# Patient Record
Sex: MALE | Race: WHITE | NOT HISPANIC OR LATINO | Employment: UNEMPLOYED | URBAN - METROPOLITAN AREA
[De-identification: names, ages, dates, MRNs, and addresses within clinical notes are randomized per-mention and may not be internally consistent; named-entity substitution may affect disease eponyms.]

---

## 2017-11-10 ENCOUNTER — HOSPITAL ENCOUNTER (EMERGENCY)
Facility: HOSPITAL | Age: 52
Discharge: HOME | End: 2017-11-10
Admitting: EMERGENCY MEDICINE
Payer: SUBSIDIZED

## 2017-11-10 ENCOUNTER — APPOINTMENT (EMERGENCY)
Dept: RADIOLOGY | Facility: HOSPITAL | Age: 52
End: 2017-11-10
Payer: SUBSIDIZED

## 2017-11-10 VITALS
HEART RATE: 84 BPM | HEIGHT: 75 IN | RESPIRATION RATE: 16 BRPM | WEIGHT: 315 LBS | BODY MASS INDEX: 39.17 KG/M2 | TEMPERATURE: 98.5 F | DIASTOLIC BLOOD PRESSURE: 70 MMHG | SYSTOLIC BLOOD PRESSURE: 115 MMHG | OXYGEN SATURATION: 97 %

## 2017-11-10 DIAGNOSIS — M51.36 DDD (DEGENERATIVE DISC DISEASE), LUMBAR: ICD-10-CM

## 2017-11-10 DIAGNOSIS — M51.26 BULGE OF LUMBAR DISC WITHOUT MYELOPATHY: ICD-10-CM

## 2017-11-10 DIAGNOSIS — M54.50 LUMBAR BACK PAIN: Primary | ICD-10-CM

## 2017-11-10 PROCEDURE — 72131 CT LUMBAR SPINE W/O DYE: CPT

## 2017-11-10 PROCEDURE — 99283 EMERGENCY DEPT VISIT LOW MDM: CPT

## 2017-11-10 PROCEDURE — 96372 THER/PROPH/DIAG INJ SC/IM: CPT

## 2017-11-10 RX ORDER — KETOROLAC TROMETHAMINE 30 MG/ML
30 INJECTION, SOLUTION INTRAMUSCULAR; INTRAVENOUS ONCE
Status: COMPLETED | OUTPATIENT
Start: 2017-11-10 | End: 2017-11-10

## 2017-11-10 RX ORDER — CYCLOBENZAPRINE HCL 10 MG
10 TABLET ORAL 3 TIMES DAILY PRN
Qty: 21 TABLET | Refills: 0 | Status: SHIPPED | OUTPATIENT
Start: 2017-11-10 | End: 2018-06-26

## 2017-11-10 RX ORDER — PREDNISONE 50 MG/1
50 TABLET ORAL DAILY
Qty: 5 TABLET | Refills: 0 | Status: SHIPPED | OUTPATIENT
Start: 2017-11-10 | End: 2017-11-15

## 2017-11-10 RX ORDER — TRAMADOL HYDROCHLORIDE 50 MG/1
50 TABLET ORAL EVERY 8 HOURS PRN
Qty: 15 TABLET | Refills: 0 | Status: SHIPPED | OUTPATIENT
Start: 2017-11-10 | End: 2017-11-15

## 2017-11-10 RX ORDER — CYCLOBENZAPRINE HCL 10 MG
10 TABLET ORAL ONCE
Status: COMPLETED | OUTPATIENT
Start: 2017-11-10 | End: 2017-11-10

## 2017-11-10 RX ADMIN — KETOROLAC TROMETHAMINE 30 MG: 30 INJECTION, SOLUTION INTRAMUSCULAR at 11:16

## 2017-11-10 RX ADMIN — CYCLOBENZAPRINE HYDROCHLORIDE 10 MG: 10 TABLET, FILM COATED ORAL at 11:09

## 2017-11-10 RX ADMIN — PREDNISONE 50 MG: 20 TABLET ORAL at 11:09

## 2017-11-10 NOTE — ED PROVIDER NOTES
History  Chief Complaint   Patient presents with    Back Pain     lower center back pain x 2-3 days     The patient is a 49-year-old male presents to the emergency department complaining moderate to severe sharp central lumbar pain for for the past 3 days after the patient lifted a large wooden block with bad posture  Patient has a history of 2 laminectomies that were done at the L5-S1 level following severe right-sided radicular symptoms  He states that the procedures were done at [facility]  He is no longer followedBy a specialist for back issues, and states that he has not had any acute issues since the procedures were completed  He denies bowel or bladder incontinence, saddle anesthesia, or symptoms to bilateral lower extremities  History provided by:  Patient   used: No    Back Pain   Location:  Lumbar spine  Quality:  Stabbing  Radiates to:  Does not radiate  Pain severity:  Moderate  Onset quality:  Sudden  Duration:  3 days  Timing:  Constant  Progression:  Unchanged  Chronicity:  New  Context: lifting heavy objects    Context: not emotional stress, not falling, not jumping from heights, not MCA, not MVA, not occupational injury, not pedestrian accident, not physical stress, not recent illness, not recent injury and not twisting    Associated symptoms: no abdominal pain, no abdominal swelling, no bladder incontinence, no bowel incontinence, no chest pain, no dysuria, no fever, no headaches, no leg pain, no numbness, no paresthesias, no pelvic pain, no perianal numbness, no tingling, no weakness and no weight loss    Risk factors: no hx of cancer, no hx of osteoporosis, no lack of exercise, no menopause, not obese, not pregnant, no recent surgery, no steroid use and no vascular disease        Prior to Admission Medications   Prescriptions Last Dose Informant Patient Reported?  Taking?   lisinopril-hydrochlorothiazide (PRINZIDE,ZESTORETIC) 20-25 MG per tablet 11/10/2017 at Unknown time  Yes Yes   Sig: Take 1 tablet by mouth daily  Facility-Administered Medications: None       Past Medical History:   Diagnosis Date    Hypertension        Past Surgical History:   Procedure Laterality Date    LAMINECTOMY         History reviewed  No pertinent family history  I have reviewed and agree with the history as documented  Social History   Substance Use Topics    Smoking status: Never Smoker    Smokeless tobacco: Never Used    Alcohol use No        Review of Systems   Constitutional: Negative for chills, diaphoresis, fatigue, fever and weight loss  HENT: Negative  Eyes: Negative for photophobia and visual disturbance  Respiratory: Negative for cough, chest tightness, shortness of breath and wheezing  Cardiovascular: Negative for chest pain  Gastrointestinal: Negative for abdominal pain, bowel incontinence, nausea and vomiting  Genitourinary: Negative for bladder incontinence, dysuria, flank pain and pelvic pain  Musculoskeletal: Positive for back pain  Negative for myalgias and neck stiffness  Skin: Negative for color change, pallor and rash  Neurological: Negative for dizziness, tingling, weakness, numbness, headaches and paresthesias  Physical Exam  ED Triage Vitals [11/10/17 1034]   Temperature Pulse Respirations Blood Pressure SpO2   98 5 °F (36 9 °C) 84 16 115/70 97 %      Temp Source Heart Rate Source Patient Position - Orthostatic VS BP Location FiO2 (%)   Tympanic Monitor Sitting Left arm --      Pain Score       8           Orthostatic Vital Signs  Vitals:    11/10/17 1034   BP: 115/70   Pulse: 84   Patient Position - Orthostatic VS: Sitting       Physical Exam   Constitutional: He is oriented to person, place, and time  He appears well-developed and well-nourished  No distress  HENT:   Head: Normocephalic and atraumatic     Right Ear: External ear normal    Left Ear: External ear normal    Nose: Nose normal    Eyes: Conjunctivae are normal  Right eye exhibits no discharge  Left eye exhibits no discharge  Neck: Normal range of motion  Neck supple  Cardiovascular: Normal rate, regular rhythm, normal heart sounds and intact distal pulses  Exam reveals no friction rub  No murmur heard  Pulmonary/Chest: Effort normal and breath sounds normal  No respiratory distress  He has no wheezes  Musculoskeletal:        Lumbar back: He exhibits decreased range of motion, tenderness and pain  He exhibits no swelling, no edema, no deformity and no spasm  Back:    Right-sided lumbar-sacral tenderness to palpation without bony step-offs  No spasms noted in the region  Lymphadenopathy:     He has no cervical adenopathy  Neurological: He is alert and oriented to person, place, and time  He exhibits normal muscle tone  Coordination normal    Skin: Skin is warm and dry  Capillary refill takes less than 2 seconds  No rash noted  He is not diaphoretic  No pallor  Nursing note and vitals reviewed  ED Medications  Medications   ketorolac (TORADOL) 30 mg/mL injection 30 mg (30 mg Intramuscular Given 11/10/17 1116)   cyclobenzaprine (FLEXERIL) tablet 10 mg (10 mg Oral Given 11/10/17 1109)   predniSONE tablet 50 mg (50 mg Oral Given 11/10/17 1109)       Diagnostic Studies  Results Reviewed     None                 CT lumbar spine without contrast   Final Result by Jenelle Mead MD (11/10 1209)      No acute compression collapse of the vertebra seen          Severe left lateral recess stenosis of the S1 due to proliferative change and degenerative disc disease      Mild central canal narrowing at L4-5 with the right foraminal and protrusion and mild right foraminal narrowing      There is a right paracentral, subarticular recess and foraminal and extraforaminal protrusion at L3-4 level mild central canal narrowing         Workstation performed: [id number]                    Procedures  Procedures       Phone Contacts  ED Phone Contact    ED Course  ED Course                                MDM  Number of Diagnoses or Management Options  Bulge of lumbar disc without myelopathy: new and requires workup  DDD (degenerative disc disease), lumbar: new and requires workup  Lumbar back pain: new and requires workup  Diagnosis management comments: CT of the patient's lumbar spine showed disc bulging and foraminal stenosis from L3-L5  He was discharged in no acute distress with supportive therapy and referred to [address] [place] [address] for further evaluation and/or treatment as necessary  Amount and/or Complexity of Data Reviewed  Tests in the radiology section of CPT®: ordered and reviewed  Review and summarize past medical records: yes      CritCare Time    Disposition  Final diagnoses:   Lumbar back pain   Bulge of lumbar disc without myelopathy   DDD (degenerative disc disease), lumbar     Time reflects when diagnosis was documented in both MDM as applicable and the Disposition within this note     Time User Action Codes Description Comment    11/10/2017 12:16 PM Gladys Major Add [M54 5] Lumbar back pain     11/10/2017 12:17 PM Gladys Major Add [M51 26] Bulge of lumbar disc without myelopathy     11/10/2017 12:17 PM Gladys Major Add [M51 36] DDD (degenerative disc disease), lumbar       ED Disposition     ED Disposition Condition Comment    Discharge  Luige Nadeem 54 discharge to home/self care  Condition at discharge: Stable        Follow-up Information     Follow up With Specialties Details Why Contact Info Additional [address] Emergency Department Emergency Medicine Go to If symptoms worsen [facility]  [phone] [facility], [place], [place], [address]    Chan Linder MD Orthopedic Surgery Schedule an appointment as soon as possible for a visit For further evaluation and/or treatment as necessary   [facility] [address] [place]  [phone]           Discharge Medication List as of 11/10/2017 12:22 PM      START taking these medications    Details   cyclobenzaprine (FLEXERIL) 10 mg tablet Take 1 tablet by mouth 3 (three) times a day as needed for muscle spasms for up to 7 days, Starting Fri 11/10/2017, Until Fri 11/17/2017, Print      predniSONE 50 mg tablet Take 1 tablet by mouth daily for 5 days, Starting Fri 11/10/2017, Until Wed 11/15/2017, Print      traMADol (ULTRAM) 50 mg tablet Take 1 tablet by mouth every 8 (eight) hours as needed for moderate pain or severe pain for up to 5 days, Starting Fri 11/10/2017, Until Wed 11/15/2017, Print         CONTINUE these medications which have NOT CHANGED    Details   lisinopril-hydrochlorothiazide (PRINZIDE,ZESTORETIC) 20-25 MG per tablet Take 1 tablet by mouth daily  , Until Discontinued, Historical Med           No discharge procedures on file      ED Provider  Electronically Signed by           Naveen Roman PA-C  11/10/17 0170

## 2017-11-10 NOTE — DISCHARGE INSTRUCTIONS
Degenerative Disc Disease   WHAT YOU NEED TO KNOW:   Degenerative disc disease happens when one or more discs between the vertebrae (bones in your spine) wear down  Discs act like a cushion between your vertebrae and help to stabilize your spine  Degenerative disc disease commonly occurs in the neck or lower back as you get older  DISCHARGE INSTRUCTIONS:   Medicines: You may need the following:  · NSAIDs , such as ibuprofen, help decrease swelling, pain, and fever  This medicine is available with or without a doctor's order  NSAIDs can cause stomach bleeding or kidney problems in certain people  If you take blood thinner medicine, always ask your healthcare provider if NSAIDs are safe for you  Always read the medicine label and follow directions  · Acetaminophen  decreases pain  It is available without a doctor's order  Ask how much to take and how often to take it  Follow directions  Acetaminophen can cause liver damage if not taken correctly  · Prescription pain medicine  may be given  Ask how to take this medicine safely  · Take your medicine as directed  Contact your healthcare provider if you think your medicine is not helping or if you have side effects  Tell him or her if you are allergic to any medicine  Keep a list of the medicines, vitamins, and herbs you take  Include the amounts, and when and why you take them  Bring the list or the pill bottles to follow-up visits  Carry your medicine list with you in case of an emergency  Follow up with your healthcare provider as directed:  Write down your questions so you remember to ask them during your visits  Go to physical therapy as directed:  A physical therapist will help you find stretches and exercises to decrease pain and improve movement and strength  He may also do spinal decompression to stretch and open the area between your vertebra  Manage your symptoms:   · Avoid activities that make your symptoms worse    Ask your healthcare provider for ways to decrease your symptoms  Certain stretches or exercises may relieve your symptoms  Ask how to stay active without further injury  · Apply heat or ice as directed  Heat or ice may help decrease pain, inflammation, and muscle spasms  · Maintain a healthy weight  If you are overweight, weight loss may help improve your symptoms  Ask your healthcare provider to help you create a weight loss plan if you are overweight  · Find ways to manage your stress  Behavioral therapy may help you learn ways to manage stress and decrease pain  Ask for more information about behavioral therapy  · Do not smoke  If you smoke, it is never too late to quit  Ask for information if you need help quitting  Contact your healthcare provider if:   · Your pain gets worse, or you cannot control it with pain medicine  · Your symptoms get worse  · You have questions or concerns about your condition or care  Return to the emergency department if:   · You have severe pain or weakness, or you cannot move your arm or leg  · You lose control of your bladder or bowels  © 2017 [facility] Information is for End User's use only and may not be sold, redistributed or otherwise used for commercial purposes  All illustrations and images included in CareNotes® are the copyrighted property of [employer] , [employer]  or Daniel Ricardo  The above information is an  only  It is not intended as medical advice for individual conditions or treatments  Talk to your doctor, nurse or pharmacist before following any medical regimen to see if it is safe and effective for you

## 2017-11-10 NOTE — ED NOTES
Patient transported to [facility]     Noel Srivastava, RN  11/10/17 [address] RN  11/10/17 1121

## 2018-06-26 ENCOUNTER — APPOINTMENT (EMERGENCY)
Dept: RADIOLOGY | Facility: HOSPITAL | Age: 53
End: 2018-06-26
Payer: SUBSIDIZED

## 2018-06-26 ENCOUNTER — HOSPITAL ENCOUNTER (OUTPATIENT)
Facility: HOSPITAL | Age: 53
Setting detail: OBSERVATION
Discharge: HOME | End: 2018-06-27
Attending: SURGERY | Admitting: EMERGENCY MEDICINE

## 2018-06-26 ENCOUNTER — HOSPITAL ENCOUNTER (EMERGENCY)
Facility: HOSPITAL | Age: 53
End: 2018-06-26
Attending: EMERGENCY MEDICINE | Admitting: EMERGENCY MEDICINE
Payer: SUBSIDIZED

## 2018-06-26 VITALS
HEART RATE: 63 BPM | BODY MASS INDEX: 38.36 KG/M2 | WEIGHT: 315 LBS | RESPIRATION RATE: 18 BRPM | DIASTOLIC BLOOD PRESSURE: 77 MMHG | HEIGHT: 76 IN | SYSTOLIC BLOOD PRESSURE: 153 MMHG | TEMPERATURE: 97.1 F | OXYGEN SATURATION: 96 %

## 2018-06-26 DIAGNOSIS — S32.009A FRACTURE OF TRANSVERSE PROCESS OF LUMBAR VERTEBRA (HCC): Primary | ICD-10-CM

## 2018-06-26 DIAGNOSIS — S32.009A CLOSED FRACTURE OF TRANSVERSE PROCESS OF LUMBAR VERTEBRA, INITIAL ENCOUNTER (HCC): Primary | ICD-10-CM

## 2018-06-26 PROBLEM — S50.812A ABRASION OF LEFT FOREARM: Status: ACTIVE | Noted: 2018-06-26

## 2018-06-26 PROBLEM — S80.212A ABRASION OF LEFT KNEE: Status: ACTIVE | Noted: 2018-06-26

## 2018-06-26 LAB
ANION GAP SERPL CALCULATED.3IONS-SCNC: 5 MMOL/L (ref 4–13)
BACTERIA UR QL AUTO: ABNORMAL /HPF
BASOPHILS # BLD AUTO: 0.07 THOUSANDS/ΜL (ref 0–0.1)
BASOPHILS NFR BLD AUTO: 1 % (ref 0–1)
BILIRUB UR QL STRIP: NEGATIVE
BUN SERPL-MCNC: 16 MG/DL (ref 5–25)
CALCIUM SERPL-MCNC: 8.8 MG/DL (ref 8.3–10.1)
CHLORIDE SERPL-SCNC: 105 MMOL/L (ref 100–108)
CLARITY UR: CLEAR
CO2 SERPL-SCNC: 28 MMOL/L (ref 21–32)
COLOR UR: YELLOW
CREAT SERPL-MCNC: 1.23 MG/DL (ref 0.6–1.3)
EOSINOPHIL # BLD AUTO: 0.21 THOUSAND/ΜL (ref 0–0.61)
EOSINOPHIL NFR BLD AUTO: 2 % (ref 0–6)
ERYTHROCYTE [DISTWIDTH] IN BLOOD BY AUTOMATED COUNT: 13 % (ref 11.6–15.1)
GFR SERPL CREATININE-BSD FRML MDRD: 66 ML/MIN/1.73SQ M
GLUCOSE SERPL-MCNC: 110 MG/DL (ref 65–140)
GLUCOSE UR STRIP-MCNC: NEGATIVE MG/DL
HCT VFR BLD AUTO: 42.5 % (ref 36.5–49.3)
HGB BLD-MCNC: 14 G/DL (ref 12–17)
HGB UR QL STRIP.AUTO: ABNORMAL
IMM GRANULOCYTES # BLD AUTO: 0.03 THOUSAND/UL (ref 0–0.2)
IMM GRANULOCYTES NFR BLD AUTO: 0 % (ref 0–2)
KETONES UR STRIP-MCNC: ABNORMAL MG/DL
LEUKOCYTE ESTERASE UR QL STRIP: NEGATIVE
LYMPHOCYTES # BLD AUTO: 1.45 THOUSANDS/ΜL (ref 0.6–4.47)
LYMPHOCYTES NFR BLD AUTO: 16 % (ref 14–44)
MCH RBC QN AUTO: 30.6 PG (ref 26.8–34.3)
MCHC RBC AUTO-ENTMCNC: 32.9 G/DL (ref 31.4–37.4)
MCV RBC AUTO: 93 FL (ref 82–98)
MONOCYTES # BLD AUTO: 0.58 THOUSAND/ΜL (ref 0.17–1.22)
MONOCYTES NFR BLD AUTO: 6 % (ref 4–12)
MUCOUS THREADS UR QL AUTO: ABNORMAL
NEUTROPHILS # BLD AUTO: 6.74 THOUSANDS/ΜL (ref 1.85–7.62)
NEUTS SEG NFR BLD AUTO: 75 % (ref 43–75)
NITRITE UR QL STRIP: NEGATIVE
NON-SQ EPI CELLS URNS QL MICRO: ABNORMAL /HPF
NRBC BLD AUTO-RTO: 0 /100 WBCS
PH UR STRIP.AUTO: 6.5 [PH] (ref 5–9)
PLATELET # BLD AUTO: 235 THOUSANDS/UL (ref 149–390)
PMV BLD AUTO: 10 FL (ref 8.9–12.7)
POTASSIUM SERPL-SCNC: 3.7 MMOL/L (ref 3.5–5.3)
PROT UR STRIP-MCNC: NEGATIVE MG/DL
RBC # BLD AUTO: 4.57 MILLION/UL (ref 3.88–5.62)
RBC #/AREA URNS AUTO: ABNORMAL /HPF
SODIUM SERPL-SCNC: 138 MMOL/L (ref 136–145)
SP GR UR STRIP.AUTO: 1.02 (ref 1–1.03)
UROBILINOGEN UR QL STRIP.AUTO: 0.2 E.U./DL
WBC # BLD AUTO: 9.08 THOUSAND/UL (ref 4.31–10.16)
WBC #/AREA URNS AUTO: ABNORMAL /HPF

## 2018-06-26 PROCEDURE — 99285 EMERGENCY DEPT VISIT HI MDM: CPT

## 2018-06-26 PROCEDURE — 85025 COMPLETE CBC W/AUTO DIFF WBC: CPT | Performed by: EMERGENCY MEDICINE

## 2018-06-26 PROCEDURE — 90715 TDAP VACCINE 7 YRS/> IM: CPT | Performed by: EMERGENCY MEDICINE

## 2018-06-26 PROCEDURE — 81001 URINALYSIS AUTO W/SCOPE: CPT | Performed by: EMERGENCY MEDICINE

## 2018-06-26 PROCEDURE — 36415 COLL VENOUS BLD VENIPUNCTURE: CPT | Performed by: EMERGENCY MEDICINE

## 2018-06-26 PROCEDURE — 72100 X-RAY EXAM L-S SPINE 2/3 VWS: CPT

## 2018-06-26 PROCEDURE — 74177 CT ABD & PELVIS W/CONTRAST: CPT

## 2018-06-26 PROCEDURE — 96376 TX/PRO/DX INJ SAME DRUG ADON: CPT

## 2018-06-26 PROCEDURE — 80048 BASIC METABOLIC PNL TOTAL CA: CPT | Performed by: EMERGENCY MEDICINE

## 2018-06-26 PROCEDURE — 96374 THER/PROPH/DIAG INJ IV PUSH: CPT

## 2018-06-26 PROCEDURE — 99219 PR INITIAL OBSERVATION CARE/DAY 50 MINUTES: CPT | Performed by: EMERGENCY MEDICINE

## 2018-06-26 PROCEDURE — 72170 X-RAY EXAM OF PELVIS: CPT

## 2018-06-26 RX ORDER — LIDOCAINE 50 MG/G
1 PATCH TOPICAL DAILY
Status: DISCONTINUED | OUTPATIENT
Start: 2018-06-26 | End: 2018-06-27 | Stop reason: HOSPADM

## 2018-06-26 RX ORDER — ACETAMINOPHEN 325 MG/1
650 TABLET ORAL EVERY 6 HOURS SCHEDULED
Status: DISCONTINUED | OUTPATIENT
Start: 2018-06-26 | End: 2018-06-27 | Stop reason: HOSPADM

## 2018-06-26 RX ORDER — OXYCODONE HYDROCHLORIDE 5 MG/1
5 TABLET ORAL EVERY 4 HOURS PRN
Status: DISCONTINUED | OUTPATIENT
Start: 2018-06-26 | End: 2018-06-27 | Stop reason: HOSPADM

## 2018-06-26 RX ORDER — METHOCARBAMOL 500 MG/1
500 TABLET, FILM COATED ORAL EVERY 6 HOURS PRN
Status: DISCONTINUED | OUTPATIENT
Start: 2018-06-26 | End: 2018-06-27 | Stop reason: HOSPADM

## 2018-06-26 RX ORDER — MORPHINE SULFATE 4 MG/ML
4 INJECTION, SOLUTION INTRAMUSCULAR; INTRAVENOUS ONCE
Status: COMPLETED | OUTPATIENT
Start: 2018-06-26 | End: 2018-06-26

## 2018-06-26 RX ORDER — HEPARIN SODIUM 5000 [USP'U]/ML
5000 INJECTION, SOLUTION INTRAVENOUS; SUBCUTANEOUS EVERY 8 HOURS SCHEDULED
Status: DISCONTINUED | OUTPATIENT
Start: 2018-06-26 | End: 2018-06-27 | Stop reason: HOSPADM

## 2018-06-26 RX ORDER — OXYCODONE HYDROCHLORIDE AND ACETAMINOPHEN 5; 325 MG/1; MG/1
1 TABLET ORAL ONCE
Status: COMPLETED | OUTPATIENT
Start: 2018-06-26 | End: 2018-06-26

## 2018-06-26 RX ORDER — OXYCODONE HYDROCHLORIDE 5 MG/1
2.5 TABLET ORAL EVERY 4 HOURS PRN
Status: DISCONTINUED | OUTPATIENT
Start: 2018-06-26 | End: 2018-06-27

## 2018-06-26 RX ORDER — MORPHINE SULFATE 4 MG/ML
2 INJECTION, SOLUTION INTRAMUSCULAR; INTRAVENOUS ONCE
Status: COMPLETED | OUTPATIENT
Start: 2018-06-26 | End: 2018-06-26

## 2018-06-26 RX ADMIN — MORPHINE SULFATE 2 MG: 4 INJECTION INTRAVENOUS at 04:21

## 2018-06-26 RX ADMIN — MORPHINE SULFATE 4 MG: 4 INJECTION INTRAVENOUS at 07:00

## 2018-06-26 RX ADMIN — HEPARIN SODIUM 5000 UNITS: 5000 INJECTION, SOLUTION INTRAVENOUS; SUBCUTANEOUS at 15:48

## 2018-06-26 RX ADMIN — IOHEXOL 100 ML: 350 INJECTION, SOLUTION INTRAVENOUS at 05:15

## 2018-06-26 RX ADMIN — HEPARIN SODIUM 5000 UNITS: 5000 INJECTION, SOLUTION INTRAVENOUS; SUBCUTANEOUS at 23:15

## 2018-06-26 RX ADMIN — ACETAMINOPHEN 650 MG: 325 TABLET ORAL at 23:15

## 2018-06-26 RX ADMIN — ACETAMINOPHEN 650 MG: 325 TABLET ORAL at 12:36

## 2018-06-26 RX ADMIN — TETANUS TOXOID, REDUCED DIPHTHERIA TOXOID AND ACELLULAR PERTUSSIS VACCINE, ADSORBED 0.5 ML: 5; 2.5; 8; 8; 2.5 SUSPENSION INTRAMUSCULAR at 12:37

## 2018-06-26 RX ADMIN — ACETAMINOPHEN 650 MG: 325 TABLET ORAL at 18:29

## 2018-06-26 RX ADMIN — OXYCODONE HYDROCHLORIDE 2.5 MG: 5 TABLET ORAL at 15:48

## 2018-06-26 RX ADMIN — OXYCODONE HYDROCHLORIDE 2.5 MG: 5 TABLET ORAL at 23:19

## 2018-06-26 RX ADMIN — OXYCODONE HYDROCHLORIDE AND ACETAMINOPHEN 1 TABLET: 5; 325 TABLET ORAL at 02:25

## 2018-06-26 NOTE — ORTHOTIC NOTE
Orthotic Note            Date: 6/26/2018      Patient Name: Carlos Chahal        Time: 13:30pm    Reason for Consult:  Patient Active Problem List   Diagnosis    Closed fracture of transverse process of lumbar vertebra ([address] )    Abrasion of left forearm    Abrasion of left knee   [employer] Draw plus additional extension panel     I measured patient for [employer] quick Draw with additional extension panel  I reviewed instructions/adjustments with patient x's 3  Patient states he is having increased pain making it difficult for him to mobilize  I will continue to follow up with patient daily  Instructions and my contact information at bedside  RN aware  Recommendations:  Please call Mobility Coordinator at ext  [phone] in regards to bracing instruction and/or adjustment  Mirza Landis Mobility Coordinator LCFo, LCOF, ASOP R  O T, O B T

## 2018-06-26 NOTE — EMTALA/ACUTE CARE TRANSFER
[address] [facility] [address] EMERGENCY DEPARTMENT  [address] [id number]  Dept: [phone]      EMTALA TRANSFER CONSENT    NAME Madison Gore                                         1965                              MRN [id number]    I have been informed of my rights regarding examination, treatment, and transfer   by Dr Niko Pelayo DO    Benefits:      Risks: Potential for delay in receiving treatment, Potential deterioration of medical condition, Increased discomfort during transfer, Possible worsening of condition or death during transfer      Consent for Transfer:  I acknowledge that my medical condition has been evaluated and explained to me by the emergency department physician or other qualified medical person and/or my attending physician, who has recommended that I be transferred to the service of  Accepting Physician: Dr Shira Molina at [address] [place] [address] Name, [address] : [place], [place]  The above potential benefits of such transfer, the potential risks associated with such transfer, and the probable risks of not being transferred have been explained to me, and I fully understand them  The doctor has explained that, in my case, the benefits of transfer outweigh the risks  I agree to be transferred  I authorize the performance of emergency medical procedures and treatments upon me in both transit and upon arrival at the receiving facility  Additionally, I authorize the release of any and all medical records to the receiving facility and request they be transported with me, if possible  I understand that the safest mode of transportation during a medical emergency is an ambulance and that the Hospital advocates the use of this mode of transport   Risks of traveling to the receiving facility by car, including absence of medical control, life sustaining equipment, such as oxygen, and medical personnel has been explained to me and I fully understand them     ([facility])  [  ]  I consent to the stated transfer and to be transported by ambulance/helicopter  [  ]  I consent to the stated transfer, but refuse transportation by ambulance and accept full responsibility for my transportation by car  I understand the risks of non-ambulance transfers and I exonerate the Hospital and its staff from any deterioration in my condition that results from this refusal     X___________________________________________    DATE  18  TIME________  Signature of patient or legally responsible individual signing on patient behalf           RELATIONSHIP TO PATIENT_________________________          Provider Certification    NAME Saqib Mendez                                         1965                              MRN [id number]    A medical screening exam was performed on the above named patient  Based on the examination:    Condition Necessitating Transfer The encounter diagnosis was Fracture of transverse process of lumbar vertebra ([facility] [address] )  Patient Condition: An emergency transfer is being made prior to stabilization due to the need for definitive care and the benefit of transfer outweighs the risk    Reason for Transfer: Level of Care needed not available at this facility    Transfer Requirements: [address] [place] [address]   · Space available and qualified personnel available for treatment as acknowledged by    · Agreed to accept transfer and to provide appropriate medical treatment as acknowledged by       Dr Shobha Cevallos  · Appropriate medical records of the examination and treatment of the patient are provided at the time of transfer   [facility], [address] _______  · Transfer will be performed by qualified personnel from    and appropriate transfer equipment as required, including the use of necessary and appropriate life support measures      Provider Certification: I have examined the patient and explained the following risks and benefits of being transferred/refusing transfer to the patient/family:  General risk, such as traffic hazards, adverse weather conditions, rough terrain or turbulence, possible failure of equipment (including vehicle or aircraft), or consequences of actions of persons outside the control of the transport personnel, Risk of worsening condition      Based on these reasonable risks and benefits to the patient and/or the unborn child(nory), and based upon the information available at the time of the patients examination, I certify that the medical benefits reasonably to be expected from the provision of appropriate medical treatments at another medical facility outweigh the increasing risks, if any, to the individuals medical condition, and in the case of labor to the unborn child, from effecting the transfer      X____________________________________________ DATE 06/26/18        TIME_______      ORIGINAL - SEND TO MEDICAL RECORDS   COPY - SEND WITH PATIENT DURING TRANSFER

## 2018-06-26 NOTE — RESPIRATORY THERAPY NOTE
RT Protocol Note  Daril Needle 52 y o  male MRN: [id number]  Unit/Bed#: ED 06 Encounter: [id number]    Assessment    Principal Problem:    Closed fracture of transverse process of lumbar vertebra ([address] )  Active Problems:    Abrasion of left forearm    Abrasion of left knee      Home Pulmonary Medications:  n/a       Past Medical History:   Diagnosis Date    Hypertension      Social History     Social History    Marital status:      Spouse name: N/A    Number of children: N/A    Years of education: N/A     Social History Main Topics    Smoking status: Never Smoker    Smokeless tobacco: Never Used    Alcohol use No    Drug use: No    Sexual activity: Not Asked     Other Topics Concern    None     Social History Narrative    None       Subjective         Objective    Physical Exam:   Assessment Type: (P) Assess only  General Appearance: (P) Awake  Respiratory Pattern: (P) Normal  Chest Assessment: (P) Chest expansion symmetrical  Bilateral Breath Sounds: (P) Clear    Vitals:  Blood pressure 129/66, pulse 65, temperature 97 8 °F (36 6 °C), temperature source Oral, resp  rate 18, SpO2 97 %  Imaging and other studies: I have personally reviewed pertinent reports  Plan             Resp Comments: (P) respiratory protocol d/c'd, pt has no pulmonary hx, pt takes no pulmonary meds at home, pt bs are clear, and pt is in no distress

## 2018-06-26 NOTE — H&P
H&P Exam - Trauma   Shawn Lowry 52 y o  male MRN: [id number]  Unit/Bed#: ED 06 Encounter: [id number]    Assessment/Plan   Trauma Alert: Other Transfer  Model of Arrival: Ambulance  Trauma Team: Attending Dianne Johnston and Residents [address]  Consultants: None Neurosurgery    Trauma Active Problems:     Fall from standing  Acute nondisplaced fracture of the left L1, L2 transverse process fracture  L forearm abrasion  L knee abrasion    Trauma Plan:     Admit for Observation  Neurosurgery consul  Pain control  Lumbar spine precautions  update tetanus  cbc  dvt ppx    Chief Complaint:  Back pain    History of Present Illness   HPI:  Shawn Lowry is a 52 y o  male who presents after falling while trying to unload a trailer  He lost his balance stumbling backwards and landing on a piece of cement on his left lower back  This happened yesterday evening  Was able sleep sleep through the night but I considerable left lower back pain  He presented to [address] [place] [address] this morning secondary to the pain and CT scan imaging showed a L1, L2 left-sided transverse process fracture  Patient had no neurologic symptoms  Pain was sharp, constant, nonradiating in the left lower back  No history of back pain  Worse with ambulating and better when lying flat  Currently a 2/10  No red flag back pain symptoms including saddle anesthesia, urinary retention, or incontinence, no lower extremity numbness tingling weakness  Fer Bess He denies any other areas of injury at this time  He denies hitting his head or losing consciousness  He was able to get up and ambulate back to his house after the fall but had considerable pain in the left lower back  Mechanism:Fall    Review of Systems   Constitutional: Negative for chills, fatigue and fever  HENT: Negative for congestion and sore throat  Eyes: Negative for visual disturbance  Respiratory: Negative for cough, chest tightness, shortness of breath and wheezing      Cardiovascular: Negative for chest pain, palpitations and leg swelling  Gastrointestinal: Negative for abdominal pain, blood in stool, diarrhea, nausea and vomiting  Genitourinary: Negative for difficulty urinating, dysuria and hematuria  Musculoskeletal: Positive for back pain and gait problem  Negative for arthralgias  Skin: Negative for rash  Neurological: Negative for dizziness, syncope, weakness, light-headedness, numbness and headaches  Hematological: Negative for adenopathy  Psychiatric/Behavioral: Negative for sleep disturbance  Historical Information     Efforts to obtain history included the following sources: other medical personnel, obtained from other records    Past Medical History:   Diagnosis Date    Hypertension      Past Surgical History:   Procedure Laterality Date    LAMINECTOMY       Social History   History   Alcohol Use No     History   Drug Use No     History   Smoking Status    Never Smoker   Smokeless Tobacco    Never Used     Immunization History   Administered Date(s) Administered    Tdap 07/02/2010     Last Tetanus:  Unknown  Family History: Non-contributory  Unable to obtain/limited by none      Meds/Allergies   all current active meds have been reviewed    No Known Allergies      PHYSICAL EXAM    PE limited by:  None    Objective   Vitals:   First set: Temperature: 97 8 °F (36 6 °C) (06/26/18 0750)  Pulse: 68 (06/26/18 0750)  Respirations: 18 (06/26/18 0750)  Blood Pressure: 156/75 (06/26/18 0750)    Primary Survey:   (A) Airway:  Intact  (B) Breathing:  Bilateral breath sounds  (C) Circulation: Pulses:   normal, carotid  2/4, pedal  2/4, radial  2/4 and femoral  2/4  (D) Disabliity:  GCS Total:  15  (E) Expose:  Completed    Secondary Survey: (Click on Physical Exam tab above)  Physical Exam   Constitutional: He is oriented to person, place, and time  He appears well-developed and well-nourished  No distress  HENT:   Head: Normocephalic and atraumatic     Right Ear: External ear normal    Left Ear: External ear normal    Mouth/Throat: Oropharynx is clear and moist  No oropharyngeal exudate  Eyes: Conjunctivae are normal  Pupils are equal, round, and reactive to light  Neck: Normal range of motion  Neck supple  No JVD present  No tracheal deviation present  No thyromegaly present  Cardiovascular: Normal rate and regular rhythm  No murmur heard  Pulmonary/Chest: Effort normal and breath sounds normal  No respiratory distress  He has no wheezes  He has no rales  He exhibits no tenderness  Abdominal: Soft  He exhibits no distension  There is no tenderness  There is no rebound and no guarding  Musculoskeletal: Normal range of motion  Cervical back: Normal         Thoracic back: Normal         Lumbar back: He exhibits tenderness, bony tenderness, pain and spasm  He exhibits no laceration  Back:    Full range of motion all extremities and no pain to bony palpation   Lymphadenopathy:     He has no cervical adenopathy  Neurological: He is alert and oriented to person, place, and time  He has normal strength  No cranial nerve deficit or sensory deficit  He exhibits normal muscle tone  Coordination normal  GCS eye subscore is 4  GCS verbal subscore is 5  GCS motor subscore is 6  Able to ambulate without difficulty, 5 out of 5 lower extremity strength at the hip, knee, plantar and dorsiflexion  Normal sensation that medial, lateral malleolus as well as first webspace  2+ patellar reflexes bilaterally  Negative straight leg raise bilaterally  Unremarkable cranial nerve exam  Pupils 4 mm equal round reactive to light  No nystagmus, normal extraocular motion  5 out of 5 upper and lower extremity strength  No subjective sensory deficits to the face, upper or lower extremity  Normal finger-nose and heel shin  Skin: Skin is warm  Abrasion noted  No laceration and no rash noted  He is not diaphoretic  No pallor  Psychiatric: He has a normal mood and affect  Invasive Devices     Peripheral Intravenous Line            Peripheral IV 06/26/18 Right Antecubital less than 1 day                Lab Results:   Results: I have personally reviewed pertinent reports   , BMP/CMP:   Lab Results   Component Value Date     06/26/2018    K 3 7 06/26/2018     06/26/2018    CO2 28 06/26/2018    ANIONGAP 5 06/26/2018    BUN 16 06/26/2018    CREATININE 1 23 06/26/2018    GLUCOSE 110 06/26/2018    CALCIUM 8 8 06/26/2018    EGFR 66 06/26/2018    and CBC: No results found for: WBC, HGB, HCT, MCV, PLT, ADJUSTEDWBC, MCH, MCHC, RDW, MPV, NRBC  Imaging/EKG Studies: Results: I have personally reviewed pertinent reports  Other Studies:     CTAP -Acute nondisplaced fracture left L1 and L2 transverse processes  No acute intra-abdominal or intrapelvic process  Mild hepatic steatosis  XR Lumbar - No acute osseous abnormality       Degenerative changes as described  XR PELVIS - No acute osseous abnormality      Degenerative changes as described       Code Status: Level 1 - Full Code  Advance Directive and Living Will:      Power of :    POLST:

## 2018-06-26 NOTE — ED PROVIDER NOTES
History  Chief Complaint   Patient presents with    Back Injury     Pt states he fell on his bottom at about 2100 and hurt his L lower back  Pt in ER with c/o low back pain after he slipped and fell onto his back and landed on a piece of concrete late last night  Pt denies LOC or head injury  He states that pain worsened with attempts to move or lay on his side  Pt took 400mg of motrin at home prior to coming to the ER  None       Past Medical History:   Diagnosis Date    Hypertension        Past Surgical History:   Procedure Laterality Date    LAMINECTOMY         No family history on file  I have reviewed and agree with the history as documented  Social History   Substance Use Topics    Smoking status: Never Smoker    Smokeless tobacco: Never Used    Alcohol use No        Review of Systems   Constitutional: Negative for chills and fever  Musculoskeletal: Positive for back pain and gait problem  All other systems reviewed and are negative  Physical Exam  Physical Exam   Constitutional: He is oriented to person, place, and time  He appears well-developed and well-nourished  He appears distressed  HENT:   Head: Normocephalic and atraumatic  Cardiovascular: Normal rate and regular rhythm  Pulmonary/Chest: Effort normal and breath sounds normal  No respiratory distress  He has no wheezes  Abdominal: Soft  Normal appearance and bowel sounds are normal    Musculoskeletal: He exhibits tenderness  He exhibits no edema or deformity  Arms:  Neurological: He is alert and oriented to person, place, and time  Skin: Skin is dry  Nursing note and vitals reviewed        Vital Signs  ED Triage Vitals   Temperature Pulse Respirations Blood Pressure SpO2   06/26/18 0208 06/26/18 0208 06/26/18 0208 06/26/18 0208 06/26/18 0208   98 4 °F (36 9 °C) 79 22 161/70 98 %      Temp Source Heart Rate Source Patient Position - Orthostatic VS BP Location FiO2 (%)   06/26/18 4116 06/26/18 1626 06/26/18 0208 06/26/18 0208 --   Tympanic Monitor Lying Left arm       Pain Score       06/26/18 0225       8           Vitals:    06/26/18 0208 06/26/18 0545 06/26/18 0546 06/26/18 0651   BP: 161/70 (!) 173/73 (!) 173/73 153/77   Pulse: 79  68 63   Patient Position - Orthostatic VS: Lying  Lying Lying       Visual Acuity      ED Medications  Medications   oxyCODONE-acetaminophen (PERCOCET) 5-325 mg per tablet 1 tablet (1 tablet Oral Given 6/26/18 0225)   morphine (PF) 4 mg/mL injection 2 mg (2 mg Intravenous Given 6/26/18 0421)   iohexol (OMNIPAQUE) 350 MG/ML injection (SINGLE-DOSE) 100 mL (100 mL Intravenous Given 6/26/18 0515)   morphine (PF) 4 mg/mL injection 4 mg (4 mg Intravenous Given 6/26/18 0700)       Diagnostic Studies  Results Reviewed     Procedure Component Value Units Date/Time    Basic metabolic panel [[id number]] Collected:  06/26/18 0422    Lab Status:  Final result Specimen:  Blood from Arm, Right Updated:  06/26/18 0440     Sodium 138 mmol/L      Potassium 3 7 mmol/L      Chloride 105 mmol/L      CO2 28 mmol/L      Anion Gap 5 mmol/L      BUN 16 mg/dL      Creatinine 1 23 mg/dL      Glucose 110 mg/dL      Calcium 8 8 mg/dL      eGFR 66 ml/min/1 73sq m     Narrative:         National Kidney Disease Education Program recommendations are as follows:  GFR calculation is accurate only with a steady state creatinine  Chronic Kidney disease less than 60 ml/min/1 73 sq  meters  Kidney failure less than 15 ml/min/1 73 sq  meters      Urine Microscopic [[id number]]  (Abnormal) Collected:  06/26/18 0335    Lab Status:  Final result Specimen:  Urine from Urine, Clean Catch Updated:  06/26/18 0348     RBC, UA 1-2 (A) /hpf      WBC, UA None Seen /hpf      Epithelial Cells None Seen /hpf      Bacteria, UA Occasional /hpf      MUCOUS THREADS Moderate (A)    UA w Reflex to Microscopic [[id number]]  (Abnormal) Collected:  06/26/18 0335    Lab Status:  Final result Specimen:  Urine from Urine, Clean Catch Updated: 06/26/18 0341     Color, UA Yellow     Clarity, UA Clear     Specific Hester, UA 1 020     pH, UA 6 5     Leukocytes, UA Negative     Nitrite, UA Negative     Protein, UA Negative mg/dl      Glucose, UA Negative mg/dl      Ketones, UA 15 (1+) (A) mg/dl      Urobilinogen, UA 0 2 E U /dl      Bilirubin, UA Negative     Blood, UA Small (A)                 XR lumbar spine 2 or 3 views   Final Result by Rosa Nj MD (06/26 8161)      No acute osseous abnormality  Degenerative changes as described  Workstation performed: [id number]         XR pelvis ap only 1 or 2 views   Final Result by Rosa Nj MD (06/26 6679)      No acute osseous abnormality  Degenerative changes as described  Workstation performed: [id number]         CT abdomen pelvis with contrast   Final Result by Maria M Combs MD (06/26 1771)      Acute nondisplaced fracture left L1 and L2 transverse processes  No acute intra-abdominal or intrapelvic process  Mild hepatic steatosis  The study was marked in [facility] for immediate notification  Workstation performed: [id number]                    Procedures  Procedures       Phone Contacts  ED Phone Contact    ED Course                               MDM  Number of Diagnoses or Management Options  Fracture of transverse process of lumbar vertebra [facility]): new and requires workup  Diagnosis management comments: Pt now pain free with morphine  CT report pending      Pt with L1/L2 transverse process fx on CT  Still with severe pain with movement  Will transfer to [facility] for trauma evaluation          Amount and/or Complexity of Data Reviewed  Tests in the radiology section of CPT®: ordered and reviewed    Risk of Complications, Morbidity, and/or Mortality  Presenting problems: moderate  Diagnostic procedures: moderate  Management options: moderate    Patient Progress  Patient progress: stable    CritCare Time    Disposition  Final diagnoses:   Fracture of transverse process of lumbar vertebra ([facility] [address] )     Time reflects when diagnosis was documented in both MDM as applicable and the Disposition within this note     Time User Action Codes Description Comment    6/26/2018  5:47 AM Yessijustyna Chu Add [S32 009A] Fracture of transverse process of lumbar vertebra [facility])       ED Disposition     ED Disposition Condition Comment    Transfer to Another [address] should be transferred out to [facility] - Dr Farida Quiñones MD Documentation      Most Recent Value   Patient Condition  An emergency transfer is being made prior to stabilization due to the need for definitive care and the benefit of transfer outweighs the risk   Reason for Transfer  Level of Care needed not available at this facility   Risks of Transfer  Potential for delay in receiving treatment, Potential deterioration of medical condition, Increased discomfort during transfer, Possible worsening of condition or death during transfer   Accepting Physician  Dr Gini Nelson Name, [address], [place]   Sending MD  Dr Lawyer Neal   Provider Certification  General risk, such as traffic hazards, adverse weather conditions, rough terrain or turbulence, possible failure of equipment (including vehicle or aircraft), or consequences of actions of persons outside the control of the transport personnel, Risk of worsening condition      RN Documentation      Most Recent Value   Accepting Facility Name, [address], [place]   Bed Assignment  [facility]   Report Given to  Abad Smith   Medications Reviewed with Next Provider of Service  Yes   Transport Mode  Ambulance   Level of Care  Basic life support   Copies of Medical Records Sent  Transfer form   Patient Belongings Disposition  Sent with patient   Transfer Date  06/26/18   Transfer Time  0645      Follow-up Information    None         Discharge Medication List as of 6/26/2018  7:26 AM      CONTINUE these medications which have NOT CHANGED    Details   cyclobenzaprine (FLEXERIL) 10 mg tablet Take 1 tablet by mouth 3 (three) times a day as needed for muscle spasms for up to 7 days, Starting Fri 11/10/2017, Until Fri 11/17/2017, Print      lisinopril-hydrochlorothiazide (PRINZIDE,ZESTORETIC) 20-25 MG per tablet Take 1 tablet by mouth daily  , Until Discontinued, Historical Med           No discharge procedures on file      ED Provider  Electronically Signed by           Jeremiah Ramírez DO  06/27/18 5314

## 2018-06-27 VITALS
BODY MASS INDEX: 38.36 KG/M2 | OXYGEN SATURATION: 94 % | HEIGHT: 76 IN | SYSTOLIC BLOOD PRESSURE: 142 MMHG | TEMPERATURE: 97.5 F | RESPIRATION RATE: 18 BRPM | HEART RATE: 74 BPM | WEIGHT: 315 LBS | DIASTOLIC BLOOD PRESSURE: 81 MMHG

## 2018-06-27 PROCEDURE — G8987 SELF CARE CURRENT STATUS: HCPCS

## 2018-06-27 PROCEDURE — 97162 PT EVAL MOD COMPLEX 30 MIN: CPT

## 2018-06-27 PROCEDURE — G8979 MOBILITY GOAL STATUS: HCPCS

## 2018-06-27 PROCEDURE — G8980 MOBILITY D/C STATUS: HCPCS

## 2018-06-27 PROCEDURE — 97165 OT EVAL LOW COMPLEX 30 MIN: CPT

## 2018-06-27 PROCEDURE — 99217 PR OBSERVATION CARE DISCHARGE MANAGEMENT: CPT | Performed by: PHYSICIAN ASSISTANT

## 2018-06-27 PROCEDURE — G8989 SELF CARE D/C STATUS: HCPCS

## 2018-06-27 PROCEDURE — G8988 SELF CARE GOAL STATUS: HCPCS

## 2018-06-27 PROCEDURE — G8978 MOBILITY CURRENT STATUS: HCPCS

## 2018-06-27 RX ORDER — ACETAMINOPHEN 325 MG/1
650 TABLET ORAL EVERY 8 HOURS
Qty: 30 TABLET | Refills: 0 | Status: SHIPPED | OUTPATIENT
Start: 2018-06-27 | End: 2018-08-13 | Stop reason: ALTCHOICE

## 2018-06-27 RX ORDER — OXYCODONE HYDROCHLORIDE 5 MG/1
5 TABLET ORAL EVERY 4 HOURS PRN
Qty: 30 TABLET | Refills: 0 | Status: SHIPPED | OUTPATIENT
Start: 2018-06-27 | End: 2018-06-27

## 2018-06-27 RX ORDER — METHOCARBAMOL 500 MG/1
500 TABLET, FILM COATED ORAL 3 TIMES DAILY
Qty: 30 TABLET | Refills: 0 | Status: SHIPPED | OUTPATIENT
Start: 2018-06-27 | End: 2018-07-17 | Stop reason: ALTCHOICE

## 2018-06-27 RX ORDER — OXYCODONE HYDROCHLORIDE AND ACETAMINOPHEN 5; 325 MG/1; MG/1
2 TABLET ORAL EVERY 4 HOURS PRN
Status: DISCONTINUED | OUTPATIENT
Start: 2018-06-27 | End: 2018-06-27 | Stop reason: HOSPADM

## 2018-06-27 RX ORDER — IBUPROFEN 400 MG/1
400 TABLET ORAL EVERY 6 HOURS PRN
Refills: 0
Start: 2018-06-27 | End: 2018-08-13 | Stop reason: ALTCHOICE

## 2018-06-27 RX ORDER — OXYCODONE HYDROCHLORIDE 5 MG/1
TABLET ORAL
Qty: 40 TABLET | Refills: 0 | Status: SHIPPED | OUTPATIENT
Start: 2018-06-27 | End: 2018-07-17 | Stop reason: ALTCHOICE

## 2018-06-27 RX ADMIN — ACETAMINOPHEN 650 MG: 325 TABLET ORAL at 05:23

## 2018-06-27 RX ADMIN — ACETAMINOPHEN 650 MG: 325 TABLET ORAL at 11:48

## 2018-06-27 RX ADMIN — OXYCODONE HYDROCHLORIDE 2.5 MG: 5 TABLET ORAL at 05:27

## 2018-06-27 RX ADMIN — HEPARIN SODIUM 5000 UNITS: 5000 INJECTION, SOLUTION INTRAVENOUS; SUBCUTANEOUS at 05:23

## 2018-06-27 RX ADMIN — OXYCODONE HYDROCHLORIDE 5 MG: 5 TABLET ORAL at 09:44

## 2018-06-27 NOTE — CASE MANAGEMENT
Initial Clinical Review    Admission: Date/Time/Statement: 06/26/18 @ 1009 Observation Written     Orders Placed This Encounter   Procedures    Place in Observation     Standing Status:   Standing     Number of Occurrences:   1     Order Specific Question:   Admitting Physician     Answer:   Kathe Andrade     Order Specific Question:   Level of Care     Answer:   Med Surg [16]         ED: Date/Time/Mode of Arrival:   ED Arrival Information     Expected Arrival Acuity Means of Arrival Escorted By Service Admission Type    6/26/2018 07:28 6/26/2018 07:46 Emergent Amb[facility] DEPARTMENT [facility] Trauma Urgent    Arrival Complaint    Lumbar Fracture          Chief Complaint:   Chief Complaint   Patient presents with    Back Injury     patient fell on back onto concrete when moving trailer last night around 9 pm  went to [facility] around 2 am  found to have L1, L2 transverse fx  History of Illness: Yoly Oneal is a 52 y o  male who presents after falling while trying to unload a trailer  He lost his balance stumbling backwards and landing on a piece of cement on his left lower back  This happened yesterday evening  Was able sleep through the night but I considerable left lower back pain  He presented to [facility] this morning secondary to the pain and CT scan imaging showed a L1, L2 left-sided transverse process fracture  Patient had no neurologic symptoms  Pain was sharp, constant, nonradiating in the left lower back  No history of back pain  Worse with ambulating and better when lying flat  Currently a 2/10  No red flag back pain symptoms including saddle anesthesia, urinary retention, or incontinence, no lower extremity numbness tingling weakness  Deacon Heather He denies any other areas of injury at this time  He denies hitting his head or losing consciousness  He was able to get up and ambulate back to his house after the fall but had considerable pain in the left lower back      ED Vital Signs: ED Triage Vitals   Temperature Pulse Respirations Blood Pressure SpO2   06/26/18 0750 06/26/18 0750 06/26/18 0750 06/26/18 0750 06/26/18 0750   97 8 °F (36 6 °C) 68 18 156/75 98 %      Temp Source Heart Rate Source Patient Position - Orthostatic VS BP Location FiO2 (%)   06/26/18 0750 06/26/18 0750 06/26/18 0750 06/26/18 1500 --   Oral Monitor Sitting Left arm       Pain Score       06/26/18 1104       No Pain        Wt Readings from Last 1 Encounters:   06/26/18 (!) 181 kg (400 lb)       Vital Signs (abnormal): WNL    Abnormal Labs/Diagnostic Test Results:   Ketones, UA 15 (1+)     Blood, UA Small       RBC, UA 1-2     MUCOUS THREADS Moderate         CTAP -Acute nondisplaced fracture left L1 and L2 transverse processes  No acute intra-abdominal or intrapelvic process  Mild hepatic steatosis      XR Lumbar - No acute osseous abnormality   Degenerative changes as described      XR PELVIS - No acute osseous abnormality  Degenerative changes as described        ED Treatment:   Medication Administration from 06/26/2018 0727 to 06/26/2018 1514       Date/Time Order Dose Route Action     06/26/2018 1236 acetaminophen (TYLENOL) tablet 650 mg 650 mg Oral Given     06/26/2018 1237 tetanus-diphtheria-acellular pertussis (BOOSTRIX) IM injection 0 5 mL 0 5 mL Intramuscular Given          Past Medical/Surgical History: Active Ambulatory Problems     Diagnosis Date Noted    No Active Ambulatory Problems     Resolved Ambulatory Problems     Diagnosis Date Noted    No Resolved Ambulatory Problems     Past Medical History:   Diagnosis Date    Hypertension        Admitting Diagnosis: [facility] [S34 109A, S32 009A]  Closed fracture of transverse process of lumbar vertebra, initial encounter ([facility] ) [S32 009A]    Age/Sex: 52 y o  male    Assessment/Plan   Trauma Alert:  Other Transfer  Model of Arrival: Ambulance  Trauma Active Problems:      Fall from standing  Acute nondisplaced fracture of the left L1, L2 transverse process fracture  L forearm abrasion  L knee abrasion     Trauma Plan:   Admit for Observation  Neurosurgery consul  Pain control  Lumbar spine precautions  update tetanus  cbc  dvt ppx    Admission Orders:  Lspine precautions  Bedrest  Encourage deep breathing and coughing  Neuro checks q4h  Pt, ot  Consult neurosurgery    Scheduled Meds:   Current Facility-Administered Medications:  acetaminophen 650 mg Oral Q6H [facility]   heparin (porcine) 5,000 Units Subcutaneous Q8H [facility]   lidocaine 1 patch Transdermal Daily   methocarbamol 500 mg Oral Q6H PRN   oxyCODONE 2 5 mg Oral Q4H PRN   oxyCODONE 5 mg Oral Q4H PRN     Continuous Infusions:    PRN Meds: methocarbamol    oxyCODONE IR 2 5mg x3 thus far

## 2018-06-27 NOTE — PLAN OF CARE
Problem: Potential for Falls  Goal: Patient will remain free of falls  INTERVENTIONS:  - Assess patient frequently for physical needs  -  Identify cognitive and physical deficits and behaviors that affect risk of falls    -  [place] fall precautions as indicated by assessment   - Educate patient/family on patient safety including physical limitations  - Instruct patient to call for assistance with activity based on assessment  - Modify environment to reduce risk of injury  - Consider OT/PT consult to assist with strengthening/mobility   Outcome: Progressing      Problem: PAIN - ADULT  Goal: Verbalizes/displays adequate comfort level or baseline comfort level  Interventions:  - Encourage patient to monitor pain and request assistance  - Assess pain using appropriate pain scale  - Administer analgesics based on type and severity of pain and evaluate response  - Implement non-pharmacological measures as appropriate and evaluate response  - Consider cultural and social influences on pain and pain management  - Notify physician/advanced practitioner if interventions unsuccessful or patient reports new pain   Outcome: Progressing      Problem: SAFETY ADULT  Goal: Maintain or return to baseline ADL function  INTERVENTIONS:  -  Assess patient's ability to carry out ADLs; assess patient's baseline for ADL function and identify physical deficits which impact ability to perform ADLs (bathing, care of mouth/teeth, toileting, grooming, dressing, etc )  - Assess/evaluate cause of self-care deficits   - Assess range of motion  - Assess patient's mobility; develop plan if impaired  - Assess patient's need for assistive devices and provide as appropriate  - Encourage maximum independence but intervene and supervise when necessary  ¯ Involve family in performance of ADLs  ¯ Assess for home care needs following discharge   ¯ Request OT consult to assist with ADL evaluation and planning for discharge  ¯ Provide patient education as appropriate   Outcome: Progressing    Goal: Maintain or return mobility status to optimal level  INTERVENTIONS:  - Assess patient's baseline mobility status (ambulation, transfers, stairs, etc )    - Identify cognitive and physical deficits and behaviors that affect mobility  - Identify mobility aids required to assist with transfers and/or ambulation (gait belt, sit-to-stand, lift, walker, cane, etc )  - [place] fall precautions as indicated by assessment  - Record patient progress and toleration of activity level on Mobility SBAR; progress patient to next Phase/Stage  - Instruct patient to call for assistance with activity based on assessment  - Request Rehabilitation consult to assist with strengthening/weightbearing, etc    Outcome: Progressing      Problem: DISCHARGE PLANNING  Goal: Discharge to home or other facility with appropriate resources  INTERVENTIONS:  - Identify barriers to discharge w/patient and caregiver  - Arrange for needed discharge resources and transportation as appropriate  - Identify discharge learning needs (meds, wound care, etc )  - Arrange for interpretive services to assist at discharge as needed  - Refer to Case Management Department for coordinating discharge planning if the patient needs post-hospital services based on physician/advanced practitioner order or complex needs related to functional status, cognitive ability, or social support system   Outcome: Progressing      Problem: Knowledge Deficit  Goal: Patient/family/caregiver demonstrates understanding of disease process, treatment plan, medications, and discharge instructions  Complete learning assessment and assess knowledge base    Interventions:  - Provide teaching at level of understanding  - Provide teaching via preferred learning methods   Outcome: Progressing      Problem: Prexisting or High Potential for Compromised Skin Integrity  Goal: Skin integrity is maintained or improved  INTERVENTIONS:  - Identify patients at risk for skin breakdown  - Assess and monitor skin integrity  - Assess and monitor nutrition and hydration status  - Monitor labs (i e  albumin)  - Assess for incontinence   - Turn and reposition patient  - Assist with mobility/ambulation  - Relieve pressure over bony prominences  - Avoid friction and shearing  - Provide appropriate hygiene as needed including keeping skin clean and dry  - Evaluate need for skin moisturizer/barrier cream  - Collaborate with interdisciplinary team (i e  Nutrition, Rehabilitation, etc )   - Patient/family teaching   Outcome: Progressing

## 2018-06-27 NOTE — OCCUPATIONAL THERAPY NOTE
[address] Evaluation     Patient Name: Herminio Armendariz  DICJV'S Date: 6/27/2018  Problem List  Patient Active Problem List   Diagnosis    Closed fracture of transverse process of lumbar vertebra ([address] )    Abrasion of left forearm    Abrasion of left knee     Past Medical History  Past Medical History:   Diagnosis Date    Hypertension      Past Surgical History  Past Surgical History:   Procedure Laterality Date    LAMINECTOMY           06/27/18 1225   Note Type   Note type Eval only   Restrictions/Precautions   Weight Bearing Precautions Per Order No   Braces or Orthoses (QUICK DRAW)   Other Precautions Spinal precautions;Pain   Pain Assessment   Pain Assessment 0-10   Pain Score 2   Pain Type Acute pain   Pain Location Back   Patient's Stated Pain Goal No pain   Hospital Pain Intervention(s) Ambulation/increased activity;Repositioned;Distraction; Emotional support   Response to Interventions TOLERATED    Home Living   Type of [address] [place] [address] One level;Stairs to enter with rails  (3 SARY )   Bathroom Shower/Tub Tub/shower unit   Bathroom Toilet Standard   Bathroom Accessibility Accessible   Additional Comments NO USE OF DME AT BASELINE   Prior Function   Level of [place] Independent with ADLs and functional mobility   Lives With Alone   Receives Help From Family   ADL Assistance Independent   IADLs Independent   Falls in the last 6 months 1 to 4  (1-REASON FOR ADMISSION )   Lifestyle   [employer] PT REPORTS BEING I WITH ADLS/IADLS/DRIVING PTA   NO USE OF DME AT BASELINE    Reciprocal Relationships LIVES ALONE  HOWEVER PT REPORTS SUPPORTIVE SISTER WHO IS ABLE TO ASSIST UPON D/C    Intrinsic Gratification PT ENJOYS BEING INDEPENDENT    Psychosocial   Psychosocial (WDL) WDL   ADL   Eating Assistance 7  Independent   Grooming Assistance 7  Independent   UB Bathing Assistance 6  Modified Independent   LB Bathing Assistance 5  Supervision/Setup   UB Dressing Assistance 6  Modified independent LB Dressing Assistance 5  Supervision/Setup   Toileting Assistance  6  Modified independent   Functional Assistance 6  Modified independent   Bed Mobility   Additional Comments PT SITTING OOB UPON ARRIVAL    Transfers   Sit to Stand 6  Modified independent   Additional items Increased time required   Stand to Sit 6  Modified independent   Additional items Increased time required   Functional Mobility   Functional Mobility 6  Modified independent   Additional Comments WITHOUT AD, REQUIRING ADDITIONAL TIME COMPARED TO BASELINE    Balance   Static Sitting Good   Static Standing Good   Ambulatory Good   Activity Tolerance   Activity Tolerance Patient tolerated treatment well   Medical Staff Made Aware SPOKE TO WILL FROM CM REGARDING D/C PLAN    Nurse Made Aware APPROPRIATE TO SEE    RUE Assessment   RUE Assessment WFL   LUE Assessment   LUE Assessment WFL   Hand Function   Gross Motor Coordination Functional   Fine Motor Coordination Functional   Sensation   Light Touch No apparent deficits   Cognition   Overall Cognitive Status WFL   Arousal/Participation Alert; Cooperative   Attention Within functional limits   Orientation Level Oriented X4   Memory Within functional limits   Following Commands Follows all commands and directions without difficulty   Comments PT IS PLEASANT AND COOPERATIVE  G RECALL/CARRY OVER OF LEARNED PRECAUTIONS  Assessment   Assessment 52 YO MALE SEEN FOR INITIAL OT EVAL S/P FALL RESULTING IN L1-2 TRANSVERESE PROCESS FX, L FOREARM ABRASION AND L KNEE ABRASION  PT IN QUICK DRAW BRACE WITH SPINAL PRECAUTIONS  PT REPORTS -HEADSTRIKE/LOC DURING FALL  PROBLEMS LIST INLCUDES HTN AND BODY HABITUS  PT IS FROM HOME ALONE WHERE HE REPORTS BEING I WITH ADLS/IADLS/DRIVING PTA  PT CURRENTLY REQUIRES OVERALL MOD I-SUPERVISION LEVEL FOR ADLS, AND MOD I FOR TRANSFERS/FUNCTIONAL MOBILITY WITHOUT AD, HOWEVER, REQUIRING ADDITIONAL TIME TO COMPLETE COMPARED TO BASELINE   PT IS EXPERIENCING EXPECTED LIMITATIONS 2' PAIN, FATIGUE, QUICK DRAW BRACE WITH SPINAL PRECAUTIONS  PT EDUCATED ON QUICK DRAW BRACE, SPINAL PRECAUTIONS AND ENERGY CONSERVATION TECHNIQUES INCLUDING USE OF SC TO CARRY OVER UPON D/C  ALL QUESTIONS/CONCERNS ADDRESS  FROM AN OT PERSPECTIVE, PT CAN RETURN HOME WITH INCREASED FAMILY SUPPORT WHEN MEDICALLY CLEARED  NO ADDITIONAL ACUTE CARE OT NEEDS  D/C OT      Goals   Patient Goals TO RETURN HOME TODAY    Recommendation   OT Discharge Recommendation Home with family support   Equipment Recommended (SC)   OT - OK to Discharge Yes   Barthel Index   Feeding 10   Bathing 5   Grooming Score 5   Dressing Score 10   Bladder Score 10   Bowels Score 10   Toilet Use Score 10   Transfers (Bed/Chair) Score 15   Mobility (Level Surface) Score 15   Stairs Score 10   Barthel Index Score 100   Modified Shahnaz Scale   Modified Shahnaz Scale 1       Documentation completed by Jeanne Crabtree, CURT, OTR/L

## 2018-06-27 NOTE — DISCHARGE SUMMARY
Discharge Summary - Yoly Oneal 52 y o  male MRN: [id number]    [facility] Encounter: [id number]    Admission Date: 6/26/2018   Discharge Date: 06/27/18    Admitting Diagnosis:   Lumbar fracture with cord injury ([facility] ) [S34 109A, S32 009A]  Closed fracture of transverse process of lumbar vertebra, initial encounter ([facility] ) Radhasujit Pryor    Discharge Diagnoses: Principal Problem:    Closed fracture of transverse process of lumbar vertebra ([facility] )  Active Problems:    Abrasion of left forearm    Abrasion of left knee      Consultations: none     Procedures Performed: none    Hospital Course: Yoly Oneal is a 52 y o  male transferred from [facility] for trauma care and admitted s/p fall for acute nondisplaced fracture left L1 and L2 transverse processes  A Quickdraw brace was ordered for comfort and he was started on around the clock muscle relaxants and pain medication with narcotics for breakthrough  He is doing well and pain is controlled  Tertiary exam did not reveal additional injuries  Abrasions will be treated with local wound care  He will be seen by PT/OT prior to discharge and is cleared for discharge with follow up in the trauma outpatient office  Condition at Discharge: good     Radiographic Imaging    Xr Lumbar Spine 2 Or 3 Views    Result Date: 6/26/2018  Impression: No acute osseous abnormality  Degenerative changes as described  Workstation performed: [id number]     Xr Pelvis Ap Only 1 Or 2 Views    Result Date: 6/26/2018  Impression: No acute osseous abnormality  Degenerative changes as described  Workstation performed: [id number]     Ct Abdomen Pelvis With Contrast    Result Date: 6/26/2018  Impression: Acute nondisplaced fracture left L1 and L2 transverse processes  No acute intra-abdominal or intrapelvic process  Mild hepatic steatosis  The study was marked in [facility] for immediate notification   Workstation performed: [id number]       Discharge instructions/Information to patient and family:   See after visit summary for information provided to patient and family  Provisions for Follow-Up Care:  See after visit summary for information related to follow-up care and any pertinent home health orders  Disposition: Home    Planned Readmission: No    Discharge Statement   I spent 20 minutes discharging the patient  This time was spent on the day of discharge  I had direct contact with the patient on the day of discharge  Additional documentation is required if more than 30 minutes were spent on discharge  Discharge Medications:  See after visit summary for reconciled discharge medications provided to patient and family

## 2018-06-27 NOTE — SOCIAL WORK
CM met with pt to discuss the role of CM  Pt lives in a 1 story home which has 3STE  Pt works, drives, and was fully independent PTA  Pt has no living will or DME  Pt has no hx of mental health, substance abuse or IP rehab  Pt's pharmacy is [facility]  Pt made aware of meds to beds and would like to utilize at the time of d/c   Awaiting therapy notes but pt likely to d/c home today and his sister will transport

## 2018-06-27 NOTE — PROGRESS NOTES
Tertiary Progress Note - Guerda Chand 1965, 52 y o  male MRN: [id number]    Unit/Bed#: [place] 922-01 Encounter: [id number]    Primary Care Provider: Anna Marie Schmidt MD   Date and time admitted to hospital: 6/26/2018  7:46 AM        Abrasion of left knee   Assessment & Plan    Healing well  Local wound care          Abrasion of left forearm   Assessment & Plan    Healing well  Local wound care        * Closed fracture of transverse process of lumbar vertebra [facility])   Assessment & Plan    Pain controlled  Brace as needed for comfort  OK for discharge home today          VTE Pharmacologic Prophylaxis: Sequential compression device (Venodyne)  and Heparin    Subjective/Objective     Subjective:  Pain controlled when he is in bed   Taking po pain control     Objective/Physical Exam: Blood pressure 142/81, pulse 74, temperature 97 5 °F (36 4 °C), temperature source Oral, resp  rate 18, height 6' 4" (1 93 m), weight (!) 181 kg (400 lb), SpO2 94 %  ,Body mass index is 48 69 kg/m²      General appearance: alert and oriented, in no acute distress  Heart: regular rate and rhythm, S1, S2 normal, no murmur, click, rub or gallop  Lungs: clear to auscultation bilaterally  Abdomen: soft, non-tender; bowel sounds normal; no masses,  no organomegaly   Back: L flank ecchymosis and tenderness, No midline pain   Neurological: normal without focal findings      Current Facility-Administered Medications:     acetaminophen (TYLENOL) tablet 650 mg, 650 mg, Oral, Q6H [facility], Hernando Thompson DO, 650 mg at 06/27/18 0523    heparin (porcine) subcutaneous injection 5,000 Units, 5,000 Units, Subcutaneous, Q8H [facility], [facility] DO Jay, 5,000 Units at 06/27/18 0523    lidocaine (LIDODERM) 5 % patch 1 patch, 1 patch, Transdermal, Daily, [facility] DO Jay, Stopped at 06/26/18 1237    methocarbamol (ROBAXIN) tablet 500 mg, 500 mg, Oral, Q6H PRN, [facility] DO Jay    oxyCODONE (ROXICODONE) IR tablet 2 5 mg, 2 5 mg, Oral, Q4H PRN, [facility] DO Jay, 2 5 mg at 06/27/18 0527    oxyCODONE (ROXICODONE) IR tablet 5 mg, 5 mg, Oral, Q4H Marla CUEVAS DO, 5 mg at 06/27/18 0944      Intake/Output Summary (Last 24 hours) at 06/27/18 1020  Last data filed at 06/27/18 0930   Gross per 24 hour   Intake              460 ml   Output              350 ml   Net              110 ml

## 2018-06-27 NOTE — PHYSICAL THERAPY NOTE
Physical Therapy Evaluation     Patient's Name: Katja Gonsalves    Admitting Diagnosis  Lumbar fracture with cord injury ([facility] ) [S34 109A, S32 009A]  Closed fracture of transverse process of lumbar vertebra, initial encounter ([facility] ) [S32 009A]    Problem List  Patient Active Problem List   Diagnosis    Closed fracture of transverse process of lumbar vertebra ([facility] )    Abrasion of left forearm    Abrasion of left knee       Past Medical History  Past Medical History:   Diagnosis Date    Hypertension        Past Surgical History  Past Surgical History:   Procedure Laterality Date    LAMINECTOMY        06/27/18 1230   Note Type   Note type Eval only   Pain Assessment   Pain Assessment 0-10   Pain Score 2   Pain Type Acute pain   Pain Location Back   Patient's Stated Pain Goal No pain   Hospital Pain Intervention(s) Repositioned; Ambulation/increased activity   Response to Interventions tolerated   Home Living   Type of [address] [place] [address] One level  (3 SARY )   Bathroom Shower/Tub Tub/shower unit   Bathroom Toilet Standard   Bathroom Accessibility Accessible   Additional Comments Pt denies any use of DME    Prior Function   Level of [place] Independent with ADLs and functional mobility   Lives With Alone   Receives Help From Family   ADL Assistance Independent   IADLs Independent   Falls in the last 6 months 1 to 4   Vocational Full time employment   Comments Pt driving, working and fully (I)    Restrictions/Precautions   [place] Bearing Precautions Per Order No  (activity as tolerated orders)   Braces or Orthoses LSO;Other (Comment)  (QUICK DRAW FOR COMFORT )   Other Precautions Pain;Spinal precautions   General   Family/Caregiver Present No   Cognition   Overall Cognitive Status WFL   Arousal/Participation Alert   Attention Within functional limits   Orientation Level Oriented X4   Memory Within functional limits   Following Commands Follows all commands and directions without difficulty   Comments Pt is overall pleasant and cooperative; able ot converse ebyond basic needs  Educated on spinal precautions in which he was able to recall post session  Visual reminder placed on white board    RUE Assessment   RUE Assessment WFL   LUE Assessment   LUE Assessment WFL   RLE Assessment   RLE Assessment WFL   LLE Assessment   LLE Assessment WFL   Coordination   Movements are Fluid and Coordinated 1   Sensation WFL   Light Touch   RLE Light Touch Grossly intact   LLE Light Touch Grossly intact   Proprioception   RLE Proprioception Grossly intact   LLE Proprioception Grossly Intact   Bed Mobility   Additional Comments Pt OOB upon arrival  Demonstrated and educated on log roll for bed mobility at d/c  Transfers   Sit to Stand 6  Modified independent   Additional items Increased time required   Stand to Sit 6  Modified independent   Additional items Increased time required   Ambulation/Elevation   Gait pattern Decreased foot clearance; Short stride   Gait Assistance 6  Modified independent   Additional items Verbal cues   Assistive Device None   Distance 250'   Stair Management Assistance 6  Modified independent   Additional items Verbal cues   Stair Management Technique Step to pattern; Foreward; Two rails   Number of Stairs 3  ( steps )   Balance   Static Sitting Normal   Dynamic Sitting Good   Static Standing Good   Dynamic Standing Good   Ambulatory Good   Endurance Deficit   Endurance Deficit No   Activity Tolerance   Activity Tolerance Patient tolerated treatment well   Medical Staff Made Olga Lidia Heller CM; MIO Zhao   Nurse Made Romayne Hakim, RN    Assessment   Prognosis Good   Problem List Orthopedic restrictions;Pain   Assessment Pt is a 52year old male presenting s/p fall while unloading trailer in which he sustained a lumbar fracture with cord injury, closed fracture of TP of lumbar vertebra, abrasions of L forearm and abrasion of L knee  Pt with an additional PMH which includes HTN and h/o laminectomy   PT consulted to assess functional mobility and assist with safe d/c planning  PT eval performed with activity as tolerated orders, quick draw brace for comfort and spinal precautions  PTA, pt living home alone in a 1 floor home with 3 SARY  Pt driving, (I) and working  On eval, pt presenting with pain but able to perform transfers, gaits and stairs with no LOB, no adverse reactions, and no physical assistance  Pt educated on log roll technique  Pt with no further skilled IP PT needs at this time  Safe to return home when medically appropriate      Goals   Patient Goals patient would like to go home    Plan   PT Frequency One time visit   Recommendation   Recommendation Home with family support   PT - OK to Discharge Yes  (when medically appropriate )   Barthel Index   Feeding 10   Bathing 5   Grooming Score 5   Dressing Score 10   Bladder Score 10   Bowels Score 10   Toilet Use Score 10   Transfers (Bed/Chair) Score 15   Mobility (Level Surface) Score 15   Stairs Score 10   Barthel Index Score 100           Ailin Maurer, PT

## 2018-06-27 NOTE — INCIDENTAL FINDINGS
The following findings require follow up:  Radiographic finding   Finding: Mild hepatic steatosis   Follow up required: Primary care provider   Follow up should be done within 1 month(s)

## 2018-07-17 ENCOUNTER — OFFICE VISIT (OUTPATIENT)
Dept: SURGERY | Facility: CLINIC | Age: 53
End: 2018-07-17

## 2018-07-17 VITALS
BODY MASS INDEX: 38.36 KG/M2 | SYSTOLIC BLOOD PRESSURE: 152 MMHG | DIASTOLIC BLOOD PRESSURE: 120 MMHG | TEMPERATURE: 98 F | HEIGHT: 76 IN | WEIGHT: 315 LBS

## 2018-07-17 DIAGNOSIS — S32.009A CLOSED FRACTURE OF TRANSVERSE PROCESS OF LUMBAR VERTEBRA, INITIAL ENCOUNTER (HCC): Primary | ICD-10-CM

## 2018-07-17 PROCEDURE — 99212 OFFICE O/P EST SF 10 MIN: CPT | Performed by: EMERGENCY MEDICINE

## 2018-07-17 NOTE — ASSESSMENT & PLAN NOTE
- L1-L2 left transverse process fractures  - Continue quickdraw brace for comfort  - May begin to resume activity as tolerated  - Continue non-narcotic oral analgesics as needed  - No further need for Trauma Clinic follow-up  May follow-up as needed and with PCP

## 2018-07-17 NOTE — PROGRESS NOTES
Office Visit - General Surgery  Christel Bowser MRN: [id number]  Encounter: [id number]    Assessment and Plan    Problem List Items Addressed This Visit        Musculoskeletal and Integument    Closed fracture of transverse process of lumbar vertebra ([address] ) - Primary     - L1-L2 left transverse process fractures  - Continue quickdraw brace for comfort  - May begin to resume activity as tolerated  - Continue non-narcotic oral analgesics as needed  - No further need for Trauma Clinic follow-up  May follow-up as needed and with PCP  Chief Complaint:  Christel Bowser is a 52 y o  male who presents for Fall (f/u fall)    Subjective    Patient is feeling much better since his discharge  He continues to have occasional episodes of spasm like pain in his lower back and notes some altered sensation in the left lateral lower back, but the symptoms have been improving and decreasing in frequency  He denies any bowel or bladder incontinence; he also denies any lower extremity weakness or decrease sensation  He offers no other complaints      Past Medical History  Past Medical History:   Diagnosis Date    Hypertension        Past Surgical History  Past Surgical History:   Procedure Laterality Date    LAMINECTOMY         Family History  Family History   Problem Relation Age of Onset    No Known Problems Mother     No Known Problems Father        Medications  Current Outpatient Prescriptions on File Prior to Visit   Medication Sig Dispense Refill    acetaminophen (TYLENOL) 325 mg tablet Take 2 tablets (650 mg total) by mouth every 8 (eight) hours 30 tablet 0    ibuprofen (MOTRIN) 400 mg tablet Take 1 tablet (400 mg total) by mouth every 6 (six) hours as needed for mild pain (Take with food)  0    [DISCONTINUED] methocarbamol (ROBAXIN) 500 mg tablet Take 1 tablet (500 mg total) by mouth 3 (three) times a day 30 tablet 0    [DISCONTINUED] oxyCODONE (ROXICODONE) 5 mg immediate release tablet SI-2 tabs PO Q4H PRN pain    Ongoing therapy 40 tablet 0     No current facility-administered medications on file prior to visit  Allergies  No Known Allergies    Review of Systems   Constitutional: Positive for activity change (He has had continued decreased activity based on recommendations of therapy staff prior to his discharge  )  Negative for chills, fever and unexpected weight change  Cardiovascular: Negative for leg swelling  Gastrointestinal: Negative for abdominal pain, constipation, diarrhea, nausea and vomiting  Genitourinary: Negative for difficulty urinating, dysuria and frequency  Musculoskeletal: Positive for back pain (Intermittent lower back pain)  Skin: Negative for wound  Neurological: Negative for weakness and numbness  Objective  Vitals:    07/17/18 1422   BP: (!) 152/120   Temp: 98 °F (36 7 °C)       Physical Exam   Constitutional: He is oriented to person, place, and time  He appears well-developed and well-nourished  No distress  HENT:   Head: Normocephalic and atraumatic  Right Ear: External ear normal    Left Ear: External ear normal    Eyes: EOM are normal  Pupils are equal, round, and reactive to light  Neck: Normal range of motion  Neck supple  No tracheal deviation present  Cardiovascular: Normal rate, regular rhythm, normal heart sounds and intact distal pulses  Exam reveals no gallop and no friction rub  No murmur heard  Pulmonary/Chest: Effort normal and breath sounds normal  No respiratory distress  He has no wheezes  He has no rales  Abdominal: Soft  Bowel sounds are normal  He exhibits no distension and no mass  There is no tenderness  Musculoskeletal: He exhibits no edema  Lumbar back: He exhibits normal range of motion, no tenderness, no swelling and no deformity  Neurological: He is alert and oriented to person, place, and time  GCS eye subscore is 4  GCS verbal subscore is 5  GCS motor subscore is 6     Skin: Skin is warm, dry and intact  No rash noted  He is not diaphoretic  No erythema  No pallor  Psychiatric: He has a normal mood and affect  Nursing note and vitals reviewed

## 2018-08-13 ENCOUNTER — OFFICE VISIT (OUTPATIENT)
Dept: FAMILY MEDICINE CLINIC | Facility: CLINIC | Age: 53
End: 2018-08-13
Payer: COMMERCIAL

## 2018-08-13 VITALS
HEIGHT: 76 IN | HEART RATE: 92 BPM | TEMPERATURE: 98.2 F | DIASTOLIC BLOOD PRESSURE: 88 MMHG | SYSTOLIC BLOOD PRESSURE: 140 MMHG | RESPIRATION RATE: 20 BRPM | OXYGEN SATURATION: 94 % | WEIGHT: 315 LBS | BODY MASS INDEX: 38.36 KG/M2

## 2018-08-13 DIAGNOSIS — I10 ESSENTIAL HYPERTENSION: ICD-10-CM

## 2018-08-13 DIAGNOSIS — H61.21 IMPACTED CERUMEN OF RIGHT EAR: Primary | ICD-10-CM

## 2018-08-13 PROCEDURE — 99213 OFFICE O/P EST LOW 20 MIN: CPT | Performed by: FAMILY MEDICINE

## 2018-08-13 RX ORDER — ALBUTEROL SULFATE 90 UG/1
2 AEROSOL, METERED RESPIRATORY (INHALATION) 3 TIMES DAILY PRN
COMMUNITY
Start: 2014-12-26 | End: 2018-08-13 | Stop reason: ALTCHOICE

## 2018-08-13 RX ORDER — VALACYCLOVIR HYDROCHLORIDE 1 G/1
1 TABLET, FILM COATED ORAL 3 TIMES DAILY
COMMUNITY
End: 2018-08-13 | Stop reason: ALTCHOICE

## 2018-08-13 RX ORDER — NEOMYCIN SULFATE, POLYMYXIN B SULFATE AND HYDROCORTISONE 10; 3.5; 1 MG/ML; MG/ML; [USP'U]/ML
3 SUSPENSION/ DROPS AURICULAR (OTIC) 3 TIMES DAILY
Qty: 10 ML | Refills: 0 | Status: SHIPPED | OUTPATIENT
Start: 2018-08-13 | End: 2018-12-10 | Stop reason: ALTCHOICE

## 2018-08-13 RX ORDER — LISINOPRIL AND HYDROCHLOROTHIAZIDE 25; 20 MG/1; MG/1
1 TABLET ORAL DAILY
COMMUNITY
Start: 2017-09-09 | End: 2018-09-24 | Stop reason: SDUPTHER

## 2018-08-13 NOTE — PROGRESS NOTES
Assessment/Plan:  Cerumen impaction  Essential   hypertension  Cortisporin otic suspension 3 drops t i d  the right ear for 5 days  Return 1 week to irrigate the right ear canal   Essential hypertension is stable and will continue to monitor  Diagnoses and all orders for this visit:    Impacted cerumen of right ear  -     neomycin-polymyxin-hydrocortisone (CORTISPORIN) 0 35%-10,000 units/mL-1% otic suspension; Administer 3 drops to the right ear 3 (three) times a day for 5 days    Other orders  -     Discontinue: albuterol (PROAIR HFA) 90 mcg/act inhaler; Inhale 2 puffs 3 (three) times a day as needed  -     lisinopril-hydrochlorothiazide (PRINZIDE,ZESTORETIC) 20-25 MG per tablet; Take 1 tablet by mouth daily  -     Discontinue: valACYclovir (VALTREX) 1,000 mg tablet; Take 1 tablet by mouth 3 (three) times a day          Subjective:     Patient ID: Serena Cai is a 52 y o  male  This is a 52-year-old gentleman who presents with the cerumen impaction right ear  Review of Systems   Constitutional: Negative  HENT: Negative  Respiratory: Negative  Cardiovascular: Negative  Objective:     Physical Exam   Constitutional: He is oriented to person, place, and time  He appears well-developed and well-nourished  HENT:   Head: Normocephalic and atraumatic  Left Ear: External ear normal    Right ear canal cerumen impaction  Eyes: Right eye exhibits no discharge  Left eye exhibits no discharge  No scleral icterus  Lymphadenopathy:     He has no cervical adenopathy  Neurological: He is alert and oriented to person, place, and time  No cranial nerve deficit

## 2018-08-29 ENCOUNTER — OFFICE VISIT (OUTPATIENT)
Dept: FAMILY MEDICINE CLINIC | Facility: CLINIC | Age: 53
End: 2018-08-29
Payer: COMMERCIAL

## 2018-08-29 VITALS
RESPIRATION RATE: 18 BRPM | TEMPERATURE: 98.2 F | WEIGHT: 315 LBS | OXYGEN SATURATION: 96 % | HEART RATE: 97 BPM | SYSTOLIC BLOOD PRESSURE: 128 MMHG | DIASTOLIC BLOOD PRESSURE: 80 MMHG | HEIGHT: 75 IN | BODY MASS INDEX: 39.17 KG/M2

## 2018-08-29 DIAGNOSIS — M54.50 CHRONIC MIDLINE LOW BACK PAIN WITHOUT SCIATICA: Primary | ICD-10-CM

## 2018-08-29 DIAGNOSIS — G89.29 CHRONIC MIDLINE LOW BACK PAIN WITHOUT SCIATICA: Primary | ICD-10-CM

## 2018-08-29 PROCEDURE — 99213 OFFICE O/P EST LOW 20 MIN: CPT | Performed by: FAMILY MEDICINE

## 2018-08-29 NOTE — PROGRESS NOTES
Assessment/Plan:   Chronic low back pain  Tylenol arthritis p r n  Gentle stretching exercises  Recommended MRI to see any soft tissue or disc injury  Call with any questions or concerns  Call if the MRI is needed for disability  Subjective:     Patient ID: Cayla Manzano is a 52 y o  male  This is a 40-year-old gentleman who has chronic low back pain  Review of Systems   Constitutional: Negative  Respiratory: Negative  Negative for apnea  Cardiovascular: Negative  Musculoskeletal: Back pain: Chronic low back pain  Objective:      Reviewed x-ray of the lumbar spine from 06/26/2018 which demonstrates degenerative changes  Physical Exam   Constitutional: He is oriented to person, place, and time  He appears well-developed and well-nourished  HENT:   Head: Normocephalic and atraumatic  Cardiovascular: Normal rate, regular rhythm and normal heart sounds  Exam reveals no gallop and no friction rub  No murmur heard  Pulmonary/Chest: Effort normal and breath sounds normal  No respiratory distress  He has no wheezes  He has no rales  He exhibits no tenderness  Neurological: He is alert and oriented to person, place, and time  No cranial nerve deficit   Coordination normal

## 2018-09-24 DIAGNOSIS — I10 ESSENTIAL HYPERTENSION: Primary | ICD-10-CM

## 2018-09-24 RX ORDER — LISINOPRIL AND HYDROCHLOROTHIAZIDE 25; 20 MG/1; MG/1
1 TABLET ORAL DAILY
Qty: 90 TABLET | Refills: 3 | Status: SHIPPED | OUTPATIENT
Start: 2018-09-24 | End: 2019-03-11 | Stop reason: SDUPTHER

## 2018-11-12 ENCOUNTER — OFFICE VISIT (OUTPATIENT)
Dept: FAMILY MEDICINE CLINIC | Facility: CLINIC | Age: 53
End: 2018-11-12
Payer: COMMERCIAL

## 2018-11-12 VITALS
WEIGHT: 315 LBS | HEIGHT: 75 IN | BODY MASS INDEX: 39.17 KG/M2 | RESPIRATION RATE: 16 BRPM | SYSTOLIC BLOOD PRESSURE: 152 MMHG | HEART RATE: 101 BPM | OXYGEN SATURATION: 96 % | DIASTOLIC BLOOD PRESSURE: 86 MMHG | TEMPERATURE: 98.7 F

## 2018-11-12 DIAGNOSIS — M15.9 PRIMARY OSTEOARTHRITIS INVOLVING MULTIPLE JOINTS: ICD-10-CM

## 2018-11-12 DIAGNOSIS — S32.009A CLOSED FRACTURE OF TRANSVERSE PROCESS OF LUMBAR VERTEBRA, INITIAL ENCOUNTER (HCC): ICD-10-CM

## 2018-11-12 DIAGNOSIS — E66.01 CLASS 3 SEVERE OBESITY DUE TO EXCESS CALORIES WITH SERIOUS COMORBIDITY AND BODY MASS INDEX (BMI) OF 50.0 TO 59.9 IN ADULT (HCC): ICD-10-CM

## 2018-11-12 DIAGNOSIS — M54.41 CHRONIC BILATERAL LOW BACK PAIN WITH RIGHT-SIDED SCIATICA: Primary | ICD-10-CM

## 2018-11-12 DIAGNOSIS — G89.29 CHRONIC BILATERAL LOW BACK PAIN WITH RIGHT-SIDED SCIATICA: Primary | ICD-10-CM

## 2018-11-12 PROBLEM — S50.812A ABRASION OF LEFT FOREARM: Status: RESOLVED | Noted: 2018-06-26 | Resolved: 2018-11-12

## 2018-11-12 PROBLEM — S80.212A ABRASION OF LEFT KNEE: Status: RESOLVED | Noted: 2018-06-26 | Resolved: 2018-11-12

## 2018-11-12 PROBLEM — E66.813 CLASS 3 SEVERE OBESITY DUE TO EXCESS CALORIES WITH SERIOUS COMORBIDITY AND BODY MASS INDEX (BMI) OF 50.0 TO 59.9 IN ADULT (HCC): Status: ACTIVE | Noted: 2018-11-12

## 2018-11-12 PROCEDURE — 99214 OFFICE O/P EST MOD 30 MIN: CPT | Performed by: FAMILY MEDICINE

## 2018-11-12 RX ORDER — METHYLPREDNISOLONE ACETATE 40 MG/ML
80 INJECTION, SUSPENSION INTRA-ARTICULAR; INTRALESIONAL; INTRAMUSCULAR; SOFT TISSUE ONCE
Status: COMPLETED | OUTPATIENT
Start: 2018-11-12 | End: 2018-11-12

## 2018-11-12 RX ORDER — METHYLPREDNISOLONE ACETATE 80 MG/ML
80 INJECTION, SUSPENSION INTRA-ARTICULAR; INTRALESIONAL; INTRAMUSCULAR; SOFT TISSUE ONCE
Status: DISCONTINUED | OUTPATIENT
Start: 2018-11-12 | End: 2018-11-12

## 2018-11-12 RX ADMIN — METHYLPREDNISOLONE ACETATE 80 MG: 40 INJECTION, SUSPENSION INTRA-ARTICULAR; INTRALESIONAL; INTRAMUSCULAR; SOFT TISSUE at 18:41

## 2018-11-12 NOTE — PATIENT INSTRUCTIONS
REST  AVOID LIFTING AND PUSHING  TRIAL OF DEPOMEDROL  PAIN MANAGEMENT CONSULT    RV 3-4 WEEKS FOR CPX, CALL SOONER PRN

## 2018-11-12 NOTE — PROGRESS NOTES
Assessment/Plan:    Problem List Items Addressed This Visit        Nervous and Auditory    Chronic bilateral low back pain with right-sided sciatica - Primary    Relevant Medications    methylPREDNISolone acetate (DEPO-MEDROL) injection 80 mg (Start on 11/12/2018  3:15 PM)    Other Relevant Orders    Ambulatory referral to Pain Management       Musculoskeletal and Integument    Closed fracture of transverse process of lumbar vertebra (HCC)    Osteoarthritis          Patient Instructions   REST  AVOID LIFTING AND PUSHING  TRIAL OF DEPOMEDROL  PAIN MANAGEMENT CONSULT    RV 3-4 WEEKS FOR CPX, CALL SOONER PRN      Return in about 4 weeks (around 12/10/2018) for Annual physical     Subjective:      Patient ID: Hakeem Hernandez is a 52 y o  male  Chief Complaint   Patient presents with    Back Pain     Laminectomies x2 L5-S1 - 1996 & 2010  Both done at [facility], No new injury    Immunizations     discuss Pneumonia & flu shots       PATIENT RETURNS TO DISCUSS HIS LOW BACK PAIN  CHRONIC CONDITION  X MANY YEARS  RECENTLY EXACERBATED BY A FALL WITH FX OF SEVERAL TRANSVERSE PROCESSES  SOME RADIATION DOWN R LEG TO KNEE    DISCUSSED ISSUES AT LENGTH  TOLD HE NEEDED TO LOSE WEIGHT - UNABLE   REVIEWED PAIN MANAGEMENT ISSUES        The following portions of the patient's history were reviewed and updated as appropriate: allergies, current medications, past family history, past medical history, past social history, past surgical history and problem list     Review of Systems   Constitutional: Negative for chills, fatigue and fever  HENT: Negative for congestion, ear discharge, ear pain, mouth sores, postnasal drip, sore throat and trouble swallowing  Eyes: Negative for pain, discharge and visual disturbance  Respiratory: Negative for cough, shortness of breath and wheezing  Cardiovascular: Negative for chest pain, palpitations and leg swelling     Gastrointestinal: Negative for abdominal distention, abdominal pain, blood in stool, diarrhea and nausea  Endocrine: Negative for polydipsia, polyphagia and polyuria  Genitourinary: Negative for dysuria, frequency, hematuria and urgency  Musculoskeletal: Positive for arthralgias and gait problem  Negative for joint swelling  Skin: Negative for pallor and rash  Neurological: Negative for dizziness, syncope, speech difficulty, weakness, light-headedness, numbness and headaches  Hematological: Negative for adenopathy  Psychiatric/Behavioral: Negative for behavioral problems, confusion and sleep disturbance  The patient is not nervous/anxious  Current Outpatient Prescriptions   Medication Sig Dispense Refill    lisinopril-hydrochlorothiazide (PRINZIDE,ZESTORETIC) 20-25 MG per tablet Take 1 tablet by mouth daily 90 tablet 3    neomycin-polymyxin-hydrocortisone (CORTISPORIN) 0 35%-10,000 units/mL-1% otic suspension Administer 3 drops to the right ear 3 (three) times a day for 5 days 10 mL 0     Current Facility-Administered Medications   Medication Dose Route Frequency Provider Last Rate Last Dose    methylPREDNISolone acetate (DEPO-MEDROL) injection 80 mg  80 mg Intramuscular Once Brennan Junior MD           Objective:    /86 (BP Location: Left arm, Patient Position: Sitting, Cuff Size: Extra-Large)   Pulse 101   Temp 98 7 °F (37 1 °C) (Temporal)   Resp 16   Ht 6' 3" (1 905 m)   Wt (!) 182 kg (401 lb 12 8 oz)   SpO2 96%   BMI 50 22 kg/m²        Physical Exam   Constitutional: He is oriented to person, place, and time  He appears well-developed and well-nourished  HENT:   Head: Normocephalic and atraumatic  Eyes: Pupils are equal, round, and reactive to light  Conjunctivae and EOM are normal  Right eye exhibits no discharge  Left eye exhibits no discharge  Neck: Neck supple  No JVD present  No thyromegaly present  Cardiovascular: Normal rate, regular rhythm and normal heart sounds  No murmur heard    Pulmonary/Chest: Effort normal and breath sounds normal  He has no wheezes  He has no rales  Abdominal: Soft  Bowel sounds are normal  He exhibits no mass  There is no hepatosplenomegaly  There is no tenderness  There is no rebound, no guarding and no CVA tenderness  MORBIDLY OBESE   Musculoskeletal: Normal range of motion  He exhibits tenderness  He exhibits no edema or deformity  MILD TENDERNESS ON PALPATION  GOOD LOWER EXT STRENGTH  REFLEXES APPEAR TO BE PRESERVED   Lymphadenopathy:     He has no cervical adenopathy  He has no axillary adenopathy  Neurological: He is alert and oriented to person, place, and time  Skin: Skin is warm and dry  No rash noted  No erythema  Psychiatric: He has a normal mood and affect   His behavior is normal  Judgment and thought content normal           LENGTH OF VISIT 25 MIN  LENGTH OF  18 MIN    Pati Linder MD

## 2018-11-15 ENCOUNTER — OFFICE VISIT (OUTPATIENT)
Dept: PAIN MEDICINE | Facility: CLINIC | Age: 53
End: 2018-11-15
Payer: COMMERCIAL

## 2018-11-15 ENCOUNTER — APPOINTMENT (OUTPATIENT)
Dept: RADIOLOGY | Facility: CLINIC | Age: 53
End: 2018-11-15
Payer: COMMERCIAL

## 2018-11-15 VITALS
HEART RATE: 80 BPM | WEIGHT: 315 LBS | RESPIRATION RATE: 16 BRPM | BODY MASS INDEX: 39.17 KG/M2 | SYSTOLIC BLOOD PRESSURE: 138 MMHG | HEIGHT: 75 IN | DIASTOLIC BLOOD PRESSURE: 92 MMHG

## 2018-11-15 DIAGNOSIS — M96.1 LUMBAR POST-LAMINECTOMY SYNDROME: ICD-10-CM

## 2018-11-15 DIAGNOSIS — G89.29 CHRONIC PAIN OF RIGHT KNEE: ICD-10-CM

## 2018-11-15 DIAGNOSIS — M25.561 CHRONIC PAIN OF RIGHT KNEE: ICD-10-CM

## 2018-11-15 DIAGNOSIS — M54.50 CHRONIC BILATERAL LOW BACK PAIN WITHOUT SCIATICA: ICD-10-CM

## 2018-11-15 DIAGNOSIS — G89.29 CHRONIC BILATERAL LOW BACK PAIN WITHOUT SCIATICA: ICD-10-CM

## 2018-11-15 DIAGNOSIS — G89.4 CHRONIC PAIN SYNDROME: Primary | ICD-10-CM

## 2018-11-15 PROCEDURE — 99244 OFF/OP CNSLTJ NEW/EST MOD 40: CPT | Performed by: ANESTHESIOLOGY

## 2018-11-15 PROCEDURE — 73562 X-RAY EXAM OF KNEE 3: CPT

## 2018-11-15 NOTE — PROGRESS NOTES
Assessment:  1  Chronic pain syndrome    2  Chronic bilateral low back pain without sciatica    3  Lumbar post-laminectomy syndrome    4  Chronic pain of right knee        Plan:  Orders Placed This Encounter   Procedures    XR knee 3 vw right non injury     Standing Status:   Future     Number of Occurrences:   1     Standing Expiration Date:   11/15/2022     Scheduling Instructions:      Bring along any outside films relating to this procedure  Order Specific Question:   Reason for Exam:     Answer:   chronic right knee pain    Ambulatory referral to Physical Therapy     Standing Status:   Future     Standing Expiration Date:   5/15/2019     Referral Priority:   Routine     Referral Type:   Physical Therapy     Referral Reason:   Specialty Services Required     Requested Specialty:   Physical Therapy     Number of Visits Requested:   1     Expiration Date:   11/15/2019    Ambulatory referral to Orthopedic Surgery     Standing Status:   Future     Standing Expiration Date:   5/15/2019     Referral Priority:   Routine     Referral Type:   Consult - AMB     Referral Reason:   Specialty Services Required     Referred to Provider:   Dev Mustafa DO     Requested Specialty:   Orthopedic Surgery     Number of Visits Requested:   1     Expiration Date:   11/15/2019     My impressions and treatment recommendations were discussed in detail with the patient, who verbalized understanding and had no further questions  The patient is complaining of chronic left-sided back pain  He has undergone 2 laminectomies in the past   He does have some signs of lumbar degenerative disc disease by x-ray of the lumbar spine  As such, I felt a reasonable to have the patient undergo a course of physical therapy 2-3 times per week for 4-6 weeks  If he does not respond to physical therapy, will obtain a MRI of the lumbar spine with and without contrast     In addition, the patient is also complaining of right knee pain    I felt a reasonable to have the patient undergo a right knee x-ray to evaluate for any pathology involving the right knee  I also felt a reasonable to have the patient evaluated by Dr Ester Good  Follow-up is planned in 6 weeks time or sooner as warranted  Discharge instructions were provided  I personally saw and examined the patient and I agree with the above discussed plan of care  History of Present Illness:    Samuel Harrell is a 52 y o  male who presents to [facility] for initial evaluation of the above stated pain complaints  The patient has a past medical and chronic pain history as outlined in the assessment section  He was referred by Dr Neha Tello  The patient reports a several year history of left-sided low back pain  He did undergo 2 lumbar laminectomies in 1996 and 2010  He did report that he had lumbar radicular symptoms into the right lower extremity prior to undergoing the laminectomy is  At this point, he does not experiencing any lumbar radicular symptoms  He is only complaining of right knee pain  He describes his pain as moderate and 5/10 on the verbal numerical pain rating scale  His pain is nearly constant in nature without any typical pattern  He describes his pain as numbness and dull/aching    He reports weakness in his right lower extremity  The patient states that lying down and relaxation decreases pain while bending, sitting, walking, and exercise increases pain  He reports that nerve block/injections, exercises, and chiropractic manipulation did not provide relief of symptoms  He does report that physical therapy and heat/ice treatment provided moderate relief  He reports the lumbar spine surgeries have provided him excellent pain relief  He is not currently using any medications for his pain  He is currently unemployed  Review of Systems:    Review of Systems   Constitutional: Negative for fever and unexpected weight change     HENT: Negative for trouble swallowing  Eyes: Negative for visual disturbance  Respiratory: Negative for shortness of breath and wheezing  Cardiovascular: Negative for chest pain and palpitations  Gastrointestinal: Negative for constipation, diarrhea, nausea and vomiting  Endocrine: Negative for cold intolerance, heat intolerance and polydipsia  Genitourinary: Negative for difficulty urinating and frequency  Musculoskeletal: Positive for gait problem (difficulty walking/ decreased range of motion)  Negative for arthralgias, joint swelling and myalgias  Skin: Negative for rash  Neurological: Negative for dizziness, seizures, syncope, weakness and headaches  Hematological: Does not bruise/bleed easily  Psychiatric/Behavioral: Negative for dysphoric mood  All other systems reviewed and are negative          Patient Active Problem List   Diagnosis    Closed fracture of transverse process of lumbar vertebra (HCC)    Anxiety    Backache    Collagen vascular disease ([facility] [address] )    Diverticulitis    GERD (gastroesophageal reflux disease)    Hypertension    Impaired fasting glucose    Osteoarthritis    Sciatica    Breathing-related sleep disorder    Type 2 diabetes mellitus (HCC)    Urinary calculus    Chronic bilateral low back pain with right-sided sciatica    Class 3 severe obesity due to excess calories with serious comorbidity and body mass index (BMI) of 50 0 to 59 9 in adult [facility])    Chronic pain syndrome    Lumbar post-laminectomy syndrome       Past Medical History:   Diagnosis Date    Hypertension     Kidney stone     x 2 episodes    Mild sleep apnea     Skin tag        Past Surgical History:   Procedure Laterality Date    KNEE ARTHROSCOPY Left 2007    LAMINECTOMY      LAMINOTOMY  07/2010    spinal       Family History   Problem Relation Age of Onset    Osteoarthritis Mother     Obesity Father     Substance Abuse Neg Hx     Mental illness Neg Hx        Social History Occupational History    Not on file  Social History Main Topics    Smoking status: Never Smoker    Smokeless tobacco: Never Used    Alcohol use No    Drug use: No    Sexual activity: Not on file         Current Outpatient Prescriptions:     lisinopril-hydrochlorothiazide (PRINZIDE,ZESTORETIC) 20-25 MG per tablet, Take 1 tablet by mouth daily, Disp: 90 tablet, Rfl: 3    neomycin-polymyxin-hydrocortisone (CORTISPORIN) 0 35%-10,000 units/mL-1% otic suspension, Administer 3 drops to the right ear 3 (three) times a day for 5 days, Disp: 10 mL, Rfl: 0    No Known Allergies    Physical Exam:    /92 (BP Location: Left arm, Patient Position: Sitting, Cuff Size: Standard)   Pulse 80   Resp 16   Ht 6' 3" (1 905 m)   Wt (!) 183 kg (402 lb 6 4 oz)   BMI 50 30 kg/m²     Constitutional: obese  Eyes: anicteric  HEENT: grossly intact  Neck: supple, symmetric, trachea midline and no masses   Pulmonary:even and unlabored  Cardiovascular:No edema or pitting edema present  Skin:Normal without rashes or lesions and well hydrated  Psychiatric:Mood and affect appropriate  Neurologic:Cranial Nerves II-XII grossly intact  Musculoskeletal:antalgic     Lumbar Spine Exam    Appearance:  Normal lordosis  Palpation/Tenderness:  no tenderness or spasm  Sensory:  no sensory deficits noted  Range of Motion:  Flexion:   Moderately limited  with pain  Extension:  Moderately limited  with pain  Lateral Flexion - Left:  Moderately limited  with pain  Lateral Flexion - Right:  Moderately limited  with pain  Rotation - Left:  Moderately limited  with pain  Rotation - Right:  Moderately limited  with pain  Motor Strength:  Left hip flexion:  5/5  Left hip extension:  5/5  Right hip flexion:  5/5  Right hip extension:  5/5  Left knee flexion:  5/5  Left knee extension:  5/5  Right knee flexion:  5/5  Right knee extension:  5/5  Left foot dorsiflexion:  5/5  Left foot plantar flexion:  5/5  Right foot dorsiflexion:  5/5  Right foot plantar flexion:  5/5  Reflexes:  Left Patellar:  2+   Right Patellar:  2+   Left Achilles:  2+   Right Achilles:  2+   Special Tests:  Left Straight Leg Test:  negative  Right Straight Leg Test:  negative  Left Roosevelt's Maneuver:  negative  Right Roosevelt's Maneuver:  negative    Imaging    PACS Images     Show images for XR lumbar spine 2 or 3 views   Order Report      Order Details   Order Questions     Question Answer Comment   Exam reason trauma    Note:  Enter reason for exam          Reason For Exam     trauma   Study Result     LUMBAR SPINE     INDICATION:   trauma      COMPARISON:  [facility] 11/10/2017      VIEWS:  XR SPINE LUMBAR 2 OR 3 VIEWS INJURY        FINDINGS:     There is no evidence of acute fracture or destructive osseous lesion      Alignment is unremarkable      There is moderate degenerative disc disease at L5-S1      The pedicles appear intact      Visualized soft tissues appear unremarkable      IMPRESSION:     No acute osseous abnormality        Degenerative changes as described         Workstation performed: [id number]      Imaging     XR lumbar spine 2 or 3 views (Order #[id number]) on 6/26/2018 - Imaging Information   Result History     XR lumbar spine 2 or 3 views (Order #[id number]) on 6/26/2018 - Order Result History Report    Show result history   Result Comparison   XR lumbar spine 2 or 3 views (Order [id number])   Newer Version Older Version   Final result    6/26/2018  9:22 AM    Interface, Radiology Results In         This is the newest version No older versions exist   Narrative     LUMBAR SPINE     INDICATION:   trauma  COMPARISON:  CT 11/10/2017  VIEWS:  XR SPINE LUMBAR 2 OR 3 VIEWS INJURY       FINDINGS:     There is no evidence of acute fracture or destructive osseous lesion  Alignment is unremarkable  There is moderate degenerative disc disease at L5-S1  The pedicles appear intact  Visualized soft tissues appear unremarkable        Impression       No acute osseous abnormality        Degenerative changes as described            Orders Placed This Encounter   Procedures    XR knee 3 vw right non injury    Ambulatory referral to Physical Therapy    Ambulatory referral to Orthopedic Surgery

## 2018-11-15 NOTE — LETTER
November 15, 2018     Camron Carter MD  [address] [address]    Patient: Lorie Alcantara   YOB: 1965   Date of Visit: 11/15/2018       Dear Dr Daigle Sayres: Thank you for referring Dany Belcher to me for evaluation  Below are my notes for this consultation  If you have questions, please do not hesitate to call me  I look forward to following your patient along with you  Sincerely,        Flor Pena MD        CC: No Recipients  Flor Pena MD  11/15/2018  9:43 AM  Sign at close encounter  Assessment:  1  Chronic pain syndrome    2  Chronic bilateral low back pain without sciatica    3  Lumbar post-laminectomy syndrome    4  Chronic pain of right knee        Plan:  Orders Placed This Encounter   Procedures    XR knee 3 vw right non injury     Standing Status:   Future     Number of Occurrences:   1     Standing Expiration Date:   11/15/2022     Scheduling Instructions:      Bring along any outside films relating to this procedure             Order Specific Question:   Reason for Exam:     Answer:   chronic right knee pain    Ambulatory referral to Physical Therapy     Standing Status:   Future     Standing Expiration Date:   5/15/2019     Referral Priority:   Routine     Referral Type:   Physical Therapy     Referral Reason:   Specialty Services Required     Requested Specialty:   Physical Therapy     Number of Visits Requested:   1     Expiration Date:   11/15/2019   [facility] Ambulatory referral to Orthopedic Surgery     Standing Status:   Future     Standing Expiration Date:   5/15/2019     Referral Priority:   Routine     Referral Type:   Consult - AMB     Referral Reason:   Specialty Services Required     Referred to Provider:   Mary Alice Yarbrough DO     Requested Specialty:   Orthopedic Surgery     Number of Visits Requested:   1     Expiration Date:   11/15/2019     My impressions and treatment recommendations were discussed in detail with the patient, who verbalized understanding and had no further questions  The patient is complaining of chronic left-sided back pain  He has undergone 2 laminectomies in the past   He does have some signs of lumbar degenerative disc disease by x-ray of the lumbar spine  As such, I felt a reasonable to have the patient undergo a course of physical therapy 2-3 times per week for 4-6 weeks  If he does not respond to physical therapy, will obtain a MRI of the lumbar spine with and without contrast     In addition, the patient is also complaining of right knee pain  I felt a reasonable to have the patient undergo a right knee x-ray to evaluate for any pathology involving the right knee  I also felt a reasonable to have the patient evaluated by Dr Jessica Montesinos  Follow-up is planned in 6 weeks time or sooner as warranted  Discharge instructions were provided  I personally saw and examined the patient and I agree with the above discussed plan of care  History of Present Illness:    Ketan Pearson is a 52 y o  male who presents to [facility] for initial evaluation of the above stated pain complaints  The patient has a past medical and chronic pain history as outlined in the assessment section  He was referred by Dr Ketan Pike  The patient reports a several year history of left-sided low back pain  He did undergo 2 lumbar laminectomies in 1996 and 2010  He did report that he had lumbar radicular symptoms into the right lower extremity prior to undergoing the laminectomy is  At this point, he does not experiencing any lumbar radicular symptoms  He is only complaining of right knee pain  He describes his pain as moderate and 5/10 on the verbal numerical pain rating scale  His pain is nearly constant in nature without any typical pattern  He describes his pain as numbness and dull/aching    He reports weakness in his right lower extremity    The patient states that lying down and relaxation decreases pain while bending, sitting, walking, and exercise increases pain  He reports that nerve block/injections, exercises, and chiropractic manipulation did not provide relief of symptoms  He does report that physical therapy and heat/ice treatment provided moderate relief  He reports the lumbar spine surgeries have provided him excellent pain relief  He is not currently using any medications for his pain  He is currently unemployed  Review of Systems:    Review of Systems   Constitutional: Negative for fever and unexpected weight change  HENT: Negative for trouble swallowing  Eyes: Negative for visual disturbance  Respiratory: Negative for shortness of breath and wheezing  Cardiovascular: Negative for chest pain and palpitations  Gastrointestinal: Negative for constipation, diarrhea, nausea and vomiting  Endocrine: Negative for cold intolerance, heat intolerance and polydipsia  Genitourinary: Negative for difficulty urinating and frequency  Musculoskeletal: Positive for gait problem (difficulty walking/ decreased range of motion)  Negative for arthralgias, joint swelling and myalgias  Skin: Negative for rash  Neurological: Negative for dizziness, seizures, syncope, weakness and headaches  Hematological: Does not bruise/bleed easily  Psychiatric/Behavioral: Negative for dysphoric mood  All other systems reviewed and are negative          Patient Active Problem List   Diagnosis    Closed fracture of transverse process of lumbar vertebra (HCC)    Anxiety    Backache    Collagen vascular disease ([facility] [address] )    Diverticulitis    GERD (gastroesophageal reflux disease)    Hypertension    Impaired fasting glucose    Osteoarthritis    Sciatica    Breathing-related sleep disorder    Type 2 diabetes mellitus (HCC)    Urinary calculus    Chronic bilateral low back pain with right-sided sciatica    Class 3 severe obesity due to excess calories with serious comorbidity and body mass index (BMI) of 50 0 to 59 9 in adult [facility])    Chronic pain syndrome    Lumbar post-laminectomy syndrome       Past Medical History:   Diagnosis Date    Hypertension     Kidney stone     x 2 episodes    Mild sleep apnea     Skin tag        Past Surgical History:   Procedure Laterality Date    KNEE ARTHROSCOPY Left 2007    LAMINECTOMY      LAMINOTOMY  07/2010    spinal       Family History   Problem Relation Age of Onset    Osteoarthritis Mother     Obesity Father     Substance Abuse Neg Hx     Mental illness Neg Hx        Social History     Occupational History    Not on file  Social History Main Topics    Smoking status: Never Smoker    Smokeless tobacco: Never Used    Alcohol use No    Drug use: No    Sexual activity: Not on file         Current Outpatient Prescriptions:     lisinopril-hydrochlorothiazide (PRINZIDE,ZESTORETIC) 20-25 MG per tablet, Take 1 tablet by mouth daily, Disp: 90 tablet, Rfl: 3    neomycin-polymyxin-hydrocortisone (CORTISPORIN) 0 35%-10,000 units/mL-1% otic suspension, Administer 3 drops to the right ear 3 (three) times a day for 5 days, Disp: 10 mL, Rfl: 0    No Known Allergies    Physical Exam:    /92 (BP Location: Left arm, Patient Position: Sitting, Cuff Size: Standard)   Pulse 80   Resp 16   Ht 6' 3" (1 905 m)   Wt (!) 183 kg (402 lb 6 4 oz)   BMI 50 30 kg/m²      Constitutional: obese  Eyes: anicteric  HEENT: grossly intact  Neck: supple, symmetric, trachea midline and no masses   Pulmonary:even and unlabored  Cardiovascular:No edema or pitting edema present  Skin:Normal without rashes or lesions and well hydrated  Psychiatric:Mood and affect appropriate  Neurologic:Cranial Nerves II-XII grossly intact  Musculoskeletal:antalgic     Lumbar Spine Exam    Appearance:  Normal lordosis  Palpation/Tenderness:  no tenderness or spasm  Sensory:  no sensory deficits noted  Range of Motion:  Flexion:   Moderately limited  with pain  Extension:  Moderately limited  with pain  Lateral Flexion - Left:  Moderately limited  with pain  Lateral Flexion - Right:  Moderately limited  with pain  Rotation - Left:  Moderately limited  with pain  Rotation - Right:  Moderately limited  with pain  Motor Strength:  Left hip flexion:  5/5  Left hip extension:  5/5  Right hip flexion:  5/5  Right hip extension:  5/5  Left knee flexion:  5/5  Left knee extension:  5/5  Right knee flexion:  5/5  Right knee extension:  5/5  Left foot dorsiflexion:  5/5  Left foot plantar flexion:  5/5  Right foot dorsiflexion:  5/5  Right foot plantar flexion:  5/5  Reflexes:  Left Patellar:  2+   Right Patellar:  2+   Left Achilles:  2+   Right Achilles:  2+   Special Tests:  Left Straight Leg Test:  negative  Right Straight Leg Test:  negative  Left Roosevelt's Maneuver:  negative  Right Roosevelt's Maneuver:  negative    Imaging    PACS Images     Show images for XR lumbar spine 2 or 3 views   Order Report      Order Details   Order Questions     Question Answer Comment   Exam reason trauma    Note:  Enter reason for exam          Reason For Exam     trauma   Study Result     LUMBAR SPINE     INDICATION:   trauma      COMPARISON:  CT 11/10/2017      VIEWS:  XR SPINE LUMBAR 2 OR 3 VIEWS INJURY        FINDINGS:     There is no evidence of acute fracture or destructive osseous lesion      Alignment is unremarkable      There is moderate degenerative disc disease at L5-S1      The pedicles appear intact      Visualized soft tissues appear unremarkable      IMPRESSION:     No acute osseous abnormality        Degenerative changes as described         Workstation performed: [id number]      Imaging     XR lumbar spine 2 or 3 views (Order #[id number]) on 6/26/2018 - Imaging Information   Result History     XR lumbar spine 2 or 3 views (Order #[id number]) on 6/26/2018 - Order Result History Report    Show result history   Result Comparison   XR lumbar spine 2 or 3 views (Order [id number])   Newer Version Older Version   Final result 6/26/2018  9:22 AM    Interface, Radiology Results In         This is the newest version No older versions exist   Narrative     LUMBAR SPINE     INDICATION:   trauma  COMPARISON:  CT 11/10/2017  VIEWS:  XR SPINE LUMBAR 2 OR 3 VIEWS INJURY       FINDINGS:     There is no evidence of acute fracture or destructive osseous lesion  Alignment is unremarkable  There is moderate degenerative disc disease at L5-S1  The pedicles appear intact  Visualized soft tissues appear unremarkable  Impression       No acute osseous abnormality        Degenerative changes as described            Orders Placed This Encounter   Procedures    XR knee 3 vw right non injury    Ambulatory referral to Physical Therapy    Ambulatory referral to Orthopedic Surgery

## 2018-11-16 ENCOUNTER — TELEPHONE (OUTPATIENT)
Dept: PAIN MEDICINE | Facility: MEDICAL CENTER | Age: 53
End: 2018-11-16

## 2018-11-16 NOTE — TELEPHONE ENCOUNTER
----- Message from James Perez MD sent at 11/16/2018 11:16 AM EST -----  Regarding: XR Results  X-ray of the right knee showing arthritis of the right knee  As discussed with him yesterday, please have him follow-up with Dr Rd Harding

## 2018-11-20 ENCOUNTER — APPOINTMENT (OUTPATIENT)
Dept: PHYSICAL THERAPY | Facility: CLINIC | Age: 53
End: 2018-11-20
Payer: COMMERCIAL

## 2018-11-21 ENCOUNTER — APPOINTMENT (OUTPATIENT)
Dept: PHYSICAL THERAPY | Facility: CLINIC | Age: 53
End: 2018-11-21
Payer: COMMERCIAL

## 2018-11-27 ENCOUNTER — EVALUATION (OUTPATIENT)
Dept: PHYSICAL THERAPY | Facility: CLINIC | Age: 53
End: 2018-11-27
Payer: COMMERCIAL

## 2018-11-27 DIAGNOSIS — M54.41 CHRONIC BILATERAL LOW BACK PAIN WITH RIGHT-SIDED SCIATICA: Primary | ICD-10-CM

## 2018-11-27 DIAGNOSIS — G89.29 CHRONIC BILATERAL LOW BACK PAIN WITH RIGHT-SIDED SCIATICA: Primary | ICD-10-CM

## 2018-11-27 PROCEDURE — G8978 MOBILITY CURRENT STATUS: HCPCS

## 2018-11-27 PROCEDURE — 97162 PT EVAL MOD COMPLEX 30 MIN: CPT

## 2018-11-27 PROCEDURE — G8979 MOBILITY GOAL STATUS: HCPCS

## 2018-11-27 NOTE — PROGRESS NOTES
PT Evaluation     Today's date: 2018  Patient name: Mehreen Monday  : 1965  MRN: [id number]  Referring provider: Mathew Fortune MD  Dx:   Encounter Diagnosis     ICD-10-CM    1  Chronic bilateral low back pain with right-sided sciatica M54 41     G89 29                   Assessment  Assessment details: Pt is a pleasant 52 y o  male who presents [facility] with low back pain that has been chronic in nature for over 20 years  Today, he presents with decreased and guarded thoracolumbar ROM and joint mobility, decreased B LE and core strength, soft tissue density restrictions throughout posterior chain, and high self reports of pain w/ activity  Functionally, he is limited in his ability to tolerate prolonged static positioning in sitting or standing, tolerate normal exercise, and perform full day of work related activities  He is motivated to improve  Pt will benefit from skilled PT to address the aforementioned deficits and limitations in an effort to maximize pain free functional mobility and overall quality of life  Progress as able with these goals in mind  Impairments: abnormal coordination, abnormal gait, abnormal muscle firing, abnormal muscle tone, abnormal or restricted ROM, abnormal movement, activity intolerance, impaired physical strength and pain with function  Understanding of Dx/Px/POC: good   Prognosis: good    Goals  Short term goals (3 weeks):  1) Pt will improve thoracolumbar mobility deficits by 25% pain free  2) Pt will improve B LE and core strength deficits by 1/3 grade MMT  3) Pt will report pain at worse <4/10  4) Pt will initiate and progress HEP w/ special emphasis on functional core stability and thoracolumbar mobility  Long term goals (6 weeks)  1) Pt will improve FOTO to at least 70   2) Pt will tolerate prolonged static positioning in sitting and standing while working for durations of at least 1 hour w/o modification due to pain    3) Pt will stand and ambulate for durations of at least 20 min w/ normalized gait mechanics and only min pain  4) Pt will be independent and compliant w/ HEP in order to maximize functional benefit of skilled PT following d/c        Plan  Plan details: POC to include: stretching, traction, core stab, functional lifting     HEP to start: see scanned document     Patient would benefit from: skilled PT  Planned modality interventions: cryotherapy and thermotherapy: hydrocollator packs  Planned therapy interventions: abdominal trunk stabilization, activity modification, ADL retraining, manual therapy, massage, motor coordination training, neuromuscular re-education, patient education, therapeutic training, therapeutic exercise, therapeutic activities, stretching, strengthening, home exercise program, functional ROM exercises, gait training, balance, balance/weight bearing training and body mechanics training  Frequency: 2x week  Duration in visits: 12  Duration in weeks: 6  Treatment plan discussed with: patient        Subjective Evaluation    History of Present Illness  Mechanism of injury: Pt has hx of 2 laminectomies, L5/21, first in  and had another in  in same spot  Was told both times that he needed to lose weight and stop driving truck  Notes that he has not lose weight and kept driving truck for 6 years  Notes that pain has steadily increased in the last few years  Retired from  two years ago  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, notes that he has to switch between sitting and standing  Prolonged positioning causes lots of soreness  Lying down flat is best  Was told recently he has arthritis in R knee  Pt notes fall over the summer, notes two small fractures in spine (L1 and L2 )  Reports that he has been told he has DDD in low back   Pt functional status is as follows:   Quality of life: good    Pain  No pain reported  Current pain rating: 3  At best pain ratin  At worst pain ratin  Location: [place] towards L side of low back   Quality: sharp and dull ache  Relieving factors: rest  Aggravating factors: standing, walking and sitting  Progression: no change    Social Support  Steps to enter house: yes  Stairs in house: no   Lives in: [place] house  Lives with: alone    Employment status: working (works as custom )  Treatments  Previous treatment: physical therapy  Patient Goals  Patient goals for therapy: increased strength, decreased pain and increased motion          Objective     Special Questions  Negative for bladder dysfunction, bowel dysfunction and saddle (S4) numbness    Postural Observations  Seated posture: poor  Standing posture: poor  Correction of posture: makes symptoms better        Palpation     Additional Palpation Details  Pt is TTP and has increased tissue density through lower thoracolumbar PS and QL/piri    Neurological Testing     Sensation     Lumbar   Left   Intact: light touch    Right   Intact: light touch    Active Range of Motion     Additional Active Range of Motion Details  Flex: 25% w/ heavy guarding upon standing, min pain  Ext: 50% w/o pain  SB R: 50% w/ min pain  SB L: 50% w/ min pain   Rot R: 75%  Rot L: 75%       Strength/Myotome Testing     Left Hip   Planes of Motion   Flexion: 4  Extension: 4  Abduction: 4  External rotation: 4  Internal rotation: 4    Right Hip   Planes of Motion   Flexion: 4  Extension: 4  Abduction: 4  External rotation: 4  Internal rotation: 4    Left Knee   Flexion: 4  Extension: 4    Right Knee   Flexion: 4  Extension: 4    Left Ankle/Foot   Dorsiflexion: 4    Right Ankle/Foot   Dorsiflexion: 4    Muscle Activation   Patient unable to activate left transverse abdominals and right transverse abdominals  Additional Muscle Activation Details  Core Strength: 1+/5 w/ compensations and heavy komal brijesh noted at 2/5 testing     Tests     Lumbar   Positive repeated flexion       Additional Tests Details  R passive SLR to 60 w/ min LBP, L to 80-90 w/o pain    Slump (-) B     PRITI and press ups relieve sx  Manual lumbar traction provides relief     Ambulation     Ambulation: Level Surfaces     Additional Level Surfaces Ambulation Details  Poor lumbar rotation, Mod trendelenburg B, decreased pacing, not significantly antalgic     Functional Assessment     Comments  Sit<>stand: heavy UE support and forward WS, mod antalgic, guarded       Flowsheet Rows      Most Recent Value   PT/OT G-Codes   Current Score  49   Projected Score  64   FOTO information reviewed  Yes   Assessment Type  Evaluation   G code set  Mobility: Walking & Moving Around   Mobility: Walking and Moving Around Current Status ()  CK   Mobility: Walking and Moving Around Goal Status ()  CJ          Precautions: DM II, HTN, standard     Specialty Daily Treatment Diary     Manual         Manual lumbar traction                                            Exercise Diary         HS and glute/piri stretching        TrA progressions        bridging        Sciatic nerve flossing         LTR         Sit<>stand        tband core stab        pball isometrics                                                                                                             Modalities        Heat pre        Ice post

## 2018-11-29 ENCOUNTER — OFFICE VISIT (OUTPATIENT)
Dept: PHYSICAL THERAPY | Facility: CLINIC | Age: 53
End: 2018-11-29
Payer: COMMERCIAL

## 2018-11-29 DIAGNOSIS — G89.29 CHRONIC BILATERAL LOW BACK PAIN WITH RIGHT-SIDED SCIATICA: Primary | ICD-10-CM

## 2018-11-29 DIAGNOSIS — M54.41 CHRONIC BILATERAL LOW BACK PAIN WITH RIGHT-SIDED SCIATICA: Primary | ICD-10-CM

## 2018-11-29 PROCEDURE — 97112 NEUROMUSCULAR REEDUCATION: CPT

## 2018-11-29 PROCEDURE — 97110 THERAPEUTIC EXERCISES: CPT

## 2018-11-29 NOTE — PROGRESS NOTES
Daily Note     Today's date: 2018  Patient name: Ema Thornton  : 1965  MRN: [id number]  Referring provider: Yumiko Awan MD  Dx:   Encounter Diagnosis     ICD-10-CM    1  Chronic bilateral low back pain with right-sided sciatica M54 41     G89 29                   Subjective: Pt notes 3/10 pain in low back to start  Thinks that home exercises helped significantly  Excited to begin formal sessions  Objective: Pt requires cueing to avoid komal brijesh w/ TrA progressions in supine, corrects well but needs cueing throughout       Precautions: DM II, HTN, standard     Specialty Daily Treatment Diary     Manual         Manual lumbar traction 2x2 min holds                                            Exercise Diary  2       HS and glute/piri stretching 3x30 sec holds w/ sciatic nerve floss       TrA progressions isos x 3 sec x 10, marching 3x10 B        bridging Ad sq x 20, w/ bridge 2x10       Sciatic nerve flossing  Manual        LTR  x20-30 total       Sit<>stand        tband core stab GTB pallof press 2x10 B        pball isometrics          3 way pball roll outs x15 B                                                                                                    Modalities 2       Heat pre no       Ice post  no                       Assessment: Tolerated treatment well  Patient demonstrated fatigue post treatment and would benefit from continued PT  Does well w/ initial follow up, shows good form and pacing throughout  Fatigue but no pain by end  He will benefit from higher level stabilization as sx allow  Added bridges to HEP  Plan: Continue per plan of care   stay away from 3 way pball roll outs

## 2018-11-30 ENCOUNTER — OFFICE VISIT (OUTPATIENT)
Dept: OBGYN CLINIC | Facility: CLINIC | Age: 53
End: 2018-11-30
Payer: COMMERCIAL

## 2018-11-30 VITALS
DIASTOLIC BLOOD PRESSURE: 74 MMHG | BODY MASS INDEX: 38.36 KG/M2 | HEART RATE: 86 BPM | WEIGHT: 315 LBS | SYSTOLIC BLOOD PRESSURE: 169 MMHG | HEIGHT: 76 IN

## 2018-11-30 DIAGNOSIS — M25.561 CHRONIC PAIN OF RIGHT KNEE: Primary | ICD-10-CM

## 2018-11-30 DIAGNOSIS — G89.29 CHRONIC PAIN OF RIGHT KNEE: Primary | ICD-10-CM

## 2018-11-30 DIAGNOSIS — M17.11 PRIMARY OSTEOARTHRITIS OF RIGHT KNEE: ICD-10-CM

## 2018-11-30 DIAGNOSIS — E66.01 CLASS 3 SEVERE OBESITY DUE TO EXCESS CALORIES WITH SERIOUS COMORBIDITY AND BODY MASS INDEX (BMI) OF 50.0 TO 59.9 IN ADULT (HCC): ICD-10-CM

## 2018-11-30 PROCEDURE — 99244 OFF/OP CNSLTJ NEW/EST MOD 40: CPT | Performed by: ORTHOPAEDIC SURGERY

## 2018-11-30 NOTE — PROGRESS NOTES
Assessment/Plan:  1  Chronic pain of right knee     2  Primary osteoarthritis of right knee     3  Class 3 severe obesity due to excess calories with serious comorbidity and body mass index (BMI) of 50 0 to 59 9 in adult [facility])       Patient is a very pleasant 56-year-old male that is sent to see me as a consultation from his pain management specialist for his right knee pain  After thorough history, clinical exam, and review of his x-rays he does have very mild osteoarthritis of his right knee  We discussed this diagnosis here today his x-rays were reviewed  We discussed activity modification and more specifically an ergonomic driving position as this seems to be when he is having the most discomfort after about 45 min of driving  We also discussed activity modification and weight management as his BMI is around 50  He did appear to have a scant effusion evaluation today and he was amenable to undergoing an aspiration and injection of his right knee  The risks and benefits of undergoing this procedure were discussed at length prior the procedure  There was no aspirate yield and he tolerated the knee injection well  Post-injection instructions were provided  All of his questions were addressed today  He can return to see me approximately 4 months or later pending the efficacy of today steroid injection  Subjective:  Right knee pain    Patient ID: Wagner Covarrubias is a 52 y o  male  HPI  Patient is a very pleasant 56-year-old male that is seen today in consultation at the referral of his pain management specialist for his complaint of right knee pain  He states that over the last few months he notices that he gets and anterior medial right knee pain after he has been driving for 45 min or more  He denies any locking or catching but states that this is a dull ache that is present over the anterior medial aspect of his knee and is quantified at 7/10  He denies any stiffness associated with it    He denies any prior injury or surgery to the right knee  He states the pain is not associated with activity weight-bearing or going up and down stairs  He states that there is no pain with twisting motions of his knee  He states that after he gets out of a car and starts to mobilize the pain is gone  He has not attempted any form of conservative measures of treatment for his discomfort thus far  He is currently employed as [employer] for children  Review of Systems   Constitutional: Positive for activity change  HENT: Negative  Eyes: Negative  Respiratory: Negative  Cardiovascular: Negative  Gastrointestinal: Negative  Endocrine: Negative  Musculoskeletal: Positive for arthralgias  Skin: Negative  Neurological: Negative  Psychiatric/Behavioral: Negative  Past Medical History:   Diagnosis Date    Hypertension     Kidney stone     x 2 episodes    Mild sleep apnea     Skin tag        Past Surgical History:   Procedure Laterality Date    KNEE ARTHROSCOPY Left 2007    LAMINECTOMY      LAMINOTOMY  07/2010    spinal       Family History   Problem Relation Age of Onset    Osteoarthritis Mother     Obesity Father     Substance Abuse Neg Hx     Mental illness Neg Hx        Social History     Occupational History    Not on file       Social History Main Topics    Smoking status: Never Smoker    Smokeless tobacco: Never Used    Alcohol use No    Drug use: No    Sexual activity: Not on file         Current Outpatient Prescriptions:     lisinopril-hydrochlorothiazide (PRINZIDE,ZESTORETIC) 20-25 MG per tablet, Take 1 tablet by mouth daily, Disp: 90 tablet, Rfl: 3    neomycin-polymyxin-hydrocortisone (CORTISPORIN) 0 35%-10,000 units/mL-1% otic suspension, Administer 3 drops to the right ear 3 (three) times a day for 5 days, Disp: 10 mL, Rfl: 0    No Known Allergies    Objective:  Vitals:    11/30/18 1012   BP: 169/74   Pulse: 86       Body mass index is 49 35 kg/m²  Right Knee Exam     Tenderness   The patient is experiencing tenderness in the medial joint line  Range of Motion   Extension: 0   Right knee flexion: 115°     Tests   Carmen:  Medial - negative Lateral - negative  Drawer:       Anterior - negative      Varus: negative  Valgus: negative  Patellar Apprehension: negative    Other   Erythema: absent  Scars: absent  Sensation: normal  Pulse: present  Swelling: none    Comments:  Negative Apley negative Johnny  Mild parapatellar crepitance  Negative parapatellar grind  Knee is stable to ligamentous exam at 0° 30° 90°            Physical Exam   Constitutional: He is oriented to person, place, and time  He appears well-developed  HENT:   Head: Atraumatic  Eyes: EOM are normal    Neck: Neck supple  Cardiovascular: Normal rate  Pulmonary/Chest: Effort normal    Abdominal: Soft  Musculoskeletal:   See orthopedic exam   Neurological: He is alert and oriented to person, place, and time  Skin: Skin is warm and dry  Psychiatric: He has a normal mood and affect  Nursing note and vitals reviewed  I have personally reviewed pertinent films in PACS  X-rays of the right knee reviewed by me from 11/15/2018  They demonstrate mild degenerative changes in the patellofemoral joint as well as the medial compartment with joint space asymmetry  There is osteophyte formation off of the nonarticular side of the patella  No lytic or blastic lesion  No fracture appreciated  Mynor STOUT    Division of Adult Reconstruction  Department of [facility]

## 2018-11-30 NOTE — LETTER
November 30, 2018     Les Zuniga, [address] [facility] [address]    Patient: Alex Noble   YOB: 1965   Date of Visit: 11/30/2018       Dear Dr Sonia Ram:    Thank you for referring Daisha Batresler to me for evaluation  Below are my notes for this consultation  If you have questions, please do not hesitate to call me  I look forward to following your patient along with you  Sincerely,        Iggy Livnigston DO        CC: No Recipients  Iggy Livingston DO  11/30/2018 10:48 AM  Sign at close encounter  Assessment/Plan:  1  Chronic pain of right knee     2  Primary osteoarthritis of right knee     3  Class 3 severe obesity due to excess calories with serious comorbidity and body mass index (BMI) of 50 0 to 59 9 in [facility])       Patient is a very pleasant 70-year-old male that is sent to see me as a consultation from his pain management specialist for his right knee pain  After thorough history, clinical exam, and review of his x-rays he does have very mild osteoarthritis of his right knee  We discussed this diagnosis here today his x-rays were reviewed  We discussed activity modification and more specifically an ergonomic driving position as this seems to be when he is having the most discomfort after about 45 min of driving  We also discussed activity modification and weight management as his BMI is around 50  He did appear to have a scant effusion evaluation today and he was amenable to undergoing an aspiration and injection of his right knee  The risks and benefits of undergoing this procedure were discussed at length prior the procedure  There was no aspirate yield and he tolerated the knee injection well  Post-injection instructions were provided  All of his questions were addressed today  He can return to see me approximately 4 months or later pending the efficacy of today steroid injection       Subjective:  Right knee pain    Patient ID: Liban Colon Lashonda Vines is a 52 y o  male  HPI  Patient is a very pleasant 64-year-old male that is seen today in consultation at the referral of his pain management specialist for his complaint of right knee pain  He states that over the last few months he notices that he gets and anterior medial right knee pain after he has been driving for 45 min or more  He denies any locking or catching but states that this is a dull ache that is present over the anterior medial aspect of his knee and is quantified at 7/10  He denies any stiffness associated with it  He denies any prior injury or surgery to the right knee  He states the pain is not associated with activity weight-bearing or going up and down stairs  He states that there is no pain with twisting motions of his knee  He states that after he gets out of a car and starts to mobilize the pain is gone  He has not attempted any form of conservative measures of treatment for his discomfort thus far  He is currently employed as [employer] for children  Review of Systems   Constitutional: Positive for activity change  HENT: Negative  Eyes: Negative  Respiratory: Negative  Cardiovascular: Negative  Gastrointestinal: Negative  Endocrine: Negative  Musculoskeletal: Positive for arthralgias  Skin: Negative  Neurological: Negative  Psychiatric/Behavioral: Negative  Past Medical History:   Diagnosis Date    Hypertension     Kidney stone     x 2 episodes    Mild sleep apnea     Skin tag        Past Surgical History:   Procedure Laterality Date    KNEE ARTHROSCOPY Left 2007    LAMINECTOMY      LAMINOTOMY  07/2010    spinal       Family History   Problem Relation Age of Onset    Osteoarthritis Mother     Obesity Father     Substance Abuse Neg Hx     Mental illness Neg Hx        Social History     Occupational History    Not on file       Social History Main Topics    Smoking status: Never Smoker    Smokeless tobacco: Never Used    Alcohol use No    Drug use: No    Sexual activity: Not on file         Current Outpatient Prescriptions:     lisinopril-hydrochlorothiazide (PRINZIDE,ZESTORETIC) 20-25 MG per tablet, Take 1 tablet by mouth daily, Disp: 90 tablet, Rfl: 3    neomycin-polymyxin-hydrocortisone (CORTISPORIN) 0 35%-10,000 units/mL-1% otic suspension, Administer 3 drops to the right ear 3 (three) times a day for 5 days, Disp: 10 mL, Rfl: 0    No Known Allergies    Objective:  Vitals:    11/30/18 1012   BP: 169/74   Pulse: 86       Body mass index is 49 35 kg/m²  Right Knee Exam     Tenderness   The patient is experiencing tenderness in the medial joint line  Range of Motion   Extension: 0   Right knee flexion: 115°      Tests   Carmen:  Medial - negative Lateral - negative  Drawer:       Anterior - negative      Varus: negative  Valgus: negative  Patellar Apprehension: negative    Other   Erythema: absent  Scars: absent  Sensation: normal  Pulse: present  Swelling: none    Comments:  Negative Apley negative Johnny  Mild parapatellar crepitance  Negative parapatellar grind  Knee is stable to ligamentous exam at 0° 30° 90°            Physical Exam   Constitutional: He is oriented to person, place, and time  He appears well-developed  HENT:   Head: Atraumatic  Eyes: EOM are normal    Neck: Neck supple  Cardiovascular: Normal rate  Pulmonary/Chest: Effort normal    Abdominal: Soft  Musculoskeletal:   See orthopedic exam   Neurological: He is alert and oriented to person, place, and time  Skin: Skin is warm and dry  Psychiatric: He has a normal mood and affect  Nursing note and vitals reviewed  I have personally reviewed pertinent films in PACS  X-rays of the right knee reviewed by me from 11/15/2018  They demonstrate mild degenerative changes in the patellofemoral joint as well as the medial compartment with joint space asymmetry    There is osteophyte formation off of the nonarticular side of the patella  No lytic or blastic lesion  No fracture appreciated  Auneber STOUT    Division of Adult Reconstruction  Department of [facility]

## 2018-12-04 ENCOUNTER — OFFICE VISIT (OUTPATIENT)
Dept: PHYSICAL THERAPY | Facility: CLINIC | Age: 53
End: 2018-12-04
Payer: COMMERCIAL

## 2018-12-04 DIAGNOSIS — G89.29 CHRONIC BILATERAL LOW BACK PAIN WITH RIGHT-SIDED SCIATICA: Primary | ICD-10-CM

## 2018-12-04 DIAGNOSIS — M54.41 CHRONIC BILATERAL LOW BACK PAIN WITH RIGHT-SIDED SCIATICA: Primary | ICD-10-CM

## 2018-12-04 PROCEDURE — 97110 THERAPEUTIC EXERCISES: CPT

## 2018-12-04 PROCEDURE — 97112 NEUROMUSCULAR REEDUCATION: CPT

## 2018-12-04 NOTE — PROGRESS NOTES
Daily Note     Today's date: 2018  Patient name: Marisa Moore  : 1965  MRN: [id number]  Referring provider: Anne Landry MD  Dx:   Encounter Diagnosis     ICD-10-CM    1  Chronic bilateral low back pain with right-sided sciatica M54 41     G89 29                   Subjective: Pt notes 3/10 pain to start session  Had one instance of sharp pain in R side low back while changing tire over the weekend  Pt is trying to avoid this as much as possible in the meantime  Objective: Requires initial cueing for proper TrA contraction in supine and standing each but corrects well and maintains  Quick fatigue noted w/ pball progresions  Precautions: DM II, HTN, standard     Specialty Daily Treatment Diary     Manual        Manual lumbar traction 2x2 min holds  2x2 min holds                                           Exercise Diary  2 3      HS and glute/piri stretching 3x30 sec holds w/ sciatic nerve floss 3x30 sec for HS w/ sciatic nerve floss, self piri stretch 3x30 sec holds      TrA progressions isos x 3 sec x 10, marching 3x10 B  Isos, marching x 20-30 each, single leg ext x 10 each      bridging Ad sq x 20, w/ bridge 2x10 Reviewed       Sciatic nerve flossing  Manual  manual      LTR  x20-30 total x20-30 total      Sit<>stand        tband core stab GTB pallof press 2x10 B        pball isometrics   isos 3 sec hold x 20, marching, hip abd x 20 each       3 way pball roll outs x15 B                                                                                                    Modalities 2 3      Heat pre no no      Ice post  no no                  Assessment: Tolerated treatment well  Patient demonstrated fatigue post treatment and would benefit from continued PT  Pt fatigued after current program and deferred further progressions  Shows good form and pacing, improves tendency to utilize komal brijesh w/ cueing   Asked to add standing core isometrics to HEP and shows good understanding of how to perform these techniques at home  Pt will be progressed as sx allow at next session  Plan: Continue per plan of care   hector foote progressions

## 2018-12-06 ENCOUNTER — OFFICE VISIT (OUTPATIENT)
Dept: PHYSICAL THERAPY | Facility: CLINIC | Age: 53
End: 2018-12-06
Payer: COMMERCIAL

## 2018-12-06 DIAGNOSIS — M54.41 CHRONIC BILATERAL LOW BACK PAIN WITH RIGHT-SIDED SCIATICA: Primary | ICD-10-CM

## 2018-12-06 DIAGNOSIS — G89.29 CHRONIC BILATERAL LOW BACK PAIN WITH RIGHT-SIDED SCIATICA: Primary | ICD-10-CM

## 2018-12-06 PROCEDURE — 97110 THERAPEUTIC EXERCISES: CPT

## 2018-12-06 PROCEDURE — 97112 NEUROMUSCULAR REEDUCATION: CPT

## 2018-12-06 NOTE — PROGRESS NOTES
Daily Note     Today's date: 2018  Patient name: Sena Galvez  : 1965  MRN: [id number]  Referring provider: Elsy Estrella MD  Dx:   Encounter Diagnosis     ICD-10-CM    1  Chronic bilateral low back pain with right-sided sciatica M54 41     G89 29                   Subjective: Pt notes 4/10 soreness to start session  Not sure why he is a little more sore today  Felt good after last session and has been doing HEP each morning  Objective: Pt unable to perform wall slides for posture w/ arms flat against wall  Shows improved TrA contraction throughout w/ less trunk compensation       Precautions: DM II, HTN, standard     Specialty Daily Treatment Diary     Manual       Manual lumbar traction 2x2 min holds  2x2 min holds  2x2 min holds                                         Exercise Diary  2 3 4     HS and glute/piri stretching 3x30 sec holds w/ sciatic nerve floss 3x30 sec for HS w/ sciatic nerve floss, self piri stretch 3x30 sec holds 3x30 w/ floss, self piri reviewed      TrA progressions isos x 3 sec x 10, marching 3x10 B  Isos, marching x 20-30 each, single leg ext x 10 each isos 10 x 3 sec, marching 2x10, single leg ext 2x10     bridging Ad sq x 20, w/ bridge 2x10 Reviewed  Review      Sciatic nerve flossing  Manual  manual      LTR  x20-30 total x20-30 total x20 B      Sit<>stand        tband core stab GTB pallof press 2x10 B   Review      pball isometrics   isos 3 sec hold x 20, marching, hip abd x 20 each Isos, marching, hip abd and hip ext 3x10 each      3 way pball roll outs x15 B           Black tubing rows and shoulder ext 2x10 each        Wall posture x 3 mins, wall slides for posture attempted                                                                                  Modalities 2 3 4     Heat pre no no no     Ice post  no no no                   Assessment: Tolerated treatment well   Patient demonstrated fatigue post treatment and would benefit from continued PT  Fatigue but no increase in pain by end of session  Pt continually notes that exercises feel beneficial  Shows good improvement in ability to self correct for min form deficits w/ less cueing  Appropriate for higher level progressions as sx allow at next session         Plan: Continue per plan of care  hip abd work, gentle SLDL progressions

## 2018-12-10 ENCOUNTER — OFFICE VISIT (OUTPATIENT)
Dept: FAMILY MEDICINE CLINIC | Facility: CLINIC | Age: 53
End: 2018-12-10
Payer: COMMERCIAL

## 2018-12-10 VITALS
HEART RATE: 86 BPM | BODY MASS INDEX: 39.17 KG/M2 | TEMPERATURE: 98.3 F | RESPIRATION RATE: 18 BRPM | DIASTOLIC BLOOD PRESSURE: 80 MMHG | HEIGHT: 75 IN | OXYGEN SATURATION: 97 % | SYSTOLIC BLOOD PRESSURE: 126 MMHG | WEIGHT: 315 LBS

## 2018-12-10 DIAGNOSIS — E11.9 TYPE 2 DIABETES MELLITUS WITHOUT COMPLICATION, WITHOUT LONG-TERM CURRENT USE OF INSULIN (HCC): ICD-10-CM

## 2018-12-10 DIAGNOSIS — M54.41 CHRONIC BILATERAL LOW BACK PAIN WITH RIGHT-SIDED SCIATICA: ICD-10-CM

## 2018-12-10 DIAGNOSIS — M15.9 PRIMARY OSTEOARTHRITIS INVOLVING MULTIPLE JOINTS: ICD-10-CM

## 2018-12-10 DIAGNOSIS — Z00.00 ROUTINE GENERAL MEDICAL EXAMINATION AT A HEALTH CARE FACILITY: Primary | ICD-10-CM

## 2018-12-10 DIAGNOSIS — R73.01 IMPAIRED FASTING GLUCOSE: ICD-10-CM

## 2018-12-10 DIAGNOSIS — Z12.11 COLON CANCER SCREENING: ICD-10-CM

## 2018-12-10 DIAGNOSIS — E66.01 CLASS 3 SEVERE OBESITY DUE TO EXCESS CALORIES WITH SERIOUS COMORBIDITY AND BODY MASS INDEX (BMI) OF 50.0 TO 59.9 IN ADULT (HCC): ICD-10-CM

## 2018-12-10 DIAGNOSIS — I10 ESSENTIAL HYPERTENSION: ICD-10-CM

## 2018-12-10 DIAGNOSIS — R31.9 HEMATURIA, UNSPECIFIED TYPE: ICD-10-CM

## 2018-12-10 DIAGNOSIS — K21.9 GASTROESOPHAGEAL REFLUX DISEASE WITHOUT ESOPHAGITIS: ICD-10-CM

## 2018-12-10 DIAGNOSIS — Z13.220 SCREENING FOR HYPERLIPIDEMIA: ICD-10-CM

## 2018-12-10 DIAGNOSIS — G89.29 CHRONIC BILATERAL LOW BACK PAIN WITH RIGHT-SIDED SCIATICA: ICD-10-CM

## 2018-12-10 DIAGNOSIS — Z12.5 ENCOUNTER FOR PROSTATE CANCER SCREENING: ICD-10-CM

## 2018-12-10 DIAGNOSIS — Z13.29 SCREENING FOR HYPOTHYROIDISM: ICD-10-CM

## 2018-12-10 LAB
SL AMB  POCT GLUCOSE, UA: 50
SL AMB LEUKOCYTE ESTERASE,UA: 0
SL AMB POCT BILIRUBIN,UA: 0
SL AMB POCT BLOOD,UA: 250
SL AMB POCT CLARITY,UA: CLEAR
SL AMB POCT COLOR,UA: YELLOW
SL AMB POCT KETONES,UA: 0
SL AMB POCT NITRITE,UA: 0
SL AMB POCT PH,UA: 5
SL AMB POCT SPECIFIC GRAVITY,UA: 1.01
SL AMB POCT URINE PROTEIN: 0
SL AMB POCT UROBILINOGEN: 0

## 2018-12-10 PROCEDURE — 81003 URINALYSIS AUTO W/O SCOPE: CPT | Performed by: FAMILY MEDICINE

## 2018-12-10 PROCEDURE — 36415 COLL VENOUS BLD VENIPUNCTURE: CPT | Performed by: FAMILY MEDICINE

## 2018-12-10 PROCEDURE — 93000 ELECTROCARDIOGRAM COMPLETE: CPT | Performed by: FAMILY MEDICINE

## 2018-12-10 PROCEDURE — 99396 PREV VISIT EST AGE 40-64: CPT | Performed by: FAMILY MEDICINE

## 2018-12-10 PROCEDURE — 82270 OCCULT BLOOD FECES: CPT | Performed by: FAMILY MEDICINE

## 2018-12-10 NOTE — PATIENT INSTRUCTIONS
DISCUSSED HEALTH MAINTENENCE ISSUES  BW WILL BE OBTAINED  ENCOURAGED HEALTHY DIET AND EXERCISE  WEIGHT MANAGEMENT CONSULT    COLONOSCOPY  RV FOR ANNUAL HEALTH EXAM IN 1 YEAR  RV SOONER IF THERE ARE ANY CONCERNS

## 2018-12-11 ENCOUNTER — OFFICE VISIT (OUTPATIENT)
Dept: PHYSICAL THERAPY | Facility: CLINIC | Age: 53
End: 2018-12-11
Payer: COMMERCIAL

## 2018-12-11 DIAGNOSIS — G89.29 CHRONIC BILATERAL LOW BACK PAIN WITH RIGHT-SIDED SCIATICA: Primary | ICD-10-CM

## 2018-12-11 DIAGNOSIS — M54.41 CHRONIC BILATERAL LOW BACK PAIN WITH RIGHT-SIDED SCIATICA: Primary | ICD-10-CM

## 2018-12-11 LAB — SL AMB POCT FECES OCC BLD: NORMAL

## 2018-12-11 PROCEDURE — 97112 NEUROMUSCULAR REEDUCATION: CPT

## 2018-12-11 PROCEDURE — 97110 THERAPEUTIC EXERCISES: CPT

## 2018-12-11 NOTE — PROGRESS NOTES
[facility] HEALTH MAINTENANCE OFFICE VISIT  [facility]    NAME: Bibiana Mathews  AGE: 52 y o  SEX: male  : 1965     DATE: 2018    Assessment and Plan     Problem List Items Addressed This Visit        Digestive    GERD (gastroesophageal reflux disease)       Endocrine    Impaired fasting glucose    Type 2 diabetes mellitus (HCC)    Relevant Orders    Hemoglobin A1C    Lipid panel    Microalbumin,Urine       Cardiovascular and Mediastinum    Hypertension    Relevant Orders    POCT ECG (Completed)    Comprehensive metabolic panel    Uric acid    POCT urine dip auto non-scope (Completed)       Nervous and Auditory    Chronic bilateral low back pain with right-sided sciatica       Musculoskeletal and Integument    Osteoarthritis       Other    Class 3 severe obesity due to excess calories with serious comorbidity and body mass index (BMI) of 50 0 to 59 9 in adult ([facility] [address] )    Relevant Orders    TSH, 3rd generation    CBC and differential    Lipid panel    Ambulatory referral to Bariatric Surgery    Routine general medical examination at a health care facility - Primary    Screening for hyperlipidemia    Relevant Orders    Lipid panel    Colon cancer screening    Relevant Orders    POCT hemoccult screening    Ambulatory referral to Gastroenterology    Encounter for prostate cancer screening    Relevant Orders    PSA, total and free    Screening for hypothyroidism    Relevant Orders    TSH, 3rd generation    Lipid panel      Other Visit Diagnoses     Hematuria, unspecified type        Relevant Orders    Cytology, urine               No Follow-up on file          Chief Complaint     Chief Complaint   Patient presents with    Physical Exam    Flu Vaccine     will discuss       History of Present Illness     [address] RECORD  WEIGHT ISSUES        Well Adult Physical   Patient here for a comprehensive physical exam       Diet and Physical Activity  Diet: well balanced diet  Weight concerns: Patient has class 3 obesity (BMI >40)  Exercise: rarely      Depression Screen  PHQ-9 Depression Screening    PHQ-9:    Frequency of the following problems over the past two weeks:               General Health  Hearing: Normal:  bilateral  Vision: no vision problems  Dental: regular dental visits    Reproductive Health          The following portions of the patient's history were reviewed and updated as appropriate: allergies, current medications, past family history, past medical history, past social history, past surgical history and problem list     Review of Systems     Review of Systems   Constitutional: Negative for chills, fatigue and fever  HENT: Negative for congestion, ear discharge, ear pain, mouth sores, postnasal drip, sore throat and trouble swallowing  Eyes: Negative for pain, discharge and visual disturbance  Respiratory: Negative for cough, shortness of breath and wheezing  Cardiovascular: Negative for chest pain, palpitations and leg swelling  Gastrointestinal: Negative for abdominal distention, abdominal pain, blood in stool, diarrhea and nausea  Endocrine: Negative for polydipsia, polyphagia and polyuria  Genitourinary: Negative for dysuria, frequency, hematuria and urgency  Musculoskeletal: Positive for arthralgias and gait problem  Negative for joint swelling  Skin: Negative for pallor and rash  Neurological: Negative for dizziness, syncope, speech difficulty, weakness, light-headedness, numbness and headaches  Hematological: Negative for adenopathy  Psychiatric/Behavioral: Negative for behavioral problems, confusion and sleep disturbance  The patient is not nervous/anxious          Past Medical History     Past Medical History:   Diagnosis Date    Hypertension     Kidney stone     x 2 episodes    Mild sleep apnea     Skin tag        Past Surgical History     Past Surgical History:   Procedure Laterality Date    KNEE ARTHROSCOPY Left 2007    LAMINECTOMY      LAMINOTOMY  07/2010    spinal       Social History     Social History     Social History    Marital status:      Spouse name: N/A    Number of children: N/A    Years of education: N/A     Social History Main Topics    Smoking status: Never Smoker    Smokeless tobacco: Never Used    Alcohol use No    Drug use: No    Sexual activity: Not on file     Other Topics Concern    Not on file     Social History Narrative    Living alone           Family History     Family History   Problem Relation Age of Onset    Osteoarthritis Mother     Obesity Father     Substance Abuse Neg Hx     Mental illness Neg Hx        Current Medications       Current Outpatient Prescriptions:     lisinopril-hydrochlorothiazide (PRINZIDE,ZESTORETIC) 20-25 MG per tablet, Take 1 tablet by mouth daily, Disp: 90 tablet, Rfl: 3     Allergies     No Known Allergies    Objective     /80 (BP Location: Left arm, Patient Position: Sitting, Cuff Size: Extra-Large)   Pulse 86   Temp 98 3 °F (36 8 °C) (Temporal)   Resp 18   Ht 6' 2 5" (1 892 m)   Wt (!) 178 kg (391 lb 6 4 oz)   SpO2 97%   BMI 49 58 kg/m²      Physical Exam   Constitutional: He is oriented to person, place, and time  He appears well-developed and well-nourished  HENT:   Head: Normocephalic and atraumatic  Right Ear: External ear normal    Left Ear: External ear normal    Nose: Nose normal    Mouth/Throat: Oropharynx is clear and moist    Eyes: Pupils are equal, round, and reactive to light  Conjunctivae and EOM are normal  Right eye exhibits no discharge  Left eye exhibits no discharge  Neck: Neck supple  No JVD present  No thyromegaly present  Cardiovascular: Normal rate, regular rhythm, normal heart sounds and intact distal pulses  No murmur heard  Pulmonary/Chest: Effort normal and breath sounds normal  He has no wheezes  He has no rales  Abdominal: Soft   Bowel sounds are normal  He exhibits no mass  There is no hepatosplenomegaly  There is no tenderness  There is no rebound, no guarding and no CVA tenderness  OBESE   Genitourinary: Rectum normal, prostate normal and penis normal  Rectal exam shows guaiac negative stool  Musculoskeletal: Normal range of motion  He exhibits no edema, tenderness or deformity  MILD DJD CHANGES     Lymphadenopathy:     He has no cervical adenopathy  He has no axillary adenopathy  Neurological: He is alert and oriented to person, place, and time  He has normal reflexes  No cranial nerve deficit  He exhibits normal muscle tone  Coordination normal    Skin: Skin is warm and dry  No rash noted  No erythema  Psychiatric: He has a normal mood and affect   His behavior is normal  Judgment and thought content normal          No exam data present    Health Maintenance     Health Maintenance   Topic Date Due    Hepatitis C Screening  1965    HEMOGLOBIN A1C  1965    Diabetic Foot Exam  04/02/1973    DM Eye Exam  04/02/1973    URINE MICROALBUMIN  04/02/1973    Pneumococcal PPSV23 Highest Risk Adult (1 of 3 - PCV13) 04/02/1982    INFLUENZA VACCINE  07/01/2018    PT PLAN OF CARE  12/27/2018    Depression Screening PHQ  11/27/2019    CRC Screening: Colonoscopy  12/08/2021    DTaP,Tdap,and Td Vaccines (3 - Td) 06/26/2028     Immunization History   Administered Date(s) Administered    Tdap 07/02/2010, 06/26/2018       Skylar Carrera MD  [facility]

## 2018-12-11 NOTE — PROGRESS NOTES
Daily Note     Today's date: 2018  Patient name: Levy Zhang  : 1965  MRN: [id number]  Referring provider: Devin Agudelo MD  Dx:   Encounter Diagnosis     ICD-10-CM    1  Chronic bilateral low back pain with right-sided sciatica M54 41     G89 29        Start Time: 1430  Stop Time: 1520  Total time in clinic (min): 50 minutes    Subjective: Pt states his LB has been sore; therapy is helping muscles in his upper back & core       Objective: Pt unable to perform wall slides for posture w/ arms flat against wall   Shows improved TrA contraction throughout w/ less trunk compensation       Precautions: DM II, HTN, standard     Specialty Daily Treatment Diary     Manual      Manual lumbar traction 2x2 min holds  2x2 min holds  2x2 min holds Not today                                         Exercise Diary  2 3 4 5    HS and glute/piri stretching 3x30 sec holds w/ sciatic nerve floss 3x30 sec for HS w/ sciatic nerve floss, self piri stretch 3x30 sec holds 3x30 w/ floss, self piri reviewed  HS SOS 3 x 30 sec   Self piri 3 x 30 sec     TrA progressions isos x 3 sec x 10, marching 3x10 B  Isos, marching x 20-30 each, single leg ext x 10 each isos 10 x 3 sec, marching 2x10, single leg ext 2x10 isos 3 x 10   Marching 3  x 10, single leg ext 2 x 10     bridging Ad sq x 20, w/ bridge 2x10 Reviewed  Review  Held     Sciatic nerve flossing  Manual  manual      LTR  x20-30 total x20-30 total x20 B  X 20 B    Sit<>stand        tband core stab GTB pallof press 2x10 B   Review  Resume NV     pball isometrics   isos 3 sec hold x 20, marching, hip abd x 20 each Isos, marching, hip abd and hip ext 3x10 each isos march, hip abd & hip ext 2 x 10 ea     3 way pball roll outs x15 B           Black tubing rows and shoulder ext 2x10 each Blk tubing rows & shldr ext 2 x 10        Wall posture x 3 mins, wall slides for posture attempted Modalities 2 3 4 5    Heat pre no no no No     Ice post  no no no No                     Assessment: Tolerated treatment well  Patient would benefit from continued PT; pt tolerating core strengthening well - supine & in stance; pt reports therex challenging but " feels good"     Plan: Continue per plan of care   Pt to add self HS stretch w SOS to home program; resume Tband pallof/ add rotation

## 2018-12-12 LAB
ALBUMIN SERPL-MCNC: 4.3 G/DL (ref 3.5–5.5)
ALBUMIN/GLOB SERPL: 1.3 {RATIO} (ref 1.2–2.2)
ALP SERPL-CCNC: 79 IU/L (ref 39–117)
ALT SERPL-CCNC: 29 IU/L (ref 0–44)
AST SERPL-CCNC: 25 IU/L (ref 0–40)
BASOPHILS # BLD AUTO: 0.1 X10E3/UL (ref 0–0.2)
BASOPHILS NFR BLD AUTO: 0 %
BILIRUB SERPL-MCNC: 0.4 MG/DL (ref 0–1.2)
BUN SERPL-MCNC: 20 MG/DL (ref 6–24)
BUN/CREAT SERPL: 18 (ref 9–20)
CALCIUM SERPL-MCNC: 9.6 MG/DL (ref 8.7–10.2)
CHLORIDE SERPL-SCNC: 99 MMOL/L (ref 96–106)
CHOLEST SERPL-MCNC: 158 MG/DL (ref 100–199)
CHOLEST/HDLC SERPL: 3.8 RATIO (ref 0–5)
CO2 SERPL-SCNC: 23 MMOL/L (ref 20–29)
CREAT SERPL-MCNC: 1.09 MG/DL (ref 0.76–1.27)
EOSINOPHIL # BLD AUTO: 0.1 X10E3/UL (ref 0–0.4)
EOSINOPHIL NFR BLD AUTO: 1 %
ERYTHROCYTE [DISTWIDTH] IN BLOOD BY AUTOMATED COUNT: 14 % (ref 12.3–15.4)
EST. AVERAGE GLUCOSE BLD GHB EST-MCNC: 123 MG/DL
GLOBULIN SER-MCNC: 3.4 G/DL (ref 1.5–4.5)
GLUCOSE SERPL-MCNC: 102 MG/DL (ref 65–99)
HBA1C MFR BLD: 5.9 % (ref 4.8–5.6)
HCT VFR BLD AUTO: 45.4 % (ref 37.5–51)
HDLC SERPL-MCNC: 42 MG/DL
HGB BLD-MCNC: 15.3 G/DL (ref 13–17.7)
IMM GRANULOCYTES # BLD: 0 X10E3/UL (ref 0–0.1)
IMM GRANULOCYTES NFR BLD: 0 %
LDLC SERPL CALC-MCNC: 103 MG/DL (ref 0–99)
LYMPHOCYTES # BLD AUTO: 2.6 X10E3/UL (ref 0.7–3.1)
LYMPHOCYTES NFR BLD AUTO: 19 %
MCH RBC QN AUTO: 31.1 PG (ref 26.6–33)
MCHC RBC AUTO-ENTMCNC: 33.7 G/DL (ref 31.5–35.7)
MCV RBC AUTO: 92 FL (ref 79–97)
MONOCYTES # BLD AUTO: 0.9 X10E3/UL (ref 0.1–0.9)
MONOCYTES NFR BLD AUTO: 7 %
NEUTROPHILS # BLD AUTO: 10.3 X10E3/UL (ref 1.4–7)
NEUTROPHILS NFR BLD AUTO: 73 %
PLATELET # BLD AUTO: 343 X10E3/UL (ref 150–379)
POTASSIUM SERPL-SCNC: 4.1 MMOL/L (ref 3.5–5.2)
PROT SERPL-MCNC: 7.7 G/DL (ref 6–8.5)
PSA FREE MFR SERPL: 9.1 %
PSA FREE SERPL-MCNC: 0.2 NG/ML
PSA SERPL-MCNC: 2.2 NG/ML (ref 0–4)
RBC # BLD AUTO: 4.92 X10E6/UL (ref 4.14–5.8)
SL AMB EGFR AFRICAN AMERICAN: 88 ML/MIN/1.73
SL AMB EGFR NON AFRICAN AMERICAN: 76 ML/MIN/1.73
SL AMB VLDL CHOLESTEROL CALC: 13 MG/DL (ref 5–40)
SODIUM SERPL-SCNC: 138 MMOL/L (ref 134–144)
TRIGL SERPL-MCNC: 65 MG/DL (ref 0–149)
TSH SERPL DL<=0.005 MIU/L-ACNC: 2.08 UIU/ML (ref 0.45–4.5)
URATE SERPL-MCNC: 7.6 MG/DL (ref 3.7–8.6)
WBC # BLD AUTO: 14.1 X10E3/UL (ref 3.4–10.8)

## 2018-12-13 ENCOUNTER — OFFICE VISIT (OUTPATIENT)
Dept: PHYSICAL THERAPY | Facility: CLINIC | Age: 53
End: 2018-12-13
Payer: COMMERCIAL

## 2018-12-13 DIAGNOSIS — M54.41 CHRONIC BILATERAL LOW BACK PAIN WITH RIGHT-SIDED SCIATICA: Primary | ICD-10-CM

## 2018-12-13 DIAGNOSIS — G89.29 CHRONIC BILATERAL LOW BACK PAIN WITH RIGHT-SIDED SCIATICA: Primary | ICD-10-CM

## 2018-12-13 LAB
COUNSELING NOTE: NORMAL
CYTOLOGIST CVX/VAG CYTO: NORMAL
DX ICD CODE: NORMAL
PATH REPORT.FINAL DX SPEC: NORMAL
PATH REPORT.GROSS SPEC: NORMAL
PATH REPORT.SITE OF ORIGIN SPEC: NORMAL
PATHOLOGIST NAME: NORMAL

## 2018-12-13 PROCEDURE — 97112 NEUROMUSCULAR REEDUCATION: CPT | Performed by: PHYSICAL THERAPIST

## 2018-12-13 PROCEDURE — 97110 THERAPEUTIC EXERCISES: CPT | Performed by: PHYSICAL THERAPIST

## 2018-12-13 NOTE — PROGRESS NOTES
Daily Note     Today's date: 2018  Patient name: Hakeem Hernandez  : 1965  MRN: [id number]  Referring provider: Kai Cobos MD  Dx:   Encounter Diagnosis     ICD-10-CM    1  Chronic bilateral low back pain with right-sided sciatica M54 41     G89 29                   Subjective:   He feels improvement overall      Objective:  See treatment flow sheet    Added Mini-squats with upper extremity support      Precautions: DM II, HTN, standard     Specialty Daily Treatment Diary     Manual     Manual lumbar traction 2x2 min holds  2x2 min holds  2x2 min holds Not today  3 x 30 sec                                       Exercise Diary  2 3 4 5 6   HS and glute/piri stretching 3x30 sec holds w/ sciatic nerve floss 3x30 sec for HS w/ sciatic nerve floss, self piri stretch 3x30 sec holds 3x30 w/ floss, self piri reviewed  HS SOS 3 x 30 sec   Self piri 3 x 30 sec  3 x 30 sec   TrA progressions isos x 3 sec x 10, marching 3x10 B  Isos, marching x 20-30 each, single leg ext x 10 each isos 10 x 3 sec, marching 2x10, single leg ext 2x10 isos 3 x 10   Marching 3  x 10, single leg ext 2 x 10  3 x 10 sec   bridging Ad sq x 20, w/ bridge 2x10 Reviewed  Review  Held     Sciatic nerve flossing  Manual  manual      LTR  x20-30 total x20-30 total x20 B  X 20 B 20x  B   Sit<>stand        tband core stab GTB pallof press 2x10 B   Review  Resume NV  Pallof press  Green   20x  B   pball isometrics   isos 3 sec hold x 20, marching, hip abd x 20 each Isos, marching, hip abd and hip ext 3x10 each isos march, hip abd & hip ext 2 x 10 ea 20x    3 way pball roll outs x15 B           Black tubing rows and shoulder ext 2x10 each Blk tubing rows & shldr ext 2 x 10  Black TB   Rows and Ext  30x      Wall posture x 3 mins, wall slides for posture attempted           Mini squats    20x                                                                       Modalities 2 3 4 5 6   Heat pre no no no No     Ice post  no no no No                     Assessment:    He only had lower extremity muscle fatigue following squats ; no LBP or joint pain  Plan: Continue per plan of care

## 2018-12-17 ENCOUNTER — TELEPHONE (OUTPATIENT)
Dept: FAMILY MEDICINE CLINIC | Facility: CLINIC | Age: 53
End: 2018-12-17

## 2018-12-18 ENCOUNTER — OFFICE VISIT (OUTPATIENT)
Dept: PHYSICAL THERAPY | Facility: CLINIC | Age: 53
End: 2018-12-18
Payer: COMMERCIAL

## 2018-12-18 DIAGNOSIS — M54.41 CHRONIC BILATERAL LOW BACK PAIN WITH RIGHT-SIDED SCIATICA: Primary | ICD-10-CM

## 2018-12-18 DIAGNOSIS — G89.29 CHRONIC BILATERAL LOW BACK PAIN WITH RIGHT-SIDED SCIATICA: Primary | ICD-10-CM

## 2018-12-18 PROCEDURE — 97140 MANUAL THERAPY 1/> REGIONS: CPT

## 2018-12-18 PROCEDURE — 97112 NEUROMUSCULAR REEDUCATION: CPT

## 2018-12-18 NOTE — PROGRESS NOTES
Daily Note     Today's date: 2018  Patient name: Ashley Triana  : 1965  MRN: [id number]  Referring provider: Marah Pierre MD  Dx:   Encounter Diagnosis     ICD-10-CM    1  Chronic bilateral low back pain with right-sided sciatica M54 41     G89 29                   Subjective: 3/10 pain to start session  Feels that PT is helping but notes that pain is creeping up to mid back lately  Not sure why  Has been keeping up w/ home exercises         Objective: min hypomobility through tspine, requires cueing to prevent excessive thoracic flexion at end range w/ rowing and shoulder ext       Precautions: DM II, HTN, standard     Specialty Daily Treatment Diary     Manual     Manual lumbar traction 2x2 min holds   2x2 min holds Not today  3 x 30 sec    Thoracic  x 3-4 mins in prone T2-8                                    Exercise Diary  7  4 5 6   HS and glute/piri stretching Self 3x30 sec holds piri, manual w/ sciatic nerve floss 3x30 sec   3x30 w/ floss, self piri reviewed  HS SOS 3 x 30 sec   Self piri 3 x 30 sec  3 x 30 sec   TrA progressions Isos, march, single leg ext 3x10   isos 10 x 3 sec, marching 2x10, single leg ext 2x10 isos 3 x 10   Marching 3  x 10, single leg ext 2 x 10  3 x 10 sec   bridging   Review  Held     Sciatic nerve flossing         LTR  x10 B, s/l open books x 15 B   x20 B  X 20 B 20x  B   Sit<>stand        tband core stab Green and red tband pallof press 3x10  Review  Resume NV  Pallof press  Green   20x  B   pball isometrics    Isos, marching, hip abd and hip ext 3x10 each isos march, hip abd & hip ext 2 x 10 ea 20x            Black tband rows and shoulder ext x 30-40 each w/ focus on form   Black tubing rows and shoulder ext 2x10 each Blk tubing rows & shldr ext 2 x 10  Black TB   Rows and Ext  30x      Wall posture x 3 mins, wall slides for posture attempted           Mini squats    20x Modalities 7  4 5 6   Heat pre no  no No     Ice post  no  no No                   Assessment: Tolerated treatment well  Patient demonstrated fatigue post treatment and would benefit from continued PT  Shows improved form w/ all exercises following cueing  Notes fatigue but no pain by end  Good relief after manual work and s/l open books  Will add open books to HEP  Progress as sx allow         Plan: Continue per plan of care  hip abd work, hip hinge

## 2018-12-20 ENCOUNTER — OFFICE VISIT (OUTPATIENT)
Dept: PHYSICAL THERAPY | Facility: CLINIC | Age: 53
End: 2018-12-20
Payer: COMMERCIAL

## 2018-12-20 DIAGNOSIS — M54.41 CHRONIC BILATERAL LOW BACK PAIN WITH RIGHT-SIDED SCIATICA: Primary | ICD-10-CM

## 2018-12-20 DIAGNOSIS — G89.29 CHRONIC BILATERAL LOW BACK PAIN WITH RIGHT-SIDED SCIATICA: Primary | ICD-10-CM

## 2018-12-20 PROCEDURE — 97110 THERAPEUTIC EXERCISES: CPT

## 2018-12-20 PROCEDURE — 97140 MANUAL THERAPY 1/> REGIONS: CPT

## 2018-12-20 PROCEDURE — G8980 MOBILITY D/C STATUS: HCPCS

## 2018-12-20 PROCEDURE — G8979 MOBILITY GOAL STATUS: HCPCS

## 2018-12-20 NOTE — PROGRESS NOTES
Daily Note     Today's date: 2018  Patient name: Billy Ovalles  : 1965  MRN: [id number]  Referring provider: Symone Loredo MD  Dx:   Encounter Diagnosis     ICD-10-CM    1  Chronic bilateral low back pain with right-sided sciatica M54 41     G89 29        Start Time: 1515  Stop Time: 1610  Total time in clinic (min): 55 minutes    Subjective: Patient reports that he had 3/10 LBP upon         Objective: See treatment diary below  e  Specialty Daily Treatment Diary     Manual     Manual lumbar traction 2x2 min holds   2x2 min holds Not today  3 x 30 sec    Thoracic  x 3-4 mins in prone T2-8        HS stretch                            Exercise Diary  7 8 4 5 6   HS and glute/piri stretching Self 3x30 sec holds piri, manual w/ sciatic nerve floss 3x30 sec  Self 3x30 sec holds piri, manual w/ sciatic nerve floss 3x30 sec  3x30 w/ floss, self piri reviewed  HS SOS 3 x 30 sec   Self piri 3 x 30 sec  3 x 30 sec   TrA progressions Isos, march, single leg ext 3x10  Isos, march, single leg ext 3x10  isos 10 x 3 sec, marching 2x10, single leg ext 2x10 isos 3 x 10   Marching 3  x 10, single leg ext 2 x 10  3 x 10 sec   bridging   Review  Held     Sciatic nerve flossing         LTR  x10 B, s/l open books x 15 B  x10 B, s/l open books x 15 B x20 B  X 20 B 20x  B   Sit<>stand        tband core stab Green and red tband pallof press 3x10 Green and red tband pallof press 3x10 Review  Resume NV  Pallof press  Green   20x  B   pball isometrics    Isos, marching, hip abd and hip ext 3x10 each isos march, hip abd & hip ext 2 x 10 ea 20x            Black tband rows and shoulder ext x 30-40 each w/ focus on form  Black tband rows and shoulder ext x 30-40 each w/ focus on form Black tubing rows and shoulder ext 2x10 each Blk tubing rows & shldr ext 2 x 10  Black TB   Rows and Ext  30x      Wall posture x 3 mins, wall slides for posture attempted        Mini squats    20x   Mini squats    20x Modalities 7 8 4 5 6   Heat pre no no no No     Ice post  no no no No                   Assessment: Tolerated treatment well  No increase in back pain with exercises  Completed with good form requiring minimal - no PT cues  Manual tx was emphasized to address severe HS length restrictions present which are associated with his career as a  requiring long durations of sitting  Patient would benefit from continued PT in order to continue to reduce pain and promote pain free function  Plan: Continue per plan of care

## 2018-12-31 ENCOUNTER — OFFICE VISIT (OUTPATIENT)
Dept: PAIN MEDICINE | Facility: CLINIC | Age: 53
End: 2018-12-31
Payer: COMMERCIAL

## 2018-12-31 VITALS
DIASTOLIC BLOOD PRESSURE: 80 MMHG | SYSTOLIC BLOOD PRESSURE: 126 MMHG | WEIGHT: 315 LBS | BODY MASS INDEX: 39.17 KG/M2 | HEART RATE: 79 BPM | HEIGHT: 75 IN

## 2018-12-31 DIAGNOSIS — M96.1 LUMBAR POST-LAMINECTOMY SYNDROME: ICD-10-CM

## 2018-12-31 DIAGNOSIS — G89.29 CHRONIC BILATERAL LOW BACK PAIN WITH RIGHT-SIDED SCIATICA: ICD-10-CM

## 2018-12-31 DIAGNOSIS — G89.4 CHRONIC PAIN SYNDROME: Primary | ICD-10-CM

## 2018-12-31 DIAGNOSIS — M54.41 CHRONIC BILATERAL LOW BACK PAIN WITH RIGHT-SIDED SCIATICA: ICD-10-CM

## 2018-12-31 PROCEDURE — 99214 OFFICE O/P EST MOD 30 MIN: CPT | Performed by: ANESTHESIOLOGY

## 2018-12-31 NOTE — LETTER
December 31, 2018     Santy Dill MD  [address] [place] [address] [address] [id number]    Patient: Judy Elder   YOB: 1965   Date of Visit: 12/31/2018       Dear Dr Danna Hurst: Thank you for referring Elayne Bermudez to me for evaluation  Below are my notes for this consultation  If you have questions, please do not hesitate to call me  I look forward to following your patient along with you  Sincerely,        Vincent Klinefelter, MD        CC: No Recipients  Vincent Klinefelter, MD  12/31/2018 10:42 AM  Sign at close encounter  Pain Medicine Follow-Up Note    Assessment:  1  Chronic pain syndrome    2  Chronic bilateral low back pain with right-sided sciatica    3  Lumbar post-laminectomy syndrome        Plan:  Orders Placed This Encounter   Procedures    MRI lumbar spine without contrast     Standing Status:   Future     Standing Expiration Date:   12/31/2022     Scheduling Instructions: There is no preparation for this test  Please leave your jewelry and valuables at home, wedding rings are the exception  Please bring your insurance cards, a form of photo ID and a list of your medications with you  Arrive 15 minutes prior to your appointment time in order to register  Please bring any prior CT or MRI studies of this area that were not performed at a [facility]  To schedule this appointment, please contact Central Scheduling at [phone]  Order Specific Question:   What is the patient's sedation requirement? Answer:   Unknown     My impressions and treatment recommendations were discussed in detail with the patient who verbalized understanding and had no further questions  The patient reports no significant relief following physical therapy  He continues to complain of chronic midline low back pain  He is pain appears to be around his incision site for the lumbar laminectomies    As such, since he has failed physical therapy, I feel reasonable to obtain a MRI of the lumbar spine to evaluate for any pathology that may be contributing to his pain complaints  Once the patient undergoes the MRI of the lumbar spine, we will discuss further treatment options  Discharge instructions were provided  I personally saw and examined the patient and I agree with the above discussed plan of care  History of Present Illness:    Kurt Neal is a 52 y o  male who presents to [facility] for interval re-evaluation of the above stated pain complaints  The patient has a past medical and chronic pain history as outlined in the assessment section  He was last seen on November 15, 2018 at which time he was asked to undergo a course of physical therapy  At today's office visit, the patient's pain score is 3/10 on the verbal numerical pain rating scale  The patient states that his pain is primarily in his low back  He describes pain as constant in nature and describes the quality of his pain as dull/aching, sharp, and numbness    He continues to report primarily midline low back pain as his primary pain complaint  He reports that physical therapy was not helpful for his low back pain, but states that his posture has improved significantly since undergoing physical therapy  Other than as stated above, the patient denies any interval changes in medications, medical condition, mental condition, symptoms, or allergies since the last office visit  Review of Systems:    Review of Systems   Respiratory: Negative for shortness of breath  Cardiovascular: Negative for chest pain  Gastrointestinal: Negative for constipation, diarrhea, nausea and vomiting  Musculoskeletal: Positive for gait problem (difficulty walking/ decreased range of motion)  Negative for arthralgias, joint swelling and myalgias  Skin: Negative for rash  Neurological: Negative for dizziness, seizures and weakness  All other systems reviewed and are negative          Patient Active Problem List   Diagnosis    Closed fracture of transverse process of lumbar vertebra (HCC)    Anxiety    Backache    Collagen vascular disease (HCC)    GERD (gastroesophageal reflux disease)    Hypertension    Impaired fasting glucose    Osteoarthritis    Sciatica    Breathing-related sleep disorder    Type 2 diabetes mellitus (HCC)    Urinary calculus    Chronic bilateral low back pain with right-sided sciatica    Class 3 severe obesity due to excess calories with serious comorbidity and body mass index (BMI) of 50 0 to 59 9 in adult [facility])    Chronic pain syndrome    Lumbar post-laminectomy syndrome    Primary osteoarthritis of right knee    Routine general medical examination at a health care facility    Screening for hyperlipidemia    Colon cancer screening    Encounter for prostate cancer screening    Screening for hypothyroidism       Past Medical History:   Diagnosis Date    Hypertension     Kidney stone     x 2 episodes    Mild sleep apnea     Skin tag        Past Surgical History:   Procedure Laterality Date    KNEE ARTHROSCOPY Left 2007    LAMINECTOMY      LAMINOTOMY  07/2010    spinal       Family History   Problem Relation Age of Onset    Osteoarthritis Mother     Obesity Father     Substance Abuse Neg Hx     Mental illness Neg Hx        Social History     Occupational History    Not on file       Social History Main Topics    Smoking status: Never Smoker    Smokeless tobacco: Never Used    Alcohol use No    Drug use: No    Sexual activity: Not on file         Current Outpatient Prescriptions:     lisinopril-hydrochlorothiazide (PRINZIDE,ZESTORETIC) 20-25 MG per tablet, Take 1 tablet by mouth daily, Disp: 90 tablet, Rfl: 3    No Known Allergies    Physical Exam:    /80 (BP Location: Left arm, Patient Position: Sitting, Cuff Size: Standard)   Pulse 79   Ht 6' 3" (1 905 m)   Wt (!) 182 kg (401 lb 12 8 oz)   BMI 50 22 kg/m² Constitutional:obese  Eyes:anicteric  HEENT:grossly intact  Neck:supple, symmetric, trachea midline and no masses   Pulmonary:even and unlabored  Cardiovascular:No edema or pitting edema present  Skin:Normal without rashes or lesions and well hydrated  Psychiatric:Mood and affect appropriate  Neurologic:Cranial Nerves II-XII grossly intact  Musculoskeletal:normal      Imaging  MRI lumbar spine without contrast    (Results Pending)         Orders Placed This Encounter   Procedures    MRI lumbar spine without contrast

## 2018-12-31 NOTE — PROGRESS NOTES
Pain Medicine Follow-Up Note    Assessment:  1  Chronic pain syndrome    2  Chronic bilateral low back pain with right-sided sciatica    3  Lumbar post-laminectomy syndrome        Plan:  Orders Placed This Encounter   Procedures    MRI lumbar spine without contrast     Standing Status:   Future     Standing Expiration Date:   12/31/2022     Scheduling Instructions: There is no preparation for this test  Please leave your jewelry and valuables at home, wedding rings are the exception  Please bring your insurance cards, a form of photo ID and a list of your medications with you  Arrive 15 minutes prior to your appointment time in order to register  Please bring any prior CT or MRI studies of this area that were not performed at a [facility]  To schedule this appointment, please contact Central Scheduling at [phone]  Order Specific Question:   What is the patient's sedation requirement? Answer:   Unknown     My impressions and treatment recommendations were discussed in detail with the patient who verbalized understanding and had no further questions  The patient reports no significant relief following physical therapy  He continues to complain of chronic midline low back pain  He is pain appears to be around his incision site for the lumbar laminectomies  As such, since he has failed physical therapy, I feel reasonable to obtain a MRI of the lumbar spine to evaluate for any pathology that may be contributing to his pain complaints  Once the patient undergoes the MRI of the lumbar spine, we will discuss further treatment options  Discharge instructions were provided  I personally saw and examined the patient and I agree with the above discussed plan of care  History of Present Illness:    Jessi Hook is a 52 y o  male who presents to [facility] for interval re-evaluation of the above stated pain complaints   The patient has a past medical and chronic pain history as outlined in the assessment section  He was last seen on November 15, 2018 at which time he was asked to undergo a course of physical therapy  At today's office visit, the patient's pain score is 3/10 on the verbal numerical pain rating scale  The patient states that his pain is primarily in his low back  He describes pain as constant in nature and describes the quality of his pain as dull/aching, sharp, and numbness    He continues to report primarily midline low back pain as his primary pain complaint  He reports that physical therapy was not helpful for his low back pain, but states that his posture has improved significantly since undergoing physical therapy  Other than as stated above, the patient denies any interval changes in medications, medical condition, mental condition, symptoms, or allergies since the last office visit  Review of Systems:    Review of Systems   Respiratory: Negative for shortness of breath  Cardiovascular: Negative for chest pain  Gastrointestinal: Negative for constipation, diarrhea, nausea and vomiting  Musculoskeletal: Positive for gait problem (difficulty walking/ decreased range of motion)  Negative for arthralgias, joint swelling and myalgias  Skin: Negative for rash  Neurological: Negative for dizziness, seizures and weakness  All other systems reviewed and are negative          Patient Active Problem List   Diagnosis    Closed fracture of transverse process of lumbar vertebra (HCC)    Anxiety    Backache    Collagen vascular disease ([facility] [address] )    GERD (gastroesophageal reflux disease)    Hypertension    Impaired fasting glucose    Osteoarthritis    Sciatica    Breathing-related sleep disorder    Type 2 diabetes mellitus (HCC)    Urinary calculus    Chronic bilateral low back pain with right-sided sciatica    Class 3 severe obesity due to excess calories with serious comorbidity and body mass index (BMI) of 50 0 to 59 9 in adult [facility])    Chronic pain syndrome    Lumbar post-laminectomy syndrome    Primary osteoarthritis of right knee    Routine general medical examination at a health care facility    Screening for hyperlipidemia    Colon cancer screening    Encounter for prostate cancer screening    Screening for hypothyroidism       Past Medical History:   Diagnosis Date    Hypertension     Kidney stone     x 2 episodes    Mild sleep apnea     Skin tag        Past Surgical History:   Procedure Laterality Date    KNEE ARTHROSCOPY Left 2007    LAMINECTOMY      LAMINOTOMY  07/2010    spinal       Family History   Problem Relation Age of Onset    Osteoarthritis Mother     Obesity Father     Substance Abuse Neg Hx     Mental illness Neg Hx        Social History     Occupational History    Not on file       Social History Main Topics    Smoking status: Never Smoker    Smokeless tobacco: Never Used    Alcohol use No    Drug use: No    Sexual activity: Not on file         Current Outpatient Prescriptions:     lisinopril-hydrochlorothiazide (PRINZIDE,ZESTORETIC) 20-25 MG per tablet, Take 1 tablet by mouth daily, Disp: 90 tablet, Rfl: 3    No Known Allergies    Physical Exam:    /80 (BP Location: Left arm, Patient Position: Sitting, Cuff Size: Standard)   Pulse 79   Ht 6' 3" (1 905 m)   Wt (!) 182 kg (401 lb 12 8 oz)   BMI 50 22 kg/m²     Constitutional:obese  Eyes:anicteric  HEENT:grossly intact  Neck:supple, symmetric, trachea midline and no masses   Pulmonary:even and unlabored  Cardiovascular:No edema or pitting edema present  Skin:Normal without rashes or lesions and well hydrated  Psychiatric:Mood and affect appropriate  Neurologic:Cranial Nerves II-XII grossly intact  Musculoskeletal:normal      Imaging  MRI lumbar spine without contrast    (Results Pending)         Orders Placed This Encounter   Procedures    MRI lumbar spine without contrast

## 2019-01-08 NOTE — PROGRESS NOTES
Update 1/8/2018: Pt has not returned for any follow up and has no future visits scheduled  Chart will be d/c at this time s/t non-compliance w/ program  From patient chart I can see that he is scheduled for MRI on back in the future

## 2019-01-09 ENCOUNTER — TELEPHONE (OUTPATIENT)
Dept: RADIOLOGY | Facility: CLINIC | Age: 54
End: 2019-01-09

## 2019-01-09 ENCOUNTER — OFFICE VISIT (OUTPATIENT)
Dept: PHYSICAL THERAPY | Facility: CLINIC | Age: 54
End: 2019-01-09
Payer: COMMERCIAL

## 2019-01-09 DIAGNOSIS — G89.29 CHRONIC BILATERAL LOW BACK PAIN WITH RIGHT-SIDED SCIATICA: Primary | ICD-10-CM

## 2019-01-09 DIAGNOSIS — M54.41 CHRONIC BILATERAL LOW BACK PAIN WITH RIGHT-SIDED SCIATICA: Primary | ICD-10-CM

## 2019-01-09 PROCEDURE — G8978 MOBILITY CURRENT STATUS: HCPCS

## 2019-01-09 PROCEDURE — 97110 THERAPEUTIC EXERCISES: CPT

## 2019-01-09 PROCEDURE — 97112 NEUROMUSCULAR REEDUCATION: CPT

## 2019-01-09 PROCEDURE — G8979 MOBILITY GOAL STATUS: HCPCS

## 2019-01-09 NOTE — TELEPHONE ENCOUNTER
Called pt and notified of MRI denial  Advised to continue PT for full 6 weeks  MRI canceled in system  Pt verbalized understanding

## 2019-01-09 NOTE — TELEPHONE ENCOUNTER
----- Message from Mamie Yates MD sent at 1/9/2019 10:15 AM EST -----  Regarding: RE: PEER TO PEER  P2P done  They will not approve the MRI under any circumstance unless he does all 6 weeks of PT  Please let the patient know and resubmit when we have the PT notes after he goes for the remainder of the PT   ----- Message -----  From: Niranjan Mathew  Sent: 1/9/2019   9:39 AM  To: Mamie Yates MD, #  Subject: FW: PEER TO PEER                                 Hi team,    Please advise the message that was sent below  Joseluis's MRI appointment is tomorrow  Thank you,  Aimee Pagan    ----- Message -----  From: Niranjan Mathew  Sent: 1/8/2019   9:03 AM  To: Mamie Yates MD, #  Subject: PEER TO PEER                                     Good morning Dr Tomeka Wyatt,    Joseluis's MRI lumbar spine is pending denial through his insurance  I called to verify that all clinicals were received and reviewed  The rep I spoke with stated that there are numerous reasons why it would be denied, most possibly because he hasn't done a full 6 week PT treatment  He went for almost 4 weeks  Joseluis's MRI appt is this Thurs 1/10/2019  They are offering Peer to Peer at [phone]; Tracking #[id number]  Please let me know the outcome of the Peer to Peer OR have your office contact him to review plan of care and instruct him to cancel his MRI appt       Thank you,  Aimee Pagan

## 2019-01-09 NOTE — PROGRESS NOTES
Progress Note     Today's date: 2019  Patient name: Rd Mckee  : 1965  MRN: [id number]  Referring provider: Michaela Mccartney MD  Dx:   Encounter Diagnosis     ICD-10-CM    1  Chronic bilateral low back pain with right-sided sciatica M54 41 PT plan of care cert/re-cert    [id number]        Start Time: 1338  Stop Time: 1425  Total time in clinic (min): 47 minutes    Subjective: Pt reports that he has continued PT exercises on own over the last few weeks  Was told by MD that he needs an MRI but insurance will not cover until he has more skilled PT  As a result, he presents today for re-assessment w/ intention on continuing for the next few weeks  He notes that his exercises have helped, but he feels like he has plateud  Intense pain occurs less often but has not gone away completely  Pt updated functional status to follow:     Goals  Short term goals (3 weeks):  1) Pt will improve thoracolumbar mobility deficits by 25% pain free  - progressed   2) Pt will improve B LE and core strength deficits by 1/3 grade MMT  - progressed   3) Pt will report pain at worse <4/10  - progressed   4) Pt will initiate and progress HEP w/ special emphasis on functional core stability and thoracolumbar mobility  - achieved     Long term goals (6 weeks)  1) Pt will improve FOTO to at least 70  - progressed   2) Pt will tolerate prolonged static positioning in sitting and standing while working for durations of at least 1 hour w/o modification due to pain  - progressed   3) Pt will stand and ambulate for durations of at least 20 min w/ normalized gait mechanics and only min pain  - progressed   4) Pt will be independent and compliant w/ HEP in order to maximize functional benefit of skilled PT following d/c  - progressed     New Goals 2019:  Short term goals (3 weeks):  1) Pt will improve thoracolumbar mobility deficits by 25% pain free  2) Pt will improve B LE and core strength deficits by 1/3 grade MMT    3) Pt will report pain at worse <4/10  4) Pt will initiate and progress HEP w/ special emphasis on functional core stability and thoracolumbar mobility  Long term goals (6 weeks)  1) Pt will improve FOTO to at least 70   2) Pt will tolerate prolonged static positioning in sitting and standing while working for durations of at least 1 hour w/o modification due to pain  3) Pt will stand and ambulate for durations of at least 20 min w/ normalized gait mechanics and only min pain  4) Pt will be independent and compliant w/ HEP in order to maximize functional benefit of skilled PT following d/c       Pain  No pain reported  Current pain ratin  At best pain ratin  At worst pain ratin (zinging pain only occasionally)   Location: Center towards L side of low back   Quality: sharp and dull ache  Relieving factors: rest  Aggravating factors: standing, walking and sitting  Progression: no change    Social Support  Steps to enter house: yes  Stairs in house: no   Lives in: [facility]  Lives with: alone    Employment status: working (works as [employer] )  Treatments  Previous treatment: physical therapy  Patient Goals  Patient goals for therapy: increased strength, decreased pain and increased motion          Objective     Special Questions  Negative for bladder dysfunction, bowel dysfunction and saddle (S4) numbness    Postural Observations  Seated posture: poor  Standing posture: poor  Correction of posture: makes symptoms better        Palpation     Additional Palpation Details  Pt is TTP and has increased tissue density through lower thoracolumbar PS and QL/piri - continued     Neurological Testing     Sensation     Lumbar   Left   Intact: light touch    Right   Intact: light touch    Active Range of Motion     Additional Active Range of Motion Details  Flex: 25-50% w/ heavy guarding upon standing, min pain  Ext: 50-75% w/o pain  SB R: 50% w/ min pain on R side low back  SB L: 50% w/ min pain   Rot R: 75%  Rot L: 75%       Strength/Myotome Testing     Left Hip   Planes of Motion   Flexion: 4  Extension: 4  Abduction: 4  External rotation: 4  Internal rotation: 4    Right Hip   Planes of Motion   Flexion: 4  Extension: 4  Abduction: 4  External rotation: 4  Internal rotation: 4    Left Knee   Flexion: 4  Extension: 4    Right Knee   Flexion: 4  Extension: 4    Left Ankle/Foot   Dorsiflexion: 4    Right Ankle/Foot   Dorsiflexion: 4    Muscle Activation   Patient unable to activate left transverse abdominals and right transverse abdominals  Additional Muscle Activation Details  Core Strength: 2/5 w/ compensations and heavy komal brijesh noted at 3/5 testing     Tests     Lumbar   Positive repeated flexion   - continued     Additional Tests Details  R passive SLR to 70 w/o LBP, L to 80 w/o pain    Slump (-) B     PRITI and press ups relieve sx    Manual lumbar traction provides significant relief     Ambulation     Ambulation: Level Surfaces     Additional Level Surfaces Ambulation Details  Poor lumbar rotation, Mod trendelenburg B, decreased pacing, not significantly antalgic - continued    Functional Assessment     Comments  Sit<>stand: heavy UE support and forward WS, mod antalgic, guarded - continued     Precautions: DM II, HTN, standard     Specialty Daily Treatment Diary     Manual  12/18 12/20 1/9 - RE  12/13   Manual lumbar traction 2x2 min holds   2x2 min holds   3 x 30 sec    Thoracic  x 3-4 mins in prone T2-8        HS stretch                            Exercise Diary  7 8 9 -   6   HS and glute/piri stretching Self 3x30 sec holds piri, manual w/ sciatic nerve floss 3x30 sec  Self 3x30 sec holds piri, manual w/ sciatic nerve floss 3x30 sec  HS w/ sciatic nerve flossing 3x30 sec holds   3 x 30 sec   TrA progressions Isos, march, single leg ext 3x10  Isos, march, single leg ext 3x10  Isos, single leg ext, 90/90 taps - 6 mins total  3 x 10 sec   bridging        Sciatic nerve flossing         LTR  x10 B, s/l open books x 15 B  x10 B, s/l open books x 15 B LTR x 20 total  20x  B   Sit<>stand        tband core stab Green and red tband pallof press 3x10 Green and red tband pallof press 3x10 Black tband pallof press 3x10, rotations x 15 B   Pallof press  Green   20x  B   pball isometrics      20x            Black tband rows and shoulder ext x 30-40 each w/ focus on form  Black tband rows and shoulder ext x 30-40 each w/ focus on form x30 total for each  Black TB   Rows and Ext  30x             Mini squats    20x no  Mini squats    20x                              PRITI x 4 mins, PPU 2x10 total                         Review of HEP, anatomy of spine and possible causes of pain, and progressions to be made moving forward x 6 mins                  Modalities 7 8 9  6   Heat pre no no no No     Ice post  no no no No                 Assessment: Tolerated treatment well  Patient demonstrated fatigue post treatment and would benefit from continued PT  Pt has made progress since starting skilled PT, despite recent 3 week layoff  He shows good carryover of home program and notes no significant increase in sx throughout today's session  Higher level progressions were added (core rotations black tband and 90/90 taps w/ TrA brace), w/ good results  Will continue w/ these on own  Core strength has improved, B LE strength is relatively similar  We will continue for the next 3-4 weeks w/ focus on techniques that can be added to HEP  Plan: Continue per plan of care   tband chops and lifts, dead bugs, bridging progressions

## 2019-01-14 ENCOUNTER — OFFICE VISIT (OUTPATIENT)
Dept: PHYSICAL THERAPY | Facility: CLINIC | Age: 54
End: 2019-01-14
Payer: COMMERCIAL

## 2019-01-14 DIAGNOSIS — M54.41 CHRONIC BILATERAL LOW BACK PAIN WITH RIGHT-SIDED SCIATICA: Primary | ICD-10-CM

## 2019-01-14 DIAGNOSIS — G89.29 CHRONIC BILATERAL LOW BACK PAIN WITH RIGHT-SIDED SCIATICA: Primary | ICD-10-CM

## 2019-01-14 PROCEDURE — 97110 THERAPEUTIC EXERCISES: CPT

## 2019-01-14 PROCEDURE — 97112 NEUROMUSCULAR REEDUCATION: CPT

## 2019-01-14 NOTE — PROGRESS NOTES
Daily Note     Today's date: 2019  Patient name: Narayan Melgar  : 1965  MRN: [id number]  Referring provider: Homar Richards MD  Dx:   Encounter Diagnosis     ICD-10-CM    1   Chronic bilateral low back pain with right-sided sciatica M54 41     G89 29        Start Time: 1430  Stop Time: 1520  Total time in clinic (min): 50 minutes    Subjective: 3/10 LBP which remains unchanged; now has momentary sharp  stabbing pain mid back 9/10  - mostly on the right; pt states he  has been breaking out in a sweat & breathing heavy for the last 2 days jes when the pain occurs ; upper back & LBP increases when on his feet - best laying down       Objective: See treatment diary below    Precautions: DM II, HTN, standard     Specialty Daily Treatment Diary     Manual   - RE     Manual lumbar traction 2x2 min holds   2x2 min holds  no 3 x 30 sec    Thoracic  x 3-4 mins in prone T2-8        HS stretch                            Exercise Diary  7 8 9 -  10    HS and glute/piri stretching Self 3x30 sec holds piri, manual w/ sciatic nerve floss 3x30 sec  Self 3x30 sec holds piri, manual w/ sciatic nerve floss 3x30 sec  HS w/ sciatic nerve flossing 3x30 sec holds  HS self  w SOS 3 x 30 sec ea  3 x 30 sec   TrA progressions Isos, march, single leg ext 3x10  Isos, march, single leg ext 3x10  Isos, single leg ext, 90/90 taps - 6 mins total Isos  3 x 10 only  3 x 10 sec   bridging        Sciatic nerve flossing         LTR  x10 B, s/l open books x 15 B  x10 B, s/l open books x 15 B LTR x 20 total LTR x 20 ea  20x  B   Sit<>stand        tband core stab Green and red tband pallof press 3x10 Green and red tband pallof press 3x10 Black tband pallof press 3x10, rotations x 15 B  Blk tband pallof press   3 x 10, rotations x 15 B  Pallof press  Green   20x  B   pball isometrics      20x            Black tband rows and shoulder ext x 30-40 each w/ focus on form  Black tband rows and shoulder ext x 30-40 each w/ focus on form x30 total for each X 30 e a Black TB   Rows and Ext  30x             Mini squats    20x no  Mini squats    20x                              PRITI x 4 mins, PPU 2x10 total  PRITI x 4 min   PPU 2 x 10 - held today                        Review of HEP, anatomy of spine and possible causes of pain, and progressions to be made moving forward x 6 mins                  Modalities 7 8 9 10     Heat pre no no no No     Ice post  no no no No                   Assessment: Tolerated treatment well  Patient would benefit from continued PT; pt able to tolerate above exercises including theraband rotations without reproducing T/S pain; deferred prone therex this session due to increasing pain in T/S;  pt instructed to monitor his sweating & breathing & to contact his PCP if symptoms continue or worsen       Plan: Continue per plan of care

## 2019-01-16 ENCOUNTER — OFFICE VISIT (OUTPATIENT)
Dept: PHYSICAL THERAPY | Facility: CLINIC | Age: 54
End: 2019-01-16
Payer: COMMERCIAL

## 2019-01-16 DIAGNOSIS — G89.29 CHRONIC BILATERAL LOW BACK PAIN WITH RIGHT-SIDED SCIATICA: Primary | ICD-10-CM

## 2019-01-16 DIAGNOSIS — M54.41 CHRONIC BILATERAL LOW BACK PAIN WITH RIGHT-SIDED SCIATICA: Primary | ICD-10-CM

## 2019-01-16 PROCEDURE — 97110 THERAPEUTIC EXERCISES: CPT

## 2019-01-16 PROCEDURE — 97140 MANUAL THERAPY 1/> REGIONS: CPT

## 2019-01-16 NOTE — PROGRESS NOTES
Daily Note     Today's date: 2019  Patient name: Shey Griffith  : 1965  MRN: [id number]  Referring provider: Estrada Flaherty MD  Dx:   Encounter Diagnosis     ICD-10-CM    1  Chronic bilateral low back pain with right-sided sciatica M54 41     G89 29                   Subjective: Pt reports he has been taking ibuprofen consistently since last session, but that intense bouts of mid back pain (sharp and knife-like in nature) have been less  He notes no other new complaints  No pain number given to start  Objective: hypomobility noted through R side T7-9 at junction of rib and transverse process, good reduction in sx w/ mobs  Requires cueing for proper form but corrects well w/ s/l open books         Precautions: DM II, HTN, standard     Specialty Daily Treatment Diary     Manual   - RE    Manual lumbar traction 2x2 min holds   2x2 min holds  no 3x2 min holds     Thoracic  x 3-4 mins in prone T2-8     Prone thoracic PA's and ribs mobs grade III/IV x 5 mins    HS stretch                            Exercise Diary  7 8 9 -  10 11   HS and glute/piri stretching Self 3x30 sec holds piri, manual w/ sciatic nerve floss 3x30 sec  Self 3x30 sec holds piri, manual w/ sciatic nerve floss 3x30 sec  HS w/ sciatic nerve flossing 3x30 sec holds  HS self  w SOS 3 x 30 sec ea     TrA progressions Isos, march, single leg ext 3x10  Isos, march, single leg ext 3x10  Isos, single leg ext, 90/90 taps - 6 mins total Isos  3 x 10 only  isos x 10 total   bridging        Sciatic nerve flossing         LTR  x10 B, s/l open books x 15 B  x10 B, s/l open books x 15 B LTR x 20 total LTR x 20 ea  x20 total   Sit<>stand        tband core stab Green and red tband pallof press 3x10 Green and red tband pallof press 3x10 Black tband pallof press 3x10, rotations x 15 B  Blk tband pallof press   3 x 10, rotations x 15 B  x15 each   pball isometrics                  Black tband rows and shoulder ext x 30-40 each w/ focus on form  Black tband rows and shoulder ext x 30-40 each w/ focus on form x30 total for each X 30 e a x30 each        S/l open books x 15-20 B      Mini squats    20x no                                PRITI x 4 mins, PPU 2x10 total  PRITI x 4 min   PPU 2 x 10 - held today  PPU 2x10 total w/ overpressure through tspine                      Review of HEP, anatomy of spine and possible causes of pain, and progressions to be made moving forward x 6 mins                  Modalities 7 8 9 10  11   Heat pre no no no No  no   Ice post  no no no No  no               Assessment: Tolerated treatment well  Patient demonstrated fatigue post treatment and would benefit from continued PT  Pt notes "good soreness" to end session, further exercises held so as not to further exacerbate sx  Pt will continue w/ home mobility techniques as he feels that these were helpful today  Discussed w/ pt fact that continuing to take excessive amounts of ibuprofen can be detrimental over a longer period, verbalizes understanding  Will check in on sx again at next session and proceed accordingly         Plan: Continue per plan of care  higher level thoracic work as able, self massage w/ tennis ball

## 2019-01-21 ENCOUNTER — OFFICE VISIT (OUTPATIENT)
Dept: BARIATRICS | Facility: CLINIC | Age: 54
End: 2019-01-21
Payer: COMMERCIAL

## 2019-01-21 VITALS
BODY MASS INDEX: 38.36 KG/M2 | TEMPERATURE: 98.3 F | RESPIRATION RATE: 18 BRPM | DIASTOLIC BLOOD PRESSURE: 90 MMHG | WEIGHT: 315 LBS | HEART RATE: 80 BPM | HEIGHT: 76 IN | SYSTOLIC BLOOD PRESSURE: 148 MMHG

## 2019-01-21 DIAGNOSIS — R73.01 IMPAIRED FASTING GLUCOSE: ICD-10-CM

## 2019-01-21 DIAGNOSIS — G89.29 CHRONIC BILATERAL LOW BACK PAIN WITH RIGHT-SIDED SCIATICA: ICD-10-CM

## 2019-01-21 DIAGNOSIS — E66.01 MORBID OBESITY (HCC): Primary | ICD-10-CM

## 2019-01-21 DIAGNOSIS — I10 ESSENTIAL HYPERTENSION: ICD-10-CM

## 2019-01-21 DIAGNOSIS — M54.41 CHRONIC BILATERAL LOW BACK PAIN WITH RIGHT-SIDED SCIATICA: ICD-10-CM

## 2019-01-21 PROCEDURE — 99244 OFF/OP CNSLTJ NEW/EST MOD 40: CPT | Performed by: PHYSICIAN ASSISTANT

## 2019-01-21 NOTE — ASSESSMENT & PLAN NOTE
-followed by pain management  -currently in physical therapy  -weight loss may help improve back pain

## 2019-01-21 NOTE — PROGRESS NOTES
Assessment/Plan: Morbid obesity ([facility] 75 )  -Discussed options of HealthyCORE-Intensive Lifestyle Intervention Program, Very Low Calorie Diet-VLCD, Conservative Program, Vijaya-En-Y Gastric Bypass and Vertical Sleeve Gastrectomy and the role of weight loss medications   -Initial weight loss goal of 5-10% weight loss for improved health  -Screening labs: reviewed, HgbA1c elevated at 5 9  -Patient is interested in pursuing HealthyCORE    +STOP BANG (7/8):  -reviewed risks of undiagnosed/untreated sleep apnea  -Recommended referral to sleep medicine provider for further evaluation  Hypertension  -on BP medications, blood pressure elevated today  Patient reports he is having a lot of back pain  -weight loss may help improve this condition  -advised he continue to monitor his blood pressure and if it is consistently greater than 140/90, contact family doctor    Impaired fasting glucose  -hgbA1c elevated at 5 9  -recommend avoid refined carbohydrates, increase cardiovascular exercise as tolerated    Chronic bilateral low back pain with right-sided sciatica  -followed by pain management  -currently in physical therapy  -weight loss may help improve back pain    Goals:    Food log (ie ) www myfitnesspal com,sparkpeople  com,loseit com,calorieking  com,etc    No sugary beverages  At least 64oz of water daily  Increase physical activity by 10 minutes daily  Gradually increase physical activity to a goal of 5 days per week for 30 minutes of MODERATE intensity PLUS 2 days per week of FULL BODY resistance training  Monitor blood pressure, if consistently greater than 140/90 please notify family doctor    Follow up in approximately 1 week with Non-Surgical Dietician and 6 weeks w/ PA-C    Diagnoses and all orders for this visit:    Morbid obesity ([facility] 75 )  -     Ambulatory referral to Sleep Medicine;  Future    Essential hypertension    Impaired fasting glucose    Chronic bilateral low back pain with right-sided sciatica Subjective:   Chief Complaint   Patient presents with    Consult     pt is here for MWM consult  Patient ID: Jessi Hook  is a 52 y o  male with excess weight/obesity here to pursue weight managment  Past Medical History:   Diagnosis Date    Hypertension     Kidney stone     x 2 episodes    Localized pain of joint     Mild sleep apnea     Skin tag          HPI:   Obesity/Excess Weight:  Severity: Very Severe  Onset:  Last 10 years - was 300 lbs at age [age]  Modifiers: Diet and Exercise  Contributing factors: Poor Food Choices and very large portions  Associated symptoms: decreased mobility and inability to do certain activities    Goals: to lose 100 lbs    B: 2 eri english muffins + butter and PB  S: skips  L: hot dog/bun + potato salad + pickles  S: skips  D: frozen pizza 2x per week or hamburgers without bun + salad or pork roast + potato   S: pretzels, apple pie, ice cream     Hydration: water 1 gallon, occasional glass of juice  Exercise: none    The following portions of the patient's history were reviewed and updated as appropriate: allergies, current medications, past family history, past medical history, past social history, past surgical history and problem list     Review of Systems   Constitutional: Negative for chills and fever  HENT: Negative for sore throat  Respiratory: Positive for shortness of breath (wakes up trying to catch breath some nights)  Negative for cough  Cardiovascular: Negative for chest pain and palpitations  Gastrointestinal: Negative for abdominal pain, constipation, diarrhea, nausea and vomiting  Genitourinary: Negative for dysuria  Musculoskeletal: Positive for arthralgias and back pain  Skin: Negative for rash  Neurological: Negative for dizziness and headaches     Psychiatric/Behavioral:        Reports some stress financially but overall reports mood stable       Objective:    /90 (BP Location: Left arm, Patient Position: Sitting, Cuff Size: Large)   Pulse 80   Temp 98 3 °F (36 8 °C) (Tympanic)   Resp 18   Ht 6' 3 5" (1 918 m)   Wt (!) 186 kg (409 lb 9 6 oz)   BMI 50 52 kg/m²     Physical Exam   Nursing note and vitals reviewed  Constitutional   General appearance: Abnormal   well developed and morbidly obese  Eyes No conjunctival pallor  Ears, Nose, Mouth, and Throat Oral mucosa moist    Pulmonary   Respiratory effort: No increased work of breathing or signs of respiratory distress  Auscultation of lungs: Clear to auscultation, equal breath sounds bilaterally, no wheezes, no rales, no rhonci  Cardiovascular   Auscultation of heart: Normal rate and rhythm, normal S1 and S2, without murmurs  Examination of extremities for edema and/or varicosities: Normal   no edema  Abdomen   Abdomen: Abnormal   The abdomen was obese  Bowel sounds were normal  The abdomen was soft and nontender     Musculoskeletal   Gait and station: Normal     Psychiatric   Orientation to person, place and time: Normal     Affect: appropriate

## 2019-01-21 NOTE — PATIENT INSTRUCTIONS
Goals: Food log (ie ) www myfitnesspal com,sparkpeople  com,loseit com,calorieking  com,etc    No sugary beverages  At least 64oz of water daily  Increase physical activity by 10 minutes daily   Gradually increase physical activity to a goal of 5 days per week for 30 minutes of MODERATE intensity PLUS 2 days per week of FULL BODY resistance training  Monitor blood pressure, if consistently greater than 140/90 please notify family doctor

## 2019-01-21 NOTE — ASSESSMENT & PLAN NOTE
-hgbA1c elevated at 5 9  -recommend avoid refined carbohydrates, increase cardiovascular exercise as tolerated

## 2019-01-21 NOTE — PROGRESS NOTES
Weight Management Medical Nutrition Assessment  Rollin Cowden is here for his  initial RD visit for the Healthy Core program  Current wt: 407 4 lbs  Pt reports that he is largely motivated to lose weight due to preventing knee problems and alleviating chronic back pain  Pt often eats 3 large meals throughout the day and snacks on sweets in the evening  Reviewed portion sizes and encouraged pt to incorporate some protein-rich snacks in between meals in order to meet high protein needs and better control appetite  Pt admits that he often struggles with willpower and is interested in practicing some mindful eating principles  Recommend pt switch to leaner protein sources (ie switch to low-fat or fat-free lactaid, etc ) in order to reduce saturated fat intake  Body composition test completed and results will be reviewed at next appointment      Anthropometric Measurements  Start Weight (lbs): 407 4 lbs  Ideal Body Weight (lbs): 199 lbs  Goal Weight (lbs): 5-10% loss of TBW    Weight Loss History  Previous weight loss attempts: Self Created Diets (Portion Control, Healthy Food Choices, etc )  Highest weight: 407 lbs (today)  Lowest weight: 200 lbs at age 23  350 lbs at age 26 lbs    Food and Nutrition Related History  Wake up: 6 am  Bed Time: 10 pm    Meal Plan  Breakfast: 2 english muffins w/butter, peanut butter, and honey w/1 glass of 2% lactaid milk @ 6:30 am  Snack: none  Lunch: hot dog, potato salad, and pickle @ 12:00 pm  Snack: none  Dinner: pork roast w/potatoes OR frozen pizza OR hamburgers w/onion and mushrooms @ 4:45-5:00 pm  Snack: cookies, apple pie, something sweet, sometimes popcorn @ 7-8 pm    Beverages: 1 gallon water & 1 cup of 2% lactaid milk    Weekends: Same  Cravings: sweets   Trouble area of day: after dinner    Frequency of Eating out: 1x/month  Food restrictions: lactose intolerance  Cooking: self   Food Shopping: self    Physical Activity Intake  Activity: minimum   Physical limitations/barriers to exercise: chronic lower back pain (had surgery when he was 33)    Estimated Needs  Energy  [place]: BMR: 2993 X 1 3 -1000 = 400 [facility] Energy Needs: BMR : 9439   1-2# loss sedentary:  7537-6549             Lightly active:   8137-6000  Protein: 108-136 g (1 2-1 5g/kg IBW)  Fluid: 104 oz (35mL/kg IBW)    Nutrition Diagnosis  Yes;     Overweight/obesity  related to Excess energy intake as evidenced by  BMI more than normative standard for age and sex (obesity-grade III 36+)       Nutrition Intervention  Meal Plan  5735-5973 kcal/day  108-136 g protein/day    Nutrition Education:   Healthy Core Manual  Calorie controlled menu  Lean protein food choices  Healthy snack options  Food journaling tips      Nutrition Counseling:  Strategies: meal planning, portion sizes, healthy snack choices, hydration, fiber intake, protein intake, exercise, food journal      Monitoring and Evaluation:  Evaluation criteria:  Energy Intake: 8282-6183 kcal/day  Meet protein needs  Maintain adequate hydration  Monitor weekly weight  Meal planning/preparation  Food journal   Portions at mealtimes and snacks  Physical activity     Barriers to learning:none  Readiness to change: Preparation  Comprehension: very good  Expected Compliance: very good

## 2019-01-21 NOTE — ASSESSMENT & PLAN NOTE
-on BP medications, blood pressure elevated today   Patient reports he is having a lot of back pain  -weight loss may help improve this condition  -advised he continue to monitor his blood pressure and if it is consistently greater than 140/90, contact family doctor

## 2019-01-21 NOTE — ASSESSMENT & PLAN NOTE
-Discussed options of HealthyCORE-Intensive Lifestyle Intervention Program, Very Low Calorie Diet-VLCD, Conservative Program, Vijaya-En-Y Gastric Bypass and Vertical Sleeve Gastrectomy and the role of weight loss medications   -Initial weight loss goal of 5-10% weight loss for improved health  -Screening labs: reviewed, HgbA1c elevated at 5 9  -Patient is interested in pursuing HealthyCORE    +STOP BANG (7/8):  -reviewed risks of undiagnosed/untreated sleep apnea  -Recommended referral to sleep medicine provider for further evaluation

## 2019-01-22 ENCOUNTER — OFFICE VISIT (OUTPATIENT)
Dept: BARIATRICS | Facility: CLINIC | Age: 54
End: 2019-01-22

## 2019-01-22 ENCOUNTER — OFFICE VISIT (OUTPATIENT)
Dept: PHYSICAL THERAPY | Facility: CLINIC | Age: 54
End: 2019-01-22
Payer: COMMERCIAL

## 2019-01-22 ENCOUNTER — OFFICE VISIT (OUTPATIENT)
Dept: PAIN MEDICINE | Facility: CLINIC | Age: 54
End: 2019-01-22
Payer: COMMERCIAL

## 2019-01-22 VITALS — WEIGHT: 315 LBS | HEIGHT: 76 IN | BODY MASS INDEX: 38.36 KG/M2

## 2019-01-22 VITALS
BODY MASS INDEX: 38.36 KG/M2 | HEART RATE: 96 BPM | WEIGHT: 315 LBS | HEIGHT: 76 IN | SYSTOLIC BLOOD PRESSURE: 132 MMHG | RESPIRATION RATE: 17 BRPM | DIASTOLIC BLOOD PRESSURE: 90 MMHG

## 2019-01-22 DIAGNOSIS — G89.29 CHRONIC BILATERAL LOW BACK PAIN WITH RIGHT-SIDED SCIATICA: Primary | ICD-10-CM

## 2019-01-22 DIAGNOSIS — G89.29 CHRONIC BILATERAL LOW BACK PAIN WITH RIGHT-SIDED SCIATICA: ICD-10-CM

## 2019-01-22 DIAGNOSIS — M54.41 CHRONIC BILATERAL LOW BACK PAIN WITH RIGHT-SIDED SCIATICA: Primary | ICD-10-CM

## 2019-01-22 DIAGNOSIS — M96.1 LUMBAR POST-LAMINECTOMY SYNDROME: ICD-10-CM

## 2019-01-22 DIAGNOSIS — M79.18 MYOFASCIAL PAIN SYNDROME: ICD-10-CM

## 2019-01-22 DIAGNOSIS — M54.41 CHRONIC BILATERAL LOW BACK PAIN WITH RIGHT-SIDED SCIATICA: ICD-10-CM

## 2019-01-22 DIAGNOSIS — R63.5 ABNORMAL WEIGHT GAIN: ICD-10-CM

## 2019-01-22 DIAGNOSIS — G89.4 CHRONIC PAIN SYNDROME: Primary | ICD-10-CM

## 2019-01-22 PROCEDURE — 97110 THERAPEUTIC EXERCISES: CPT

## 2019-01-22 PROCEDURE — 97140 MANUAL THERAPY 1/> REGIONS: CPT

## 2019-01-22 PROCEDURE — WMPRO12

## 2019-01-22 PROCEDURE — 99214 OFFICE O/P EST MOD 30 MIN: CPT | Performed by: ANESTHESIOLOGY

## 2019-01-22 PROCEDURE — 99999 PR OFFICE/OUTPT VISIT,PROCEDURE ONLY: CPT

## 2019-01-22 RX ORDER — IBUPROFEN 200 MG
600 TABLET ORAL EVERY 6 HOURS PRN
COMMUNITY
End: 2019-03-11 | Stop reason: ALTCHOICE

## 2019-01-22 RX ORDER — BACLOFEN 10 MG/1
10 TABLET ORAL 3 TIMES DAILY PRN
Qty: 90 TABLET | Refills: 0 | Status: SHIPPED | OUTPATIENT
Start: 2019-01-22 | End: 2019-03-11 | Stop reason: ALTCHOICE

## 2019-01-22 NOTE — PROGRESS NOTES
Daily Note     Today's date: 2019  Patient name: Cong Pruitt  : 1965  MRN: [id number]  Referring provider: John Argueta MD  Dx:   Encounter Diagnosis     ICD-10-CM    1  Chronic bilateral low back pain with right-sided sciatica M54 41     G89 29                   Subjective: Pt reports similar sx in mid back  He followed up w/ Dr Rashida Garg this morning, was instructed to try TENS machine to help relieve mid back pain  Reports that low back sx are similar  No other new complaints  Pain to start session 7/10  Objective: Notes significant relief from manual lumbar traction       Precautions: DM II, HTN, standard     Specialty Daily Treatment Diary     Manual   - RE    Manual lumbar traction 3x2-3 min holds   2x2 min holds  no 3x2 min holds         Prone thoracic PA's and ribs mobs grade III/IV x 5 mins    HS stretch                            Exercise Diary  12   -  10 11   HS and glute/piri stretching Manual HS stretch w/ sciatic nerve floss 3x30 sec   HS w/ sciatic nerve flossing 3x30 sec holds  HS self  w SOS 3 x 30 sec ea     TrA progressions Reviewed x 3-4 mins   Isos, single leg ext, 90/90 taps - 6 mins total Isos  3 x 10 only  isos x 10 total   bridging        Sciatic nerve flossing         LTR  x20 B   LTR x 20 total LTR x 20 ea  x20 total   Sit<>stand Side lying open books x 15-20 B        tband core stab Review   Black tband pallof press 3x10, rotations x 15 B  Blk tband pallof press   3 x 10, rotations x 15 B  x15 each   pball isometrics                    x30 total for each X 30 e a x30 each        S/l open books x 15-20 B       no                                PRITI x 4 mins, PPU 2x10 total  PRITI x 4 min   PPU 2 x 10 - held today  PPU 2x10 total w/ overpressure through tspine                      Review of HEP, anatomy of spine and possible causes of pain, and progressions to be made moving forward x 6 mins                  Modalities 12  9 10  11   Heat pre Heat pre x 10-12 min w/ TENS over R mid thoracic spine   no No  no   Ice post  no  no No  no               Assessment: Tolerated treatment well  Patient demonstrated fatigue post treatment and would benefit from continued PT  Tolerates session well, notes no pain after heat, TENS, stretching, and manual work  Exercises deferred today as pt was prescribed muscle relaxers and will start taking these following today's session  We agreed that it would be best not to exacerbate sx w/ exercise, however progressions will again be added at next session barring setback  Plan: Continue per plan of care   standing core stab as able

## 2019-01-22 NOTE — PROGRESS NOTES
Pain Medicine Follow-Up Note    Assessment:  1  Chronic pain syndrome    2  Chronic bilateral low back pain with right-sided sciatica    3  Lumbar post-laminectomy syndrome    4  Myofascial pain syndrome        Plan:  New Medications Ordered This Visit   Medications    ibuprofen (MOTRIN) 200 mg tablet     Sig: Take 600 mg by mouth every 6 (six) hours as needed for mild pain    baclofen 10 mg tablet     Sig: Take 1 tablet (10 mg total) by mouth 3 (three) times a day as needed for muscle spasms     Dispense:  90 tablet     Refill:  0     My impressions and treatment recommendations were discussed in detail with the patient who verbalized understanding and had no further questions  The patient appears to have acute right-sided thoracic paraspinal muscle spasm  This appears to be affecting him significantly, so I asked him to speak to the physical therapist about using a TENS unit to help alleviate his pain in that region  I also felt a reasonable to have the patient trial baclofen 10 mg 3 times daily as needed for muscle spasms  Side effects were reviewed with the patient  The patient reports that he has few more weeks worth of physical therapy before his MRI of the lumbar spine will be approved  I encouraged him to continue with physical therapy  Follow-up is planned in 4 weeks time or sooner as warranted  Discharge instructions were provided  I personally saw and examined the patient and I agree with the above discussed plan of care  History of Present Illness:    Samuel Harrell is a 52 y o  male who presents to [facility] for interval re-evaluation of the above stated pain complaints  The patient has a past medical and chronic pain history as outlined in the assessment section  He was last seen on December 31, 2018  At today's office visit, the patient's pain score is 9/10 on the verbal numerical pain rating scale    The patient states that his pain is primarily on the right side of his low back  He states that his pain is worse in the morning, evening, and night  His pain is constant in nature and he reports the quality of his pain as sharp " He reports that he has been going for physical therapy  Unfortunately, he appears to have developed a muscle spasm of his right thoracic paraspinal musculature  Other than as stated above, the patient denies any interval changes in medications, medical condition, mental condition, symptoms, or allergies since the last office visit  Review of Systems:    Review of Systems   Respiratory: Negative for shortness of breath  Cardiovascular: Negative for chest pain  Gastrointestinal: Negative for constipation, diarrhea, nausea and vomiting  Musculoskeletal: Positive for gait problem (difficulty walking/ decreased range of motion) and joint swelling (joint stiffness)  Negative for arthralgias and myalgias  Skin: Negative for rash  Neurological: Negative for dizziness, seizures and weakness  All other systems reviewed and are negative          Patient Active Problem List   Diagnosis    Closed fracture of transverse process of lumbar vertebra (HCC)    Anxiety    Backache    Collagen vascular disease ([address] )    GERD (gastroesophageal reflux disease)    Hypertension    Impaired fasting glucose    Osteoarthritis    Sciatica    Breathing-related sleep disorder    Type 2 diabetes mellitus (HCC)    Urinary calculus    Chronic bilateral low back pain with right-sided sciatica    Morbid obesity (HCC)    Chronic pain syndrome    Lumbar post-laminectomy syndrome    Primary osteoarthritis of right knee    Routine general medical examination at a health care facility    Screening for hyperlipidemia    Colon cancer screening    Encounter for prostate cancer screening    Screening for hypothyroidism       Past Medical History:   Diagnosis Date    Hypertension     Kidney stone     x 2 episodes    Localized pain of joint     Mild sleep apnea     Skin tag        Past Surgical History:   Procedure Laterality Date    KNEE ARTHROSCOPY Left 2007    LAMINECTOMY      LAMINOTOMY  07/2010    spinal       Family History   Problem Relation Age of Onset    Osteoarthritis Mother     Obesity Father     No Known Problems Sister     No Known Problems Brother     No Known Problems Sister     No Known Problems Sister     Substance Abuse Neg Hx     Mental illness Neg Hx     Cancer Neg Hx     Diabetes Neg Hx     Heart disease Neg Hx     Stroke Neg Hx        Social History     Occupational History    Not on file  Social History Main Topics    Smoking status: Never Smoker    Smokeless tobacco: Never Used    Alcohol use No    Drug use: No    Sexual activity: Not on file         Current Outpatient Prescriptions:     ibuprofen (MOTRIN) 200 mg tablet, Take 600 mg by mouth every 6 (six) hours as needed for mild pain, Disp: , Rfl:     lisinopril-hydrochlorothiazide (PRINZIDE,ZESTORETIC) 20-25 MG per tablet, Take 1 tablet by mouth daily, Disp: 90 tablet, Rfl: 3    baclofen 10 mg tablet, Take 1 tablet (10 mg total) by mouth 3 (three) times a day as needed for muscle spasms, Disp: 90 tablet, Rfl: 0    No Known Allergies    Physical Exam:    /90 (BP Location: Left arm, Patient Position: Sitting, Cuff Size: Standard)   Pulse 96   Resp 17   Ht 6' 3 5" (1 918 m)   Wt (!) 186 kg (411 lb)   BMI 50 69 kg/m²     Constitutional:obese and malodorous  Eyes:anicteric  HEENT:grossly intact  Neck:supple, symmetric, trachea midline and no masses   Pulmonary:even and unlabored  Cardiovascular:No edema or pitting edema present  Skin:Normal without rashes or lesions and well hydrated  Psychiatric:Mood and affect appropriate  Neurologic:Cranial Nerves II-XII grossly intact  Musculoskeletal:Tenderness noted in the right paraspinal thoracic musculature    There are discrete trigger points palpable

## 2019-01-24 ENCOUNTER — OFFICE VISIT (OUTPATIENT)
Dept: PHYSICAL THERAPY | Facility: CLINIC | Age: 54
End: 2019-01-24
Payer: COMMERCIAL

## 2019-01-24 DIAGNOSIS — G89.29 CHRONIC BILATERAL LOW BACK PAIN WITH RIGHT-SIDED SCIATICA: Primary | ICD-10-CM

## 2019-01-24 DIAGNOSIS — M54.41 CHRONIC BILATERAL LOW BACK PAIN WITH RIGHT-SIDED SCIATICA: Primary | ICD-10-CM

## 2019-01-24 PROCEDURE — 97110 THERAPEUTIC EXERCISES: CPT

## 2019-01-24 PROCEDURE — 97140 MANUAL THERAPY 1/> REGIONS: CPT

## 2019-01-24 NOTE — PROGRESS NOTES
Daily Note     Today's date: 2019  Patient name: Lorie Alcantara  : 1965  MRN: [id number]  Referring provider: Santiago Tadeo MD  Dx:   Encounter Diagnosis     ICD-10-CM    1  Chronic bilateral low back pain with right-sided sciatica M54 41     G89 29                   Subjective: 4/10 pain to start, feels much better than at last session  Thinks stim helped, thinks that muscle relaxers have helped  Objective: Pt note significant relief w/ R sided lat stretching in standing  Requires no cueing for proper form w/ gentle core stab program     Precautions: DM II, HTN, standard     Specialty Daily Treatment Diary     Manual     Manual lumbar traction 3x2-3 min holds  4x2 min holds   no 3x2 min holds         Prone thoracic PA's and ribs mobs grade III/IV x 5 mins    HS stretch                            Exercise Diary  12 13  10 11   HS and glute/piri stretching Manual HS stretch w/ sciatic nerve floss 3x30 sec  no  HS self  w SOS 3 x 30 sec ea     TrA progressions Reviewed x 3-4 mins  isos x 10, marching 4x10 total  Isos  3 x 10 only  isos x 10 total   bridging        Sciatic nerve flossing   Review       LTR  x20 B  x20  LTR x 20 ea  x20 total   Sit<>stand Side lying open books x 15-20 B  Side lying open books x15 B       tband core stab Review  Reviewed   Blk tband pallof press   3 x 10, rotations x 15 B  x15 each   pball isometrics   Standing side lat stretch to R 3x30 sec holds                   X 30 e a x30 each        S/l open books x 15-20 B                                      PRITI x 2-3 mins, press ups x 10  PRITI x 4 min   PPU 2 x 10 - held today  PPU 2x10 total w/ overpressure through tspine                                       Modalities 12 13  10  11   Heat pre Heat pre x 10-12 min w/ TENS over R mid thoracic spine  Heat pre in prone w/ TENS x 10 min   No  no   Ice post  no no  No  no                 Assessment: Tolerated treatment well   Patient demonstrated fatigue post treatment and would benefit from continued PT  Pt noted significant relief following lat stretching and opted not to perform further exercises so as not to exacerbate sx  Palpable area of tenderness slightly reduced since last session  Pt will continue w/ exercises and stretches on own  No complaints to end session  Plan: Continue per plan of care  higher level core stab if able

## 2019-01-29 ENCOUNTER — OFFICE VISIT (OUTPATIENT)
Dept: PHYSICAL THERAPY | Facility: CLINIC | Age: 54
End: 2019-01-29
Payer: COMMERCIAL

## 2019-01-29 DIAGNOSIS — M54.41 CHRONIC BILATERAL LOW BACK PAIN WITH RIGHT-SIDED SCIATICA: Primary | ICD-10-CM

## 2019-01-29 DIAGNOSIS — G89.29 CHRONIC BILATERAL LOW BACK PAIN WITH RIGHT-SIDED SCIATICA: Primary | ICD-10-CM

## 2019-01-29 PROCEDURE — G8980 MOBILITY D/C STATUS: HCPCS

## 2019-01-29 PROCEDURE — 97110 THERAPEUTIC EXERCISES: CPT

## 2019-01-29 PROCEDURE — G8978 MOBILITY CURRENT STATUS: HCPCS

## 2019-01-29 PROCEDURE — G8979 MOBILITY GOAL STATUS: HCPCS

## 2019-01-29 PROCEDURE — 97140 MANUAL THERAPY 1/> REGIONS: CPT

## 2019-01-29 NOTE — PROGRESS NOTES
Progress Note     Today's date: 2019  Patient name: Mehreen Monday  : 1965  MRN: [id number]  Referring provider: Mathew Fortune MD  Dx:   Encounter Diagnosis     ICD-10-CM    1  Chronic bilateral low back pain with right-sided sciatica M54 41     G89 29                   Subjective: Pt reports 3/10 pain to start, feels much better and did not take muscle relaxers today  Thinks that PT has been beneficial but would like to follow up w/ MD before continuing  Was told to complete 2-3 more weeks of PT before following up w/ MD, was told that he had possibility of obtaining MRI thereafter  Pt is going to wean off of muscle relaxers and is hopeful that pain will not return  Updated functional status to follow:    Goals  Short term goals (3 weeks):  1) Pt will improve thoracolumbar mobility deficits by 25% pain free  - progressed   2) Pt will improve B LE and core strength deficits by 1/3 grade MMT  - progressed   3) Pt will report pain at worse <4/10  - progressed   4) Pt will initiate and progress HEP w/ special emphasis on functional core stability and thoracolumbar mobility  - achieved     Long term goals (6 weeks)  1) Pt will improve FOTO to at least 70  - progressed   2) Pt will tolerate prolonged static positioning in sitting and standing while working for durations of at least 1 hour w/o modification due to pain  - progressed   3) Pt will stand and ambulate for durations of at least 20 min w/ normalized gait mechanics and only min pain  - progressed   4) Pt will be independent and compliant w/ HEP in order to maximize functional benefit of skilled PT following d/c  - progressed     New Goals 2019:  Short term goals (3 weeks):  1) Pt will improve thoracolumbar mobility deficits by 25% pain free  - progressed/achieved   2) Pt will improve B LE and core strength deficits by 1/3 grade MMT   - achieved   3) Pt will report pain at worse <4/10  - progressed   4) Pt will initiate and progress HEP w/ special emphasis on functional core stability and thoracolumbar mobility  - achieved     Long term goals (6 weeks)  1) Pt will improve FOTO to at least 70  - progressed - goal should have read 55 as wrong FOTO score was entered at last re-assessment   2) Pt will tolerate prolonged static positioning in sitting and standing while working for durations of at least 1 hour w/o modification due to pain  - progressed   3) Pt will stand and ambulate for durations of at least 20 min w/ normalized gait mechanics and only min pain  - progressed   4) Pt will be independent and compliant w/ HEP in order to maximize functional benefit of skilled PT following d/c  - achieved     **wrong FOTO score was reported at last session, should have been out of 54 instead of 64  Pain  No pain reported  Current pain rating: 3  At best pain ratin  At worst pain ratin-6  Location: Center towards L side of low back   Quality: sharp and dull ache  Relieving factors: rest  Aggravating factors: standing, walking and sitting  Progression: improved in the last 2 weeks    Social Support  Steps to enter house: yes  Stairs in house: no   L[facility]: [facility]  Lives with: alone    Employment status: working (works as [employer] )  Treatments  Previous treatment: physical therapy  Patient Goals  Patient goals for therapy: increased strength, decreased pain and increased motion          Objective     Special Questions  Negative for bladder dysfunction, bowel dysfunction and saddle (S4) numbness    Postural Observations  Seated posture: poor  Standing posture: poor  Correction of posture: makes symptoms better        Palpation     Additional Palpation Details  Pt has min TTP through R side thoracic PS, no significant increase in tissue density today       Neurological Testing     Sensation     Lumbar   Left   Intact: light touch    Right   Intact: light touch    Active Range of Motion     Additional Active Range of Motion Details  Flex: 50-75% w/ min guarding upon standing, min pain  Ext: 75% w/o pain  SB R: 50-75% w/o  SB L: 50-75% w/o pain   Rot R: 75%  Rot L: 75%       Strength/Myotome Testing     Left Hip   Planes of Motion   Flexion: 4+  Extension: 4+  Abduction: 4+  External rotation: 4+  Internal rotation: 4+    Right Hip   Planes of Motion   Flexion: 4+  Extension: 4+  Abduction: 4+  External rotation: 4+  Internal rotation: 4+    Left Knee   Flexion: 4+  Extension: 4+    Right Knee   Flexion: 4+  Extension: 4+    Left Ankle/Foot   Dorsiflexion: 4+    Right Ankle/Foot   Dorsiflexion: 4+    Muscle Activation     Additional Muscle Activation Details  Core Strength: 2+/5 w/ compensations and min komal brijesh noted at 3/5 testing     Tests     Lumbar   Positive repeated flexion   - continued     Additional Tests Details  Passive SLR to ~75-80 B w/o pain     Slump (-) B     PRITI and press ups relieve sx    Manual lumbar traction provides significant relief     Ambulation     Ambulation: Level Surfaces     Additional Level Surfaces Ambulation Details  Fair lumbar rotation, Mod trendelenburg B, decreased pacing, not significantly antalgic - continued    Functional Assessment     Comments  Sit<>stand: heavy UE support and forward WS, mod antalgic, guarded - continued         Precautions: DM II, HTN, standard     Specialty Daily Treatment Diary     Manual  1/22 1/24 1/29 1/16   Manual lumbar traction 3x2-3 min holds  4x2 min holds  3x3 min holds   3x2 min holds         Prone thoracic PA's and ribs mobs grade III/IV x 5 mins    HS stretch                            Exercise Diary  12 13 14  11   HS and glute/piri stretching Manual HS stretch w/ sciatic nerve floss 3x30 sec  no Manual HS stretch 3x30 sec holds B      TrA progressions Reviewed x 3-4 mins  isos x 10, marching 4x10 total isos 2x10 total, review of progressions x 5 mins  isos x 10 total   bridging        Sciatic nerve flossing   Review       LTR  x20 B  x20 Reviewed   x20 total Sit<>stand Side lying open books x 15-20 B  Side lying open books x15 B       tband core stab Review  Reviewed    x15 each   pball isometrics   Standing side lat stretch to R 3x30 sec holds                    x30 each        S/l open books x 15-20 B                                      PRITI x 2-3 mins, press ups x 10 PRITI x 2-3 mins, press ups x 10  PPU 2x10 total w/ overpressure through tspine                      HEP review, progressions updated, final questions answered x 5 mins                 Modalities 12 13 14  11   Heat pre Heat pre x 10-12 min w/ TENS over R mid thoracic spine  Heat pre in prone w/ TENS x 10 min  Pre x 10 min w TENS   no   Ice post  no no no  no                   Assessment: Tolerated treatment well  Patient exhibited good technique with therapeutic exercises  Pt does well w/ today's session, notes greater mobility and less pain by end of session  Pt shows good understanding of HEP and declines further handouts detailing exercises  Pt will call to schedule MD follow up, as was instructed, will check in with us after this visit  In the case that he is cleared by MD this will be changed to a d/c note, until then case will be left open  Pt final questions were answered and he was asked to call should any issues arise prior to MD visit  Plan: Continue per plan of care  possible d/c pending results of MD visit  Update 2/20/2019: Pt chart to be d/c as we have not heard back regarding continued therapy  Program will be re-started in future if necessary

## 2019-01-30 ENCOUNTER — OFFICE VISIT (OUTPATIENT)
Dept: FAMILY MEDICINE CLINIC | Facility: CLINIC | Age: 54
End: 2019-01-30
Payer: COMMERCIAL

## 2019-01-30 VITALS
SYSTOLIC BLOOD PRESSURE: 130 MMHG | HEART RATE: 95 BPM | HEIGHT: 75 IN | BODY MASS INDEX: 39.17 KG/M2 | RESPIRATION RATE: 16 BRPM | DIASTOLIC BLOOD PRESSURE: 90 MMHG | TEMPERATURE: 97.3 F | WEIGHT: 315 LBS | OXYGEN SATURATION: 95 %

## 2019-01-30 DIAGNOSIS — M96.1 LUMBAR POST-LAMINECTOMY SYNDROME: ICD-10-CM

## 2019-01-30 DIAGNOSIS — G89.29 CHRONIC BILATERAL LOW BACK PAIN WITH RIGHT-SIDED SCIATICA: ICD-10-CM

## 2019-01-30 DIAGNOSIS — G89.4 CHRONIC PAIN SYNDROME: ICD-10-CM

## 2019-01-30 DIAGNOSIS — Z12.11 COLON CANCER SCREENING: ICD-10-CM

## 2019-01-30 DIAGNOSIS — E66.01 MORBID OBESITY WITH BODY MASS INDEX (BMI) OF 50.0 TO 59.9 IN ADULT (HCC): ICD-10-CM

## 2019-01-30 DIAGNOSIS — M54.41 CHRONIC BILATERAL LOW BACK PAIN WITH RIGHT-SIDED SCIATICA: ICD-10-CM

## 2019-01-30 DIAGNOSIS — I10 ESSENTIAL HYPERTENSION: ICD-10-CM

## 2019-01-30 DIAGNOSIS — R73.01 IMPAIRED FASTING GLUCOSE: Primary | ICD-10-CM

## 2019-01-30 DIAGNOSIS — E11.9 TYPE 2 DIABETES MELLITUS WITHOUT COMPLICATION, WITHOUT LONG-TERM CURRENT USE OF INSULIN (HCC): ICD-10-CM

## 2019-01-30 PROBLEM — Z00.00 ROUTINE GENERAL MEDICAL EXAMINATION AT A HEALTH CARE FACILITY: Status: RESOLVED | Noted: 2018-12-10 | Resolved: 2019-01-30

## 2019-01-30 PROBLEM — Z12.5 ENCOUNTER FOR PROSTATE CANCER SCREENING: Status: RESOLVED | Noted: 2018-12-10 | Resolved: 2019-01-30

## 2019-01-30 PROBLEM — Z13.29 SCREENING FOR HYPOTHYROIDISM: Status: RESOLVED | Noted: 2018-12-10 | Resolved: 2019-01-30

## 2019-01-30 PROBLEM — Z13.220 SCREENING FOR HYPERLIPIDEMIA: Status: RESOLVED | Noted: 2018-12-10 | Resolved: 2019-01-30

## 2019-01-30 LAB — SL AMB POCT HEMOGLOBIN AIC: 5.9 (ref ?–6.5)

## 2019-01-30 PROCEDURE — 3044F HG A1C LEVEL LT 7.0%: CPT | Performed by: FAMILY MEDICINE

## 2019-01-30 PROCEDURE — 99214 OFFICE O/P EST MOD 30 MIN: CPT | Performed by: FAMILY MEDICINE

## 2019-01-30 PROCEDURE — 83036 HEMOGLOBIN GLYCOSYLATED A1C: CPT | Performed by: FAMILY MEDICINE

## 2019-01-30 RX ORDER — PHENTERMINE HYDROCHLORIDE 30 MG/1
30 CAPSULE ORAL EVERY MORNING
Qty: 30 CAPSULE | Refills: 2 | Status: SHIPPED | OUTPATIENT
Start: 2019-01-30 | End: 2019-05-13 | Stop reason: ALTCHOICE

## 2019-01-30 NOTE — ASSESSMENT & PLAN NOTE
STARTED HIS EVALUATION WITH [facility]  STARTING A NEW MEAL PLAN  APPEARS MOTIVATED    - SUPPORT GIVEN  - CONTINUE CURRENT TREATMENT PLAN

## 2019-01-30 NOTE — PATIENT INSTRUCTIONS
CONTINUE CURRENT TREATMENT PLAN  MONITOR DIETARY SODIUM, CHOL, AND CARBOHYDRATE INTAKE  ENCOURAGE PHYSICAL ACTIVITY  FOOT CARE  TRIAL OF PHENTERMINE  COLOGUARD WILL BE ORDERED    RV 3 M, SOONER PRN

## 2019-01-30 NOTE — ASSESSMENT & PLAN NOTE
WORKING WITH PAIN MANAGEMENT  JUST FINISHED 6 WEEKS OF PT  NO REAL IMPROVEMENT      - REST  - AVOID LIFTING OR PUSHING  - PAIN MANAGEMENT FOLLOW UP

## 2019-01-30 NOTE — PROGRESS NOTES
Assessment/Plan:    Problem List Items Addressed This Visit        Endocrine    Impaired fasting glucose - Primary    Relevant Orders    POCT hemoglobin A1c (Completed)    Microalbumin / creatinine urine ratio    Type 2 diabetes mellitus (HCC)     Lab Results   Component Value Date    HGBA1C 5 9 01/30/2019       No results for input(s): POCGLU in the last 72 hours      Blood Sugar Average: Last 72 hrs:       [facility] WITH [facility]  DENIES ANY NUMBNESS, TINGLING IN FEET    - DISCUSSED DIETARY MODIFICATION OF CARBOHYDRATES  - HGB A1C AND URINE STUDIES DUE TODAY  - FOOT CARE  - RV 3 MONTHS             Relevant Orders    POCT hemoglobin A1c (Completed)    Microalbumin / creatinine urine ratio       Cardiovascular and Mediastinum    Hypertension     STABLE  DENIES ANY CP, SOB, PALPITATIONS, OR HEADACHE  NOTES NO WATER RETENTION  COMPLIANT WITH MEDICATION  NO CONCERNS    - CONTINUE CURRENT TREATMENT PLAN  - MONITOR DIETARY SODIUM INTAKE  - ENCOURAGE PHYSICAL ACTIVITY  - RV 3 MONTHS              Nervous and Auditory    Chronic bilateral low back pain with right-sided sciatica     WORKING WITH PAIN MANAGEMENT  JUST FINISHED 6 WEEKS OF PT  NO REAL IMPROVEMENT      - REST  - AVOID LIFTING OR PUSHING  - PAIN MANAGEMENT FOLLOW UP            Other    Morbid obesity with body mass index (BMI) of 50 0 to 59 9 in adult ([facility])     STARTED HIS EVALUATION WITH THE [facility] LOSS [facility]  STARTING A NEW MEAL PLAN  APPEARS MOTIVATED    - SUPPORT GIVEN  - CONTINUE CURRENT TREATMENT PLAN         Relevant Medications    phentermine 30 MG capsule    Chronic pain syndrome    Lumbar post-laminectomy syndrome    Colon cancer screening    Relevant Orders    Cologuard          Patient Instructions   CONTINUE CURRENT TREATMENT PLAN  MONITOR DIETARY SODIUM, CHOL, AND CARBOHYDRATE INTAKE  ENCOURAGE PHYSICAL ACTIVITY  FOOT CARE  TRIAL OF PHENTERMINE  COLOGUARD WILL BE ORDERED    RV 3 M, SOONER PRN        Return in about 3 months (around 4/30/2019) for Recheck  Subjective:      Patient ID: Jose Yates is a 52 y o  male  Chief Complaint   Patient presents with    Weight Loss    Diabetes     Urine Micro, A1C,     Foot exam       PATIENT RETURNS FOR ROUTINE EVALUATION OF PATIENT'S MEDICAL ISSUES    INDIVIDUAL MEDICAL ISSUES WITH THEIR CURRENT STATUS, ASSESSMENT AND PLANS ARE LISTED ABOVE            The following portions of the patient's history were reviewed and updated as appropriate: allergies, current medications, past family history, past medical history, past social history, past surgical history and problem list     Review of Systems   Constitutional: Negative for chills, fatigue and fever  HENT: Negative for congestion, ear discharge, ear pain, mouth sores, postnasal drip, sore throat and trouble swallowing  Eyes: Negative for pain, discharge and visual disturbance  Respiratory: Negative for cough, shortness of breath and wheezing  Cardiovascular: Negative for chest pain, palpitations and leg swelling  Gastrointestinal: Negative for abdominal distention, abdominal pain, blood in stool, diarrhea and nausea  Endocrine: Negative for polydipsia, polyphagia and polyuria  Genitourinary: Negative for dysuria, frequency, hematuria and urgency  Musculoskeletal: Positive for arthralgias and back pain  Negative for gait problem and joint swelling  Skin: Negative for pallor and rash  Neurological: Negative for dizziness, syncope, speech difficulty, weakness, light-headedness, numbness and headaches  Hematological: Negative for adenopathy  Psychiatric/Behavioral: Negative for behavioral problems, confusion and sleep disturbance  The patient is not nervous/anxious            Current Outpatient Prescriptions   Medication Sig Dispense Refill    baclofen 10 mg tablet Take 1 tablet (10 mg total) by mouth 3 (three) times a day as needed for muscle spasms 90 tablet 0    lisinopril-hydrochlorothiazide (PRINZIDE,ZESTORETIC) 20-25 MG per tablet Take 1 tablet by mouth daily 90 tablet 3    ibuprofen (MOTRIN) 200 mg tablet Take 600 mg by mouth every 6 (six) hours as needed for mild pain      phentermine 30 MG capsule Take 1 capsule (30 mg total) by mouth every morning 30 capsule 2     No current facility-administered medications for this visit  Objective:    /90 (BP Location: Other (Comment), Patient Position: Sitting, Cuff Size: Extra-Large) Comment (BP Location): Left forearn  Pulse 95   Temp (!) 97 3 °F (36 3 °C) (Temporal)   Resp 16   Ht 6' 3" (1 905 m)   Wt (!) 183 kg (404 lb)   SpO2 95%   BMI 50 50 kg/m²        Physical Exam   Constitutional: He is oriented to person, place, and time  He appears well-developed and well-nourished  HENT:   Head: Normocephalic and atraumatic  Eyes: Pupils are equal, round, and reactive to light  Conjunctivae and EOM are normal  Right eye exhibits no discharge  Left eye exhibits no discharge  Neck: Neck supple  No JVD present  No thyromegaly present  Cardiovascular: Normal rate, regular rhythm and normal heart sounds  Pulses are no weak pulses  No murmur heard  Pulses:       Dorsalis pedis pulses are 2+ on the right side, and 2+ on the left side  Posterior tibial pulses are 2+ on the right side, and 2+ on the left side  Pulmonary/Chest: Effort normal and breath sounds normal  He has no wheezes  He has no rales  Abdominal: Soft  Bowel sounds are normal  He exhibits no mass  There is no hepatosplenomegaly  There is no tenderness  There is no rebound, no guarding and no CVA tenderness  Morbidly obese     Musculoskeletal: Normal range of motion  He exhibits no edema, tenderness or deformity  MODERATE DJD CHANGES  TRACE EDEMA   Feet:   Right Foot:   Skin Integrity: Negative for ulcer, skin breakdown, erythema, warmth, callus or dry skin  Left Foot:   Skin Integrity: Negative for ulcer, skin breakdown, erythema, warmth, callus or dry skin     Lymphadenopathy: He has no cervical adenopathy  He has no axillary adenopathy  Neurological: He is alert and oriented to person, place, and time  Skin: Skin is warm and dry  No rash noted  No erythema  Psychiatric: He has a normal mood and affect  His behavior is normal  Judgment and thought content normal           Patient's shoes and socks removed  Right Foot/Ankle   Right Foot Inspection  Skin Exam: skin normal and skin intact no dry skin, no warmth, no callus, no erythema, no maceration, no abnormal color, no pre-ulcer, no ulcer and no callus                            Sensory       Monofilament testing: intact  Vascular    The right DP pulse is 2+  The right PT pulse is 2+  Left Foot/Ankle  Left Foot Inspection  Skin Exam: skin normal and skin intactno dry skin, no warmth, no erythema, no maceration, normal color, no pre-ulcer, no ulcer and no callus                                         Sensory       Monofilament: intact  Vascular    The left DP pulse is 2+  The left PT pulse is 2+  Assign Risk Category:  No deformity present; No loss of protective sensation;  No weak pulses       Risk: 0      Elinor Brown MD

## 2019-01-30 NOTE — ASSESSMENT & PLAN NOTE
Lab Results   Component Value Date    HGBA1C 5 9 01/30/2019       No results for input(s): POCGLU in the last 72 hours      Blood Sugar Average: Last 72 hrs:       [facility] WITH [facility]  DENIES ANY NUMBNESS, TINGLING IN FEET    - DISCUSSED DIETARY MODIFICATION OF CARBOHYDRATES  - HGB A1C AND URINE STUDIES DUE TODAY  - FOOT CARE  - RV 3 MONTHS

## 2019-01-31 LAB
ALBUMIN/CREAT UR: 5.9 MG/G CREAT (ref 0–30)
CREAT UR-MCNC: 203 MG/DL
MICROALBUMIN UR-MCNC: 12 UG/ML

## 2019-02-07 ENCOUNTER — OFFICE VISIT (OUTPATIENT)
Dept: BARIATRICS | Facility: CLINIC | Age: 54
End: 2019-02-07

## 2019-02-07 VITALS — BODY MASS INDEX: 39.17 KG/M2 | HEIGHT: 75 IN | WEIGHT: 315 LBS

## 2019-02-07 DIAGNOSIS — R63.5 ABNORMAL WEIGHT GAIN: Primary | ICD-10-CM

## 2019-02-07 PROCEDURE — 99999 PR OFFICE/OUTPT VISIT,PROCEDURE ONLY: CPT

## 2019-02-07 NOTE — PROGRESS NOTES
Weight Management Medical Nutrition Assessment  Bal Marcelo is here for his month 1 healthy core visit  Current weight: 403 0 lbs  Loss of 4 4 lbs since last visit ~2 weeks ago  Pt states that he has been very busy and has not been able to make many changes so far  However, diet recall reveals that pt has eliminated nighttime snacking, switched to lean proteins as discussed, and is trying to make healthier choices  Provided pt with affirmations regarding the changes he has made  Encouraged pt to focus on making small, gradual changes instead of getting overwhelmed with making changes all at once  Pt set a goal of tracking and measuring his food  Pt to follow up in 1 month  Anthropometric Measurements  Start Weight (lbs): 407 4 lbs  Current Weight (lbs): 403 0 lbs  TBW % Change from start weight (lbs): 1%  Ideal Body Weight (lbs): 199 lbs  Goal Weight (lbs): 5-10% loss of TBW     Weight Loss History  Previous weight loss attempts: Self Created Diets (Portion Control, Healthy Food Choices, etc )  Highest weight: 407 lbs (today)  Lowest weight: 200 lbs at age 23  350 lbs at age 26 lbs     Food and Nutrition Related History  Wake up: 6 am  Bed Time: 10 pm    Meal Plan  Breakfast: 2 english muffins w/butter & peanut butter w/1 glass of skim milk   Snack: protein bar  Lunch: turkey sandwich on whole wheat bread w/loomis   Snack: protein bar or none  Dinner: baked chicken breast with sweet potatoes w/butter   Snack: none or ritz crackers w/peanut butter      Beverages: 1 gallon of water & 1 cup lactose-free skim milk    Food restrictions: lactose intolerance  Cooking: self   Food Shopping: self    Physical Activity Intake  Activity none   Physical limitations/barriers to exercise: chronic lower back pain (had surgery when he was 33)    Estimated Needs  Energy  Bear Rigoberto Energy Needs:  BMR : 1394   1-2# loss sedentary: 3245-0582            Lightly active: 5773- 7529    Protein: 108-136 g (1 2-1 5g/kg IBW)  Fluid: 104 oz (35mL/kg IBW)    Nutrition Diagnosis  Yes;     Overweight/obesity  related to Excess energy intake as evidenced by  BMI more than normative standard for age and sex (obesity-grade III 36+)       Nutrition Intervention  Meal Plan  3579-8895 kcal/day  Adequate protein intake: 108-136 g/day    Nutrition Education:   Healthy Core Manual  Calorie controlled menu  Lean protein food choices  Healthy snack options  Food journaling tips      Nutrition Counseling:  Strategies: meal planning, portion sizes, healthy snack choices, hydration, fiber intake, protein intake, exercise, food journal      Monitoring and Evaluation:  Evaluation criteria:  Energy Intake: 6143-4027 kcal/day  Meet protein needs  Maintain adequate hydration  Monitor weekly weight  Meal planning/preparation  Food journal   Portions at mealtimes and snacks  Physical activity     Barriers to learning:none  Readiness to change: Action & preparation  Comprehension: very good  Expected Compliance: very good

## 2019-02-19 ENCOUNTER — CLINICAL SUPPORT (OUTPATIENT)
Dept: BARIATRICS | Facility: CLINIC | Age: 54
End: 2019-02-19

## 2019-02-19 VITALS — BODY MASS INDEX: 38.36 KG/M2 | WEIGHT: 315 LBS | HEIGHT: 76 IN

## 2019-02-19 DIAGNOSIS — R63.5 ABNORMAL WEIGHT GAIN: Primary | ICD-10-CM

## 2019-02-19 PROCEDURE — 99999 PR OFFICE/OUTPT VISIT,PROCEDURE ONLY: CPT

## 2019-02-26 ENCOUNTER — TRANSCRIBE ORDERS (OUTPATIENT)
Dept: SLEEP CENTER | Facility: CLINIC | Age: 54
End: 2019-02-26

## 2019-02-26 DIAGNOSIS — E66.01 MORBID (SEVERE) OBESITY DUE TO EXCESS CALORIES (HCC): Primary | ICD-10-CM

## 2019-02-28 ENCOUNTER — OFFICE VISIT (OUTPATIENT)
Dept: SLEEP CENTER | Facility: CLINIC | Age: 54
End: 2019-02-28
Payer: COMMERCIAL

## 2019-02-28 VITALS
HEART RATE: 90 BPM | HEIGHT: 75 IN | WEIGHT: 315 LBS | BODY MASS INDEX: 39.17 KG/M2 | DIASTOLIC BLOOD PRESSURE: 92 MMHG | SYSTOLIC BLOOD PRESSURE: 153 MMHG

## 2019-02-28 DIAGNOSIS — G47.62 NOCTURNAL LEG CRAMPS: ICD-10-CM

## 2019-02-28 DIAGNOSIS — R06.89 GASPING FOR BREATH: ICD-10-CM

## 2019-02-28 DIAGNOSIS — E66.01 MORBID (SEVERE) OBESITY DUE TO EXCESS CALORIES (HCC): ICD-10-CM

## 2019-02-28 DIAGNOSIS — G47.9 SLEEP DISTURBANCE: ICD-10-CM

## 2019-02-28 DIAGNOSIS — R06.83 SNORING: ICD-10-CM

## 2019-02-28 DIAGNOSIS — M96.1 LUMBAR POST-LAMINECTOMY SYNDROME: ICD-10-CM

## 2019-02-28 DIAGNOSIS — I10 ESSENTIAL HYPERTENSION: ICD-10-CM

## 2019-02-28 DIAGNOSIS — G47.33 OSA (OBSTRUCTIVE SLEEP APNEA): Primary | ICD-10-CM

## 2019-02-28 DIAGNOSIS — E11.9 TYPE 2 DIABETES MELLITUS WITHOUT COMPLICATION, WITHOUT LONG-TERM CURRENT USE OF INSULIN (HCC): ICD-10-CM

## 2019-02-28 PROCEDURE — 99244 OFF/OP CNSLTJ NEW/EST MOD 40: CPT | Performed by: INTERNAL MEDICINE

## 2019-02-28 NOTE — PROGRESS NOTES
Consultation - [address]  52 y o  male  :1965  ZZW:[id number]    Physician Requesting Consult: Chao Tomlinson PA-C     Reason for Consult : At your kind request I saw this patient for initial sleep evaluation today  The patient is planning Bariatric surgery and is here to also evaluate for Obstructive Sleep Apnea  However, he is unsure why he is here  He has had sleep studies in the past but could not recall what the results were  Past history notes mild sleep apnea but no results of prior studies are available  PFSH, Problem List, Medications & Allergies were reviewed in EMR  He  has a past medical history of Hypertension, Kidney stone, Localized pain of joint, Mild sleep apnea, and Skin tag  He has a current medication list which includes the following prescription(s): baclofen, ibuprofen, lisinopril-hydrochlorothiazide, and phentermine  HPI:  He sleeps alone but he is aware of snoring ongoing for many years  At times he awakens himself with snoring or feeling short of breath  He attributes the latter to eating close to bedtime  He is not aware of other modifying factors  Other Complaints: none  Restless Leg Syndrome: reports no suggestive symptoms, but awakens with leg cramps around once a week   Parasomnia: no features reported   Sleep Routine:   Typical Bedtime:  11:00 p m  Gets OOB:  6:00 a m  TIB:7 hrs  Sleep latency:<  30 minutes Sleep Interruptions:5-6 x/night he is unsure of the cause but is able to fall back asleep  Awakens: spontaneously  Upon awakening: {feels refreshed He estimates getting 5 hrs sleep  He denied Excessive Daytime Sleepiness  [place] Sleepiness Scale rated at Total score:   Habits: reports that he has never smoked  He has never used smokeless tobacco , reports that he does not drink alcohol ,  reports that he does not use drugs  ,Caffeine use:none , Exercise routine: none         Family History:  Father and brother have obstructive sleep apnea  ROS: reviewed & as attached  Significant for weight has been stable  He has ongoing low back pain but recently no radicular symptoms  He reported no nasal, respiratory or cardiac symptoms  EXAM:    Vitals /92   Pulse 90   Ht 6' 3" (1 905 m)   Wt (!) 181 kg (400 lb)   BMI 50 00 kg/m²     General  Well groomed male, appears stated age, in no apparent distress  Psychiatric  Alert and cooperative  Normal mood, affect & thought    Head   Craniofacial anatomy:normal Sinuses: non- tender  TMJ: Normal     Eyes   EOM's intact, conjunctiva/corneas clear         Nasal Airway  is patent Septum:central, Mucous membranes:appear normal     Turbinates:  are normal  There is no rhinorrhea; No PND  Oral   Airway   crowded Tongue:Modified Mallampati class IV (only hard palate visible)  Palate:  redundant soft palateTonsils: no hypertrophy  Teeth: normal       Neck    appears thick and there's extra fatty tissue; Neck Circumference: 24"; Supple; no abnormal masses; Thyroid:normal  Trachea:central      Lymph    No Cervical or Submandibular Lymhadenopathy   Heart:    RRR; S1,S2 normal; no gallop; nomurmurs     Lungs   Respiratory Effort:normal  Air entry good bilaterally  No wheezes  No rales   Abdomen   Obese, Soft & non-tender     Extremities   No pedal edema  No clubbing or cyanosis  Skin   Skin is warm and dry; Color& Hydration good; no facial rashes or lesions    Neurologic  Speech is clear and coherent  CNII-XII intact  Rombergs Negative  Muscskeltl    Muscle bulk, tone and power WNL Gait:normal          IMPRESSION: Primary Sleep/Secondary(to Medical or Psych conditions) & comorbidities   1  DOMINIC (obstructive sleep apnea)  Diagnostic Sleep Study    CPAP Study   2  Sleep disturbance     3  Snoring     4  Gasping for breath     5  Nocturnal leg cramps     6  Morbid (severe) obesity due to excess calories [facility])  Ambulatory referral to Sleep Medicine   7   Essential hypertension     8  Type 2 diabetes mellitus without complication, without long-term current use of insulin ([facility] [address] )     9  Lumbar post-laminectomy syndrome        PLAN:   1  Comprehensive counseling was provided on pathophysiology, diagnostic strategies & treatment options; effects on symptoms and comorbidities; risks of inadequate therapy; costs and insurance aspects  2  I advised on weight reduction, avoiding sleeping supine, using alcohol or sedating medications close to bed time and on safe driving practices  3  Nocturnal polysomnography is indicated and a diagnostic study will be scheduled  4  Patient is willing to try Positive airway pressure therapy and will be scheduled for a titration study if indicated  5  Follow-up will be scheduled after the studies to review results, further details of treatment options and to initiate/adjust therapy  Thank you for allowing me to participate in the care of this patient  I will keep you apprised of developments          Sincerely,     Authenticated electronically by Maxwell Sanches MD   on 18/31/98   Board Certified Specialist

## 2019-02-28 NOTE — PATIENT INSTRUCTIONS
What is DOMINIC? Obstructive sleep apnea is a common and serious sleep disorder that causes you to stop breathing during sleep  The airway repeatedly becomes blocked, limiting the amount of air that reaches your lungs  When this happens, you may snore loudly or making choking noises as you try to breathe  Your brain and body becomes oxygen deprived and you may wake up  This may happen a few times a night, or in more severe cases, several hundred times a night  Sleep apnea can make you wake up in the morning feeling tired or unrefreshed even though you have had a full night of sleep  During the day, you may feel fatigued, have difficulty concentrating or you may even unintentionally fall asleep  This is because your body is waking up numerous times throughout the night, even though you might not be conscious of each awakening  The lack of oxygen your body receives can have negative long-term consequences for your health  This includes:  High blood pressure  Heart disease  Irregular heart rhythms  Stroke  Pre-diabetes and diabetes  Depression    Testing  An objective evaluation of your sleep may be needed before your board certified sleep physician can make a diagnosis  Options include:   In-lab overnight sleep study  This type of sleep study requires you to stay overnight at a sleep center, in a bed that may resemble a hotel room  You will sleep with sensors hooked up to various parts of your body  These sensors record your brain waves, heartbeat, breathing and movement  An overnight sleep study also provides your doctor with the most complete information about your sleep  Learn more about an overnight sleep study      Home sleep apnea test  Some patients with high risk factors for obstructive sleep apnea and no other medical disorders may be candidates for a home sleep apnea test  The testing equipment differs in that it is less complicated than what is used in an overnight sleep study   As such, does not give all the data an in-lab will and if negative, may not mean you do not have the problem  Treatment for sleep apnea  includes using a continuous positive airway pressure (CPAP) machine to keep your airway open during sleep  A mask is placed over your nose and mouth, or just your nose  The mask is hooked to the CPAP machine that blows a gentle stream of air into the mask when you breathe  This helps keep your airway open so you can breathe more regularly  Extra oxygen may be given to you through the machine  You may be given a mouth device  It looks like a mouth guard or dental retainer and stops your tongue and mouth tissues from blocking your throat while you sleep  Surgery may be needed to remove extra tissues that block your mouth, throat, or nose  Manage sleep apnea:   Do not smoke  Nicotine and other chemicals in cigarettes and cigars can cause lung damage  Ask your healthcare provider for information if you currently smoke and need help to quit  E-cigarettes or smokeless tobacco still contain nicotine  Talk to your healthcare provider before you use these products  Do not drink alcohol or take sedative medicine before you go to sleep  Alcohol and sedatives can relax the muscles and tissues around your throat  This can block the airflow to your lungs  Maintain a healthy weight  Excess tissue around your throat may restrict your breathing  Ask your healthcare provider for information if you need to lose weight  Sleep on your side or use pillows designed to prevent sleep apnea  This prevents your tongue or other tissues from blocking your throat  You can also raise the head of your bed  Driving Safety  Refrain from driving when drowsy  Follow up with your healthcare provider as directed:  Write down your questions so you remember to ask them during your visits  Go to [employer] website for more information: Sleepeducation  org     What is DOMINIC?    Obstructive sleep apnea is a common and serious sleep disorder that causes you to stop breathing during sleep  The airway repeatedly becomes blocked, limiting the amount of air that reaches your lungs  When this happens, you may snore loudly or making choking noises as you try to breathe  Your brain and body becomes oxygen deprived and you may wake up  This may happen a few times a night, or in more severe cases, several hundred times a night  Sleep apnea can make you wake up in the morning feeling tired or unrefreshed even though you have had a full night of sleep  During the day, you may feel fatigued, have difficulty concentrating or you may even unintentionally fall asleep  This is because your body is waking up numerous times throughout the night, even though you might not be conscious of each awakening  The lack of oxygen your body receives can have negative long-term consequences for your health  This includes:  High blood pressure  Heart disease  Irregular heart rhythms  Stroke  Pre-diabetes and diabetes  Depression    Testing  An objective evaluation of your sleep may be needed before your board certified sleep physician can make a diagnosis  Options include:   In-lab overnight sleep study  This type of sleep study requires you to stay overnight at a sleep center, in a bed that may resemble a hotel room  You will sleep with sensors hooked up to various parts of your body  These sensors record your brain waves, heartbeat, breathing and movement  An overnight sleep study also provides your doctor with the most complete information about your sleep  Learn more about an overnight sleep study      Home sleep apnea test  Some patients with high risk factors for obstructive sleep apnea and no other medical disorders may be candidates for a home sleep apnea test  The testing equipment differs in that it is less complicated than what is used in an overnight sleep study   As such, does not give all the data an in-lab will and if negative, may not mean you do not have the problem  Treatment for sleep apnea  includes using a continuous positive airway pressure (CPAP) machine to keep your airway open during sleep  A mask is placed over your nose and mouth, or just your nose  The mask is hooked to the CPAP machine that blows a gentle stream of air into the mask when you breathe  This helps keep your airway open so you can breathe more regularly  Extra oxygen may be given to you through the machine  You may be given a mouth device  It looks like a mouth guard or dental retainer and stops your tongue and mouth tissues from blocking your throat while you sleep  Surgery may be needed to remove extra tissues that block your mouth, throat, or nose  Manage sleep apnea:   Do not smoke  Nicotine and other chemicals in cigarettes and cigars can cause lung damage  Ask your healthcare provider for information if you currently smoke and need help to quit  E-cigarettes or smokeless tobacco still contain nicotine  Talk to your healthcare provider before you use these products  Do not drink alcohol or take sedative medicine before you go to sleep  Alcohol and sedatives can relax the muscles and tissues around your throat  This can block the airflow to your lungs  Maintain a healthy weight  Excess tissue around your throat may restrict your breathing  Ask your healthcare provider for information if you need to lose weight  Sleep on your side or use pillows designed to prevent sleep apnea  This prevents your tongue or other tissues from blocking your throat  You can also raise the head of your bed  Driving Safety  Refrain from driving when drowsy  Follow up with your healthcare provider as directed:  Write down your questions so you remember to ask them during your visits  Go to AAS website for more information: Sleepeducation  org

## 2019-02-28 NOTE — PROGRESS NOTES
Review of Systems      Genitourinary none   Cardiology none   Gastrointestinal none   Neurology none   Constitutional none   Integumentary none   Psychiatry none   Musculoskeletal joint pain and muscle aches   Pulmonary snoring   ENT ringing in ears   Endocrine none   Hematological none

## 2019-03-05 PROBLEM — G47.33 OBSTRUCTIVE SLEEP APNEA: Status: ACTIVE | Noted: 2019-03-05

## 2019-03-11 ENCOUNTER — OFFICE VISIT (OUTPATIENT)
Dept: FAMILY MEDICINE CLINIC | Facility: CLINIC | Age: 54
End: 2019-03-11
Payer: COMMERCIAL

## 2019-03-11 VITALS
HEART RATE: 76 BPM | HEIGHT: 75 IN | OXYGEN SATURATION: 98 % | WEIGHT: 315 LBS | DIASTOLIC BLOOD PRESSURE: 86 MMHG | RESPIRATION RATE: 16 BRPM | TEMPERATURE: 98.1 F | SYSTOLIC BLOOD PRESSURE: 146 MMHG | BODY MASS INDEX: 39.17 KG/M2

## 2019-03-11 DIAGNOSIS — M54.41 CHRONIC BILATERAL LOW BACK PAIN WITH RIGHT-SIDED SCIATICA: ICD-10-CM

## 2019-03-11 DIAGNOSIS — G89.29 CHRONIC BILATERAL LOW BACK PAIN WITH RIGHT-SIDED SCIATICA: ICD-10-CM

## 2019-03-11 DIAGNOSIS — I10 ESSENTIAL HYPERTENSION: ICD-10-CM

## 2019-03-11 DIAGNOSIS — R73.01 IMPAIRED FASTING GLUCOSE: Primary | ICD-10-CM

## 2019-03-11 DIAGNOSIS — M96.1 LUMBAR POST-LAMINECTOMY SYNDROME: ICD-10-CM

## 2019-03-11 DIAGNOSIS — E66.01 MORBID OBESITY WITH BODY MASS INDEX (BMI) OF 50.0 TO 59.9 IN ADULT (HCC): ICD-10-CM

## 2019-03-11 LAB — SL AMB POCT HEMOGLOBIN AIC: 5.6 (ref ?–6.5)

## 2019-03-11 PROCEDURE — 99213 OFFICE O/P EST LOW 20 MIN: CPT | Performed by: FAMILY MEDICINE

## 2019-03-11 PROCEDURE — 1036F TOBACCO NON-USER: CPT | Performed by: FAMILY MEDICINE

## 2019-03-11 PROCEDURE — 3044F HG A1C LEVEL LT 7.0%: CPT | Performed by: FAMILY MEDICINE

## 2019-03-11 PROCEDURE — 83036 HEMOGLOBIN GLYCOSYLATED A1C: CPT | Performed by: FAMILY MEDICINE

## 2019-03-11 RX ORDER — LISINOPRIL AND HYDROCHLOROTHIAZIDE 25; 20 MG/1; MG/1
1 TABLET ORAL DAILY
Qty: 90 TABLET | Refills: 3 | Status: SHIPPED | OUTPATIENT
Start: 2019-03-11 | End: 2020-03-29

## 2019-03-11 NOTE — PROGRESS NOTES
Assessment/Plan:    Problem List Items Addressed This Visit        Endocrine    Impaired fasting glucose - Primary    Relevant Orders    POCT hemoglobin A1c (Completed)       Cardiovascular and Mediastinum    Hypertension    Relevant Medications    lisinopril-hydrochlorothiazide (PRINZIDE,ZESTORETIC) 20-25 MG per tablet       Nervous and Auditory    Chronic bilateral low back pain with right-sided sciatica    Relevant Orders    MRI lumbar spine w wo contrast       Other    Morbid obesity with body mass index (BMI) of 50 0 to 59 9 in adult [facility])    Lumbar post-laminectomy syndrome    Relevant Orders    MRI lumbar spine w wo contrast          BMI Counseling: Body mass index is 49 39 kg/m²  Discussed the patient's BMI with him  The BMI is above average  BMI counseling and education was provided to the patient  Nutrition recommendations include decreasing overall calorie intake and moderation in carbohydrate intake  Exercise recommendations include exercising 3-5 times per week  Patient Instructions   CONTINUE CURRENT TREATMENT PLAN  MONITOR INTAKE OF CARBOHYDRATES AND SODIUM  FOOT CARE  RV 3 M    MRI FOR LOWER BACK ISSUES      Return in about 3 months (around 6/11/2019) for Recheck  Subjective:      Patient ID: Narayan Melgar is a 52 y o  male      Chief Complaint   Patient presents with    Diabetes    Medication Refill       DISCUSSION    REVIEWED RECENT PLANS  HAS LOST SOME WEIGHT  BACK PAIN STILL ACTIVE  HAS DONE 12 WEEKS OF PT WITHOUT MUCH IMPROVEMENT  NEEDS MRI    Medication Refill         The following portions of the patient's history were reviewed and updated as appropriate: allergies, current medications, past family history, past medical history, past social history, past surgical history and problem list     Review of Systems      Current Outpatient Medications   Medication Sig Dispense Refill    phentermine 30 MG capsule Take 1 capsule (30 mg total) by mouth every morning 30 capsule 2    lisinopril-hydrochlorothiazide (PRINZIDE,ZESTORETIC) 20-25 MG per tablet Take 1 tablet by mouth daily 90 tablet 3     No current facility-administered medications for this visit          Objective:    /86 (BP Location: Left arm, Patient Position: Sitting, Cuff Size: Extra-Large)   Pulse 76   Temp 98 1 °F (36 7 °C) (Temporal)   Resp 16   Ht 6' 3 4" (1 915 m)   Wt (!) 181 kg (399 lb 6 4 oz)   SpO2 98%   BMI 49 39 kg/m²        Physical Exam       NO PE WAS PERFORMED    LENGTH OF VISIT 20 MIN  LENGTH OF  20 MIN      Malathi Ramsey MD

## 2019-03-11 NOTE — PATIENT INSTRUCTIONS
CONTINUE CURRENT TREATMENT PLAN  MONITOR INTAKE OF CARBOHYDRATES AND SODIUM  FOOT CARE  RV 3 M    MRI FOR LOWER BACK ISSUES

## 2019-03-14 ENCOUNTER — TELEPHONE (OUTPATIENT)
Dept: SLEEP CENTER | Facility: CLINIC | Age: 54
End: 2019-03-14

## 2019-03-14 DIAGNOSIS — R06.83 SNORING: Primary | ICD-10-CM

## 2019-03-14 DIAGNOSIS — G47.33 OSA (OBSTRUCTIVE SLEEP APNEA): ICD-10-CM

## 2019-03-15 ENCOUNTER — TELEPHONE (OUTPATIENT)
Dept: FAMILY MEDICINE CLINIC | Facility: CLINIC | Age: 54
End: 2019-03-15

## 2019-03-15 ENCOUNTER — PATIENT OUTREACH (OUTPATIENT)
Dept: BARIATRICS | Facility: CLINIC | Age: 54
End: 2019-03-15

## 2019-03-15 NOTE — TELEPHONE ENCOUNTER
STACIE called back  At this time, they are denying the MRI Lumbar Spine  They said you can do a peer to peer [phone] and they could possibly change their decision

## 2019-03-18 ENCOUNTER — PATIENT OUTREACH (OUTPATIENT)
Dept: BARIATRICS | Facility: CLINIC | Age: 54
End: 2019-03-18

## 2019-03-19 ENCOUNTER — TELEPHONE (OUTPATIENT)
Dept: PAIN MEDICINE | Facility: CLINIC | Age: 54
End: 2019-03-19

## 2019-03-19 NOTE — TELEPHONE ENCOUNTER
[facility] stating MRI keeps getting denied  I scheduled a follow up appt for today but Dr Starr Ahn wants patient to get an MRI first before seeing him again  Can you please find out why patient MRI keeps getting denied and call patient at [phone]   TY

## 2019-03-21 NOTE — TELEPHONE ENCOUNTER
Javier Cowan, she re-submitted the authorization for the MRI on 03/20/19  SW patient and made him aware  Patient is appreciative of call back

## 2019-03-30 ENCOUNTER — HOSPITAL ENCOUNTER (EMERGENCY)
Facility: HOSPITAL | Age: 54
Discharge: HOME | End: 2019-03-31
Attending: EMERGENCY MEDICINE | Admitting: EMERGENCY MEDICINE
Payer: COMMERCIAL

## 2019-03-30 ENCOUNTER — APPOINTMENT (EMERGENCY)
Dept: RADIOLOGY | Facility: HOSPITAL | Age: 54
End: 2019-03-30
Attending: EMERGENCY MEDICINE
Payer: COMMERCIAL

## 2019-03-30 DIAGNOSIS — K22.2 ESOPHAGEAL OBSTRUCTION DUE TO FOOD IMPACTION: Primary | ICD-10-CM

## 2019-03-30 DIAGNOSIS — T18.128A ESOPHAGEAL OBSTRUCTION DUE TO FOOD IMPACTION: Primary | ICD-10-CM
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
## 2019-03-30 LAB
BASOPHILS # BLD AUTO: 0.12 THOUSANDS/ΜL (ref 0–0.1)
BASOPHILS NFR BLD AUTO: 1 % (ref 0–1)
EOSINOPHIL # BLD AUTO: 0.73 THOUSAND/ΜL (ref 0–0.61)
EOSINOPHIL NFR BLD AUTO: 6 % (ref 0–6)
ERYTHROCYTE [DISTWIDTH] IN BLOOD BY AUTOMATED COUNT: 12.1 % (ref 11.6–15.1)
HCT VFR BLD AUTO: 45 % (ref 36.5–49.3)
HGB BLD-MCNC: 14.6 G/DL (ref 12–17)
IMM GRANULOCYTES # BLD AUTO: 0.04 THOUSAND/UL (ref 0–0.2)
IMM GRANULOCYTES NFR BLD AUTO: 0 % (ref 0–2)
LYMPHOCYTES # BLD AUTO: 2.54 THOUSANDS/ΜL (ref 0.6–4.47)
LYMPHOCYTES NFR BLD AUTO: 19 % (ref 14–44)
MCH RBC QN AUTO: 30.5 PG (ref 26.8–34.3)
MCHC RBC AUTO-ENTMCNC: 32.4 G/DL (ref 31.4–37.4)
MCV RBC AUTO: 94 FL (ref 82–98)
MONOCYTES # BLD AUTO: 0.98 THOUSAND/ΜL (ref 0.17–1.22)
MONOCYTES NFR BLD AUTO: 7 % (ref 4–12)
NEUTROPHILS # BLD AUTO: 8.92 THOUSANDS/ΜL (ref 1.85–7.62)
NEUTS SEG NFR BLD AUTO: 67 % (ref 43–75)
NRBC BLD AUTO-RTO: 0 /100 WBCS
PLATELET # BLD AUTO: 284 THOUSANDS/UL (ref 149–390)
PMV BLD AUTO: 9.4 FL (ref 8.9–12.7)
RBC # BLD AUTO: 4.78 MILLION/UL (ref 3.88–5.62)
WBC # BLD AUTO: 13.33 THOUSAND/UL (ref 4.31–10.16)

## 2019-03-30 PROCEDURE — 85610 PROTHROMBIN TIME: CPT | Performed by: EMERGENCY MEDICINE

## 2019-03-30 PROCEDURE — 85025 COMPLETE CBC W/AUTO DIFF WBC: CPT | Performed by: EMERGENCY MEDICINE

## 2019-03-30 PROCEDURE — 99284 EMERGENCY DEPT VISIT MOD MDM: CPT

## 2019-03-30 PROCEDURE — 96374 THER/PROPH/DIAG INJ IV PUSH: CPT

## 2019-03-30 PROCEDURE — 99204 OFFICE O/P NEW MOD 45 MIN: CPT | Performed by: INTERNAL MEDICINE

## 2019-03-30 PROCEDURE — 71046 X-RAY EXAM CHEST 2 VIEWS: CPT

## 2019-03-30 PROCEDURE — 80048 BASIC METABOLIC PNL TOTAL CA: CPT | Performed by: EMERGENCY MEDICINE

## 2019-03-30 PROCEDURE — 85730 THROMBOPLASTIN TIME PARTIAL: CPT | Performed by: EMERGENCY MEDICINE

## 2019-03-30 PROCEDURE — 36415 COLL VENOUS BLD VENIPUNCTURE: CPT | Performed by: EMERGENCY MEDICINE

## 2019-03-30 RX ADMIN — GLUCAGON HYDROCHLORIDE 1 MG: KIT at 22:42

## 2019-03-31 ENCOUNTER — ANESTHESIA EVENT (EMERGENCY)
Dept: PERIOP | Facility: HOSPITAL | Age: 54
End: 2019-03-31
Payer: COMMERCIAL

## 2019-03-31 ENCOUNTER — ANESTHESIA (EMERGENCY)
Dept: PERIOP | Facility: HOSPITAL | Age: 54
End: 2019-03-31
Payer: COMMERCIAL

## 2019-03-31 VITALS
RESPIRATION RATE: 20 BRPM | DIASTOLIC BLOOD PRESSURE: 92 MMHG | BODY MASS INDEX: 48.97 KG/M2 | WEIGHT: 315 LBS | OXYGEN SATURATION: 96 % | TEMPERATURE: 97.7 F | SYSTOLIC BLOOD PRESSURE: 171 MMHG | HEART RATE: 100 BPM

## 2019-03-31 LAB
ANION GAP SERPL CALCULATED.3IONS-SCNC: 9 MMOL/L (ref 4–13)
APTT PPP: 28 SECONDS (ref 26–38)
BUN SERPL-MCNC: 19 MG/DL (ref 5–25)
CALCIUM SERPL-MCNC: 9.3 MG/DL (ref 8.3–10.1)
CHLORIDE SERPL-SCNC: 107 MMOL/L (ref 100–108)
CO2 SERPL-SCNC: 27 MMOL/L (ref 21–32)
CREAT SERPL-MCNC: 1.42 MG/DL (ref 0.6–1.3)
GFR SERPL CREATININE-BSD FRML MDRD: 55 ML/MIN/1.73SQ M
GLUCOSE SERPL-MCNC: 99 MG/DL (ref 65–140)
INR PPP: 0.95 (ref 0.86–1.16)
POTASSIUM SERPL-SCNC: 3.9 MMOL/L (ref 3.5–5.3)
PROTHROMBIN TIME: 10 SECONDS (ref 9.4–11.7)
SODIUM SERPL-SCNC: 143 MMOL/L (ref 136–145)

## 2019-03-31 PROCEDURE — 43235 EGD DIAGNOSTIC BRUSH WASH: CPT | Performed by: INTERNAL MEDICINE

## 2019-03-31 RX ORDER — DEXAMETHASONE SODIUM PHOSPHATE 4 MG/ML
INJECTION, SOLUTION INTRA-ARTICULAR; INTRALESIONAL; INTRAMUSCULAR; INTRAVENOUS; SOFT TISSUE AS NEEDED
Status: DISCONTINUED | OUTPATIENT
Start: 2019-03-31 | End: 2019-03-31 | Stop reason: SURG

## 2019-03-31 RX ORDER — ONDANSETRON 2 MG/ML
4 INJECTION INTRAMUSCULAR; INTRAVENOUS ONCE AS NEEDED
Status: DISCONTINUED | OUTPATIENT
Start: 2019-03-31 | End: 2019-03-31 | Stop reason: HOSPADM

## 2019-03-31 RX ORDER — SODIUM CHLORIDE, SODIUM LACTATE, POTASSIUM CHLORIDE, CALCIUM CHLORIDE 600; 310; 30; 20 MG/100ML; MG/100ML; MG/100ML; MG/100ML
INJECTION, SOLUTION INTRAVENOUS CONTINUOUS PRN
Status: DISCONTINUED | OUTPATIENT
Start: 2019-03-31 | End: 2019-03-31 | Stop reason: SURG

## 2019-03-31 RX ORDER — PANTOPRAZOLE SODIUM 40 MG/1
40 TABLET, DELAYED RELEASE ORAL 2 TIMES DAILY
Qty: 60 TABLET | Refills: 3 | Status: SHIPPED | OUTPATIENT
Start: 2019-03-31 | End: 2019-04-15 | Stop reason: ALTCHOICE

## 2019-03-31 RX ORDER — DIPHENHYDRAMINE HYDROCHLORIDE 50 MG/ML
12.5 INJECTION INTRAMUSCULAR; INTRAVENOUS ONCE AS NEEDED
Status: DISCONTINUED | OUTPATIENT
Start: 2019-03-31 | End: 2019-03-31 | Stop reason: HOSPADM

## 2019-03-31 RX ORDER — LIDOCAINE HYDROCHLORIDE 10 MG/ML
0.5 INJECTION, SOLUTION INFILTRATION; PERINEURAL ONCE AS NEEDED
Status: DISCONTINUED | OUTPATIENT
Start: 2019-03-31 | End: 2019-04-01 | Stop reason: HOSPADM

## 2019-03-31 RX ORDER — SODIUM CHLORIDE, SODIUM LACTATE, POTASSIUM CHLORIDE, CALCIUM CHLORIDE 600; 310; 30; 20 MG/100ML; MG/100ML; MG/100ML; MG/100ML
100 INJECTION, SOLUTION INTRAVENOUS CONTINUOUS
Status: DISCONTINUED | OUTPATIENT
Start: 2019-03-31 | End: 2019-04-01 | Stop reason: HOSPADM

## 2019-03-31 RX ORDER — ONDANSETRON 2 MG/ML
INJECTION INTRAMUSCULAR; INTRAVENOUS AS NEEDED
Status: DISCONTINUED | OUTPATIENT
Start: 2019-03-31 | End: 2019-03-31 | Stop reason: SURG

## 2019-03-31 RX ORDER — SODIUM CHLORIDE, SODIUM LACTATE, POTASSIUM CHLORIDE, CALCIUM CHLORIDE 600; 310; 30; 20 MG/100ML; MG/100ML; MG/100ML; MG/100ML
125 INJECTION, SOLUTION INTRAVENOUS CONTINUOUS
Status: DISCONTINUED | OUTPATIENT
Start: 2019-03-31 | End: 2019-04-01 | Stop reason: HOSPADM

## 2019-03-31 RX ORDER — SUCCINYLCHOLINE/SOD CL,ISO/PF 100 MG/5ML
SYRINGE (ML) INTRAVENOUS AS NEEDED
Status: DISCONTINUED | OUTPATIENT
Start: 2019-03-31 | End: 2019-03-31 | Stop reason: SURG

## 2019-03-31 RX ORDER — PROPOFOL 10 MG/ML
INJECTION, EMULSION INTRAVENOUS AS NEEDED
Status: DISCONTINUED | OUTPATIENT
Start: 2019-03-31 | End: 2019-03-31 | Stop reason: SURG

## 2019-03-31 RX ORDER — GLYCOPYRROLATE 0.2 MG/ML
INJECTION INTRAMUSCULAR; INTRAVENOUS AS NEEDED
Status: DISCONTINUED | OUTPATIENT
Start: 2019-03-31 | End: 2019-03-31 | Stop reason: SURG

## 2019-03-31 RX ORDER — LIDOCAINE HYDROCHLORIDE 10 MG/ML
INJECTION, SOLUTION INFILTRATION; PERINEURAL AS NEEDED
Status: DISCONTINUED | OUTPATIENT
Start: 2019-03-31 | End: 2019-03-31 | Stop reason: SURG

## 2019-03-31 RX ADMIN — PROPOFOL 30 MG: 10 INJECTION, EMULSION INTRAVENOUS at 01:10

## 2019-03-31 RX ADMIN — ONDANSETRON 4 MG: 2 INJECTION INTRAMUSCULAR; INTRAVENOUS at 00:51

## 2019-03-31 RX ADMIN — PROPOFOL 20 MG: 10 INJECTION, EMULSION INTRAVENOUS at 01:17

## 2019-03-31 RX ADMIN — PROPOFOL 250 MG: 10 INJECTION, EMULSION INTRAVENOUS at 00:45

## 2019-03-31 RX ADMIN — PROPOFOL 30 MG: 10 INJECTION, EMULSION INTRAVENOUS at 00:54

## 2019-03-31 RX ADMIN — LIDOCAINE HYDROCHLORIDE 100 MG: 10 INJECTION, SOLUTION EPIDURAL; INFILTRATION; INTRACAUDAL; PERINEURAL at 00:45

## 2019-03-31 RX ADMIN — SODIUM CHLORIDE, SODIUM LACTATE, POTASSIUM CHLORIDE, AND CALCIUM CHLORIDE: .6; .31; .03; .02 INJECTION, SOLUTION INTRAVENOUS at 00:38

## 2019-03-31 RX ADMIN — PROPOFOL 20 MG: 10 INJECTION, EMULSION INTRAVENOUS at 01:22

## 2019-03-31 RX ADMIN — PROPOFOL 20 MG: 10 INJECTION, EMULSION INTRAVENOUS at 01:04

## 2019-03-31 RX ADMIN — Medication 180 MG: at 00:45

## 2019-03-31 RX ADMIN — GLYCOPYRROLATE 0.2 MG: 0.2 INJECTION, SOLUTION INTRAMUSCULAR; INTRAVENOUS at 00:40

## 2019-03-31 RX ADMIN — DEXAMETHASONE SODIUM PHOSPHATE 4 MG: 4 INJECTION, SOLUTION INTRAMUSCULAR; INTRAVENOUS at 00:51

## 2019-03-31 RX ADMIN — PROPOFOL 20 MG: 10 INJECTION, EMULSION INTRAVENOUS at 01:01

## 2019-03-31 NOTE — ANESTHESIA POSTPROCEDURE EVALUATION
Post-Op Assessment Note    CV Status:  Stable    Pain management: satisfactory to patient     Mental Status:  Alert and awake   Hydration Status:  Euvolemic   PONV Controlled:  Controlled   Airway Patency:  Patent   Post Op Vitals Reviewed: Yes      Staff: Anesthesiologist           /92 (03/31/19 0145)    Temp 97 7 °F (36 5 °C) (03/31/19 0145)    Pulse 104 (03/31/19 0145)   Resp 20 (03/31/19 0145)    SpO2 97 % (03/31/19 0145)

## 2019-03-31 NOTE — ANESTHESIA PREPROCEDURE EVALUATION
Review of Systems/Medical History  Patient summary reviewed  Chart reviewed      Cardiovascular  EKG reviewed, Hypertension ,    Pulmonary  Shortness of breath, Sleep apnea Sleep Study completed,   Comment: Sleeps with two pillows      GI/Hepatic    GERD ,        Kidney stones, Kidney disease ARF,        Endo/Other    Obesity  super morbid obesity   GYN       Hematology   Musculoskeletal  Back pain , lumbar pain, Sciatica,   Arthritis     Neurology   Psychology   Anxiety,   Chronic pain,            Physical Exam    Airway    Mallampati score: I  TM Distance: >3 FB  Neck ROM: full     Dental       Cardiovascular  Rhythm: regular, Rate: normal,     Pulmonary  Breath sounds clear to auscultation,     Other Findings        Anesthesia Plan  ASA Score- 3 Emergent    Anesthesia Type- general with ASA Monitors  Additional Monitors:   Airway Plan: ETT  Plan Factors-    Induction- intravenous  Postoperative Plan-     Informed Consent- Anesthetic plan and risks discussed with patient

## 2019-04-01 ENCOUNTER — TELEPHONE (OUTPATIENT)
Dept: GASTROENTEROLOGY | Facility: AMBULARY SURGERY CENTER | Age: 54
End: 2019-04-01

## 2019-04-05 LAB
LEFT EYE DIABETIC RETINOPATHY: NORMAL
RIGHT EYE DIABETIC RETINOPATHY: NORMAL

## 2019-04-08 ENCOUNTER — TELEPHONE (OUTPATIENT)
Dept: GASTROENTEROLOGY | Facility: AMBULARY SURGERY CENTER | Age: 54
End: 2019-04-08

## 2019-04-09 ENCOUNTER — TELEPHONE (OUTPATIENT)
Dept: GASTROENTEROLOGY | Facility: AMBULARY SURGERY CENTER | Age: 54
End: 2019-04-09

## 2019-04-09 PROBLEM — K20.90 ESOPHAGITIS: Status: ACTIVE | Noted: 2019-04-09

## 2019-04-09 PROBLEM — K22.6: Status: ACTIVE | Noted: 2019-04-09

## 2019-04-15 ENCOUNTER — OFFICE VISIT (OUTPATIENT)
Dept: FAMILY MEDICINE CLINIC | Facility: CLINIC | Age: 54
End: 2019-04-15
Payer: COMMERCIAL

## 2019-04-15 VITALS
DIASTOLIC BLOOD PRESSURE: 86 MMHG | TEMPERATURE: 97.4 F | OXYGEN SATURATION: 98 % | BODY MASS INDEX: 39.17 KG/M2 | HEIGHT: 75 IN | HEART RATE: 88 BPM | RESPIRATION RATE: 12 BRPM | WEIGHT: 315 LBS | SYSTOLIC BLOOD PRESSURE: 142 MMHG

## 2019-04-15 DIAGNOSIS — I10 ESSENTIAL HYPERTENSION: ICD-10-CM

## 2019-04-15 DIAGNOSIS — S32.009A CLOSED FRACTURE OF TRANSVERSE PROCESS OF LUMBAR VERTEBRA, INITIAL ENCOUNTER (HCC): ICD-10-CM

## 2019-04-15 DIAGNOSIS — G89.29 CHRONIC BILATERAL LOW BACK PAIN WITH RIGHT-SIDED SCIATICA: Primary | ICD-10-CM

## 2019-04-15 DIAGNOSIS — K21.9 GASTROESOPHAGEAL REFLUX DISEASE WITHOUT ESOPHAGITIS: ICD-10-CM

## 2019-04-15 DIAGNOSIS — T18.128A ESOPHAGEAL OBSTRUCTION DUE TO FOOD IMPACTION: ICD-10-CM

## 2019-04-15 DIAGNOSIS — K22.2 ESOPHAGEAL OBSTRUCTION DUE TO FOOD IMPACTION: ICD-10-CM

## 2019-04-15 DIAGNOSIS — R73.01 IMPAIRED FASTING GLUCOSE: ICD-10-CM

## 2019-04-15 DIAGNOSIS — M96.1 LUMBAR POST-LAMINECTOMY SYNDROME: ICD-10-CM

## 2019-04-15 DIAGNOSIS — M54.41 CHRONIC BILATERAL LOW BACK PAIN WITH RIGHT-SIDED SCIATICA: Primary | ICD-10-CM

## 2019-04-15 PROBLEM — Z12.11 COLON CANCER SCREENING: Status: RESOLVED | Noted: 2018-12-10 | Resolved: 2019-04-15

## 2019-04-15 PROCEDURE — 3008F BODY MASS INDEX DOCD: CPT | Performed by: FAMILY MEDICINE

## 2019-04-15 PROCEDURE — 99214 OFFICE O/P EST MOD 30 MIN: CPT | Performed by: FAMILY MEDICINE

## 2019-04-15 PROCEDURE — 36415 COLL VENOUS BLD VENIPUNCTURE: CPT | Performed by: FAMILY MEDICINE

## 2019-04-15 PROCEDURE — 1036F TOBACCO NON-USER: CPT | Performed by: FAMILY MEDICINE

## 2019-04-15 RX ORDER — PANTOPRAZOLE SODIUM 40 MG/1
40 TABLET, DELAYED RELEASE ORAL DAILY
Qty: 90 TABLET | Refills: 1 | Status: SHIPPED | OUTPATIENT
Start: 2019-04-15 | End: 2019-05-13 | Stop reason: ALTCHOICE

## 2019-04-15 RX ORDER — AMLODIPINE BESYLATE 5 MG/1
5 TABLET ORAL DAILY
Qty: 90 TABLET | Refills: 1 | Status: SHIPPED | OUTPATIENT
Start: 2019-04-15 | End: 2019-10-12 | Stop reason: SDUPTHER

## 2019-04-16 LAB
BASOPHILS # BLD AUTO: 0.1 X10E3/UL (ref 0–0.2)
BASOPHILS NFR BLD AUTO: 1 %
BUN SERPL-MCNC: 23 MG/DL (ref 6–24)
BUN/CREAT SERPL: 21 (ref 9–20)
CALCIUM SERPL-MCNC: 9.3 MG/DL (ref 8.7–10.2)
CHLORIDE SERPL-SCNC: 103 MMOL/L (ref 96–106)
CO2 SERPL-SCNC: 23 MMOL/L (ref 20–29)
CREAT SERPL-MCNC: 1.12 MG/DL (ref 0.76–1.27)
EOSINOPHIL # BLD AUTO: 0.7 X10E3/UL (ref 0–0.4)
EOSINOPHIL NFR BLD AUTO: 8 %
ERYTHROCYTE [DISTWIDTH] IN BLOOD BY AUTOMATED COUNT: 13 % (ref 12.3–15.4)
EST. AVERAGE GLUCOSE BLD GHB EST-MCNC: 123 MG/DL
GLUCOSE SERPL-MCNC: 124 MG/DL (ref 65–99)
HBA1C MFR BLD: 5.9 % (ref 4.8–5.6)
HCT VFR BLD AUTO: 45.8 % (ref 37.5–51)
HGB BLD-MCNC: 15.1 G/DL (ref 13–17.7)
IMM GRANULOCYTES # BLD: 0 X10E3/UL (ref 0–0.1)
IMM GRANULOCYTES NFR BLD: 0 %
LYMPHOCYTES # BLD AUTO: 2.1 X10E3/UL (ref 0.7–3.1)
LYMPHOCYTES NFR BLD AUTO: 21 %
MCH RBC QN AUTO: 30.4 PG (ref 26.6–33)
MCHC RBC AUTO-ENTMCNC: 33 G/DL (ref 31.5–35.7)
MCV RBC AUTO: 92 FL (ref 79–97)
MONOCYTES # BLD AUTO: 0.6 X10E3/UL (ref 0.1–0.9)
MONOCYTES NFR BLD AUTO: 6 %
NEUTROPHILS # BLD AUTO: 6.2 X10E3/UL (ref 1.4–7)
NEUTROPHILS NFR BLD AUTO: 64 %
PLATELET # BLD AUTO: 300 X10E3/UL (ref 150–379)
POTASSIUM SERPL-SCNC: 4.4 MMOL/L (ref 3.5–5.2)
RBC # BLD AUTO: 4.97 X10E6/UL (ref 4.14–5.8)
SL AMB EGFR AFRICAN AMERICAN: 84 ML/MIN/1.73
SL AMB EGFR NON AFRICAN AMERICAN: 73 ML/MIN/1.73
SODIUM SERPL-SCNC: 139 MMOL/L (ref 134–144)
WBC # BLD AUTO: 9.7 X10E3/UL (ref 3.4–10.8)

## 2019-04-18 ENCOUNTER — TELEPHONE (OUTPATIENT)
Dept: SLEEP CENTER | Facility: CLINIC | Age: 54
End: 2019-04-18

## 2019-04-19 ENCOUNTER — TELEPHONE (OUTPATIENT)
Dept: SLEEP CENTER | Facility: CLINIC | Age: 54
End: 2019-04-19

## 2019-04-22 ENCOUNTER — HOSPITAL ENCOUNTER (OUTPATIENT)
Dept: RADIOLOGY | Facility: HOSPITAL | Age: 54
Discharge: HOME | End: 2019-04-22

## 2019-04-22 DIAGNOSIS — M54.41 CHRONIC BILATERAL LOW BACK PAIN WITH RIGHT-SIDED SCIATICA: ICD-10-CM

## 2019-04-22 DIAGNOSIS — G89.29 CHRONIC BILATERAL LOW BACK PAIN WITH RIGHT-SIDED SCIATICA: ICD-10-CM

## 2019-04-22 DIAGNOSIS — M96.1 LUMBAR POST-LAMINECTOMY SYNDROME: ICD-10-CM

## 2019-04-23 ENCOUNTER — HOSPITAL ENCOUNTER (OUTPATIENT)
Dept: SLEEP CENTER | Facility: CLINIC | Age: 54
Discharge: HOME | End: 2019-04-23
Payer: COMMERCIAL

## 2019-04-23 DIAGNOSIS — G47.33 OSA (OBSTRUCTIVE SLEEP APNEA): ICD-10-CM

## 2019-04-23 DIAGNOSIS — R06.83 SNORING: ICD-10-CM

## 2019-04-23 PROCEDURE — G0399 HOME SLEEP TEST/TYPE 3 PORTA: HCPCS

## 2019-04-23 PROCEDURE — 95806 SLEEP STUDY UNATT&RESP EFFT: CPT

## 2019-04-24 ENCOUNTER — TELEPHONE (OUTPATIENT)
Dept: FAMILY MEDICINE CLINIC | Facility: CLINIC | Age: 54
End: 2019-04-24

## 2019-04-25 ENCOUNTER — TELEPHONE (OUTPATIENT)
Dept: SLEEP CENTER | Facility: CLINIC | Age: 54
End: 2019-04-25

## 2019-04-25 DIAGNOSIS — F41.9 ANXIETY: Primary | ICD-10-CM

## 2019-04-25 RX ORDER — ALPRAZOLAM 0.25 MG/1
TABLET ORAL
Qty: 5 TABLET | Refills: 0 | Status: SHIPPED | OUTPATIENT
Start: 2019-04-25 | End: 2019-05-13 | Stop reason: ALTCHOICE

## 2019-04-30 ENCOUNTER — ANESTHESIA EVENT (OUTPATIENT)
Dept: GASTROENTEROLOGY | Facility: AMBULARY SURGERY CENTER | Age: 54
End: 2019-04-30
Payer: COMMERCIAL

## 2019-05-01 ENCOUNTER — ANESTHESIA (OUTPATIENT)
Dept: GASTROENTEROLOGY | Facility: AMBULARY SURGERY CENTER | Age: 54
End: 2019-05-01
Payer: COMMERCIAL

## 2019-05-01 ENCOUNTER — HOSPITAL ENCOUNTER (OUTPATIENT)
Facility: AMBULARY SURGERY CENTER | Age: 54
Setting detail: OUTPATIENT SURGERY
Discharge: HOME | End: 2019-05-01
Attending: INTERNAL MEDICINE | Admitting: INTERNAL MEDICINE
Payer: COMMERCIAL

## 2019-05-01 VITALS
SYSTOLIC BLOOD PRESSURE: 135 MMHG | TEMPERATURE: 97.1 F | OXYGEN SATURATION: 93 % | RESPIRATION RATE: 18 BRPM | HEIGHT: 75 IN | HEART RATE: 74 BPM | WEIGHT: 315 LBS | BODY MASS INDEX: 39.17 KG/M2 | DIASTOLIC BLOOD PRESSURE: 83 MMHG

## 2019-05-01 DIAGNOSIS — K22.6: ICD-10-CM

## 2019-05-01 DIAGNOSIS — K20.90 ESOPHAGITIS: ICD-10-CM

## 2019-05-01 PROCEDURE — 88312 SPECIAL STAINS GROUP 1: CPT | Performed by: PATHOLOGY

## 2019-05-01 PROCEDURE — 88305 TISSUE EXAM BY PATHOLOGIST: CPT | Performed by: PATHOLOGY

## 2019-05-01 PROCEDURE — NC001 PR NO CHARGE: Performed by: INTERNAL MEDICINE

## 2019-05-01 PROCEDURE — 43239 EGD BIOPSY SINGLE/MULTIPLE: CPT | Performed by: INTERNAL MEDICINE

## 2019-05-01 RX ORDER — SODIUM CHLORIDE, SODIUM LACTATE, POTASSIUM CHLORIDE, CALCIUM CHLORIDE 600; 310; 30; 20 MG/100ML; MG/100ML; MG/100ML; MG/100ML
100 INJECTION, SOLUTION INTRAVENOUS CONTINUOUS
Status: DISCONTINUED | OUTPATIENT
Start: 2019-05-01 | End: 2019-05-01 | Stop reason: HOSPADM

## 2019-05-01 RX ORDER — PROPOFOL 10 MG/ML
INJECTION, EMULSION INTRAVENOUS AS NEEDED
Status: DISCONTINUED | OUTPATIENT
Start: 2019-05-01 | End: 2019-05-01 | Stop reason: SURG

## 2019-05-01 RX ORDER — PANTOPRAZOLE SODIUM 40 MG/1
40 TABLET, DELAYED RELEASE ORAL 2 TIMES DAILY
Qty: 60 TABLET | Refills: 3 | Status: SHIPPED | OUTPATIENT
Start: 2019-05-01 | End: 2019-07-03 | Stop reason: SDUPTHER

## 2019-05-01 RX ADMIN — PROPOFOL 50 MG: 10 INJECTION, EMULSION INTRAVENOUS at 08:24

## 2019-05-01 RX ADMIN — PROPOFOL 50 MG: 10 INJECTION, EMULSION INTRAVENOUS at 08:21

## 2019-05-01 RX ADMIN — PROPOFOL 150 MG: 10 INJECTION, EMULSION INTRAVENOUS at 08:19

## 2019-05-01 RX ADMIN — PROPOFOL 50 MG: 10 INJECTION, EMULSION INTRAVENOUS at 08:23

## 2019-05-01 RX ADMIN — SODIUM CHLORIDE, SODIUM LACTATE, POTASSIUM CHLORIDE, AND CALCIUM CHLORIDE: .6; .31; .03; .02 INJECTION, SOLUTION INTRAVENOUS at 08:13

## 2019-05-08 ENCOUNTER — TELEPHONE (OUTPATIENT)
Dept: GASTROENTEROLOGY | Facility: AMBULARY SURGERY CENTER | Age: 54
End: 2019-05-08

## 2019-05-08 DIAGNOSIS — K20.0 EOSINOPHILIC ESOPHAGITIS: Primary | ICD-10-CM

## 2019-05-08 RX ORDER — FLUTICASONE PROPIONATE 220 UG/1
2 AEROSOL, METERED RESPIRATORY (INHALATION) 2 TIMES DAILY
Qty: 1 INHALER | Refills: 2 | Status: SHIPPED | OUTPATIENT
Start: 2019-05-08 | End: 2019-07-06

## 2019-05-13 ENCOUNTER — OFFICE VISIT (OUTPATIENT)
Dept: FAMILY MEDICINE CLINIC | Facility: CLINIC | Age: 54
End: 2019-05-13
Payer: COMMERCIAL

## 2019-05-13 VITALS
TEMPERATURE: 97.7 F | RESPIRATION RATE: 12 BRPM | WEIGHT: 315 LBS | SYSTOLIC BLOOD PRESSURE: 130 MMHG | BODY MASS INDEX: 39.17 KG/M2 | DIASTOLIC BLOOD PRESSURE: 78 MMHG | HEIGHT: 75 IN | OXYGEN SATURATION: 95 % | HEART RATE: 89 BPM

## 2019-05-13 DIAGNOSIS — M15.9 PRIMARY OSTEOARTHRITIS INVOLVING MULTIPLE JOINTS: ICD-10-CM

## 2019-05-13 DIAGNOSIS — R73.01 IMPAIRED FASTING GLUCOSE: Primary | ICD-10-CM

## 2019-05-13 DIAGNOSIS — K21.9 GASTROESOPHAGEAL REFLUX DISEASE WITHOUT ESOPHAGITIS: ICD-10-CM

## 2019-05-13 DIAGNOSIS — I10 ESSENTIAL HYPERTENSION: ICD-10-CM

## 2019-05-13 PROBLEM — K22.2 ESOPHAGEAL OBSTRUCTION DUE TO FOOD IMPACTION: Status: RESOLVED | Noted: 2019-03-30 | Resolved: 2019-05-13
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
## 2019-05-13 PROBLEM — K20.90 ESOPHAGITIS: Status: RESOLVED | Noted: 2019-04-09 | Resolved: 2019-05-13

## 2019-05-13 PROBLEM — T18.128A ESOPHAGEAL OBSTRUCTION DUE TO FOOD IMPACTION: Status: RESOLVED | Noted: 2019-03-30 | Resolved: 2019-05-13

## 2019-05-13 LAB — SL AMB POCT HEMOGLOBIN AIC: 6 (ref ?–6.5)

## 2019-05-13 PROCEDURE — 3044F HG A1C LEVEL LT 7.0%: CPT | Performed by: FAMILY MEDICINE

## 2019-05-13 PROCEDURE — 83036 HEMOGLOBIN GLYCOSYLATED A1C: CPT | Performed by: FAMILY MEDICINE

## 2019-05-13 PROCEDURE — 99214 OFFICE O/P EST MOD 30 MIN: CPT | Performed by: FAMILY MEDICINE

## 2019-05-28 ENCOUNTER — TELEPHONE (OUTPATIENT)
Dept: GASTROENTEROLOGY | Facility: CLINIC | Age: 54
End: 2019-05-28

## 2019-05-30 ENCOUNTER — HOSPITAL ENCOUNTER (OUTPATIENT)
Dept: SLEEP CENTER | Facility: CLINIC | Age: 54
Discharge: HOME | End: 2019-05-30
Payer: COMMERCIAL

## 2019-05-30 DIAGNOSIS — G47.33 OSA (OBSTRUCTIVE SLEEP APNEA): ICD-10-CM

## 2019-05-30 PROCEDURE — 95811 POLYSOM 6/>YRS CPAP 4/> PARM: CPT

## 2019-05-31 DIAGNOSIS — G47.33 OSA (OBSTRUCTIVE SLEEP APNEA): Primary | ICD-10-CM

## 2019-06-03 ENCOUNTER — TELEPHONE (OUTPATIENT)
Dept: SLEEP CENTER | Facility: CLINIC | Age: 54
End: 2019-06-03

## 2019-06-27 ENCOUNTER — OFFICE VISIT (OUTPATIENT)
Dept: SLEEP CENTER | Facility: CLINIC | Age: 54
End: 2019-06-27
Payer: COMMERCIAL

## 2019-06-27 VITALS
WEIGHT: 315 LBS | HEIGHT: 75 IN | BODY MASS INDEX: 39.17 KG/M2 | SYSTOLIC BLOOD PRESSURE: 109 MMHG | DIASTOLIC BLOOD PRESSURE: 73 MMHG | HEART RATE: 86 BPM

## 2019-06-27 DIAGNOSIS — G47.33 OSA (OBSTRUCTIVE SLEEP APNEA): Primary | ICD-10-CM

## 2019-06-27 DIAGNOSIS — E11.9 TYPE 2 DIABETES MELLITUS WITHOUT COMPLICATION, WITHOUT LONG-TERM CURRENT USE OF INSULIN (HCC): ICD-10-CM

## 2019-06-27 DIAGNOSIS — G47.9 SLEEP DISTURBANCE: ICD-10-CM

## 2019-06-27 DIAGNOSIS — I10 ESSENTIAL HYPERTENSION: ICD-10-CM

## 2019-06-27 DIAGNOSIS — G89.29 CHRONIC BILATERAL LOW BACK PAIN WITH RIGHT-SIDED SCIATICA: ICD-10-CM

## 2019-06-27 DIAGNOSIS — E66.01 MORBID (SEVERE) OBESITY DUE TO EXCESS CALORIES (HCC): ICD-10-CM

## 2019-06-27 DIAGNOSIS — M54.41 CHRONIC BILATERAL LOW BACK PAIN WITH RIGHT-SIDED SCIATICA: ICD-10-CM

## 2019-06-27 PROCEDURE — 99214 OFFICE O/P EST MOD 30 MIN: CPT | Performed by: INTERNAL MEDICINE

## 2019-07-03 ENCOUNTER — OFFICE VISIT (OUTPATIENT)
Dept: GASTROENTEROLOGY | Facility: CLINIC | Age: 54
End: 2019-07-03
Payer: COMMERCIAL

## 2019-07-03 ENCOUNTER — TELEPHONE (OUTPATIENT)
Dept: GASTROENTEROLOGY | Facility: AMBULARY SURGERY CENTER | Age: 54
End: 2019-07-03

## 2019-07-03 VITALS
WEIGHT: 315 LBS | HEIGHT: 75 IN | SYSTOLIC BLOOD PRESSURE: 120 MMHG | BODY MASS INDEX: 39.17 KG/M2 | RESPIRATION RATE: 18 BRPM | HEART RATE: 70 BPM | TEMPERATURE: 97.4 F | DIASTOLIC BLOOD PRESSURE: 84 MMHG

## 2019-07-03 DIAGNOSIS — K20.0 EOSINOPHILIC ESOPHAGITIS: ICD-10-CM

## 2019-07-03 DIAGNOSIS — K62.5 BRBPR (BRIGHT RED BLOOD PER RECTUM): Primary | ICD-10-CM

## 2019-07-03 DIAGNOSIS — K62.5 RECTAL BLEEDING: Primary | ICD-10-CM

## 2019-07-03 DIAGNOSIS — K20.90 ESOPHAGITIS: ICD-10-CM

## 2019-07-03 PROCEDURE — 99214 OFFICE O/P EST MOD 30 MIN: CPT | Performed by: PHYSICIAN ASSISTANT

## 2019-07-03 RX ORDER — PANTOPRAZOLE SODIUM 40 MG/1
40 TABLET, DELAYED RELEASE ORAL 2 TIMES DAILY
Qty: 60 TABLET | Refills: 3 | Status: SHIPPED | OUTPATIENT
Start: 2019-07-03 | End: 2019-08-22 | Stop reason: CLARIF

## 2019-07-03 NOTE — ASSESSMENT & PLAN NOTE
Patient says he was not contacted by his pharmacy about any steroid inhaler, and thus did not start taking the Flovent, nevertheless he does appear significantly improved clinically after starting twice daily PPI therapy, reporting no significant swallowing difficulties at this time    - continue twice daily PPI therapy for now    - office follow-up in a few months, at that point can decide about continuing verses tapering PPI therapy    - advised patient to call us if he experiences any recurrent difficulties with swallowing, at that point would recommend starting Flovent therapy, and considering allergy referral for allergy testing    - I also advised patient about pacing himself with eating, taking small bites and chewing thoroughly, exercising particular caution with breads and meats

## 2019-07-03 NOTE — ASSESSMENT & PLAN NOTE
Patient reports his last colonoscopy was done in his mid 45s (about 10 years ago, to follow up on an episode of diverticulitis), he also notes intermittent bright red blood on the toilet paper with wiping for the last few years; appears most likely hemorrhoidal but rule out underlying colorectal lesions    - will schedule patient for colonoscopy    - patient was advised regarding risks and benefits of the procedure, risks including but not limited to infection, perforation and bleeding    He is noted with history of morbid obesity and obstructive sleep apnea for which he has started CPAP therapy recently    - patient was given instructions and prescription for colonoscopy prep

## 2019-07-03 NOTE — PROGRESS NOTES
Follow-up Note -  [facility]  Josef Willis 1965 62 y o  male         Reason:  Follow-up; eosinophilic esophagitis, bright red blood per rectum    HPI:  77-year-old morbidly obese male with history of obstructive sleep apnea on CPAP who presents for follow-up; he was 1st seen by our service at the end of March when he had a food impaction which was removed by Dr Aiyana Renee; EGD was repeated on May 1st and showed findings consistent with eosinophilic esophagitis  Prescription was sent for Flovent inhaler and we he was also started on twice daily pantoprazole  The patient says that he did not take the Flovent inhaler as he did not receive any prescription for 1, but he did take the Protonix twice daily  He says that he had some continued issues with swallowing for couple of weeks after the procedure, but these started to resolve and for the last 4 weeks he has actually had no difficulty swallowing whatsoever  Denies any significant acid reflux or heartburn issues recently  He does mention that he has had intermittent bright red blood with wiping after defecation over the last few years  He mentions that his last colonoscopy was done about 10 years ago, at that time following up from a previous episode of diverticulitis  He does not recall being told about any abnormalities, he says he was told to come back in a certain number of years but does not remember exactly how many  Denies any unexpected weight loss, any disturbances to his bowel habits otherwise  REVIEW OF SYSTEMS:      CONSTITUTIONAL: Denies any fever, chills, or rigors  Good appetite, and no recent weight loss  HEENT: No earache or tinnitus  Denies hearing loss or visual disturbances  CARDIOVASCULAR: No chest pain or palpitations  RESPIRATORY: Denies any cough, hemoptysis, shortness of breath or dyspnea on exertion  GASTROINTESTINAL: As noted in the History of Present Illness     GENITOURINARY: No problems with urination  Denies any hematuria or dysuria  NEUROLOGIC: No dizziness or vertigo, denies headaches  MUSCULOSKELETAL: Denies any muscle or joint pain  SKIN: Denies skin rashes or itching  ENDOCRINE: Denies excessive thirst  Denies intolerance to heat or cold  PSYCHOSOCIAL: Denies depression or anxiety  Denies any recent memory loss  Past Medical History:   Diagnosis Date    Claustrophobia     Hypertension     Kidney stone     x 2 episodes    Localized pain of joint     Mild sleep apnea     not fitted for CPAP yet as of 04/2019    Skin tag       Past Surgical History:   Procedure Laterality Date    ESOPHAGOGASTRODUODENOSCOPY N/A 3/30/2019    Procedure: ESOPHAGOGASTRODUODENOSCOPY (EGD); Surgeon: Nely Link MD;  Location: [address] [place] [address];  Service: Gastroenterology    KNEE ARTHROSCOPY Left 2007    LAMINECTOMY  1996    LAMINOTOMY  07/2010    spinal    [place] ESOPHAGOGASTRODUODENOSCOPY TRANSORAL DIAGNOSTIC N/A 5/1/2019    Procedure: ESOPHAGOGASTRODUODENOSCOPY (EGD); Surgeon: Nely Link MD;  Location: [facility];   Service: Gastroenterology    UPPER GASTROINTESTINAL ENDOSCOPY  2006     Social History     Socioeconomic History    Marital status:      Spouse name: Not on file    Number of children: Not on file    Years of education: Not on file    Highest education level: Not on file   Occupational History    Not on file   Social Needs    Financial resource strain: Not on file    Food insecurity:     Worry: Not on file     Inability: Not on file    Transportation needs:     Medical: Not on file     Non-medical: Not on file   Tobacco Use    Smoking status: Never Smoker    Smokeless tobacco: Never Used   Substance and Sexual Activity    Alcohol use: No    Drug use: No    Sexual activity: Not on file   Lifestyle    Physical activity:     Days per week: Not on file     Minutes per session: Not on file    Stress: Not on file   Relationships    Social connections:     Talks on phone: Not on file     Gets together: Not on file     Attends [employer] service: Not on file     Active member of club or organization: Not on file     Attends meetings of clubs or organizations: Not on file     Relationship status: Not on file    Intimate partner violence:     Fear of current or ex partner: Not on file     Emotionally abused: Not on file     Physically abused: Not on file     Forced sexual activity: Not on file   Other Topics Concern    Not on file   Social History Narrative    Living alone     Family History   Problem Relation Age of Onset    Osteoarthritis Mother     Obesity Father     No Known Problems Sister     No Known Problems Brother     No Known Problems Sister     No Known Problems Sister     Substance Abuse Neg Hx     Mental illness Neg Hx     Cancer Neg Hx     Diabetes Neg Hx     Heart disease Neg Hx     Stroke Neg Hx      Patient has no known allergies  Current Outpatient Medications   Medication Sig Dispense Refill    amLODIPine (NORVASC) 5 mg tablet Take 1 tablet (5 mg total) by mouth daily 90 tablet 1    lisinopril-hydrochlorothiazide (PRINZIDE,ZESTORETIC) 20-25 MG per tablet Take 1 tablet by mouth daily 90 tablet 3    pantoprazole (PROTONIX) 40 mg tablet Take 1 tablet (40 mg total) by mouth 2 (two) times a day 60 tablet 3    timolol (BETIMOL) 0 25 % ophthalmic solution 1-2 drops daily      fluticasone (FLOVENT HFA) 220 mcg/act inhaler Inhale 2 puffs 2 (two) times a day Puff into cheek and swallow with saliva  NPO for 30 minutes after (Patient not taking: Reported on 7/3/2019) 1 Inhaler 2    Na Sulfate-K Sulfate-Mg Sulf (SUPREP BOWEL PREP KIT) 17 5-3 13-1 6 GM/177ML SOLN Take 2 Bottles by mouth see administration instructions Suprep bowel prep  Please follow the instructions from the office 2 Bottle 0     No current facility-administered medications for this visit          Blood pressure 120/84, pulse 70, temperature (!) 97 4 °F (36 3 °C), temperature source Tympanic, resp  rate 18, height 6' 3" (1 905 m), weight (!) 186 kg (409 lb 9 6 oz)  PHYSICAL EXAM:      General Appearance:   Alert, cooperative, no distress, appears stated age    HEENT:   Normocephalic, atraumatic, anicteric      Neck:  Supple, symmetrical, trachea midline, no adenopathy;    thyroid: no enlargement/tenderness/nodules; no carotid  bruit or JVD    Lungs:   Clear to auscultation bilaterally; no rales, rhonchi or wheezing; respirations unlabored    Heart[de-identified]   S1 and S2 normal; regular rate and rhythm; no murmur, rub, or gallop  Abdomen:   Soft, non-tender, non-distended; normal bowel sounds; no masses, no organomegaly    Extremities: No edema, erythema, wounds, rashes   Rectal:   Deferred                      Lab Results   Component Value Date    WBC 9 7 04/15/2019    HGB 15 1 04/15/2019    HCT 45 8 04/15/2019    MCV 92 04/15/2019     04/15/2019     Lab Results   Component Value Date    CALCIUM 9 3 03/30/2019    K 4 4 04/15/2019    CO2 23 04/15/2019     04/15/2019    BUN 23 04/15/2019    CREATININE 1 12 04/15/2019     Lab Results   Component Value Date    ALT 29 12/10/2018    AST 25 12/10/2018    ALKPHOS 74 08/26/2016     Lab Results   Component Value Date    INR 0 95 03/30/2019    INR 0 97 08/26/2016    PROTIME 10 0 03/30/2019    PROTIME 10 2 08/26/2016       No results found  ASSESSMENT & PLAN:    BRBPR (bright red blood per rectum)  Patient reports his last colonoscopy was done in his mid 45s (about 10 years ago, to follow up on an episode of diverticulitis), he also notes intermittent bright red blood on the toilet paper with wiping for the last few years; appears most likely hemorrhoidal but rule out underlying colorectal lesions    - will schedule patient for colonoscopy    - patient was advised regarding risks and benefits of the procedure, risks including but not limited to infection, perforation and bleeding    He is noted with history of morbid obesity and obstructive sleep apnea for which he has started CPAP therapy recently    - patient was given instructions and prescription for colonoscopy prep    Eosinophilic esophagitis  Patient says he was not contacted by his pharmacy about any steroid inhaler, and thus did not start taking the Flovent, nevertheless he does appear significantly improved clinically after starting twice daily PPI therapy, reporting no significant swallowing difficulties at this time    - continue twice daily PPI therapy for now    - office follow-up in a few months, at that point can decide about continuing verses tapering PPI therapy    - advised patient to call us if he experiences any recurrent difficulties with swallowing, at that point would recommend starting Flovent therapy, and considering allergy referral for allergy testing    - I also advised patient about pacing himself with eating, taking small bites and chewing thoroughly, exercising particular caution with breads and meats

## 2019-07-03 NOTE — TELEPHONE ENCOUNTER
petros patient      [facility] called to get an alternate colon prep because suprep is not covered by insurance    Also the prononix will need an auth for twice daily dose

## 2019-07-05 ENCOUNTER — HOSPITAL ENCOUNTER (OUTPATIENT)
Dept: RADIOLOGY | Facility: HOSPITAL | Age: 54
Discharge: HOME | End: 2019-07-05
Attending: ANESTHESIOLOGY
Payer: COMMERCIAL

## 2019-07-05 PROCEDURE — 72148 MRI LUMBAR SPINE W/O DYE: CPT

## 2019-07-06 ENCOUNTER — HOSPITAL ENCOUNTER (EMERGENCY)
Facility: HOSPITAL | Age: 54
Discharge: HOME | End: 2019-07-06
Attending: EMERGENCY MEDICINE | Admitting: EMERGENCY MEDICINE
Payer: COMMERCIAL

## 2019-07-06 VITALS
RESPIRATION RATE: 16 BRPM | BODY MASS INDEX: 39.17 KG/M2 | HEART RATE: 84 BPM | DIASTOLIC BLOOD PRESSURE: 69 MMHG | TEMPERATURE: 98.1 F | OXYGEN SATURATION: 95 % | SYSTOLIC BLOOD PRESSURE: 140 MMHG | WEIGHT: 315 LBS | HEIGHT: 75 IN

## 2019-07-06 DIAGNOSIS — H61.21 IMPACTED CERUMEN OF RIGHT EAR: Primary | ICD-10-CM

## 2019-07-06 DIAGNOSIS — H60.501 ACUTE OTITIS EXTERNA OF RIGHT EAR, UNSPECIFIED TYPE: ICD-10-CM

## 2019-07-06 PROCEDURE — 99282 EMERGENCY DEPT VISIT SF MDM: CPT

## 2019-07-06 RX ORDER — CIPROFLOXACIN AND DEXAMETHASONE 3; 1 MG/ML; MG/ML
4 SUSPENSION/ DROPS AURICULAR (OTIC) 2 TIMES DAILY
Qty: 7.5 ML | Refills: 0 | Status: SHIPPED | OUTPATIENT
Start: 2019-07-06 | End: 2019-08-02

## 2019-07-06 NOTE — ED PROVIDER NOTES
History  Chief Complaint   Patient presents with    Earache     Pt c/o rt earache since last night after cleaning ears with q-tip     Patient presents for evaluation of right ear pain  States he was cleaning his ears with q tip last night and afterwards right ear has been painful  No fever  History provided by:  Patient   used: No    Earache       Prior to Admission Medications   Prescriptions Last Dose Informant Patient Reported? Taking? amLODIPine (NORVASC) 5 mg tablet  Self No No   Sig: Take 1 tablet (5 mg total) by mouth daily   lisinopril-hydrochlorothiazide (PRINZIDE,ZESTORETIC) 20-25 MG per tablet  Self No No   Sig: Take 1 tablet by mouth daily   pantoprazole (PROTONIX) 40 mg tablet   No No   Sig: Take 1 tablet (40 mg total) by mouth 2 (two) times a day   timolol (BETIMOL) 0 25 % ophthalmic solution  Self Yes No   Si-2 drops daily      Facility-Administered Medications: None       Past Medical History:   Diagnosis Date    Claustrophobia     Hypertension     Kidney stone     x 2 episodes    Localized pain of joint     Mild sleep apnea     not fitted for CPAP yet as of 2019    Skin tag        Past Surgical History:   Procedure Laterality Date    ESOPHAGOGASTRODUODENOSCOPY N/A 3/30/2019    Procedure: ESOPHAGOGASTRODUODENOSCOPY (EGD); Surgeon: Nicolasa Marshall MD;  Location: [address] [place] [address];  Service: Gastroenterology    KNEE ARTHROSCOPY Left 2007    LAMINECTOMY  1996    LAMINOTOMY  2010    spinal    [place] ESOPHAGOGASTRODUODENOSCOPY TRANSORAL DIAGNOSTIC N/A 2019    Procedure: ESOPHAGOGASTRODUODENOSCOPY (EGD); Surgeon: Nicolasa Marshall MD;  Location: [facility];   Service: Gastroenterology    UPPER GASTROINTESTINAL ENDOSCOPY  2006       Family History   Problem Relation Age of Onset    Osteoarthritis Mother     Obesity Father     No Known Problems Sister     No Known Problems Brother     No Known Problems Sister     No Known Problems Sister     Substance Abuse Neg Hx     Mental illness Neg Hx     Cancer Neg Hx     Diabetes Neg Hx     Heart disease Neg Hx     Stroke Neg Hx      I have reviewed and agree with the history as documented  Social History     Tobacco Use    Smoking status: Never Smoker    Smokeless tobacco: Never Used   Substance Use Topics    Alcohol use: No    Drug use: No        Review of Systems   HENT: Positive for ear pain  All other systems reviewed and are negative  Physical Exam  Physical Exam   Constitutional: He is oriented to person, place, and time  No distress  HENT:   Right Ear: External ear normal    Ears:    Neck: Normal range of motion  Cardiovascular: Normal rate, regular rhythm and intact distal pulses  Pulmonary/Chest: Effort normal and breath sounds normal  No respiratory distress  Neurological: He is alert and oriented to person, place, and time  Skin: He is not diaphoretic  Nursing note and vitals reviewed        Vital Signs  ED Triage Vitals   Temperature Pulse Respirations Blood Pressure SpO2   07/06/19 1131 07/06/19 1130 07/06/19 1131 07/06/19 1130 07/06/19 1130   98 1 °F (36 7 °C) 86 16 140/69 94 %      Temp Source Heart Rate Source Patient Position - Orthostatic VS BP Location FiO2 (%)   07/06/19 1131 07/06/19 1131 07/06/19 1131 07/06/19 1131 --   Oral Monitor Sitting Right arm       Pain Score       07/06/19 1136       5           Vitals:    07/06/19 1130 07/06/19 1131   BP: 140/69 140/69   Pulse: 86 84   Patient Position - Orthostatic VS:  Sitting         Visual Acuity      ED Medications  Medications - No data to display    Diagnostic Studies  Results Reviewed     None                 No orders to display              Procedures  Ear cerumen removal  Date/Time: 7/6/2019 4:42 PM  Performed by: John Reynolds DO  Authorized by: John Reynolds DO     Patient location:  ED  Consent:     Consent obtained:  Verbal    Consent given by:  Patient    Risks discussed:  Bleeding, dizziness, incomplete removal, infection, pain and TM perforation  Universal protocol:     Patient identity confirmed:  Verbally with patient and arm band  Procedure details:     Location:  R ear    Procedure type: irrigation      Approach:  Natural orifice    Equipment used:  Syringe and 18 guage catheter  Post-procedure details:     Complication:  None    Hearing quality:  Improved    Patient tolerance of procedure:  Procedure terminated at patient's request  Comments:      Removed a large amount of wax, there was still some in the canal but able to visualize a normal intact TM afterwards  Procedure was becoming uncomfortable for the patient so at this point enough had been removed that we will stop  ED Course                               MDM  Number of Diagnoses or Management Options  Acute otitis externa of right ear, unspecified type: Impacted cerumen of right ear:   Diagnosis management comments: Pulse ox 95% on RA indicating adequate oxygenation           Amount and/or Complexity of Data Reviewed  Decide to obtain previous medical records or to obtain history from someone other than the patient: yes  Review and summarize past medical records: yes    Patient Progress  Patient progress: stable      Disposition  Final diagnoses:   None     ED Disposition     None      Follow-up Information    None         Patient's Medications   Discharge Prescriptions    No medications on file     No discharge procedures on file      ED Provider  Electronically Signed by           Britton Suazo DO  07/07/19 4763

## 2019-07-08 ENCOUNTER — TELEPHONE (OUTPATIENT)
Dept: RADIOLOGY | Facility: CLINIC | Age: 54
End: 2019-07-08

## 2019-07-08 ENCOUNTER — TELEPHONE (OUTPATIENT)
Dept: SLEEP CENTER | Facility: CLINIC | Age: 54
End: 2019-07-08

## 2019-07-08 NOTE — TELEPHONE ENCOUNTER
lvm for pt to call and schedule please transfer to [facility] at [phone]

## 2019-07-08 NOTE — TELEPHONE ENCOUNTER
Patient states he's having problems falling asleep since he started CPAP several weeks ago  He didn't have problems prior to starting CPAP and he does have claustrophobia  He is not using it every night due to the sleep problems- it's taking him about 45 minutes to an hour to fall asleep, and then he is waking frequently throughout the night      He is requesting a sleep aid- please advise

## 2019-07-08 NOTE — TELEPHONE ENCOUNTER
S/w pt and advised the same  Pt interested in scheduling procedure if AS thinks appropriate  Also mailed out pamphlets on both MBB and RFA procedure so pt may review further

## 2019-07-08 NOTE — TELEPHONE ENCOUNTER
----- Message from Rosi Wilkins MD sent at 7/8/2019  8:35 AM EDT -----  Regarding: MRI L-spine results  MRI of the lumbar spine shows primarily facet arthritis  The discs appear to be relatively normal   If the patient is interested, I suggest we trial a bilateral L3-S1 diagnostic and confirmatory medial branch blocks  Please schedule if he is interested

## 2019-07-10 NOTE — TELEPHONE ENCOUNTER
Advised patient Dr Wilfred Ferrera wants to see him at earlier appointment   Patient scheduled for 7/25/2019

## 2019-07-10 NOTE — TELEPHONE ENCOUNTER
Advise earlier follow-up with data download to assist trouble shooting cause of his problem so it can be appropriately addressed

## 2019-07-10 NOTE — TELEPHONE ENCOUNTER
Left second message for pt to call back to schedule MBB #1   Please transfer to [facility] at  [phone]

## 2019-07-12 NOTE — TELEPHONE ENCOUNTER
Spoke with patient and explained in detail both procedure  Pt states his pain has only been at a level 2   He will call if pain increases to schedule procedure

## 2019-07-25 ENCOUNTER — OFFICE VISIT (OUTPATIENT)
Dept: SLEEP CENTER | Facility: CLINIC | Age: 54
End: 2019-07-25
Payer: COMMERCIAL

## 2019-07-25 VITALS
DIASTOLIC BLOOD PRESSURE: 71 MMHG | WEIGHT: 315 LBS | SYSTOLIC BLOOD PRESSURE: 129 MMHG | HEIGHT: 75 IN | BODY MASS INDEX: 39.17 KG/M2 | HEART RATE: 76 BPM

## 2019-07-25 DIAGNOSIS — G47.33 OSA (OBSTRUCTIVE SLEEP APNEA): Primary | ICD-10-CM

## 2019-07-25 DIAGNOSIS — G89.4 CHRONIC PAIN SYNDROME: ICD-10-CM

## 2019-07-25 DIAGNOSIS — F41.9 ANXIETY: ICD-10-CM

## 2019-07-25 DIAGNOSIS — K21.9 GASTROESOPHAGEAL REFLUX DISEASE WITHOUT ESOPHAGITIS: ICD-10-CM

## 2019-07-25 DIAGNOSIS — E11.9 TYPE 2 DIABETES MELLITUS WITHOUT COMPLICATION, WITHOUT LONG-TERM CURRENT USE OF INSULIN (HCC): ICD-10-CM

## 2019-07-25 DIAGNOSIS — I10 ESSENTIAL HYPERTENSION: ICD-10-CM

## 2019-07-25 DIAGNOSIS — E66.01 MORBID OBESITY WITH BODY MASS INDEX (BMI) OF 50.0 TO 59.9 IN ADULT (HCC): ICD-10-CM

## 2019-07-25 DIAGNOSIS — G47.9 SLEEP DISTURBANCE: ICD-10-CM

## 2019-07-25 PROCEDURE — 99214 OFFICE O/P EST MOD 30 MIN: CPT | Performed by: INTERNAL MEDICINE

## 2019-07-25 NOTE — PROGRESS NOTES
Follow-Up Note - Sleep Center   Carlos Chahal  62 y o  male  :1965  NNR:[id number]    CC: I saw this patient for follow-up in clinic today for his Sleep Disordered Breathing, Coexisting Sleep and Medical Problems  He is having difficulty adjusting to CPAP  PFSH, Problem List, Medications & Allergies were reviewed in EMR  Interval changes: none reported  He  has a past medical history of Claustrophobia, Hypertension, Kidney stone, Localized pain of joint, Mild sleep apnea, and Skin tag  He has a current medication list which includes the following prescription(s): amlodipine, carbamide peroxide, ciprofloxacin-dexamethasone, lisinopril-hydrochlorothiazide, pantoprazole, and timolol  ROS: Reviewed (see attached)  Significant for no report of claustrophobia or anxiety  Back pain and acid reflux controlled  DATA REVIEWED:  using PAP > 4 hours/night 3 3% of the time  Estimated GUERA 3 2/hour at pressure of 16cm H2O @90th percentile  He stopped using CPAP altogether a few weeks ago so is is able to sleep  SUBJECTIVE: Regarding use of PAP, Gerry Beasley reports:   · He is experiencing no  adverse effects: But is unable to fall asleep with CPAP  · He is unsure if benefiting from use:    Sleep Routine: He reports getting 6-7 hrs sleep  ; he has no difficulty initiating or maintaining sleep   He awakens spontaneously and is not always refreshed  He has excessive drowsiness and naps 1 to 2 times a week  He rated himself at Total score: 5 /24 on the [place] sleepiness scale  Habits: reports that he has never smoked  He has never used smokeless tobacco ,  reports that he does not drink alcohol ,  reports that he does not use drugs  , Caffeine use: none , Exercise routine: none   OBJECTIVE: /71   Pulse 76   Ht 6' 3" (1 905 m)   Wt (!) 181 kg (400 lb)   BMI 50 00 kg/m²    Constitutional: Patient is well groomed; well appearing    Skin/Extrem: warm & dry; col & hydration normal; no edema  Psych: cooperativeand in no distress  Normal mood, affect & thought   CNS: Alert, orientated, clear & coherent speech  H&N: EOMI; NC/AT:no facial pressure marks, no rashes  ENMT Mucus membranes normal Nasal airway:patent  Oral airway: crowded  Resp:effort is normal CVS: RRR ABD:truncal obesity MSK:Gait normal     ASSESSMENT: Primary Sleep/Secondary(to Medical or Psych conditions) & comorbidities   1  DOMINIC (obstructive sleep apnea)     2  Sleep disturbance     3  Anxiety     4  Essential hypertension     5  Type 2 diabetes mellitus without complication, without long-term current use of insulin ([facility] [address] )     6  Gastroesophageal reflux disease without esophagitis     7  Chronic pain syndrome     8  Morbid obesity with body mass index (BMI) of 50 0 to 59 9 in adult [facility])       PLAN:  1  Treatment with  PAP is medically necessary and Keisha Arteaga is agreable to continue use  2  Care of equipment, methods to improve comfort using PAP and importance of compliance with therapy were discussed  3  Pressure setting: change 4-14 cmH2O  I advised formal desensitization and explained the principal and proceed you to him  4  I also advised limiting time in bed, starting an exercise routine and on relaxation techniques  5  If he continues to have difficulty initiating sleep, he may benefit from short-term hypnotic  In his case, I would consider gabapentin 300 mg q h s  6  Follow-up is advised in 1 year or sooner if needed to monitor progress, compliance and to adjust therapy  Thank you for allowing me to participate in the care of this patient      Sincerely,    Authenticated electronically by Kayla Guadalupe MD on 70/00/43   Board Certified Specialist

## 2019-07-25 NOTE — PROGRESS NOTES
Review of Systems      Genitourinary none   Cardiology none   Gastrointestinal none   Neurology none   Constitutional none   Integumentary none   Psychiatry none   Musculoskeletal back pain   Pulmonary snoring   ENT ringing in ears   Endocrine none   Hematological none

## 2019-08-02 NOTE — PRE-PROCEDURE INSTRUCTIONS
Pre-Surgery Instructions:   Medication Instructions    amLODIPine (NORVASC) 5 mg tablet Patient was instructed by Physician and understands   lisinopril-hydrochlorothiazide (PRINZIDE,ZESTORETIC) 20-25 MG per tablet Patient was instructed by Physician and understands   Na Sulfate-K Sulfate-Mg Sulf (SUPREP BOWEL PREP KIT PO) Patient was instructed by Physician and understands   pantoprazole (PROTONIX) 40 mg tablet Patient was instructed by Physician and understands   timolol (BETIMOL) 0 25 % ophthalmic solution Patient was instructed by Physician and understands  Pt to follow Dr Amber Levi instructions    sister Ana Jules [phone]

## 2019-08-05 ENCOUNTER — ANESTHESIA EVENT (OUTPATIENT)
Dept: PERIOP | Facility: HOSPITAL | Age: 54
End: 2019-08-05

## 2019-08-05 ENCOUNTER — ANESTHESIA (OUTPATIENT)
Dept: PERIOP | Facility: HOSPITAL | Age: 54
End: 2019-08-05

## 2019-08-05 ENCOUNTER — HOSPITAL ENCOUNTER (OUTPATIENT)
Dept: PERIOP | Facility: HOSPITAL | Age: 54
Setting detail: OUTPATIENT SURGERY
Discharge: HOME | End: 2019-08-05
Admitting: INTERNAL MEDICINE
Payer: COMMERCIAL

## 2019-08-05 VITALS
TEMPERATURE: 97.9 F | WEIGHT: 315 LBS | OXYGEN SATURATION: 94 % | HEIGHT: 75 IN | SYSTOLIC BLOOD PRESSURE: 136 MMHG | DIASTOLIC BLOOD PRESSURE: 57 MMHG | RESPIRATION RATE: 18 BRPM | BODY MASS INDEX: 39.17 KG/M2 | HEART RATE: 80 BPM

## 2019-08-05 DIAGNOSIS — K20.0 EOSINOPHILIC ESOPHAGITIS: ICD-10-CM

## 2019-08-05 DIAGNOSIS — K20.90 ESOPHAGITIS: ICD-10-CM

## 2019-08-05 DIAGNOSIS — K62.5 BRBPR (BRIGHT RED BLOOD PER RECTUM): ICD-10-CM

## 2019-08-05 PROCEDURE — G0121 COLON CA SCRN NOT HI RSK IND: HCPCS | Performed by: INTERNAL MEDICINE

## 2019-08-05 RX ORDER — PROPOFOL 10 MG/ML
INJECTION, EMULSION INTRAVENOUS CONTINUOUS PRN
Status: DISCONTINUED | OUTPATIENT
Start: 2019-08-05 | End: 2019-08-05 | Stop reason: SURG

## 2019-08-05 RX ORDER — PROPOFOL 10 MG/ML
INJECTION, EMULSION INTRAVENOUS AS NEEDED
Status: DISCONTINUED | OUTPATIENT
Start: 2019-08-05 | End: 2019-08-05 | Stop reason: SURG

## 2019-08-05 RX ORDER — SODIUM CHLORIDE, SODIUM LACTATE, POTASSIUM CHLORIDE, CALCIUM CHLORIDE 600; 310; 30; 20 MG/100ML; MG/100ML; MG/100ML; MG/100ML
125 INJECTION, SOLUTION INTRAVENOUS CONTINUOUS
Status: DISCONTINUED | OUTPATIENT
Start: 2019-08-05 | End: 2019-08-09 | Stop reason: HOSPADM

## 2019-08-05 RX ADMIN — PROPOFOL 160 MCG/KG/MIN: 10 INJECTION, EMULSION INTRAVENOUS at 11:48

## 2019-08-05 RX ADMIN — SODIUM CHLORIDE, SODIUM LACTATE, POTASSIUM CHLORIDE, AND CALCIUM CHLORIDE 125 ML/HR: .6; .31; .03; .02 INJECTION, SOLUTION INTRAVENOUS at 10:31

## 2019-08-05 RX ADMIN — PROPOFOL 100 MG: 10 INJECTION, EMULSION INTRAVENOUS at 11:48

## 2019-08-05 NOTE — ANESTHESIA PREPROCEDURE EVALUATION
Review of Systems/Medical History  Patient summary reviewed  Chart reviewed  No history of anesthetic complications     Cardiovascular  EKG reviewed, Exercise tolerance (METS): >4,  Hypertension ,    Pulmonary  Sleep apnea CPAP,        GI/Hepatic    GERD ,   Comment: Eosinophilic esophagitis  BRBPR (bright red blood per rectum         Kidney stones,        Endo/Other    Obesity  super morbid obesity   GYN       Hematology   Musculoskeletal  Back pain (Chronic pain syndrome) , lumbar pain, Sciatica,   Arthritis     Neurology   Psychology   Anxiety,              Physical Exam    Airway    Mallampati score: II  TM Distance: >3 FB  Neck ROM: full     Dental   No notable dental hx     Cardiovascular  Cardiovascular exam normal    Pulmonary  Pulmonary exam normal     Other Findings        Anesthesia Plan  ASA Score- 3     Anesthesia Type- IV sedation with anesthesia with ASA Monitors  Additional Monitors:   Airway Plan:         Plan Factors-    Induction-     Postoperative Plan-     Informed Consent- Anesthetic plan and risks discussed with patient

## 2019-08-05 NOTE — H&P
History and Physical -  [facility]  Ruby Carreno 62 y o  male MRN: [id number]    HPI: Ruby Carreno is a 62y o  year old male who presents with screening colonoscopy, last exam 10 years ago  Review of Systems    Historical Information   Past Medical History:   Diagnosis Date    Borderline diabetes     Chronic pain disorder     lower back-s/p laminectomy    Claustrophobia     CPAP (continuous positive airway pressure) dependence     Diverticulitis     GERD (gastroesophageal reflux disease)     Hypertension     Kidney stone     x 2 episodes    Localized pain of joint     Mild sleep apnea     CPAP started in July having difficulty using- only has used on occ    Morbid obesity with body mass index (BMI) of 50 0 to 59 9 in [facility])     Rectal bleeding     occ rectal bleeding last episode 6/19    Skin tag     skin tags were removed-as of 8/2/19 has a few more    Wears glasses      Past Surgical History:   Procedure Laterality Date    BACK SURGERY      laminectomy-1996, 2010    COLONOSCOPY      x2    ESOPHAGOGASTRODUODENOSCOPY N/A 3/30/2019    Procedure: ESOPHAGOGASTRODUODENOSCOPY (EGD); Surgeon: Pratima Wong MD;  Location: [address] [place] [address];  Service: Gastroenterology    KNEE ARTHROSCOPY Left 2007    CO ESOPHAGOGASTRODUODENOSCOPY TRANSORAL DIAGNOSTIC N/A 5/1/2019    Procedure: ESOPHAGOGASTRODUODENOSCOPY (EGD); Surgeon: Pratima Wong MD;  Location: [facility];   Service: Gastroenterology    UPPER GASTROINTESTINAL ENDOSCOPY  2006     Social History   Social History     Substance and Sexual Activity   Alcohol Use No     Social History     Substance and Sexual Activity   Drug Use No     Social History     Tobacco Use   Smoking Status Never Smoker   Smokeless Tobacco Never Used     Family History   Problem Relation Age of Onset    Osteoarthritis Mother     Obesity Father         ig=160    Sleep apnea Father         with CPAP    No Known Problems Sister     No Known Problems Brother     Lupus Sister     No Known Problems Sister     No Known Problems Son     Substance Abuse Neg Hx     Mental illness Neg Hx     Cancer Neg Hx     Diabetes Neg Hx     Heart disease Neg Hx     Stroke Neg Hx        Meds/Allergies       (Not in a hospital admission)    Allergies   Allergen Reactions    Lactose GI Intolerance       Objective     /77   Pulse 83   Temp 97 9 °F (36 6 °C) (Oral)   Resp 16   Ht 6' 3" (1 905 m)   Wt (!) 181 kg (400 lb)   SpO2 94%   BMI 50 00 kg/m²       PHYSICAL EXAM    Gen: NAD  CV: RRR  CHEST: Clear  ABD: soft, NT/ND  EXT: no edema  Neuro: AAO      ASSESSMENT/PLAN:  This is a 62y o  year old male here for screening colonoscopy, last exam 10 years ago         PLAN:   Procedure: colonoscopy

## 2019-08-12 ENCOUNTER — OFFICE VISIT (OUTPATIENT)
Dept: FAMILY MEDICINE CLINIC | Facility: CLINIC | Age: 54
End: 2019-08-12
Payer: COMMERCIAL

## 2019-08-12 VITALS
BODY MASS INDEX: 38.36 KG/M2 | DIASTOLIC BLOOD PRESSURE: 76 MMHG | WEIGHT: 315 LBS | HEART RATE: 93 BPM | TEMPERATURE: 97.5 F | HEIGHT: 76 IN | RESPIRATION RATE: 14 BRPM | SYSTOLIC BLOOD PRESSURE: 132 MMHG | OXYGEN SATURATION: 97 %

## 2019-08-12 DIAGNOSIS — R73.01 IMPAIRED FASTING GLUCOSE: Primary | ICD-10-CM

## 2019-08-12 DIAGNOSIS — E66.01 MORBID OBESITY WITH BODY MASS INDEX (BMI) OF 50.0 TO 59.9 IN ADULT (HCC): ICD-10-CM

## 2019-08-12 DIAGNOSIS — I10 ESSENTIAL HYPERTENSION: ICD-10-CM

## 2019-08-12 LAB — SL AMB POCT HEMOGLOBIN AIC: 5.7 (ref ?–6.5)

## 2019-08-12 PROCEDURE — 99214 OFFICE O/P EST MOD 30 MIN: CPT | Performed by: FAMILY MEDICINE

## 2019-08-12 PROCEDURE — 83036 HEMOGLOBIN GLYCOSYLATED A1C: CPT | Performed by: FAMILY MEDICINE

## 2019-08-12 NOTE — PROGRESS NOTES
Assessment/Plan:    Hypertension  STABLE  DENIES ANY CP, SOB, PALPITATIONS, OR HEADACHE  NOTES NO WATER RETENTION  COMPLIANT WITH MEDICATION  NO CONCERNS    - CONTINUE CURRENT TREATMENT PLAN  - MONITOR DIETARY SODIUM INTAKE  - ENCOURAGE PHYSICAL ACTIVITY  - RV 3 MONTHS      Morbid obesity with body mass index (BMI) of 50 0 to 59 9 in adult ([facility])  DISCUSSED WEIGHT LOSS AND CONSIDERATION OF WEIGHT LOSS PROGRAM    Impaired fasting glucose  COMPLIANT WITH MEDICATION  DENIES ANY NUMBNESS, TINGLING IN FEET    - DISCUSSED DIETARY MODIFICATION OF CARBOHYDRATES  - HGB A1C AND URINE STUDIES DUE TODAY  - FOOT CARE  - RV 3 MONTHS           Diagnoses and all orders for this visit:    Impaired fasting glucose  -     POCT hemoglobin A1c    Essential hypertension    Morbid obesity with body mass index (BMI) of 50 0 to 59 9 in adult [facility])          Patient Instructions   CONTINUE CURRENT TREATMENT PLAN  MONITOR DIETARY SODIUM, CHOL, AND CARBOHYDRATE INTAKE  ENCOURAGE PHYSICAL ACTIVITY  FOOT CARE    RV 3 M, SOONER PRN        Return in about 3 months (around 11/12/2019) for Recheck  Subjective:      Patient ID: Debra Corbin is a 62 y o  male  Chief Complaint   Patient presents with    Diabetes     A1C    CPAP       PATIENT RETURNS FOR ROUTINE EVALUATION OF PATIENT'S MEDICAL ISSUES    INDIVIDUAL MEDICAL ISSUES WITH THEIR CURRENT STATUS, ASSESSMENT AND PLANS ARE LISTED ABOVE        The following portions of the patient's history were reviewed and updated as appropriate: allergies, current medications, past family history, past medical history, past social history, past surgical history and problem list     Review of Systems   Constitutional: Negative for chills, fatigue and fever  HENT: Negative for congestion, ear discharge, ear pain, mouth sores, postnasal drip, sore throat and trouble swallowing  Eyes: Negative for pain, discharge and visual disturbance     Respiratory: Negative for cough, shortness of breath and wheezing  Cardiovascular: Negative for chest pain, palpitations and leg swelling  Gastrointestinal: Negative for abdominal distention, abdominal pain, blood in stool, diarrhea and nausea  Endocrine: Negative for polydipsia, polyphagia and polyuria  Genitourinary: Negative for dysuria, frequency, hematuria and urgency  Musculoskeletal: Positive for arthralgias and back pain  Negative for gait problem and joint swelling  Skin: Negative for pallor and rash  Neurological: Negative for dizziness, syncope, speech difficulty, weakness, light-headedness, numbness and headaches  Hematological: Negative for adenopathy  Psychiatric/Behavioral: Negative for behavioral problems, confusion and sleep disturbance  The patient is not nervous/anxious  Current Outpatient Medications   Medication Sig Dispense Refill    amLODIPine (NORVASC) 5 mg tablet Take 1 tablet (5 mg total) by mouth daily (Patient taking differently: Take 5 mg by mouth every morning ) 90 tablet 1    lisinopril-hydrochlorothiazide (PRINZIDE,ZESTORETIC) 20-25 MG per tablet Take 1 tablet by mouth daily (Patient taking differently: Take 1 tablet by mouth every morning ) 90 tablet 3    pantoprazole (PROTONIX) 40 mg tablet Take 1 tablet (40 mg total) by mouth 2 (two) times a day 60 tablet 3    timolol (BETIMOL) 0 25 % ophthalmic solution Administer 1-2 drops to both eyes daily at bedtime        No current facility-administered medications for this visit  Objective:    /76 (BP Location: Left arm, Patient Position: Sitting, Cuff Size: Large)   Pulse 93   Temp 97 5 °F (36 4 °C) (Temporal)   Resp 14   Ht 6' 4" (1 93 m)   Wt (!) 188 kg (414 lb 9 6 oz)   SpO2 97%   BMI 50 47 kg/m²        Physical Exam   Constitutional: He is oriented to person, place, and time  He appears well-developed and well-nourished  HENT:   Head: Normocephalic and atraumatic  Eyes: Pupils are equal, round, and reactive to light   Conjunctivae and EOM are normal  Right eye exhibits no discharge  Left eye exhibits no discharge  Neck: Neck supple  No JVD present  No thyromegaly present  Cardiovascular: Normal rate, regular rhythm and normal heart sounds  No murmur heard  Pulmonary/Chest: Effort normal and breath sounds normal  He has no wheezes  He has no rales  Abdominal: Soft  Bowel sounds are normal  He exhibits no mass  There is no hepatosplenomegaly  There is no tenderness  There is no rebound, no guarding and no CVA tenderness  OBESE   Musculoskeletal: Normal range of motion  He exhibits no edema, tenderness or deformity  Lymphadenopathy:     He has no cervical adenopathy  He has no axillary adenopathy  Neurological: He is alert and oriented to person, place, and time  Skin: Skin is warm and dry  No rash noted  No erythema  Psychiatric: He has a normal mood and affect   His behavior is normal  Judgment and thought content normal               Marylu Fowler MD

## 2019-08-14 ENCOUNTER — TELEPHONE (OUTPATIENT)
Dept: FAMILY MEDICINE CLINIC | Facility: CLINIC | Age: 54
End: 2019-08-14

## 2019-08-14 NOTE — TELEPHONE ENCOUNTER
Was in to see you on Monday    States you were going to call in a medication for him    Send to [facility] in [facility]

## 2019-08-15 NOTE — TELEPHONE ENCOUNTER
Still waiting for rx that was going to be called in during Monday's office visit  Louisa Ambriz states it was something to help him sleep  Please call into [facility]    Thanks

## 2019-08-19 DIAGNOSIS — G47.9 SLEEP DISTURBANCE: Primary | ICD-10-CM

## 2019-08-19 RX ORDER — TRAZODONE HYDROCHLORIDE 100 MG/1
100 TABLET ORAL
Qty: 30 TABLET | Refills: 3 | Status: SHIPPED | OUTPATIENT
Start: 2019-08-19 | End: 2020-10-05 | Stop reason: ALTCHOICE

## 2019-08-22 ENCOUNTER — TELEPHONE (OUTPATIENT)
Dept: GASTROENTEROLOGY | Facility: CLINIC | Age: 54
End: 2019-08-22

## 2019-08-22 ENCOUNTER — OFFICE VISIT (OUTPATIENT)
Dept: SLEEP CENTER | Facility: CLINIC | Age: 54
End: 2019-08-22
Payer: COMMERCIAL

## 2019-08-22 VITALS
SYSTOLIC BLOOD PRESSURE: 109 MMHG | DIASTOLIC BLOOD PRESSURE: 75 MMHG | HEIGHT: 76 IN | HEART RATE: 77 BPM | WEIGHT: 315 LBS | BODY MASS INDEX: 38.36 KG/M2

## 2019-08-22 DIAGNOSIS — G47.33 OSA (OBSTRUCTIVE SLEEP APNEA): Primary | ICD-10-CM

## 2019-08-22 DIAGNOSIS — K20.0 EOSINOPHILIC ESOPHAGITIS: Primary | ICD-10-CM

## 2019-08-22 DIAGNOSIS — F41.9 ANXIETY: ICD-10-CM

## 2019-08-22 DIAGNOSIS — G47.9 SLEEP DISTURBANCE: ICD-10-CM

## 2019-08-22 DIAGNOSIS — F40.240 CLAUSTROPHOBIA: ICD-10-CM

## 2019-08-22 DIAGNOSIS — I10 ESSENTIAL HYPERTENSION: ICD-10-CM

## 2019-08-22 DIAGNOSIS — E66.01 MORBID OBESITY WITH BODY MASS INDEX (BMI) OF 50.0 TO 59.9 IN ADULT (HCC): ICD-10-CM

## 2019-08-22 PROCEDURE — 99214 OFFICE O/P EST MOD 30 MIN: CPT | Performed by: INTERNAL MEDICINE

## 2019-08-22 PROCEDURE — 3074F SYST BP LT 130 MM HG: CPT | Performed by: INTERNAL MEDICINE

## 2019-08-22 RX ORDER — OMEPRAZOLE 20 MG/1
20 CAPSULE, DELAYED RELEASE ORAL 2 TIMES DAILY
Qty: 60 CAPSULE | Refills: 0 | Status: SHIPPED | OUTPATIENT
Start: 2019-08-22 | End: 2019-10-14 | Stop reason: SDUPTHER

## 2019-08-22 NOTE — PROGRESS NOTES
Review of Systems      Genitourinary none   Cardiology none   Gastrointestinal none   Neurology none   Constitutional claustrophobia   Integumentary none   Psychiatry anxiety   Musculoskeletal back pain   Pulmonary snoring   ENT ringing in ears   Endocrine none   Hematological none

## 2019-08-22 NOTE — PATIENT INSTRUCTIONS

## 2019-08-22 NOTE — TELEPHONE ENCOUNTER
Pt needs to be on BID PPI therapy for eosinophilic esophagitis  Please advise him that he should stop protonix and start omeprazole 20mg PO BID  Rx sent to pharmacy

## 2019-08-22 NOTE — PROGRESS NOTES
Follow-Up Note - Sleep Center   Unruly Cobos  62 y o  male  :1965  DRQ:[id number]    CC: I saw this patient for follow-up in clinic today for his Sleep Disordered Breathing, Coexisting Sleep and Medical Problems  He continues to have difficulty adjusting to CPAP  Home sleep testing in 2019 demonstrated  : GUERA (respiratory event index of) 40 1 /hour  Minimum oxygen saturation was 81 % and 20 6% of the study was spent with saturations less than 90%  The snore index was 79 5%  During the subsequent therapeutic study, sleep disordered breathing was sufficiently remediated with PAP at 14 cm H2O  He was observed during stage REM but not while supine  PFSH, Problem List, Medications & Allergies were reviewed in EMR  Interval changes: none reported  He  has a past medical history of Borderline diabetes, Chronic pain disorder, Claustrophobia, CPAP (continuous positive airway pressure) dependence, Diverticulitis, GERD (gastroesophageal reflux disease), Hypertension, Kidney stone, Localized pain of joint, Mild sleep apnea, Morbid obesity with body mass index (BMI) of 50 0 to 59 9 in adult [facility]), Rectal bleeding, Skin tag, and Wears glasses  He has a current medication list which includes the following prescription(s): amlodipine, lisinopril-hydrochlorothiazide, pantoprazole, timolol, and trazodone  ROS: Reviewed (see attached)  DATA REVIEWED: using PAP irregularly  He attempted desensitization and is able to tolerate at 4 cm H2O while awake  However feels claustrophobic when trying to sleep and has to remove the mask  Recently, he is claustrophobic in confined spaces and needed Xanax for an MRI    His primary care physician gave him a prescription for trazodone to assist sleep and tolerating CPAP  SUBJECTIVE: Regarding use of PAP, Danelle Louie reports:   · He is experiencing significant adverse effects: claustrophobia  · He is not benefiting from use:    Sleep Routine: He reports getting 6 hrs sleep  ; he having difficulty initiating sleep, but not maintaining sleep   He awakens spontaneously and is not always refreshed  He has excessive drowsiness and has to take cat naps in the afternoons  He rated himself at Total score: 3 /24 on the [place] sleepiness scale  Habits: reports that he has never smoked  He has never used smokeless tobacco ,  reports that he does not drink alcohol ,  reports that he does not use drugs  , Caffeine use: none , Exercise routine: none   OBJECTIVE: /75   Pulse 77   Ht 6' 4" (1 93 m)   Wt (!) 186 kg (410 lb)   BMI 49 91 kg/m²    Constitutional: Patient is well groomed; well appearing  Skin/Extrem: warm & dry; col & hydration normal; no edema  Psych: cooperativeand in no distress  Anxious  CNS: Alert, orientated, clear & coherent speech  H&N: EOMI; NC/AT:no facial pressure marks, no rashes  "    ENMT Mucus membranes normal Nasal airway:patent  Oral airway: crowded  Resp:effort is normal CVS: RRR ABD:truncal obesity MSK:Gait normal     ASSESSMENT: Primary Sleep/Secondary(to Medical or Psych conditions) & comorbidities   1  DOMINIC (obstructive sleep apnea)  PAP DME Pressure Change   2  Sleep disturbance     3  Claustrophobia     4  Anxiety     5  Essential hypertension     6  Morbid obesity with body mass index (BMI) of 50 0 to 59 9 in adult [facility])         PLAN:  1  Treatment with  PAP is medically necessary and Gerhardt Gear is agreable to continue use  2  Care of equipment, methods to improve comfort using PAP and importance of compliance with therapy were discussed  3  Pressure setting: continue desensitization 4 to 14 cmH2O     4  Rx provided to replace supplies and Care coordinated with DME provider  5  Strategies for weight reduction were discussed  6  May combined desensitization with trazodone  A trial of reverse intention may get him past he has anxiety  If not effective, and SSRI for anxiety may be necessary    Short term anxiolytics using Xanax for his anxiety before applying CPAP may also be necessary  7  Follow-up is advised in 1 month  to monitor progress, compliance and to adjust therapy  Thank you for allowing me to participate in the care of this patient      Sincerely,    Authenticated electronically by Santiago Rios MD on 38/94/86   Board Certified Specialist

## 2019-08-30 ENCOUNTER — TELEPHONE (OUTPATIENT)
Dept: SLEEP CENTER | Facility: CLINIC | Age: 54
End: 2019-08-30

## 2019-09-03 ENCOUNTER — TELEPHONE (OUTPATIENT)
Dept: SLEEP CENTER | Facility: CLINIC | Age: 54
End: 2019-09-03

## 2019-09-03 NOTE — TELEPHONE ENCOUNTER
Received an RX for a pressure change  Changed accordingly  4-14cm  1cm every 2 days  Called PT and informed him

## 2019-10-12 DIAGNOSIS — I10 ESSENTIAL HYPERTENSION: ICD-10-CM

## 2019-10-13 RX ORDER — AMLODIPINE BESYLATE 5 MG/1
TABLET ORAL
Qty: 90 TABLET | Refills: 1 | Status: SHIPPED | OUTPATIENT
Start: 2019-10-13 | End: 2020-05-13

## 2019-10-14 DIAGNOSIS — K20.0 EOSINOPHILIC ESOPHAGITIS: ICD-10-CM

## 2019-10-14 RX ORDER — OMEPRAZOLE 20 MG/1
20 CAPSULE, DELAYED RELEASE ORAL 2 TIMES DAILY
Qty: 60 CAPSULE | Refills: 5 | Status: SHIPPED | OUTPATIENT
Start: 2019-10-14 | End: 2020-10-12

## 2019-10-29 DIAGNOSIS — Z71.89 COMPLEX CARE COORDINATION: Primary | ICD-10-CM

## 2019-10-31 ENCOUNTER — TELEPHONE (OUTPATIENT)
Dept: FAMILY MEDICINE CLINIC | Facility: CLINIC | Age: 54
End: 2019-10-31

## 2019-10-31 ENCOUNTER — PATIENT OUTREACH (OUTPATIENT)
Dept: CASE MANAGEMENT | Facility: OTHER | Age: 54
End: 2019-10-31

## 2019-10-31 DIAGNOSIS — G47.9 SLEEP DISORDER: Primary | ICD-10-CM

## 2019-10-31 RX ORDER — ZOLPIDEM TARTRATE 10 MG/1
10 TABLET ORAL
Qty: 30 TABLET | Refills: 0 | Status: SHIPPED | OUTPATIENT
Start: 2019-10-31 | End: 2020-10-05 | Stop reason: ALTCHOICE

## 2019-10-31 NOTE — TELEPHONE ENCOUNTER
Leonel Carlos from [facility] called stating Rell Higginbotham is not using his cpap due to anxiety and claustrophobia  She states trazodone didn't work for helping him sleep  He is willing to try something else but is currently uninsured  She will try to help him get on Medicare due to his disability but doesn't know how long that will take  Leonel Carlos asked if you would be willing to call him something else in for him to try  Please advise    Thanks

## 2019-10-31 NOTE — PROGRESS NOTES
Initial outreach made to patient  Introduced myself & outpatient care management  Patient is currently uninsured since he started social security disability & is now ineligible for medicaid  He has been paying for his meds out of pocket, but unable to make follow up PCP or specialist visits  He is not using his CPAP at this time due to anxiety & claustrophobia  He did try the trazodone without result  He agrees to OP CM calling his PCP for possible alternatives to trazodone as he states a desire to use his CPAP & improve his health  Call placed to Dr Saul Saint office & he will order a short term supply of Ambien for patient to trial  Patient is aware & if he can afford the Rx, he will try the new med  Patient thinks he has a letter at home regarding medicare  He will call me tomorrow from home to start the process of getting him insured

## 2019-10-31 NOTE — TELEPHONE ENCOUNTER
Chuck Kessler from [address] [place] [address] called back  Informed her short term rx was called in by dr Alben Koyanagi  She states she will call Eden Potter and let him know, but cant guarantee he will  the rx due to cost   No further action required at this time

## 2019-11-07 ENCOUNTER — PATIENT OUTREACH (OUTPATIENT)
Dept: CASE MANAGEMENT | Facility: OTHER | Age: 54
End: 2019-11-07

## 2019-11-07 NOTE — PROGRESS NOTES
Attempted follow up of Ambien trial & insurance  Left a voicemail requesting a call back, phone number provided

## 2019-11-07 NOTE — PROGRESS NOTES
Patient returned my call  He found the letter regarding insurance, but it doesn't provide the info he needs  He states he will go directly to  because he would like to talk to someone in person  He was able to purchase the Ambien, but states he is still anxious & nervous about trying the machine  He also lives alone & does not have anyone there for support at night  Recommended patient try using the CPAP during the day while watching TV to get accustomed to it  Patient was very excited to try this & states he is going to as soon as we hang up  He asks that I check in with him next week to see if he is making any progress

## 2019-11-14 ENCOUNTER — PATIENT OUTREACH (OUTPATIENT)
Dept: CASE MANAGEMENT | Facility: OTHER | Age: 54
End: 2019-11-14

## 2019-11-14 NOTE — PROGRESS NOTES
Attempted to follow up on patient's CPAP use  Left a voicemail requesting a call back, phone number provided

## 2019-12-10 ENCOUNTER — PATIENT OUTREACH (OUTPATIENT)
Dept: CASE MANAGEMENT | Facility: OTHER | Age: 54
End: 2019-12-10

## 2019-12-10 NOTE — PROGRESS NOTES
Called patient to check in on CPAP use & insurance  Left a voicemail requesting a call back, phone number provided

## 2019-12-19 ENCOUNTER — PATIENT OUTREACH (OUTPATIENT)
Dept: CASE MANAGEMENT | Facility: OTHER | Age: 54
End: 2019-12-19

## 2020-02-18 ENCOUNTER — EPISODE CHANGES (OUTPATIENT)
Dept: CASE MANAGEMENT | Facility: OTHER | Age: 55
End: 2020-02-18

## 2020-03-12 ENCOUNTER — HOSPITAL ENCOUNTER (EMERGENCY)
Facility: HOSPITAL | Age: 55
Discharge: HOME | End: 2020-03-12
Attending: EMERGENCY MEDICINE | Admitting: EMERGENCY MEDICINE

## 2020-03-12 VITALS
BODY MASS INDEX: 51.25 KG/M2 | HEART RATE: 98 BPM | OXYGEN SATURATION: 95 % | TEMPERATURE: 100.5 F | WEIGHT: 315 LBS | SYSTOLIC BLOOD PRESSURE: 169 MMHG | RESPIRATION RATE: 21 BRPM | DIASTOLIC BLOOD PRESSURE: 103 MMHG

## 2020-03-12 DIAGNOSIS — J10.1 INFLUENZA A: Primary | ICD-10-CM

## 2020-03-12 LAB
FLUAV RNA NPH QL NAA+PROBE: DETECTED
FLUBV RNA NPH QL NAA+PROBE: ABNORMAL
RSV RNA NPH QL NAA+PROBE: ABNORMAL
S PYO DNA THROAT QL NAA+PROBE: NORMAL

## 2020-03-12 PROCEDURE — 87631 RESP VIRUS 3-5 TARGETS: CPT | Performed by: EMERGENCY MEDICINE

## 2020-03-12 PROCEDURE — 87651 STREP A DNA AMP PROBE: CPT | Performed by: EMERGENCY MEDICINE

## 2020-03-12 PROCEDURE — 99284 EMERGENCY DEPT VISIT MOD MDM: CPT | Performed by: EMERGENCY MEDICINE

## 2020-03-12 PROCEDURE — 99283 EMERGENCY DEPT VISIT LOW MDM: CPT

## 2020-03-12 RX ORDER — BENZONATATE 100 MG/1
100 CAPSULE ORAL EVERY 8 HOURS
Qty: 21 CAPSULE | Refills: 0 | Status: SHIPPED | OUTPATIENT
Start: 2020-03-12 | End: 2020-10-05 | Stop reason: ALTCHOICE

## 2020-03-12 RX ORDER — ALBUTEROL SULFATE 90 UG/1
2 AEROSOL, METERED RESPIRATORY (INHALATION) EVERY 4 HOURS PRN
Qty: 1 INHALER | Refills: 0 | Status: SHIPPED | OUTPATIENT
Start: 2020-03-12 | End: 2020-10-05 | Stop reason: ALTCHOICE

## 2020-03-12 NOTE — ED PROVIDER NOTES
History  Chief Complaint   Patient presents with    Cough     States feels hot, body aches, cough for 2 days  Motrin 600 mg at 10 am      Patient states he started with congestion body aches and cough which has been painful in the back and not productive for about 2 days  Patient did not get flu vaccine this year  He is a nonsmoker  Patient has had no recent travel out of the country  He did not notify his PMD prior to arrival          Prior to Admission Medications   Prescriptions Last Dose Informant Patient Reported? Taking?    amLODIPine (NORVASC) 5 mg tablet   No No   Sig: TAKE ONE TABLET BY MOUTH EVERY DAY   lisinopril-hydrochlorothiazide (PRINZIDE,ZESTORETIC) 20-25 MG per tablet  Self No No   Sig: Take 1 tablet by mouth daily   Patient taking differently: Take 1 tablet by mouth every morning    omeprazole (PriLOSEC) 20 mg delayed release capsule   No No   Sig: Take 1 capsule (20 mg total) by mouth 2 (two) times a day   timolol (BETIMOL) 0 25 % ophthalmic solution  Self Yes No   Sig: Administer 1-2 drops to both eyes daily at bedtime    traZODone (DESYREL) 100 mg tablet   No No   Sig: Take 1 tablet (100 mg total) by mouth daily at bedtime   Patient not taking: Reported on 10/31/2019   zolpidem (AMBIEN) 10 mg tablet   No No   Sig: Take 1 tablet (10 mg total) by mouth daily at bedtime as needed for sleep      Facility-Administered Medications: None       Past Medical History:   Diagnosis Date    Borderline diabetes     Chronic pain disorder     lower back-s/p laminectomy    Claustrophobia     CPAP (continuous positive airway pressure) dependence     Diverticulitis     GERD (gastroesophageal reflux disease)     Hypertension     Kidney stone     x 2 episodes    Localized pain of joint     Mild sleep apnea     CPAP started in July having difficulty using- only has used on occ    Morbid obesity with body mass index (BMI) of 50 0 to 59 9 in adult [facility])     Rectal bleeding     occ rectal bleeding last episode 6/19    Skin tag     skin tags were removed-as of 8/2/19 has a few more    Wears glasses        Past Surgical History:   Procedure Laterality Date    BACK SURGERY      laminectomy-1996, 2010    COLONOSCOPY      x2    ESOPHAGOGASTRODUODENOSCOPY N/A 3/30/2019    Procedure: ESOPHAGOGASTRODUODENOSCOPY (EGD); Surgeon: Mireya Natarajan MD;  Location: [address] [place] [address];  Service: Gastroenterology    KNEE ARTHROSCOPY Left 2007    AL ESOPHAGOGASTRODUODENOSCOPY TRANSORAL DIAGNOSTIC N/A 5/1/2019    Procedure: ESOPHAGOGASTRODUODENOSCOPY (EGD); Surgeon: Mireya Natarajan MD;  Location: [facility]; Service: Gastroenterology    UPPER GASTROINTESTINAL ENDOSCOPY  2006       Family History   Problem Relation Age of Onset    Osteoarthritis Mother     Obesity Father         [id number]    Sleep apnea Father         with CPAP    No Known Problems Sister     No Known Problems Brother     Lupus Sister     No Known Problems Sister     No Known Problems Son     Substance Abuse Neg Hx     Mental illness Neg Hx     Cancer Neg Hx     Diabetes Neg Hx     Heart disease Neg Hx     Stroke Neg Hx      I have reviewed and agree with the history as documented  E-Cigarette/Vaping    E-Cigarette Use Never User      E-Cigarette/Vaping Substances     Social History     Tobacco Use    Smoking status: Never Smoker    Smokeless tobacco: Never Used   Substance Use Topics    Alcohol use: No    Drug use: No       Review of Systems   Constitutional: Positive for chills and fever  HENT: Positive for congestion and sore throat  Eyes: Negative for visual disturbance  Respiratory: Positive for cough, chest tightness and shortness of breath  Cardiovascular: Negative for palpitations and leg swelling  Gastrointestinal: Negative for abdominal pain and vomiting  Genitourinary: Negative for dysuria  Musculoskeletal: Positive for arthralgias, back pain and myalgias  Skin: Negative for rash  Neurological: Positive for weakness  Negative for syncope and light-headedness  Hematological: Does not bruise/bleed easily  Psychiatric/Behavioral: Negative for confusion  All other systems reviewed and are negative  Physical Exam  Physical Exam    Vital Signs  ED Triage Vitals [03/12/20 1814]   Temperature Pulse Respirations Blood Pressure SpO2   100 5 °F (38 1 °C) 102 20 (!) 179/103 96 %      Temp Source Heart Rate Source Patient Position - Orthostatic VS BP Location FiO2 (%)   Oral Monitor Sitting Right arm --      Pain Score       --           Vitals:    03/12/20 1814 03/12/20 1930   BP: (!) 179/103 (!) 169/103   Pulse: 102 98   Patient Position - Orthostatic VS: Sitting Sitting         Visual Acuity      ED Medications  Medications - No data to display    Diagnostic Studies  Results Reviewed     Procedure Component Value Units Date/Time    Influenza A/B and RSV PCR [[id number]]  (Abnormal) Collected:  03/12/20 1927    Lab Status:  Final result Specimen:  Nasopharyngeal from Nose Updated:  03/12/20 2011     INFLUENZA A PCR Detected     INFLUENZA B PCR None Detected     RSV PCR None Detected    Strep A PCR [[id number]]  (Normal) Collected:  03/12/20 1928    Lab Status:  Final result Specimen:  Throat Updated:  03/12/20 2005     STREP A PCR None Detected                 No orders to display              Procedures  Procedures         ED Course                                 MDM  Number of Diagnoses or Management Options  Diagnosis management comments: Check viral studies, possible influenza        Disposition  Final diagnoses:   Influenza A     Time reflects when diagnosis was documented in both MDM as applicable and the Disposition within this note     Time User Action Codes Description Comment    3/12/2020  8:17 PM Chen NICOLE Add [J10 1] Influenza A       ED Disposition     ED Disposition Condition Date/Time Comment    Discharge Stable u Mar 12, 2020  8:17 [facility] to home/self care              Follow-up Information     Follow up With Specialties Details Why Contact Info    Loly Alba MD Family Medicine Schedule an appointment as soon as possible for a visit   [address] [address]  [phone]            Patient's Medications   Discharge Prescriptions    ALBUTEROL (PROVENTIL HFA,VENTOLIN HFA) 90 MCG/ACT INHALER    Inhale 2 puffs every 4 (four) hours as needed for wheezing       Start Date: 3/12/2020 End Date: --       Order Dose: 2 puffs       Quantity: 1 Inhaler    Refills: 0    BENZONATATE (TESSALON PERLES) 100 MG CAPSULE    Take 1 capsule (100 mg total) by mouth every 8 (eight) hours       Start Date: 3/12/2020 End Date: --       Order Dose: 100 mg       Quantity: 21 capsule    Refills: 0     No discharge procedures on file      PDMP Review     None          ED Provider  Electronically Signed by           Hair Knox MD  03/12/20 2018

## 2020-03-29 DIAGNOSIS — I10 ESSENTIAL HYPERTENSION: ICD-10-CM

## 2020-03-29 RX ORDER — LISINOPRIL AND HYDROCHLOROTHIAZIDE 25; 20 MG/1; MG/1
TABLET ORAL
Qty: 90 TABLET | Refills: 3 | Status: SHIPPED | OUTPATIENT
Start: 2020-03-29 | End: 2021-04-28

## 2020-05-13 DIAGNOSIS — I10 ESSENTIAL HYPERTENSION: ICD-10-CM

## 2020-05-13 RX ORDER — AMLODIPINE BESYLATE 5 MG/1
TABLET ORAL
Qty: 90 TABLET | Refills: 1 | Status: SHIPPED | OUTPATIENT
Start: 2020-05-13 | End: 2020-11-22

## 2020-07-13 ENCOUNTER — APPOINTMENT (EMERGENCY)
Dept: RADIOLOGY | Facility: HOSPITAL | Age: 55
End: 2020-07-13

## 2020-07-13 ENCOUNTER — HOSPITAL ENCOUNTER (EMERGENCY)
Facility: HOSPITAL | Age: 55
Discharge: HOME | End: 2020-07-13
Attending: EMERGENCY MEDICINE | Admitting: EMERGENCY MEDICINE

## 2020-07-13 VITALS
HEART RATE: 69 BPM | RESPIRATION RATE: 18 BRPM | OXYGEN SATURATION: 97 % | SYSTOLIC BLOOD PRESSURE: 138 MMHG | DIASTOLIC BLOOD PRESSURE: 63 MMHG | TEMPERATURE: 96.9 F

## 2020-07-13 DIAGNOSIS — G47.33 OSA (OBSTRUCTIVE SLEEP APNEA): ICD-10-CM

## 2020-07-13 DIAGNOSIS — R06.00 DYSPNEA: Primary | ICD-10-CM

## 2020-07-13 DIAGNOSIS — J40 BRONCHITIS: ICD-10-CM

## 2020-07-13 DIAGNOSIS — E04.1 THYROID NODULE: ICD-10-CM

## 2020-07-13 LAB
ALBUMIN SERPL BCP-MCNC: 3.7 G/DL (ref 3.5–5)
ALP SERPL-CCNC: 69 U/L (ref 46–116)
ALT SERPL W P-5'-P-CCNC: 41 U/L (ref 12–78)
ANION GAP SERPL CALCULATED.3IONS-SCNC: 7 MMOL/L (ref 4–13)
APTT PPP: 28 SECONDS (ref 23–37)
AST SERPL W P-5'-P-CCNC: 28 U/L (ref 5–45)
ATRIAL RATE: 76 BPM
BACTERIA UR QL AUTO: ABNORMAL /HPF
BASOPHILS # BLD AUTO: 0.07 THOUSANDS/ΜL (ref 0–0.1)
BASOPHILS NFR BLD AUTO: 1 % (ref 0–1)
BILIRUB SERPL-MCNC: 0.6 MG/DL (ref 0.2–1)
BILIRUB UR QL STRIP: NEGATIVE
BUN SERPL-MCNC: 21 MG/DL (ref 5–25)
CALCIUM SERPL-MCNC: 9.2 MG/DL (ref 8.3–10.1)
CHLORIDE SERPL-SCNC: 105 MMOL/L (ref 100–108)
CLARITY UR: CLEAR
CO2 SERPL-SCNC: 29 MMOL/L (ref 21–32)
COLOR UR: YELLOW
CREAT SERPL-MCNC: 1.26 MG/DL (ref 0.6–1.3)
D DIMER PPP FEU-MCNC: 0.84 UG/ML FEU
EOSINOPHIL # BLD AUTO: 0.5 THOUSAND/ΜL (ref 0–0.61)
EOSINOPHIL NFR BLD AUTO: 5 % (ref 0–6)
ERYTHROCYTE [DISTWIDTH] IN BLOOD BY AUTOMATED COUNT: 12.9 % (ref 11.6–15.1)
GFR SERPL CREATININE-BSD FRML MDRD: 63 ML/MIN/1.73SQ M
GLUCOSE SERPL-MCNC: 115 MG/DL (ref 65–140)
GLUCOSE UR STRIP-MCNC: NEGATIVE MG/DL
HCT VFR BLD AUTO: 43.8 % (ref 36.5–49.3)
HGB BLD-MCNC: 14.4 G/DL (ref 12–17)
HGB UR QL STRIP.AUTO: ABNORMAL
IMM GRANULOCYTES # BLD AUTO: 0.07 THOUSAND/UL (ref 0–0.2)
IMM GRANULOCYTES NFR BLD AUTO: 1 % (ref 0–2)
INR PPP: 1.04 (ref 0.84–1.19)
KETONES UR STRIP-MCNC: NEGATIVE MG/DL
LEUKOCYTE ESTERASE UR QL STRIP: NEGATIVE
LYMPHOCYTES # BLD AUTO: 2 THOUSANDS/ΜL (ref 0.6–4.47)
LYMPHOCYTES NFR BLD AUTO: 22 % (ref 14–44)
MAGNESIUM SERPL-MCNC: 2.4 MG/DL (ref 1.6–2.6)
MCH RBC QN AUTO: 30.6 PG (ref 26.8–34.3)
MCHC RBC AUTO-ENTMCNC: 32.9 G/DL (ref 31.4–37.4)
MCV RBC AUTO: 93 FL (ref 82–98)
MONOCYTES # BLD AUTO: 0.61 THOUSAND/ΜL (ref 0.17–1.22)
MONOCYTES NFR BLD AUTO: 7 % (ref 4–12)
NEUTROPHILS # BLD AUTO: 5.95 THOUSANDS/ΜL (ref 1.85–7.62)
NEUTS SEG NFR BLD AUTO: 64 % (ref 43–75)
NITRITE UR QL STRIP: NEGATIVE
NON-SQ EPI CELLS URNS QL MICRO: ABNORMAL /HPF
NRBC BLD AUTO-RTO: 0 /100 WBCS
NT-PROBNP SERPL-MCNC: 61 PG/ML
P AXIS: 38 DEGREES
PH UR STRIP.AUTO: 7 [PH]
PLATELET # BLD AUTO: 261 THOUSANDS/UL (ref 149–390)
PMV BLD AUTO: 9.7 FL (ref 8.9–12.7)
POTASSIUM SERPL-SCNC: 3.7 MMOL/L (ref 3.5–5.3)
PR INTERVAL: 174 MS
PROT SERPL-MCNC: 7.7 G/DL (ref 6.4–8.2)
PROT UR STRIP-MCNC: NEGATIVE MG/DL
PROTHROMBIN TIME: 13.5 SECONDS (ref 11.6–14.5)
QRS AXIS: 33 DEGREES
QRSD INTERVAL: 100 MS
QT INTERVAL: 382 MS
QTC INTERVAL: 429 MS
RBC # BLD AUTO: 4.71 MILLION/UL (ref 3.88–5.62)
RBC #/AREA URNS AUTO: ABNORMAL /HPF
SODIUM SERPL-SCNC: 141 MMOL/L (ref 136–145)
SP GR UR STRIP.AUTO: <=1.005 (ref 1–1.03)
T WAVE AXIS: -7 DEGREES
TROPONIN I SERPL-MCNC: <0.02 NG/ML
UROBILINOGEN UR QL STRIP.AUTO: 0.2 E.U./DL
VENTRICULAR RATE: 76 BPM
WBC # BLD AUTO: 9.2 THOUSAND/UL (ref 4.31–10.16)
WBC #/AREA URNS AUTO: ABNORMAL /HPF

## 2020-07-13 PROCEDURE — 85379 FIBRIN DEGRADATION QUANT: CPT | Performed by: EMERGENCY MEDICINE

## 2020-07-13 PROCEDURE — 93005 ELECTROCARDIOGRAM TRACING: CPT

## 2020-07-13 PROCEDURE — 71045 X-RAY EXAM CHEST 1 VIEW: CPT

## 2020-07-13 PROCEDURE — 99285 EMERGENCY DEPT VISIT HI MDM: CPT

## 2020-07-13 PROCEDURE — 83880 ASSAY OF NATRIURETIC PEPTIDE: CPT

## 2020-07-13 PROCEDURE — 71275 CT ANGIOGRAPHY CHEST: CPT

## 2020-07-13 PROCEDURE — 36415 COLL VENOUS BLD VENIPUNCTURE: CPT

## 2020-07-13 PROCEDURE — 99285 EMERGENCY DEPT VISIT HI MDM: CPT | Performed by: EMERGENCY MEDICINE

## 2020-07-13 PROCEDURE — 96374 THER/PROPH/DIAG INJ IV PUSH: CPT

## 2020-07-13 PROCEDURE — 83735 ASSAY OF MAGNESIUM: CPT | Performed by: EMERGENCY MEDICINE

## 2020-07-13 PROCEDURE — 85730 THROMBOPLASTIN TIME PARTIAL: CPT | Performed by: EMERGENCY MEDICINE

## 2020-07-13 PROCEDURE — 84484 ASSAY OF TROPONIN QUANT: CPT

## 2020-07-13 PROCEDURE — 94640 AIRWAY INHALATION TREATMENT: CPT

## 2020-07-13 PROCEDURE — 81001 URINALYSIS AUTO W/SCOPE: CPT | Performed by: EMERGENCY MEDICINE

## 2020-07-13 PROCEDURE — 93010 ELECTROCARDIOGRAM REPORT: CPT | Performed by: INTERNAL MEDICINE

## 2020-07-13 PROCEDURE — 85610 PROTHROMBIN TIME: CPT | Performed by: EMERGENCY MEDICINE

## 2020-07-13 PROCEDURE — 85025 COMPLETE CBC W/AUTO DIFF WBC: CPT

## 2020-07-13 PROCEDURE — 80053 COMPREHEN METABOLIC PANEL: CPT

## 2020-07-13 RX ORDER — PREDNISONE 50 MG/1
50 TABLET ORAL DAILY
Qty: 5 TABLET | Refills: 0 | Status: SHIPPED | OUTPATIENT
Start: 2020-07-13 | End: 2020-07-18

## 2020-07-13 RX ORDER — ALBUTEROL SULFATE 90 UG/1
2 AEROSOL, METERED RESPIRATORY (INHALATION) ONCE
Status: DISCONTINUED | OUTPATIENT
Start: 2020-07-13 | End: 2020-07-13

## 2020-07-13 RX ORDER — ALBUTEROL SULFATE 90 UG/1
2 AEROSOL, METERED RESPIRATORY (INHALATION) EVERY 4 HOURS PRN
Qty: 1 INHALER | Refills: 0 | Status: SHIPPED | OUTPATIENT
Start: 2020-07-13 | End: 2020-10-05 | Stop reason: ALTCHOICE

## 2020-07-13 RX ORDER — METHYLPREDNISOLONE SODIUM SUCCINATE 125 MG/2ML
60 INJECTION, POWDER, LYOPHILIZED, FOR SOLUTION INTRAMUSCULAR; INTRAVENOUS ONCE
Status: COMPLETED | OUTPATIENT
Start: 2020-07-13 | End: 2020-07-13

## 2020-07-13 RX ADMIN — ALBUTEROL SULFATE 5 MG: 2.5 SOLUTION RESPIRATORY (INHALATION) at 10:19

## 2020-07-13 RX ADMIN — IOHEXOL 100 ML: 350 INJECTION, SOLUTION INTRAVENOUS at 10:06

## 2020-07-13 RX ADMIN — IPRATROPIUM BROMIDE 0.5 MG: 0.5 SOLUTION RESPIRATORY (INHALATION) at 10:19

## 2020-07-13 RX ADMIN — METHYLPREDNISOLONE SODIUM SUCCINATE 60 MG: 125 INJECTION, POWDER, FOR SOLUTION INTRAMUSCULAR; INTRAVENOUS at 10:21

## 2020-07-13 NOTE — ED PROVIDER NOTES
History  Chief Complaint   Patient presents with    Shortness of Breath     Pt c/o SOB for over a year, pt states that it has gotten progressively worse and that it is worse at night  Pt states that las night was really bad     70-year-old male with history of morbid obesity, DOMINIC currently not on CPAP, hypertension, presents to the ER with complaint of intermittent shortness of breath over the past few months  Patient states that he does not use a CPAP as the mask makes him feel claustrophobic  Patient denies any fevers or chills  Patient denies any dyspnea on exertion  Patient states that shortness of breath is worse when he goes to sleep at night  Patient denies any pedal edema  Patient denies any previous history of congestive heart failure  Patient denies any fevers or chills  Patient denies any chest pain, headaches, weakness, dizziness, focal neuro deficits  Patient came to the ED today as his symptoms are becoming more frequent  History provided by:  Patient      Prior to Admission Medications   Prescriptions Last Dose Informant Patient Reported? Taking?    albuterol (PROVENTIL HFA,VENTOLIN HFA) 90 mcg/act inhaler   No No   Sig: Inhale 2 puffs every 4 (four) hours as needed for wheezing   amLODIPine (NORVASC) 5 mg tablet   No No   Sig: TAKE ONE TABLET BY MOUTH EVERY DAY   benzonatate (TESSALON PERLES) 100 mg capsule   No No   Sig: Take 1 capsule (100 mg total) by mouth every 8 (eight) hours   lisinopril-hydrochlorothiazide (PRINZIDE,ZESTORETIC) 20-25 MG per tablet   No No   Sig: TAKE ONE TABLET BY MOUTH EVERY DAY   omeprazole (PriLOSEC) 20 mg delayed release capsule   No No   Sig: Take 1 capsule (20 mg total) by mouth 2 (two) times a day   timolol (BETIMOL) 0 25 % ophthalmic solution  Self Yes No   Sig: Administer 1-2 drops to both eyes daily at bedtime    traZODone (DESYREL) 100 mg tablet   No No   Sig: Take 1 tablet (100 mg total) by mouth daily at bedtime   Patient not taking: Reported on 10/31/2019   zolpidem (AMBIEN) 10 mg tablet   No No   Sig: Take 1 tablet (10 mg total) by mouth daily at bedtime as needed for sleep      Facility-Administered Medications: None       Past Medical History:   Diagnosis Date    Borderline diabetes     Chronic pain disorder     lower back-s/p laminectomy    Claustrophobia     CPAP (continuous positive airway pressure) dependence     Diverticulitis     GERD (gastroesophageal reflux disease)     Hypertension     Kidney stone     x 2 episodes    Localized pain of joint     Mild sleep apnea     CPAP started in July having difficulty using- only has used on occ    Morbid obesity with body mass index (BMI) of 50 0 to 59 9 in adult [facility])     Rectal bleeding     occ rectal bleeding last episode 6/19    Skin tag     skin tags were removed-as of 8/2/19 has a few more    Wears glasses        Past Surgical History:   Procedure Laterality Date    BACK SURGERY      laminectomy-1996, 2010    COLONOSCOPY      x2    ESOPHAGOGASTRODUODENOSCOPY N/A 3/30/2019    Procedure: ESOPHAGOGASTRODUODENOSCOPY (EGD); Surgeon: Mylene Avalos MD;  Location: [address] [place] [address];  Service: Gastroenterology    KNEE ARTHROSCOPY Left 2007    [place] ESOPHAGOGASTRODUODENOSCOPY TRANSORAL DIAGNOSTIC N/A 5/1/2019    Procedure: ESOPHAGOGASTRODUODENOSCOPY (EGD); Surgeon: Mylene Avalso MD;  Location: [facility]; Service: Gastroenterology    UPPER GASTROINTESTINAL ENDOSCOPY  2006       Family History   Problem Relation Age of Onset    Osteoarthritis Mother     Obesity Father         [id number]    Sleep apnea Father         with CPAP    No Known Problems Sister     No Known Problems Brother     Lupus Sister     No Known Problems Sister     No Known Problems Son     Substance Abuse Neg Hx     Mental illness Neg Hx     Cancer Neg Hx     Diabetes Neg Hx     Heart disease Neg Hx     Stroke Neg Hx      I have reviewed and agree with the history as documented      E-Cigarette/Vaping    E-Cigarette Use Never User      E-Cigarette/Vaping Substances     Social History     Tobacco Use    Smoking status: Never Smoker    Smokeless tobacco: Never Used   Substance Use Topics    Alcohol use: No    Drug use: No       Review of Systems   Constitutional: Negative for activity change, fatigue and fever  HENT: Negative for congestion, ear discharge and sore throat  Eyes: Negative for pain and redness  Respiratory: Positive for shortness of breath  Negative for cough, chest tightness and wheezing  Cardiovascular: Negative for chest pain  Gastrointestinal: Negative for abdominal pain, diarrhea, nausea and vomiting  Endocrine: Negative for cold intolerance  Genitourinary: Negative for dysuria and urgency  Musculoskeletal: Negative for arthralgias and back pain  Neurological: Negative for dizziness, weakness and headaches  Psychiatric/Behavioral: Negative for agitation and behavioral problems  Physical Exam  Physical Exam   Constitutional: He is oriented to person, place, and time  He appears well-developed and well-nourished  Morbidly obese male   HENT:   Head: Normocephalic and atraumatic  Nose: Nose normal    Mouth/Throat: Oropharynx is clear and moist    Eyes: Conjunctivae and EOM are normal    Neck: Normal range of motion  Neck supple  Cardiovascular: Normal rate, regular rhythm and normal heart sounds  Pulmonary/Chest: Effort normal and breath sounds normal    Lungs are clear to auscultation bilateral    Abdominal: Soft  Bowel sounds are normal  He exhibits no distension  There is no tenderness  Musculoskeletal: Normal range of motion  Neurological: He is alert and oriented to person, place, and time  Skin: Skin is warm  Psychiatric: He has a normal mood and affect  His behavior is normal  Judgment and thought content normal    Nursing note and vitals reviewed        Vital Signs  ED Triage Vitals   Temperature Pulse Respirations Blood Pressure SpO2   07/13/20 0812 07/13/20 6487 07/13/20 0812 07/13/20 1000 07/13/20 0812   (!) 96 9 °F (36 1 °C) 86 22 152/70 99 %      Temp Source Heart Rate Source Patient Position - Orthostatic VS BP Location FiO2 (%)   07/13/20 0812 07/13/20 0812 07/13/20 1000 07/13/20 1000 --   Tympanic Monitor Sitting Right arm       Pain Score       --                  Vitals:    07/13/20 0812 07/13/20 1000 07/13/20 1030 07/13/20 1100   BP:  152/70 155/67 145/65   Pulse: 86 74 72 72   Patient Position - Orthostatic VS:  Sitting Sitting          Visual Acuity      ED Medications  Medications   iohexol (OMNIPAQUE) 350 MG/ML injection (MULTI-DOSE) 100 mL (100 mL Intravenous Given 7/13/20 1006)   ipratropium (ATROVENT) 0 02 % inhalation solution 0 5 mg (0 5 mg Nebulization Given 7/13/20 1019)   methylPREDNISolone sodium succinate (Solu-MEDROL) injection 60 mg (60 mg Intravenous Given 7/13/20 1021)   albuterol inhalation solution 5 mg (5 mg Nebulization Given 7/13/20 1019)       Diagnostic Studies  Results Reviewed     Procedure Component Value Units Date/Time    UA w Reflex to Microscopic w Reflex to Culture [[id number]] Collected:  07/13/20 1147    Lab Status:   In process Specimen:  Urine, Clean Catch Updated:  07/13/20 1150    D-Dimer [[id number]]  (Abnormal) Collected:  07/13/20 0907    Lab Status:  Final result Specimen:  Blood from Arm, Left Updated:  07/13/20 0933     D-Dimer, Quant 0 84 ug/ml FEU     Protime-INR [[id number]]  (Normal) Collected:  07/13/20 0907    Lab Status:  Final result Specimen:  Blood from Arm, Left Updated:  07/13/20 0929     Protime 13 5 seconds      INR 1 04    APTT [[id number]]  (Normal) Collected:  07/13/20 0907    Lab Status:  Final result Specimen:  Blood from Arm, Left Updated:  07/13/20 0929     PTT 28 seconds     NT-BNP PRO [[id number]]  (Normal) Collected:  07/13/20 0847    Lab Status:  Final result Specimen:  Blood from Arm, Left Updated:  07/13/20 0923     NT-proBNP 61 pg/mL     Magnesium [[id number]]  (Normal) Collected:  07/13/20 0847 Lab Status:  Final result Specimen:  Blood from Arm, Left Updated:  07/13/20 0923     Magnesium 2 4 mg/dL     Troponin I [[id number]]  (Normal) Collected:  07/13/20 0847    Lab Status:  Final result Specimen:  Blood from Arm, Left Updated:  07/13/20 0912     Troponin I <0 02 ng/mL     Comprehensive metabolic panel [[id number]] Collected:  07/13/20 0847    Lab Status:  Final result Specimen:  Blood from Arm, Left Updated:  07/13/20 0910     Sodium 141 mmol/L      Potassium 3 7 mmol/L      Chloride 105 mmol/L      CO2 29 mmol/L      ANION GAP 7 mmol/L      BUN 21 mg/dL      Creatinine 1 26 mg/dL      Glucose 115 mg/dL      Calcium 9 2 mg/dL      AST 28 U/L      ALT 41 U/L      Alkaline Phosphatase 69 U/L      Total Protein 7 7 g/dL      Albumin 3 7 g/dL      Total Bilirubin 0 60 mg/dL      eGFR 63 ml/min/1 73sq m     Narrative:       [facility] guidelines for Chronic Kidney Disease (CKD):     Stage 1 with normal or high GFR (GFR > 90 mL/min/1 73 square meters)    Stage 2 Mild CKD (GFR = 60-89 mL/min/1 73 square meters)    Stage 3A Moderate CKD (GFR = 45-59 mL/min/1 73 square meters)    Stage 3B Moderate CKD (GFR = 30-44 mL/min/1 73 square meters)    Stage 4 Severe CKD (GFR = 15-29 mL/min/1 73 square meters)    Stage 5 End Stage CKD (GFR <15 mL/min/1 73 square meters)  Note: GFR calculation is accurate only with a steady state creatinine    CBC and differential [[id number]] Collected:  07/13/20 0847    Lab Status:  Final result Specimen:  Blood from Arm, Left Updated:  07/13/20 0854     WBC 9 20 Thousand/uL      RBC 4 71 Million/uL      Hemoglobin 14 4 g/dL      Hematocrit 43 8 %      MCV 93 fL      MCH 30 6 pg      MCHC 32 9 g/dL      RDW 12 9 %      MPV 9 7 fL      Platelets 381 Thousands/uL      nRBC 0 /100 WBCs      Neutrophils Relative 64 %      Immat GRANS % 1 %      Lymphocytes Relative 22 %      Monocytes Relative 7 %      Eosinophils Relative 5 %      Basophils Relative 1 % Neutrophils Absolute 5 95 Thousands/µL      Immature Grans Absolute 0 07 Thousand/uL      Lymphocytes Absolute 2 00 Thousands/µL      Monocytes Absolute 0 61 Thousand/µL      Eosinophils Absolute 0 50 Thousand/µL      Basophils Absolute 0 07 Thousands/µL                  CTA ED chest PE Study   Final Result by Ramya Elder MD (07/13 1040)      No pulmonary embolus      No acute pulmonary disease  2 5 cm left thyroid nodule  Follow-up with nonemergent ultrasound is recommended  The study was marked in [facility] for immediate notification and follow-up  Workstation performed: [id number]         XR chest 1 view portable   Final Result by Girma Barbour MD (07/13 1704)      No acute cardiopulmonary disease  Workstation performed: [id number]                    Procedures  ECG 12 Lead Documentation Only  Date/Time: 7/13/2020 8:11 AM  Performed by: Silas Hopson DO  Authorized by: Silas Hopson DO     Indications / Diagnosis:  Shortness of breath  ECG reviewed by me, the ED Provider: yes    Patient location:  ED  Previous ECG:     Previous ECG:  Compared to current    Similarity:  No change    Comparison to cardiac monitor: Yes    Interpretation:     Interpretation: abnormal    Comments:      Sinus rhythm, rate 76, normal axis, normal intervals, Q-wave noted to lead 3, T-wave inversions noted to lead 3, nonspecific T-wave abnormalities, essentially unchanged EKG from previous study  ED Course  ED Course as of Jul 13 1151   Mon Jul 13, 2020   1119 Patient re-examined at bedside  Patient feels much better after steroids and neb treatment  US AUDIT      Most Recent Value   Initial Alcohol Screen: US AUDIT-C    1  How often do you have a drink containing alcohol? 1 Filed at: 07/13/2020 0813   2  How many drinks containing alcohol do you have on a typical day you are drinking? 0 Filed at: 07/13/2020 0813   3a  Male UNDER 65:  How often do you have five or more drinks on one occasion? 1 Filed at: 07/13/2020 0813   Audit-C Score  2 Filed at: 07/13/2020 0813                  KRAIG/DAST-10      Most Recent Value   How many times in the past year have you    Used an illegal drug or used a prescription medication for non-medical reasons? Never Filed at: 07/13/2020 5865                    Wells' Criteria for PE      Most Recent Value   Wells' Criteria for PE   Clinical signs and symptoms of DVT  0 Filed at: 07/13/2020 1002   PE is primary diagnosis or equally likely  3 Filed at: 07/13/2020 1002   HR >100  0 Filed at: 07/13/2020 1002   Immobilization at least 3 days or Surgery in the previous 4 weeks  0 Filed at: 07/13/2020 1002   Previous, objectively diagnosed PE or DVT  0 Filed at: 07/13/2020 1002   Hemoptysis  0 Filed at: 07/13/2020 1002   Malignancy with treatment within 6 months or palliative  0 Filed at: 07/13/2020 1002   Wells' Criteria Total  3 Filed at: 07/13/2020 1002                  MDM  Number of Diagnoses or Management Options  Bronchitis: new and requires workup  Dyspnea: new and requires workup  DOMINIC (obstructive sleep apnea): new and requires workup  Thyroid nodule: new and requires workup  Diagnosis management comments: Obtain blood work, EKG, chest x-ray  Give steroids, neb treatment and reassess       Amount and/or Complexity of Data Reviewed  Clinical lab tests: ordered and reviewed  Tests in the radiology section of CPT®: ordered and reviewed  Tests in the medicine section of CPT®: ordered and reviewed  Review and summarize past medical records: yes  Independent visualization of images, tracings, or specimens: yes    Risk of Complications, Morbidity, and/or Mortality  General comments: Patient's D-dimer was elevated in the ED  Patient subsequently underwent CTA lungs PE study that was negative for any pulmonary pathology including PE  CTA did show concern for thyroid nodule    Patient felt significantly better with his shortness of breath after neb and steroids were given  At this time patient's shortness of breath may be a combination bronchitis  This time patient is discharged home with prednisone burst, albuterol with follow-up to PCP for further evaluation of his shortness of breath as well as for follow-up of thyroid nodule with an outpatient ultrasound thyroid  Patient is not compliant with his CPAP as he has history of obstructive sleep apnea  I encouraged patient to follow-up with his pulmonologist again and get his CPAP readjusted  I had a long discussion with patient regarding the importance of compliance with CPAP for obstructive sleep apnea  Close return instructions given to return to the ER for any worsening symptoms  Patient agrees with discharge plan  Patient well appearing at time of discharge  Patient Progress  Patient progress: improved        Disposition  Final diagnoses:   Dyspnea   Bronchitis   Thyroid nodule   DOMINIC (obstructive sleep apnea)     Time reflects when diagnosis was documented in both MDM as applicable and the Disposition within this note     Time User Action Codes Description Comment    7/13/2020 11:45 AM Chacha Sierraat Add [R06 00] Dyspnea     7/13/2020 11:45 AM Blanca Cohn Shutter Add [J40] Bronchitis     7/13/2020 11:45 AM Blanca Cohner Add [E04 1] Thyroid nodule     7/13/2020 11:45 AM CelinausBlanca Shutter Add [G47 33] DOMINIC (obstructive sleep apnea)       ED Disposition     ED Disposition Condition Date/Time Comment    Discharge Stable Mon Jul 13, 2020 11:45 AM Susi Miller discharge to home/self care  Follow-up Information     Follow up With Specialties Details Why Contact Info    Malathi Ramsey MD Family Medicine  Please follow-up for further evaluation of your shortness of breath as well as your CT finding to the that showed thyroid nodule  You will need a follow-up ultrasound thyroid   [address] [facility]  [phone]          Please call and follow-up with her pulmonologist as soon as possible for further management of your obstructive sleep apnea and CPAP machine use  Patient's Medications   Discharge Prescriptions    ALBUTEROL (PROVENTIL HFA,VENTOLIN HFA) 90 MCG/ACT INHALER    Inhale 2 puffs every 4 (four) hours as needed for wheezing       Start Date: 7/13/2020 End Date: --       Order Dose: 2 puffs       Quantity: 1 Inhaler    Refills: 0    PREDNISONE 50 MG TABLET    Take 1 tablet (50 mg total) by mouth daily for 5 days       Start Date: 7/13/2020 End Date: 7/18/2020       Order Dose: 50 mg       Quantity: 5 tablet    Refills: 0     No discharge procedures on file      PDMP Review     None          ED Provider  Electronically Signed by           Della Mccabe DO  07/13/20 0681 10

## 2020-07-13 NOTE — ED NOTES
Patient transported to [facility] via wheelchair       Chuck Robison RN  07/13/20 1002

## 2020-07-14 ENCOUNTER — VBI (OUTPATIENT)
Dept: FAMILY MEDICINE CLINIC | Facility: CLINIC | Age: 55
End: 2020-07-14

## 2020-07-14 NOTE — TELEPHONE ENCOUNTER
Narayan Melgar    ED Visit Information     Ed visit date: 7/13/2020  Diagnosis Description: SOB  In Network? Yes [place]  Discharge status: Home  Discharged with meds ? Yes  Number of ED visits to date: 1  N/A    Outreach Information    Outreach successful: Yes 1  Date letter mailedN/A  Date Finalized 7/14/20    Care Coordination    Follow up appointment with pcp: no PATIENT WILL CALL BACK TO SCHEDULE  Transportation issues ?  No    Value [employer]

## 2020-10-05 ENCOUNTER — OFFICE VISIT (OUTPATIENT)
Dept: FAMILY MEDICINE CLINIC | Facility: CLINIC | Age: 55
End: 2020-10-05
Payer: MEDICARE

## 2020-10-05 VITALS
RESPIRATION RATE: 20 BRPM | OXYGEN SATURATION: 98 % | HEART RATE: 86 BPM | SYSTOLIC BLOOD PRESSURE: 124 MMHG | WEIGHT: 315 LBS | DIASTOLIC BLOOD PRESSURE: 78 MMHG | HEIGHT: 75 IN | BODY MASS INDEX: 39.17 KG/M2 | TEMPERATURE: 97.9 F

## 2020-10-05 DIAGNOSIS — E04.1 THYROID NODULE: Primary | ICD-10-CM

## 2020-10-05 DIAGNOSIS — E66.01 MORBID OBESITY WITH BODY MASS INDEX (BMI) OF 50.0 TO 59.9 IN ADULT (HCC): ICD-10-CM

## 2020-10-05 DIAGNOSIS — G89.29 CHRONIC MIDLINE THORACIC BACK PAIN: ICD-10-CM

## 2020-10-05 DIAGNOSIS — M54.6 CHRONIC MIDLINE THORACIC BACK PAIN: ICD-10-CM

## 2020-10-05 DIAGNOSIS — I10 ESSENTIAL HYPERTENSION: ICD-10-CM

## 2020-10-05 DIAGNOSIS — R73.09 ELEVATED GLUCOSE: ICD-10-CM

## 2020-10-05 PROBLEM — K62.5 BRBPR (BRIGHT RED BLOOD PER RECTUM): Status: RESOLVED | Noted: 2019-07-03 | Resolved: 2020-10-05

## 2020-10-05 LAB — SL AMB POCT HEMOGLOBIN AIC: 5.4 (ref ?–6.5)

## 2020-10-05 PROCEDURE — 83036 HEMOGLOBIN GLYCOSYLATED A1C: CPT | Performed by: FAMILY MEDICINE

## 2020-10-05 PROCEDURE — 99214 OFFICE O/P EST MOD 30 MIN: CPT | Performed by: FAMILY MEDICINE

## 2020-10-12 DIAGNOSIS — K20.0 EOSINOPHILIC ESOPHAGITIS: ICD-10-CM

## 2020-10-12 RX ORDER — OMEPRAZOLE 20 MG/1
CAPSULE, DELAYED RELEASE ORAL
Qty: 60 CAPSULE | Refills: 5 | Status: SHIPPED | OUTPATIENT
Start: 2020-10-12 | End: 2021-08-25

## 2020-10-14 ENCOUNTER — HOSPITAL ENCOUNTER (OUTPATIENT)
Dept: RADIOLOGY | Facility: HOSPITAL | Age: 55
Discharge: HOME | End: 2020-10-14
Payer: MEDICARE

## 2020-10-14 DIAGNOSIS — E04.1 THYROID NODULE: ICD-10-CM

## 2020-10-14 PROCEDURE — 76536 US EXAM OF HEAD AND NECK: CPT

## 2020-10-16 ENCOUNTER — TELEPHONE (OUTPATIENT)
Dept: FAMILY MEDICINE CLINIC | Facility: CLINIC | Age: 55
End: 2020-10-16

## 2020-10-28 ENCOUNTER — TELEMEDICINE (OUTPATIENT)
Dept: FAMILY MEDICINE CLINIC | Facility: CLINIC | Age: 55
End: 2020-10-28
Payer: MEDICARE

## 2020-10-28 DIAGNOSIS — E04.1 THYROID NODULE: Primary | ICD-10-CM

## 2020-10-28 PROCEDURE — 99213 OFFICE O/P EST LOW 20 MIN: CPT | Performed by: FAMILY MEDICINE

## 2020-11-05 DIAGNOSIS — E66.01 MORBID OBESITY WITH BODY MASS INDEX (BMI) OF 50.0 TO 59.9 IN ADULT (HCC): ICD-10-CM

## 2020-11-05 DIAGNOSIS — Z13.29 SCREENING FOR HYPOTHYROIDISM: ICD-10-CM

## 2020-11-05 DIAGNOSIS — Z11.4 SCREENING FOR HIV (HUMAN IMMUNODEFICIENCY VIRUS): ICD-10-CM

## 2020-11-05 DIAGNOSIS — Z11.59 NEED FOR HEPATITIS C SCREENING TEST: ICD-10-CM

## 2020-11-05 DIAGNOSIS — Z12.5 ENCOUNTER FOR PROSTATE CANCER SCREENING: ICD-10-CM

## 2020-11-05 DIAGNOSIS — R73.09 ELEVATED GLUCOSE: ICD-10-CM

## 2020-11-05 DIAGNOSIS — I10 ESSENTIAL HYPERTENSION: ICD-10-CM

## 2020-11-05 DIAGNOSIS — Z13.220 SCREENING FOR HYPERLIPIDEMIA: ICD-10-CM

## 2020-11-05 DIAGNOSIS — E11.9 TYPE 2 DIABETES MELLITUS WITHOUT COMPLICATION, WITHOUT LONG-TERM CURRENT USE OF INSULIN (HCC): ICD-10-CM

## 2020-11-05 DIAGNOSIS — Z00.00 ENCOUNTER FOR MEDICARE ANNUAL WELLNESS EXAM: Primary | ICD-10-CM

## 2020-11-22 DIAGNOSIS — I10 ESSENTIAL HYPERTENSION: ICD-10-CM

## 2020-11-22 RX ORDER — AMLODIPINE BESYLATE 5 MG/1
TABLET ORAL
Qty: 90 TABLET | Refills: 1 | Status: SHIPPED | OUTPATIENT
Start: 2020-11-22 | End: 2020-11-23 | Stop reason: SDUPTHER

## 2020-11-23 DIAGNOSIS — I10 ESSENTIAL HYPERTENSION: ICD-10-CM

## 2020-11-23 RX ORDER — AMLODIPINE BESYLATE 5 MG/1
5 TABLET ORAL DAILY
Qty: 90 TABLET | Refills: 1 | Status: SHIPPED | OUTPATIENT
Start: 2020-11-23 | End: 2021-05-30

## 2020-12-22 ENCOUNTER — LAB (OUTPATIENT)
Dept: LAB | Facility: HOSPITAL | Age: 55
End: 2020-12-22
Payer: MEDICARE

## 2020-12-22 ENCOUNTER — TRANSCRIBE ORDERS (OUTPATIENT)
Dept: ADMINISTRATIVE | Facility: HOSPITAL | Age: 55
End: 2020-12-22

## 2020-12-22 DIAGNOSIS — I10 ESSENTIAL HYPERTENSION: ICD-10-CM

## 2020-12-22 DIAGNOSIS — Z11.4 SCREENING FOR HIV (HUMAN IMMUNODEFICIENCY VIRUS): ICD-10-CM

## 2020-12-22 DIAGNOSIS — Z12.5 ENCOUNTER FOR PROSTATE CANCER SCREENING: ICD-10-CM

## 2020-12-22 DIAGNOSIS — Z11.59 NEED FOR HEPATITIS C SCREENING TEST: ICD-10-CM

## 2020-12-22 DIAGNOSIS — E11.9 TYPE 2 DIABETES MELLITUS WITHOUT COMPLICATION, WITHOUT LONG-TERM CURRENT USE OF INSULIN (HCC): ICD-10-CM

## 2020-12-22 DIAGNOSIS — Z00.00 ENCOUNTER FOR MEDICARE ANNUAL WELLNESS EXAM: ICD-10-CM

## 2020-12-22 DIAGNOSIS — R73.09 ELEVATED GLUCOSE: ICD-10-CM

## 2020-12-22 DIAGNOSIS — E66.01 MORBID OBESITY WITH BODY MASS INDEX (BMI) OF 50.0 TO 59.9 IN ADULT (HCC): ICD-10-CM

## 2020-12-22 DIAGNOSIS — Z13.29 SCREENING FOR HYPOTHYROIDISM: ICD-10-CM

## 2020-12-22 DIAGNOSIS — Z13.220 SCREENING FOR HYPERLIPIDEMIA: ICD-10-CM

## 2020-12-22 LAB
ALBUMIN SERPL BCP-MCNC: 3.3 G/DL (ref 3.5–5)
ALP SERPL-CCNC: 77 U/L (ref 46–116)
ALT SERPL W P-5'-P-CCNC: 37 U/L (ref 12–78)
ANION GAP SERPL CALCULATED.3IONS-SCNC: 4 MMOL/L (ref 4–13)
AST SERPL W P-5'-P-CCNC: 21 U/L (ref 5–45)
BASOPHILS # BLD AUTO: 0.1 THOUSANDS/ΜL (ref 0–0.1)
BASOPHILS NFR BLD AUTO: 1 % (ref 0–1)
BILIRUB SERPL-MCNC: 0.3 MG/DL (ref 0.2–1)
BUN SERPL-MCNC: 15 MG/DL (ref 5–25)
CALCIUM ALBUM COR SERPL-MCNC: 10 MG/DL (ref 8.3–10.1)
CALCIUM SERPL-MCNC: 9.4 MG/DL (ref 8.3–10.1)
CHLORIDE SERPL-SCNC: 104 MMOL/L (ref 100–108)
CHOLEST SERPL-MCNC: 146 MG/DL (ref 50–200)
CO2 SERPL-SCNC: 31 MMOL/L (ref 21–32)
CREAT SERPL-MCNC: 1.18 MG/DL (ref 0.6–1.3)
CREAT UR-MCNC: 212 MG/DL
EOSINOPHIL # BLD AUTO: 0.52 THOUSAND/ΜL (ref 0–0.61)
EOSINOPHIL NFR BLD AUTO: 6 % (ref 0–6)
ERYTHROCYTE [DISTWIDTH] IN BLOOD BY AUTOMATED COUNT: 12.8 % (ref 11.6–15.1)
EST. AVERAGE GLUCOSE BLD GHB EST-MCNC: 128 MG/DL
GFR SERPL CREATININE-BSD FRML MDRD: 68 ML/MIN/1.73SQ M
GLUCOSE P FAST SERPL-MCNC: 116 MG/DL (ref 65–99)
HBA1C MFR BLD: 6.1 %
HCT VFR BLD AUTO: 47.9 % (ref 36.5–49.3)
HCV AB SER QL: NORMAL
HDLC SERPL-MCNC: 33 MG/DL
HGB BLD-MCNC: 15.1 G/DL (ref 12–17)
IMM GRANULOCYTES # BLD AUTO: 0.05 THOUSAND/UL (ref 0–0.2)
IMM GRANULOCYTES NFR BLD AUTO: 1 % (ref 0–2)
LDLC SERPL CALC-MCNC: 95 MG/DL (ref 0–100)
LYMPHOCYTES # BLD AUTO: 2.21 THOUSANDS/ΜL (ref 0.6–4.47)
LYMPHOCYTES NFR BLD AUTO: 23 % (ref 14–44)
MCH RBC QN AUTO: 29.7 PG (ref 26.8–34.3)
MCHC RBC AUTO-ENTMCNC: 31.5 G/DL (ref 31.4–37.4)
MCV RBC AUTO: 94 FL (ref 82–98)
MICROALBUMIN UR-MCNC: 11.9 MG/L (ref 0–20)
MICROALBUMIN/CREAT 24H UR: 6 MG/G CREATININE (ref 0–30)
MONOCYTES # BLD AUTO: 0.59 THOUSAND/ΜL (ref 0.17–1.22)
MONOCYTES NFR BLD AUTO: 6 % (ref 4–12)
NEUTROPHILS # BLD AUTO: 5.98 THOUSANDS/ΜL (ref 1.85–7.62)
NEUTS SEG NFR BLD AUTO: 63 % (ref 43–75)
NONHDLC SERPL-MCNC: 113 MG/DL
NRBC BLD AUTO-RTO: 0 /100 WBCS
PLATELET # BLD AUTO: 282 THOUSANDS/UL (ref 149–390)
PMV BLD AUTO: 9.4 FL (ref 8.9–12.7)
POTASSIUM SERPL-SCNC: 4.4 MMOL/L (ref 3.5–5.3)
PROT SERPL-MCNC: 7.7 G/DL (ref 6.4–8.2)
PSA SERPL-MCNC: 2.4 NG/ML (ref 0–4)
RBC # BLD AUTO: 5.08 MILLION/UL (ref 3.88–5.62)
SODIUM SERPL-SCNC: 139 MMOL/L (ref 136–145)
TRIGL SERPL-MCNC: 88 MG/DL
TSH SERPL DL<=0.05 MIU/L-ACNC: 1.58 UIU/ML (ref 0.36–3.74)
WBC # BLD AUTO: 9.45 THOUSAND/UL (ref 4.31–10.16)

## 2020-12-22 PROCEDURE — 86803 HEPATITIS C AB TEST: CPT

## 2020-12-22 PROCEDURE — 83036 HEMOGLOBIN GLYCOSYLATED A1C: CPT

## 2020-12-22 PROCEDURE — 84443 ASSAY THYROID STIM HORMONE: CPT

## 2020-12-22 PROCEDURE — G0103 PSA SCREENING: HCPCS

## 2020-12-22 PROCEDURE — 82570 ASSAY OF URINE CREATININE: CPT

## 2020-12-22 PROCEDURE — 87389 HIV-1 AG W/HIV-1&-2 AB AG IA: CPT

## 2020-12-22 PROCEDURE — 36415 COLL VENOUS BLD VENIPUNCTURE: CPT

## 2020-12-22 PROCEDURE — 80053 COMPREHEN METABOLIC PANEL: CPT

## 2020-12-22 PROCEDURE — 85025 COMPLETE CBC W/AUTO DIFF WBC: CPT

## 2020-12-22 PROCEDURE — 80061 LIPID PANEL: CPT

## 2020-12-22 PROCEDURE — 82043 UR ALBUMIN QUANTITATIVE: CPT

## 2020-12-23 LAB — HIV 1+2 AB+HIV1 P24 AG SERPL QL IA: NORMAL

## 2020-12-28 ENCOUNTER — HOSPITAL ENCOUNTER (OUTPATIENT)
Dept: RADIOLOGY | Facility: HOSPITAL | Age: 55
Discharge: HOME | End: 2020-12-28
Admitting: PHYSICIAN ASSISTANT
Payer: MEDICARE

## 2020-12-28 DIAGNOSIS — E04.1 THYROID NODULE: ICD-10-CM

## 2020-12-28 PROCEDURE — 88173 CYTOPATH EVAL FNA REPORT: CPT | Performed by: PATHOLOGY

## 2020-12-28 PROCEDURE — 10005 FNA BX W/US GDN 1ST LES: CPT

## 2020-12-28 PROCEDURE — 88172 CYTP DX EVAL FNA 1ST EA SITE: CPT | Performed by: PATHOLOGY

## 2020-12-28 PROCEDURE — 10006 FNA BX W/US GDN EA ADDL: CPT

## 2020-12-28 RX ORDER — LIDOCAINE HYDROCHLORIDE 10 MG/ML
5 INJECTION, SOLUTION EPIDURAL; INFILTRATION; INTRACAUDAL; PERINEURAL ONCE
Status: COMPLETED | OUTPATIENT
Start: 2020-12-28 | End: 2020-12-28

## 2020-12-28 RX ADMIN — LIDOCAINE HYDROCHLORIDE 5 ML: 10 INJECTION, SOLUTION EPIDURAL; INFILTRATION; INTRACAUDAL; PERINEURAL at 09:10

## 2021-01-19 VITALS — WEIGHT: 315 LBS | BODY MASS INDEX: 39.17 KG/M2 | HEIGHT: 75 IN

## 2021-01-20 ENCOUNTER — TELEMEDICINE (OUTPATIENT)
Dept: FAMILY MEDICINE CLINIC | Facility: CLINIC | Age: 56
End: 2021-01-20
Payer: MEDICARE

## 2021-01-20 DIAGNOSIS — E66.01 MORBID OBESITY WITH BODY MASS INDEX (BMI) OF 50.0 TO 59.9 IN ADULT (HCC): ICD-10-CM

## 2021-01-20 DIAGNOSIS — R73.09 ELEVATED GLUCOSE: ICD-10-CM

## 2021-01-20 DIAGNOSIS — E04.1 THYROID NODULE: Primary | ICD-10-CM

## 2021-01-20 PROCEDURE — 99213 OFFICE O/P EST LOW 20 MIN: CPT | Performed by: FAMILY MEDICINE

## 2021-01-20 NOTE — PROGRESS NOTES
Virtual Regular Visit      Assessment/Plan:    Problem List Items Addressed This Visit        Endocrine    Thyroid nodule - Primary       Other    Morbid obesity with body mass index (BMI) of 50 0 to 59 9 in [facility])    Elevated glucose               Reason for visit is   Chief Complaint   Patient presents with    Follow-up     bw test results    Virtual Regular Visit        Encounter provider Chavez Koenig MD    Provider located at [address] [place] [address]  [phone]      Recent Visits  No visits were found meeting these conditions  Showing recent visits within past 7 days and meeting all other requirements     Today's Visits  Date Type Provider Dept   01/20/21 Telemedicine Chavez Koenig MD [facility]   Showing today's visits and meeting all other requirements     Future Appointments  No visits were found meeting these conditions  Showing future appointments within next 150 days and meeting all other requirements        The patient was identified by name and date of birth  Adela Noguera was informed that this is a telemedicine visit and that the visit is being conducted through [facility] and patient was informed that this is a secure, HIPAA-compliant platform  He agrees to proceed     My office door was closed  No one else was in the room  He acknowledged consent and understanding of privacy and security of the video platform  The patient has agreed to participate and understands they can discontinue the visit at any time  Patient is aware this is a billable service  Subjective  Adela Noguera is a 60 y o  male            REVIEW OF STUDIES    DISCUSSED THYROID BX RESULTS  RELATED BENIGN NATURE  PATIENT DOES FEEL HE NOTICES THE NODULES ON SWALLOWING  ENCOURAGED HIM TO DISCUSS WITH ENT    REVIEWED BW  DISCUSSED ELEVATED GLUCOSE  REVIEWED DIETARY MODIFICATION OF CARBOHYDRATES       Past Medical History: Diagnosis Date    Borderline diabetes     Chronic pain disorder     lower back-s/p laminectomy    Claustrophobia     CPAP (continuous positive airway pressure) dependence     Diverticulitis     GERD (gastroesophageal reflux disease)     Hypertension     Kidney stone     x 2 episodes    Localized pain of joint     Mild sleep apnea     CPAP started in July having difficulty using- only has used on occ    Morbid obesity with body mass index (BMI) of 50 0 to 59 9 in adult [facility])     Rectal bleeding     occ rectal bleeding last episode 6/19    Skin tag     skin tags were removed-as of 8/2/19 has a few more    Wears glasses        Past Surgical History:   Procedure Laterality Date    BACK SURGERY      laminectomy-1996, 2010    COLONOSCOPY      x2    ESOPHAGOGASTRODUODENOSCOPY N/A 3/30/2019    Procedure: ESOPHAGOGASTRODUODENOSCOPY (EGD); Surgeon: Tabatha Earl MD;  Location: [address] [place] [address];  Service: Gastroenterology    KNEE ARTHROSCOPY Left 2007    [place] ESOPHAGOGASTRODUODENOSCOPY TRANSORAL DIAGNOSTIC N/A 5/1/2019    Procedure: ESOPHAGOGASTRODUODENOSCOPY (EGD); Surgeon: Tabatha Earl MD;  Location: [facility]; Service: Gastroenterology    UPPER GASTROINTESTINAL ENDOSCOPY  2006    US GUIDED THYROID BIOPSY  12/28/2020       Current Outpatient Medications   Medication Sig Dispense Refill    amLODIPine (NORVASC) 5 mg tablet Take 1 tablet (5 mg total) by mouth daily 90 tablet 1    lisinopril-hydrochlorothiazide (PRINZIDE,ZESTORETIC) 20-25 MG per tablet TAKE ONE TABLET BY MOUTH EVERY DAY 90 tablet 3    omeprazole (PriLOSEC) 20 mg delayed release capsule TAKE ONE CAPSULE BY MOUTH TWICE A DAY (GENERIC FOR PRILOSEC) 60 capsule 5     No current facility-administered medications for this visit  Allergies   Allergen Reactions    Lactose GI Intolerance       Review of Systems   Constitutional: Negative for chills, fatigue and fever     HENT: Negative for congestion, ear discharge, ear pain, mouth sores, postnasal drip, sore throat and trouble swallowing  Eyes: Negative for pain, discharge and visual disturbance  Respiratory: Negative for cough, shortness of breath and wheezing  Cardiovascular: Negative for chest pain, palpitations and leg swelling  Gastrointestinal: Negative for abdominal distention, abdominal pain, blood in stool, diarrhea and nausea  Endocrine: Negative for polydipsia, polyphagia and polyuria  Genitourinary: Negative for dysuria, frequency, hematuria and urgency  Musculoskeletal: Negative for arthralgias, gait problem and joint swelling  Skin: Negative for pallor and rash  Neurological: Negative for dizziness, syncope, speech difficulty, weakness, light-headedness, numbness and headaches  Hematological: Negative for adenopathy  Psychiatric/Behavioral: Negative for behavioral problems, confusion and sleep disturbance  The patient is not nervous/anxious  Video Exam    Vitals:    01/19/21 1332   Weight: (!) 191 kg (421 lb)   Height: 6' 3" (1 905 m)       Physical Exam     PATIENT VISUALLY APPEARS WELL IN NO OBVIOUS DISTRESS      I spent 20 minutes directly with the patient during this visit      Randolph Laurent acknowledges that he has consented to an online visit or consultation  He understands that the online visit is based solely on information provided by him, and that, in the absence of a face-to-face physical evaluation by the physician, the diagnosis he receives is both limited and provisional in terms of accuracy and completeness  This is not intended to replace a full medical face-to-face evaluation by the physician  Olive Camacho understands and accepts these terms

## 2021-01-20 NOTE — PATIENT INSTRUCTIONS
CONTINUE CURRENT TREATMENT PLAN  MONITOR DIETARY SODIUM, CHOL, AND CARBOHYDRATE INTAKE  ENCOURAGE PHYSICAL ACTIVITY    RV 3 M, SOONER PRN

## 2021-02-19 ENCOUNTER — OFFICE VISIT (OUTPATIENT)
Dept: FAMILY MEDICINE CLINIC | Facility: CLINIC | Age: 56
End: 2021-02-19
Payer: MEDICARE

## 2021-02-19 VITALS
WEIGHT: 315 LBS | RESPIRATION RATE: 16 BRPM | OXYGEN SATURATION: 96 % | HEART RATE: 90 BPM | TEMPERATURE: 97.5 F | HEIGHT: 75 IN | SYSTOLIC BLOOD PRESSURE: 116 MMHG | DIASTOLIC BLOOD PRESSURE: 70 MMHG | BODY MASS INDEX: 39.17 KG/M2

## 2021-02-19 DIAGNOSIS — M96.1 LUMBAR POST-LAMINECTOMY SYNDROME: ICD-10-CM

## 2021-02-19 DIAGNOSIS — K21.9 GASTROESOPHAGEAL REFLUX DISEASE WITHOUT ESOPHAGITIS: ICD-10-CM

## 2021-02-19 DIAGNOSIS — E66.01 CLASS 3 SEVERE OBESITY WITH BODY MASS INDEX (BMI) OF 45.0 TO 49.9 IN ADULT, UNSPECIFIED OBESITY TYPE, UNSPECIFIED WHETHER SERIOUS COMORBIDITY PRESENT (HCC): ICD-10-CM

## 2021-02-19 DIAGNOSIS — M54.41 CHRONIC BILATERAL LOW BACK PAIN WITH RIGHT-SIDED SCIATICA: ICD-10-CM

## 2021-02-19 DIAGNOSIS — G47.33 OSA (OBSTRUCTIVE SLEEP APNEA): ICD-10-CM

## 2021-02-19 DIAGNOSIS — E11.9 TYPE 2 DIABETES MELLITUS WITHOUT COMPLICATION, WITHOUT LONG-TERM CURRENT USE OF INSULIN (HCC): ICD-10-CM

## 2021-02-19 DIAGNOSIS — E78.5 DYSLIPIDEMIA: ICD-10-CM

## 2021-02-19 DIAGNOSIS — G89.4 CHRONIC PAIN SYNDROME: ICD-10-CM

## 2021-02-19 DIAGNOSIS — R73.09 ELEVATED GLUCOSE: ICD-10-CM

## 2021-02-19 DIAGNOSIS — Z00.00 MEDICARE WELCOME EXAM: Primary | ICD-10-CM

## 2021-02-19 DIAGNOSIS — I10 ESSENTIAL HYPERTENSION: ICD-10-CM

## 2021-02-19 DIAGNOSIS — Z12.11 COLON CANCER SCREENING: ICD-10-CM

## 2021-02-19 DIAGNOSIS — M15.9 PRIMARY OSTEOARTHRITIS INVOLVING MULTIPLE JOINTS: ICD-10-CM

## 2021-02-19 DIAGNOSIS — G89.29 CHRONIC BILATERAL LOW BACK PAIN WITH RIGHT-SIDED SCIATICA: ICD-10-CM

## 2021-02-19 DIAGNOSIS — E04.1 THYROID NODULE: ICD-10-CM

## 2021-02-19 LAB — SL AMB POCT FECES OCC BLD: NORMAL

## 2021-02-19 PROCEDURE — G0402 INITIAL PREVENTIVE EXAM: HCPCS | Performed by: FAMILY MEDICINE

## 2021-02-19 PROCEDURE — G0403 EKG FOR INITIAL PREVENT EXAM: HCPCS | Performed by: FAMILY MEDICINE

## 2021-02-19 PROCEDURE — 82270 OCCULT BLOOD FECES: CPT | Performed by: FAMILY MEDICINE

## 2021-02-19 NOTE — PATIENT INSTRUCTIONS
DISCUSSED HEALTH MAINTENENCE ISSUES  BW WILL BE REVIEWED  ENCOURAGED HEALTHY DIET AND EXERCISE  COLONOSCOPY WILL BE ORDERED  RV FOR ANNUAL HEALTH EXAM IN 1 YEAR  RV SOONER IF THERE ARE ANY CONCERNS    Recent Results (from the past 2688 hour(s))   CBC and differential    Collection Time: 12/22/20 10:52 AM   Result Value Ref Range    WBC 9 45 4 31 - 10 16 Thousand/uL    RBC 5 08 3 88 - 5 62 Million/uL    Hemoglobin 15 1 12 0 - 17 0 g/dL    Hematocrit 47 9 36 5 - 49 3 %    MCV 94 82 - 98 fL    MCH 29 7 26 8 - 34 3 pg    MCHC 31 5 31 4 - 37 4 g/dL    RDW 12 8 11 6 - 15 1 %    MPV 9 4 8 9 - 12 7 fL    Platelets 441 476 - 478 Thousands/uL    nRBC 0 /100 WBCs    Neutrophils Relative 63 43 - 75 %    Immat GRANS % 1 0 - 2 %    Lymphocytes Relative 23 14 - 44 %    Monocytes Relative 6 4 - 12 %    Eosinophils Relative 6 0 - 6 %    Basophils Relative 1 0 - 1 %    Neutrophils Absolute 5 98 1 85 - 7 62 Thousands/µL    Immature Grans Absolute 0 05 0 00 - 0 20 Thousand/uL    Lymphocytes Absolute 2 21 0 60 - 4 47 Thousands/µL    Monocytes Absolute 0 59 0 17 - 1 22 Thousand/µL    Eosinophils Absolute 0 52 0 00 - 0 61 Thousand/µL    Basophils Absolute 0 10 0 00 - 0 10 Thousands/µL   Comprehensive metabolic panel    Collection Time: 12/22/20 10:52 AM   Result Value Ref Range    Sodium 139 136 - 145 mmol/L    Potassium 4 4 3 5 - 5 3 mmol/L    Chloride 104 100 - 108 mmol/L    CO2 31 21 - 32 mmol/L    ANION GAP 4 4 - 13 mmol/L    BUN 15 5 - 25 mg/dL    Creatinine 1 18 0 60 - 1 30 mg/dL    Glucose, Fasting 116 (H) 65 - 99 mg/dL    Calcium 9 4 8 3 - 10 1 mg/dL    Corrected Calcium 10 0 8 3 - 10 1 mg/dL    AST 21 5 - 45 U/L    ALT 37 12 - 78 U/L    Alkaline Phosphatase 77 46 - 116 U/L    Total Protein 7 7 6 4 - 8 2 g/dL    Albumin 3 3 (L) 3 5 - 5 0 g/dL    Total Bilirubin 0 30 0 20 - 1 00 mg/dL    eGFR 68 ml/min/1 73sq m   Hemoglobin A1C    Collection Time: 12/22/20 10:52 AM   Result Value Ref Range    Hemoglobin A1C 6 1 (H) Normal 3 8-5 6%; PreDiabetic 5 7-6 4%; Diabetic >=6 5%; Glycemic control for adults with diabetes <7 0% %     mg/dl   Hepatitis C antibody    Collection Time: 12/22/20 10:52 AM   Result Value Ref Range    Hepatitis C Ab Non-reactive Non-reactive   Lipid panel    Collection Time: 12/22/20 10:52 AM   Result Value Ref Range    Cholesterol 146 50 - 200 mg/dL    Triglycerides 88 <=150 mg/dL    HDL, Direct 33 (L) >=40 mg/dL    LDL Calculated 95 0 - 100 mg/dL    Non-HDL-Chol (CHOL-HDL) 113 mg/dl   TSH, 3rd generation    Collection Time: 12/22/20 10:52 AM   Result Value Ref Range    TSH 3RD GENERATON 1 576 0 358 - 3 740 uIU/mL   Microalbumin / creatinine urine ratio    Collection Time: 12/22/20 10:52 AM   Result Value Ref Range    Creatinine, Ur 212 0 mg/dL    Microalbum  ,U,Random 11 9 0 0 - 20 0 mg/L    Microalb Creat Ratio 6 0 - 30 mg/g creatinine   HIV 1/2 Antigen/Antibody (4th Generation) w Reflex [facility]    Collection Time: 12/22/20 10:52 AM   Result Value Ref Range    HIV-1/HIV-2 Ab Non-Reactive Non-Reactive   PSA, Total Screen    Collection Time: 12/22/20 10:52 AM   Result Value Ref Range    PSA 2 4 0 0 - 4 0 ng/mL   Fine Needle Aspiration    Collection Time: 12/28/20  9:13 AM   Result Value Ref Range    Case Report       Non-gynecologic Cytology                          Case: [id number]                                  Authorizing Provider:  Tree Marc      Collected:           12/28/2020 0913              Ordering Location:     [address] [place] [address] Received:            12/28/2020 1004                                     Ultrasound                                                                   Pathologist:           Maddi Coppola MD                                                   Specimens:   A) - Thyroid, Left, Upper                                                                           B) - Thyroid, Left, Upper                                                                           C) - Thyroid, Left, Mid                                                                             D) - Thyroid, Left, Mid                                                                             E) - Thyroid, Left, Lateral                                                                          F) - Thyroid, Left, Lateral                                                                Final Diagnosis       A -B  Thyroid, Left, Upper (Thin-prep and smear preparations):   Benign (Amber Category II) - See note  Follicular cells in small groups and flat sheets  Few groups of cyst-lining cells  Scattered hemosiderin-laden macrophages and rare multinucleated histiocytes  Focal areas of scant thin colloid  Satisfactory for evaluation  C -D  Thyroid, Left, Mid (Thin-prep and smear preparations): Atypia of undetermined significance (Amber Category III) - See note  Follicular cells with mildly enlarged nuclei, irregular nuclear membranes and nuclear grooves in flat sheets and crowded/overlapping groups  Scattered hemosiderin-laden macrophages  Focal areas of thin colloid  Satisfactory for evaluation  E -F  Thyroid, Left, Lateral (Thin-prep and smear preparations):   Benign (Amber Category II) - See note  Follicular cells in small groups and flat sheets  Scattered hemosiderin-laden macrophages  Abundant thin colloid  Satisfactory for evaluation  Note (Thyroid, Left upper/Thyroid, Left lateral):  As reported in the [facility] for Reporting Thyroid Cytopathology*, the diagnostic category of "benign/negative for malignancy" carries a 0-3% risk of malignancy being found in subsequent resections (in the few studies of patients with benign FNA results that were followed long-term, a false negative rate of 0-3% was reported), with the usual management being clinical follow-up supplemented by ultrasonography (US) as indicated   Repeat FNA may be indicated for nodules showing significant growth or developing US abnormalities  Ultimately, clinical/imaging correlation for this patient is needed in arriving at the actual management plan  *The [place] System for Reporting Thyroid Cytopathology, Stepan Leon ), 2018 (2nd ed )     Note (Thyroid, Left mid):  (1) As reported in the [address] [facility] [address] for Reporting Thyroid Cytopathology*, this diagnostic category has demonstrated anywhere from 10-30% risk of malignancy being found in subsequent resections and/or FNA  This risk of malignancy is expected to change due to the usage of the surgical pathology diagnosis of non-invasive follicular thyroid neoplasm with papillary-like nuclear features (NIFTP)    The anticipated risk of malignancy secondary to NIFTP is 6-18%  The manual reports that the usual management following this diagnosis is repeat FNA, molecular testing, or lobectomy  Ultimately, clinical/imaging correlation for this patient is needed in arriving at the actual management plan  *The [place] System for Reporting Thyroid Cytopathology, Stepan Leon ), 2018 (2nd ed )        Note       The treating physician has requested a sample(s) from the above thyroid nodule(s) be sent for analysis by  [facility]  ([facility]), performed by [facility] [address] [place] [address])  [facility] only performs this test on specimen(s) receiving a cytologic diagnosis of [place] Category III or  IV  If such a diagnosis is rendered (above) specimen will be sent to [facility], and a separate report with  results of [facility] will follow directly from [facility] (typically taking 14 days)  If a cytologic  interpretation other than [place] category III or IV is rendered, specimen will not be sent for [facility] GEC  Intraoperative Consultation          B  Not Adequate, recommend additional passes    (1,3,5,6,7)  Dr Venice Phelan, Interpretation performed at [facility], [address] [place] [address]         D  Adequate (1,3)  Dr Patricia Aguilar, Interpretation performed at [facility], [address] [address]       F  Adequate- colloid and rare follicular cells  (1,3)  Dr Patricia Aguilar, Interpretation performed at [facility], [address] [address]      Gross Description          A   20ml, slightly pink, clear,received in CytoLyt          B   10 slides received (5 alcohol, 5 diff quik)          C    20ml, slightly pink, clear,received in CytoLyt       D   4 slides received (2 alcohol, 2 diff quik)          E    20ml, colorless, clear,received in CytoLyt       F    4 slides received (2 alcohol, 2 diff quik)      Clinical Information       Left Upper:  Size: 3 3x2  4x3 9cm Margins: smooth Echogenicity: solid, cystic Microcalcs: absent Flow: intranodular, peripheral Size change: na Suspicion level: na Hx of Hashimoto's Thyroiditis: no    Left Mid:  Size: 4 0x4 0x4 2cm Margins: smooth Echogenicity: solid, cystic Microcalcs: absent Flow: intranodular, peripheral Size change: na Suspicion level: na Hx of Hashimoto's Thyroiditis: no    Left Lateral:  Size: 6 1x3 7x4cm Margins: smooth Echogenicity: solid, cystic Microcalcs: absent Flow: intranodular, peripheral Size change: na Suspicion level: na Hx of Hashimoto's Thyroiditis: no          Additional Information       [employer] FDA approved ,  and ThinPrep Imaging Duo System are utilized with strict adherence to the 's instruction manual to prepare gynecologic and non-gynecologic cytology specimens for the production of ThinPrep slides as well as for gynecologic ThinPrep imaging  These processes have been validated by our laboratory and/or by the   These tests were developed and their performance characteristics determined by [facility] or [address] [place] [address]  They may not be cleared or approved by the [place]  Food and Drug Administration   The FDA has determined that such clearance or approval is not necessary  These tests are used for clinical purposes  They should not be regarded as investigational or for research  This laboratory has been approved by [place], designated as a high-complexity laboratory and is qualified to perform these tests  Interpretation performed at [address] [place], [facility]           Medicare Preventive Visit Patient Instructions  Thank you for completing your Welcome to Medicare Visit or Medicare Annual Wellness Visit today  Your next wellness visit will be due in one year (2/19/2022)  The screening/preventive services that you may require over the next 5-10 years are detailed below  Some tests may not apply to you based off risk factors and/or age  Screening tests ordered at today's visit but not completed yet may show as past due  Also, please note that scanned in results may not display below  Preventive Screenings:  Service Recommendations Previous Testing/Comments   Colorectal Cancer Screening  · Colonoscopy    · Fecal Occult Blood Test (FOBT)/Fecal Immunochemical Test (FIT)  · Fecal DNA/Cologuard Test  · Flexible Sigmoidoscopy Age: 54-65 years old   Colonoscopy: every 10 years (May be performed more frequently if at higher risk)  OR  FOBT/FIT: every 1 year  OR  Cologuard: every 3 years  OR  Sigmoidoscopy: every 5 years  Screening may be recommended earlier than age 48 if at higher risk for colorectal cancer  Also, an individualized decision between you and your healthcare provider will decide whether screening between the ages of 74-80 would be appropriate   Colonoscopy: 08/05/2019  FOBT/FIT: Not on file  Cologuard: Not on file  Sigmoidoscopy: Not on file    Screening Current     Prostate Cancer Screening Individualized decision between patient and health care provider in men between ages of 53-78   Medicare will cover every 12 months beginning on the day after your 50th birthday PSA: 2 4 ng/mL     Screening Current Hepatitis C Screening Once for adults born between 1945 and 1965  More frequently in patients at high risk for Hepatitis C Hep C Antibody: 12/22/2020    Screening Current   Diabetes Screening 1-2 times per year if you're at risk for diabetes or have pre-diabetes Fasting glucose: 116 mg/dL   A1C: 6 1 %    Screening Not Indicated  History Diabetes   Cholesterol Screening Once every 5 years if you don't have a lipid disorder  May order more often based on risk factors  Lipid panel: 12/22/2020    Screening Current      Other Preventive Screenings Covered by Medicare:  1  Abdominal Aortic Aneurysm (AAA) Screening: covered once if your at risk  You're considered to be at risk if you have a family history of AAA or a male between the age of 73-68 who smoking at least 100 cigarettes in your lifetime  2  Lung Cancer Screening: covers low dose CT scan once per year if you meet all of the following conditions: (1) Age 50-69; (2) No signs or symptoms of lung cancer; (3) Current smoker or have quit smoking within the last 15 years; (4) You have a tobacco smoking history of at least 30 pack years (packs per day x number of years you smoked); (5) You get a written order from a healthcare provider  3  Glaucoma Screening: covered annually if you're considered high risk: (1) You have diabetes OR (2) Family history of glaucoma OR (3)  aged 48 and older OR (3)  American aged 72 and older  3  Osteoporosis Screening: covered every 2 years if you meet one of the following conditions: (1) Have a vertebral abnormality; (2) On glucocorticoid therapy for more than 3 months; (3) Have primary hyperparathyroidism; (4) On osteoporosis medications and need to assess response to drug therapy  5  HIV Screening: covered annually if you're between the age of 12-76  Also covered annually if you are younger than 13 and older than 72 with risk factors for HIV infection   For pregnant patients, it is covered up to 3 times per pregnancy  Immunizations:  Immunization Recommendations   Influenza Vaccine Annual influenza vaccination during flu season is recommended for all persons aged >= 6 months who do not have contraindications   Pneumococcal Vaccine (Prevnar and Pneumovax)  * Prevnar = PCV13  * Pneumovax = PPSV23 Adults 25-60 years old: 1-3 doses may be recommended based on certain risk factors  Adults 72 years old: Prevnar (PCV13) vaccine recommended followed by Pneumovax (PPSV23) vaccine  If already received PPSV23 since turning 65, then PCV13 recommended at least one year after PPSV23 dose  Hepatitis B Vaccine 3 dose series if at intermediate or high risk (ex: diabetes, end stage renal disease, liver disease)   Tetanus (Td) Vaccine - COST NOT COVERED BY MEDICARE PART B Following completion of primary series, a booster dose should be given every 10 years to maintain immunity against tetanus  Td may also be given as tetanus wound prophylaxis  Tdap Vaccine - COST NOT COVERED BY MEDICARE PART B Recommended at least once for all adults  For pregnant patients, recommended with each pregnancy  Shingles Vaccine (Shingrix) - COST NOT COVERED BY MEDICARE PART B  2 shot series recommended in those aged 48 and above     Health Maintenance Due:      Topic Date Due    Colorectal Cancer Screening  08/05/2029    HIV Screening  Completed    Hepatitis C Screening  Completed     Immunizations Due:      Topic Date Due    Pneumococcal Vaccine: Pediatrics (0 to 5 Years) and At-Risk Patients (6 to 59 Years) (1 of 1 - PPSV23) 04/02/1971    Influenza Vaccine (1) 09/01/2020     Advance Directives   What are advance directives? Advance directives are legal documents that state your wishes and plans for medical care  These plans are made ahead of time in case you lose your ability to make decisions for yourself  Advance directives can apply to any medical decision, such as the treatments you want, and if you want to donate organs     What are the types of advance directives? There are many types of advance directives, and each state has rules about how to use them  You may choose a combination of any of the following:  · Living will: This is a written record of the treatment you want  You can also choose which treatments you do not want, which to limit, and which to stop at a certain time  This includes surgery, medicine, IV fluid, and tube feedings  · Durable power of  for healthcare [facility]): This is a written record that states who you want to make healthcare choices for you when you are unable to make them for yourself  This person, called a proxy, is usually a family member or a friend  You may choose more than 1 proxy  · Do not resuscitate (DNR) order:  A DNR order is used in case your heart stops beating or you stop breathing  It is a request not to have certain forms of treatment, such as CPR  A DNR order may be included in other types of advance directives  · Medical directive: This covers the care that you want if you are in a coma, near death, or unable to make decisions for yourself  You can list the treatments you want for each condition  Treatment may include pain medicine, surgery, blood transfusions, dialysis, IV or tube feedings, and a ventilator (breathing machine)  · Values history: This document has questions about your views, beliefs, and how you feel and think about life  This information can help others choose the care that you would choose  Why are advance directives important? An advance directive helps you control your care  Although spoken wishes may be used, it is better to have your wishes written down  Spoken wishes can be misunderstood, or not followed  Treatments may be given even if you do not want them  An advance directive may make it easier for your family to make difficult choices about your care     Weight Management   Why it is important to manage your weight:  Being overweight increases your risk of health conditions such as heart disease, high blood pressure, type 2 diabetes, and certain types of cancer  It can also increase your risk for osteoarthritis, sleep apnea, and other respiratory problems  Aim for a slow, steady weight loss  Even a small amount of weight loss can lower your risk of health problems  How to lose weight safely:  A safe and healthy way to lose weight is to eat fewer calories and get regular exercise  You can lose up about 1 pound a week by decreasing the number of calories you eat by 500 calories each day  Healthy meal plan for weight management:  A healthy meal plan includes a variety of foods, contains fewer calories, and helps you stay healthy  A healthy meal plan includes the following:  · Eat whole-grain foods more often  A healthy meal plan should contain fiber  Fiber is the part of grains, fruits, and vegetables that is not broken down by your body  Whole-grain foods are healthy and provide extra fiber in your diet  Some examples of whole-grain foods are whole-wheat breads and pastas, oatmeal, brown rice, and bulgur  · Eat a variety of vegetables every day  Include dark, leafy greens such as spinach, kale, romeo greens, and mustard greens  Eat yellow and orange vegetables such as carrots, sweet potatoes, and winter squash  · Eat a variety of fruits every day  Choose fresh or canned fruit (canned in its own juice or light syrup) instead of juice  Fruit juice has very little or no fiber  · Eat low-fat dairy foods  Drink fat-free (skim) milk or 1% milk  Eat fat-free yogurt and low-fat cottage cheese  Try low-fat cheeses such as mozzarella and other reduced-fat cheeses  · Choose meat and other protein foods that are low in fat  Choose beans or other legumes such as split peas or lentils  Choose fish, skinless poultry (chicken or turkey), or lean cuts of red meat (beef or pork)  Before you cook meat or poultry, cut off any visible fat  · Use less fat and oil    Try baking foods instead of frying them  Add less fat, such as margarine, sour cream, regular salad dressing and mayonnaise to foods  Eat fewer high-fat foods  Some examples of high-fat foods include french fries, doughnuts, ice cream, and cakes  · Eat fewer sweets  Limit foods and drinks that are high in sugar  This includes candy, cookies, regular soda, and sweetened drinks  Exercise:  Exercise at least 30 minutes per day on most days of the week  Some examples of exercise include walking, biking, dancing, and swimming  You can also fit in more physical activity by taking the stairs instead of the elevator or parking farther away from stores  Ask your healthcare provider about the best exercise plan for you  © Copyright [employer] 2018 Information is for End User's use only and may not be sold, redistributed or otherwise used for commercial purposes   All illustrations and images included in CareNotes® are the copyrighted property of A D A M , Inc  or [address] [place] [address]

## 2021-02-19 NOTE — PROGRESS NOTES
Assessment and Plan:     Problem List Items Addressed This Visit        Digestive    GERD (gastroesophageal reflux disease)     STABLE  DENIES ANY CP, INDIGESTION, OR COUGH  NOTES NO MELENA OR HEMATOCHEZIA  NO HEMATEMESIS  COMPLIANT WITH MEDICATION  NO CONCERNS    - CONTINUE CURRENT TREATMENT PLAN  - MEDICATION AS PRESCRIBED  - AVOID CAFFEINE, ALCOHOL OR SMOKING              Endocrine    Thyroid nodule    Relevant Orders    Ambulatory Referral to Otolaryngology    Type 2 diabetes mellitus without complication, without long-term current use of insulin (HCC)       Respiratory    DOMINIC (obstructive sleep apnea)       Cardiovascular and Mediastinum    Hypertension     STABLE  DENIES ANY CP, SOB, PALPITATIONS, OR HEADACHE  NOTES NO WATER RETENTION  COMPLIANT WITH MEDICATION  NO CONCERNS    - CONTINUE CURRENT TREATMENT PLAN  - MONITOR DIETARY SODIUM INTAKE  - ENCOURAGE PHYSICAL ACTIVITY  - RV 3 MONTHS           Relevant Orders    POCT ECG (Completed)    CT coronary calcium score       Nervous and Auditory    Chronic bilateral low back pain with right-sided sciatica       Musculoskeletal and Integument    Osteoarthritis     STABLE  DENIES ANY JOINT SWELLING OR REDNESS  JOINT STIFFNESS PRESENT  PAIN MANAGEMENT ADEQUATE    - CONTINUE CURRENT MANAGEMENT  - MEDICATION AS DIRECTED  - CALL / RETURN IF SYMPTOMS WORSEN              Other    Chronic pain syndrome    Lumbar post-laminectomy syndrome    Elevated glucose     COMPLIANT WITH MEDICATION  DENIES ANY NUMBNESS, TINGLING IN FEET    - DISCUSSED DIETARY MODIFICATION OF CARBOHYDRATES  - HGB A1C AND URINE STUDIES DUE TODAY  - FOOT CARE  - RV 3 MONTHS             Relevant Orders    CT coronary calcium score    Medicare welcome exam - Primary    Relevant Orders    POCT ECG (Completed)      Other Visit Diagnoses     Dyslipidemia        Relevant Orders    CT coronary calcium score    Class 3 severe obesity with body mass index (BMI) of 45 0 to 49 9 in adult, unspecified obesity type, unspecified whether serious comorbidity present [facility])        Colon cancer screening        Relevant Orders    POCT hemoccult screening (Completed)           Preventive health issues were discussed with patient, and age appropriate screening tests were ordered as noted in patient's After Visit Summary  Personalized health advice and appropriate referrals for health education or preventive services given if needed, as noted in patient's After Visit Summary  History of Present Illness:     Patient presents for Welcome to Medicare visit  Patient Care Team:  Charla Guthrie MD as PCP - General (Family Medicine)  Bonnie Rossi MD (Otolaryngology)     Review of Systems:     Review of Systems   Constitutional: Negative for chills, fatigue and fever  HENT: Negative for congestion, ear discharge, ear pain, mouth sores, postnasal drip, sore throat and trouble swallowing  Eyes: Negative for pain, discharge and visual disturbance  Respiratory: Negative for cough, shortness of breath and wheezing  Cardiovascular: Negative for chest pain, palpitations and leg swelling  Gastrointestinal: Negative for abdominal distention, abdominal pain, blood in stool, diarrhea and nausea  Endocrine: Negative for polydipsia, polyphagia and polyuria  Genitourinary: Negative for dysuria, frequency, hematuria and urgency  Musculoskeletal: Positive for arthralgias, back pain and myalgias  Negative for gait problem and joint swelling  Skin: Negative for pallor and rash  Neurological: Negative for dizziness, syncope, speech difficulty, weakness, light-headedness, numbness and headaches  Hematological: Negative for adenopathy  Psychiatric/Behavioral: Negative for behavioral problems, confusion and sleep disturbance  The patient is not nervous/anxious         Problem List:     Patient Active Problem List   Diagnosis    Closed fracture of transverse process of lumbar vertebra (HCC)    Anxiety    Backache    Collagen vascular disease ([address] )    GERD (gastroesophageal reflux disease)    Hypertension    Osteoarthritis    Sciatica    Breathing-related sleep disorder    Urinary calculus    Chronic bilateral low back pain with right-sided sciatica    Morbid obesity with body mass index (BMI) of 50 0 to 59 9 in adult [facility])    Chronic pain syndrome    Lumbar post-laminectomy syndrome    Primary osteoarthritis of right knee    DOMINIC (obstructive sleep apnea)    Eosinophilic esophagitis    Thyroid nodule    Elevated glucose    Medicare welcome exam    Type 2 diabetes mellitus without complication, without long-term current use of insulin ([facility])      Past Medical and Surgical History:     Past Medical History:   Diagnosis Date    Borderline diabetes     Chronic pain disorder     lower back-s/p laminectomy    Claustrophobia     CPAP (continuous positive airway pressure) dependence     Diverticulitis     GERD (gastroesophageal reflux disease)     Hypertension     Kidney stone     x 2 episodes    Localized pain of joint     Mild sleep apnea     CPAP started in July having difficulty using- only has used on occ    Morbid obesity with body mass index (BMI) of 50 0 to 59 9 in [facility])     Rectal bleeding     occ rectal bleeding last episode 6/19    Skin tag     skin tags were removed-as of 8/2/19 has a few more    Wears glasses      Past Surgical History:   Procedure Laterality Date    BACK SURGERY      laminectomy-1996, 2010    COLONOSCOPY      x2    ESOPHAGOGASTRODUODENOSCOPY N/A 3/30/2019    Procedure: ESOPHAGOGASTRODUODENOSCOPY (EGD); Surgeon: Mireya Natarajan MD;  Location: [address] [place] [address];  Service: Gastroenterology    KNEE ARTHROSCOPY Left 2007    OK ESOPHAGOGASTRODUODENOSCOPY TRANSORAL DIAGNOSTIC N/A 5/1/2019    Procedure: ESOPHAGOGASTRODUODENOSCOPY (EGD); Surgeon: Mireya Natarajan MD;  Location: [facility];   Service: Gastroenterology    UPPER GASTROINTESTINAL ENDOSCOPY  2006    US GUIDED THYROID BIOPSY 12/28/2020      Family History:     Family History   Problem Relation Age of Onset    Osteoarthritis Mother     Obesity Father         [id number]    Sleep apnea Father         with CPAP    No Known Problems Sister     No Known Problems Brother     Lupus Sister     No Known Problems Sister     No Known Problems Son     Substance Abuse Neg Hx     Mental illness Neg Hx     Cancer Neg Hx     Diabetes Neg Hx     Heart disease Neg Hx     Stroke Neg Hx       Social History:     E-Cigarette/Vaping    E-Cigarette Use Never User      E-Cigarette/Vaping Substances    Nicotine No     THC No     CBD No     Flavoring No     Other No     Unknown No      Social History     Socioeconomic History    Marital status:      Spouse name: None    Number of children: None    Years of education: None    Highest education level: None   Occupational History    None   Social Needs    Financial resource strain: None    Food insecurity     Worry: None     Inability: None    Transportation needs     Medical: None     Non-medical: None   Tobacco Use    Smoking status: Never Smoker    Smokeless tobacco: Never Used   Substance and Sexual Activity    Alcohol use: No    Drug use: No    Sexual activity: None   Lifestyle    Physical activity     Days per week: None     Minutes per session: None    Stress: None   Relationships    Social connections     Talks on phone: None     Gets together: None     Attends [employer] service: None     Active member of club or organization: None     Attends meetings of clubs or organizations: None     Relationship status: None    Intimate partner violence     Fear of current or ex partner: None     Emotionally abused: None     Physically abused: None     Forced sexual activity: None   Other Topics Concern    None   Social History Narrative    Living alone      Medications and Allergies:     Current Outpatient Medications   Medication Sig Dispense Refill    amLODIPine (NORVASC) 5 mg tablet Take 1 tablet (5 mg total) by mouth daily 90 tablet 1    lisinopril-hydrochlorothiazide (PRINZIDE,ZESTORETIC) 20-25 MG per tablet TAKE ONE TABLET BY MOUTH EVERY DAY 90 tablet 3    omeprazole (PriLOSEC) 20 mg delayed release capsule TAKE ONE CAPSULE BY MOUTH TWICE A DAY (GENERIC FOR PRILOSEC) 60 capsule 5     No current facility-administered medications for this visit  Allergies   Allergen Reactions    Lactose GI Intolerance      Immunizations:     Immunization History   Administered Date(s) Administered    Tdap 07/02/2010, 06/26/2018      Health Maintenance:         Topic Date Due    Colorectal Cancer Screening  08/05/2029    HIV Screening  Completed    Hepatitis C Screening  Completed         Topic Date Due    Pneumococcal Vaccine: Pediatrics (0 to 5 Years) and At-Risk Patients (6 to 59 Years) (1 of 1 - PPSV23) 04/02/1971    Influenza Vaccine (1) 09/01/2020      Medicare Screening Tests and Risk Assessments:     Eden Potter is here for his Welcome to Medicare visit  Health Risk Assessment:   Patient rates overall health as fair  Patient feels that their physical health rating is same  Eyesight was rated as same  Hearing was rated as same  Patient feels that their emotional and mental health rating is slightly better  Pain experienced in the last 7 days has been none  Patient states that he has experienced weight loss or gain in last 6 months  Depression Screening:   PHQ-2 Score: 0      Fall Risk Screening: In the past year, patient has experienced: no history of falling in past year      Home Safety:  Patient does not have trouble with stairs inside or outside of their home  Patient has working smoke alarms and has working carbon monoxide detector  Home safety hazards include: none  Nutrition:   Current diet is Low Carb  Medications:   Patient is currently taking over-the-counter supplements  OTC medications include: see medication list  Patient is able to manage medications  Activities of Daily Living (ADLs)/Instrumental Activities of Daily Living (IADLs):   Walk and transfer into and out of bed and chair?: Yes  Dress and groom yourself?: Yes    Bathe or shower yourself?: Yes    Feed yourself? Yes  Do your laundry/housekeeping?: Yes  Manage your money, pay your bills and track your expenses?: Yes  Make your own meals?: Yes    Do your own shopping?: Yes    Previous Hospitalizations:   Any hospitalizations or ED visits within the last 12 months?: Yes    How many hospitalizations have you had in the last year?: 1-2    Advance Care Planning:   Living will: No    Durable POA for healthcare: No    Advanced directive: No    Five wishes given: Yes      Cognitive Screening:   Provider or family/friend/caregiver concerned regarding cognition?: No    PREVENTIVE SCREENINGS      Cardiovascular Screening:    General: Screening Current      Diabetes Screening:     General: Screening Not Indicated and History Diabetes      Colorectal Cancer Screening:     General: Screening Current      Prostate Cancer Screening:    General: Screening Current      Osteoporosis Screening:    General: Screening Not Indicated      Abdominal Aortic Aneurysm (AAA) Screening:        General: Screening Not Indicated      Lung Cancer Screening:     General: Screening Not Indicated      Hepatitis C Screening:    General: Screening Current     Visual Acuity Screening    Right eye Left eye Both eyes   Without correction:      With correction: 20/20 20/20 20/20        Physical Exam:     /70   Pulse 90   Temp 97 5 °F (36 4 °C) (Temporal)   Resp 16   Ht 6' 3" (1 905 m)   Wt (!) 181 kg (400 lb)   SpO2 96%   BMI 50 00 kg/m²     Physical Exam  Constitutional:       General: He is not in acute distress  Appearance: He is well-developed  He is not diaphoretic  HENT:      Head: Normocephalic and atraumatic        Right Ear: External ear normal       Left Ear: External ear normal       Nose: Nose normal  Mouth/Throat:      Mouth: Mucous membranes are moist       Pharynx: Oropharynx is clear  No oropharyngeal exudate  Eyes:      General: No scleral icterus  Right eye: No discharge  Left eye: No discharge  Conjunctiva/sclera: Conjunctivae normal       Pupils: Pupils are equal, round, and reactive to light  Neck:      Musculoskeletal: Normal range of motion and neck supple  Thyroid: No thyromegaly  Vascular: No JVD  Trachea: No tracheal deviation  Comments: PROMINENT THYROID  Cardiovascular:      Rate and Rhythm: Normal rate and regular rhythm  Heart sounds: Normal heart sounds  No murmur  No friction rub  No gallop  Pulmonary:      Effort: Pulmonary effort is normal  No respiratory distress  Breath sounds: Normal breath sounds  No stridor  No wheezing or rales  Chest:      Chest wall: No tenderness  Abdominal:      General: Bowel sounds are normal  There is no distension  Palpations: Abdomen is soft  There is no mass  Tenderness: There is no abdominal tenderness  There is no guarding or rebound  Hernia: No hernia is present  Comments: MORBIDLY OBESE   Genitourinary:     Penis: Normal  No tenderness  Prostate: Normal       Rectum: Normal  Guaiac result negative  Musculoskeletal: Normal range of motion  General: No tenderness or deformity  Lymphadenopathy:      Cervical: No cervical adenopathy  Skin:     General: Skin is warm and dry  Coloration: Skin is not pale  Findings: No erythema or rash  Neurological:      Mental Status: He is alert and oriented to person, place, and time  Cranial Nerves: No cranial nerve deficit  Sensory: No sensory deficit  Motor: No abnormal muscle tone  Coordination: Coordination normal       Deep Tendon Reflexes: Reflexes normal    Psychiatric:         Behavior: Behavior normal          Thought Content:  Thought content normal          Judgment: Judgment normal  Mary Ellen Klein MD

## 2021-02-19 NOTE — LETTER
[employer]      Current Outpatient Medications:     amLODIPine (NORVASC) 5 mg tablet, Take 1 tablet (5 mg total) by mouth daily, Disp: 90 tablet, Rfl: 1    lisinopril-hydrochlorothiazide (PRINZIDE,ZESTORETIC) 20-25 MG per tablet, TAKE ONE TABLET BY MOUTH EVERY DAY, Disp: 90 tablet, Rfl: 3    omeprazole (PriLOSEC) 20 mg delayed release capsule, TAKE ONE CAPSULE BY MOUTH TWICE A DAY (GENERIC FOR PRILOSEC), Disp: 60 capsule, Rfl: 5      Recent Results (from the past 2688 hour(s))   CBC and differential    Collection Time: 12/22/20 10:52 AM   Result Value Ref Range    WBC 9 45 4 31 - 10 16 Thousand/uL    RBC 5 08 3 88 - 5 62 Million/uL    Hemoglobin 15 1 12 0 - 17 0 g/dL    Hematocrit 47 9 36 5 - 49 3 %    MCV 94 82 - 98 fL    MCH 29 7 26 8 - 34 3 pg    MCHC 31 5 31 4 - 37 4 g/dL    RDW 12 8 11 6 - 15 1 %    MPV 9 4 8 9 - 12 7 fL    Platelets 190 748 - 408 Thousands/uL    nRBC 0 /100 WBCs    Neutrophils Relative 63 43 - 75 %    Immat GRANS % 1 0 - 2 %    Lymphocytes Relative 23 14 - 44 %    Monocytes Relative 6 4 - 12 %    Eosinophils Relative 6 0 - 6 %    Basophils Relative 1 0 - 1 %    Neutrophils Absolute 5 98 1 85 - 7 62 Thousands/µL    Immature Grans Absolute 0 05 0 00 - 0 20 Thousand/uL    Lymphocytes Absolute 2 21 0 60 - 4 47 Thousands/µL    Monocytes Absolute 0 59 0 17 - 1 22 Thousand/µL    Eosinophils Absolute 0 52 0 00 - 0 61 Thousand/µL    Basophils Absolute 0 10 0 00 - 0 10 Thousands/µL   Comprehensive metabolic panel    Collection Time: 12/22/20 10:52 AM   Result Value Ref Range    Sodium 139 136 - 145 mmol/L    Potassium 4 4 3 5 - 5 3 mmol/L    Chloride 104 100 - 108 mmol/L    CO2 31 21 - 32 mmol/L    ANION GAP 4 4 - 13 mmol/L    BUN 15 5 - 25 mg/dL    Creatinine 1 18 0 60 - 1 30 mg/dL    Glucose, Fasting 116 (H) 65 - 99 mg/dL    Calcium 9 4 8 3 - 10 1 mg/dL    Corrected Calcium 10 0 8 3 - 10 1 mg/dL    AST 21 5 - 45 U/L    ALT 37 12 - 78 U/L    Alkaline Phosphatase 77 46 - 116 U/L    Total Protein 7 7 6 4 - 8 2 g/dL    Albumin 3 3 (L) 3 5 - 5 0 g/dL    Total Bilirubin 0 30 0 20 - 1 00 mg/dL    eGFR 68 ml/min/1 73sq m   Hemoglobin A1C    Collection Time: 12/22/20 10:52 AM   Result Value Ref Range    Hemoglobin A1C 6 1 (H) Normal 3 8-5 6%; PreDiabetic 5 7-6 4%; Diabetic >=6 5%; Glycemic control for adults with diabetes <7 0% %     mg/dl   Hepatitis C antibody    Collection Time: 12/22/20 10:52 AM   Result Value Ref Range    Hepatitis C Ab Non-reactive Non-reactive   Lipid panel    Collection Time: 12/22/20 10:52 AM   Result Value Ref Range    Cholesterol 146 50 - 200 mg/dL    Triglycerides 88 <=150 mg/dL    HDL, Direct 33 (L) >=40 mg/dL    LDL Calculated 95 0 - 100 mg/dL    Non-HDL-Chol (CHOL-HDL) 113 mg/dl   TSH, 3rd generation    Collection Time: 12/22/20 10:52 AM   Result Value Ref Range    TSH 3RD GENERATON 1 576 0 358 - 3 740 uIU/mL   Microalbumin / creatinine urine ratio    Collection Time: 12/22/20 10:52 AM   Result Value Ref Range    Creatinine, Ur 212 0 mg/dL    Microalbum  ,U,Random 11 9 0 0 - 20 0 mg/L    Microalb Creat Ratio 6 0 - 30 mg/g creatinine   HIV 1/2 Antigen/Antibody (4th Generation) w Reflex [facility]    Collection Time: 12/22/20 10:52 AM   Result Value Ref Range    HIV-1/HIV-2 Ab Non-Reactive Non-Reactive   PSA, Total Screen    Collection Time: 12/22/20 10:52 AM   Result Value Ref Range    PSA 2 4 0 0 - 4 0 ng/mL   Fine Needle Aspiration    Collection Time: 12/28/20  9:13 AM   Result Value Ref Range    Case Report       Non-gynecologic Cytology                          Case: [id number]                                  Authorizing Provider:  Tree Morrow      Collected:           12/28/2020 0913              Ordering Location:     [address] [place] [address] Received:            12/28/2020 1004                                     Ultrasound                                                                   Pathologist:           Dante Rivera MD Specimens:   A) - Thyroid, Left, Upper                                                                           B) - Thyroid, Left, Upper                                                                           C) - Thyroid, Left, Mid                                                                             D) - Thyroid, Left, Mid                                                                             E) - Thyroid, Left, Lateral                                                                          F) - Thyroid, Left, Lateral                                                                Final Diagnosis       A -B  Thyroid, Left, Upper (Thin-prep and smear preparations):   Benign ([place] Category II) - See note  Follicular cells in small groups and flat sheets  Few groups of cyst-lining cells  Scattered hemosiderin-laden macrophages and rare multinucleated histiocytes  Focal areas of scant thin colloid  Satisfactory for evaluation  C -D  Thyroid, Left, Mid (Thin-prep and smear preparations): Atypia of undetermined significance ([place] Category III) - See note  Follicular cells with mildly enlarged nuclei, irregular nuclear membranes and nuclear grooves in flat sheets and crowded/overlapping groups  Scattered hemosiderin-laden macrophages  Focal areas of thin colloid  Satisfactory for evaluation  E -F  Thyroid, Left, Lateral (Thin-prep and smear preparations):   Benign ([place] Category II) - See note  Follicular cells in small groups and flat sheets  Scattered hemosiderin-laden macrophages  Abundant thin colloid  Satisfactory for evaluation         Note (Thyroid, Left upper/Thyroid, Left lateral):  As reported in the [facility] for Reporting Thyroid Cytopathology*, the diagnostic category of "benign/negative for malignancy" carries a 0-3% risk of malignancy being found in subsequent resections (in the few studies of patients with benign FNA results that were followed long-term, a false negative rate of 0-3% was reported), with the usual management being clinical follow-up supplemented by ultrasonography (US) as indicated  Repeat FNA may be indicated for nodules showing significant growth or developing US abnormalities  Ultimately, clinical/imaging correlation for this patient is needed in arriving at the actual management plan  *The [place] System for Reporting Thyroid Cytopathology, Reji Pilar , [employer] Becky Seed ), 2018 (2nd ed )     Note (Thyroid, Left mid):  (1) As reported in the [address] [facility] [address] for Reporting Thyroid Cytopathology*, this diagnostic category has demonstrated anywhere from 10-30% risk of malignancy being found in subsequent resections and/or FNA  This risk of malignancy is expected to change due to the usage of the surgical pathology diagnosis of non-invasive follicular thyroid neoplasm with papillary-like nuclear features (NIFTP)    The anticipated risk of malignancy secondary to NIFTP is 6-18%  The manual reports that the usual management following this diagnosis is repeat FNA, molecular testing, or lobectomy  Ultimately, clinical/imaging correlation for this patient is needed in arriving at the actual management plan  *The [place] System for Reporting Thyroid Cytopathology, Rejibebeto Steele , [employer] Becky Seed ), 2018 (2nd ed )        Note       The treating physician has requested a sample(s) from the above thyroid nodule(s) be sent for analysis by  [facility]  ([facility]ma GEC), performed by [address])  [facility] only performs this test on specimen(s) receiving a cytologic diagnosis of [place] Category III or  IV  If such a diagnosis is rendered (above) specimen will be sent to [facility], and a separate report with  results of Afirma GEC will follow directly from [facility] (typically taking 14 days)   If a cytologic  interpretation other than [place] category III or IV is rendered, specimen will not be sent for [facility]  Intraoperative Consultation          B  Not Adequate, recommend additional passes  (1,3,5,6,7)  Dr Jordyn Camacho, Interpretation performed at [facility], [address]         D  Adequate (1,3)  Dr Jordyn Camacho, Interpretation performed at [facility], [address] [address]       F  Adequate- colloid and rare follicular cells  (1,3)  Dr Jordyn Camacho, Interpretation performed at [facility], [address] [address]      Gross Description          A   20ml, slightly pink, clear,received in CytoLyt          B   10 slides received (5 alcohol, 5 diff quik)          C    20ml, slightly pink, clear,received in CytoLyt       D   4 slides received (2 alcohol, 2 diff quik)          E    20ml, colorless, clear,received in CytoLyt       F    4 slides received (2 alcohol, 2 diff quik)      Clinical Information       Left Upper:  Size: 3 3x2  4x3 9cm Margins: smooth Echogenicity: solid, cystic Microcalcs: absent Flow: intranodular, peripheral Size change: na Suspicion level: na Hx of Hashimoto's Thyroiditis: no    Left Mid:  Size: 4 0x4 0x4 2cm Margins: smooth Echogenicity: solid, cystic Microcalcs: absent Flow: intranodular, peripheral Size change: na Suspicion level: na Hx of Hashimoto's Thyroiditis: no    Left Lateral:  Size: 6 1x3 7x4cm Margins: smooth Echogenicity: solid, cystic Microcalcs: absent Flow: intranodular, peripheral Size change: na Suspicion level: na Hx of Hashimoto's Thyroiditis: no          Additional Information       [employer]'s FDA approved ,  and ThinPrep Imaging Duo System are utilized with strict adherence to the 's instruction manual to prepare gynecologic and non-gynecologic cytology specimens for the production of ThinPrep slides as well as for gynecologic ThinPrep imaging  These processes have been validated by our laboratory and/or by the       These tests were developed and their performance characteristics determined by [facility] or [facility] Rosalba Lena  They may not be cleared or approved by the [place]  Food and Drug Administration  The FDA has determined that such clearance or approval is not necessary  These tests are used for clinical purposes  They should not be regarded as investigational or for research  This laboratory has been approved by CLIA 88, designated as a high-complexity laboratory and is qualified to perform these tests    Interpretation performed at [facility] [place] [address]

## 2021-03-21 ENCOUNTER — HOSPITAL ENCOUNTER (OUTPATIENT)
Dept: CT IMAGING | Facility: HOSPITAL | Age: 56
Discharge: HOME | End: 2021-03-21
Payer: COMMERCIAL

## 2021-03-21 DIAGNOSIS — I10 ESSENTIAL HYPERTENSION: ICD-10-CM

## 2021-03-21 DIAGNOSIS — R73.09 ELEVATED GLUCOSE: ICD-10-CM

## 2021-03-21 DIAGNOSIS — E78.5 DYSLIPIDEMIA: ICD-10-CM

## 2021-03-21 PROCEDURE — G1004 CDSM NDSC: HCPCS

## 2021-03-21 PROCEDURE — 75571 CT HRT W/O DYE W/CA TEST: CPT

## 2021-04-28 DIAGNOSIS — I10 ESSENTIAL HYPERTENSION: ICD-10-CM

## 2021-04-28 RX ORDER — LISINOPRIL AND HYDROCHLOROTHIAZIDE 25; 20 MG/1; MG/1
TABLET ORAL
Qty: 90 TABLET | Refills: 3 | Status: SHIPPED | OUTPATIENT
Start: 2021-04-28 | End: 2022-04-13 | Stop reason: SDUPTHER

## 2021-05-11 ENCOUNTER — TELEPHONE (OUTPATIENT)
Dept: FAMILY MEDICINE CLINIC | Facility: CLINIC | Age: 56
End: 2021-05-11

## 2021-05-11 NOTE — TELEPHONE ENCOUNTER
Pt called back and said its been about 2 yrs and it was in [place]  He asked for a recommendation: pt Lino Vinson he recommends Dr Helga Olsen office, I gave the pt the phone# and he will call and sched      rt

## 2021-05-11 NOTE — TELEPHONE ENCOUNTER
LM for pt to call the office regard to his yearly DM eye exam   Who and When was his last one? If its been over a year, does he have appt?     rt

## 2021-05-24 PROBLEM — Z00.00 MEDICARE WELCOME EXAM: Status: RESOLVED | Noted: 2021-02-19 | Resolved: 2021-05-24

## 2021-05-24 PROBLEM — R73.09 ELEVATED GLUCOSE: Status: RESOLVED | Noted: 2020-10-05 | Resolved: 2021-05-24

## 2021-05-24 NOTE — ASSESSMENT & PLAN NOTE
Lab Results   Component Value Date    HGBA1C 6 1 (H) 12/22/2020       COMPLIANT WITH MEDICATION  DENIES ANY NUMBNESS, TINGLING IN FEET    - DISCUSSED DIETARY MODIFICATION OF CARBOHYDRATES  - HGB A1C AND URINE STUDIES DUE TODAY  - FOOT CARE  - RV 3 MONTHS

## 2021-05-25 ENCOUNTER — OFFICE VISIT (OUTPATIENT)
Dept: FAMILY MEDICINE CLINIC | Facility: CLINIC | Age: 56
End: 2021-05-25
Payer: MEDICARE

## 2021-05-25 VITALS
HEART RATE: 88 BPM | WEIGHT: 315 LBS | RESPIRATION RATE: 18 BRPM | HEIGHT: 76 IN | BODY MASS INDEX: 38.36 KG/M2 | OXYGEN SATURATION: 96 % | TEMPERATURE: 98 F | DIASTOLIC BLOOD PRESSURE: 80 MMHG | SYSTOLIC BLOOD PRESSURE: 140 MMHG

## 2021-05-25 DIAGNOSIS — E66.01 MORBID OBESITY WITH BODY MASS INDEX (BMI) OF 50.0 TO 59.9 IN ADULT (HCC): ICD-10-CM

## 2021-05-25 DIAGNOSIS — E11.9 TYPE 2 DIABETES MELLITUS WITHOUT COMPLICATION, WITHOUT LONG-TERM CURRENT USE OF INSULIN (HCC): Primary | ICD-10-CM

## 2021-05-25 DIAGNOSIS — K21.9 GASTROESOPHAGEAL REFLUX DISEASE WITHOUT ESOPHAGITIS: ICD-10-CM

## 2021-05-25 DIAGNOSIS — I10 ESSENTIAL HYPERTENSION: ICD-10-CM

## 2021-05-25 LAB — SL AMB POCT HEMOGLOBIN AIC: 5.6 (ref ?–6.5)

## 2021-05-25 PROCEDURE — 83036 HEMOGLOBIN GLYCOSYLATED A1C: CPT | Performed by: FAMILY MEDICINE

## 2021-05-25 PROCEDURE — 99214 OFFICE O/P EST MOD 30 MIN: CPT | Performed by: FAMILY MEDICINE

## 2021-05-25 NOTE — PROGRESS NOTES
Assessment/Plan:    Type 2 diabetes mellitus without complication, without long-term current use of insulin ([facility])    Lab Results   Component Value Date    HGBA1C 6 1 (H) 12/22/2020       COMPLIANT WITH MEDICATION  DENIES ANY NUMBNESS, TINGLING IN FEET    - DISCUSSED DIETARY MODIFICATION OF CARBOHYDRATES  - HGB A1C AND URINE STUDIES DUE TODAY  - FOOT CARE  - RV 3 MONTHS        GERD (gastroesophageal reflux disease)  STABLE  DENIES ANY CP, INDIGESTION, OR COUGH  NOTES NO MELENA OR HEMATOCHEZIA  NO HEMATEMESIS  COMPLIANT WITH MEDICATION  NO CONCERNS    - CONTINUE CURRENT TREATMENT PLAN  - MEDICATION AS PRESCRIBED  - AVOID CAFFEINE, ALCOHOL OR SMOKING      Hypertension  STABLE  DENIES ANY CP, SOB, PALPITATIONS, OR HEADACHE  NOTES NO WATER RETENTION  COMPLIANT WITH MEDICATION  NO CONCERNS    - CONTINUE CURRENT TREATMENT PLAN  - MONITOR DIETARY SODIUM INTAKE  - ENCOURAGE PHYSICAL ACTIVITY  - RV 3 MONTHS      Morbid obesity with body mass index (BMI) of 50 0 to 59 9 in adult ([facility])  ON CARNIVORE DIET  DOING VERY WELL  LOST OVER 40 LBS  DOING WELL  ENCOURAGED INCREASE IN ACTIVITY       Diagnoses and all orders for this visit:    Type 2 diabetes mellitus without complication, without long-term current use of insulin ([facility])  -     POCT hemoglobin A1c    Gastroesophageal reflux disease without esophagitis    Essential hypertension    Morbid obesity with body mass index (BMI) of 50 0 to 59 9 in adult [facility])          Patient Instructions   CONTINUE CURRENT TREATMENT PLAN  MONITOR DIETARY SODIUM, CHOL, AND CARBOHYDRATE INTAKE  ENCOURAGE PHYSICAL ACTIVITY  FOOT CARE    RV 3 M, SOONER PRN        Return in about 3 months (around 8/25/2021) for DIABETIC REVISIT  Subjective:      Patient ID: Marisa Moore is a 60 y o  male      Chief Complaint   Patient presents with    Follow-up    DM FOOT EXAM     SHOES AND SHOCKS OFF        PATIENT RETURNS FOR ROUTINE EVALUATION OF PATIENT'S MEDICAL ISSUES    INDIVIDUAL MEDICAL ISSUES WITH THEIR CURRENT STATUS, ASSESSMENT AND PLANS ARE LISTED ABOVE          The following portions of the patient's history were reviewed and updated as appropriate: allergies, current medications, past family history, past medical history, past social history, past surgical history and problem list     Review of Systems   Constitutional: Negative for chills, fatigue and fever  HENT: Negative for congestion, ear discharge, ear pain, mouth sores, postnasal drip, sore throat and trouble swallowing  Eyes: Negative for pain, discharge and visual disturbance  Respiratory: Negative for cough, shortness of breath and wheezing  Cardiovascular: Negative for chest pain, palpitations and leg swelling  Gastrointestinal: Negative for abdominal distention, abdominal pain, blood in stool, diarrhea and nausea  Endocrine: Negative for polydipsia, polyphagia and polyuria  Genitourinary: Negative for dysuria, frequency, hematuria and urgency  Musculoskeletal: Positive for arthralgias and back pain  Negative for gait problem and joint swelling  Skin: Negative for pallor and rash  Neurological: Negative for dizziness, syncope, speech difficulty, weakness, light-headedness, numbness and headaches  Hematological: Negative for adenopathy  Psychiatric/Behavioral: Negative for behavioral problems, confusion and sleep disturbance  The patient is not nervous/anxious  Current Outpatient Medications   Medication Sig Dispense Refill    amLODIPine (NORVASC) 5 mg tablet Take 1 tablet (5 mg total) by mouth daily 90 tablet 1    lisinopril-hydrochlorothiazide (PRINZIDE,ZESTORETIC) 20-25 MG per tablet TAKE ONE TABLET BY MOUTH EVERY DAY 90 tablet 3    omeprazole (PriLOSEC) 20 mg delayed release capsule TAKE ONE CAPSULE BY MOUTH TWICE A DAY (GENERIC FOR PRILOSEC) 60 capsule 5     No current facility-administered medications for this visit          Objective:    /80   Pulse 88   Temp 98 °F (36 7 °C) (Temporal)   Resp 18   Ht 6' 4" (1 93 m)   Wt (!) 164 kg (362 lb)   SpO2 96%   BMI 44 06 kg/m²        Physical Exam  Constitutional:       Appearance: He is well-developed  HENT:      Head: Normocephalic and atraumatic  Eyes:      General:         Right eye: No discharge  Left eye: No discharge  Conjunctiva/sclera: Conjunctivae normal       Pupils: Pupils are equal, round, and reactive to light  Neck:      Musculoskeletal: Neck supple  Thyroid: No thyromegaly  Vascular: No JVD  Cardiovascular:      Rate and Rhythm: Normal rate and regular rhythm  Pulses: no weak pulses          Dorsalis pedis pulses are 1+ on the right side and 1+ on the left side  Posterior tibial pulses are 1+ on the right side and 1+ on the left side  Heart sounds: Normal heart sounds  No murmur  Pulmonary:      Effort: Pulmonary effort is normal       Breath sounds: Normal breath sounds  No wheezing or rales  Abdominal:      General: Bowel sounds are normal       Palpations: Abdomen is soft  There is no mass  Tenderness: There is no abdominal tenderness  There is no guarding or rebound  Comments: OBESE   Musculoskeletal: Normal range of motion  General: No tenderness or deformity  Feet:      Right foot:      Skin integrity: No ulcer, skin breakdown, erythema, warmth, callus or dry skin  Left foot:      Skin integrity: No ulcer, skin breakdown, erythema, warmth, callus or dry skin  Lymphadenopathy:      Cervical: No cervical adenopathy  Skin:     General: Skin is warm and dry  Findings: No erythema or rash  Neurological:      Mental Status: He is alert and oriented to person, place, and time  Psychiatric:         Behavior: Behavior normal          Thought Content: Thought content normal          Judgment: Judgment normal          Patient's shoes and socks removed  Right Foot/Ankle   Right Foot Inspection  Skin Exam: skin normal and skin intact no dry skin, no warmth, no callus, no erythema, no maceration, no abnormal color, no pre-ulcer, no ulcer and no callus                          Toe Exam: ROM and strength within normal limits  Sensory       Monofilament testing: intact  Vascular    The right DP pulse is 1+  The right PT pulse is 1+  Left Foot/Ankle  Left Foot Inspection  Skin Exam: skin normal and skin intactno dry skin, no warmth, no erythema, no maceration, normal color, no pre-ulcer, no ulcer and no callus                         Toe Exam: ROM and strength within normal limits                   Sensory       Monofilament: intact  Vascular    The left DP pulse is 1+  The left PT pulse is 1+  Assign Risk Category:  No deformity present; No loss of protective sensation;  No weak pulses       Risk: 0         Luana Lyon MD

## 2021-05-30 DIAGNOSIS — I10 ESSENTIAL HYPERTENSION: ICD-10-CM

## 2021-05-30 RX ORDER — AMLODIPINE BESYLATE 5 MG/1
TABLET ORAL
Qty: 90 TABLET | Refills: 1 | Status: SHIPPED | OUTPATIENT
Start: 2021-05-30 | End: 2021-05-31 | Stop reason: SDUPTHER

## 2021-05-31 DIAGNOSIS — I10 ESSENTIAL HYPERTENSION: ICD-10-CM

## 2021-05-31 RX ORDER — AMLODIPINE BESYLATE 5 MG/1
TABLET ORAL
Qty: 90 TABLET | Refills: 1 | Status: SHIPPED | OUTPATIENT
Start: 2021-05-31 | End: 2021-06-01

## 2021-06-01 DIAGNOSIS — I10 ESSENTIAL HYPERTENSION: ICD-10-CM

## 2021-06-01 RX ORDER — AMLODIPINE BESYLATE 5 MG/1
TABLET ORAL
Qty: 90 TABLET | Refills: 1 | Status: SHIPPED | OUTPATIENT
Start: 2021-06-01 | End: 2021-06-09

## 2021-06-09 DIAGNOSIS — I10 ESSENTIAL HYPERTENSION: ICD-10-CM

## 2021-06-09 RX ORDER — AMLODIPINE BESYLATE 5 MG/1
TABLET ORAL
Qty: 90 TABLET | Refills: 1 | Status: SHIPPED | OUTPATIENT
Start: 2021-06-09 | End: 2021-12-23

## 2021-06-16 NOTE — TELEPHONE ENCOUNTER
Called pt regard to his DM Eye exam   He has not gone as of yet, but will call Dr Cabrera office  Gave the pt their phone#, and asked for him to as the office to forward his eye exam report to our office  No further action required      RT CMA

## 2021-06-25 ENCOUNTER — PATIENT OUTREACH (OUTPATIENT)
Dept: CASE MANAGEMENT | Facility: OTHER | Age: 56
End: 2021-06-25

## 2021-06-25 NOTE — PROGRESS NOTES
1st Attempt    This CHW called patient this day in regards to Longitudinal Care Management call  No answer, This CHW was unable to leave a voicemail as voicemail is disconnected  This CHW will reach out again in a few days

## 2021-06-30 ENCOUNTER — PATIENT OUTREACH (OUTPATIENT)
Dept: CASE MANAGEMENT | Facility: OTHER | Age: 56
End: 2021-06-30

## 2021-06-30 NOTE — PROGRESS NOTES
2nd Attempt    This CHW called patient this day in regards to Longitudinal Care Management call  No answer, Left voicemail with my name and phone number so patient can contact me

## 2021-07-07 ENCOUNTER — PATIENT OUTREACH (OUTPATIENT)
Dept: CASE MANAGEMENT | Facility: OTHER | Age: 56
End: 2021-07-07

## 2021-07-07 NOTE — PROGRESS NOTES
3rd Attempt  This CHW called patient this day in regards to Longitudinal Care Management call  No answer, Left voicemail with my name and phone number so patient can contact me

## 2021-08-17 ENCOUNTER — HOSPITAL ENCOUNTER (OUTPATIENT)
Dept: RADIOLOGY | Facility: HOSPITAL | Age: 56
Discharge: HOME | End: 2021-08-17
Payer: MEDICARE

## 2021-08-17 ENCOUNTER — OFFICE VISIT (OUTPATIENT)
Dept: FAMILY MEDICINE CLINIC | Facility: CLINIC | Age: 56
End: 2021-08-17
Payer: MEDICARE

## 2021-08-17 VITALS
OXYGEN SATURATION: 95 % | TEMPERATURE: 97.8 F | WEIGHT: 315 LBS | HEART RATE: 80 BPM | RESPIRATION RATE: 12 BRPM | HEIGHT: 76 IN | DIASTOLIC BLOOD PRESSURE: 86 MMHG | BODY MASS INDEX: 38.36 KG/M2 | SYSTOLIC BLOOD PRESSURE: 146 MMHG

## 2021-08-17 DIAGNOSIS — M79.89 PAIN AND SWELLING OF RIGHT LOWER LEG: Primary | ICD-10-CM

## 2021-08-17 DIAGNOSIS — M79.661 PAIN AND SWELLING OF RIGHT LOWER LEG: ICD-10-CM

## 2021-08-17 DIAGNOSIS — M79.661 PAIN AND SWELLING OF RIGHT LOWER LEG: Primary | ICD-10-CM

## 2021-08-17 DIAGNOSIS — M25.561 ACUTE PAIN OF RIGHT KNEE: ICD-10-CM

## 2021-08-17 DIAGNOSIS — M79.89 PAIN AND SWELLING OF RIGHT LOWER LEG: ICD-10-CM

## 2021-08-17 PROCEDURE — 99213 OFFICE O/P EST LOW 20 MIN: CPT | Performed by: FAMILY MEDICINE

## 2021-08-17 PROCEDURE — 93971 EXTREMITY STUDY: CPT

## 2021-08-17 RX ORDER — PREDNISONE 20 MG/1
40 TABLET ORAL DAILY
Qty: 8 TABLET | Refills: 0 | Status: SHIPPED | OUTPATIENT
Start: 2021-08-17 | End: 2021-08-21

## 2021-08-17 RX ORDER — DICLOFENAC SODIUM 75 MG/1
75 TABLET, DELAYED RELEASE ORAL 2 TIMES DAILY
Qty: 20 TABLET | Refills: 0 | Status: SHIPPED | OUTPATIENT
Start: 2021-08-17 | End: 2021-11-22 | Stop reason: ALTCHOICE

## 2021-08-17 NOTE — PROGRESS NOTES
Assessment/Plan:    No problem-specific Assessment & Plan notes found for this encounter  Diagnoses and all orders for this visit:    Pain and swelling of right lower leg  -     Cancel: VAS lower limb venous duplex study, unilateral/limited; Future  -     VAS lower limb venous duplex study, unilateral/limited; Future  -     diclofenac (VOLTAREN) 75 mg EC tablet; Take 1 tablet (75 mg total) by mouth 2 (two) times a day With food  -     predniSONE 20 mg tablet; Take 2 tablets (40 mg total) by mouth daily for 4 days    Acute pain of right knee  -     diclofenac (VOLTAREN) 75 mg EC tablet; Take 1 tablet (75 mg total) by mouth 2 (two) times a day With food  -     predniSONE 20 mg tablet; Take 2 tablets (40 mg total) by mouth daily for 4 days          Patient Instructions   REST  ELEVATION  ICE TO AREAS OF PAIN  US OF LEG  MEDICATION AS DIRECTED    CALL IF SYMPTOMS PERSIST OR WORSEN      Return in about 3 months (around 11/17/2021), or if symptoms worsen or fail to improve, for DIABETIC REVISIT  Subjective:      Patient ID: Geetha Kelly is a 60 y o  male  Chief Complaint   Patient presents with    Knee Injury     Pt c/o right knee pain for the past five days since injuring it on 8/12/21  PATIENT C/O R KNEE PAIN, R LEG PAIN AND SWELLING  NO OBVIOUS TRAUMA  WAS MOVING SOME HEAVY EQUIPMENT    PAIN WORSENING OVER THE PAST SEVERAL DAYS  NO NUMBNESS TINGLING  NOW EXPERIENCING SOME LOWER LEG SWELLING AND CALF PAIN  NOTES NO CP, SOB, PALPITATIONS      The following portions of the patient's history were reviewed and updated as appropriate: allergies, current medications, past family history, past medical history, past social history, past surgical history and problem list     Review of Systems   Constitutional: Negative for chills, fatigue and fever  HENT: Negative for sore throat  Eyes: Negative for discharge  Respiratory: Negative for cough and chest tightness      Cardiovascular: Negative for chest pain and palpitations  Gastrointestinal: Negative for abdominal pain, diarrhea, nausea and vomiting  Musculoskeletal: Positive for arthralgias, joint swelling and myalgias  Negative for gait problem  Neurological: Negative for dizziness, weakness and headaches  Hematological: Negative for adenopathy  Psychiatric/Behavioral: The patient is not nervous/anxious  Current Outpatient Medications   Medication Sig Dispense Refill    amLODIPine (NORVASC) 5 mg tablet TAKE ONE TABLET BY MOUTH EVERY DAY 90 tablet 1    lisinopril-hydrochlorothiazide (PRINZIDE,ZESTORETIC) 20-25 MG per tablet TAKE ONE TABLET BY MOUTH EVERY DAY 90 tablet 3    omeprazole (PriLOSEC) 20 mg delayed release capsule TAKE ONE CAPSULE BY MOUTH TWICE A DAY (GENERIC FOR PRILOSEC) 60 capsule 5    diclofenac (VOLTAREN) 75 mg EC tablet Take 1 tablet (75 mg total) by mouth 2 (two) times a day With food 20 tablet 0    predniSONE 20 mg tablet Take 2 tablets (40 mg total) by mouth daily for 4 days 8 tablet 0     No current facility-administered medications for this visit  Objective:    /86 (BP Location: Left arm, Patient Position: Sitting, Cuff Size: Large)   Pulse 80   Temp 97 8 °F (36 6 °C) (Temporal)   Resp 12   Ht 6' 4" (1 93 m)   Wt (!) 164 kg (361 lb)   SpO2 95%   BMI 43 94 kg/m²        Physical Exam  Constitutional:       Appearance: He is well-developed  HENT:      Head: Normocephalic and atraumatic  Eyes:      General:         Right eye: No discharge  Left eye: No discharge  Conjunctiva/sclera: Conjunctivae normal       Pupils: Pupils are equal, round, and reactive to light  Neck:      Thyroid: No thyromegaly  Vascular: No JVD  Cardiovascular:      Rate and Rhythm: Normal rate and regular rhythm  Heart sounds: Normal heart sounds  No murmur heard  Pulmonary:      Effort: Pulmonary effort is normal       Breath sounds: Normal breath sounds  No wheezing or rales     Abdominal: General: Bowel sounds are normal       Palpations: Abdomen is soft  There is no mass  Tenderness: There is no abdominal tenderness  There is no guarding or rebound  Comments: OBESE   Musculoskeletal:         General: Swelling and tenderness present  No deformity  Cervical back: Neck supple  Comments: R KNEE SL SWOLLEN  TENDER TO PALPATION BARBRA OVER QUAD TENDON AND MOVEMENT    R CALF  SL SWOLLEN AND TENDER  NEG SRIKANTH   Lymphadenopathy:      Cervical: No cervical adenopathy  Skin:     General: Skin is warm and dry  Findings: No erythema or rash  Neurological:      Mental Status: He is alert and oriented to person, place, and time  Psychiatric:         Behavior: Behavior normal          Thought Content:  Thought content normal          Judgment: Judgment normal                 Thelma Stanford MD

## 2021-08-17 NOTE — PATIENT INSTRUCTIONS
REST  ELEVATION  ICE TO AREAS OF PAIN  US OF LEG  MEDICATION AS DIRECTED    CALL IF SYMPTOMS PERSIST OR WORSEN

## 2021-08-18 PROCEDURE — 93971 EXTREMITY STUDY: CPT | Performed by: SURGERY

## 2021-08-25 ENCOUNTER — OFFICE VISIT (OUTPATIENT)
Dept: FAMILY MEDICINE CLINIC | Facility: CLINIC | Age: 56
End: 2021-08-25
Payer: MEDICARE

## 2021-08-25 VITALS
BODY MASS INDEX: 38.36 KG/M2 | TEMPERATURE: 97.8 F | HEIGHT: 76 IN | OXYGEN SATURATION: 98 % | DIASTOLIC BLOOD PRESSURE: 82 MMHG | HEART RATE: 78 BPM | SYSTOLIC BLOOD PRESSURE: 140 MMHG | WEIGHT: 315 LBS | RESPIRATION RATE: 18 BRPM

## 2021-08-25 DIAGNOSIS — I10 ESSENTIAL HYPERTENSION: ICD-10-CM

## 2021-08-25 DIAGNOSIS — M15.9 PRIMARY OSTEOARTHRITIS INVOLVING MULTIPLE JOINTS: ICD-10-CM

## 2021-08-25 DIAGNOSIS — F41.9 ANXIETY: ICD-10-CM

## 2021-08-25 DIAGNOSIS — E11.9 TYPE 2 DIABETES MELLITUS WITHOUT COMPLICATION, WITHOUT LONG-TERM CURRENT USE OF INSULIN (HCC): Primary | ICD-10-CM

## 2021-08-25 DIAGNOSIS — K21.9 GASTROESOPHAGEAL REFLUX DISEASE WITHOUT ESOPHAGITIS: ICD-10-CM

## 2021-08-25 PROBLEM — M79.89 PAIN AND SWELLING OF RIGHT LOWER LEG: Status: RESOLVED | Noted: 2021-08-17 | Resolved: 2021-08-25

## 2021-08-25 PROBLEM — M25.561 ACUTE PAIN OF RIGHT KNEE: Status: RESOLVED | Noted: 2021-08-17 | Resolved: 2021-08-25

## 2021-08-25 PROBLEM — M79.661 PAIN AND SWELLING OF RIGHT LOWER LEG: Status: RESOLVED | Noted: 2021-08-17 | Resolved: 2021-08-25

## 2021-08-25 LAB
ALBUMIN SERPL BCP-MCNC: 3.9 G/DL (ref 3.5–5)
ALP SERPL-CCNC: 64 U/L (ref 46–116)
ALT SERPL W P-5'-P-CCNC: 32 U/L (ref 12–78)
ANION GAP SERPL CALCULATED.3IONS-SCNC: 7 MMOL/L (ref 4–13)
AST SERPL W P-5'-P-CCNC: 23 U/L (ref 5–45)
BASOPHILS # BLD AUTO: 0.08 THOUSANDS/ΜL (ref 0–0.1)
BASOPHILS NFR BLD AUTO: 1 % (ref 0–1)
BILIRUB SERPL-MCNC: 0.91 MG/DL (ref 0.2–1)
BUN SERPL-MCNC: 30 MG/DL (ref 5–25)
CALCIUM SERPL-MCNC: 9.7 MG/DL (ref 8.3–10.1)
CHLORIDE SERPL-SCNC: 106 MMOL/L (ref 100–108)
CHOLEST SERPL-MCNC: 179 MG/DL (ref 50–200)
CO2 SERPL-SCNC: 23 MMOL/L (ref 21–32)
CREAT SERPL-MCNC: 1.1 MG/DL (ref 0.6–1.3)
EOSINOPHIL # BLD AUTO: 0.25 THOUSAND/ΜL (ref 0–0.61)
EOSINOPHIL NFR BLD AUTO: 2 % (ref 0–6)
ERYTHROCYTE [DISTWIDTH] IN BLOOD BY AUTOMATED COUNT: 14.1 % (ref 11.6–15.1)
EST. AVERAGE GLUCOSE BLD GHB EST-MCNC: 117 MG/DL
GFR SERPL CREATININE-BSD FRML MDRD: 74 ML/MIN/1.73SQ M
GLUCOSE P FAST SERPL-MCNC: 83 MG/DL (ref 65–99)
HBA1C MFR BLD: 5.7 %
HCT VFR BLD AUTO: 46.7 % (ref 36.5–49.3)
HDLC SERPL-MCNC: 39 MG/DL
HGB BLD-MCNC: 15.3 G/DL (ref 12–17)
IMM GRANULOCYTES # BLD AUTO: 0.04 THOUSAND/UL (ref 0–0.2)
IMM GRANULOCYTES NFR BLD AUTO: 0 % (ref 0–2)
LDLC SERPL CALC-MCNC: 127 MG/DL (ref 0–100)
LYMPHOCYTES # BLD AUTO: 2.1 THOUSANDS/ΜL (ref 0.6–4.47)
LYMPHOCYTES NFR BLD AUTO: 19 % (ref 14–44)
MCH RBC QN AUTO: 29.9 PG (ref 26.8–34.3)
MCHC RBC AUTO-ENTMCNC: 32.8 G/DL (ref 31.4–37.4)
MCV RBC AUTO: 91 FL (ref 82–98)
MONOCYTES # BLD AUTO: 0.68 THOUSAND/ΜL (ref 0.17–1.22)
MONOCYTES NFR BLD AUTO: 6 % (ref 4–12)
NEUTROPHILS # BLD AUTO: 7.83 THOUSANDS/ΜL (ref 1.85–7.62)
NEUTS SEG NFR BLD AUTO: 72 % (ref 43–75)
NONHDLC SERPL-MCNC: 140 MG/DL
NRBC BLD AUTO-RTO: 0 /100 WBCS
PLATELET # BLD AUTO: 273 THOUSANDS/UL (ref 149–390)
PMV BLD AUTO: 10.5 FL (ref 8.9–12.7)
POTASSIUM SERPL-SCNC: 3.7 MMOL/L (ref 3.5–5.3)
PROT SERPL-MCNC: 8.5 G/DL (ref 6.4–8.2)
RBC # BLD AUTO: 5.11 MILLION/UL (ref 3.88–5.62)
SODIUM SERPL-SCNC: 136 MMOL/L (ref 136–145)
TRIGL SERPL-MCNC: 67 MG/DL
WBC # BLD AUTO: 10.98 THOUSAND/UL (ref 4.31–10.16)

## 2021-08-25 PROCEDURE — 36415 COLL VENOUS BLD VENIPUNCTURE: CPT | Performed by: FAMILY MEDICINE

## 2021-08-25 PROCEDURE — 83036 HEMOGLOBIN GLYCOSYLATED A1C: CPT | Performed by: FAMILY MEDICINE

## 2021-08-25 PROCEDURE — 99214 OFFICE O/P EST MOD 30 MIN: CPT | Performed by: FAMILY MEDICINE

## 2021-08-25 PROCEDURE — 80061 LIPID PANEL: CPT | Performed by: FAMILY MEDICINE

## 2021-08-25 PROCEDURE — 80053 COMPREHEN METABOLIC PANEL: CPT | Performed by: FAMILY MEDICINE

## 2021-08-25 PROCEDURE — 85025 COMPLETE CBC W/AUTO DIFF WBC: CPT | Performed by: FAMILY MEDICINE

## 2021-08-25 NOTE — PROGRESS NOTES
Assessment/Plan:    Hypertension  STABLE  DENIES ANY CP, SOB, PALPITATIONS, OR HEADACHE  NOTES NO WATER RETENTION  COMPLIANT WITH MEDICATION  NO CONCERNS    - CONTINUE CURRENT TREATMENT PLAN  - MONITOR DIETARY SODIUM INTAKE  - ENCOURAGE PHYSICAL ACTIVITY  - RV 3 MONTHS      Type 2 diabetes mellitus without complication, without long-term current use of insulin ([facility])    Lab Results   Component Value Date    HGBA1C 5 6 05/25/2021       COMPLIANT WITH MEDICATION  DENIES ANY NUMBNESS, TINGLING IN FEET    - DISCUSSED DIETARY MODIFICATION OF CARBOHYDRATES  - HGB A1C AND URINE STUDIES DUE TODAY  - FOOT CARE  - RV 3 MONTHS        Osteoarthritis  STABLE  DENIES ANY JOINT SWELLING OR REDNESS  JOINT STIFFNESS PRESENT  PAIN MANAGEMENT ADEQUATE    - CONTINUE CURRENT MANAGEMENT  - MEDICATION AS DIRECTED  - CALL / RETURN IF SYMPTOMS WORSEN         Diagnoses and all orders for this visit:    Type 2 diabetes mellitus without complication, without long-term current use of insulin ([facility])  -     Comprehensive metabolic panel  -     Hemoglobin A1C  -     Lipid panel    Essential hypertension  -     Comprehensive metabolic panel    Gastroesophageal reflux disease without esophagitis  -     CBC and differential    Primary osteoarthritis involving multiple joints    Anxiety          Patient Instructions   CONTINUE CURRENT TREATMENT PLAN  MONITOR DIETARY SODIUM, CHOL, AND CARBOHYDRATE INTAKE  ENCOURAGE PHYSICAL ACTIVITY  FOOT CARE    RV 3 M, SOONER PRN        Return in about 3 months (around 11/25/2021) for DIABETIC REVISIT  Subjective:      Patient ID: Katja Gonsalves is a 60 y o  male      Chief Complaint   Patient presents with    Blood Pressure Check    A1C       PATIENT RETURNS FOR ROUTINE EVALUATION OF PATIENT'S MEDICAL ISSUES    INDIVIDUAL MEDICAL ISSUES WITH THEIR CURRENT STATUS, ASSESSMENT AND PLANS ARE LISTED ABOVE          The following portions of the patient's history were reviewed and updated as appropriate: allergies, current medications, past family history, past medical history, past social history, past surgical history and problem list     Review of Systems   Constitutional: Negative for chills, fatigue and fever  HENT: Negative for congestion, ear discharge, ear pain, mouth sores, postnasal drip, sore throat and trouble swallowing  Eyes: Negative for pain, discharge and visual disturbance  Respiratory: Negative for cough, shortness of breath and wheezing  Cardiovascular: Negative for chest pain, palpitations and leg swelling  Gastrointestinal: Negative for abdominal distention, abdominal pain, blood in stool, diarrhea and nausea  Endocrine: Negative for polydipsia, polyphagia and polyuria  Genitourinary: Negative for dysuria, frequency, hematuria and urgency  Musculoskeletal: Negative for arthralgias, gait problem and joint swelling  Skin: Negative for pallor and rash  Neurological: Negative for dizziness, syncope, speech difficulty, weakness, light-headedness, numbness and headaches  Hematological: Negative for adenopathy  Psychiatric/Behavioral: Negative for behavioral problems, confusion and sleep disturbance  The patient is not nervous/anxious  Current Outpatient Medications   Medication Sig Dispense Refill    amLODIPine (NORVASC) 5 mg tablet TAKE ONE TABLET BY MOUTH EVERY DAY 90 tablet 1    diclofenac (VOLTAREN) 75 mg EC tablet Take 1 tablet (75 mg total) by mouth 2 (two) times a day With food 20 tablet 0    lisinopril-hydrochlorothiazide (PRINZIDE,ZESTORETIC) 20-25 MG per tablet TAKE ONE TABLET BY MOUTH EVERY DAY 90 tablet 3     No current facility-administered medications for this visit  Objective:    /82   Pulse 78   Temp 97 8 °F (36 6 °C) (Temporal)   Resp 18   Ht 6' 4" (1 93 m)   Wt (!) 161 kg (355 lb)   SpO2 98%   BMI 43 21 kg/m²        Physical Exam  Constitutional:       Appearance: He is well-developed  HENT:      Head: Normocephalic and atraumatic  Eyes:      General:         Right eye: No discharge  Left eye: No discharge  Conjunctiva/sclera: Conjunctivae normal       Pupils: Pupils are equal, round, and reactive to light  Neck:      Thyroid: No thyromegaly  Vascular: No JVD  Cardiovascular:      Rate and Rhythm: Normal rate and regular rhythm  Heart sounds: Normal heart sounds  No murmur heard  Pulmonary:      Effort: Pulmonary effort is normal       Breath sounds: Normal breath sounds  No wheezing or rales  Abdominal:      General: Bowel sounds are normal       Palpations: Abdomen is soft  There is no mass  Tenderness: There is no abdominal tenderness  There is no guarding or rebound  Comments: OBESE   Musculoskeletal:         General: No tenderness or deformity  Normal range of motion  Cervical back: Neck supple  Lymphadenopathy:      Cervical: No cervical adenopathy  Skin:     General: Skin is warm and dry  Findings: No erythema or rash  Neurological:      Mental Status: He is alert and oriented to person, place, and time  Psychiatric:         Behavior: Behavior normal          Thought Content:  Thought content normal          Judgment: Judgment normal                 Massiel Louie MD

## 2021-08-25 NOTE — ASSESSMENT & PLAN NOTE
Lab Results   Component Value Date    HGBA1C 5 6 05/25/2021       COMPLIANT WITH MEDICATION  DENIES ANY NUMBNESS, TINGLING IN FEET    - DISCUSSED DIETARY MODIFICATION OF CARBOHYDRATES  - HGB A1C AND URINE STUDIES DUE TODAY  - FOOT CARE  - RV 3 MONTHS

## 2021-08-26 ENCOUNTER — TELEPHONE (OUTPATIENT)
Dept: ADMINISTRATIVE | Facility: OTHER | Age: 56
End: 2021-08-26

## 2021-08-26 NOTE — LETTER
Diabetic Eye Exam Form    Date Requested: 21  Patient: Serena Cai  Patient : 1965   Referring Provider: Nella Juárez MD    Dilated Retinal Exam, Optomap-Iris Exam, or Fundus Photography Done         Yes ([place] one above)         No     Date of Diabetic Eye Exam ______________________________  Left Eye      Exam did show retinopathy    Exam did not show retinopathy         Mild       Moderate       None       Proliferative       Severe     Right Eye     Exam did show retinopathy    Exam did not show retinopathy         Mild       Moderate       None       Proliferative       Severe     Comments __________________________________________________________    Practice Providing Exam ______________________________________________    Exam Performed By (print name) _______________________________________      Provider Signature ___________________________________________________      These reports are needed for  compliance    Please fax this completed form and a copy of the Diabetic Eye Exam report to our office located at [place] [address] as soon as possible to [phone] attention Angy: Phone [phone]    We thank you for your assistance in treating our mutual patient     (sent to [facility])

## 2021-08-26 NOTE — TELEPHONE ENCOUNTER
----- Message from [facility], LPN sent at 3/30/2265  2:22 PM EDT -----  08/25/21 2:22 PM    Hello, our patient Neville Huggins has had Diabetic Eye Exam completed/performed  Please assist in updating the patient chart by making an External outreach  TO [facility] located in [place] The date of service is 7/2021      Thank you,  jewel truong, LPN  [address]

## 2021-08-26 NOTE — TELEPHONE ENCOUNTER
Upon review of the In Basket request we have found as a result of outreach that patient did not have the requested item(s) completed in July of 2021  The last documented DOS is 6-; this was resulted on 7-1-2021 and is reflected in HM  Any additional questions or concerns should be emailed to the Practice Liaisons via [email or web address] email, please do not reply via In Basket      Thank you  Michelle Rowan MA

## 2021-08-26 NOTE — TELEPHONE ENCOUNTER
Upon review of the In Basket request and the patient's chart, initial outreach has been made via telephone call and fax, please see Contacts section for details       Thank you  Vikas Moss MA

## 2021-11-22 ENCOUNTER — OFFICE VISIT (OUTPATIENT)
Dept: FAMILY MEDICINE CLINIC | Facility: CLINIC | Age: 56
End: 2021-11-22
Payer: MEDICARE

## 2021-11-22 VITALS
HEART RATE: 92 BPM | TEMPERATURE: 98 F | OXYGEN SATURATION: 97 % | SYSTOLIC BLOOD PRESSURE: 110 MMHG | RESPIRATION RATE: 16 BRPM | BODY MASS INDEX: 38.36 KG/M2 | WEIGHT: 315 LBS | DIASTOLIC BLOOD PRESSURE: 78 MMHG | HEIGHT: 76 IN

## 2021-11-22 DIAGNOSIS — M15.9 PRIMARY OSTEOARTHRITIS INVOLVING MULTIPLE JOINTS: ICD-10-CM

## 2021-11-22 DIAGNOSIS — E11.9 TYPE 2 DIABETES MELLITUS WITHOUT COMPLICATION, WITHOUT LONG-TERM CURRENT USE OF INSULIN (HCC): ICD-10-CM

## 2021-11-22 DIAGNOSIS — L98.9 SKIN LESION: ICD-10-CM

## 2021-11-22 DIAGNOSIS — E66.01 MORBID OBESITY WITH BODY MASS INDEX (BMI) OF 50.0 TO 59.9 IN ADULT (HCC): ICD-10-CM

## 2021-11-22 DIAGNOSIS — I10 PRIMARY HYPERTENSION: Primary | ICD-10-CM

## 2021-11-22 LAB — SL AMB POCT HEMOGLOBIN AIC: 5.4 (ref ?–6.5)

## 2021-11-22 PROCEDURE — 88305 TISSUE EXAM BY PATHOLOGIST: CPT | Performed by: PATHOLOGY

## 2021-11-22 PROCEDURE — 99214 OFFICE O/P EST MOD 30 MIN: CPT | Performed by: FAMILY MEDICINE

## 2021-11-22 PROCEDURE — 11400 EXC TR-EXT B9+MARG 0.5 CM<: CPT | Performed by: FAMILY MEDICINE

## 2021-11-22 PROCEDURE — 83036 HEMOGLOBIN GLYCOSYLATED A1C: CPT | Performed by: FAMILY MEDICINE

## 2021-11-30 ENCOUNTER — PATIENT OUTREACH (OUTPATIENT)
Dept: CASE MANAGEMENT | Facility: OTHER | Age: 56
End: 2021-11-30

## 2021-12-06 ENCOUNTER — OFFICE VISIT (OUTPATIENT)
Dept: URGENT CARE | Facility: CLINIC | Age: 56
End: 2021-12-06
Payer: MEDICARE

## 2021-12-06 VITALS
RESPIRATION RATE: 16 BRPM | HEIGHT: 76 IN | HEART RATE: 77 BPM | OXYGEN SATURATION: 97 % | DIASTOLIC BLOOD PRESSURE: 70 MMHG | SYSTOLIC BLOOD PRESSURE: 135 MMHG | TEMPERATURE: 97 F | BODY MASS INDEX: 38.36 KG/M2 | WEIGHT: 315 LBS

## 2021-12-06 DIAGNOSIS — H93.8X1 CLOGGED EAR, RIGHT: Primary | ICD-10-CM

## 2021-12-06 PROCEDURE — 99214 OFFICE O/P EST MOD 30 MIN: CPT | Performed by: PHYSICIAN ASSISTANT

## 2021-12-06 PROCEDURE — 69209 REMOVE IMPACTED EAR WAX UNI: CPT | Performed by: PHYSICIAN ASSISTANT

## 2021-12-17 DIAGNOSIS — K21.9 GASTROESOPHAGEAL REFLUX DISEASE, UNSPECIFIED WHETHER ESOPHAGITIS PRESENT: Primary | ICD-10-CM

## 2021-12-17 RX ORDER — OMEPRAZOLE 20 MG/1
CAPSULE, DELAYED RELEASE ORAL
Qty: 60 CAPSULE | Refills: 5 | Status: SHIPPED | OUTPATIENT
Start: 2021-12-17 | End: 2022-02-24 | Stop reason: SDUPTHER

## 2021-12-23 DIAGNOSIS — I10 ESSENTIAL HYPERTENSION: ICD-10-CM

## 2021-12-23 RX ORDER — AMLODIPINE BESYLATE 5 MG/1
TABLET ORAL
Qty: 90 TABLET | Refills: 1 | Status: SHIPPED | OUTPATIENT
Start: 2021-12-23 | End: 2022-01-03

## 2022-01-02 DIAGNOSIS — I10 ESSENTIAL HYPERTENSION: ICD-10-CM

## 2022-01-03 ENCOUNTER — APPOINTMENT (EMERGENCY)
Dept: RADIOLOGY | Facility: HOSPITAL | Age: 57
End: 2022-01-03
Payer: COMMERCIAL

## 2022-01-03 ENCOUNTER — HOSPITAL ENCOUNTER (EMERGENCY)
Facility: HOSPITAL | Age: 57
Discharge: HOME | End: 2022-01-03
Attending: EMERGENCY MEDICINE | Admitting: EMERGENCY MEDICINE
Payer: COMMERCIAL

## 2022-01-03 VITALS
SYSTOLIC BLOOD PRESSURE: 145 MMHG | HEART RATE: 78 BPM | RESPIRATION RATE: 18 BRPM | OXYGEN SATURATION: 96 % | TEMPERATURE: 98.9 F | DIASTOLIC BLOOD PRESSURE: 82 MMHG

## 2022-01-03 DIAGNOSIS — K57.92 DIVERTICULITIS: Primary | ICD-10-CM

## 2022-01-03 LAB
ALBUMIN SERPL BCP-MCNC: 3.5 G/DL (ref 3.5–5)
ALP SERPL-CCNC: 69 U/L (ref 46–116)
ALT SERPL W P-5'-P-CCNC: 27 U/L (ref 12–78)
ANION GAP SERPL CALCULATED.3IONS-SCNC: 8 MMOL/L (ref 4–13)
AST SERPL W P-5'-P-CCNC: 17 U/L (ref 5–45)
BACTERIA UR QL AUTO: ABNORMAL /HPF
BASOPHILS # BLD AUTO: 0.1 THOUSANDS/ΜL (ref 0–0.1)
BASOPHILS NFR BLD AUTO: 1 % (ref 0–1)
BILIRUB SERPL-MCNC: 0.57 MG/DL (ref 0.2–1)
BILIRUB UR QL STRIP: NEGATIVE
BUN SERPL-MCNC: 29 MG/DL (ref 5–25)
CALCIUM SERPL-MCNC: 9.3 MG/DL (ref 8.3–10.1)
CHLORIDE SERPL-SCNC: 104 MMOL/L (ref 100–108)
CLARITY UR: CLEAR
CO2 SERPL-SCNC: 27 MMOL/L (ref 21–32)
COLOR UR: YELLOW
CREAT SERPL-MCNC: 1.41 MG/DL (ref 0.6–1.3)
EOSINOPHIL # BLD AUTO: 0.54 THOUSAND/ΜL (ref 0–0.61)
EOSINOPHIL NFR BLD AUTO: 4 % (ref 0–6)
ERYTHROCYTE [DISTWIDTH] IN BLOOD BY AUTOMATED COUNT: 13.2 % (ref 11.6–15.1)
GFR SERPL CREATININE-BSD FRML MDRD: 54 ML/MIN/1.73SQ M
GLUCOSE SERPL-MCNC: 112 MG/DL (ref 65–140)
GLUCOSE UR STRIP-MCNC: NEGATIVE MG/DL
HCT VFR BLD AUTO: 47.2 % (ref 36.5–49.3)
HGB BLD-MCNC: 15.5 G/DL (ref 12–17)
HGB UR QL STRIP.AUTO: ABNORMAL
HYALINE CASTS #/AREA URNS LPF: ABNORMAL /LPF
IMM GRANULOCYTES # BLD AUTO: 0.07 THOUSAND/UL (ref 0–0.2)
IMM GRANULOCYTES NFR BLD AUTO: 1 % (ref 0–2)
KETONES UR STRIP-MCNC: NEGATIVE MG/DL
LEUKOCYTE ESTERASE UR QL STRIP: NEGATIVE
LIPASE SERPL-CCNC: 118 U/L (ref 73–393)
LYMPHOCYTES # BLD AUTO: 2.24 THOUSANDS/ΜL (ref 0.6–4.47)
LYMPHOCYTES NFR BLD AUTO: 17 % (ref 14–44)
MCH RBC QN AUTO: 30 PG (ref 26.8–34.3)
MCHC RBC AUTO-ENTMCNC: 32.8 G/DL (ref 31.4–37.4)
MCV RBC AUTO: 92 FL (ref 82–98)
MONOCYTES # BLD AUTO: 0.97 THOUSAND/ΜL (ref 0.17–1.22)
MONOCYTES NFR BLD AUTO: 8 % (ref 4–12)
NEUTROPHILS # BLD AUTO: 9.04 THOUSANDS/ΜL (ref 1.85–7.62)
NEUTS SEG NFR BLD AUTO: 69 % (ref 43–75)
NITRITE UR QL STRIP: NEGATIVE
NON-SQ EPI CELLS URNS QL MICRO: ABNORMAL /HPF
NRBC BLD AUTO-RTO: 0 /100 WBCS
PH UR STRIP.AUTO: 5.5 [PH]
PLATELET # BLD AUTO: 249 THOUSANDS/UL (ref 149–390)
PMV BLD AUTO: 9.5 FL (ref 8.9–12.7)
POTASSIUM SERPL-SCNC: 3.8 MMOL/L (ref 3.5–5.3)
PROT SERPL-MCNC: 7.9 G/DL (ref 6.4–8.2)
PROT UR STRIP-MCNC: NEGATIVE MG/DL
RBC # BLD AUTO: 5.16 MILLION/UL (ref 3.88–5.62)
RBC #/AREA URNS AUTO: ABNORMAL /HPF
SODIUM SERPL-SCNC: 139 MMOL/L (ref 136–145)
SP GR UR STRIP.AUTO: 1.02 (ref 1–1.03)
UROBILINOGEN UR QL STRIP.AUTO: 0.2 E.U./DL
WBC # BLD AUTO: 12.96 THOUSAND/UL (ref 4.31–10.16)
WBC #/AREA URNS AUTO: ABNORMAL /HPF

## 2022-01-03 PROCEDURE — G1004 CDSM NDSC: HCPCS

## 2022-01-03 PROCEDURE — 85025 COMPLETE CBC W/AUTO DIFF WBC: CPT | Performed by: EMERGENCY MEDICINE

## 2022-01-03 PROCEDURE — 74177 CT ABD & PELVIS W/CONTRAST: CPT

## 2022-01-03 PROCEDURE — 96374 THER/PROPH/DIAG INJ IV PUSH: CPT

## 2022-01-03 PROCEDURE — 80053 COMPREHEN METABOLIC PANEL: CPT | Performed by: EMERGENCY MEDICINE

## 2022-01-03 PROCEDURE — 36415 COLL VENOUS BLD VENIPUNCTURE: CPT | Performed by: EMERGENCY MEDICINE

## 2022-01-03 PROCEDURE — 99284 EMERGENCY DEPT VISIT MOD MDM: CPT

## 2022-01-03 PROCEDURE — 81001 URINALYSIS AUTO W/SCOPE: CPT | Performed by: EMERGENCY MEDICINE

## 2022-01-03 PROCEDURE — 96361 HYDRATE IV INFUSION ADD-ON: CPT

## 2022-01-03 PROCEDURE — 99285 EMERGENCY DEPT VISIT HI MDM: CPT | Performed by: EMERGENCY MEDICINE

## 2022-01-03 PROCEDURE — 83690 ASSAY OF LIPASE: CPT | Performed by: EMERGENCY MEDICINE

## 2022-01-03 RX ORDER — AMLODIPINE BESYLATE 5 MG/1
TABLET ORAL
Qty: 90 TABLET | Refills: 1 | Status: SHIPPED | OUTPATIENT
Start: 2022-01-03 | End: 2022-04-13 | Stop reason: SDUPTHER

## 2022-01-03 RX ORDER — AMOXICILLIN AND CLAVULANATE POTASSIUM 875; 125 MG/1; MG/1
1 TABLET, FILM COATED ORAL ONCE
Status: COMPLETED | OUTPATIENT
Start: 2022-01-03 | End: 2022-01-03

## 2022-01-03 RX ORDER — AMOXICILLIN AND CLAVULANATE POTASSIUM 875; 125 MG/1; MG/1
1 TABLET, FILM COATED ORAL EVERY 12 HOURS
Qty: 14 TABLET | Refills: 0 | Status: SHIPPED | OUTPATIENT
Start: 2022-01-03 | End: 2022-01-10

## 2022-01-03 RX ORDER — MORPHINE SULFATE 4 MG/ML
4 INJECTION, SOLUTION INTRAMUSCULAR; INTRAVENOUS ONCE
Status: COMPLETED | OUTPATIENT
Start: 2022-01-03 | End: 2022-01-03

## 2022-01-03 RX ADMIN — SODIUM CHLORIDE 1000 ML: 0.9 INJECTION, SOLUTION INTRAVENOUS at 06:18

## 2022-01-03 RX ADMIN — AMOXICILLIN AND CLAVULANATE POTASSIUM 1 TABLET: 875; 125 TABLET, FILM COATED ORAL at 06:56

## 2022-01-03 RX ADMIN — MORPHINE SULFATE 4 MG: 4 INJECTION INTRAVENOUS at 05:16

## 2022-01-03 RX ADMIN — IOHEXOL 100 ML: 350 INJECTION, SOLUTION INTRAVENOUS at 05:58

## 2022-01-03 NOTE — ED PROVIDER NOTES
History  Chief Complaint   Patient presents with    Abdominal Pain     Pt reports feeling a discomfort with intermittent sharp pain to lower abdomen, reports history of diverticulitis, with this pain being very similar  Patient presents for evaluation of intermittent sharp pain in his lower abdomen for last for 5 days  States pain got worse tonight  However at this time he states the pain is not that bad as declining any pain medication  Has a history of diverticulitis twice in 2008  States this feels similar  Denies any modifying factors for symptoms  Denies any pain with urination  No nausea, vomiting, or diarrhea  History provided by:  Patient   used: No    Abdominal Pain  Associated symptoms: no chest pain, no chills, no cough, no dysuria, no fever, no hematuria, no shortness of breath, no sore throat and no vomiting        Prior to Admission Medications   Prescriptions Last Dose Informant Patient Reported?  Taking?   lisinopril-hydrochlorothiazide (PRINZIDE,ZESTORETIC) 20-25 MG per tablet  Self No No   Sig: TAKE ONE TABLET BY MOUTH EVERY DAY   omeprazole (PriLOSEC) 20 mg delayed release capsule   No No   Sig: TAKE ONE CAPSULE BY MOUTH TWICE A DAY (GENERIC FOR PRILOSEC)      Facility-Administered Medications: None       Past Medical History:   Diagnosis Date    Borderline diabetes     Chronic pain disorder     lower back-s/p laminectomy    Claustrophobia     CPAP (continuous positive airway pressure) dependence     Diverticulitis     GERD (gastroesophageal reflux disease)     Hypertension     Kidney stone     x 2 episodes    Localized pain of joint     Mild sleep apnea     CPAP started in July having difficulty using- only has used on occ    Morbid obesity with body mass index (BMI) of 50 0 to 59 9 in adult [facility])     Rectal bleeding     occ rectal bleeding last episode 6/19    Skin tag     skin tags were removed-as of 8/2/19 has a few more    Wears glasses Past Surgical History:   Procedure Laterality Date    BACK SURGERY      laminectomy-1996, 2010    COLONOSCOPY      x2    ESOPHAGOGASTRODUODENOSCOPY N/A 3/30/2019    Procedure: ESOPHAGOGASTRODUODENOSCOPY (EGD); Surgeon: Elvin Washington MD;  Location: [address] [place] [address];  Service: Gastroenterology    KNEE ARTHROSCOPY Left 2007    MT ESOPHAGOGASTRODUODENOSCOPY TRANSORAL DIAGNOSTIC N/A 5/1/2019    Procedure: ESOPHAGOGASTRODUODENOSCOPY (EGD); Surgeon: Elvin Washington MD;  Location: [facility]; Service: Gastroenterology    UPPER GASTROINTESTINAL ENDOSCOPY  2006    US GUIDED THYROID BIOPSY  12/28/2020       Family History   Problem Relation Age of Onset    Osteoarthritis Mother     Obesity Father         [id number]    Sleep apnea Father         with CPAP    No Known Problems Sister     No Known Problems Brother     Lupus Sister     No Known Problems Sister     No Known Problems Son     Substance Abuse Neg Hx     Mental illness Neg Hx     Cancer Neg Hx     Diabetes Neg Hx     Heart disease Neg Hx     Stroke Neg Hx      I have reviewed and agree with the history as documented  E-Cigarette/Vaping    E-Cigarette Use Never User      E-Cigarette/Vaping Substances    Nicotine No     THC No     CBD No     Flavoring No     Other No     Unknown No      Social History     Tobacco Use    Smoking status: Never Smoker    Smokeless tobacco: Never Used   Vaping Use    Vaping Use: Never used   Substance Use Topics    Alcohol use: No    Drug use: No       Review of Systems   Constitutional: Negative for chills and fever  HENT: Negative for ear pain and sore throat  Eyes: Negative for pain and visual disturbance  Respiratory: Negative for cough and shortness of breath  Cardiovascular: Negative for chest pain and palpitations  Gastrointestinal: Positive for abdominal pain  Negative for vomiting  Genitourinary: Negative for dysuria and hematuria     Musculoskeletal: Negative for arthralgias and back pain    Skin: Negative for color change and rash  Neurological: Negative for seizures and syncope  All other systems reviewed and are negative  Physical Exam  Physical Exam  Vitals and nursing note reviewed  Constitutional:       General: He is not in acute distress  Appearance: Normal appearance  He is obese  HENT:      Head: Atraumatic  Right Ear: External ear normal       Left Ear: External ear normal       Nose: Nose normal       Mouth/Throat:      Mouth: Mucous membranes are moist       Pharynx: Oropharynx is clear  Eyes:      General: No scleral icterus  Conjunctiva/sclera: Conjunctivae normal    Cardiovascular:      Rate and Rhythm: Normal rate and regular rhythm  Pulses: Normal pulses  Pulmonary:      Effort: Pulmonary effort is normal  No respiratory distress  Breath sounds: Normal breath sounds  Abdominal:      General: Abdomen is flat  Bowel sounds are normal  There is no distension  Tenderness: There is abdominal tenderness  There is no guarding or rebound  Comments: Suprapubic tenderness   Musculoskeletal:         General: No deformity  Normal range of motion  Skin:     Capillary Refill: Capillary refill takes less than 2 seconds  Findings: No rash  Neurological:      General: No focal deficit present  Mental Status: He is alert and oriented to person, place, and time           Vital Signs  ED Triage Vitals   Temperature Pulse Respirations Blood Pressure SpO2   01/03/22 0431 01/03/22 0431 01/03/22 0431 01/03/22 0530 01/03/22 0431   98 9 °F (37 2 °C) 80 18 145/82 97 %      Temp Source Heart Rate Source Patient Position - Orthostatic VS BP Location FiO2 (%)   01/03/22 0431 01/03/22 0431 01/03/22 0530 01/03/22 0530 --   Tympanic Monitor Sitting Right arm       Pain Score       01/03/22 0431       7           Vitals:    01/03/22 0431 01/03/22 0530   BP:  145/82   Pulse: 80 78   Patient Position - Orthostatic VS:  Sitting         Visual Acuity      ED Medications  Medications   morphine (PF) 4 mg/mL injection 4 mg (4 mg Intravenous Given 1/3/22 0516)   sodium chloride 0 9 % bolus 1,000 mL (0 mL Intravenous Stopped 1/3/22 0659)   iohexol (OMNIPAQUE) 350 MG/ML injection (SINGLE-DOSE) 100 mL (100 mL Intravenous Given 1/3/22 0558)   amoxicillin-clavulanate (AUGMENTIN) 875-125 mg per tablet 1 tablet (1 tablet Oral Given 1/3/22 0656)       Diagnostic Studies  Results Reviewed     Procedure Component Value Units Date/Time    Urine Microscopic [[id number]]  (Abnormal) Collected: 01/03/22 0440    Lab Status: Final result Specimen: Urine, Clean Catch Updated: 01/03/22 0519     RBC, UA None Seen /hpf      WBC, UA 0-1 /hpf      Epithelial Cells Occasional /hpf      Bacteria, UA Occasional /hpf      Hyaline Casts, UA 1-2 /lpf     Comprehensive metabolic panel [[id number]]  (Abnormal) Collected: 01/03/22 0440    Lab Status: Final result Specimen: Blood from Arm, Left Updated: 01/03/22 0501     Sodium 139 mmol/L      Potassium 3 8 mmol/L      Chloride 104 mmol/L      CO2 27 mmol/L      ANION GAP 8 mmol/L      BUN 29 mg/dL      Creatinine 1 41 mg/dL      Glucose 112 mg/dL      Calcium 9 3 mg/dL      AST 17 U/L      ALT 27 U/L      Alkaline Phosphatase 69 U/L      Total Protein 7 9 g/dL      Albumin 3 5 g/dL      Total Bilirubin 0 57 mg/dL      eGFR 54 ml/min/1 73sq m     Narrative:      [place] guidelines for Chronic Kidney Disease (CKD):     Stage 1 with normal or high GFR (GFR > 90 mL/min/1 73 square meters)    Stage 2 Mild CKD (GFR = 60-89 mL/min/1 73 square meters)    Stage 3A Moderate CKD (GFR = 45-59 mL/min/1 73 square meters)    Stage 3B Moderate CKD (GFR = 30-44 mL/min/1 73 square meters)    Stage 4 Severe CKD (GFR = 15-29 mL/min/1 73 square meters)    Stage 5 End Stage CKD (GFR <15 mL/min/1 73 square meters)  Note: GFR calculation is accurate only with a steady state creatinine    Lipase [[id number]]  (Normal) Collected: 01/03/22 0440    Lab Status: Final result Specimen: Blood from Arm, Left Updated: 01/03/22 0501     Lipase 118 u/L     UA (URINE) with reflex to Scope [[id number]]  (Abnormal) Collected: 01/03/22 0440    Lab Status: Final result Specimen: Urine, Clean Catch Updated: 01/03/22 0446     Color, UA Yellow     Clarity, UA Clear     Specific [place], UA 1 020     pH, UA 5 5     Leukocytes, UA Negative     Nitrite, UA Negative     Protein, UA Negative mg/dl      Glucose, UA Negative mg/dl      Ketones, UA Negative mg/dl      Urobilinogen, UA 0 2 E U /dl      Bilirubin, UA Negative     Blood, UA Moderate    CBC and differential [[id number]]  (Abnormal) Collected: 01/03/22 0440    Lab Status: Final result Specimen: Blood from Arm, Left Updated: 01/03/22 0445     WBC 12 96 Thousand/uL      RBC 5 16 Million/uL      Hemoglobin 15 5 g/dL      Hematocrit 47 2 %      MCV 92 fL      MCH 30 0 pg      MCHC 32 8 g/dL      RDW 13 2 %      MPV 9 5 fL      Platelets 339 Thousands/uL      nRBC 0 /100 WBCs      Neutrophils Relative 69 %      Immat GRANS % 1 %      Lymphocytes Relative 17 %      Monocytes Relative 8 %      Eosinophils Relative 4 %      Basophils Relative 1 %      Neutrophils Absolute 9 04 Thousands/µL      Immature Grans Absolute 0 07 Thousand/uL      Lymphocytes Absolute 2 24 Thousands/µL      Monocytes Absolute 0 97 Thousand/µL      Eosinophils Absolute 0 54 Thousand/µL      Basophils Absolute 0 10 Thousands/µL                  CT abdomen pelvis with contrast   Final Result by Tana Boudreaux DO (01/03 5699)      Colonic diverticulosis  Moderate wall thickening in the proximal sigmoid colon with associated pericolonic inflammatory changes, highly suspicious for acute diverticulitis  No pericolonic abscess  Partially distended bladder  Mild irregular bladder wall thickening noted, probably exaggerated by underdistention  Consider a cystitis in the appropriate clinical setting        Right-sided nephrolithiasis, no hydronephrosis  Renal cysts, and other findings as above  Workstation performed: [id number]                    Procedures  Procedures         ED Course                                             MDM  Number of Diagnoses or Management Options  Diverticulitis  Diagnosis management comments: Pulse ox 97% room air indicating adequate oxygenation  CT and lab work discussed with patient  Will treat with abx for uncomplicated diverticulitis with outpatient follow up  Return to ER if symptoms worsen  Amount and/or Complexity of Data Reviewed  Clinical lab tests: ordered and reviewed  Tests in the radiology section of CPT®: reviewed and ordered  Decide to obtain previous medical records or to obtain history from someone other than the patient: yes  Review and summarize past medical records: yes    Patient Progress  Patient progress: stable      Disposition  Final diagnoses:   Diverticulitis     Time reflects when diagnosis was documented in both MDM as applicable and the Disposition within this note     Time User Action Codes Description Comment    1/3/2022  6:41 AM Mayelin Pena [K57 [id number]] Diverticulitis       ED Disposition     ED Disposition Condition Date/Time Comment    Discharge Stable Mon Michael 3, 2022  6:41 AM Ketan Bancroft discharge to home/self care              Follow-up Information     Follow up With Specialties Details Why Contact Info Additional Information    Bebe Rand MD Family Medicine In 1 week  [address] [address]  [address] [facility] Emergency Medicine  If symptoms worsen [address] [address] [address] [place] [address] Emergency Department, [place], [place], [address]          Discharge Medication List as of 1/3/2022  6:42 AM      START taking these medications    Details   amoxicillin-clavulanate (AUGMENTIN) 875-125 mg per tablet Take 1 tablet by mouth every 12 (twelve) hours for 7 days, Starting Mon 1/3/2022, Until Mon 1/10/2022, Normal         CONTINUE these medications which have NOT CHANGED    Details   lisinopril-hydrochlorothiazide (PRINZIDE,ZESTORETIC) 20-25 MG per tablet TAKE ONE TABLET BY MOUTH EVERY DAY, Normal      omeprazole (PriLOSEC) 20 mg delayed release capsule TAKE ONE CAPSULE BY MOUTH TWICE A DAY (GENERIC FOR PRILOSEC), Normal      amLODIPine (NORVASC) 5 mg tablet TAKE ONE TABLET BY MOUTH EVERY DAY, Normal             No discharge procedures on file      PDMP Review     None          ED Provider  Electronically Signed by           Blaze Watts DO  01/03/22 3937

## 2022-01-04 ENCOUNTER — VBI (OUTPATIENT)
Dept: FAMILY MEDICINE CLINIC | Facility: CLINIC | Age: 57
End: 2022-01-04

## 2022-01-04 NOTE — TELEPHONE ENCOUNTER
Kurt Neal    ED Visit Information     Ed visit date: 1/3/22  Diagnosis Description: DIVERTICULITIS  In Network? Yes Duwayne Ormond  Discharge status: Home  Discharged with meds ? Yes  Number of ED visits to date: 1  ED Severity:N/A     Outreach Information    Outreach successful: Yes 1  Date letter mailed:N/A  Date Finalized:1/4/22    Care Coordination    Follow up appointment with pcp: yes SPOKE WITH PATIENT  Transportation issues ?  No    Value [employer] Teodoro

## 2022-01-06 ENCOUNTER — APPOINTMENT (EMERGENCY)
Dept: RADIOLOGY | Facility: HOSPITAL | Age: 57
End: 2022-01-06
Payer: COMMERCIAL

## 2022-01-06 ENCOUNTER — APPOINTMENT (OUTPATIENT)
Dept: RADIOLOGY | Facility: HOSPITAL | Age: 57
End: 2022-01-06
Payer: COMMERCIAL

## 2022-01-06 ENCOUNTER — ANESTHESIA EVENT (OUTPATIENT)
Dept: PERIOP | Facility: HOSPITAL | Age: 57
End: 2022-01-06
Payer: COMMERCIAL

## 2022-01-06 ENCOUNTER — ANESTHESIA (OUTPATIENT)
Dept: PERIOP | Facility: HOSPITAL | Age: 57
End: 2022-01-06
Payer: COMMERCIAL

## 2022-01-06 ENCOUNTER — HOSPITAL ENCOUNTER (OUTPATIENT)
Facility: HOSPITAL | Age: 57
Setting detail: OUTPATIENT SURGERY
Discharge: HOME | End: 2022-01-07
Attending: EMERGENCY MEDICINE | Admitting: INTERNAL MEDICINE
Payer: COMMERCIAL

## 2022-01-06 DIAGNOSIS — N13.2 URETERAL STONE WITH HYDRONEPHROSIS: ICD-10-CM

## 2022-01-06 DIAGNOSIS — E87.6 HYPOKALEMIA: ICD-10-CM

## 2022-01-06 DIAGNOSIS — N23 URETERAL COLIC: Primary | ICD-10-CM

## 2022-01-06 DIAGNOSIS — N20.1 URETERAL STONE: ICD-10-CM

## 2022-01-06 PROBLEM — N18.9 CHRONIC KIDNEY DISEASE: Status: ACTIVE | Noted: 2022-01-06

## 2022-01-06 LAB
ALBUMIN SERPL BCP-MCNC: 3.2 G/DL (ref 3.5–5)
ALP SERPL-CCNC: 60 U/L (ref 46–116)
ALT SERPL W P-5'-P-CCNC: 23 U/L (ref 12–78)
ANION GAP SERPL CALCULATED.3IONS-SCNC: 9 MMOL/L (ref 4–13)
AST SERPL W P-5'-P-CCNC: 20 U/L (ref 5–45)
BACTERIA UR QL AUTO: NORMAL /HPF
BASOPHILS # BLD AUTO: 0.09 THOUSANDS/ΜL (ref 0–0.1)
BASOPHILS NFR BLD AUTO: 1 % (ref 0–1)
BILIRUB SERPL-MCNC: 0.4 MG/DL (ref 0.2–1)
BILIRUB UR QL STRIP: NEGATIVE
BUN SERPL-MCNC: 24 MG/DL (ref 5–25)
CALCIUM ALBUM COR SERPL-MCNC: 9 MG/DL (ref 8.3–10.1)
CALCIUM SERPL-MCNC: 8.4 MG/DL (ref 8.3–10.1)
CHLORIDE SERPL-SCNC: 100 MMOL/L (ref 100–108)
CLARITY UR: CLEAR
CO2 SERPL-SCNC: 24 MMOL/L (ref 21–32)
COLOR UR: YELLOW
CREAT SERPL-MCNC: 1.39 MG/DL (ref 0.6–1.3)
EOSINOPHIL # BLD AUTO: 0.4 THOUSAND/ΜL (ref 0–0.61)
EOSINOPHIL NFR BLD AUTO: 3 % (ref 0–6)
ERYTHROCYTE [DISTWIDTH] IN BLOOD BY AUTOMATED COUNT: 12.9 % (ref 11.6–15.1)
FLUAV RNA RESP QL NAA+PROBE: NEGATIVE
FLUBV RNA RESP QL NAA+PROBE: NEGATIVE
GFR SERPL CREATININE-BSD FRML MDRD: 55 ML/MIN/1.73SQ M
GLUCOSE SERPL-MCNC: 148 MG/DL (ref 65–140)
GLUCOSE UR STRIP-MCNC: NEGATIVE MG/DL
HCT VFR BLD AUTO: 42.8 % (ref 36.5–49.3)
HGB BLD-MCNC: 14.3 G/DL (ref 12–17)
HGB UR QL STRIP.AUTO: ABNORMAL
IMM GRANULOCYTES # BLD AUTO: 0.06 THOUSAND/UL (ref 0–0.2)
IMM GRANULOCYTES NFR BLD AUTO: 1 % (ref 0–2)
KETONES UR STRIP-MCNC: NEGATIVE MG/DL
LACTATE SERPL-SCNC: 0.6 MMOL/L (ref 0.5–2)
LEUKOCYTE ESTERASE UR QL STRIP: NEGATIVE
LYMPHOCYTES # BLD AUTO: 1.93 THOUSANDS/ΜL (ref 0.6–4.47)
LYMPHOCYTES NFR BLD AUTO: 15 % (ref 14–44)
MCH RBC QN AUTO: 29.9 PG (ref 26.8–34.3)
MCHC RBC AUTO-ENTMCNC: 33.4 G/DL (ref 31.4–37.4)
MCV RBC AUTO: 89 FL (ref 82–98)
MONOCYTES # BLD AUTO: 0.9 THOUSAND/ΜL (ref 0.17–1.22)
MONOCYTES NFR BLD AUTO: 7 % (ref 4–12)
NEUTROPHILS # BLD AUTO: 9.75 THOUSANDS/ΜL (ref 1.85–7.62)
NEUTS SEG NFR BLD AUTO: 73 % (ref 43–75)
NITRITE UR QL STRIP: NEGATIVE
NON-SQ EPI CELLS URNS QL MICRO: NORMAL /HPF
NRBC BLD AUTO-RTO: 0 /100 WBCS
PH UR STRIP.AUTO: 5 [PH]
PLATELET # BLD AUTO: 223 THOUSANDS/UL (ref 149–390)
PMV BLD AUTO: 9.6 FL (ref 8.9–12.7)
POTASSIUM SERPL-SCNC: 3.1 MMOL/L (ref 3.5–5.3)
PROT SERPL-MCNC: 7.1 G/DL (ref 6.4–8.2)
PROT UR STRIP-MCNC: NEGATIVE MG/DL
RBC # BLD AUTO: 4.79 MILLION/UL (ref 3.88–5.62)
RBC #/AREA URNS AUTO: NORMAL /HPF
RSV RNA RESP QL NAA+PROBE: NEGATIVE
SARS-COV-2 RNA RESP QL NAA+PROBE: NEGATIVE
SODIUM SERPL-SCNC: 133 MMOL/L (ref 136–145)
SP GR UR STRIP.AUTO: 1.02 (ref 1–1.03)
UROBILINOGEN UR QL STRIP.AUTO: 0.2 E.U./DL
WBC # BLD AUTO: 13.13 THOUSAND/UL (ref 4.31–10.16)
WBC #/AREA URNS AUTO: NORMAL /HPF

## 2022-01-06 PROCEDURE — 74018 RADEX ABDOMEN 1 VIEW: CPT

## 2022-01-06 PROCEDURE — C1769 GUIDE WIRE: HCPCS | Performed by: SPECIALIST

## 2022-01-06 PROCEDURE — 96374 THER/PROPH/DIAG INJ IV PUSH: CPT

## 2022-01-06 PROCEDURE — 99285 EMERGENCY DEPT VISIT HI MDM: CPT

## 2022-01-06 PROCEDURE — 74176 CT ABD & PELVIS W/O CONTRAST: CPT

## 2022-01-06 PROCEDURE — G1004 CDSM NDSC: HCPCS

## 2022-01-06 PROCEDURE — 99220 PR INITIAL OBSERVATION CARE/DAY 70 MINUTES: CPT | Performed by: INTERNAL MEDICINE

## 2022-01-06 PROCEDURE — 80053 COMPREHEN METABOLIC PANEL: CPT | Performed by: EMERGENCY MEDICINE

## 2022-01-06 PROCEDURE — C2617 STENT, NON-COR, TEM W/O DEL: HCPCS | Performed by: SPECIALIST

## 2022-01-06 PROCEDURE — 96376 TX/PRO/DX INJ SAME DRUG ADON: CPT

## 2022-01-06 PROCEDURE — A4338 INDWELLING CATHETER LATEX: HCPCS | Performed by: SPECIALIST

## 2022-01-06 PROCEDURE — 0241U HB NFCT DS VIR RESP RNA 4 TRGT: CPT | Performed by: INTERNAL MEDICINE

## 2022-01-06 PROCEDURE — 36415 COLL VENOUS BLD VENIPUNCTURE: CPT | Performed by: EMERGENCY MEDICINE

## 2022-01-06 PROCEDURE — 81001 URINALYSIS AUTO W/SCOPE: CPT | Performed by: EMERGENCY MEDICINE

## 2022-01-06 PROCEDURE — 85025 COMPLETE CBC W/AUTO DIFF WBC: CPT | Performed by: EMERGENCY MEDICINE

## 2022-01-06 PROCEDURE — 99285 EMERGENCY DEPT VISIT HI MDM: CPT | Performed by: EMERGENCY MEDICINE

## 2022-01-06 PROCEDURE — 83605 ASSAY OF LACTIC ACID: CPT | Performed by: EMERGENCY MEDICINE

## 2022-01-06 PROCEDURE — 96375 TX/PRO/DX INJ NEW DRUG ADDON: CPT

## 2022-01-06 DEVICE — INLAY URETERAL STENT W/O GUIDEWIRE
Type: IMPLANTABLE DEVICE | Site: URETER | Status: FUNCTIONAL
Brand: BARD® INLAY® URETERAL STENT

## 2022-01-06 RX ORDER — HYDROCODONE BITARTRATE AND ACETAMINOPHEN 5; 325 MG/1; MG/1
1 TABLET ORAL EVERY 6 HOURS PRN
Qty: 12 TABLET | Refills: 0 | Status: SHIPPED | OUTPATIENT
Start: 2022-01-06 | End: 2022-01-06 | Stop reason: ALTCHOICE

## 2022-01-06 RX ORDER — PROPOFOL 10 MG/ML
INJECTION, EMULSION INTRAVENOUS AS NEEDED
Status: DISCONTINUED | OUTPATIENT
Start: 2022-01-06 | End: 2022-01-06

## 2022-01-06 RX ORDER — SODIUM CHLORIDE 9 MG/ML
100 INJECTION, SOLUTION INTRAVENOUS CONTINUOUS
Status: DISCONTINUED | OUTPATIENT
Start: 2022-01-06 | End: 2022-01-07 | Stop reason: HOSPADM

## 2022-01-06 RX ORDER — ONDANSETRON 2 MG/ML
4 INJECTION INTRAMUSCULAR; INTRAVENOUS ONCE AS NEEDED
Status: DISCONTINUED | OUTPATIENT
Start: 2022-01-06 | End: 2022-01-06

## 2022-01-06 RX ORDER — LEVOFLOXACIN 5 MG/ML
INJECTION, SOLUTION INTRAVENOUS CONTINUOUS PRN
Status: DISCONTINUED | OUTPATIENT
Start: 2022-01-06 | End: 2022-01-06

## 2022-01-06 RX ORDER — ONDANSETRON 2 MG/ML
4 INJECTION INTRAMUSCULAR; INTRAVENOUS ONCE
Status: COMPLETED | OUTPATIENT
Start: 2022-01-06 | End: 2022-01-06

## 2022-01-06 RX ORDER — TAMSULOSIN HYDROCHLORIDE 0.4 MG/1
0.4 CAPSULE ORAL ONCE
Status: COMPLETED | OUTPATIENT
Start: 2022-01-06 | End: 2022-01-06

## 2022-01-06 RX ORDER — PANTOPRAZOLE SODIUM 40 MG/1
40 TABLET, DELAYED RELEASE ORAL
Status: DISCONTINUED | OUTPATIENT
Start: 2022-01-06 | End: 2022-01-07 | Stop reason: HOSPADM

## 2022-01-06 RX ORDER — ONDANSETRON 4 MG/1
4 TABLET, ORALLY DISINTEGRATING ORAL EVERY 6 HOURS PRN
Qty: 20 TABLET | Refills: 0 | Status: SHIPPED | OUTPATIENT
Start: 2022-01-06 | End: 2022-01-06 | Stop reason: ALTCHOICE

## 2022-01-06 RX ORDER — AMOXICILLIN AND CLAVULANATE POTASSIUM 875; 125 MG/1; MG/1
1 TABLET, FILM COATED ORAL EVERY 12 HOURS
Status: DISCONTINUED | OUTPATIENT
Start: 2022-01-06 | End: 2022-01-07 | Stop reason: HOSPADM

## 2022-01-06 RX ORDER — MORPHINE SULFATE 4 MG/ML
4 INJECTION, SOLUTION INTRAMUSCULAR; INTRAVENOUS ONCE
Status: COMPLETED | OUTPATIENT
Start: 2022-01-06 | End: 2022-01-06

## 2022-01-06 RX ORDER — FENTANYL CITRATE/PF 50 MCG/ML
25 SYRINGE (ML) INJECTION
Status: DISCONTINUED | OUTPATIENT
Start: 2022-01-06 | End: 2022-01-06

## 2022-01-06 RX ORDER — AMLODIPINE BESYLATE 5 MG/1
5 TABLET ORAL DAILY
Status: DISCONTINUED | OUTPATIENT
Start: 2022-01-06 | End: 2022-01-07 | Stop reason: HOSPADM

## 2022-01-06 RX ORDER — LEVOFLOXACIN 5 MG/ML
500 INJECTION, SOLUTION INTRAVENOUS ONCE
Status: DISCONTINUED | OUTPATIENT
Start: 2022-01-06 | End: 2022-01-06

## 2022-01-06 RX ORDER — ONDANSETRON 2 MG/ML
4 INJECTION INTRAMUSCULAR; INTRAVENOUS EVERY 6 HOURS PRN
Status: DISCONTINUED | OUTPATIENT
Start: 2022-01-06 | End: 2022-01-07 | Stop reason: HOSPADM

## 2022-01-06 RX ORDER — LIDOCAINE HYDROCHLORIDE 10 MG/ML
INJECTION, SOLUTION EPIDURAL; INFILTRATION; INTRACAUDAL; PERINEURAL AS NEEDED
Status: DISCONTINUED | OUTPATIENT
Start: 2022-01-06 | End: 2022-01-06

## 2022-01-06 RX ORDER — TAMSULOSIN HYDROCHLORIDE 0.4 MG/1
0.4 CAPSULE ORAL
Qty: 30 CAPSULE | Refills: 0 | Status: SHIPPED | OUTPATIENT
Start: 2022-01-06 | End: 2022-01-06 | Stop reason: ALTCHOICE

## 2022-01-06 RX ORDER — TAMSULOSIN HYDROCHLORIDE 0.4 MG/1
0.4 CAPSULE ORAL
Status: DISCONTINUED | OUTPATIENT
Start: 2022-01-06 | End: 2022-01-07 | Stop reason: HOSPADM

## 2022-01-06 RX ORDER — POTASSIUM CHLORIDE 20 MEQ/1
20 TABLET, EXTENDED RELEASE ORAL 2 TIMES DAILY
Qty: 4 TABLET | Refills: 0 | Status: SHIPPED | OUTPATIENT
Start: 2022-01-06 | End: 2022-01-06 | Stop reason: ALTCHOICE

## 2022-01-06 RX ORDER — POTASSIUM CHLORIDE 20 MEQ/1
40 TABLET, EXTENDED RELEASE ORAL ONCE
Status: COMPLETED | OUTPATIENT
Start: 2022-01-06 | End: 2022-01-06

## 2022-01-06 RX ORDER — MIDAZOLAM HYDROCHLORIDE 2 MG/2ML
INJECTION, SOLUTION INTRAMUSCULAR; INTRAVENOUS AS NEEDED
Status: DISCONTINUED | OUTPATIENT
Start: 2022-01-06 | End: 2022-01-06

## 2022-01-06 RX ORDER — FENTANYL CITRATE 50 UG/ML
INJECTION, SOLUTION INTRAMUSCULAR; INTRAVENOUS AS NEEDED
Status: DISCONTINUED | OUTPATIENT
Start: 2022-01-06 | End: 2022-01-06

## 2022-01-06 RX ORDER — MAGNESIUM HYDROXIDE 1200 MG/15ML
LIQUID ORAL AS NEEDED
Status: DISCONTINUED | OUTPATIENT
Start: 2022-01-06 | End: 2022-01-06 | Stop reason: HOSPADM

## 2022-01-06 RX ADMIN — MORPHINE SULFATE 4 MG: 4 INJECTION INTRAVENOUS at 03:32

## 2022-01-06 RX ADMIN — FENTANYL CITRATE 50 MCG: 50 INJECTION, SOLUTION INTRAMUSCULAR; INTRAVENOUS at 16:53

## 2022-01-06 RX ADMIN — MORPHINE SULFATE 2 MG: 2 INJECTION, SOLUTION INTRAMUSCULAR; INTRAVENOUS at 02:57

## 2022-01-06 RX ADMIN — AMOXICILLIN AND CLAVULANATE POTASSIUM 1 TABLET: 875; 125 TABLET, FILM COATED ORAL at 18:08

## 2022-01-06 RX ADMIN — TAMSULOSIN HYDROCHLORIDE 0.4 MG: 0.4 CAPSULE ORAL at 05:25

## 2022-01-06 RX ADMIN — TAMSULOSIN HYDROCHLORIDE 0.4 MG: 0.4 CAPSULE ORAL at 15:49

## 2022-01-06 RX ADMIN — SODIUM CHLORIDE 100 ML/HR: 0.9 INJECTION, SOLUTION INTRAVENOUS at 22:30

## 2022-01-06 RX ADMIN — MORPHINE SULFATE 2 MG: 2 INJECTION, SOLUTION INTRAMUSCULAR; INTRAVENOUS at 14:25

## 2022-01-06 RX ADMIN — PROPOFOL 150 MG: 10 INJECTION, EMULSION INTRAVENOUS at 16:43

## 2022-01-06 RX ADMIN — MORPHINE SULFATE 4 MG: 4 INJECTION INTRAVENOUS at 05:26

## 2022-01-06 RX ADMIN — MIDAZOLAM 2 MG: 1 INJECTION INTRAMUSCULAR; INTRAVENOUS at 16:41

## 2022-01-06 RX ADMIN — MORPHINE SULFATE 2 MG: 2 INJECTION, SOLUTION INTRAMUSCULAR; INTRAVENOUS at 07:59

## 2022-01-06 RX ADMIN — ONDANSETRON 4 MG: 2 INJECTION INTRAMUSCULAR; INTRAVENOUS at 02:57

## 2022-01-06 RX ADMIN — SODIUM CHLORIDE 100 ML/HR: 0.9 INJECTION, SOLUTION INTRAVENOUS at 08:57

## 2022-01-06 RX ADMIN — POTASSIUM CHLORIDE 40 MEQ: 1500 TABLET, EXTENDED RELEASE ORAL at 05:25

## 2022-01-06 RX ADMIN — MORPHINE SULFATE 2 MG: 2 INJECTION, SOLUTION INTRAMUSCULAR; INTRAVENOUS at 11:03

## 2022-01-06 RX ADMIN — AMLODIPINE BESYLATE 5 MG: 5 TABLET ORAL at 11:24

## 2022-01-06 RX ADMIN — LEVOFLOXACIN: 5 INJECTION, SOLUTION INTRAVENOUS at 16:26

## 2022-01-06 RX ADMIN — AMOXICILLIN AND CLAVULANATE POTASSIUM 1 TABLET: 875; 125 TABLET, FILM COATED ORAL at 08:00

## 2022-01-06 RX ADMIN — PROPOFOL 50 MG: 10 INJECTION, EMULSION INTRAVENOUS at 16:44

## 2022-01-06 RX ADMIN — PANTOPRAZOLE SODIUM 40 MG: 40 TABLET, DELAYED RELEASE ORAL at 08:00

## 2022-01-06 RX ADMIN — FENTANYL CITRATE 50 MCG: 50 INJECTION, SOLUTION INTRAMUSCULAR; INTRAVENOUS at 16:49

## 2022-01-06 RX ADMIN — LIDOCAINE HYDROCHLORIDE 50 MG: 10 INJECTION, SOLUTION EPIDURAL; INFILTRATION; INTRACAUDAL; PERINEURAL at 16:43

## 2022-01-06 NOTE — ASSESSMENT & PLAN NOTE
Lab Results   Component Value Date    EGFR 55 01/06/2022    EGFR 54 01/03/2022    EGFR 74 08/25/2021    CREATININE 1 39 (H) 01/06/2022    CREATININE 1 41 (H) 01/03/2022    CREATININE 1 10 08/25/2021   Baseline creatinine appears to be around 1 1  Creatinine today is 1 39  IV hydration  Trend labs

## 2022-01-06 NOTE — ASSESSMENT & PLAN NOTE
Patient was here a few days ago with complaints of left-sided abdominal pain found to have diverticulitis  CT of the abdomen and pelvis today shows improvement    · Continue amoxicillin  · Outpatient follow-up for colonoscopy

## 2022-01-06 NOTE — ASSESSMENT & PLAN NOTE
Patient presented with acute onset right-sided flank pain with radiation to the right lower quadrant which started just prior to arrival   He did notice some dark urine yesterday  He has no dysuria, fevers, chills  CT of the abdomen pelvis revealed a moderate right-sided hydroureteronephrosis with a 3 mm obstructing stone in the proximal right ureter  Patient was unable to be discharged secondary to pain  Urinalysis revealed blood     Admit to Medicine   Continue IV fluids, antiemetics, pain medication   Strain all urine   Urology consultation   NPO after midnight   Flomax

## 2022-01-06 NOTE — ANESTHESIA PREPROCEDURE EVALUATION
Procedure:  CYSTOSCOPY URETEROSCOPY WITH LITHOTRIPSY HOLMIUM LASER, RETROGRADE PYELOGRAM AND INSERTION STENT URETERAL (Right Bladder)    Relevant Problems   CARDIO   (+) Hypertension      ENDO   (+) Type 2 diabetes mellitus without complication, without long-term current use of insulin (HCC)      GI/HEPATIC   (+) GERD (gastroesophageal reflux disease)      /RENAL   (+) Chronic kidney disease      MUSCULOSKELETAL   (+) Chronic bilateral low back pain with right-sided sciatica   (+) Osteoarthritis   (+) Primary osteoarthritis of right knee   (+) Sciatica      NEURO/PSYCH   (+) Anxiety   (+) Chronic pain syndrome      PULMONARY   (+) DOMINIC (obstructive sleep apnea)        Physical Exam    Airway    Mallampati score: IV  TM Distance: >3 FB  Neck ROM: full     Dental   No notable dental hx     Cardiovascular  Rhythm: regular, Rate: normal, Cardiovascular exam normal    Pulmonary  Breath sounds clear to auscultation, Decreased breath sounds,     Other Findings        Anesthesia Plan  ASA Score- 3     Anesthesia Type- general with ASA Monitors  Additional Monitors:   Airway Plan: LMA  Plan Factors-Exercise tolerance (METS): >4 METS  Chart reviewed  Existing labs reviewed  Patient summary reviewed  Patient is not a current smoker  Induction- intravenous  Postoperative Plan- Plan for postoperative opioid use  Informed Consent- Anesthetic plan and risks discussed with patient  I personally reviewed this patient with the CRNA  Discussed and agreed on the Anesthesia Plan with the CRNA  Kenisha Amaya

## 2022-01-06 NOTE — OP NOTE
PERATIVE REPORT  PATIENT NAME: Albert Dunham    :  1965  MRN: [id number]  Pt Location: [facility] OR ROOM 04    SURGERY DATE: 2022    Surgeon(s) and Role:     * Prashanth Rodriguez MD - Primary    Preop Diagnosis:  Ureteral stone [N20 1]  3 mm proximal stone  Severe unrelenting pain    Post-Op Diagnosis Codes:     * Ureteral stone [N20 1]  4 mm proximal stone  Severe unrelenting pain    Procedure(s) (LRB):  CYSTOSCOPY URETEROSCOPY AND INSERTION STENT URETERAL RIGHT (Right) manipulation     Specimen(s):  * No specimens in log *    Estimated Blood Loss:   Minimal    Drains:  Urethral Catheter Coude 16 Fr  (Active)   Number of days: 0       Ureteral Drain/Stent Right ureter 6 Fr  (Active)   Number of days: 0       Anesthesia Type:   General/LMA    Operative Indications:  Ureteral stone [N20 1]  This 49-year-old morbidly obese male presented to [address] [place] [address] ER late last night with new onset severe right flank pain starting 11:00 p m  Oscar Manifold Patient underwent CT scan confirming moderate right hydronephrosis with a 3 mm stone right proximal ureter patient was given multiple shots of analgesics and continued to have severe pain and finally admitted to the medical service urologic consult was called for and when seen at the bedside earlier today patient stated he has been in chronic pain unrelenting for now 12 hours    Discussion for medical expulsion therapy patient states that the pain is constant and does not wish to try to pass it he is aware in light of his body habitus and the proximal location of the stone that it could possibly migrate back into the ureter and that ureteroscopy stone extraction would be aborted he is aware that stent insertion can give him significant irritative symptoms    Operative Findings:  Right ureteroscopy  3 mm stone noted in the proximal ureter retrograde  back into the renal pelvis attempts at flexible ureteroscopy not performed due to body habitus tightness of the ureter  24 cm 6 [facility] stent past    Complications:   None    Procedure and Technique:  After patient identified in the holding area right side marked consent signed he was placed in the op suite  After anesthesia was induced he was placed in the thigh position draped prep standard fashion  Time-out performed  A 22 [place] cystoscope was passed through through the bladder  Anterior showed no abnormalities  Posterior urethra confirmed 3 0 centimeter bilobar prostatic urethra  The scope was inserted into the bladder lumen  The right orifice was somewhat patulous and open  A 0 038 wire was passed upward into the right distal mid and proximal ureter but appeared to be curling just proximal to the stone attempts at passing the wire was unsuccessful  The wire was left in the proximal ureter  The scope was removed and the semi rigid ureteral scope was then easily passed up the right orifice distal mid ureter  As the scope was inserted into the proximal ureter the stone was visualized and a 0 038 wire was removed replaced through the scope and guided up into the renal pelvis  The scope was then backloaded off and replaced  The basket was then placed  As the ureteral scope was inserted back up into the proximal ureter the stone had migrated back into the renal pelvis    The scope was then removed cystoscope was replaced and a 24 cm 6 [place] stent was passed wire removed scope removed postop procedure well will have cysto stent removal in the office in a few weeks post KUB   I was present for the entire procedure    Patient Disposition:  PACU       SIGNATURE: Dami Rubio MD  DATE: January 6, 2022  TIME: 5:22 PM

## 2022-01-06 NOTE — PLAN OF CARE
Problem: Potential for Falls  Goal: Patient will remain free of falls  Description: INTERVENTIONS:  - Educate patient/family on patient safety including physical limitations  - Instruct patient to call for assistance with activity   - Consult OT/PT to assist with strengthening/mobility   - Keep Call bell within reach  - Keep bed low and locked with side rails adjusted as appropriate  - Keep care items and personal belongings within reach  - Initiate and maintain comfort rounds  - Make Fall Risk Sign visible to staff  - Offer Toileting every  Hours, in advance of need  - Initiate/Maintain alarm  - Obtain necessary fall risk management equipment:   - Apply yellow socks and bracelet for high fall risk patients  - Consider moving patient to room near nurses station  Outcome: Progressing     Problem: GENITOURINARY - ADULT  Goal: Maintains or returns to baseline urinary function  Description: INTERVENTIONS:  - Assess urinary function  - Encourage oral fluids to ensure adequate hydration if ordered  - Administer IV fluids as ordered to ensure adequate hydration  - Administer ordered medications as needed  - Offer frequent toileting  - Follow urinary retention protocol if ordered  Outcome: Progressing  Goal: Absence of urinary retention  Description: INTERVENTIONS:  - Assess patients ability to void and empty bladder  - Monitor I/O  - Bladder scan as needed  - Discuss with physician/AP medications to alleviate retention as needed  - Discuss catheterization for long term situations as appropriate  Outcome: Progressing  Goal: Urinary catheter remains patent  Description: INTERVENTIONS:  - Assess patency of urinary catheter  - If patient has a chronic diaz, consider changing catheter if non-functioning  - Follow guidelines for intermittent irrigation of non-functioning urinary catheter  Outcome: Progressing     Problem: METABOLIC, FLUID AND ELECTROLYTES - ADULT  Goal: Electrolytes maintained within normal limits  Description: INTERVENTIONS:  - Monitor labs and assess patient for signs and symptoms of electrolyte imbalances  - Administer electrolyte replacement as ordered  - Monitor response to electrolyte replacements, including repeat lab results as appropriate  - Instruct patient on fluid and nutrition as appropriate  Outcome: Progressing  Goal: Fluid balance maintained  Description: INTERVENTIONS:  - Monitor labs   - Monitor I/O and WT  - Instruct patient on fluid and nutrition as appropriate  - Assess for signs & symptoms of volume excess or deficit  Outcome: Progressing  Goal: Glucose maintained within target range  Description: INTERVENTIONS:  - Monitor Blood Glucose as ordered  - Assess for signs and symptoms of hyperglycemia and hypoglycemia  - Administer ordered medications to maintain glucose within target range  - Assess nutritional intake and initiate nutrition service referral as needed  Outcome: Progressing     Problem: Potential for Falls  Goal: Patient will remain free of falls  Description: INTERVENTIONS:  - Educate patient/family on patient safety including physical limitations  - Instruct patient to call for assistance with activity   - Consult OT/PT to assist with strengthening/mobility   - Keep Call bell within reach  - Keep bed low and locked with side rails adjusted as appropriate  - Keep care items and personal belongings within reach  - Initiate and maintain comfort rounds  - Make Fall Risk Sign visible to staff  - Offer Toileting every  Hours, in advance of need  - Initiate/Maintain alarm  - Obtain necessary fall risk management equipment  - Apply yellow socks and bracelet for high fall risk patients  - Consider moving patient to room near nurses station  1/6/2022 1055 by Chioma Waggoner RN  Outcome: Progressing  1/6/2022 1055 by Chioma Waggoner RN  Outcome: Progressing     Problem: GENITOURINARY - ADULT  Goal: Maintains or returns to baseline urinary function  Description: INTERVENTIONS:  - Assess urinary function  - Encourage oral fluids to ensure adequate hydration if ordered  - Administer IV fluids as ordered to ensure adequate hydration  - Administer ordered medications as needed  - Offer frequent toileting  - Follow urinary retention protocol if ordered  1/6/2022 1055 by Sonya Vieira RN  Outcome: Progressing  1/6/2022 1055 by Sonya Vieira RN  Outcome: Progressing  Goal: Absence of urinary retention  Description: INTERVENTIONS:  - Assess patients ability to void and empty bladder  - Monitor I/O  - Bladder scan as needed  - Discuss with physician/AP medications to alleviate retention as needed  - Discuss catheterization for long term situations as appropriate  1/6/2022 1055 by Sonya Vieira RN  Outcome: Progressing  1/6/2022 1055 by Sonya Vieira RN  Outcome: Progressing  Goal: Urinary catheter remains patent  Description: INTERVENTIONS:  - Assess patency of urinary catheter  - If patient has a chronic diaz, consider changing catheter if non-functioning  - Follow guidelines for intermittent irrigation of non-functioning urinary catheter  1/6/2022 1055 by Sonya Vieira RN  Outcome: Progressing  1/6/2022 1055 by Sonya Vieira RN  Outcome: Progressing     Problem: METABOLIC, FLUID AND ELECTROLYTES - ADULT  Goal: Electrolytes maintained within normal limits  Description: INTERVENTIONS:  - Monitor labs and assess patient for signs and symptoms of electrolyte imbalances  - Administer electrolyte replacement as ordered  - Monitor response to electrolyte replacements, including repeat lab results as appropriate  - Instruct patient on fluid and nutrition as appropriate  1/6/2022 1055 by Sonya Vieira RN  Outcome: Progressing  1/6/2022 1055 by Sonya Vieira RN  Outcome: Progressing  Goal: Fluid balance maintained  Description: INTERVENTIONS:  - Monitor labs   - Monitor I/O and WT  - Instruct patient on fluid and nutrition as appropriate  - Assess for signs & symptoms of volume excess or deficit  1/6/2022 1055 by Ciro Pike RN  Outcome: Progressing  1/6/2022 1055 by Ciro Pike RN  Outcome: Progressing  Goal: Glucose maintained within target range  Description: INTERVENTIONS:  - Monitor Blood Glucose as ordered  - Assess for signs and symptoms of hyperglycemia and hypoglycemia  - Administer ordered medications to maintain glucose within target range  - Assess nutritional intake and initiate nutrition service referral as needed  1/6/2022 1055 by Ciro Pike RN  Outcome: Progressing  1/6/2022 1055 by Ciro Pike RN  Outcome: Progressing

## 2022-01-06 NOTE — ANESTHESIA POSTPROCEDURE EVALUATION
Post-Op Assessment Note    CV Status:  Stable  Pain Score: 1    Pain management: adequate     Mental Status:  Awake and arousable   Hydration Status:  Stable   PONV Controlled:  None   Airway Patency:  Patent  Airway: intubated   Two or more mitigation strategies used for obstructive sleep apnea   Post Op Vitals Reviewed: Yes      Staff: Anesthesiologist         No complications documented      BP      Temp     Pulse     Resp      SpO2

## 2022-01-06 NOTE — ED PROVIDER NOTES
History  Chief Complaint   Patient presents with    Abdominal Pain     pt c/o right side abd pain, recent dx with diverticulitis  on PO abx  no n/v/d no fevers or chills,      Patient here with complaint of right flank pain  Symptoms began at approximately 12 midnight today  Patient states that he noted a brownish tinge to his urine yesterday prior to the onset of symptoms  Patient was recently diagnosed with acute diverticulitis and is compliant with his antibiotics as prescribed  Patient also has a past history is of hypertension, chronic pain, morbid obesity  He last took ibuprofen 2 hours prior to coming to the ER  Patient is moaning at the time of initial evaluation in obvious discomfort  History provided by:  Patient  History limited by:  Acuity of condition   used: No    Abdominal Pain  Pain location:  R flank  Pain quality: aching    Pain radiates to:  Groin  Pain severity:  Moderate  Onset quality:  Sudden  Timing:  Constant  Progression:  Worsening  Chronicity:  New  Relieved by:  Nothing  Worsened by:  Nothing  Ineffective treatments:  NSAIDs  Associated symptoms: nausea    Associated symptoms: no chest pain, no chills, no constipation, no cough, no diarrhea, no dysuria, no fever, no hematuria, no shortness of breath and no vomiting        Prior to Admission Medications   Prescriptions Last Dose Informant Patient Reported? Taking?    amLODIPine (NORVASC) 5 mg tablet   No No   Sig: TAKE ONE TABLET BY MOUTH EVERY DAY   amoxicillin-clavulanate (AUGMENTIN) 875-125 mg per tablet   No No   Sig: Take 1 tablet by mouth every 12 (twelve) hours for 7 days   lisinopril-hydrochlorothiazide (PRINZIDE,ZESTORETIC) 20-25 MG per tablet  Self No No   Sig: TAKE ONE TABLET BY MOUTH EVERY DAY   omeprazole (PriLOSEC) 20 mg delayed release capsule   No No   Sig: TAKE ONE CAPSULE BY MOUTH TWICE A DAY (GENERIC FOR PRILOSEC)      Facility-Administered Medications: None       Past Medical History: Diagnosis Date    Borderline diabetes     Chronic pain disorder     lower back-s/p laminectomy    Claustrophobia     CPAP (continuous positive airway pressure) dependence     Diverticulitis     GERD (gastroesophageal reflux disease)     Hypertension     Kidney stone     x 2 episodes    Localized pain of joint     Mild sleep apnea     CPAP started in July having difficulty using- only has used on occ    Morbid obesity with body mass index (BMI) of 50 0 to 59 9 in adult [facility])     Rectal bleeding     occ rectal bleeding last episode 6/19    Skin tag     skin tags were removed-as of 8/2/19 has a few more    Wears glasses        Past Surgical History:   Procedure Laterality Date    BACK SURGERY      laminectomy-1996, 2010    COLONOSCOPY      x2    ESOPHAGOGASTRODUODENOSCOPY N/A 3/30/2019    Procedure: ESOPHAGOGASTRODUODENOSCOPY (EGD); Surgeon: Ayala Lyon MD;  Location: [address] [place] [address];  Service: Gastroenterology    KNEE ARTHROSCOPY Left 2007    SD CYSTO/URETERO W/LITHOTRIPSY &INDWELL STENT INSRT Right 1/6/2022    Procedure: CYSTOSCOPY URETEROSCOPY AND INSERTION STENT URETERAL RIGHT;  Surgeon: Anusha Walden MD;  Location: [address] [place] [address];  Service: Urology    SD ESOPHAGOGASTRODUODENOSCOPY TRANSORAL DIAGNOSTIC N/A 5/1/2019    Procedure: ESOPHAGOGASTRODUODENOSCOPY (EGD); Surgeon: Ayala Lyon MD;  Location: [facility] GI LAB;   Service: Gastroenterology    UPPER GASTROINTESTINAL ENDOSCOPY  2006    US GUIDED THYROID BIOPSY  12/28/2020       Family History   Problem Relation Age of Onset    Osteoarthritis Mother     Obesity Father         [id number]    Sleep apnea Father         with CPAP    No Known Problems Sister     No Known Problems Brother     Lupus Sister     No Known Problems Sister     No Known Problems Son     Substance Abuse Neg Hx     Mental illness Neg Hx     Cancer Neg Hx     Diabetes Neg Hx     Heart disease Neg Hx     Stroke Neg Hx      I have reviewed and agree with the history as documented  E-Cigarette/Vaping    E-Cigarette Use Never User      E-Cigarette/Vaping Substances    Nicotine No     THC No     CBD No     Flavoring No     Other No     Unknown No      Social History     Tobacco Use    Smoking status: Never Smoker    Smokeless tobacco: Never Used   Vaping Use    Vaping Use: Never used   Substance Use Topics    Alcohol use: No    Drug use: No       Review of Systems   Constitutional: Negative for chills and fever  Respiratory: Negative for cough, shortness of breath and wheezing  Cardiovascular: Negative for chest pain and palpitations  Gastrointestinal: Positive for abdominal pain and nausea  Negative for constipation, diarrhea and vomiting  Genitourinary: Positive for flank pain  Negative for dysuria, hematuria and urgency  Musculoskeletal: Negative for back pain  Skin: Negative for color change and rash  All other systems reviewed and are negative  Physical Exam  Physical Exam  Vitals and nursing note reviewed  Constitutional:       Appearance: He is well-developed  HENT:      Head: Normocephalic and atraumatic  Eyes:      Pupils: Pupils are equal, round, and reactive to light  Cardiovascular:      Rate and Rhythm: Normal rate and regular rhythm  Heart sounds: Normal heart sounds  Pulmonary:      Effort: Pulmonary effort is normal       Breath sounds: Normal breath sounds  Abdominal:      General: Abdomen is protuberant  Bowel sounds are normal  There is no distension  Palpations: Abdomen is soft  There is no mass  Tenderness: There is no abdominal tenderness  There is no right CVA tenderness, left CVA tenderness, guarding or rebound  Skin:     General: Skin is warm and dry  Capillary Refill: Capillary refill takes less than 2 seconds  Neurological:      General: No focal deficit present  Mental Status: He is alert and oriented to person, place, and time     Psychiatric:         Mood and Affect: Mood is anxious  Behavior: Behavior normal          Thought Content: Thought content normal          Judgment: Judgment normal          Vital Signs  ED Triage Vitals [01/06/22 0242]   Temperature Pulse Respirations Blood Pressure SpO2   97 8 °F (36 6 °C) 73 20 158/83 95 %      Temp Source Heart Rate Source Patient Position - Orthostatic VS BP Location FiO2 (%)   Tympanic Monitor Lying Left arm --      Pain Score       10 - Worst Possible Pain           Vitals:    01/06/22 1741 01/06/22 1807 01/06/22 1944 01/06/22 2355   BP: 146/77 116/67 137/68 135/70   Pulse: 71  73    Patient Position - Orthostatic VS:   Lying          Visual Acuity      ED Medications  Medications   amLODIPine (NORVASC) tablet 5 mg (5 mg Oral Given 1/6/22 1124)   amoxicillin-clavulanate (AUGMENTIN) 875-125 mg per tablet 1 tablet (1 tablet Oral Given 1/7/22 0638)   pantoprazole (PROTONIX) EC tablet 40 mg (40 mg Oral Given 1/7/22 0509)   sodium chloride 0 9 % infusion (100 mL/hr Intravenous New Bag 1/6/22 2230)   ondansetron (ZOFRAN) injection 4 mg (has no administration in time range)   morphine injection 2 mg (2 mg Intravenous Given 1/6/22 1425)   tamsulosin (FLOMAX) capsule 0 4 mg (0 4 mg Oral Given 1/6/22 1549)   morphine injection 2 mg (2 mg Intravenous Given 1/6/22 0257)   ondansetron (ZOFRAN) injection 4 mg (4 mg Intravenous Given 1/6/22 0257)   morphine (PF) 4 mg/mL injection 4 mg (4 mg Intravenous Given 1/6/22 0332)   tamsulosin (FLOMAX) capsule 0 4 mg (0 4 mg Oral Given 1/6/22 0525)   potassium chloride (K-DUR,KLOR-CON) CR tablet 40 mEq (40 mEq Oral Given 1/6/22 0525)   morphine (PF) 4 mg/mL injection 4 mg (4 mg Intravenous Given 1/6/22 0526)       Diagnostic Studies  Results Reviewed     Procedure Component Value Units Date/Time    CBC (With Platelets) [[id number]] Collected: 01/07/22 0643    Lab Status:  In process Specimen: Blood from Arm, Right Updated: 01/07/22 0648    Comprehensive metabolic panel [[id number]] Collected: 01/07/22 3924 Lab Status: In process Specimen: Blood from Arm, Right Updated: 01/07/22 0648    Magnesium [[id number]] Collected: 01/07/22 0643    Lab Status: In process Specimen: Blood from Arm, Right Updated: 01/07/22 0648    Urine Microscopic [[id number]]  (Normal) Collected: 01/06/22 0332    Lab Status: Final result Specimen: Urine, Clean Catch Updated: 01/06/22 0359     RBC, UA 1-2 /hpf      WBC, UA 0-1 /hpf      Epithelial Cells None Seen /hpf      Bacteria, UA Occasional /hpf     UA w Reflex to Microscopic w Reflex to Culture [[id number]]  (Abnormal) Collected: 01/06/22 0332    Lab Status: Final result Specimen: Urine, Clean Catch Updated: 01/06/22 0341     Color, UA Yellow     Clarity, UA Clear     Specific [place], UA 1 020     pH, UA 5 0     Leukocytes, UA Negative     Nitrite, UA Negative     Protein, UA Negative mg/dl      Glucose, UA Negative mg/dl      Ketones, UA Negative mg/dl      Urobilinogen, UA 0 2 E U /dl      Bilirubin, UA Negative     Blood, UA Large    Lactic acid [[id number]]  (Normal) Collected: 01/06/22 0255    Lab Status: Final result Specimen: Blood from Hand, Left Updated: 01/06/22 0321     LACTIC ACID 0 6 mmol/L     Narrative:      Result may be elevated if tourniquet was used during collection      Comprehensive metabolic panel [[id number]]  (Abnormal) Collected: 01/06/22 0255    Lab Status: Final result Specimen: Blood from Hand, Left Updated: 01/06/22 0317     Sodium 133 mmol/L      Potassium 3 1 mmol/L      Chloride 100 mmol/L      CO2 24 mmol/L      ANION GAP 9 mmol/L      BUN 24 mg/dL      Creatinine 1 39 mg/dL      Glucose 148 mg/dL      Calcium 8 4 mg/dL      Corrected Calcium 9 0 mg/dL      AST 20 U/L      ALT 23 U/L      Alkaline Phosphatase 60 U/L      Total Protein 7 1 g/dL      Albumin 3 2 g/dL      Total Bilirubin 0 40 mg/dL      eGFR 55 ml/min/1 73sq m     Narrative:      [place] guidelines for Chronic Kidney Disease (CKD):     Stage 1 with normal or high GFR (GFR > 90 mL/min/1 73 square meters)    Stage 2 Mild CKD (GFR = 60-89 mL/min/1 73 square meters)    Stage 3A Moderate CKD (GFR = 45-59 mL/min/1 73 square meters)    Stage 3B Moderate CKD (GFR = 30-44 mL/min/1 73 square meters)    Stage 4 Severe CKD (GFR = 15-29 mL/min/1 73 square meters)    Stage 5 End Stage CKD (GFR <15 mL/min/1 73 square meters)  Note: GFR calculation is accurate only with a steady state creatinine    CBC and differential [[id number]]  (Abnormal) Collected: 01/06/22 0255    Lab Status: Final result Specimen: Blood from Hand, Left Updated: 01/06/22 0301     WBC 13 13 Thousand/uL      RBC 4 79 Million/uL      Hemoglobin 14 3 g/dL      Hematocrit 42 8 %      MCV 89 fL      MCH 29 9 pg      MCHC 33 4 g/dL      RDW 12 9 %      MPV 9 6 fL      Platelets 522 Thousands/uL      nRBC 0 /100 WBCs      Neutrophils Relative 73 %      Immat GRANS % 1 %      Lymphocytes Relative 15 %      Monocytes Relative 7 %      Eosinophils Relative 3 %      Basophils Relative 1 %      Neutrophils Absolute 9 75 Thousands/µL      Immature Grans Absolute 0 06 Thousand/uL      Lymphocytes Absolute 1 93 Thousands/µL      Monocytes Absolute 0 90 Thousand/µL      Eosinophils Absolute 0 40 Thousand/µL      Basophils Absolute 0 09 Thousands/µL                  CT renal stone study abdomen pelvis without contrast   Final Result by Eulalia Gil DO (01/06 0416)      Moderate right-sided hydroureteronephrosis with a 3 mm obstructing stone in the proximal right ureter  Colonic diverticulosis with inflammatory changes surrounding the sigmoid colon decreased compared to prior study representing residual acute sigmoid diverticulitis  If not already performed recently, colonoscopy after the acute illness is recommended to rule out possibility of colon cancer mimicking diverticulitis  The study was marked in [facility] for immediate notification                 Workstation performed: [id number]         XR abdomen 1 view kub (Results Pending)              Procedures  Procedures         ED Course                               SBIRT 20yo+      Most Recent Value   SBIRT (22 yo +)    In order to provide better care to our patients, we are screening all of our patients for alcohol and drug use  Would it be okay to ask you these screening questions? No Filed at: 01/06/2022 0258                    [facility]  Number of Diagnoses or Management Options  Hypokalemia: new and requires workup  Ureteral colic: new and requires workup  Ureteral stone with hydronephrosis: new and requires workup  Diagnosis management comments: Patient with complaint of right flank pain, diagnosed with a 3 mm ureteral stone  Patient with relief with IV narcotics, fallen asleep intermittently but continues to awaken and moan in complaints  I attempted to discharge patient to home with a prescription for pain medications, however he prefers to be admitted  Patient will be admitted for intractable pain  I reviewed patient with urologist on-call and will see patient in consult         Amount and/or Complexity of Data Reviewed  Clinical lab tests: ordered and reviewed  Tests in the radiology section of CPT®: ordered and reviewed    Risk of Complications, Morbidity, and/or Mortality  Presenting problems: high  Diagnostic procedures: high  Management options: high    Patient Progress  Patient progress: stable      Disposition  Final diagnoses:   Ureteral colic   Ureteral stone with hydronephrosis   Hypokalemia     Time reflects when diagnosis was documented in both MDM as applicable and the Disposition within this note     Time User Action Codes Description Comment    1/6/2022  4:42 AM Lavenia Swati O Add [D38] Ureteral colic     2/9/0485  5:62 AM Lavenia Swati O Add [N13 2] Ureteral stone with hydronephrosis     1/6/2022  4:44 AM Lavenia Swati O Add [E87 6] Hypokalemia     1/6/2022  1:45 PM Emili Rome Add [N20 1] Ureteral stone       ED Disposition     ED Disposition Condition Date/Time Comment    Admit Stable Thu Jan 6, 2022  6:20 AM Case was discussed with Asher marino NP and the patient's admission status was agreed to be Admission Status: observation status to the service of Dr Lopez Garcia   Follow-up Information     Follow up With Specialties Details Why Contact Info    Ronny Herman MD Family Medicine Schedule an appointment as soon as possible for a visit in 2 days for follow up [address]      Wallace Malone MD Urology Schedule an appointment as soon as possible for a visit in 1 day for follow up [address]  [phone]            Current Discharge Medication List      CONTINUE these medications which have NOT CHANGED    Details   amLODIPine (NORVASC) 5 mg tablet TAKE ONE TABLET BY MOUTH EVERY DAY  Qty: 90 tablet, Refills: 1    Associated Diagnoses: Essential hypertension      amoxicillin-clavulanate (AUGMENTIN) 875-125 mg per tablet Take 1 tablet by mouth every 12 (twelve) hours for 7 days  Qty: 14 tablet, Refills: 0    Associated Diagnoses: Diverticulitis      lisinopril-hydrochlorothiazide (PRINZIDE,ZESTORETIC) 20-25 MG per tablet TAKE ONE TABLET BY MOUTH EVERY DAY  Qty: 90 tablet, Refills: 3    Associated Diagnoses: Essential hypertension      omeprazole (PriLOSEC) 20 mg delayed release capsule TAKE ONE CAPSULE BY MOUTH TWICE A DAY (GENERIC FOR PRILOSEC)  Qty: 60 capsule, Refills: 5    Associated Diagnoses: Gastroesophageal reflux disease, unspecified whether esophagitis present             No discharge procedures on file      PDMP Review     None          ED Provider  Electronically Signed by           Ирина Tatum DO  01/07/22 5344

## 2022-01-06 NOTE — ED NOTES
Patient moaning loudly  Continuously even after being given morphine IV       Saran Akhtar RN  01/06/22 7481

## 2022-01-06 NOTE — ED NOTES
[facility]      [facility], [address] [place] [address]  01/06/22 3931

## 2022-01-06 NOTE — DISCHARGE INSTRUCTIONS
Return to the ER for further concerns or worsening symptoms  Follow up with your primary care physician and urology in 1-2 days  Take medication as presrcribed

## 2022-01-06 NOTE — CONSULTS
H&P Exam - Urology       Patient: Boyd Garcia   : 1965 Sex: male   MRN: [id number]     CSN: [id number]      History of Present Illness   HPI:  Boyd Garcia is a 60 y o  male who presents [facility] ER 3:00 a m  This morning with a 6 hour worsening complaint of right flank pain  Patient has history of kidney stones undergoing spiral CT scan confirming 3 mm right proximal ureteral stone after copious amounts of IV analgesics patient was admitted for pain control seen at the bedside at this time NPO and still has severe pain of 9/10 despite analgesics patient has passed 2 other stones long discussion about medical expulsion therapy with tamsulosin analgesics straining urine KUB patient refusing at this time preferred to undergo cysto stent placement possible ureteroscopy        Review of Systems:   Constitutional:  Negative for activity change, fever, chills and diaphoresis  HENT: Negative for hearing loss and trouble swallowing  Eyes: Negative for itching and visual disturbance  Respiratory: Negative for chest tightness and shortness of breath  Cardiovascular: Negative for chest pain, edema  Gastrointestinal: Negative for abdominal distention, na abdominal pain, constipation, diarrhea, Nausea and vomiting  Genitourinary: Negative for decreased urine volume, difficulty urinating, dysuria, enuresis, frequency, hematuria and urgency  Musculoskeletal: Negative for gait problem and myalgias  Neurological: Negative for dizziness and headaches  Hematological: Does not bruise/bleed easily         Historical Information   Past Medical History:   Diagnosis Date    Borderline diabetes     Chronic pain disorder     lower back-s/p laminectomy    Claustrophobia     CPAP (continuous positive airway pressure) dependence     Diverticulitis     GERD (gastroesophageal reflux disease)     Hypertension     Kidney stone     x 2 episodes    Localized pain of joint     Mild sleep apnea CPAP started in July having difficulty using- only has used on occ    Morbid obesity with body mass index (BMI) of 50 0 to 59 9 in adult [facility])     Rectal bleeding     occ rectal bleeding last episode 6/19    Skin tag     skin tags were removed-as of 8/2/19 has a few more    Wears glasses      Past Surgical History:   Procedure Laterality Date    BACK SURGERY      laminectomy-1996, 2010    COLONOSCOPY      x2    ESOPHAGOGASTRODUODENOSCOPY N/A 3/30/2019    Procedure: ESOPHAGOGASTRODUODENOSCOPY (EGD); Surgeon: Kartik Carpenter MD;  Location: [address] [place] [address];  Service: Gastroenterology    KNEE ARTHROSCOPY Left 2007    [place] ESOPHAGOGASTRODUODENOSCOPY TRANSORAL DIAGNOSTIC N/A 5/1/2019    Procedure: ESOPHAGOGASTRODUODENOSCOPY (EGD); Surgeon: Kartik Carpenter MD;  Location: [facility] GI LAB;   Service: Gastroenterology    UPPER GASTROINTESTINAL ENDOSCOPY  2006    US GUIDED THYROID BIOPSY  12/28/2020     Social History   Social History     Substance and Sexual Activity   Alcohol Use No     Social History     Substance and Sexual Activity   Drug Use No     Social History     Tobacco Use   Smoking Status Never Smoker   Smokeless Tobacco Never Used     Family History:   Family History   Problem Relation Age of Onset    Osteoarthritis Mother     Obesity Father         [id number]    Sleep apnea Father         with CPAP    No Known Problems Sister     No Known Problems Brother     Lupus Sister     No Known Problems Sister     No Known Problems Son     Substance Abuse Neg Hx     Mental illness Neg Hx     Cancer Neg Hx     Diabetes Neg Hx     Heart disease Neg Hx     Stroke Neg Hx        Meds/Allergies   Medications Prior to Admission   Medication    amLODIPine (NORVASC) 5 mg tablet    amoxicillin-clavulanate (AUGMENTIN) 875-125 mg per tablet    lisinopril-hydrochlorothiazide (PRINZIDE,ZESTORETIC) 20-25 MG per tablet    omeprazole (PriLOSEC) 20 mg delayed release capsule     Allergies   Allergen Reactions    Lactose - Food Allergy GI Intolerance       Objective   Vitals: /71 (BP Location: Left arm)   Pulse 73   Temp (!) 97 3 °F (36 3 °C) (Oral)   Resp 18   Ht 6' 4" (1 93 m)   Wt (!) 166 kg (367 lb 1 1 oz)   SpO2 96%   BMI 44 68 kg/m²     Physical Exam:  General Alert awake   Normocephalic atraumatic PERRLA  Lungs clear bilaterally  Cardiac normal S1 normal S2  Abdomen soft, flank pain right  Extremities no edema    No intake/output data recorded      Invasive Devices  Report    Peripheral Intravenous Line            Peripheral IV 01/06/22 Left;Medial Forearm <1 day                    Lab Results: CBC:   Lab Results   Component Value Date    WBC 13 13 (H) 01/06/2022    HGB 14 3 01/06/2022    HCT 42 8 01/06/2022    MCV 89 01/06/2022     01/06/2022    ADJUSTEDWBC 9 00 08/26/2016    MCH 29 9 01/06/2022    MCHC 33 4 01/06/2022    RDW 12 9 01/06/2022    MPV 9 6 01/06/2022    NRBC 0 01/06/2022     CMP:   Lab Results   Component Value Date     01/06/2022     04/15/2019    CO2 24 01/06/2022    CO2 23 04/15/2019    BUN 24 01/06/2022    BUN 23 04/15/2019    CREATININE 1 39 (H) 01/06/2022    CALCIUM 8 4 01/06/2022    AST 20 01/06/2022    AST 25 12/10/2018    ALT 23 01/06/2022    ALT 29 12/10/2018    ALKPHOS 60 01/06/2022    EGFR 55 01/06/2022     Urinalysis:   Lab Results   Component Value Date    COLORU Yellow 01/06/2022    CLARITYU Clear 01/06/2022    SPECGRAV 1 020 01/06/2022    PHUR 5 0 01/06/2022    PHUR 6 5 06/26/2018    LEUKOCYTESUR Negative 01/06/2022    NITRITE Negative 01/06/2022    GLUCOSEU Negative 01/06/2022    KETONESU Negative 01/06/2022    BILIRUBINUR Negative 01/06/2022    BLOODU Large (A) 01/06/2022     Urine Culture: No results found for: URINECX  PSA:   Lab Results   Component Value Date    PSA 2 4 12/22/2020    PSA 2 2 12/10/2018           Assessment/ Plan:  3 mm right proximal stone has a 98% chance of passing with medical expulsion therapy  Patient states that he has been and unrelenting pain for the last 14 hours does not wish to try to pass it in light of his severe pain despite passing 2 of the stones  NPO  Cysto stent possible ureteroscopy stone extraction patient aware of his morbid obesity and body habitus ureteroscopy may be difficult at this time 2nd urologic procedure as well as risk of anesthesia infection bleeding discussed if stone is removed will be seen in the office to have stent removed thereafter      Douglas Mims MD

## 2022-01-06 NOTE — H&P
[facility] 1965, 60 y o  male MRN: [id number]  Unit/Bed#:  HW3 Encounter: [id number]  Primary Care Provider: Emmanuel Severino MD   Date and time admitted to hospital: 1/6/2022  2:34 AM    * Ureteral stone  Assessment & Plan  Patient presented with acute onset right-sided flank pain with radiation to the right lower quadrant which started just prior to arrival   He did notice some dark urine yesterday  He has no dysuria, fevers, chills  CT of the abdomen pelvis revealed a moderate right-sided hydroureteronephrosis with a 3 mm obstructing stone in the proximal right ureter  Patient was unable to be discharged secondary to pain  Urinalysis revealed blood   Admit to Medicine   Continue IV fluids, antiemetics, pain medication   Strain all urine   Urology consultation   NPO after midnight   Flomax    Chronic kidney disease  Assessment & Plan  Lab Results   Component Value Date    EGFR 55 01/06/2022    EGFR 54 01/03/2022    EGFR 74 08/25/2021    CREATININE 1 39 (H) 01/06/2022    CREATININE 1 41 (H) 01/03/2022    CREATININE 1 10 08/25/2021   Baseline creatinine appears to be around 1 1  Creatinine today is 1 39  IV hydration  Trend labs    Hypertension  Assessment & Plan  Continue Norvasc  Hold Prinzide    GERD (gastroesophageal reflux disease)  Assessment & Plan  Continue Protonix    Diverticulitis  Assessment & Plan  Patient was here a few days ago with complaints of left-sided abdominal pain found to have diverticulitis  CT of the abdomen and pelvis today shows improvement  · Continue amoxicillin  · Outpatient follow-up for colonoscopy    VTE Prophylaxis: Other Medication: contraindicated due to hematuria  / sequential compression device   Code Status: Prior    Anticipated Length of Stay:  Patient will be admitted on an Observation basis with an anticipated length of stay of less than 2 midnights     Justification for Hospital Stay: kidney stone    Total Time for Visit, including Counseling / Coordination of Care: 15 minutes  Greater than 50% of this total time spent on direct patient counseling and coordination of care  Chief Complaint:   Abdominal Pain (pt c/o right side abd pain, recent dx with diverticulitis  on PO abx  no n/v/d no fevers or chills, )      History of Present Illness:    Ketan Pearson is a 60 y o  male with a PMH of hypertension, GERD, diverticulitis who presents with flank pain  Patient presented with acute onset right-sided flank pain with radiation to the right lower quadrant which started just prior to arrival   He did notice some dark urine yesterday  He has no dysuria, fevers, chills  CT of the abdomen pelvis revealed a moderate right-sided hydroureteronephrosis with a 3 mm obstructing stone in the proximal right ureter  Patient was unable to be discharged secondary to pain  Urinalysis revealed blood  Patient was here a few days ago with complaints of left-sided abdominal pain found to have diverticulitis  CT of the abdomen and pelvis today shows improvement  Review of Systems:    Review of Systems   Constitutional: Negative for chills and fever  HENT: Negative for ear pain and sore throat  Eyes: Negative for pain and visual disturbance  Respiratory: Negative for cough and shortness of breath  Cardiovascular: Negative for chest pain and palpitations  Gastrointestinal: Negative for abdominal pain and vomiting  Genitourinary: Positive for flank pain and hematuria  Negative for dysuria  Musculoskeletal: Negative for arthralgias and back pain  Skin: Negative for color change and rash  Neurological: Negative for seizures and syncope  All other systems reviewed and are negative        Past Medical and Surgical History:     Past Medical History:   Diagnosis Date    Borderline diabetes     Chronic pain disorder     lower back-s/p laminectomy    Claustrophobia     CPAP (continuous positive airway pressure) dependence     Diverticulitis     GERD (gastroesophageal reflux disease)     Hypertension     Kidney stone     x 2 episodes    Localized pain of joint     Mild sleep apnea     CPAP started in July having difficulty using- only has used on occ    Morbid obesity with body mass index (BMI) of 50 0 to 59 9 in adult [facility])     Rectal bleeding     occ rectal bleeding last episode 6/19    Skin tag     skin tags were removed-as of 8/2/19 has a few more    Wears glasses        Past Surgical History:   Procedure Laterality Date    BACK SURGERY      laminectomy-1996, 2010    COLONOSCOPY      x2    ESOPHAGOGASTRODUODENOSCOPY N/A 3/30/2019    Procedure: ESOPHAGOGASTRODUODENOSCOPY (EGD); Surgeon: Carmela Guerra MD;  Location: [address] [place] [address];  Service: Gastroenterology    KNEE ARTHROSCOPY Left 2007    MS ESOPHAGOGASTRODUODENOSCOPY TRANSORAL DIAGNOSTIC N/A 5/1/2019    Procedure: ESOPHAGOGASTRODUODENOSCOPY (EGD); Surgeon: Carmela Guerra MD;  Location: [facility]; Service: Gastroenterology    UPPER GASTROINTESTINAL ENDOSCOPY  2006    US GUIDED THYROID BIOPSY  12/28/2020       Meds/Allergies:    Prior to Admission medications    Medication Sig Start Date End Date Taking?  Authorizing Provider   amLODIPine (NORVASC) 5 mg tablet TAKE ONE TABLET BY MOUTH EVERY DAY 1/3/22   Rachele Merritt MD   amoxicillin-clavulanate (AUGMENTIN) 875-125 mg per tablet Take 1 tablet by mouth every 12 (twelve) hours for 7 days 1/3/22 1/10/22  Gabriela White DO   lisinopril-hydrochlorothiazide (PRINZIDE,ZESTORETIC) 20-25 MG per tablet TAKE ONE TABLET BY MOUTH EVERY DAY 4/28/21   Rachele Merritt MD   omeprazole (PriLOSEC) 20 mg delayed release capsule TAKE ONE CAPSULE BY MOUTH TWICE A DAY (GENERIC FOR PRILOSEC) 12/17/21   Nelwyn Sandhoff, PA-C   amLODIPine (NORVASC) 5 mg tablet TAKE ONE TABLET BY MOUTH EVERY DAY 5/30/21   Rachele Merritt MD   HYDROcodone-acetaminophen (Norco) 5-325 mg per tablet Take 1 tablet by mouth every 6 (six) hours as needed for pain Max Daily Amount: 4 tablets 1/6/22 1/6/22  Olubusola ARGELIA Rincon, DO   ondansetron (Zofran ODT) 4 mg disintegrating tablet Take 1 tablet (4 mg total) by mouth every 6 (six) hours as needed for nausea or vomiting 1/6/22 1/6/22  Sarbjit Rincon DO   potassium chloride (K-DUR,KLOR-CON) 20 mEq tablet Take 1 tablet (20 mEq total) by mouth 2 (two) times a day 1/6/22 1/6/22  Sarbjit Rincon DO   tamsulosin (FLOMAX) 0 4 mg Take 1 capsule (0 4 mg total) by mouth daily with dinner 1/6/22 1/6/22  Cinda Dessert, DO       Allergies: Allergies   Allergen Reactions    Lactose - Food Allergy GI Intolerance       Social History:     Marital Status:    Substance Use History:   Social History     Substance and Sexual Activity   Alcohol Use No     Social History     Tobacco Use   Smoking Status Never Smoker   Smokeless Tobacco Never Used     Social History     Substance and Sexual Activity   Drug Use No       Family History:    Family History   Problem Relation Age of Onset    Osteoarthritis Mother     Obesity Father         [id number]    Sleep apnea Father         with CPAP    No Known Problems Sister     No Known Problems Brother     Lupus Sister     No Known Problems Sister     No Known Problems Son     Substance Abuse Neg Hx     Mental illness Neg Hx     Cancer Neg Hx     Diabetes Neg Hx     Heart disease Neg Hx     Stroke Neg Hx        Physical Exam:     Vitals:   Blood Pressure: 158/83 (01/06/22 0242)  Pulse: 73 (01/06/22 0242)  Temperature: 97 8 °F (36 6 °C) (01/06/22 0242)  Temp Source: Tympanic (01/06/22 0242)  Respirations: 20 (01/06/22 0242)  Weight - Scale: (!) 163 kg (360 lb) (01/06/22 0242)  SpO2: 95 % (01/06/22 0242)    Physical Exam  Vitals and nursing note reviewed  Constitutional:       Appearance: Normal appearance  HENT:      Head: Normocephalic  Mouth/Throat:      Mouth: Mucous membranes are dry     Eyes:      Extraocular Movements: Extraocular movements intact  Cardiovascular:      Rate and Rhythm: Normal rate and regular rhythm  Pulses: Normal pulses  Pulmonary:      Effort: Pulmonary effort is normal  No respiratory distress  Breath sounds: Normal breath sounds  No wheezing  Abdominal:      General: Abdomen is flat  Bowel sounds are normal       Palpations: Abdomen is soft  Musculoskeletal:         General: No swelling  Normal range of motion  Skin:     General: Skin is warm and dry  Neurological:      General: No focal deficit present  Mental Status: He is alert and oriented to person, place, and time  Psychiatric:         Mood and Affect: Mood normal          Behavior: Behavior normal            Additional Data:     Lab Results: I have personally reviewed pertinent reports  Results from last 7 days   Lab Units 01/06/22  0255   WBC Thousand/uL 13 13*   HEMOGLOBIN g/dL 14 3   HEMATOCRIT % 42 8   PLATELETS Thousands/uL 223   NEUTROS PCT % 73     Results from last 7 days   Lab Units 01/06/22  0255   SODIUM mmol/L 133*   POTASSIUM mmol/L 3 1*   CHLORIDE mmol/L 100   CO2 mmol/L 24   BUN mg/dL 24   CREATININE mg/dL 1 39*   CALCIUM mg/dL 8 4   TOTAL BILIRUBIN mg/dL 0 40   ALK PHOS U/L 60   ALT U/L 23   AST U/L 20                 Results from last 7 days   Lab Units 01/06/22  0255   LACTIC ACID mmol/L 0 6       Imaging: I have personally reviewed pertinent reports  CT renal stone study abdomen pelvis without contrast   Final Result by Sarah Stubbs DO (01/06 0416)      Moderate right-sided hydroureteronephrosis with a 3 mm obstructing stone in the proximal right ureter  Colonic diverticulosis with inflammatory changes surrounding the sigmoid colon decreased compared to prior study representing residual acute sigmoid diverticulitis  If not already performed recently, colonoscopy after the acute illness is recommended to rule out possibility of colon cancer mimicking diverticulitis           The study was marked in EPIC for immediate notification  Workstation performed: [id number]             CT renal stone study abdomen pelvis without contrast   Final Result      Moderate right-sided hydroureteronephrosis with a 3 mm obstructing stone in the proximal right ureter  Colonic diverticulosis with inflammatory changes surrounding the sigmoid colon decreased compared to prior study representing residual acute sigmoid diverticulitis  If not already performed recently, colonoscopy after the acute illness is recommended to rule out possibility of colon cancer mimicking diverticulitis  The study was marked in [facility] for immediate notification  Workstation performed: [id number]             EKG, Pathology, and Other Studies Reviewed on Admission:   · EKG: not done     Allscripts / Epic Records Reviewed: Yes     ** Please Note: This note has been constructed using a voice recognition system   **

## 2022-01-07 PROBLEM — N23 RENAL COLIC ON RIGHT SIDE: Status: ACTIVE | Noted: 2022-01-06

## 2022-01-07 PROBLEM — E87.8 ELECTROLYTE ABNORMALITY: Status: ACTIVE | Noted: 2022-01-07

## 2022-01-07 LAB
ALBUMIN SERPL BCP-MCNC: 2.7 G/DL (ref 3.5–5)
ALP SERPL-CCNC: 56 U/L (ref 46–116)
ALT SERPL W P-5'-P-CCNC: 20 U/L (ref 12–78)
ANION GAP SERPL CALCULATED.3IONS-SCNC: 10 MMOL/L (ref 4–13)
AST SERPL W P-5'-P-CCNC: 17 U/L (ref 5–45)
BILIRUB SERPL-MCNC: 0.53 MG/DL (ref 0.2–1)
BUN SERPL-MCNC: 16 MG/DL (ref 5–25)
CALCIUM ALBUM COR SERPL-MCNC: 9 MG/DL (ref 8.3–10.1)
CALCIUM SERPL-MCNC: 8 MG/DL (ref 8.3–10.1)
CHLORIDE SERPL-SCNC: 104 MMOL/L (ref 100–108)
CO2 SERPL-SCNC: 22 MMOL/L (ref 21–32)
CREAT SERPL-MCNC: 1.15 MG/DL (ref 0.6–1.3)
ERYTHROCYTE [DISTWIDTH] IN BLOOD BY AUTOMATED COUNT: 13.1 % (ref 11.6–15.1)
GFR SERPL CREATININE-BSD FRML MDRD: 69 ML/MIN/1.73SQ M
GLUCOSE SERPL-MCNC: 108 MG/DL (ref 65–140)
HCT VFR BLD AUTO: 45.7 % (ref 36.5–49.3)
HGB BLD-MCNC: 14.3 G/DL (ref 12–17)
MAGNESIUM SERPL-MCNC: 2.5 MG/DL (ref 1.6–2.6)
MCH RBC QN AUTO: 29.7 PG (ref 26.8–34.3)
MCHC RBC AUTO-ENTMCNC: 31.3 G/DL (ref 31.4–37.4)
MCV RBC AUTO: 95 FL (ref 82–98)
PLATELET # BLD AUTO: 218 THOUSANDS/UL (ref 149–390)
PMV BLD AUTO: 9.7 FL (ref 8.9–12.7)
POTASSIUM SERPL-SCNC: 3.7 MMOL/L (ref 3.5–5.3)
PROT SERPL-MCNC: 6.7 G/DL (ref 6.4–8.2)
RBC # BLD AUTO: 4.82 MILLION/UL (ref 3.88–5.62)
SODIUM SERPL-SCNC: 136 MMOL/L (ref 136–145)
WBC # BLD AUTO: 8.55 THOUSAND/UL (ref 4.31–10.16)

## 2022-01-07 PROCEDURE — 99217 PR OBSERVATION CARE DISCHARGE MANAGEMENT: CPT | Performed by: INTERNAL MEDICINE

## 2022-01-07 PROCEDURE — 80053 COMPREHEN METABOLIC PANEL: CPT | Performed by: SPECIALIST

## 2022-01-07 PROCEDURE — 83735 ASSAY OF MAGNESIUM: CPT | Performed by: SPECIALIST

## 2022-01-07 PROCEDURE — 85027 COMPLETE CBC AUTOMATED: CPT | Performed by: SPECIALIST

## 2022-01-07 RX ORDER — TAMSULOSIN HYDROCHLORIDE 0.4 MG/1
0.4 CAPSULE ORAL
Qty: 30 CAPSULE | Refills: 0 | Status: SHIPPED | OUTPATIENT
Start: 2022-01-07 | End: 2022-05-09 | Stop reason: ALTCHOICE

## 2022-01-07 RX ADMIN — PANTOPRAZOLE SODIUM 40 MG: 40 TABLET, DELAYED RELEASE ORAL at 05:09

## 2022-01-07 RX ADMIN — AMOXICILLIN AND CLAVULANATE POTASSIUM 1 TABLET: 875; 125 TABLET, FILM COATED ORAL at 06:38

## 2022-01-07 RX ADMIN — AMLODIPINE BESYLATE 5 MG: 5 TABLET ORAL at 09:22

## 2022-01-07 NOTE — ASSESSMENT & PLAN NOTE
Lab Results   Component Value Date    EGFR 69 01/07/2022    EGFR 55 01/06/2022    EGFR 54 01/03/2022    CREATININE 1 15 01/07/2022    CREATININE 1 39 (H) 01/06/2022    CREATININE 1 41 (H) 01/03/2022   Baseline creatinine appears to be around 1 1  Creatinine has improved with IV hydration (3) adequate

## 2022-01-07 NOTE — ASSESSMENT & PLAN NOTE
 Patient presented with acute onset right-sided flank pain with radiation to the right lower quadrant which started just prior to arrival   He did notice some dark urine yesterday  He has no dysuria, fevers, chills  CT of the abdomen pelvis revealed a moderate right-sided hydroureteronephrosis with a 3 mm obstructing stone in the proximal right ureter       Patient underwent cystoscopy with ureteroscopy with right ureteral stent placement   Coombs catheter was discontinued today and patient was voiding well   Patient to be discharged home with outpatient follow-up with Urology for stent removal

## 2022-01-07 NOTE — ASSESSMENT & PLAN NOTE
Patient had a sodium level of 131 and potassium level of 3 1 upon admission  Normalized with IV hydration and repletion

## 2022-01-07 NOTE — DISCHARGE SUMMARY
[facility]  Discharge- Kerrie Sees 1965, 60 y o  male MRN: [id number]  Unit/Bed#: 2 [address] 202 Encounter: [id number]  Primary Care Provider: Irvin De Dios MD   Date and time admitted to hospital: 1/6/2022  2:34 AM    * Renal colic on right side  Assessment & Plan   Patient presented with acute onset right-sided flank pain with radiation to the right lower quadrant which started just prior to arrival   He did notice some dark urine yesterday  He has no dysuria, fevers, chills  CT of the abdomen pelvis revealed a moderate right-sided hydroureteronephrosis with a 3 mm obstructing stone in the proximal right ureter   Patient underwent cystoscopy with ureteroscopy with right ureteral stent placement   Coombs catheter was discontinued today and patient was voiding well   Patient to be discharged home with outpatient follow-up with Urology for stent removal    Electrolyte abnormality  Assessment & Plan  Patient had a sodium level of 131 and potassium level of 3 1 upon admission  Normalized with IV hydration and repletion    Chronic kidney disease  Assessment & Plan  Lab Results   Component Value Date    EGFR 69 01/07/2022    EGFR 55 01/06/2022    EGFR 54 01/03/2022    CREATININE 1 15 01/07/2022    CREATININE 1 39 (H) 01/06/2022    CREATININE 1 41 (H) 01/03/2022   Baseline creatinine appears to be around 1 1  Creatinine has improved with IV hydration    Hypertension  Assessment & Plan  Continue Norvasc  Resume lisinopril/hydrochlorothiazide upon discharge    GERD (gastroesophageal reflux disease)  Assessment & Plan  Continue Protonix    Diverticulitis  Assessment & Plan  Patient was here a few days ago with complaints of left-sided abdominal pain found to have diverticulitis  CT of the abdomen and pelvis upon admission shows improvement    · Review Augmentin to finish the course  · Patient follow-up with GI        Medical Problems             Resolved Problems  Date Reviewed: 1/7/2022    None              Discharging Physician / Practitioner: Sarah Lilly MD  PCP: Jessica Fraire MD  Admission Date:   Admission Orders (From admission, onward)     Ordered        01/06/22 0621  Place in Observation  Once                      Discharge Date: 01/07/22    Consultations During Hospital Stay:  · Urology    Procedures Performed:   · Cystoscopy with basket and placement of right ureteral stent        Outpatient Tests Requested:  · Follow-up with Urology in 2 weeks    Complications:  None    Reason for Admission:  Flank    Hospital Course:   Mehreen Monday is a 60 y o  male patient with past medical history of hypertension, GERD, diverticulitis who originally presented to the hospital on 1/6/2022 due to right flank pain radiating to right side of the abdomen  In the ED patient had mild elevation of creatinine with some electrolyte abnormalities and CT scan showed 3 millimeter obstructing proximal ureteral stone on the right with moderate right hydroureteronephrosis  Patient was treated with IV hydration later was seen by Urology  Patient underwent cystoscopy with right ureteral stent placement  Patient had Coombs catheter postprocedure which was later removed  Patient was voiding well postprocedure and patient remained stable  Patient was discharged home with outpatient follow-up with Urology        Please see above list of diagnoses and related plan for additional information  Condition at Discharge: stable    Discharge Day Visit / Exam:   Subjective:  Patient has no further flank pain  Patient urinated well after the catheter was removed  Patient denies any nausea or vomiting    Vitals: Blood Pressure: 128/70 (01/07/22 0834)  Pulse: 74 (01/07/22 0834)  Temperature: 97 5 °F (36 4 °C) (01/07/22 0834)  Temp Source: Oral (01/07/22 0834)  Respirations: 17 (01/07/22 0834)  Height: 6' 4" (193 cm) (01/06/22 7505)  Weight - Scale: (!) 166 kg (367 lb 1 1 oz) (01/06/22 0922)  SpO2: 94 % (01/07/22 0845)  Exam:   Physical Exam  Constitutional:       Appearance: Normal appearance  HENT:      Head: Normocephalic and atraumatic  Eyes:      Extraocular Movements: Extraocular movements intact  Pupils: Pupils are equal, round, and reactive to light  Cardiovascular:      Rate and Rhythm: Normal rate and regular rhythm  Heart sounds: No murmur heard  No gallop  Pulmonary:      Effort: Pulmonary effort is normal       Breath sounds: Normal breath sounds  Abdominal:      General: Bowel sounds are normal       Palpations: Abdomen is soft  Tenderness: There is no abdominal tenderness  Musculoskeletal:         General: No swelling or deformity  Normal range of motion  Cervical back: Normal range of motion and neck supple  Skin:     General: Skin is warm and dry  Neurological:      General: No focal deficit present  Mental Status: He is alert  Discussion with Family: yes    Discharge instructions/Information to patient and family:   See after visit summary for information provided to patient and family  Provisions for Follow-Up Care:  See after visit summary for information related to follow-up care and any pertinent home health orders  Disposition:   Home    Planned Readmission: No     Discharge Statement:  I spent 25 minutes discharging the patient  This time was spent on the day of discharge  I had direct contact with the patient on the day of discharge  Greater than 50% of the total time was spent examining patient, answering all patient questions, arranging and discussing plan of care with patient as well as directly providing post-discharge instructions  Additional time then spent on discharge activities  Discharge Medications:  See after visit summary for reconciled discharge medications provided to patient and/or family        **Please Note: This note may have been constructed using a voice recognition system**

## 2022-01-07 NOTE — CASE MANAGEMENT
Case Management Discharge Planning Note    Patient name Hakeem Hernandez  Location [address] [facility] MRN [id number]  : 1965 Date 2022       Current Admission Date: 2022  Current Admission Diagnosis:Ureteral stone   Patient Active Problem List    Diagnosis Date Noted    Ureteral stone 2022    Chronic kidney disease 2022    Skin lesion 2021    Type 2 diabetes mellitus without complication, without long-term current use of insulin ([facility] ) 2021    Thyroid nodule     Eosinophilic esophagitis 2799    DOMINIC (obstructive sleep apnea) 2019    Primary osteoarthritis of right knee 2018    Chronic pain syndrome 11/15/2018    Lumbar post-laminectomy syndrome 11/15/2018    Chronic bilateral low back pain with right-sided sciatica 2018    Morbid obesity with body mass index (BMI) of 50 0 to 59 9 in adult [facility]) 2018    Closed fracture of transverse process of lumbar vertebra [facility] ) 2018    Anxiety 2014    Osteoarthritis 2014    Sciatica 2014    Breathing-related sleep disorder 2014    Urinary calculus 2014    Collagen vascular disease ([facility] ) 2014    Diverticulitis 2014    GERD (gastroesophageal reflux disease) 2014    Hypertension 2014      LOS (days): 0  Geometric Mean LOS (GMLOS) (days):   Days to GMLOS:     OBJECTIVE:     Current admission status: Observation   Preferred Pharmacy:   [place] [address] [place] [facility] - [address] [facility] [address] [address]  Phone: [phone] Fax: [phone]    Primary Care Provider: Lance Victor MD    Primary Insurance: [facility] [employer]  Secondary Insurance:     DISCHARGE DETAILS:    Discharge planning discussed with[de-identified] Dr Millicent Syed    Per chart review and rounds with nursing and Dr Millicent Syed pt is reported to be independent with no apparent discharge needs at this time        Other Referral/Resources/Interventions Provided:  Interventions: None Indicated    Treatment Team Recommendation: Home  Discharge Destination Plan[de-identified] Home

## 2022-01-07 NOTE — PLAN OF CARE
Problem: Potential for Falls  Goal: Patient will remain free of falls  Description: INTERVENTIONS:  - Educate patient/family on patient safety including physical limitations  - Instruct patient to call for assistance with activity   - Consult OT/PT to assist with strengthening/mobility   - Keep Call bell within reach  - Keep bed low and locked with side rails adjusted as appropriate  - Keep care items and personal belongings within reach  - Initiate and maintain comfort rounds  - Make Fall Risk Sign visible to staff  - Offer Toileting every  Hours, in advance of need  - Initiate/Maintain alarm  - Obtain necessary fall risk management equipment:   - Apply yellow socks and bracelet for high fall risk patients  - Consider moving patient to room near nurses station  Outcome: Progressing     Problem: GENITOURINARY - ADULT  Goal: Maintains or returns to baseline urinary function  Description: INTERVENTIONS:  - Assess urinary function  - Encourage oral fluids to ensure adequate hydration if ordered  - Administer IV fluids as ordered to ensure adequate hydration  - Administer ordered medications as needed  - Offer frequent toileting  - Follow urinary retention protocol if ordered  Outcome: Progressing     Problem: METABOLIC, FLUID AND ELECTROLYTES - ADULT  Goal: Electrolytes maintained within normal limits  Description: INTERVENTIONS:  - Monitor labs and assess patient for signs and symptoms of electrolyte imbalances  - Administer electrolyte replacement as ordered  - Monitor response to electrolyte replacements, including repeat lab results as appropriate  - Instruct patient on fluid and nutrition as appropriate  Outcome: Progressing

## 2022-01-07 NOTE — PLAN OF CARE
Problem: GENITOURINARY - ADULT  Goal: Maintains or returns to baseline urinary function  Description: INTERVENTIONS:  - Assess urinary function  - Encourage oral fluids to ensure adequate hydration if ordered  - Administer IV fluids as ordered to ensure adequate hydration  - Administer ordered medications as needed  - Offer frequent toileting  - Follow urinary retention protocol if ordered  Outcome: Progressing     Problem: GENITOURINARY - ADULT  Goal: Absence of urinary retention  Description: INTERVENTIONS:  - Assess patients ability to void and empty bladder  - Monitor I/O  - Bladder scan as needed  - Discuss with physician/AP medications to alleviate retention as needed  - Discuss catheterization for long term situations as appropriate  Outcome: Progressing       Problem: METABOLIC, FLUID AND ELECTROLYTES - ADULT  Goal: Electrolytes maintained within normal limits  Description: INTERVENTIONS:  - Monitor labs and assess patient for signs and symptoms of electrolyte imbalances  - Administer electrolyte replacement as ordered  - Monitor response to electrolyte replacements, including repeat lab results as appropriate  - Instruct patient on fluid and nutrition as appropriate  Outcome: Progressing     Problem: METABOLIC, FLUID AND ELECTROLYTES - ADULT  Goal: Fluid balance maintained  Description: INTERVENTIONS:  - Monitor labs   - Monitor I/O and WT  - Instruct patient on fluid and nutrition as appropriate  - Assess for signs & symptoms of volume excess or deficit  Outcome: Progressing

## 2022-01-07 NOTE — DISCHARGE INSTR - AVS FIRST PAGE
#1 no heavy straining or lifting above 10 pounds for 2 weeks    #2 call office fevers, chills, or worsening blood in the urine  #3  office jan 28  1:15pm cysto/stent out  May have blood, flank pain      Tamra GREENE  [address] [place] [address] office  [address] [place] [address]  [phone]  8:30 AM to 4:30 PM  Monday through Friday    [place] office  [phone] route [address] [place] [address]  [phone]  1:00 to 5:00 PM  Wednesday

## 2022-01-07 NOTE — PROGRESS NOTES
Progress Note - Urology      Patient: Alex Noble   : 1965 Sex: male   MRN: [id number]     CSN: [id number]  Unit/Bed#: [address] [address]     SUBJECTIVE:   S/p rihgt ureeroscopy/basketstent      Objective   Vitals: /70 (BP Location: Left arm)   Pulse 74   Temp 97 5 °F (36 4 °C) (Oral)   Resp 17   Ht 6' 4" (1 93 m)   Wt (!) 166 kg (367 lb 1 1 oz)   SpO2 94%   BMI 44 68 kg/m²     I/O last 24 hours: In: 100 [IV Piggyback:100]  Out: 2200 [Urine:2200]      Physical Exam:   General Alert awake   Normocephalic atraumatic PERRLA  Lungs clear bilaterally  Cardiac normal S1 normal S2  Abdomen soft, flank pain  Extremities no edema      Lab Results: CBC:   Lab Results   Component Value Date    WBC 8 55 2022    HGB 14 3 2022    HCT 45 7 2022    MCV 95 2022     2022    ADJUSTEDWBC 9 00 2016    MCH 29 7 2022    MCHC 31 3 (L) 2022    RDW 13 1 2022    MPV 9 7 2022    NRBC 0 2022     CMP:   Lab Results   Component Value Date     2022     04/15/2019    CO2 22 2022    CO2 23 04/15/2019    BUN 16 2022    BUN 23 04/15/2019    CREATININE 1 15 2022    CALCIUM 8 0 (L) 2022    AST 17 2022    AST 25 12/10/2018    ALT 20 2022    ALT 29 12/10/2018    ALKPHOS 56 2022    EGFR 69 2022     Urinalysis:   Lab Results   Component Value Date    COLORU Yellow 2022    CLARITYU Clear 2022    SPECGRAV 1 020 2022    PHUR 5 0 2022    PHUR 6 5 2018    LEUKOCYTESUR Negative 2022    NITRITE Negative 2022    GLUCOSEU Negative 2022    KETONESU Negative 2022    BILIRUBINUR Negative 2022    BLOODU Large (A) 2022     Urine Culture: No results found for: URINECX  PSA:   Lab Results   Component Value Date    PSA 2 4 2020    PSA 2 2 12/10/2018         Assessment/ Plan:   Coombs out  Voiding  D/c home offive x 2 weeks cysto/stent out          Hermann Mitchell MD

## 2022-01-07 NOTE — NURSING NOTE
Patient left unit ambulating in stable condition with all belongings  All discharge instructions reviewed with patient and were well understood

## 2022-01-09 VITALS
TEMPERATURE: 98.9 F | HEIGHT: 76 IN | SYSTOLIC BLOOD PRESSURE: 93 MMHG | WEIGHT: 315 LBS | BODY MASS INDEX: 38.36 KG/M2 | RESPIRATION RATE: 18 BRPM | DIASTOLIC BLOOD PRESSURE: 50 MMHG | HEART RATE: 74 BPM | OXYGEN SATURATION: 94 %

## 2022-01-10 ENCOUNTER — TELEPHONE (OUTPATIENT)
Dept: FAMILY MEDICINE CLINIC | Facility: CLINIC | Age: 57
End: 2022-01-10

## 2022-01-10 ENCOUNTER — TRANSITIONAL CARE MANAGEMENT (OUTPATIENT)
Dept: FAMILY MEDICINE CLINIC | Facility: CLINIC | Age: 57
End: 2022-01-10

## 2022-01-10 NOTE — TELEPHONE ENCOUNTER
[facility] [phone] ESTUARDO admitted Joselyn Fernandez 1/6-1/7  Virtual visit scheduled for 1/20 with Dr Corrie Calvo

## 2022-01-11 ENCOUNTER — HOSPITAL ENCOUNTER (OUTPATIENT)
Dept: RADIOLOGY | Facility: HOSPITAL | Age: 57
Discharge: HOME | End: 2022-01-11
Payer: COMMERCIAL

## 2022-01-11 DIAGNOSIS — E04.1 THYROID NODULE: ICD-10-CM

## 2022-01-11 PROCEDURE — 76536 US EXAM OF HEAD AND NECK: CPT

## 2022-01-17 RX ORDER — HYDROCODONE BITARTRATE AND ACETAMINOPHEN 5; 325 MG/1; MG/1
TABLET ORAL AS NEEDED
COMMUNITY
Start: 2022-01-06 | End: 2022-05-09 | Stop reason: ALTCHOICE

## 2022-01-17 RX ORDER — POTASSIUM CHLORIDE 20 MEQ/1
TABLET, EXTENDED RELEASE ORAL
COMMUNITY
Start: 2022-01-06 | End: 2022-01-20 | Stop reason: HOSPADM

## 2022-01-17 RX ORDER — OXYCODONE HYDROCHLORIDE AND ACETAMINOPHEN 5; 325 MG/1; MG/1
TABLET ORAL
COMMUNITY
Start: 2022-01-12 | End: 2022-01-20 | Stop reason: HOSPADM

## 2022-01-17 RX ORDER — ONDANSETRON 4 MG/1
TABLET, ORALLY DISINTEGRATING ORAL AS NEEDED
COMMUNITY
Start: 2022-01-06 | End: 2022-05-09 | Stop reason: ALTCHOICE

## 2022-01-20 ENCOUNTER — OFFICE VISIT (OUTPATIENT)
Dept: FAMILY MEDICINE CLINIC | Facility: CLINIC | Age: 57
End: 2022-01-20
Payer: COMMERCIAL

## 2022-01-20 VITALS — HEIGHT: 76 IN | BODY MASS INDEX: 38.36 KG/M2 | WEIGHT: 315 LBS

## 2022-01-20 DIAGNOSIS — E11.9 TYPE 2 DIABETES MELLITUS WITHOUT COMPLICATION, WITHOUT LONG-TERM CURRENT USE OF INSULIN (HCC): ICD-10-CM

## 2022-01-20 DIAGNOSIS — N23 RENAL COLIC ON RIGHT SIDE: ICD-10-CM

## 2022-01-20 DIAGNOSIS — E04.1 THYROID NODULE: ICD-10-CM

## 2022-01-20 DIAGNOSIS — N21.9 CALCULUS OF LOWER URINARY TRACT: ICD-10-CM

## 2022-01-20 DIAGNOSIS — I10 PRIMARY HYPERTENSION: ICD-10-CM

## 2022-01-20 DIAGNOSIS — Z76.89 ENCOUNTER FOR SUPPORT AND COORDINATION OF TRANSITION OF CARE: Primary | ICD-10-CM

## 2022-01-20 DIAGNOSIS — K21.9 GASTROESOPHAGEAL REFLUX DISEASE WITHOUT ESOPHAGITIS: ICD-10-CM

## 2022-01-20 PROCEDURE — 99495 TRANSJ CARE MGMT MOD F2F 14D: CPT | Performed by: FAMILY MEDICINE

## 2022-01-20 NOTE — PATIENT INSTRUCTIONS
CONTINUE CURRENT TREATMENT PLAN  CONTINUE ROUTINE DIABETIC FOLLOW UP    MONITOR SYMPTOMS    CALL IF SYMPTOMS WORSEN

## 2022-01-20 NOTE — PROGRESS NOTES
BMI Counseling: Body mass index is 43 82 kg/m²  The BMI is above normal  Nutrition recommendations include encouraging healthy choices of fruits and vegetables and moderation in carbohydrate intake  Exercise recommendations include exercising 3-5 times per week  No pharmacotherapy was ordered  Rationale for BMI follow-up plan is due to patient being overweight or obese  Virtual TCM Visit:    Verification of patient location:    Patient is located in the following state in which I hold an active license [place]        Assessment/Plan:        Problem List Items Addressed This Visit        Digestive    GERD (gastroesophageal reflux disease)       Endocrine    Thyroid nodule    Type 2 diabetes mellitus without complication, without long-term current use of insulin ([address] )       Cardiovascular and Mediastinum    Hypertension       Genitourinary    Urinary calculus    Relevant Medications    HYDROcodone-acetaminophen (NORCO) 5-325 mg per tablet       Other    Renal colic on right side    Encounter for support and coordination of transition of care - Primary             Reason for visit is [address]    Encounter provider Irvin De Dios MD       Provider located at [address] [place] [address]  [id number]      Recent Visits  No visits were found meeting these conditions  Showing recent visits within past 7 days and meeting all other requirements  Today's Visits  Date Type Provider Dept   01/20/22 Office Visit Irvin De Dios MD [facility]   Showing today's visits and meeting all other requirements  Future Appointments  No visits were found meeting these conditions  Showing future appointments within next 150 days and meeting all other requirements       After connecting through [employer] the patient was identified by name and date of birth   Kerrie Alcons was informed that this is a telemedicine visit and that the visit is being conducted through [employer] and patient was informed that this is a secure, HIPAA-compliant platform  He agrees to proceed     My office door was closed  No one else was in the room  He acknowledged consent and understanding of privacy and security of the video platform  The patient has agreed to participate and understands they can discontinue the visit at any time  Patient is aware this is a billable service  Subjective:     Patient ID: Susi Miller is a 60 y o  male  TRANSITION OF CARE    PATIENT IS S/P [facility] ADMISSION FOR RENAL COLIC    DATE OF DISCHARGE 1/7/ 2022    [address] [place] [address], DISCHARGE INSTRUCTIONS AND MEDICATIONS    PATIENT FEELS WELL  HAD STENT REMOVED YESTERDAY  DENIES ANY FEVER OR CHILLS  NO NVD  NO HEMATURIA      Review of Systems      Objective:    Vitals:    01/20/22 1340   Weight: (!) 163 kg (360 lb)   Height: 6' 4" (1 93 m)       Physical Exam        Transitional Care Management Review:  Susi Miller is a 60 y o  male here for TCM follow up  During the TCM phone call patient stated:    TCM Call (since 12/20/2021)     Date and time call was made  1/10/2022  3:30 PM    Hospital care reviewed  Records reviewed        Patient was hospitialized at  [address] [place] [address]        Date of Admission  01/06/22    Date of discharge  01/07/22    Diagnosis  Renal colic on right side    Disposition  Home    Were the patients medications reviewed and updated  Yes    Current Symptoms  None      TCM Call (since 12/20/2021)     Post hospital issues  Reduced activity    Should patient be enrolled in anticoag monitoring? No    Scheduled for follow up?   Yes    Patients specialists  Urologist    Did you obtain your prescribed medications  Yes    Do you need help managing your prescriptions or medications  No    Is transportation to your appointment needed  No    I have advised the patient to call PCP with any new or worsening symptoms  Lenore Valentin CMA    Living Arrangements  Alone    Support System  Family    The type of support provided  Emotional    Do you have social support  Yes, as much as I need    Are you recieving any outpatient services  No    Are you recieving home care services  No    Are you using any community resources  No    Current waiver services  No    Have you fallen in the last 12 months  No    Interperter language line needed  No    Counseling  Patient    Counseling topics  Activities of daily living          I spent 20 minutes with the patient during this visit  Kimberli Garza MD      VIRTUAL VISIT [address] verbally agrees to participate in [facility]  Pt is aware that [facility] could be limited without vital signs or the ability to perform a full hands-on physical exam  Joseluis Freire understands he or the provider may request at any time to terminate the video visit and request the patient to seek care or treatment in person

## 2022-01-21 ENCOUNTER — HOSPITAL ENCOUNTER (OUTPATIENT)
Dept: RADIOLOGY | Facility: HOSPITAL | Age: 57
Discharge: HOME | End: 2022-01-21
Payer: COMMERCIAL

## 2022-01-21 DIAGNOSIS — E04.1 THYROID NODULE: ICD-10-CM

## 2022-01-21 PROCEDURE — 76536 US EXAM OF HEAD AND NECK: CPT

## 2022-02-24 DIAGNOSIS — K21.9 GASTROESOPHAGEAL REFLUX DISEASE, UNSPECIFIED WHETHER ESOPHAGITIS PRESENT: ICD-10-CM

## 2022-02-24 RX ORDER — OMEPRAZOLE 20 MG/1
20 CAPSULE, DELAYED RELEASE ORAL DAILY
Qty: 60 CAPSULE | Refills: 0 | Status: SHIPPED | OUTPATIENT
Start: 2022-02-24 | End: 2022-03-02 | Stop reason: SDUPTHER

## 2022-02-24 NOTE — TELEPHONE ENCOUNTER
I called and advised pt we will honor this refill, he needs to make office appt, pt stated he will call back to make appt

## 2022-03-01 ENCOUNTER — OFFICE VISIT (OUTPATIENT)
Dept: FAMILY MEDICINE CLINIC | Facility: CLINIC | Age: 57
End: 2022-03-01
Payer: COMMERCIAL

## 2022-03-01 VITALS
HEIGHT: 76 IN | TEMPERATURE: 98 F | BODY MASS INDEX: 38.36 KG/M2 | RESPIRATION RATE: 18 BRPM | SYSTOLIC BLOOD PRESSURE: 132 MMHG | DIASTOLIC BLOOD PRESSURE: 76 MMHG | HEART RATE: 73 BPM | WEIGHT: 315 LBS | OXYGEN SATURATION: 97 %

## 2022-03-01 DIAGNOSIS — H61.21 IMPACTED CERUMEN OF RIGHT EAR: ICD-10-CM

## 2022-03-01 DIAGNOSIS — M54.41 CHRONIC BILATERAL LOW BACK PAIN WITH RIGHT-SIDED SCIATICA: ICD-10-CM

## 2022-03-01 DIAGNOSIS — M96.1 LUMBAR POST-LAMINECTOMY SYNDROME: ICD-10-CM

## 2022-03-01 DIAGNOSIS — E11.9 TYPE 2 DIABETES MELLITUS WITHOUT COMPLICATION, WITHOUT LONG-TERM CURRENT USE OF INSULIN (HCC): Primary | ICD-10-CM

## 2022-03-01 DIAGNOSIS — G89.4 CHRONIC PAIN SYNDROME: ICD-10-CM

## 2022-03-01 DIAGNOSIS — G89.29 CHRONIC BILATERAL LOW BACK PAIN WITH RIGHT-SIDED SCIATICA: ICD-10-CM

## 2022-03-01 DIAGNOSIS — M15.9 PRIMARY OSTEOARTHRITIS INVOLVING MULTIPLE JOINTS: ICD-10-CM

## 2022-03-01 DIAGNOSIS — K21.9 GASTROESOPHAGEAL REFLUX DISEASE WITHOUT ESOPHAGITIS: ICD-10-CM

## 2022-03-01 DIAGNOSIS — E66.01 MORBID OBESITY WITH BODY MASS INDEX (BMI) OF 50.0 TO 59.9 IN ADULT (HCC): ICD-10-CM

## 2022-03-01 DIAGNOSIS — F41.9 ANXIETY: ICD-10-CM

## 2022-03-01 DIAGNOSIS — I10 PRIMARY HYPERTENSION: ICD-10-CM

## 2022-03-01 PROBLEM — N23 RENAL COLIC ON RIGHT SIDE: Status: RESOLVED | Noted: 2022-01-06 | Resolved: 2022-03-01

## 2022-03-01 PROBLEM — L98.9 SKIN LESION: Status: RESOLVED | Noted: 2021-11-22 | Resolved: 2022-03-01

## 2022-03-01 PROBLEM — K20.0 EOSINOPHILIC ESOPHAGITIS: Status: RESOLVED | Noted: 2019-05-08 | Resolved: 2022-03-01

## 2022-03-01 PROBLEM — Z76.89 ENCOUNTER FOR SUPPORT AND COORDINATION OF TRANSITION OF CARE: Status: RESOLVED | Noted: 2022-01-20 | Resolved: 2022-03-01

## 2022-03-01 PROBLEM — E87.8 ELECTROLYTE ABNORMALITY: Status: RESOLVED | Noted: 2022-01-07 | Resolved: 2022-03-01

## 2022-03-01 LAB — SL AMB POCT HEMOGLOBIN AIC: 5.6 (ref ?–6.5)

## 2022-03-01 PROCEDURE — 3725F SCREEN DEPRESSION PERFORMED: CPT | Performed by: FAMILY MEDICINE

## 2022-03-01 PROCEDURE — 3044F HG A1C LEVEL LT 7.0%: CPT | Performed by: PHYSICIAN ASSISTANT

## 2022-03-01 PROCEDURE — 99214 OFFICE O/P EST MOD 30 MIN: CPT | Performed by: FAMILY MEDICINE

## 2022-03-01 PROCEDURE — 83036 HEMOGLOBIN GLYCOSYLATED A1C: CPT | Performed by: FAMILY MEDICINE

## 2022-03-01 PROCEDURE — 69210 REMOVE IMPACTED EAR WAX UNI: CPT | Performed by: FAMILY MEDICINE

## 2022-03-01 NOTE — PROGRESS NOTES
Ear cerumen removal    Date/Time: 3/1/2022 1:21 PM  Performed by: Kristel Dunn MD  Authorized by: Kristel Dunn MD   Universal Protocol:  Consent: Verbal consent obtained  Risks and benefits: risks, benefits and alternatives were discussed  Consent given by: patient      Patient location:  Clinic  Procedure details:     Location:  R ear    Procedure type: irrigation with instrumentation      Instrumentation: curette      Approach:  External  Post-procedure details:     Complication:  None    Hearing quality:  Improved    Patient tolerance of procedure:   Tolerated well, no immediate complications

## 2022-03-01 NOTE — PROGRESS NOTES
Depression Screening and Follow-up Plan: Patient was screened for depression during today's encounter  They screened negative with a PHQ-2 score of 0      Assessment/Plan:    Type 2 diabetes mellitus without complication, without long-term current use of insulin ([facility])    Lab Results   Component Value Date    HGBA1C 5 4 11/22/2021     COMPLIANT WITH MEDICATION  DENIES ANY NUMBNESS, TINGLING IN FEET    - DISCUSSED DIETARY MODIFICATION OF CARBOHYDRATES  - HGB A1C AND URINE STUDIES DUE TODAY  - FOOT CARE  - RV 3 MONTHS        GERD (gastroesophageal reflux disease)  STABLE  DENIES ANY CP, INDIGESTION, OR COUGH  NOTES NO MELENA OR HEMATOCHEZIA  NO HEMATEMESIS  COMPLIANT WITH MEDICATION  NO CONCERNS    - CONTINUE CURRENT TREATMENT PLAN  - MEDICATION AS PRESCRIBED  - AVOID CAFFEINE, ALCOHOL OR SMOKING      Osteoarthritis  STABLE  DENIES ANY JOINT SWELLING OR REDNESS  JOINT STIFFNESS PRESENT  PAIN MANAGEMENT ADEQUATE    - CONTINUE CURRENT MANAGEMENT  - MEDICATION AS DIRECTED  - CALL / RETURN IF SYMPTOMS WORSEN      Hypertension  STABLE  DENIES ANY CP, SOB, PALPITATIONS, OR HEADACHE  NOTES NO WATER RETENTION  COMPLIANT WITH MEDICATION  NO CONCERNS    - CONTINUE CURRENT TREATMENT PLAN  - MONITOR DIETARY SODIUM INTAKE  - ENCOURAGE PHYSICAL ACTIVITY  - RV 6 MONTHS         Diagnoses and all orders for this visit:    Type 2 diabetes mellitus without complication, without long-term current use of insulin ([facility])  -     POCT hemoglobin A1c    Primary hypertension    Gastroesophageal reflux disease without esophagitis    Primary osteoarthritis involving multiple joints    Chronic pain syndrome    Lumbar post-laminectomy syndrome    Anxiety    Chronic bilateral low back pain with right-sided sciatica    Morbid obesity with body mass index (BMI) of 50 0 to 59 9 in adult ([facility])    Impacted cerumen of right ear          Patient Instructions   CONTINUE CURRENT TREATMENT PLAN  MONITOR DIETARY SODIUM, CHOL, AND CARBOHYDRATE INTAKE  ENCOURAGE PHYSICAL ACTIVITY      RV 6 M, SOONER PRN        Return in about 6 months (around 9/1/2022) for Annual physical     Subjective:      Patient ID: Hakeem Hernandez is a 60 y o  male  Chief Complaint   Patient presents with    Follow-up     THREE MONTH DM CHECK     DM FOOT EXAM     SHOES AND SOCKS OFF       PATIENT RETURNS FOR ROUTINE EVALUATION OF PATIENT'S MEDICAL ISSUES    INDIVIDUAL MEDICAL ISSUES WITH THEIR CURRENT STATUS, ASSESSMENT AND PLANS ARE LISTED ABOVE          The following portions of the patient's history were reviewed and updated as appropriate: allergies, current medications, past family history, past medical history, past social history, past surgical history and problem list     Review of Systems   Constitutional: Negative for chills, fatigue and fever  HENT: Positive for tinnitus  Negative for congestion, ear discharge, ear pain, mouth sores, postnasal drip, sore throat and trouble swallowing  Eyes: Negative for pain, discharge and visual disturbance  Respiratory: Negative for cough, shortness of breath and wheezing  Cardiovascular: Negative for chest pain, palpitations and leg swelling  Gastrointestinal: Negative for abdominal distention, abdominal pain, blood in stool, diarrhea and nausea  Endocrine: Negative for polydipsia, polyphagia and polyuria  Genitourinary: Negative for dysuria, frequency, hematuria and urgency  Musculoskeletal: Positive for arthralgias  Negative for gait problem and joint swelling  Skin: Negative for pallor and rash  Neurological: Negative for dizziness, syncope, speech difficulty, weakness, light-headedness, numbness and headaches  Hematological: Negative for adenopathy  Psychiatric/Behavioral: Negative for behavioral problems, confusion and sleep disturbance  The patient is not nervous/anxious            Current Outpatient Medications   Medication Sig Dispense Refill    amLODIPine (NORVASC) 5 mg tablet TAKE ONE TABLET BY MOUTH EVERY DAY 90 tablet 1    lisinopril-hydrochlorothiazide (PRINZIDE,ZESTORETIC) 20-25 MG per tablet TAKE ONE TABLET BY MOUTH EVERY DAY 90 tablet 3    omeprazole (PriLOSEC) 20 mg delayed release capsule Take 1 capsule (20 mg total) by mouth daily 60 capsule 0    HYDROcodone-acetaminophen (NORCO) 5-325 mg per tablet as needed   (Patient not taking: Reported on 3/1/2022 )      ondansetron (ZOFRAN-ODT) 4 mg disintegrating tablet as needed   (Patient not taking: Reported on 3/1/2022 )      tamsulosin (FLOMAX) 0 4 mg Take 1 capsule (0 4 mg total) by mouth daily with dinner (Patient not taking: Reported on 3/1/2022 ) 30 capsule 0     No current facility-administered medications for this visit  Objective:    /76   Pulse 73   Temp 98 °F (36 7 °C) (Temporal)   Resp 18   Ht 6' 4" (1 93 m)   Wt (!) 165 kg (364 lb)   SpO2 97%   BMI 44 31 kg/m²        Physical Exam  Constitutional:       Appearance: He is well-developed  HENT:      Head: Normocephalic and atraumatic  Right Ear: There is impacted cerumen  Left Ear: Tympanic membrane and ear canal normal    Eyes:      General:         Right eye: No discharge  Left eye: No discharge  Conjunctiva/sclera: Conjunctivae normal       Pupils: Pupils are equal, round, and reactive to light  Neck:      Thyroid: No thyromegaly  Vascular: No JVD  Cardiovascular:      Rate and Rhythm: Normal rate and regular rhythm  Heart sounds: Normal heart sounds  No murmur heard  Pulmonary:      Effort: Pulmonary effort is normal       Breath sounds: Normal breath sounds  No wheezing or rales  Abdominal:      General: Bowel sounds are normal       Palpations: Abdomen is soft  There is no mass  Tenderness: There is no abdominal tenderness  There is no guarding or rebound  Comments: OBESE   Musculoskeletal:         General: No tenderness or deformity  Normal range of motion  Cervical back: Neck supple        Comments: MODERATE DJD CHANGES Lymphadenopathy:      Cervical: No cervical adenopathy  Skin:     General: Skin is warm and dry  Findings: No erythema or rash  Neurological:      Mental Status: He is alert and oriented to person, place, and time  Psychiatric:         Behavior: Behavior normal          Thought Content:  Thought content normal          Judgment: Judgment normal                 Rowena Magana MD

## 2022-03-01 NOTE — ASSESSMENT & PLAN NOTE
Lab Results   Component Value Date    HGBA1C 5 4 11/22/2021     COMPLIANT WITH MEDICATION  DENIES ANY NUMBNESS, TINGLING IN FEET    - DISCUSSED DIETARY MODIFICATION OF CARBOHYDRATES  - HGB A1C AND URINE STUDIES DUE TODAY  - FOOT CARE  - RV 3 MONTHS

## 2022-03-02 ENCOUNTER — OFFICE VISIT (OUTPATIENT)
Dept: GASTROENTEROLOGY | Facility: CLINIC | Age: 57
End: 2022-03-02
Payer: COMMERCIAL

## 2022-03-02 VITALS
BODY MASS INDEX: 38.36 KG/M2 | HEART RATE: 83 BPM | TEMPERATURE: 97.9 F | HEIGHT: 76 IN | DIASTOLIC BLOOD PRESSURE: 98 MMHG | SYSTOLIC BLOOD PRESSURE: 154 MMHG | WEIGHT: 315 LBS

## 2022-03-02 DIAGNOSIS — Z12.11 COLON CANCER SCREENING: Primary | ICD-10-CM

## 2022-03-02 DIAGNOSIS — K20.0 EOSINOPHILIC ESOPHAGITIS: ICD-10-CM

## 2022-03-02 PROBLEM — E11.9 TYPE 2 DIABETES MELLITUS WITHOUT COMPLICATION, WITHOUT LONG-TERM CURRENT USE OF INSULIN (HCC): Status: RESOLVED | Noted: 2021-02-19 | Resolved: 2022-03-02

## 2022-03-02 PROCEDURE — 3008F BODY MASS INDEX DOCD: CPT | Performed by: PHYSICIAN ASSISTANT

## 2022-03-02 PROCEDURE — 99213 OFFICE O/P EST LOW 20 MIN: CPT | Performed by: PHYSICIAN ASSISTANT

## 2022-03-02 PROCEDURE — 3080F DIAST BP >= 90 MM HG: CPT | Performed by: PHYSICIAN ASSISTANT

## 2022-03-02 PROCEDURE — 3077F SYST BP >= 140 MM HG: CPT | Performed by: PHYSICIAN ASSISTANT

## 2022-03-02 PROCEDURE — 1036F TOBACCO NON-USER: CPT | Performed by: PHYSICIAN ASSISTANT

## 2022-03-02 RX ORDER — OMEPRAZOLE 20 MG/1
20 CAPSULE, DELAYED RELEASE ORAL DAILY
Qty: 90 CAPSULE | Refills: 3 | Status: SHIPPED | OUTPATIENT
Start: 2022-03-02 | End: 2022-05-03 | Stop reason: SDUPTHER

## 2022-03-02 NOTE — PROGRESS NOTES
Assessment and Plan    #1  Eosinophilic esophagitis: on PPI 20 mg once daily with good control of symptoms, reports stopping PPI a few months ago and after 1 month had recurrence of food getting stuck, this resolved with restarting PPI  -discussed risks and benefits of PPI, would recommend continuing at this time as benefit outweighs the risks   -had recent normal labs through PCP  -consider allergy referral or fluticasone if symptoms worsen on PPI  -follow up in 1 year with Dr Maicol Mike, sooner if needed    #2  Colon cancer screening  -repeat due in 2029   --------------------------------------------------------------------------------------------------------------------    Chief Complaint: f/u EoE    HPI: Kerrie Fernando is a 60 y o  male with a history of EoE, sleep apnea, HTN, low back pain, obesity who presents today for follow up for EoE  He was last seen in 2019 and had an EGD and colonoscopy at that time  He was diagnosed with eosinophilic esophagitis on PPI  He reports that he never saw an allergist and never took an inhaler or steroids for his issues  He reports he was doing very well in a few months ago he stopped the medication for 1 month  He reports that after about 1 month he started to notice that food was getting stuck against any resume the medication has been fine ever since  Denies any heartburn, nausea, vomiting  Denies any change in bowel habits, diarrhea, constipation, blood in the stool, black tarry stool  He has lost about 80 pounds but this is with dieting and was intentional   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    His next colonoscopy is due in 2029      Review of Systems:   General: negative for fatigue, fever, night sweats or unexpected weight loss  Psychological: negative for anxiety or depression  Ophthalmic: negative for blurry vision or scleral icterus  ENT: negative for headaches, oral lesions, sore throat, vocal changes or dysphagia  Hematological and Lymphatic: negative for pallor or swollen lymph nodes  Respiratory: negative for cough, shortness of breath or wheezing  Cardiovascular: negative for chest pain, edema or murmur  Gastrointestinal: as mentioned in HPI  Genito-Urinary: negative for dysuria or incontinence  Musculoskeletal: negative for joint pain, joint stiffness or joint swelling  Dermatological: negative for pruritus, rash, or jaundice    Current Medications  Current Outpatient Medications   Medication Sig Dispense Refill    amLODIPine (NORVASC) 5 mg tablet TAKE ONE TABLET BY MOUTH EVERY DAY 90 tablet 1    lisinopril-hydrochlorothiazide (PRINZIDE,ZESTORETIC) 20-25 MG per tablet TAKE ONE TABLET BY MOUTH EVERY DAY 90 tablet 3    omeprazole (PriLOSEC) 20 mg delayed release capsule Take 1 capsule (20 mg total) by mouth daily 90 capsule 3    tamsulosin (FLOMAX) 0 4 mg Take 1 capsule (0 4 mg total) by mouth daily with dinner 30 capsule 0    HYDROcodone-acetaminophen (NORCO) 5-325 mg per tablet as needed   (Patient not taking: Reported on 3/1/2022 )      ondansetron (ZOFRAN-ODT) 4 mg disintegrating tablet as needed   (Patient not taking: Reported on 3/2/2022 )       No current facility-administered medications for this visit         Past Medical History  Past Medical History:   Diagnosis Date    Borderline diabetes     Chronic pain disorder     lower back-s/p laminectomy    Claustrophobia     CPAP (continuous positive airway pressure) dependence     Diverticulitis     GERD (gastroesophageal reflux disease)     Hypertension     Kidney stone     x 2 episodes    Localized pain of joint     Mild sleep apnea     CPAP started in July having difficulty using- only has used on occ    Morbid obesity with body mass index (BMI) of 50 0 to 59 9 in adult [facility])     Rectal bleeding     occ rectal bleeding last episode 6/19    Skin tag     skin tags were removed-as of 8/2/19 has a few more    Wears glasses        Past Surgical History  Past Surgical History:   Procedure Laterality Date    BACK SURGERY      laminectomy-1996, 2010    COLONOSCOPY      x2    ESOPHAGOGASTRODUODENOSCOPY N/A 3/30/2019    Procedure: ESOPHAGOGASTRODUODENOSCOPY (EGD); Surgeon: Nuria Gonzalez MD;  Location: [address] [place] [address];  Service: Gastroenterology    KNEE ARTHROSCOPY Left 2007    [place] CYSTO/URETERO W/LITHOTRIPSY &INDWELL STENT INSRT Right 1/6/2022    Procedure: CYSTOSCOPY URETEROSCOPY AND INSERTION STENT URETERAL RIGHT;  Surgeon: Parris Golden MD;  Location: [address] [place] [address];  Service: Urology    [place] ESOPHAGOGASTRODUODENOSCOPY TRANSORAL DIAGNOSTIC N/A 5/1/2019    Procedure: ESOPHAGOGASTRODUODENOSCOPY (EGD); Surgeon: Nuria Gonzalez MD;  Location: [facility];   Service: Gastroenterology    UPPER GASTROINTESTINAL ENDOSCOPY  2006    US GUIDED THYROID BIOPSY  12/28/2020       Past Social History   Social History     Socioeconomic History    Marital status:      Spouse name: None    Number of children: None    Years of education: None    Highest education level: None   Occupational History    None   Tobacco Use    Smoking status: Never Smoker    Smokeless tobacco: Never Used   Vaping Use    Vaping Use: Never used   Substance and Sexual Activity    Alcohol use: No    Drug use: No    Sexual activity: Not Currently     Comment: none   Other Topics Concern    None   Social History Narrative    Living alone     Social Determinants of Health     Financial Resource Strain: Not on file   Food Insecurity: Not on file   Transportation Needs: Not on file   Physical Activity: Not on file   Stress: Not on file   Social Connections: Not on file   Intimate Partner Violence: Not on file   Housing Stability: Not on file       The following portions of the patient's history were reviewed and updated as appropriate: allergies, current medications, past family history, past medical history, past social history, past surgical history and problem list     Vital Signs  Vitals:    03/02/22 1358   BP: 154/98   BP Location: Left arm   Patient Position: Standing   Cuff Size: Standard   Pulse: 83   Temp: 97 9 °F (36 6 °C)   TempSrc: Temporal   Weight: (!) 166 kg (366 lb)   Height: 6' 4" (1 93 m)       Physical Exam:  General appearance: alert, cooperative, no distress  HEENT: normocephalic, anicteric, no eye erythema or discharge, no oropharyngeal thrush  Neck: supple, trachea midline, no adenopathy  Lungs: CTA b/l, no rales, rhonchi, or wheezing, unlabored respirations  Heart: RRR, no murmur, rubs, or gallops  Abdomen: soft, obese abdomen, non-tender, non-distended, normal bowel sounds, no masses or organomegaly  Rectal: deferred  Extremities: no cyanosis, clubbing, or edema  Musculoskeletal: normal gait  Skin: color and texture normal, no jaundice, no rashes or lesions  Psychiatric: alert and oriented, normal affect and behavior

## 2022-04-13 DIAGNOSIS — I10 ESSENTIAL HYPERTENSION: ICD-10-CM

## 2022-04-13 RX ORDER — AMLODIPINE BESYLATE 5 MG/1
5 TABLET ORAL DAILY
Qty: 90 TABLET | Refills: 3 | Status: SHIPPED | OUTPATIENT
Start: 2022-04-13

## 2022-04-13 RX ORDER — LISINOPRIL AND HYDROCHLOROTHIAZIDE 25; 20 MG/1; MG/1
1 TABLET ORAL DAILY
Qty: 90 TABLET | Refills: 3 | Status: SHIPPED | OUTPATIENT
Start: 2022-04-13 | End: 2022-05-20

## 2022-05-02 NOTE — TELEPHONE ENCOUNTER
Patients GI provider:  [facility]   [place]  Number to return call: ([phone])    Reason for call: Pt calling to have Omeprazole RX [facility] to [facility]    Scheduled procedure/appointment date if applicable: Apt/procedure 3/2/22

## 2022-05-03 NOTE — TELEPHONE ENCOUNTER
Pt calling stating omeprazole script was not sent to mail order pharmacy yet and pt is out of pills

## 2022-05-09 ENCOUNTER — OFFICE VISIT (OUTPATIENT)
Dept: FAMILY MEDICINE CLINIC | Facility: CLINIC | Age: 57
End: 2022-05-09
Payer: COMMERCIAL

## 2022-05-09 VITALS
DIASTOLIC BLOOD PRESSURE: 82 MMHG | WEIGHT: 315 LBS | HEART RATE: 66 BPM | BODY MASS INDEX: 38.36 KG/M2 | RESPIRATION RATE: 14 BRPM | TEMPERATURE: 96.5 F | OXYGEN SATURATION: 99 % | HEIGHT: 76 IN | SYSTOLIC BLOOD PRESSURE: 138 MMHG

## 2022-05-09 DIAGNOSIS — S39.012D STRAIN OF LUMBAR REGION, SUBSEQUENT ENCOUNTER: ICD-10-CM

## 2022-05-09 DIAGNOSIS — M54.50 CHRONIC MIDLINE LOW BACK PAIN WITHOUT SCIATICA: Primary | ICD-10-CM

## 2022-05-09 DIAGNOSIS — G89.29 CHRONIC MIDLINE LOW BACK PAIN WITHOUT SCIATICA: Primary | ICD-10-CM

## 2022-05-09 PROCEDURE — 96374 THER/PROPH/DIAG INJ IV PUSH: CPT

## 2022-05-09 PROCEDURE — 3008F BODY MASS INDEX DOCD: CPT | Performed by: NURSE PRACTITIONER

## 2022-05-09 PROCEDURE — 3075F SYST BP GE 130 - 139MM HG: CPT | Performed by: NURSE PRACTITIONER

## 2022-05-09 PROCEDURE — 1036F TOBACCO NON-USER: CPT | Performed by: NURSE PRACTITIONER

## 2022-05-09 PROCEDURE — 96372 THER/PROPH/DIAG INJ SC/IM: CPT

## 2022-05-09 PROCEDURE — 99214 OFFICE O/P EST MOD 30 MIN: CPT | Performed by: NURSE PRACTITIONER

## 2022-05-09 PROCEDURE — 3079F DIAST BP 80-89 MM HG: CPT | Performed by: NURSE PRACTITIONER

## 2022-05-09 RX ORDER — DEXAMETHASONE SODIUM PHOSPHATE 4 MG/ML
4 INJECTION, SOLUTION INTRA-ARTICULAR; INTRALESIONAL; INTRAMUSCULAR; INTRAVENOUS; SOFT TISSUE ONCE
Status: COMPLETED | OUTPATIENT
Start: 2022-05-09 | End: 2022-05-09

## 2022-05-09 RX ORDER — PREDNISONE 20 MG/1
TABLET ORAL
Qty: 11 TABLET | Refills: 0 | Status: SHIPPED | OUTPATIENT
Start: 2022-05-09 | End: 2022-08-02

## 2022-05-09 RX ORDER — CYCLOBENZAPRINE HCL 10 MG
10 TABLET ORAL 3 TIMES DAILY PRN
Qty: 30 TABLET | Refills: 0 | Status: SHIPPED | OUTPATIENT
Start: 2022-05-09 | End: 2022-08-02

## 2022-05-09 RX ORDER — KETOROLAC TROMETHAMINE 30 MG/ML
60 INJECTION, SOLUTION INTRAMUSCULAR; INTRAVENOUS ONCE
Status: COMPLETED | OUTPATIENT
Start: 2022-05-09 | End: 2022-05-09

## 2022-05-09 RX ADMIN — KETOROLAC TROMETHAMINE 60 MG: 30 INJECTION, SOLUTION INTRAMUSCULAR; INTRAVENOUS at 14:37

## 2022-05-09 RX ADMIN — DEXAMETHASONE SODIUM PHOSPHATE 4 MG: 4 INJECTION, SOLUTION INTRA-ARTICULAR; INTRALESIONAL; INTRAMUSCULAR; INTRAVENOUS; SOFT TISSUE at 14:39

## 2022-05-09 NOTE — PROGRESS NOTES
Assessment/Plan:    1  Chronic midline low back pain without sciatica  Assessment & Plan:  Recommend PT evaluation for new onset of chronic lower back pain s/p laminectomy x's 2  Reviewed previous MRI from 2019  If symptoms persist, consider updated imaging  Orders:  -     Ambulatory Referral to Physical Therapy; Future  -     dexamethasone (DECADRON) injection 4 mg  -     ketorolac (TORADOL) 60 mg/2 mL IM injection 60 mg  -     predniSONE 20 mg tablet; Take 2 tablets PO daily x's 3 days, then take 1 tablet daily x's 3 days, then take 1/2 tablet daily x's 3 days  -     cyclobenzaprine (FLEXERIL) 10 mg tablet; Take 1 tablet (10 mg total) by mouth 3 (three) times a day as needed for muscle spasms    2  Strain of lumbar region, subsequent encounter  -     Ambulatory Referral to Physical Therapy; Future  -     dexamethasone (DECADRON) injection 4 mg  -     ketorolac (TORADOL) 60 mg/2 mL IM injection 60 mg  -     predniSONE 20 mg tablet; Take 2 tablets PO daily x's 3 days, then take 1 tablet daily x's 3 days, then take 1/2 tablet daily x's 3 days  -     cyclobenzaprine (FLEXERIL) 10 mg tablet; Take 1 tablet (10 mg total) by mouth 3 (three) times a day as needed for muscle spasms      Return in about 4 weeks (around 6/6/2022), or if symptoms worsen or fail to improve  Subjective:      Patient ID: Dayana Andres is a 59 y o  male  Chief Complaint   Patient presents with    Back Pain     pt here for middle lower back pain, lifted and twisted the wrong way yestery 5/8, was stabbing pain, now a dull ache       Back Pain  This is a chronic problem  The current episode started yesterday  The problem occurs constantly  The problem is unchanged  The pain is present in the lumbar spine  The pain is moderate  The pain is the same all the time  The symptoms are aggravated by twisting, bending and lying down   Pertinent negatives include no abdominal pain, bladder incontinence, bowel incontinence, fever, numbness, tingling or weakness  Risk factors include recent trauma  He has tried analgesics and NSAIDs for the symptoms  The treatment provided mild relief  The following portions of the patient's history were reviewed and updated as appropriate: allergies, current medications, past family history, past medical history, past social history, past surgical history and problem list     Review of Systems   Constitutional: Negative for chills, fatigue and fever  Respiratory: Negative for cough, chest tightness and shortness of breath  Gastrointestinal: Negative for abdominal pain and bowel incontinence  Genitourinary: Negative for bladder incontinence  Musculoskeletal: Positive for back pain  Negative for joint swelling, myalgias, neck pain and neck stiffness  Neurological: Negative for tingling, weakness and numbness  Current Outpatient Medications   Medication Sig Dispense Refill    amLODIPine (NORVASC) 5 mg tablet Take 1 tablet (5 mg total) by mouth daily 90 tablet 3    lisinopril-hydrochlorothiazide (PRINZIDE,ZESTORETIC) 20-25 MG per tablet Take 1 tablet by mouth daily 90 tablet 3    omeprazole (PriLOSEC) 20 mg delayed release capsule Take 1 capsule (20 mg total) by mouth daily 90 capsule 3    cyclobenzaprine (FLEXERIL) 10 mg tablet Take 1 tablet (10 mg total) by mouth 3 (three) times a day as needed for muscle spasms 30 tablet 0    predniSONE 20 mg tablet Take 2 tablets PO daily x's 3 days, then take 1 tablet daily x's 3 days, then take 1/2 tablet daily x's 3 days 11 tablet 0     No current facility-administered medications for this visit  Objective:    /82   Pulse 66   Temp (!) 96 5 °F (35 8 °C) (Temporal)   Resp 14   Ht 6' 4" (1 93 m)   Wt (!) 166 kg (366 lb)   SpO2 99%   BMI 44 55 kg/m²        Physical Exam  Constitutional:       General: He is not in acute distress  Appearance: He is well-developed  He is not diaphoretic  HENT:      Head: Normocephalic and atraumatic  Eyes:      General:         Right eye: No discharge  Left eye: No discharge  Conjunctiva/sclera: Conjunctivae normal    Cardiovascular:      Rate and Rhythm: Normal rate and regular rhythm  Heart sounds: Normal heart sounds  Pulmonary:      Effort: Pulmonary effort is normal  No respiratory distress  Breath sounds: Normal breath sounds  No decreased breath sounds, wheezing, rhonchi or rales  Musculoskeletal:      Lumbar back: Tenderness present  No swelling  Normal range of motion  Positive right straight leg raise test  Negative left straight leg raise test         Back:       Comments: Healed lumbar surgical scar  Tenderness upon point palpation of lower spine    Skin:     General: Skin is warm and dry  Findings: No rash  Neurological:      Mental Status: He is alert and oriented to person, place, and time  Psychiatric:         Behavior: Behavior normal          Thought Content:  Thought content normal          Judgment: Judgment normal                 SOLE Hernandez

## 2022-05-09 NOTE — ASSESSMENT & PLAN NOTE
Recommend PT evaluation for new onset of chronic lower back pain s/p laminectomy x's 2  Reviewed previous MRI from 2019  If symptoms persist, consider updated imaging

## 2022-05-17 ENCOUNTER — EVALUATION (OUTPATIENT)
Dept: PHYSICAL THERAPY | Facility: CLINIC | Age: 57
End: 2022-05-17
Payer: COMMERCIAL

## 2022-05-17 DIAGNOSIS — M54.50 CHRONIC MIDLINE LOW BACK PAIN WITHOUT SCIATICA: ICD-10-CM

## 2022-05-17 DIAGNOSIS — S39.012D STRAIN OF LUMBAR REGION, SUBSEQUENT ENCOUNTER: ICD-10-CM

## 2022-05-17 DIAGNOSIS — G89.29 CHRONIC MIDLINE LOW BACK PAIN WITHOUT SCIATICA: ICD-10-CM

## 2022-05-17 PROCEDURE — 97161 PT EVAL LOW COMPLEX 20 MIN: CPT

## 2022-05-17 NOTE — PROGRESS NOTES
PT Evaluation     Today's date: 2022  Patient name: Zoltan Yi  : 1965  MRN: [id number]  Referring provider: SOLE Bustillo  Dx:   Encounter Diagnosis     ICD-10-CM    1  Strain of lumbar region, subsequent encounter  S39 012D Ambulatory Referral to Physical Therapy   2  Chronic midline low back pain without sciatica  M54 50 Ambulatory Referral to Physical Therapy    G89 29                 Insurance:  AMA/CMS Eval/ Re-eval POC expires FOTO Auth Status Total   Visits  Start date  Expiration date Extension  Visit limitation? PT only or  PT+OT? Co-Insurance   [facility] PPO 22  Requesting       $40 Co-pay                                                                 AUTH Status:  Date               Visits  Authed:  Used                Remaining                  Assessment  Assessment details: Zoltan Yi is a 59 y o  male who presents with signs and symptoms consistent with the referring diagnoses of strain of lumbar region, subsequent encounter, and chronic midline low back pain without sciatica  Patient presents with the following impairments: low back pain, abnormal posture, limited lumbar AROM, pain with movement, and limited activity tolerance  Due to these impairments, patient has difficulty performing the following: sit-to-stand transfers, bending forward, lifting, carrying, twisting, prolonged sitting, prolonged standing, lying supine, sleeping, shopping, and yard work  Patient has been educated in home exercise program and plan of care   Patient would benefit from skilled physical therapy services to address the above functional limitations and progress towards prior level of function and independence with home exercise program   Impairments: abnormal gait, abnormal muscle firing, abnormal or restricted ROM, activity intolerance, impaired physical strength, lacks appropriate home exercise program, pain with function, poor posture  and poor body mechanics  Understanding of Dx/Px/POC: good   Prognosis: good    Goals  Short Term Goals (3 weeks; target date: 6/17/22)  1  Patient will be independent with initial HEP  2  Patient will demonstrate an increase in lumbar extension AROM by 10%  Long Term Goals (6 weeks; target date: 7/17/22)  1  Patient will be independent with comprehensive HEP  2  Patient will present with decrease in LBP to 4/10 at worst    3  Patient will demonstrate an increase in lumbar extension AROM by 20%  Plan  Plan details: Patient has been educated in comprehensive HEP and has scheduled a follow up visit in 2 weeks to assess need for more therapy  Should patient's chief complaint be solved with independent exercise, he will be discharged after evaluation and 1-4 follow up visits     Patient would benefit from: skilled physical therapy  Planned modality interventions: cryotherapy, electrical stimulation/Russian stimulation, TENS, ultrasound, thermotherapy: hydrocollator packs, traction, high voltage pulsed current: spasm management and high voltage pulsed current: pain management  Planned therapy interventions: flexibility, functional ROM exercises, graded exercise, home exercise program, joint mobilization, manual therapy, neuromuscular re-education, patient education, strengthening, stretching, therapeutic exercise, therapeutic activities, balance/weight bearing training, gait training, abdominal trunk stabilization, self care, postural training, activity modification, ADL retraining, ADL training, balance, behavior modification, body mechanics training, breathing training, therapeutic training, transfer training, graded activity, graded motor, muscle pump exercises, Tesfaye taping and IADL retraining  Frequency: 1x week  Duration in weeks: 6  Plan of Care beginning date: 5/17/2022  Plan of Care expiration date: 7/17/2022  Treatment plan discussed with: patient        Subjective Evaluation    History of Present Illness  Mechanism of injury: Pt reports chronic low back pain that was exacerbated about 10 days ago while lifting a box  Pt states he was kneeling, lifting a box, and twisted toward his left side  Pt states that the pain is located in the center (or slightly left) of the low back and non-radiating  Pt states that since this episode, he has difficulty with getting up from a chair and sitting down  Pt states that past 4-5 days have been much better  Pt states that he saw his PCP and was referred to PT  Pt states that due to high co-pay, he is interested in learning exercises and being discharged as soon as possible to avoid high costs     Pain  Current pain ratin  At best pain ratin  At worst pain ratin  Location: Low back  Quality: dull ache  Relieving factors: medications and change in position  Aggravating factors: lifting  Progression: improved    Social Support  Steps to enter house: yes  3  Stairs in house: no   Lives with: alone    Employment status: not working  Hand dominance: right  Exercise history: 2-3x week - walking outside       Diagnostic Tests  No diagnostic tests performed  Treatments  Previous treatment: medication and injection treatment  Patient Goals  Patient goals for therapy: decreased pain  Patient goal: To get back to where I was before hurting my back        Objective    Neurological Testing  Light Touch Sensation  Intact throughout BLE dermatomes     Lumbar AROM     Flexion  20%   Extension 50% w/ decreased LBP   L side glide 25% w/ LBP   R side glide 50%    L rotation WNL   R rotation  WNL     Repeated motions Position Repetitions performed Symptomatic response during Symptomatic response after   Flexion NT       Extension Standing 10 Decreases LBP Better          Precautions: HTN, hx of laminectomy x 2, hx of L1-L2 fracture        EVAL 2 3 4 5   Manuals 22       STM/MFR        Manual flexibility         Joint mobs         Neuro Re-Ed        TA activation 5x       TA w/ march 3x B/L       TA w/ BKFO 3x B/L                                       Ther Ex        Prone lying 2'        GIN elbow prop 10x        Prone press up  3x                                                Ther Activity        Pt education POC, HEP, D/C planning       Stairs        Squats        Gait                                Modalities

## 2022-05-20 DIAGNOSIS — I10 ESSENTIAL HYPERTENSION: ICD-10-CM

## 2022-05-20 RX ORDER — LISINOPRIL AND HYDROCHLOROTHIAZIDE 25; 20 MG/1; MG/1
TABLET ORAL
Qty: 90 TABLET | Refills: 3 | Status: SHIPPED | OUTPATIENT
Start: 2022-05-20

## 2022-05-25 ENCOUNTER — APPOINTMENT (OUTPATIENT)
Dept: PHYSICAL THERAPY | Facility: CLINIC | Age: 57
End: 2022-05-25
Payer: COMMERCIAL

## 2022-07-06 DIAGNOSIS — I10 ESSENTIAL HYPERTENSION: ICD-10-CM

## 2022-07-06 DIAGNOSIS — E66.01 MORBID OBESITY WITH BODY MASS INDEX (BMI) OF 50.0 TO 59.9 IN ADULT (HCC): ICD-10-CM

## 2022-07-06 DIAGNOSIS — E11.9 TYPE 2 DIABETES MELLITUS WITHOUT COMPLICATION, WITHOUT LONG-TERM CURRENT USE OF INSULIN (HCC): Primary | ICD-10-CM

## 2022-07-21 ENCOUNTER — TELEPHONE (OUTPATIENT)
Dept: FAMILY MEDICINE CLINIC | Facility: CLINIC | Age: 57
End: 2022-07-21

## 2022-07-21 NOTE — TELEPHONE ENCOUNTER
Joseluis's surgeons office Dr Chuck Caldwell called and needs a surgical Clearance    Having BW done 7/22 and needs EKG     He is having his thyroid removed    Phone # [phone]  Fax:  [phone]    Scheduled for 8/5 at 9:25

## 2022-07-22 ENCOUNTER — APPOINTMENT (OUTPATIENT)
Dept: LAB | Facility: HOSPITAL | Age: 57
End: 2022-07-22
Payer: COMMERCIAL

## 2022-07-22 DIAGNOSIS — E04.1 THYROID NODULE: ICD-10-CM

## 2022-07-22 DIAGNOSIS — Z01.818 PRE-OPERATIVE EXAMINATION: ICD-10-CM

## 2022-07-22 DIAGNOSIS — E11.9 TYPE 2 DIABETES MELLITUS WITHOUT COMPLICATION, WITHOUT LONG-TERM CURRENT USE OF INSULIN (HCC): ICD-10-CM

## 2022-07-22 DIAGNOSIS — I10 ESSENTIAL HYPERTENSION: ICD-10-CM

## 2022-07-22 DIAGNOSIS — E66.01 MORBID OBESITY WITH BODY MASS INDEX (BMI) OF 50.0 TO 59.9 IN ADULT (HCC): ICD-10-CM

## 2022-07-22 LAB
ALBUMIN SERPL BCP-MCNC: 3.6 G/DL (ref 3.5–5)
ALP SERPL-CCNC: 57 U/L (ref 46–116)
ALT SERPL W P-5'-P-CCNC: 34 U/L (ref 12–78)
ANION GAP SERPL CALCULATED.3IONS-SCNC: 10 MMOL/L (ref 4–13)
AST SERPL W P-5'-P-CCNC: 30 U/L (ref 5–45)
BASOPHILS # BLD AUTO: 0.08 THOUSANDS/ΜL (ref 0–0.1)
BASOPHILS NFR BLD AUTO: 1 % (ref 0–1)
BILIRUB SERPL-MCNC: 0.68 MG/DL (ref 0.2–1)
BUN SERPL-MCNC: 27 MG/DL (ref 5–25)
CALCIUM SERPL-MCNC: 8.6 MG/DL (ref 8.3–10.1)
CHLORIDE SERPL-SCNC: 107 MMOL/L (ref 96–108)
CHOLEST SERPL-MCNC: 151 MG/DL
CO2 SERPL-SCNC: 24 MMOL/L (ref 21–32)
CREAT SERPL-MCNC: 1.15 MG/DL (ref 0.6–1.3)
EOSINOPHIL # BLD AUTO: 0.35 THOUSAND/ΜL (ref 0–0.61)
EOSINOPHIL NFR BLD AUTO: 4 % (ref 0–6)
ERYTHROCYTE [DISTWIDTH] IN BLOOD BY AUTOMATED COUNT: 13.1 % (ref 11.6–15.1)
EST. AVERAGE GLUCOSE BLD GHB EST-MCNC: 123 MG/DL
GFR SERPL CREATININE-BSD FRML MDRD: 69 ML/MIN/1.73SQ M
GLUCOSE P FAST SERPL-MCNC: 89 MG/DL (ref 65–99)
HBA1C MFR BLD: 5.9 %
HCT VFR BLD AUTO: 43.7 % (ref 36.5–49.3)
HDLC SERPL-MCNC: 38 MG/DL
HGB BLD-MCNC: 14.3 G/DL (ref 12–17)
IMM GRANULOCYTES # BLD AUTO: 0.03 THOUSAND/UL (ref 0–0.2)
IMM GRANULOCYTES NFR BLD AUTO: 0 % (ref 0–2)
LDLC SERPL CALC-MCNC: 103 MG/DL (ref 0–100)
LYMPHOCYTES # BLD AUTO: 1.9 THOUSANDS/ΜL (ref 0.6–4.47)
LYMPHOCYTES NFR BLD AUTO: 21 % (ref 14–44)
MCH RBC QN AUTO: 30 PG (ref 26.8–34.3)
MCHC RBC AUTO-ENTMCNC: 32.7 G/DL (ref 31.4–37.4)
MCV RBC AUTO: 92 FL (ref 82–98)
MONOCYTES # BLD AUTO: 0.65 THOUSAND/ΜL (ref 0.17–1.22)
MONOCYTES NFR BLD AUTO: 7 % (ref 4–12)
NEUTROPHILS # BLD AUTO: 6.02 THOUSANDS/ΜL (ref 1.85–7.62)
NEUTS SEG NFR BLD AUTO: 67 % (ref 43–75)
NONHDLC SERPL-MCNC: 113 MG/DL
NRBC BLD AUTO-RTO: 0 /100 WBCS
PLATELET # BLD AUTO: 239 THOUSANDS/UL (ref 149–390)
PMV BLD AUTO: 9.9 FL (ref 8.9–12.7)
POTASSIUM SERPL-SCNC: 4.2 MMOL/L (ref 3.5–5.3)
PROT SERPL-MCNC: 7.6 G/DL (ref 6.4–8.4)
RBC # BLD AUTO: 4.76 MILLION/UL (ref 3.88–5.62)
SODIUM SERPL-SCNC: 141 MMOL/L (ref 135–147)
TRIGL SERPL-MCNC: 48 MG/DL
WBC # BLD AUTO: 9.03 THOUSAND/UL (ref 4.31–10.16)

## 2022-07-22 PROCEDURE — 85025 COMPLETE CBC W/AUTO DIFF WBC: CPT

## 2022-07-22 PROCEDURE — 80053 COMPREHEN METABOLIC PANEL: CPT

## 2022-07-22 PROCEDURE — 80061 LIPID PANEL: CPT

## 2022-07-22 PROCEDURE — 36415 COLL VENOUS BLD VENIPUNCTURE: CPT

## 2022-07-22 PROCEDURE — 83036 HEMOGLOBIN GLYCOSYLATED A1C: CPT

## 2022-07-26 ENCOUNTER — RA CDI HCC (OUTPATIENT)
Dept: OTHER | Facility: HOSPITAL | Age: 57
End: 2022-07-26

## 2022-07-26 NOTE — PROGRESS NOTES
[address]  coding opportunities          Chart Reviewed number of suggestions sent to Provider: 1  [id number]     Patients Insurance     Medicare Insurance: [facility] Advantage

## 2022-08-05 ENCOUNTER — OFFICE VISIT (OUTPATIENT)
Dept: FAMILY MEDICINE CLINIC | Facility: CLINIC | Age: 57
End: 2022-08-05
Payer: COMMERCIAL

## 2022-08-05 VITALS
BODY MASS INDEX: 38.36 KG/M2 | SYSTOLIC BLOOD PRESSURE: 132 MMHG | RESPIRATION RATE: 18 BRPM | HEIGHT: 76 IN | OXYGEN SATURATION: 97 % | HEART RATE: 75 BPM | DIASTOLIC BLOOD PRESSURE: 78 MMHG | WEIGHT: 315 LBS | TEMPERATURE: 97.8 F

## 2022-08-05 DIAGNOSIS — Z00.00 MEDICARE ANNUAL WELLNESS VISIT, SUBSEQUENT: ICD-10-CM

## 2022-08-05 DIAGNOSIS — K21.9 GASTROESOPHAGEAL REFLUX DISEASE WITHOUT ESOPHAGITIS: ICD-10-CM

## 2022-08-05 DIAGNOSIS — G47.33 OSA (OBSTRUCTIVE SLEEP APNEA): ICD-10-CM

## 2022-08-05 DIAGNOSIS — Z01.818 PREOPERATIVE CLEARANCE: Primary | ICD-10-CM

## 2022-08-05 DIAGNOSIS — I10 PRIMARY HYPERTENSION: ICD-10-CM

## 2022-08-05 DIAGNOSIS — E04.1 THYROID NODULE: ICD-10-CM

## 2022-08-05 PROCEDURE — 3078F DIAST BP <80 MM HG: CPT | Performed by: FAMILY MEDICINE

## 2022-08-05 PROCEDURE — 93000 ELECTROCARDIOGRAM COMPLETE: CPT | Performed by: FAMILY MEDICINE

## 2022-08-05 PROCEDURE — 3075F SYST BP GE 130 - 139MM HG: CPT | Performed by: FAMILY MEDICINE

## 2022-08-05 PROCEDURE — 3725F SCREEN DEPRESSION PERFORMED: CPT | Performed by: FAMILY MEDICINE

## 2022-08-05 PROCEDURE — G0439 PPPS, SUBSEQ VISIT: HCPCS | Performed by: FAMILY MEDICINE

## 2022-08-05 PROCEDURE — 99214 OFFICE O/P EST MOD 30 MIN: CPT | Performed by: FAMILY MEDICINE

## 2022-08-05 NOTE — PROGRESS NOTES
Assessment and Plan:     Problem List Items Addressed This Visit        Digestive    GERD (gastroesophageal reflux disease)       Endocrine    Thyroid nodule       Respiratory    DOMINIC (obstructive sleep apnea)       Cardiovascular and Mediastinum    Hypertension    Relevant Orders    POCT ECG (Completed)      Other Visit Diagnoses     Preoperative clearance    -  Primary    Relevant Orders    POCT ECG (Completed)    Medicare annual wellness visit, subsequent              Depression Screening and Follow-up Plan: Patient was screened for depression during today's encounter  They screened negative with a PHQ-2 score of 0  Preventive health issues were discussed with patient, and age appropriate screening tests were ordered as noted in patient's After Visit Summary  Personalized health advice and appropriate referrals for health education or preventive services given if needed, as noted in patient's After Visit Summary       History of Present Illness:     Patient presents for a Medicare Wellness Visit    HPI   Patient Care Team:  Rebecca Phan MD as PCP - General (Family Medicine)  Michael Acevedo MD (Otolaryngology)  Marysol Alcantara MD (Ophthalmology)     Review of Systems:     Review of Systems     Problem List:     Patient Active Problem List   Diagnosis    Closed fracture of transverse process of lumbar vertebra ([address] )    Anxiety    Diverticulitis    GERD (gastroesophageal reflux disease)    Hypertension    Osteoarthritis    Sciatica    Breathing-related sleep disorder    Urinary calculus    Chronic midline low back pain without sciatica    Morbid obesity with body mass index (BMI) of 50 0 to 59 9 in adult [facility])    Chronic pain syndrome    Lumbar post-laminectomy syndrome    Primary osteoarthritis of right knee    DOMINIC (obstructive sleep apnea)    Thyroid nodule    Chronic kidney disease      Past Medical and Surgical History:     Past Medical History:   Diagnosis Date    Borderline diabetes     Chronic pain disorder     lower back-s/p laminectomy    Claustrophobia     CPAP (continuous positive airway pressure) dependence     Diverticulitis     GERD (gastroesophageal reflux disease)     Hypertension     Kidney stone     x 2 episodes    Localized pain of joint     Mild sleep apnea     CPAP started in July having difficulty using- only has used on occ    Morbid obesity with body mass index (BMI) of 50 0 to 59 9 in adult [facility])     Rectal bleeding     occ rectal bleeding last episode 6/19    Skin tag     skin tags were removed-as of 8/2/19 has a few more    Wears glasses      Past Surgical History:   Procedure Laterality Date    BACK SURGERY      laminectomy-1996, 2010    COLONOSCOPY      x2    ESOPHAGOGASTRODUODENOSCOPY N/A 3/30/2019    Procedure: ESOPHAGOGASTRODUODENOSCOPY (EGD); Surgeon: Macarena Salazar MD;  Location: [address] [place] [address];  Service: Gastroenterology    KNEE ARTHROSCOPY Left 2007    WI CYSTO/URETERO W/LITHOTRIPSY &INDWELL STENT INSRT Right 1/6/2022    Procedure: CYSTOSCOPY URETEROSCOPY AND INSERTION STENT URETERAL RIGHT;  Surgeon: Lissa Hernandez MD;  Location: [address] [place] [address];  Service: Urology    WI ESOPHAGOGASTRODUODENOSCOPY TRANSORAL DIAGNOSTIC N/A 5/1/2019    Procedure: ESOPHAGOGASTRODUODENOSCOPY (EGD); Surgeon: Macarena Salazar MD;  Location: [facility];   Service: Gastroenterology    UPPER GASTROINTESTINAL ENDOSCOPY  2006    US GUIDED THYROID BIOPSY  12/28/2020      Family History:     Family History   Problem Relation Age of Onset    Osteoarthritis Mother     Obesity Father         [id number]    Sleep apnea Father         with CPAP    No Known Problems Sister     No Known Problems Brother     Lupus Sister     No Known Problems Sister     No Known Problems Son     Substance Abuse Neg Hx     Mental illness Neg Hx     Cancer Neg Hx     Diabetes Neg Hx     Heart disease Neg Hx     Stroke Neg Hx       Social History:     Social History     Socioeconomic History  Marital status:      Spouse name: None    Number of children: None    Years of education: None    Highest education level: None   Occupational History    None   Tobacco Use    Smoking status: Never Smoker    Smokeless tobacco: Never Used   Vaping Use    Vaping Use: Never used   Substance and Sexual Activity    Alcohol use: No    Drug use: No    Sexual activity: Not Currently     Comment: none   Other Topics Concern    None   Social History Narrative    Living alone     Social Determinants of Health     Financial Resource Strain: Not on file   Food Insecurity: Not on file   Transportation Needs: Not on file   Physical Activity: Not on file   Stress: Not on file   Social Connections: Not on file   Intimate Partner Violence: Not on file   Housing Stability: Not on file      Medications and Allergies:     Current Outpatient Medications   Medication Sig Dispense Refill    amLODIPine (NORVASC) 5 mg tablet Take 1 tablet (5 mg total) by mouth daily 90 tablet 3    lisinopril-hydrochlorothiazide (PRINZIDE,ZESTORETIC) 20-25 MG per tablet TAKE ONE TABLET BY MOUTH EVERY DAY 90 tablet 3    omeprazole (PriLOSEC) 20 mg delayed release capsule Take 1 capsule (20 mg total) by mouth daily 90 capsule 3     No current facility-administered medications for this visit       Allergies   Allergen Reactions    Lactose - Food Allergy GI Intolerance      Immunizations:     Immunization History   Administered Date(s) Administered    Tdap 07/02/2010, 06/26/2018      Health Maintenance:         Topic Date Due    Colorectal Cancer Screening  08/05/2029    HIV Screening  Completed    Hepatitis C Screening  Completed         Topic Date Due    COVID-19 Vaccine (1) Never done    Pneumococcal Vaccine: Pediatrics (0 to 5 Years) and At-Risk Patients (6 to 59 Years) (1 - PCV) Never done    Influenza Vaccine (1) 09/01/2022      Medicare Screening Tests and Risk Assessments:     James Mitchell is here for his Subsequent Wellness visit  Health Risk Assessment:   Patient rates overall health as fair  Patient feels that their physical health rating is same  Patient is satisfied with their life  Eyesight was rated as same  Hearing was rated as same  Patient feels that their emotional and mental health rating is same  Patients states they are never, rarely angry  Patient states they are never, rarely unusually tired/fatigued  Pain experienced in the last 7 days has been none  Patient states that he has experienced weight loss or gain in last 6 months  Depression Screening:   PHQ-2 Score: 0      Fall Risk Screening: In the past year, patient has experienced: no history of falling in past year      Home Safety:  Patient has trouble with stairs inside or outside of their home  Patient has working smoke alarms and has working carbon monoxide detector  Home safety hazards include: none  Nutrition:   Current diet is Regular  Medications:   Patient is not currently taking any over-the-counter supplements  Patient is able to manage medications  Activities of Daily Living (ADLs)/Instrumental Activities of Daily Living (IADLs):   Walk and transfer into and out of bed and chair?: Yes  Dress and groom yourself?: Yes    Bathe or shower yourself?: Yes    Feed yourself?  Yes  Do your laundry/housekeeping?: Yes  Manage your money, pay your bills and track your expenses?: Yes  Make your own meals?: Yes    Do your own shopping?: Yes    Previous Hospitalizations:   Any hospitalizations or ED visits within the last 12 months?: Yes    How many hospitalizations have you had in the last year?: 1-2    Advance Care Planning:   Living will: No    Durable POA for healthcare: No    Advanced directive: No    End of Life Decisions reviewed with patient: No      Cognitive Screening:   Provider or family/friend/caregiver concerned regarding cognition?: No    PREVENTIVE SCREENINGS      Cardiovascular Screening:    General: Screening Current      Diabetes Screening:     General: Screening Not Indicated and History Diabetes      Colorectal Cancer Screening:     General: Screening Current      Prostate Cancer Screening:    General: Risks and Benefits Discussed      Osteoporosis Screening:    General: Screening Not Indicated      Abdominal Aortic Aneurysm (AAA) Screening:        General: Screening Not Indicated      Lung Cancer Screening:     General: Screening Not Indicated      Hepatitis C Screening:    General: Screening Current    Screening, Brief Intervention, and Referral to Treatment (SBIRT)    Screening  Typical number of drinks in a day: 0  Typical number of drinks in a week: 0  Interpretation: Low risk drinking behavior      No exam data present     Physical Exam:     /78   Pulse 75   Temp 97 8 °F (36 6 °C) (Temporal)   Resp 18   Ht 6' 4" (1 93 m)   Wt (!) 171 kg (378 lb)   SpO2 97%   BMI 46 01 kg/m²     Physical Exam     Alexx Rice MD

## 2022-08-05 NOTE — PROGRESS NOTES
Assessment/Plan:    No problem-specific Assessment & Plan notes found for this encounter  Diagnoses and all orders for this visit:    Preoperative clearance  -     POCT ECG    Thyroid nodule    Primary hypertension  -     POCT ECG    Gastroesophageal reflux disease without esophagitis    DOMINIC (obstructive sleep apnea)    Medicare annual wellness visit, subsequent        Patient Instructions   THERE IS NO OBVIOUS CONTRAINDICATION FOR SURGERY UNDER GENERAL ANESTHESIA  APPROPRIATE DVT PROPHYLAXIS SHOULD BE PROVIDED      MEDICARE WELLNESS QUESTIONS WERE OBTAINED              Medicare Preventive Visit Patient Instructions  Thank you for completing your Welcome to Medicare Visit or Medicare Annual Wellness Visit today  Your next wellness visit will be due in one year (8/6/2023)  The screening/preventive services that you may require over the next 5-10 years are detailed below  Some tests may not apply to you based off risk factors and/or age  Screening tests ordered at today's visit but not completed yet may show as past due  Also, please note that scanned in results may not display below  Preventive Screenings:  Service Recommendations Previous Testing/Comments   Colorectal Cancer Screening  Colonoscopy    Fecal Occult Blood Test (FOBT)/Fecal Immunochemical Test (FIT)  Fecal DNA/Cologuard Test  Flexible Sigmoidoscopy Age: 54-65 years old   Colonoscopy: every 10 years (May be performed more frequently if at higher risk)  OR  FOBT/FIT: every 1 year  OR  Cologuard: every 3 years  OR  Sigmoidoscopy: every 5 years  Screening may be recommended earlier than age 48 if at higher risk for colorectal cancer  Also, an individualized decision between you and your healthcare provider will decide whether screening between the ages of 74-80 would be appropriate   Colonoscopy: 08/05/2019  FOBT/FIT: Not on file  Cologuard: Not on file  Sigmoidoscopy: Not on file    Screening Current     Prostate Cancer Screening Individualized decision between patient and health care provider in men between ages of 53-78   Medicare will cover every 12 months beginning on the day after your 50th birthday PSA: 2 4 ng/mL           Hepatitis C Screening Once for adults born between 1945 and 1965  More frequently in patients at high risk for Hepatitis C Hep C Antibody: 12/22/2020    Screening Current   Diabetes Screening 1-2 times per year if you're at risk for diabetes or have pre-diabetes Fasting glucose: 89 mg/dL   A1C: 5 9 %    Screening Not Indicated  History Diabetes   Cholesterol Screening Once every 5 years if you don't have a lipid disorder  May order more often based on risk factors  Lipid panel: 07/22/2022    Screening Current      Other Preventive Screenings Covered by Medicare:  Abdominal Aortic Aneurysm (AAA) Screening: covered once if your at risk  You're considered to be at risk if you have a family history of AAA or a male between the age of 73-68 who smoking at least 100 cigarettes in your lifetime  Lung Cancer Screening: covers low dose CT scan once per year if you meet all of the following conditions: (1) Age 50-69; (2) No signs or symptoms of lung cancer; (3) Current smoker or have quit smoking within the last 15 years; (4) You have a tobacco smoking history of at least 30 pack years (packs per day x number of years you smoked); (5) You get a written order from a healthcare provider  Glaucoma Screening: covered annually if you're considered high risk: (1) You have diabetes OR (2) Family history of glaucoma OR (3)  aged 48 and older OR (3)  American aged 72 and older  Osteoporosis Screening: covered every 2 years if you meet one of the following conditions: (1) Have a vertebral abnormality; (2) On glucocorticoid therapy for more than 3 months; (3) Have primary hyperparathyroidism; (4) On osteoporosis medications and need to assess response to drug therapy    HIV Screening: covered annually if you're between the age of 15-65  Also covered annually if you are younger than 13 and older than 72 with risk factors for HIV infection  For pregnant patients, it is covered up to 3 times per pregnancy  Immunizations:  Immunization Recommendations   Influenza Vaccine Annual influenza vaccination during flu season is recommended for all persons aged >= 6 months who do not have contraindications   Pneumococcal Vaccine (Prevnar and Pneumovax)  * Prevnar = PCV13  * Pneumovax = PPSV23 Adults 25-60 years old: 1-3 doses may be recommended based on certain risk factors  Adults 72 years old: Prevnar (PCV13) vaccine recommended followed by Pneumovax (PPSV23) vaccine  If already received PPSV23 since turning 65, then PCV13 recommended at least one year after PPSV23 dose  Hepatitis B Vaccine 3 dose series if at intermediate or high risk (ex: diabetes, end stage renal disease, liver disease)   Tetanus (Td) Vaccine - COST NOT COVERED BY MEDICARE PART B Following completion of primary series, a booster dose should be given every 10 years to maintain immunity against tetanus  Td may also be given as tetanus wound prophylaxis  Tdap Vaccine - COST NOT COVERED BY MEDICARE PART B Recommended at least once for all adults  For pregnant patients, recommended with each pregnancy  Shingles Vaccine (Shingrix) - COST NOT COVERED BY MEDICARE PART B  2 shot series recommended in those aged 48 and above     Health Maintenance Due:      Topic Date Due    Colorectal Cancer Screening  08/05/2029    HIV Screening  Completed    Hepatitis C Screening  Completed     Immunizations Due:      Topic Date Due    COVID-19 Vaccine (1) Never done    Pneumococcal Vaccine: Pediatrics (0 to 5 Years) and At-Risk Patients (6 to 59 Years) (1 - PCV) Never done    Influenza Vaccine (1) 09/01/2022     Advance Directives   What are advance directives? Advance directives are legal documents that state your wishes and plans for medical care   These plans are made ahead of time in case you lose your ability to make decisions for yourself  Advance directives can apply to any medical decision, such as the treatments you want, and if you want to donate organs  What are the types of advance directives? There are many types of advance directives, and each state has rules about how to use them  You may choose a combination of any of the following:  Living will: This is a written record of the treatment you want  You can also choose which treatments you do not want, which to limit, and which to stop at a certain time  This includes surgery, medicine, IV fluid, and tube feedings  Durable power of  for [facility]): This is a written record that states who you want to make healthcare choices for you when you are unable to make them for yourself  This person, called a proxy, is usually a family member or a friend  You may choose more than 1 proxy  Do not resuscitate (DNR) order:  A DNR order is used in case your heart stops beating or you stop breathing  It is a request not to have certain forms of treatment, such as CPR  A DNR order may be included in other types of advance directives  Medical directive: This covers the care that you want if you are in a coma, near death, or unable to make decisions for yourself  You can list the treatments you want for each condition  Treatment may include pain medicine, surgery, blood transfusions, dialysis, IV or tube feedings, and a ventilator (breathing machine)  Values history: This document has questions about your views, beliefs, and how you feel and think about life  This information can help others choose the care that you would choose  Why are advance directives important? An advance directive helps you control your care  Although spoken wishes may be used, it is better to have your wishes written down  Spoken wishes can be misunderstood, or not followed  Treatments may be given even if you do not want them   An advance directive may make it easier for your family to make difficult choices about your care  Weight Management   Why it is important to manage your weight:  Being overweight increases your risk of health conditions such as heart disease, high blood pressure, type 2 diabetes, and certain types of cancer  It can also increase your risk for osteoarthritis, sleep apnea, and other respiratory problems  Aim for a slow, steady weight loss  Even a small amount of weight loss can lower your risk of health problems  How to lose weight safely:  A safe and healthy way to lose weight is to eat fewer calories and get regular exercise  You can lose up about 1 pound a week by decreasing the number of calories you eat by 500 calories each day  Healthy meal plan for weight management:  A healthy meal plan includes a variety of foods, contains fewer calories, and helps you stay healthy  A healthy meal plan includes the following:  Eat whole-grain foods more often  A healthy meal plan should contain fiber  Fiber is the part of grains, fruits, and vegetables that is not broken down by your body  Whole-grain foods are healthy and provide extra fiber in your diet  Some examples of whole-grain foods are whole-wheat breads and pastas, oatmeal, brown rice, and bulgur  Eat a variety of vegetables every day  Include dark, leafy greens such as spinach, kale, romeo greens, and mustard greens  Eat yellow and orange vegetables such as carrots, sweet potatoes, and winter squash  Eat a variety of fruits every day  Choose fresh or canned fruit (canned in its own juice or light syrup) instead of juice  Fruit juice has very little or no fiber  Eat low-fat dairy foods  Drink fat-free (skim) milk or 1% milk  Eat fat-free yogurt and low-fat cottage cheese  Try low-fat cheeses such as mozzarella and other reduced-fat cheeses  Choose meat and other protein foods that are low in fat  Choose beans or other legumes such as split peas or lentils  Choose fish, skinless poultry (chicken or turkey), or lean cuts of red meat (beef or pork)  Before you cook meat or poultry, cut off any visible fat  Use less fat and oil  Try baking foods instead of frying them  Add less fat, such as margarine, sour cream, regular salad dressing and mayonnaise to foods  Eat fewer high-fat foods  Some examples of high-fat foods include french fries, doughnuts, ice cream, and cakes  Eat fewer sweets  Limit foods and drinks that are high in sugar  This includes candy, cookies, regular soda, and sweetened drinks  Exercise:  Exercise at least 30 minutes per day on most days of the week  Some examples of exercise include walking, biking, dancing, and swimming  You can also fit in more physical activity by taking the stairs instead of the elevator or parking farther away from stores  Ask your healthcare provider about the best exercise plan for you  © Copyright [employer] 2018 Information is for End User's use only and may not be sold, redistributed or otherwise used for commercial purposes  All illustrations and images included in CareNotes® are the copyrighted property of [employer]  or [employer]         Return if symptoms worsen or fail to improve, for Next scheduled follow up  Subjective:      Patient ID: Alfa Boothe is a 59 y o  male      Chief Complaint   Patient presents with    [address] Medicare Wellness Visit       PATIENT COMES IN FOR PREOPERATIVE CLEARANCE FOR ELECTIVE THYROID SURGERY    DATE OF PROCEDURE - 8/31/22    SURGEON  - DR Jovita Moon    REVIEWED PAST MEDICAL HISTORY AND RECENT STUDIES    THERE IS NO OBVIOUS MEDICAL CONTRAINDICATION FOR SURGERY UNDER GENERAL ANESTHESIA  APPROPRIATE DVT PROPHYLAXIS SHOULD BE PROVIDED        The following portions of the patient's history were reviewed and updated as appropriate: allergies, current medications, past family history, past medical history, past social history, past surgical history and problem list     Review of Systems   Constitutional: Negative for chills, fatigue and fever  HENT: Negative for congestion, ear discharge, ear pain, mouth sores, postnasal drip, sore throat and trouble swallowing  Eyes: Negative for pain, discharge and visual disturbance  Respiratory: Negative for cough, shortness of breath and wheezing  Cardiovascular: Negative for chest pain, palpitations and leg swelling  Gastrointestinal: Negative for abdominal distention, abdominal pain, blood in stool, diarrhea and nausea  Endocrine: Negative for polydipsia, polyphagia and polyuria  Genitourinary: Negative for dysuria, frequency, hematuria and urgency  Musculoskeletal: Negative for arthralgias, gait problem and joint swelling  Skin: Negative for pallor and rash  Neurological: Negative for dizziness, syncope, speech difficulty, weakness, light-headedness, numbness and headaches  Hematological: Negative for adenopathy  Psychiatric/Behavioral: Negative for behavioral problems, confusion and sleep disturbance  The patient is not nervous/anxious  Current Outpatient Medications   Medication Sig Dispense Refill    amLODIPine (NORVASC) 5 mg tablet Take 1 tablet (5 mg total) by mouth daily 90 tablet 3    lisinopril-hydrochlorothiazide (PRINZIDE,ZESTORETIC) 20-25 MG per tablet TAKE ONE TABLET BY MOUTH EVERY DAY 90 tablet 3    omeprazole (PriLOSEC) 20 mg delayed release capsule Take 1 capsule (20 mg total) by mouth daily 90 capsule 3     No current facility-administered medications for this visit  Objective:    /78   Pulse 75   Temp 97 8 °F (36 6 °C) (Temporal)   Resp 18   Ht 6' 4" (1 93 m)   Wt (!) 171 kg (378 lb)   SpO2 97%   BMI 46 01 kg/m²        Physical Exam  Constitutional:       Appearance: He is well-developed  He is obese  HENT:      Head: Normocephalic and atraumatic        Nose: Nose normal       Mouth/Throat:      Mouth: Mucous membranes are moist    Eyes: General:         Right eye: No discharge  Left eye: No discharge  Conjunctiva/sclera: Conjunctivae normal       Pupils: Pupils are equal, round, and reactive to light  Neck:      Thyroid: No thyromegaly  Vascular: No JVD  Cardiovascular:      Rate and Rhythm: Normal rate and regular rhythm  Heart sounds: Normal heart sounds  No murmur heard  Pulmonary:      Effort: Pulmonary effort is normal       Breath sounds: Normal breath sounds  No wheezing or rales  Abdominal:      General: Bowel sounds are normal       Palpations: Abdomen is soft  There is no mass  Tenderness: There is no abdominal tenderness  There is no guarding or rebound  Comments: OBESE   Musculoskeletal:         General: No tenderness or deformity  Normal range of motion  Cervical back: Neck supple  Comments: MILD DJD CHANGES     Lymphadenopathy:      Cervical: No cervical adenopathy  Skin:     General: Skin is warm and dry  Findings: No erythema or rash  Neurological:      General: No focal deficit present  Mental Status: He is alert and oriented to person, place, and time  Cranial Nerves: No cranial nerve deficit  Sensory: No sensory deficit  Motor: No weakness  Coordination: Coordination normal       Deep Tendon Reflexes: Reflexes normal    Psychiatric:         Mood and Affect: Mood normal          Behavior: Behavior normal          Thought Content:  Thought content normal          Judgment: Judgment normal                 Rebecca Phan MD

## 2022-08-05 NOTE — PATIENT INSTRUCTIONS
THERE IS NO OBVIOUS CONTRAINDICATION FOR SURGERY UNDER GENERAL ANESTHESIA  APPROPRIATE DVT PROPHYLAXIS SHOULD BE PROVIDED      MEDICARE WELLNESS QUESTIONS WERE OBTAINED              Medicare Preventive Visit Patient Instructions  Thank you for completing your Welcome to Medicare Visit or Medicare Annual Wellness Visit today  Your next wellness visit will be due in one year (8/6/2023)  The screening/preventive services that you may require over the next 5-10 years are detailed below  Some tests may not apply to you based off risk factors and/or age  Screening tests ordered at today's visit but not completed yet may show as past due  Also, please note that scanned in results may not display below  Preventive Screenings:  Service Recommendations Previous Testing/Comments   Colorectal Cancer Screening  Colonoscopy    Fecal Occult Blood Test (FOBT)/Fecal Immunochemical Test (FIT)  Fecal DNA/Cologuard Test  Flexible Sigmoidoscopy Age: 54-65 years old   Colonoscopy: every 10 years (May be performed more frequently if at higher risk)  OR  FOBT/FIT: every 1 year  OR  Cologuard: every 3 years  OR  Sigmoidoscopy: every 5 years  Screening may be recommended earlier than age 48 if at higher risk for colorectal cancer  Also, an individualized decision between you and your healthcare provider will decide whether screening between the ages of 74-80 would be appropriate   Colonoscopy: 08/05/2019  FOBT/FIT: Not on file  Cologuard: Not on file  Sigmoidoscopy: Not on file    Screening Current     Prostate Cancer Screening Individualized decision between patient and health care provider in men between ages of 53-78   Medicare will cover every 12 months beginning on the day after your 50th birthday PSA: 2 4 ng/mL           Hepatitis C Screening Once for adults born between [facility]  More frequently in patients at high risk for Hepatitis C Hep C Antibody: 12/22/2020    Screening Current   Diabetes Screening 1-2 times per year if you're at risk for diabetes or have pre-diabetes Fasting glucose: 89 mg/dL   A1C: 5 9 %    Screening Not Indicated  History Diabetes   Cholesterol Screening Once every 5 years if you don't have a lipid disorder  May order more often based on risk factors  Lipid panel: 07/22/2022    Screening Current      Other Preventive Screenings Covered by Medicare:  Abdominal Aortic Aneurysm (AAA) Screening: covered once if your at risk  You're considered to be at risk if you have a family history of AAA or a male between the age of 73-68 who smoking at least 100 cigarettes in your lifetime  Lung Cancer Screening: covers low dose CT scan once per year if you meet all of the following conditions: (1) Age 50-69; (2) No signs or symptoms of lung cancer; (3) Current smoker or have quit smoking within the last 15 years; (4) You have a tobacco smoking history of at least 30 pack years (packs per day x number of years you smoked); (5) You get a written order from a healthcare provider  Glaucoma Screening: covered annually if you're considered high risk: (1) You have diabetes OR (2) Family history of glaucoma OR (3)  aged 48 and older OR (3)  American aged 72 and older  Osteoporosis Screening: covered every 2 years if you meet one of the following conditions: (1) Have a vertebral abnormality; (2) On glucocorticoid therapy for more than 3 months; (3) Have primary hyperparathyroidism; (4) On osteoporosis medications and need to assess response to drug therapy  HIV Screening: covered annually if you're between the age of 12-76  Also covered annually if you are younger than 13 and older than 72 with risk factors for HIV infection  For pregnant patients, it is covered up to 3 times per pregnancy      Immunizations:  Immunization Recommendations   Influenza Vaccine Annual influenza vaccination during flu season is recommended for all persons aged >= 6 months who do not have contraindications   Pneumococcal Vaccine (Prevnar and Pneumovax)  * Prevnar = PCV13  * Pneumovax = PPSV23 Adults 25-60 years old: 1-3 doses may be recommended based on certain risk factors  Adults 72 years old: Prevnar (PCV13) vaccine recommended followed by Pneumovax (PPSV23) vaccine  If already received PPSV23 since turning 65, then PCV13 recommended at least one year after PPSV23 dose  Hepatitis B Vaccine 3 dose series if at intermediate or high risk (ex: diabetes, end stage renal disease, liver disease)   Tetanus (Td) Vaccine - COST NOT COVERED BY MEDICARE PART B Following completion of primary series, a booster dose should be given every 10 years to maintain immunity against tetanus  Td may also be given as tetanus wound prophylaxis  Tdap Vaccine - COST NOT COVERED BY MEDICARE PART B Recommended at least once for all adults  For pregnant patients, recommended with each pregnancy  Shingles Vaccine (Shingrix) - COST NOT COVERED BY MEDICARE PART B  2 shot series recommended in those aged 48 and above     Health Maintenance Due:      Topic Date Due    Colorectal Cancer Screening  08/05/2029    HIV Screening  Completed    Hepatitis C Screening  Completed     Immunizations Due:      Topic Date Due    COVID-19 Vaccine (1) Never done    Pneumococcal Vaccine: Pediatrics (0 to 5 Years) and At-Risk Patients (6 to 59 Years) (1 - PCV) Never done    Influenza Vaccine (1) 09/01/2022     Advance Directives   What are advance directives? Advance directives are legal documents that state your wishes and plans for medical care  These plans are made ahead of time in case you lose your ability to make decisions for yourself  Advance directives can apply to any medical decision, such as the treatments you want, and if you want to donate organs  What are the types of advance directives? There are many types of advance directives, and each state has rules about how to use them  You may choose a combination of any of the following:  Living will:   This is a written record of the treatment you want  You can also choose which treatments you do not want, which to limit, and which to stop at a certain time  This includes surgery, medicine, IV fluid, and tube feedings  [facility] power of  for healthcare [place] [facility]): This is a written record that states who you want to make healthcare choices for you when you are unable to make them for yourself  This person, called a proxy, is usually a family member or a friend  You may choose more than 1 proxy  Do not resuscitate (DNR) order:  A DNR order is used in case your heart stops beating or you stop breathing  It is a request not to have certain forms of treatment, such as CPR  A DNR order may be included in other types of advance directives  Medical directive: This covers the care that you want if you are in a coma, near death, or unable to make decisions for yourself  You can list the treatments you want for each condition  Treatment may include pain medicine, surgery, blood transfusions, dialysis, IV or tube feedings, and a ventilator (breathing machine)  Values history: This document has questions about your views, beliefs, and how you feel and think about life  This information can help others choose the care that you would choose  Why are advance directives important? An advance directive helps you control your care  Although spoken wishes may be used, it is better to have your wishes written down  Spoken wishes can be misunderstood, or not followed  Treatments may be given even if you do not want them  An advance directive may make it easier for your family to make difficult choices about your care  Weight Management   Why it is important to manage your weight:  Being overweight increases your risk of health conditions such as heart disease, high blood pressure, type 2 diabetes, and certain types of cancer  It can also increase your risk for osteoarthritis, sleep apnea, and other respiratory problems   Aim for a slow, steady weight loss  Even a small amount of weight loss can lower your risk of health problems  How to lose weight safely:  A safe and healthy way to lose weight is to eat fewer calories and get regular exercise  You can lose up about 1 pound a week by decreasing the number of calories you eat by 500 calories each day  Healthy meal plan for weight management:  A healthy meal plan includes a variety of foods, contains fewer calories, and helps you stay healthy  A healthy meal plan includes the following:  Eat whole-grain foods more often  A healthy meal plan should contain fiber  Fiber is the part of grains, fruits, and vegetables that is not broken down by your body  Whole-grain foods are healthy and provide extra fiber in your diet  Some examples of whole-grain foods are whole-wheat breads and pastas, oatmeal, brown rice, and bulgur  Eat a variety of vegetables every day  Include dark, leafy greens such as spinach, kale, romeo greens, and mustard greens  Eat yellow and orange vegetables such as carrots, sweet potatoes, and winter squash  Eat a variety of fruits every day  Choose fresh or canned fruit (canned in its own juice or light syrup) instead of juice  Fruit juice has very little or no fiber  Eat low-fat dairy foods  Drink fat-free (skim) milk or 1% milk  Eat fat-free yogurt and low-fat cottage cheese  Try low-fat cheeses such as mozzarella and other reduced-fat cheeses  Choose meat and other protein foods that are low in fat  Choose beans or other legumes such as split peas or lentils  Choose fish, skinless poultry (chicken or turkey), or lean cuts of red meat (beef or pork)  Before you cook meat or poultry, cut off any visible fat  Use less fat and oil  Try baking foods instead of frying them  Add less fat, such as margarine, sour cream, regular salad dressing and mayonnaise to foods  Eat fewer high-fat foods   Some examples of high-fat foods include french fries, doughnuts, ice cream, and cakes  Eat fewer sweets  Limit foods and drinks that are high in sugar  This includes candy, cookies, regular soda, and sweetened drinks  Exercise:  Exercise at least 30 minutes per day on most days of the week  Some examples of exercise include walking, biking, dancing, and swimming  You can also fit in more physical activity by taking the stairs instead of the elevator or parking farther away from stores  Ask your healthcare provider about the best exercise plan for you  © Copyright [employer] 2018 Information is for End User's use only and may not be sold, redistributed or otherwise used for commercial purposes   All illustrations and images included in CareNotes® are the copyrighted property of A D A M , Inc  or [address] [place] [address]

## 2022-08-22 ENCOUNTER — APPOINTMENT (OUTPATIENT)
Dept: PREADMISSION TESTING | Facility: HOSPITAL | Age: 57
End: 2022-08-22
Payer: COMMERCIAL

## 2022-08-25 NOTE — PRE-PROCEDURE INSTRUCTIONS
Pre-Surgery Instructions:   Medication Instructions    amLODIPine (NORVASC) 5 mg tablet Take day of surgery   lisinopril-hydrochlorothiazide (PRINZIDE,ZESTORETIC) 20-25 MG per tablet Hold day of surgery   omeprazole (PriLOSEC) 20 mg delayed release capsule Take day of surgery    Patient  instructed on use of chlorhexidine soap per hospital protocol    Patient instructed to stop all ASA, NSAIDS, vitamins and herbal supplements from now  to surgery or per Dr Neena Ordoñez

## 2022-08-30 ENCOUNTER — ANESTHESIA EVENT (OUTPATIENT)
Dept: PERIOP | Facility: HOSPITAL | Age: 57
End: 2022-08-30
Payer: COMMERCIAL

## 2022-08-31 ENCOUNTER — HOSPITAL ENCOUNTER (OUTPATIENT)
Facility: HOSPITAL | Age: 57
Setting detail: OUTPATIENT SURGERY
Discharge: HOME | End: 2022-08-31
Attending: OTOLARYNGOLOGY | Admitting: OTOLARYNGOLOGY
Payer: COMMERCIAL

## 2022-08-31 ENCOUNTER — ANESTHESIA (OUTPATIENT)
Dept: PERIOP | Facility: HOSPITAL | Age: 57
End: 2022-08-31
Payer: COMMERCIAL

## 2022-08-31 VITALS
SYSTOLIC BLOOD PRESSURE: 188 MMHG | OXYGEN SATURATION: 94 % | BODY MASS INDEX: 38.36 KG/M2 | HEART RATE: 71 BPM | WEIGHT: 315 LBS | DIASTOLIC BLOOD PRESSURE: 97 MMHG | HEIGHT: 76 IN | TEMPERATURE: 97 F | RESPIRATION RATE: 16 BRPM

## 2022-08-31 DIAGNOSIS — E04.1 THYROID NODULE: Primary | ICD-10-CM

## 2022-08-31 PROCEDURE — 88307 TISSUE EXAM BY PATHOLOGIST: CPT | Performed by: STUDENT IN AN ORGANIZED HEALTH CARE EDUCATION/TRAINING PROGRAM

## 2022-08-31 PROCEDURE — 60220 PARTIAL REMOVAL OF THYROID: CPT | Performed by: OTOLARYNGOLOGY

## 2022-08-31 RX ORDER — LIDOCAINE HYDROCHLORIDE 10 MG/ML
INJECTION, SOLUTION EPIDURAL; INFILTRATION; INTRACAUDAL; PERINEURAL AS NEEDED
Status: DISCONTINUED | OUTPATIENT
Start: 2022-08-31 | End: 2022-08-31

## 2022-08-31 RX ORDER — SUCCINYLCHOLINE/SOD CL,ISO/PF 100 MG/5ML
SYRINGE (ML) INTRAVENOUS AS NEEDED
Status: DISCONTINUED | OUTPATIENT
Start: 2022-08-31 | End: 2022-08-31

## 2022-08-31 RX ORDER — SODIUM CHLORIDE, SODIUM LACTATE, POTASSIUM CHLORIDE, CALCIUM CHLORIDE 600; 310; 30; 20 MG/100ML; MG/100ML; MG/100ML; MG/100ML
20 INJECTION, SOLUTION INTRAVENOUS CONTINUOUS
Status: DISCONTINUED | OUTPATIENT
Start: 2022-08-31 | End: 2022-08-31 | Stop reason: HOSPADM

## 2022-08-31 RX ORDER — HYDROMORPHONE HCL/PF 1 MG/ML
SYRINGE (ML) INJECTION AS NEEDED
Status: DISCONTINUED | OUTPATIENT
Start: 2022-08-31 | End: 2022-08-31

## 2022-08-31 RX ORDER — MIDAZOLAM HYDROCHLORIDE 2 MG/2ML
INJECTION, SOLUTION INTRAMUSCULAR; INTRAVENOUS AS NEEDED
Status: DISCONTINUED | OUTPATIENT
Start: 2022-08-31 | End: 2022-08-31

## 2022-08-31 RX ORDER — FENTANYL CITRATE/PF 50 MCG/ML
50 SYRINGE (ML) INJECTION
Status: DISCONTINUED | OUTPATIENT
Start: 2022-08-31 | End: 2022-08-31 | Stop reason: HOSPADM

## 2022-08-31 RX ORDER — PROPOFOL 10 MG/ML
INJECTION, EMULSION INTRAVENOUS AS NEEDED
Status: DISCONTINUED | OUTPATIENT
Start: 2022-08-31 | End: 2022-08-31

## 2022-08-31 RX ORDER — LABETALOL HYDROCHLORIDE 5 MG/ML
INJECTION, SOLUTION INTRAVENOUS AS NEEDED
Status: DISCONTINUED | OUTPATIENT
Start: 2022-08-31 | End: 2022-08-31

## 2022-08-31 RX ORDER — HYDROMORPHONE HCL/PF 1 MG/ML
0.5 SYRINGE (ML) INJECTION
Status: DISCONTINUED | OUTPATIENT
Start: 2022-08-31 | End: 2022-08-31 | Stop reason: HOSPADM

## 2022-08-31 RX ORDER — FENTANYL CITRATE 50 UG/ML
INJECTION, SOLUTION INTRAMUSCULAR; INTRAVENOUS AS NEEDED
Status: DISCONTINUED | OUTPATIENT
Start: 2022-08-31 | End: 2022-08-31

## 2022-08-31 RX ORDER — OXYCODONE HYDROCHLORIDE 5 MG/1
5 TABLET ORAL EVERY 4 HOURS PRN
Qty: 10 TABLET | Refills: 0 | Status: ON HOLD | OUTPATIENT
Start: 2022-08-31 | End: 2022-09-15 | Stop reason: ALTCHOICE

## 2022-08-31 RX ORDER — OXYCODONE HYDROCHLORIDE 5 MG/1
5 TABLET ORAL EVERY 4 HOURS PRN
Status: DISCONTINUED | OUTPATIENT
Start: 2022-08-31 | End: 2022-08-31 | Stop reason: HOSPADM

## 2022-08-31 RX ORDER — LIDOCAINE HYDROCHLORIDE AND EPINEPHRINE 10; 10 MG/ML; UG/ML
INJECTION, SOLUTION INFILTRATION; PERINEURAL AS NEEDED
Status: DISCONTINUED | OUTPATIENT
Start: 2022-08-31 | End: 2022-08-31 | Stop reason: HOSPADM

## 2022-08-31 RX ORDER — DEXAMETHASONE SODIUM PHOSPHATE 10 MG/ML
INJECTION, SOLUTION INTRAMUSCULAR; INTRAVENOUS AS NEEDED
Status: DISCONTINUED | OUTPATIENT
Start: 2022-08-31 | End: 2022-08-31

## 2022-08-31 RX ORDER — ONDANSETRON 2 MG/ML
4 INJECTION INTRAMUSCULAR; INTRAVENOUS ONCE AS NEEDED
Status: DISCONTINUED | OUTPATIENT
Start: 2022-08-31 | End: 2022-08-31 | Stop reason: HOSPADM

## 2022-08-31 RX ORDER — ONDANSETRON 2 MG/ML
INJECTION INTRAMUSCULAR; INTRAVENOUS AS NEEDED
Status: DISCONTINUED | OUTPATIENT
Start: 2022-08-31 | End: 2022-08-31

## 2022-08-31 RX ADMIN — FENTANYL CITRATE 50 MCG: 50 INJECTION INTRAMUSCULAR; INTRAVENOUS at 11:37

## 2022-08-31 RX ADMIN — LABETALOL HYDROCHLORIDE 5 MG: 5 INJECTION, SOLUTION INTRAVENOUS at 12:04

## 2022-08-31 RX ADMIN — HYDROMORPHONE HYDROCHLORIDE 0.5 MG: 1 INJECTION, SOLUTION INTRAMUSCULAR; INTRAVENOUS; SUBCUTANEOUS at 12:28

## 2022-08-31 RX ADMIN — PROPOFOL 100 MG: 10 INJECTION, EMULSION INTRAVENOUS at 11:06

## 2022-08-31 RX ADMIN — FENTANYL CITRATE 50 MCG: 50 INJECTION INTRAMUSCULAR; INTRAVENOUS at 11:54

## 2022-08-31 RX ADMIN — DEXAMETHASONE SODIUM PHOSPHATE 10 MG: 10 INJECTION, SOLUTION INTRAMUSCULAR; INTRAVENOUS at 11:09

## 2022-08-31 RX ADMIN — PROPOFOL 50 MG: 10 INJECTION, EMULSION INTRAVENOUS at 12:00

## 2022-08-31 RX ADMIN — MIDAZOLAM 2 MG: 1 INJECTION INTRAMUSCULAR; INTRAVENOUS at 10:51

## 2022-08-31 RX ADMIN — SODIUM CHLORIDE, SODIUM LACTATE, POTASSIUM CHLORIDE, AND CALCIUM CHLORIDE 20 ML/HR: .6; .31; .03; .02 INJECTION, SOLUTION INTRAVENOUS at 09:36

## 2022-08-31 RX ADMIN — Medication 3000 MG: at 11:25

## 2022-08-31 RX ADMIN — OXYCODONE HYDROCHLORIDE 5 MG: 5 TABLET ORAL at 15:16

## 2022-08-31 RX ADMIN — PROPOFOL 200 MG: 10 INJECTION, EMULSION INTRAVENOUS at 11:05

## 2022-08-31 RX ADMIN — LIDOCAINE HYDROCHLORIDE 50 MG: 10 INJECTION, SOLUTION EPIDURAL; INFILTRATION; INTRACAUDAL; PERINEURAL at 11:05

## 2022-08-31 RX ADMIN — FENTANYL CITRATE 50 MCG: 50 INJECTION INTRAMUSCULAR; INTRAVENOUS at 11:03

## 2022-08-31 RX ADMIN — LABETALOL HYDROCHLORIDE 15 MG: 5 INJECTION, SOLUTION INTRAVENOUS at 13:34

## 2022-08-31 RX ADMIN — FENTANYL CITRATE 50 MCG: 50 INJECTION INTRAMUSCULAR; INTRAVENOUS at 11:05

## 2022-08-31 RX ADMIN — PHENYLEPHRINE HYDROCHLORIDE 30 MCG/MIN: 10 INJECTION INTRAVENOUS at 11:15

## 2022-08-31 RX ADMIN — ONDANSETRON 4 MG: 2 INJECTION INTRAMUSCULAR; INTRAVENOUS at 12:44

## 2022-08-31 RX ADMIN — Medication 200 MG: at 11:06

## 2022-08-31 NOTE — ANESTHESIA POSTPROCEDURE EVALUATION
Post-Op Assessment Note    CV Status:  Stable  Pain Score: 0    Pain management: adequate     Mental Status:  Awake   Hydration Status:  Stable   PONV Controlled:  None   Airway Patency:  Patent      Post Op Vitals Reviewed: Yes      Staff: Anesthesiologist, CRNA         No complications documented      BP   157/94   Temp   97 6   Pulse  74   Resp   16   SpO2   96

## 2022-08-31 NOTE — ANESTHESIA PREPROCEDURE EVALUATION
Procedure:  Left patial THYROIDECTOMY (Left Neck)    Relevant Problems   CARDIO   (+) Hypertension      GI/HEPATIC   (+) GERD (gastroesophageal reflux disease)      /RENAL   (+) Chronic kidney disease      MUSCULOSKELETAL   (+) Chronic midline low back pain without sciatica   (+) Osteoarthritis   (+) Primary osteoarthritis of right knee   (+) Sciatica      NEURO/PSYCH   (+) Anxiety   (+) Chronic midline low back pain without sciatica   (+) Chronic pain syndrome      PULMONARY   (+) DOMINIC (obstructive sleep apnea)   Thyroid nodule    Recent labs personally reviewed:  Lab Results   Component Value Date    WBC 9 03 07/22/2022    HGB 14 3 07/22/2022     07/22/2022     Lab Results   Component Value Date    K 4 2 07/22/2022    BUN 27 (H) 07/22/2022    CREATININE 1 15 07/22/2022     Lab Results   Component Value Date    PTT 28 07/13/2020      Lab Results   Component Value Date    INR 1 04 07/13/2020       Lab Results   Component Value Date    HGBA1C 5 9 (H) 07/22/2022              Anesthesia Plan  ASA Score- 3     Anesthesia Type- general with ASA Monitors  Additional Monitors:   Airway Plan: ETT            Plan Factors-    Induction-     Postoperative Plan-     Informed Consent-

## 2022-08-31 NOTE — OP NOTE
OPERATIVE REPORT  PATIENT NAME: Juli Lau    :  1965  MRN: [id number]  Pt Location: BE OR ROOM 05    SURGERY DATE: 2022    Surgeon(s) and Role:     * Heidy Marie MD - Primary     * Lamar Le MD - Assisting    Preop Diagnosis:  Thyroid nodule [E04 1]    Post-Op Diagnosis Codes: * Thyroid nodule [E04 1]    Procedure(s) (LRB):  Left patial THYROIDECTOMY (Left) modifer 22 applied for large 12 cm goiter    Specimen(s):  ID Type Source Tests Collected by Time Destination   1 : stitch sosa superior pole Tissue Thyroid, Left TISSUE EXAM Heidy Marie MD 2022 12:58 PM        Estimated Blood Loss:   Minimal    Drains:  Closed/Suction Drain Anterior Neck Bulb 15 Fr  (Active)   Number of days: 0       Ureteral Drain/Stent Right ureter 6 Fr  (Active)   Number of days: 237       Anesthesia Type:   General    Operative Indications:  Thyroid nodule [E04 1]      Operative Findings:  Large 12 cm goiter removed in total from the left neck  Small substernal extent  Both parathyroids identified and protected  L RLN nerve identified and stimulated at the end of the case  Given the large thyroid size and extent into the chest a modifier 22 is applied  15 round drain placed  Complications:   None    Procedure and Technique:  Patient brought to the operating room and placed onto the operating table in supine position  Timeout performed  Patient placed under general endotracheal anesthesia with NIM tube  A shoulder roll was placed  The NIM system was set up, calibrated, and tested  The planned midline horizontal neck incision was marked in a RSTL  Lidocaine with epinephrine was injected into the planned incision  Patient was prepped and draped in usual sterile fashion  15 blade was used to incise the skin down through the subcutaneous tissue  A bovie was used to deepen the incision through the platysma muscle  A superior subplatysmal flap was elevated to the thyroid notch  An inferior subplatysmal flap was elevated to the level of the sternal notch and clavicles  Laterally, the dissection was carried to the anterior edge of the SCM bilaterally  A self-retaining retractor was inserted  Using Debakey forceps and bovie, the midline tissues overlying the strap muscles were divided vertically  The sternohyoid muscle was identified as well as the midline raphe  This raphe was then divided in the midline vertically  The thyroid gland was now visualized  Attention first turned to the lef} lobe  A Esteves retractor was inserted under the sternohyoid and sternothyroid muscles  The muscles were retracted laterally, and blunt dissection was used to separate the thyroid lobe from the lateral and underlying tissues so that the gland could be rotated medially  The gland was then retracted inferiorly and anteriorly  Blunt dissection was used to separate the medial aspect of the superior pole from the trachea  Blunt dissection was used to free the superior extent of the superior pole  The superior parathyroid gland was identified and  from the thyroid, taking care to preserve its vascular pedicle  The superior pole vessels were identified and ligated  Next, the thyroid was rotated more anteromedialy and dissection occurred along the mid-inferior portion of the thyroid, freeing the thin fascial cover overlying the thyroid  This was reflected laterally  Dissection then occurred within the triangle of carotid, thyroid, and trachea where the recurrent laryngeal nerve was identified  The nerve was then followed superiorly to where it entered the larynx  The inferior parathyroid gland was identified and  from the thyroid, taking care to preserve its vascular pedicle  All other tissue anterior to the nerve was then divided with the nerve under direct visualization  This included identification and ligation of the middle thyroid vein and inferior pole vessels  This now left the thyroid lobe tethered to the trachea by Berry's ligament  The lobe was dissected medially using the bipolar and curved scissors, followed by the bovie placed at a 45 degree angle to the trachea once dissection was far from the nerve  Significant more time was spent due to the size of the gland necessitating additional operative trime  The freed specimen was inspected and passed off the field  The wound was irrigated, and hemostasis was obtained with electrocautery  A Valsalva maneuver was performed, and no further bleeding was noted  Surgicel was placed in the wound  A closed suction drain was placed  The strap muscles were approximated with 3-0 vicryl in simple interrupted fashion  The platysma and deep dermis were approximated with 3-0 vicryl in simple interrupted fashion  The skin was closed with 4-0 monocryl in running subcuticular fashion  Mastisol was applied to wound edges, and steristrips were placed  The patient was taken out of general anesthesia and transferred to the PACU in stable condition           I was present for the entire procedure, A qualified resident physician was not available and A co-surgeon was required because of skills and techniques relevant to speciality    Patient Disposition:  PACU       SIGNATURE: Janeen Zaidi MD  DATE: August 31, 2022  TIME: 1:04 PM

## 2022-08-31 NOTE — H&P
Surgery Pre-op note/Updated History and Physical    Date of service: 8/31/20229:25 AM      No changes from most recent clinic H&P note  Patient to OR for Left kassidy thyroid  Vitals:    10/13/21 0845   BP: 131/91   Pulse:    Resp:    Temp:    SpO2:      ENT: Left nodule  Chest: Breathing, unremarkable  Abd: Unremarkable  Ext: Unremarkable    The procedure was discussed with the patient, including risks, benefits, and alternatives and all questions were answered  Consent signed and in the chart      Kelvin Medrano MD MPH  Otolaryngology--Head and Neck Surgery  [facility]  8/31/2022 9:25 AM

## 2022-08-31 NOTE — DISCHARGE INSTRUCTIONS
Thyroidectomy Lobectomy  WHAT YOU NEED TO KNOW:   Thyroid lobectomy is surgery to remove the part of your thyroid gland that contains a nodule or nodules  Your thyroid is a gland in the front lower part of your neck  The thyroid makes hormones that regulate your metabolism, body temperature, and heart rate  Smaller glands called parathyroids regulate the level of calcium in your blood  You have 4 parathyroids, located on the sides of your thyroid gland  Your parathyroids will not be removed during this surgery  DISCHARGE INSTRUCTIONS:   Medicines: The following medicines have been ordered by your healthcare provider:    Pain medicine  can help take away or decrease pain  Do not wait until the pain is severe before you take your medicine  Only use the oxycodone for severe pain, otherwise use just acetaminophen (Tylenol)  Take your medicine as directed  Contact your healthcare provider if you think your medicine is not helping or if you have side effects  Tell him or her if you are allergic to any medicine  Keep a list of the medicines, vitamins, and herbs you take  Include the amounts, and when and why you take them  Bring the list or the pill bottles to follow-up visits  Carry your medicine list with you in case of an emergency  Follow up with your endocrinologist or surgeon as directed: You will need to return to have your wound checked and stitches removed  You may also need blood tests to monitor your calcium, parathyroid, and thyroid hormone levels  Write down your questions so you remember to ask them during your visits  Self-care:   Rest when you need to while you heal after surgery  Slowly start to do more each day  Return to your daily activities as directed  Wound care:  Carefully wash your skin near the incision wound area with soap and water  Dry the area and put on new, clean bandages as directed  Change your bandages when they get wet or dirty   You can use a mild body lotion to improve the scar  Swallowing and voice changes: You may have a sore throat, hoarse voice, or difficulty swallowing after surgery  These symptoms should go away after a few days  Contact your surgeon if:   You have a fever or chills  You feel very tired and cold, gain weight for no reason, and your skin is very dry  You vomit several times in a row  Your incision is red, swollen, or draining pus  You have new voice weakness or hoarseness, or it is getting worse  You have questions or concerns about your condition or care  Seek care immediately or call 911 if:   You have sudden tingling or muscle cramps in your face, arm, or leg  You have muscle spasms in your legs and feet that do not go away  Blood soaks through your bandage  Your stitches come apart  You have sudden swelling in your neck or difficulty swallowing  You have trouble breathing  You have a seizure  - Keep wound clean and dry  May apply dressing as needed but generally leave open to air  - Okay to shower but do not submerge in water for 2 weeks, pat incision dry (don't rub)  - Avoid lifting anything greater than 10 lbs (a gallon of milk) for 8 weeks  - Use your prescribed pain medications as directed  Do not drive or operate heavy machinery while using narcotics}  - Activity and diet as tolerated  - May use tylenol or Advil for pain  - Follow up with Dr Vickie Teixeira on Friday for drain removal  - Watch for signs of fever, chills, warmth, redness, or drainage  A slight amount is normal for a day or two following surgery  Seek medical attention for: fever >101 5 F, increasing warmth, reddness, swelling, bleeding, or anything that may concern you  - You will be discharged with a drain in place  Prior to discharge the nursing team will speak to you about drain care  Please record morning afternoon and evening drain output   If there is any large change or irritation around drain site, an increase in output volume, consistency, or color this is an indication to call your surgical team  Ross Leema will be provided with instructions to remove the drain at home or to have it removed at your follow up        Call with any questions or concerns: office

## 2022-09-08 PROCEDURE — 88307 TISSUE EXAM BY PATHOLOGIST: CPT | Performed by: STUDENT IN AN ORGANIZED HEALTH CARE EDUCATION/TRAINING PROGRAM

## 2022-09-14 ENCOUNTER — HOSPITAL ENCOUNTER (INPATIENT)
Facility: HOSPITAL | Age: 57
LOS: 1 days | Discharge: HOME | DRG: 660 | End: 2022-09-17
Attending: EMERGENCY MEDICINE | Admitting: INTERNAL MEDICINE
Payer: COMMERCIAL

## 2022-09-14 ENCOUNTER — APPOINTMENT (EMERGENCY)
Dept: RADIOLOGY | Facility: HOSPITAL | Age: 57
DRG: 660 | End: 2022-09-14
Payer: COMMERCIAL

## 2022-09-14 DIAGNOSIS — N13.2 URETERAL STONE WITH HYDRONEPHROSIS: ICD-10-CM

## 2022-09-14 DIAGNOSIS — R10.9 FLANK PAIN: ICD-10-CM

## 2022-09-14 DIAGNOSIS — N20.1 URETEROLITHIASIS: Primary | ICD-10-CM

## 2022-09-14 DIAGNOSIS — I10 ESSENTIAL HYPERTENSION: ICD-10-CM

## 2022-09-14 DIAGNOSIS — N17.9 AKI (ACUTE KIDNEY INJURY) (HCC): ICD-10-CM

## 2022-09-14 PROCEDURE — 85025 COMPLETE CBC W/AUTO DIFF WBC: CPT | Performed by: EMERGENCY MEDICINE

## 2022-09-14 PROCEDURE — 74176 CT ABD & PELVIS W/O CONTRAST: CPT

## 2022-09-14 PROCEDURE — 99285 EMERGENCY DEPT VISIT HI MDM: CPT

## 2022-09-14 PROCEDURE — 96361 HYDRATE IV INFUSION ADD-ON: CPT

## 2022-09-14 PROCEDURE — 96374 THER/PROPH/DIAG INJ IV PUSH: CPT

## 2022-09-14 PROCEDURE — 36415 COLL VENOUS BLD VENIPUNCTURE: CPT | Performed by: EMERGENCY MEDICINE

## 2022-09-14 PROCEDURE — 81001 URINALYSIS AUTO W/SCOPE: CPT | Performed by: EMERGENCY MEDICINE

## 2022-09-14 PROCEDURE — 80053 COMPREHEN METABOLIC PANEL: CPT | Performed by: EMERGENCY MEDICINE

## 2022-09-14 PROCEDURE — 3066F NEPHROPATHY DOC TX: CPT | Performed by: FAMILY MEDICINE

## 2022-09-14 PROCEDURE — G1004 CDSM NDSC: HCPCS

## 2022-09-14 PROCEDURE — 99285 EMERGENCY DEPT VISIT HI MDM: CPT | Performed by: EMERGENCY MEDICINE

## 2022-09-14 RX ORDER — ONDANSETRON 2 MG/ML
4 INJECTION INTRAMUSCULAR; INTRAVENOUS ONCE
Status: DISCONTINUED | OUTPATIENT
Start: 2022-09-14 | End: 2022-09-17 | Stop reason: HOSPADM

## 2022-09-14 RX ORDER — KETOROLAC TROMETHAMINE 30 MG/ML
15 INJECTION, SOLUTION INTRAMUSCULAR; INTRAVENOUS ONCE
Status: COMPLETED | OUTPATIENT
Start: 2022-09-14 | End: 2022-09-14

## 2022-09-14 RX ADMIN — KETOROLAC TROMETHAMINE 15 MG: 30 INJECTION, SOLUTION INTRAMUSCULAR at 23:52

## 2022-09-14 RX ADMIN — SODIUM CHLORIDE 1000 ML: 0.9 INJECTION, SOLUTION INTRAVENOUS at 23:52

## 2022-09-15 ENCOUNTER — APPOINTMENT (OUTPATIENT)
Dept: RADIOLOGY | Facility: HOSPITAL | Age: 57
DRG: 660 | End: 2022-09-15
Payer: COMMERCIAL

## 2022-09-15 PROBLEM — N20.1 URETEROLITHIASIS: Status: ACTIVE | Noted: 2022-09-15

## 2022-09-15 PROBLEM — N17.9 ACUTE KIDNEY INJURY (HCC): Status: ACTIVE | Noted: 2022-09-15

## 2022-09-15 PROBLEM — N13.2 URETERAL STONE WITH HYDRONEPHROSIS: Status: ACTIVE | Noted: 2022-09-15

## 2022-09-15 LAB
ALBUMIN SERPL BCP-MCNC: 3.5 G/DL (ref 3.5–5)
ALP SERPL-CCNC: 66 U/L (ref 46–116)
ALT SERPL W P-5'-P-CCNC: 41 U/L (ref 12–78)
ANION GAP SERPL CALCULATED.3IONS-SCNC: 10 MMOL/L (ref 4–13)
ANION GAP SERPL CALCULATED.3IONS-SCNC: 8 MMOL/L (ref 4–13)
AST SERPL W P-5'-P-CCNC: 37 U/L (ref 5–45)
BACTERIA UR QL AUTO: ABNORMAL /HPF
BASOPHILS # BLD AUTO: 0.06 THOUSANDS/ΜL (ref 0–0.1)
BASOPHILS # BLD AUTO: 0.08 THOUSANDS/ΜL (ref 0–0.1)
BASOPHILS NFR BLD AUTO: 1 % (ref 0–1)
BASOPHILS NFR BLD AUTO: 1 % (ref 0–1)
BILIRUB SERPL-MCNC: 0.98 MG/DL (ref 0.2–1)
BILIRUB UR QL STRIP: NEGATIVE
BUN SERPL-MCNC: 29 MG/DL (ref 5–25)
BUN SERPL-MCNC: 30 MG/DL (ref 5–25)
CALCIUM SERPL-MCNC: 8.5 MG/DL (ref 8.3–10.1)
CALCIUM SERPL-MCNC: 8.9 MG/DL (ref 8.3–10.1)
CHLORIDE SERPL-SCNC: 101 MMOL/L (ref 96–108)
CHLORIDE SERPL-SCNC: 102 MMOL/L (ref 96–108)
CLARITY UR: CLEAR
CO2 SERPL-SCNC: 25 MMOL/L (ref 21–32)
CO2 SERPL-SCNC: 27 MMOL/L (ref 21–32)
COLOR UR: ABNORMAL
CREAT SERPL-MCNC: 1.75 MG/DL (ref 0.6–1.3)
CREAT SERPL-MCNC: 1.76 MG/DL (ref 0.6–1.3)
EOSINOPHIL # BLD AUTO: 0.33 THOUSAND/ΜL (ref 0–0.61)
EOSINOPHIL # BLD AUTO: 0.33 THOUSAND/ΜL (ref 0–0.61)
EOSINOPHIL NFR BLD AUTO: 3 % (ref 0–6)
EOSINOPHIL NFR BLD AUTO: 3 % (ref 0–6)
ERYTHROCYTE [DISTWIDTH] IN BLOOD BY AUTOMATED COUNT: 13 % (ref 11.6–15.1)
ERYTHROCYTE [DISTWIDTH] IN BLOOD BY AUTOMATED COUNT: 13.1 % (ref 11.6–15.1)
GFR SERPL CREATININE-BSD FRML MDRD: 41 ML/MIN/1.73SQ M
GFR SERPL CREATININE-BSD FRML MDRD: 41 ML/MIN/1.73SQ M
GLUCOSE P FAST SERPL-MCNC: 104 MG/DL (ref 65–99)
GLUCOSE SERPL-MCNC: 100 MG/DL (ref 65–140)
GLUCOSE SERPL-MCNC: 104 MG/DL (ref 65–140)
GLUCOSE UR STRIP-MCNC: NEGATIVE MG/DL
HCT VFR BLD AUTO: 43.9 % (ref 36.5–49.3)
HCT VFR BLD AUTO: 44.6 % (ref 36.5–49.3)
HGB BLD-MCNC: 14.6 G/DL (ref 12–17)
HGB BLD-MCNC: 14.9 G/DL (ref 12–17)
HGB UR QL STRIP.AUTO: ABNORMAL
IMM GRANULOCYTES # BLD AUTO: 0.05 THOUSAND/UL (ref 0–0.2)
IMM GRANULOCYTES # BLD AUTO: 0.08 THOUSAND/UL (ref 0–0.2)
IMM GRANULOCYTES NFR BLD AUTO: 1 % (ref 0–2)
IMM GRANULOCYTES NFR BLD AUTO: 1 % (ref 0–2)
KETONES UR STRIP-MCNC: NEGATIVE MG/DL
LEUKOCYTE ESTERASE UR QL STRIP: NEGATIVE
LYMPHOCYTES # BLD AUTO: 1.56 THOUSANDS/ΜL (ref 0.6–4.47)
LYMPHOCYTES # BLD AUTO: 1.73 THOUSANDS/ΜL (ref 0.6–4.47)
LYMPHOCYTES NFR BLD AUTO: 12 % (ref 14–44)
LYMPHOCYTES NFR BLD AUTO: 17 % (ref 14–44)
MCH RBC QN AUTO: 30 PG (ref 26.8–34.3)
MCH RBC QN AUTO: 30.2 PG (ref 26.8–34.3)
MCHC RBC AUTO-ENTMCNC: 33.3 G/DL (ref 31.4–37.4)
MCHC RBC AUTO-ENTMCNC: 33.4 G/DL (ref 31.4–37.4)
MCV RBC AUTO: 90 FL (ref 82–98)
MCV RBC AUTO: 91 FL (ref 82–98)
MONOCYTES # BLD AUTO: 0.87 THOUSAND/ΜL (ref 0.17–1.22)
MONOCYTES # BLD AUTO: 1.1 THOUSAND/ΜL (ref 0.17–1.22)
MONOCYTES NFR BLD AUTO: 8 % (ref 4–12)
MONOCYTES NFR BLD AUTO: 9 % (ref 4–12)
NEUTROPHILS # BLD AUTO: 10.1 THOUSANDS/ΜL (ref 1.85–7.62)
NEUTROPHILS # BLD AUTO: 6.9 THOUSANDS/ΜL (ref 1.85–7.62)
NEUTS SEG NFR BLD AUTO: 69 % (ref 43–75)
NEUTS SEG NFR BLD AUTO: 75 % (ref 43–75)
NITRITE UR QL STRIP: NEGATIVE
NON-SQ EPI CELLS URNS QL MICRO: ABNORMAL /HPF
NRBC BLD AUTO-RTO: 0 /100 WBCS
NRBC BLD AUTO-RTO: 0 /100 WBCS
PH UR STRIP.AUTO: 6 [PH]
PLATELET # BLD AUTO: 209 THOUSANDS/UL (ref 149–390)
PLATELET # BLD AUTO: 243 THOUSANDS/UL (ref 149–390)
PMV BLD AUTO: 9.2 FL (ref 8.9–12.7)
PMV BLD AUTO: 9.5 FL (ref 8.9–12.7)
POTASSIUM SERPL-SCNC: 3.7 MMOL/L (ref 3.5–5.3)
POTASSIUM SERPL-SCNC: 4.9 MMOL/L (ref 3.5–5.3)
PROT SERPL-MCNC: 7.7 G/DL (ref 6.4–8.4)
PROT UR STRIP-MCNC: NEGATIVE MG/DL
RBC # BLD AUTO: 4.87 MILLION/UL (ref 3.88–5.62)
RBC # BLD AUTO: 4.93 MILLION/UL (ref 3.88–5.62)
RBC #/AREA URNS AUTO: ABNORMAL /HPF
SODIUM SERPL-SCNC: 136 MMOL/L (ref 135–147)
SODIUM SERPL-SCNC: 137 MMOL/L (ref 135–147)
SP GR UR STRIP.AUTO: 1.02 (ref 1–1.03)
UROBILINOGEN UR QL STRIP.AUTO: 0.2 E.U./DL
WBC # BLD AUTO: 13.23 THOUSAND/UL (ref 4.31–10.16)
WBC # BLD AUTO: 9.96 THOUSAND/UL (ref 4.31–10.16)
WBC #/AREA URNS AUTO: ABNORMAL /HPF

## 2022-09-15 PROCEDURE — 99220 PR INITIAL OBSERVATION CARE/DAY 70 MINUTES: CPT

## 2022-09-15 PROCEDURE — 80048 BASIC METABOLIC PNL TOTAL CA: CPT

## 2022-09-15 PROCEDURE — 85025 COMPLETE CBC W/AUTO DIFF WBC: CPT

## 2022-09-15 PROCEDURE — 74018 RADEX ABDOMEN 1 VIEW: CPT

## 2022-09-15 RX ORDER — AMLODIPINE BESYLATE 5 MG/1
5 TABLET ORAL DAILY
Status: DISCONTINUED | OUTPATIENT
Start: 2022-09-15 | End: 2022-09-17 | Stop reason: HOSPADM

## 2022-09-15 RX ORDER — HYDROMORPHONE HCL/PF 1 MG/ML
1 SYRINGE (ML) INJECTION EVERY 4 HOURS PRN
Status: DISCONTINUED | OUTPATIENT
Start: 2022-09-15 | End: 2022-09-17 | Stop reason: HOSPADM

## 2022-09-15 RX ORDER — ONDANSETRON 2 MG/ML
4 INJECTION INTRAMUSCULAR; INTRAVENOUS EVERY 6 HOURS PRN
Status: DISCONTINUED | OUTPATIENT
Start: 2022-09-15 | End: 2022-09-17 | Stop reason: HOSPADM

## 2022-09-15 RX ORDER — ACETAMINOPHEN 325 MG/1
650 TABLET ORAL EVERY 6 HOURS PRN
COMMUNITY

## 2022-09-15 RX ORDER — HEPARIN SODIUM 5000 [USP'U]/ML
5000 INJECTION, SOLUTION INTRAVENOUS; SUBCUTANEOUS EVERY 8 HOURS SCHEDULED
Status: DISCONTINUED | OUTPATIENT
Start: 2022-09-15 | End: 2022-09-17 | Stop reason: HOSPADM

## 2022-09-15 RX ORDER — SODIUM CHLORIDE 9 MG/ML
125 INJECTION, SOLUTION INTRAVENOUS CONTINUOUS
Status: DISCONTINUED | OUTPATIENT
Start: 2022-09-15 | End: 2022-09-16

## 2022-09-15 RX ORDER — MORPHINE SULFATE 4 MG/ML
4 INJECTION, SOLUTION INTRAMUSCULAR; INTRAVENOUS EVERY 4 HOURS PRN
Status: DISCONTINUED | OUTPATIENT
Start: 2022-09-15 | End: 2022-09-15

## 2022-09-15 RX ORDER — ACETAMINOPHEN 325 MG/1
650 TABLET ORAL EVERY 6 HOURS PRN
Status: DISCONTINUED | OUTPATIENT
Start: 2022-09-15 | End: 2022-09-17 | Stop reason: HOSPADM

## 2022-09-15 RX ORDER — PANTOPRAZOLE SODIUM 40 MG/1
40 TABLET, DELAYED RELEASE ORAL
Status: DISCONTINUED | OUTPATIENT
Start: 2022-09-15 | End: 2022-09-17 | Stop reason: HOSPADM

## 2022-09-15 RX ORDER — TAMSULOSIN HYDROCHLORIDE 0.4 MG/1
0.4 CAPSULE ORAL
Status: DISCONTINUED | OUTPATIENT
Start: 2022-09-15 | End: 2022-09-17 | Stop reason: HOSPADM

## 2022-09-15 RX ADMIN — HEPARIN SODIUM 5000 UNITS: 5000 INJECTION INTRAVENOUS; SUBCUTANEOUS at 21:56

## 2022-09-15 RX ADMIN — HEPARIN SODIUM 5000 UNITS: 5000 INJECTION INTRAVENOUS; SUBCUTANEOUS at 05:22

## 2022-09-15 RX ADMIN — PANTOPRAZOLE SODIUM 40 MG: 40 TABLET, DELAYED RELEASE ORAL at 05:23

## 2022-09-15 RX ADMIN — HEPARIN SODIUM 5000 UNITS: 5000 INJECTION INTRAVENOUS; SUBCUTANEOUS at 14:00

## 2022-09-15 RX ADMIN — TAMSULOSIN HYDROCHLORIDE 0.4 MG: 0.4 CAPSULE ORAL at 17:38

## 2022-09-15 RX ADMIN — TAMSULOSIN HYDROCHLORIDE 0.4 MG: 0.4 CAPSULE ORAL at 02:28

## 2022-09-15 RX ADMIN — AMLODIPINE BESYLATE 5 MG: 5 TABLET ORAL at 09:10

## 2022-09-15 RX ADMIN — SODIUM CHLORIDE 125 ML/HR: 0.9 INJECTION, SOLUTION INTRAVENOUS at 21:18

## 2022-09-15 RX ADMIN — SODIUM CHLORIDE 125 ML/HR: 0.9 INJECTION, SOLUTION INTRAVENOUS at 02:29

## 2022-09-15 NOTE — CONSULTS
H&P Exam - Urology       Patient: Susi Miller   : 1965 Sex: male   MRN: [id number]     CSN: [id number]      History of Present Illness   HPI:  Susi Miller is a 59 y o  male who presents with severe onset right flank pain found to have 7 mm right ureteropelvic junction stone mild hydronephrosis admitted for IV analgesics seen at the bedside this morning patient has undergone ureteroscopy stent placements by myself 3 years ago at this time after IV analgesics his pain is a 3/10 we discussed medical expulsion therapy with a 50% chance of passing it over 4 weeks on tamsulosin straining urine and 2 week office visits as well as hospital procedures such as repeat right ureteroscopy laser trip see stent placement or right shockwave litho        Review of Systems:   Constitutional:  Negative for activity change, fever, chills and diaphoresis  HENT: Negative for hearing loss and trouble swallowing  Eyes: Negative for itching and visual disturbance  Respiratory: Negative for chest tightness and shortness of breath  Cardiovascular: Negative for chest pain, edema  Gastrointestinal: Negative for abdominal distention, na abdominal pain, constipation, diarrhea, Nausea and vomiting  Genitourinary: Negative for decreased urine volume, difficulty urinating, dysuria, enuresis, frequency, hematuria and urgency  Musculoskeletal: Negative for gait problem and myalgias  Neurological: Negative for dizziness and headaches  Hematological: Does not bruise/bleed easily         Historical Information   Past Medical History:   Diagnosis Date    Borderline diabetes     per pt A1C "coming down"    Chronic pain disorder     lower back-s/p laminectomy    Claustrophobia     "some degree" jes MRI's"    CPAP (continuous positive airway pressure) dependence     per pt does not use CPAP    Diverticulitis     GERD (gastroesophageal reflux disease)     Hypertension     Kidney stone     x 2 episodes    Lactose intolerance     Localized pain of joint     Mild sleep apnea     CPAP started in July having difficulty using- only has used on occ    Morbid obesity with body mass index (BMI) of 50 0 to 59 9 in adult [facility])     Rectal bleeding     occ rectal bleeding last episode unsure-"nothing recent"    Seasickness     Skin tag     skin tags were removed-as of 8/2/19 has a few more    Teeth missing     Thyroid nodule     Tinnitus     Wears glasses     Weight loss     "80lbs and did gain 40lbs back"--     Past Surgical History:   Procedure Laterality Date    BACK SURGERY      laminectomy-1996, 2010    COLONOSCOPY      x2    ESOPHAGOGASTRODUODENOSCOPY N/A 3/30/2019    Procedure: ESOPHAGOGASTRODUODENOSCOPY (EGD); Surgeon: Josiah Bnod MD;  Location: [address] [place] [address];  Service: Gastroenterology    KNEE ARTHROSCOPY Left 2007    NH CYSTO/URETERO W/LITHOTRIPSY &INDWELL STENT INSRT Right 1/6/2022    Procedure: CYSTOSCOPY URETEROSCOPY AND INSERTION STENT URETERAL RIGHT;  Surgeon: Prashanth Rodriguez MD;  Location: [address] [place] [address];  Service: Urology    NH ESOPHAGOGASTRODUODENOSCOPY TRANSORAL DIAGNOSTIC N/A 5/1/2019    Procedure: ESOPHAGOGASTRODUODENOSCOPY (EGD); Surgeon: Josiah Bond MD;  Location: [facility];   Service: Gastroenterology    NH THYROID LOBECTOMY,UNILAT Left 8/31/2022    Procedure: Left patial THYROIDECTOMY;  Surgeon: Juan C Arreola MD;  Location:  MAIN OR;  Service: ENT    UPPER GASTROINTESTINAL ENDOSCOPY  2006    US GUIDED THYROID BIOPSY  12/28/2020     Social History   Social History     Substance and Sexual Activity   Alcohol Use No     Social History     Substance and Sexual Activity   Drug Use No     Social History     Tobacco Use   Smoking Status Never Smoker   Smokeless Tobacco Never Used     Family History:   Family History   Problem Relation Age of Onset    Osteoarthritis Mother     Obesity Father         mf=[id number]    Sleep apnea Father         with CPAP    No Known Problems Sister     No Known Problems Brother     Lupus Sister     No Known Problems Sister     No Known Problems Son     Substance Abuse Neg Hx     Mental illness Neg Hx     Cancer Neg Hx     Diabetes Neg Hx     Heart disease Neg Hx     Stroke Neg Hx        Meds/Allergies   Medications Prior to Admission   Medication    acetaminophen (TYLENOL) 325 mg tablet    amLODIPine (NORVASC) 5 mg tablet    lisinopril-hydrochlorothiazide (PRINZIDE,ZESTORETIC) 20-25 MG per tablet    omeprazole (PriLOSEC) 20 mg delayed release capsule     Allergies   Allergen Reactions    Lactose - Food Allergy GI Intolerance       Objective   Vitals: /80 (BP Location: Right arm)   Pulse 65   Temp 98 2 °F (36 8 °C) (Oral)   Resp 18   Ht 6' 4" (1 93 m)   Wt (!) 177 kg (390 lb)   SpO2 97%   BMI 47 47 kg/m²     Physical Exam:  General Alert awake   Normocephalic atraumatic PERRLA  Lungs clear bilaterally  Cardiac normal S1 normal S2  Abdomen soft, flank pain right  Extremities no edema    I/O last 24 hours: In: 1860 [P O :900;  I V :960]  Out: -     Invasive Devices  Report    Peripheral Intravenous Line  Duration           Peripheral IV 09/14/22 Left Antecubital <1 day          Drain  Duration           Ureteral Drain/Stent Right ureter 6 Fr  251 days                    Lab Results: CBC:   Lab Results   Component Value Date    WBC 9 96 09/15/2022    HGB 14 6 09/15/2022    HCT 43 9 09/15/2022    MCV 90 09/15/2022     09/15/2022    ADJUSTEDWBC 9 00 08/26/2016    MCH 30 0 09/15/2022    MCHC 33 3 09/15/2022    RDW 13 0 09/15/2022    MPV 9 2 09/15/2022    NRBC 0 09/15/2022     CMP:   Lab Results   Component Value Date     09/15/2022     04/15/2019    CO2 25 09/15/2022    CO2 23 04/15/2019    BUN 29 (H) 09/15/2022    BUN 23 04/15/2019    CREATININE 1 76 (H) 09/15/2022    CALCIUM 8 5 09/15/2022    AST 37 09/14/2022    AST 25 12/10/2018    ALT 41 09/14/2022    ALT 29 12/10/2018    ALKPHOS 66 09/14/2022    EGFR 41 09/15/2022 Urinalysis:   Lab Results   Component Value Date    COLORU Light Yellow 09/14/2022    CLARITYU Clear 09/14/2022    SPECGRAV 1 025 09/14/2022    PHUR 6 0 09/14/2022    PHUR 6 5 06/26/2018    LEUKOCYTESUR Negative 09/14/2022    NITRITE Negative 09/14/2022    GLUCOSEU Negative 09/14/2022    KETONESU Negative 09/14/2022    BILIRUBINUR Negative 09/14/2022    BLOODU Moderate (A) 09/14/2022     Urine Culture: No results found for: URINECX  PSA:   Lab Results   Component Value Date    PSA 2 4 12/22/2020    PSA 2 2 12/10/2018           Assessment/ Plan:  Right 7 mm renal pelvic stone with mild hydronephrosis  Start tamsulosin  IV analgesics  KUB  If pain subsides patient hoping to go home and be scheduled for outpatient right extrapleural shockwave litho for October 12th if pain continues may need to strongly consider cysto right stent which he is trying to avoid in light of stent discomfort      Flavia Sierra MD

## 2022-09-15 NOTE — UTILIZATION REVIEW
OBSERVATION 9/15/22 @ 0056 CONVERTED TO INPATIENT 9/16/22 @ 9733 DUE TO RENAL COLIC WITH HYDRONEPHROSIS REQUIRING CONTINUE IV HYDRATION AND POSSIBLE SURGICAL INTERVENTION  Initial Clinical Review    Admission: Date/Time/Statement:   Admission Orders (From admission, onward)     Ordered        09/16/22 1022  Inpatient Admission  Once                      Orders Placed This Encounter   Procedures    Inpatient Admission     Standing Status:   Standing     Number of Occurrences:   1     Order Specific Question:   Level of Care     Answer:   Med Surg [16]     Order Specific Question:   Estimated length of stay     Answer:   More than 2 Midnights     Order Specific Question:   Certification     Answer:   I certify that inpatient services are medically necessary for this patient for a duration of greater than two midnights  See H&P and MD Progress Notes for additional information about the patient's course of treatment  ED Arrival Information     Expected   -    Arrival   9/14/2022 22:57    Acuity   Urgent            Means of arrival   Walk-In    Escorted by   Self    Service   Hospitalist    Admission type   Urgent            Arrival complaint   kidney stone           Chief Complaint   Patient presents with    Flank Pain    Abdominal Pain     Started with R side pain for past 24 hours, hx of kidney stone and feels same, no vomiting, diarrhea or contsipation  States burping alot       Initial Presentation: 59 y o  male to the ED from home with complaints of right sided   Flank and RLQ abdominal pain for a day  Admitted under observation  Then converted to inpatient for ureteral stone with hydronephrosis, debbie  H/O HTN, GERD, DOMINIC  Arrives with tachypnea, elevated WBCs, creat 1 75, elevated BP  Started on IV fluids  CT a/p shows: 7 mm right ureteropelvic stone causing mod hydronephrosis  Start flomax  Urology consult  Recheck creat  Baseline creat 1 1    Date: 9/16      ED Triage Vitals [09/14/22 2304] Temperature Pulse Respirations Blood Pressure SpO2   98 4 °F (36 9 °C) 96 (!) 24 (!) 170/101 96 %      Temp Source Heart Rate Source Patient Position - Orthostatic VS BP Location FiO2 (%)   Tympanic Monitor Sitting Right arm --      Pain Score       6          Wt Readings from Last 1 Encounters:   09/15/22 (!) 177 kg (390 lb)     Additional Vital Signs:   Date/Time Temp Pulse Resp BP MAP (mmHg) SpO2 O2 Device Patient Position - Orthostatic VS   09/16/22 0759 -- 64 18 141/85 108 95 % None (Room air) Lying   09/15/22 2220 98 4 °F (36 9 °C) 66 16 135/86 106 94 % None (Room air) Lying   09/15/22 1910 98 1 °F (36 7 °C) 83 18 161/82 113 94 % None (Room air) Lying   09/15/22 1300 98 3 °F (36 8 °C) 75 18 136/82 104 98 % None (Room air) Lying       Time Temp Pulse Resp BP MAP (mmHg) SpO2 O2 Device Patient Position - Orthostatic VS   09/15/22 0734 98 2 °F (36 8 °C) 65 18 135/80 101 97 % None (Room air) Lying   09/15/22 0205 97 7 °F (36 5 °C) 65 20 157/83 111 97 % None (Room air) Lying   09/15/22 0130 -- 64 -- 131/73 97 96 % -- --   09/15/22 0100 -- 73 18 128/72 94 96 % -- --   09/15/22 0030 -- 76 18 138/69 94 96 % -- Sitting   09/15/22 0015 -- 77 20 162/76 109 95 % -- --   09/14/22 2304 98 4 °F (36 9 °C) 96 24 Abnormal  170/101 Abnormal  -- 96 % None (Room air) Sitting       Pertinent Labs/Diagnostic Test Results:     XR abdomen 1 view kub   Final Result by Effie Montejo MD (09/15 4024)      No nephrolithiasis identified           Workstation performed: [id number]         CT renal stone study abdomen pelvis wo contrast   Final Result by Ajay Glover MD (09/15 0050)      7 mm right ureteropelvic stone causes moderate hydronephrosis                Workstation performed: [id number]               Results from last 7 days   Lab Units 09/15/22  0659 09/14/22  2353   WBC Thousand/uL 9 96 13 23*   HEMOGLOBIN g/dL 14 6 14 9   HEMATOCRIT % 43 9 44 6   PLATELETS Thousands/uL 209 243   NEUTROS ABS Thousands/µL 6 90 10 10* Results from last 7 days   Lab Units 09/16/22  0533 09/15/22  0659 09/14/22  2353   SODIUM mmol/L 136 137 136   POTASSIUM mmol/L 4 2 3 7 4 9   CHLORIDE mmol/L 102 102 101   CO2 mmol/L 26 25 27   ANION GAP mmol/L 8 10 8   BUN mg/dL 27* 29* 30*   CREATININE mg/dL 2 02* 1 76* 1 75*   EGFR ml/min/1 73sq m 35 41 41   CALCIUM mg/dL 8 3 8 5 8 9     Results from last 7 days   Lab Units 09/14/22  2353   AST U/L 37   ALT U/L 41   ALK PHOS U/L 66   TOTAL PROTEIN g/dL 7 7   ALBUMIN g/dL 3 5   TOTAL BILIRUBIN mg/dL 0 98         Results from last 7 days   Lab Units 09/16/22  0533 09/15/22  0659 09/14/22  2353   GLUCOSE RANDOM mg/dL 114 104 100             Results from last 7 days   Lab Units 09/14/22  2353   CLARITY UA  Clear   COLOR UA  Light Yellow   SPEC GRAV UA  1 025   PH UA  6 0   GLUCOSE UA mg/dl Negative   KETONES UA mg/dl Negative   BLOOD UA  Moderate*   PROTEIN UA mg/dl Negative   NITRITE UA  Negative   BILIRUBIN UA  Negative   UROBILINOGEN UA E U /dl 0 2   LEUKOCYTES UA  Negative   WBC UA /hpf 0-1*   RBC UA /hpf 1-2*   BACTERIA UA /hpf Occasional   EPITHELIAL CELLS WET PREP /hpf None Seen       ED Treatment:   Medication Administration from 09/14/2022 2257 to 09/15/2022 7757       Date/Time Order Dose Route Action     09/14/2022 2352 ketorolac (TORADOL) injection 15 mg 15 mg Intravenous Given     09/14/2022 2352 sodium chloride 0 9 % bolus 1,000 mL 1,000 mL Intravenous New Bag        Past Medical History:   Diagnosis Date    Borderline diabetes     per pt A1C "coming down"    Chronic pain disorder     lower back-s/p laminectomy    Claustrophobia     "some degree" jes MRI's"    CPAP (continuous positive airway pressure) dependence     per pt does not use CPAP    Diverticulitis     GERD (gastroesophageal reflux disease)     Hypertension     Kidney stone     x 2 episodes    Lactose intolerance     Localized pain of joint     Mild sleep apnea     CPAP started in July having difficulty using- only has used on occ    Morbid obesity with body mass index (BMI) of 50 0 to 59 9 in adult [facility])     Rectal bleeding     occ rectal bleeding last episode unsure-"nothing recent"    Seasickness     Skin tag     skin tags were removed-as of 8/2/19 has a few more    Teeth missing     Thyroid nodule     Tinnitus     Wears glasses     Weight loss     "80lbs and did gain 40lbs back"--       Admitting Diagnosis: Ureterolithiasis [N20 1]  Abdominal pain [R10 9]  Flank pain [R10 9]  KIANA (acute kidney injury) ([facility] [address] ) [N17 9]  Age/Sex: 59 y o  male  Admission Orders:    Scheduled Medications:  amLODIPine, 5 mg, Oral, Daily  heparin (porcine), 5,000 Units, Subcutaneous, Q8H JOSELINE  ondansetron, 4 mg, Intravenous, Once  pantoprazole, 40 mg, Oral, Early Morning  tamsulosin, 0 4 mg, Oral, Daily With Dinner      Continuous IV Infusions:  sodium chloride, 125 mL/hr, Intravenous, Continuous      PRN Meds:  acetaminophen, 650 mg, Oral, Q6H PRN  HYDROmorphone, 1 mg, Intravenous, Q4H PRN  ondansetron, 4 mg, Intravenous, Q6H PRN        IP CONSULT TO UROLOGY  IP CONSULT TO CASE MANAGEMENT    Network Utilization Review Department  ATTENTION: Please call with any questions or concerns to [phone] and carefully listen to the prompts so that you are directed to the right person  All voicemails are confidential   [facility] all requests for admission clinical reviews, approved or denied determinations and any other requests to dedicated fax number below belonging to the campus where the patient is receiving treatment   List of dedicated fax numbers for the Facilities:  [address] [place] [address] DENIALS (Administrative/Medical Necessity) [phone]   [address] (Maternity/NICU/Pediatrics) [phone]   [address] [address] [facility] [phone]   501 [facility] [address] [place] [address] [address] [phone] 4601 [facility] [phone]

## 2022-09-15 NOTE — PLAN OF CARE
Problem: PAIN - ADULT  Goal: Verbalizes/displays adequate comfort level or baseline comfort level  Description: Interventions:  - Encourage patient to monitor pain and request assistance  - Assess pain using appropriate pain scale  - Administer analgesics based on type and severity of pain and evaluate response  - Implement non-pharmacological measures as appropriate and evaluate response  - Consider cultural and social influences on pain and pain management  - Notify physician/advanced practitioner if interventions unsuccessful or patient reports new pain  Outcome: Progressing     Problem: DISCHARGE PLANNING  Goal: Discharge to home or other facility with appropriate resources  Description: INTERVENTIONS:  - Identify barriers to discharge w/patient and caregiver  - Arrange for needed discharge resources and transportation as appropriate  - Identify discharge learning needs (meds, wound care, etc )  - Arrange for interpretive services to assist at discharge as needed  - Refer to Case Management Department for coordinating discharge planning if the patient needs post-hospital services based on physician/advanced practitioner order or complex needs related to functional status, cognitive ability, or social support system  Outcome: Progressing     Problem: Knowledge Deficit  Goal: Patient/family/caregiver demonstrates understanding of disease process, treatment plan, medications, and discharge instructions  Description: Complete learning assessment and assess knowledge base    Interventions:  - Provide teaching at level of understanding  - Provide teaching via preferred learning methods  Outcome: Progressing     Problem: GENITOURINARY - ADULT  Goal: Maintains or returns to baseline urinary function  Description: INTERVENTIONS:  - Assess urinary function  - Encourage oral fluids to ensure adequate hydration if ordered  - Administer IV fluids as ordered to ensure adequate hydration  - Administer ordered medications as needed  - Offer frequent toileting  - Follow urinary retention protocol if ordered  Outcome: Progressing  Goal: Absence of urinary retention  Description: INTERVENTIONS:  - Assess patient's ability to void and empty bladder  - Monitor I/O  - Bladder scan as needed  - Discuss with physician/AP medications to alleviate retention as needed  - Discuss catheterization for long term situations as appropriate  Outcome: Progressing

## 2022-09-15 NOTE — ASSESSMENT & PLAN NOTE
Patient presents with right sided abdominal pain beginning yesterday afternoon  · CT a/p shows 7 mm right ureteropelvic stone causes moderate hydronephrosis  · Start IVFs  · Flomax  · NPO  · Pain medications as needed  · Urology consult in AM

## 2022-09-15 NOTE — ASSESSMENT & PLAN NOTE
Creatine 1 75, baseline around 1 1   Secondary to kidney stone   Start IVFs   Avoid nephrotoxins, hold lisinopril-HCTZ   Monitor with BMP

## 2022-09-15 NOTE — ED PROVIDER NOTES
History  Chief Complaint   Patient presents with    Flank Pain    Abdominal Pain     Started with R side pain for past 24 hours, hx of kidney stone and feels same, no vomiting, diarrhea or contsipation  States burping alot     HPI  Patient is a 61-year-old male history of nephrolithiasis, hypertension presenting for evaluation of right-sided flank pain and right lower quadrant pain for the past 24 hours  Patient states that symptoms were gradual onset yesterday evening  Patient states a continuous 5/10 severity pain, sharp  Patient states that he had dark urine 2 days ago resolved, denies gross hematuria, dysuria, frequency, fevers, chills  Patient states some nausea this morning which resolved, additionally states some belching throughout the day  Patient denies diarrhea, constipation  Patient states that symptoms feel similar to prior episode of nephrolithiasis in January 2022 which required stenting  Prior to Admission Medications   Prescriptions Last Dose Informant Patient Reported?  Taking?   acetaminophen (TYLENOL) 325 mg tablet  Self Yes Yes   Sig: Take 650 mg by mouth every 6 (six) hours as needed for mild pain   amLODIPine (NORVASC) 5 mg tablet 9/14/2022 at Unknown time Self No Yes   Sig: Take 1 tablet (5 mg total) by mouth daily   lisinopril-hydrochlorothiazide (PRINZIDE,ZESTORETIC) 20-25 MG per tablet 9/14/2022 at Unknown time Self No Yes   Sig: TAKE ONE TABLET BY MOUTH EVERY DAY   omeprazole (PriLOSEC) 20 mg delayed release capsule 9/14/2022 at Unknown time Self No Yes   Sig: Take 1 capsule (20 mg total) by mouth daily      Facility-Administered Medications: None       Past Medical History:   Diagnosis Date    Borderline diabetes     per pt A1C "coming down"    Chronic pain disorder     lower back-s/p laminectomy    Claustrophobia     "some degree" jes MRI's"    CPAP (continuous positive airway pressure) dependence     per pt does not use CPAP    Diverticulitis     GERD (gastroesophageal reflux disease)     Hypertension     Kidney stone     x 2 episodes    Lactose intolerance     Localized pain of joint     Mild sleep apnea     CPAP started in July having difficulty using- only has used on occ    Morbid obesity with body mass index (BMI) of 50 0 to 59 9 in adult [facility])     Rectal bleeding     occ rectal bleeding last episode unsure-"nothing recent"    Seasickness     Skin tag     skin tags were removed-as of 8/2/19 has a few more    Teeth missing     Thyroid nodule     Tinnitus     Wears glasses     Weight loss     "80lbs and did gain 40lbs back"--       Past Surgical History:   Procedure Laterality Date    BACK SURGERY      laminectomy-1996, 2010    COLONOSCOPY      x2    ESOPHAGOGASTRODUODENOSCOPY N/A 3/30/2019    Procedure: ESOPHAGOGASTRODUODENOSCOPY (EGD); Surgeon: Josiah Bond MD;  Location: [address] [place] [address];  Service: Gastroenterology    KNEE ARTHROSCOPY Left 2007    IA CYSTO/URETERO W/LITHOTRIPSY &INDWELL STENT INSRT Right 1/6/2022    Procedure: CYSTOSCOPY URETEROSCOPY AND INSERTION STENT URETERAL RIGHT;  Surgeon: Prashanth Rodriguez MD;  Location: [address] [place] [address];  Service: Urology    IA ESOPHAGOGASTRODUODENOSCOPY TRANSORAL DIAGNOSTIC N/A 5/1/2019    Procedure: ESOPHAGOGASTRODUODENOSCOPY (EGD); Surgeon: Josiah Bond MD;  Location: [facility] GI LAB;   Service: Gastroenterology    IA THYROID LOBECTOMY,UNILAT Left 8/31/2022    Procedure: Left patial THYROIDECTOMY;  Surgeon: Juan C Arreola MD;  Location: [facility] OR;  Service: ENT    UPPER GASTROINTESTINAL ENDOSCOPY  2006    US GUIDED THYROID BIOPSY  12/28/2020       Family History   Problem Relation Age of Onset    Osteoarthritis Mother     Obesity Father         kt=[id number]    Sleep apnea Father         with CPAP    No Known Problems Sister     No Known Problems Brother     Lupus Sister     No Known Problems Sister     No Known Problems Son     Substance Abuse Neg Hx     Mental illness Neg Hx     Cancer Neg Hx     Diabetes Neg Hx     Heart disease Neg Hx     Stroke Neg Hx      I have reviewed and agree with the history as documented  E-Cigarette/Vaping    E-Cigarette Use Never User      E-Cigarette/Vaping Substances    Nicotine No     THC No     CBD No     Flavoring No     Other No     Unknown No      Social History     Tobacco Use    Smoking status: Never Smoker    Smokeless tobacco: Never Used   Vaping Use    Vaping Use: Never used   Substance Use Topics    Alcohol use: No    Drug use: No       Review of Systems   Constitutional: Negative for chills, fatigue and fever  HENT: Negative for congestion, rhinorrhea and sore throat  Eyes: Negative for photophobia and visual disturbance  Respiratory: Negative for chest tightness and shortness of breath  Cardiovascular: Negative for chest pain, palpitations and leg swelling  Gastrointestinal: Positive for abdominal pain and nausea  Negative for abdominal distention, diarrhea and vomiting  Endocrine: Negative for polydipsia and polyuria  Genitourinary: Positive for flank pain  Negative for dysuria, frequency, hematuria and urgency  Musculoskeletal: Negative for arthralgias and myalgias  Skin: Negative for color change, pallor, rash and wound  Neurological: Negative for weakness, numbness and headaches  Psychiatric/Behavioral: Negative for confusion  Physical Exam  Physical Exam  Vitals and nursing note reviewed  Constitutional:       General: He is not in acute distress  Appearance: He is well-developed  He is not diaphoretic  Comments: Well-appearing, nondistressed   HENT:      Head: Normocephalic and atraumatic  Comments: Moist mucous membranes     Right Ear: External ear normal       Left Ear: External ear normal       Nose: Nose normal       Mouth/Throat:      Pharynx: No oropharyngeal exudate  Eyes:      Conjunctiva/sclera: Conjunctivae normal       Pupils: Pupils are equal, round, and reactive to light     Cardiovascular: Rate and Rhythm: Normal rate and regular rhythm  Heart sounds: Normal heart sounds  No murmur heard  No friction rub  No gallop  Comments: Regular rate and rhythm, no murmurs rubs or gallops  Extremities warm and well perfused  Pulmonary:      Effort: Pulmonary effort is normal  No respiratory distress  Breath sounds: Normal breath sounds  No wheezing  Comments: No increased work of breathing  Speaking in complete sentences  Satting 96% on room air indicating adequate oxygenation  Chest:      Chest wall: No tenderness  Abdominal:      General: Bowel sounds are normal  There is no distension  Palpations: Abdomen is soft  There is no mass  Tenderness: There is abdominal tenderness  There is no guarding or rebound  Comments: Right lateral mid abdominal tenderness without palpable mass, no rigidity, rebound, guarding  No CVA tenderness  Musculoskeletal:         General: No deformity  Skin:     General: Skin is warm and dry  Capillary Refill: Capillary refill takes less than 2 seconds  Neurological:      Mental Status: He is alert and oriented to person, place, and time        Comments: AAO x4   Psychiatric:         Behavior: Behavior normal          Vital Signs  ED Triage Vitals [09/14/22 2304]   Temperature Pulse Respirations Blood Pressure SpO2   98 4 °F (36 9 °C) 96 (!) 24 (!) 170/101 96 %      Temp Source Heart Rate Source Patient Position - Orthostatic VS BP Location FiO2 (%)   Tympanic Monitor Sitting Right arm --      Pain Score       6           Vitals:    09/15/22 0100 09/15/22 0130 09/15/22 0205 09/15/22 0734   BP: 128/72 131/73 157/83 135/80   Pulse: 73 64 65 65   Patient Position - Orthostatic VS:   Lying Lying         Visual Acuity      ED Medications  Medications   ondansetron (ZOFRAN) injection 4 mg (4 mg Intravenous Not Given 9/14/22 3738)   amLODIPine (NORVASC) tablet 5 mg (5 mg Oral Given 9/15/22 0910)   pantoprazole (PROTONIX) EC tablet 40 mg (40 mg Oral Given 9/15/22 0523)   sodium chloride 0 9 % infusion (125 mL/hr Intravenous New Bag 9/15/22 0229)   acetaminophen (TYLENOL) tablet 650 mg (has no administration in time range)   ondansetron (ZOFRAN) injection 4 mg (has no administration in time range)   heparin (porcine) subcutaneous injection 5,000 Units (5,000 Units Subcutaneous Given 9/15/22 0522)   tamsulosin (FLOMAX) capsule 0 4 mg (0 4 mg Oral Given 9/15/22 0228)   HYDROmorphone (DILAUDID) injection 1 mg (has no administration in time range)   ketorolac (TORADOL) injection 15 mg (15 mg Intravenous Given 9/14/22 2352)   sodium chloride 0 9 % bolus 1,000 mL (1,000 mL Intravenous New Bag 9/14/22 2352)       Diagnostic Studies  Results Reviewed     Procedure Component Value Units Date/Time    Urinalysis with microscopic [[id number]]  (Abnormal) Collected: 09/14/22 2353    Lab Status: Final result Specimen: Urine, Clean Catch Updated: 09/15/22 0046     Color, UA Light Yellow     Clarity, UA Clear     Specific Gravity, UA 1 025     pH, UA 6 0     Leukocytes, UA Negative     Nitrite, UA Negative     Protein, UA Negative mg/dl      Glucose, UA Negative mg/dl      Ketones, UA Negative mg/dl      Urobilinogen, UA 0 2 E U /dl      Bilirubin, UA Negative     Occult Blood, UA Moderate     RBC, UA 1-2 /hpf      WBC, UA 0-1 /hpf      Epithelial Cells None Seen /hpf      Bacteria, UA Occasional /hpf     Comprehensive metabolic panel [[id number]]  (Abnormal) Collected: 09/14/22 2353    Lab Status: Final result Specimen: Blood from Arm, Left Updated: 09/15/22 0028     Sodium 136 mmol/L      Potassium 4 9 mmol/L      Chloride 101 mmol/L      CO2 27 mmol/L      ANION GAP 8 mmol/L      BUN 30 mg/dL      Creatinine 1 75 mg/dL      Glucose 100 mg/dL      Calcium 8 9 mg/dL      AST 37 U/L      ALT 41 U/L      Alkaline Phosphatase 66 U/L      Total Protein 7 7 g/dL      Albumin 3 5 g/dL      Total Bilirubin 0 98 mg/dL      eGFR 41 ml/min/1 73sq m     Narrative:      [employer] guidelines for Chronic Kidney Disease (CKD):     Stage 1 with normal or high GFR (GFR > 90 mL/min/1 73 square meters)    Stage 2 Mild CKD (GFR = 60-89 mL/min/1 73 square meters)    Stage 3A Moderate CKD (GFR = 45-59 mL/min/1 73 square meters)    Stage 3B Moderate CKD (GFR = 30-44 mL/min/1 73 square meters)    Stage 4 Severe CKD (GFR = 15-29 mL/min/1 73 square meters)    Stage 5 End Stage CKD (GFR <15 mL/min/1 73 square meters)  Note: GFR calculation is accurate only with a steady state creatinine    CBC and differential [[id number]]  (Abnormal) Collected: 09/14/22 6588    Lab Status: Final result Specimen: Blood from Arm, Left Updated: 09/15/22 0005     WBC 13 23 Thousand/uL      RBC 4 93 Million/uL      Hemoglobin 14 9 g/dL      Hematocrit 44 6 %      MCV 91 fL      MCH 30 2 pg      MCHC 33 4 g/dL      RDW 13 1 %      MPV 9 5 fL      Platelets 122 Thousands/uL      nRBC 0 /100 WBCs      Neutrophils Relative 75 %      Immat GRANS % 1 %      Lymphocytes Relative 12 %      Monocytes Relative 8 %      Eosinophils Relative 3 %      Basophils Relative 1 %      Neutrophils Absolute 10 10 Thousands/µL      Immature Grans Absolute 0 08 Thousand/uL      Lymphocytes Absolute 1 56 Thousands/µL      Monocytes Absolute 1 10 Thousand/µL      Eosinophils Absolute 0 33 Thousand/µL      Basophils Absolute 0 06 Thousands/µL                  CT renal stone study abdomen pelvis wo contrast   Final Result by Rebecca Masterson MD (09/15 0050)      7 mm right ureteropelvic stone causes moderate hydronephrosis                Workstation performed: [id number]                    Procedures  Procedures         ED Course                                             MDM  Number of Diagnoses or Management Options  KIANA (acute kidney injury) ([facility] [address] )  Flank pain  Ureterolithiasis  Diagnosis management comments: Right-sided abdominal and flank pain similar to prior episodes of nephrolithiasis    Patient denying infectious symptoms  Afebrile with grossly normal vital signs, benign abdominal examination  Plan for CBC, CMP, urinalysis, CT stone study  Patient stating moderate pain in the emergency department  Plan for analgesia with Toradol, Zofran for nausea, fluids  Patient with Cr of 1 7 from baseline 1 1  CT demonstrating 7 mm right sided ureteral stone  Patient admitted for IV fluids, monitoring of renal function, urologic intervention as necessary  Disposition  Final diagnoses:   Ureterolithiasis   Flank pain   KIANA (acute kidney injury) ([facility] [address] )     Time reflects when diagnosis was documented in both MDM as applicable and the Disposition within this note     Time User Action Codes Description Comment    9/15/2022 12:55 AM Clance Naseem Add [N20 1] Ureterolithiasis     9/15/2022 12:55 AM Clance Naseem Add [R10 9] Flank pain     9/15/2022 12:56 AM Clance Naseem Add [N17 9] KIANA (acute kidney injury) [facility])       ED Disposition     ED Disposition   Admit    Condition   Stable    Date/Time   Thu Sep 15, 2022 12:55 AM    Comment   Case was discussed with PAOLO and the patient's admission status was agreed to be Admission Status: observation status to the service of Dr Valerie Houser              Follow-up Information    None         Current Discharge Medication List      CONTINUE these medications which have NOT CHANGED    Details   acetaminophen (TYLENOL) 325 mg tablet Take 650 mg by mouth every 6 (six) hours as needed for mild pain      amLODIPine (NORVASC) 5 mg tablet Take 1 tablet (5 mg total) by mouth daily  Qty: 90 tablet, Refills: 3    Associated Diagnoses: Essential hypertension      lisinopril-hydrochlorothiazide (PRINZIDE,ZESTORETIC) 20-25 MG per tablet TAKE ONE TABLET BY MOUTH EVERY DAY  Qty: 90 tablet, Refills: 3    Associated Diagnoses: Essential hypertension      omeprazole (PriLOSEC) 20 mg delayed release capsule Take 1 capsule (20 mg total) by mouth daily  Qty: 90 capsule, Refills: 3    Associated Diagnoses: Eosinophilic esophagitis             No discharge procedures on file      PDMP Review     None          ED Provider  Electronically Signed by           Sophie Lau MD  09/15/22 [phone]

## 2022-09-16 ENCOUNTER — ANESTHESIA (INPATIENT)
Dept: PERIOP | Facility: HOSPITAL | Age: 57
DRG: 660 | End: 2022-09-16
Payer: COMMERCIAL

## 2022-09-16 ENCOUNTER — APPOINTMENT (OUTPATIENT)
Dept: RADIOLOGY | Facility: HOSPITAL | Age: 57
DRG: 660 | End: 2022-09-16
Payer: COMMERCIAL

## 2022-09-16 ENCOUNTER — ANESTHESIA EVENT (INPATIENT)
Dept: PERIOP | Facility: HOSPITAL | Age: 57
DRG: 660 | End: 2022-09-16
Payer: COMMERCIAL

## 2022-09-16 PROBLEM — R65.10 SIRS (SYSTEMIC INFLAMMATORY RESPONSE SYNDROME) (HCC): Status: ACTIVE | Noted: 2022-09-16

## 2022-09-16 LAB
ANION GAP SERPL CALCULATED.3IONS-SCNC: 5 MMOL/L (ref 4–13)
ANION GAP SERPL CALCULATED.3IONS-SCNC: 8 MMOL/L (ref 4–13)
BUN SERPL-MCNC: 23 MG/DL (ref 5–25)
BUN SERPL-MCNC: 27 MG/DL (ref 5–25)
CALCIUM SERPL-MCNC: 8 MG/DL (ref 8.3–10.1)
CALCIUM SERPL-MCNC: 8.3 MG/DL (ref 8.3–10.1)
CHLORIDE SERPL-SCNC: 102 MMOL/L (ref 96–108)
CHLORIDE SERPL-SCNC: 103 MMOL/L (ref 96–108)
CO2 SERPL-SCNC: 26 MMOL/L (ref 21–32)
CO2 SERPL-SCNC: 26 MMOL/L (ref 21–32)
CREAT SERPL-MCNC: 1.83 MG/DL (ref 0.6–1.3)
CREAT SERPL-MCNC: 2.02 MG/DL (ref 0.6–1.3)
GFR SERPL CREATININE-BSD FRML MDRD: 35 ML/MIN/1.73SQ M
GFR SERPL CREATININE-BSD FRML MDRD: 39 ML/MIN/1.73SQ M
GLUCOSE SERPL-MCNC: 104 MG/DL (ref 65–140)
GLUCOSE SERPL-MCNC: 114 MG/DL (ref 65–140)
GLUCOSE SERPL-MCNC: 89 MG/DL (ref 65–140)
POTASSIUM SERPL-SCNC: 3.9 MMOL/L (ref 3.5–5.3)
POTASSIUM SERPL-SCNC: 4.2 MMOL/L (ref 3.5–5.3)
SARS-COV-2 RNA RESP QL NAA+PROBE: NEGATIVE
SODIUM SERPL-SCNC: 134 MMOL/L (ref 135–147)
SODIUM SERPL-SCNC: 136 MMOL/L (ref 135–147)

## 2022-09-16 PROCEDURE — 80048 BASIC METABOLIC PNL TOTAL CA: CPT | Performed by: INTERNAL MEDICINE

## 2022-09-16 PROCEDURE — 0T768DZ DILATION OF RIGHT URETER WITH INTRALUMINAL DEVICE, VIA NATURAL OR ARTIFICIAL OPENING ENDOSCOPIC: ICD-10-PCS | Performed by: SPECIALIST

## 2022-09-16 PROCEDURE — U0005 INFEC AGEN DETEC AMPLI PROBE: HCPCS | Performed by: SPECIALIST

## 2022-09-16 PROCEDURE — 99232 SBSQ HOSP IP/OBS MODERATE 35: CPT | Performed by: INTERNAL MEDICINE

## 2022-09-16 PROCEDURE — 74420 UROGRAPHY RTRGR +-KUB: CPT

## 2022-09-16 PROCEDURE — BT1DZZZ FLUOROSCOPY OF RIGHT KIDNEY, URETER AND BLADDER: ICD-10-PCS | Performed by: SPECIALIST

## 2022-09-16 PROCEDURE — 82948 REAGENT STRIP/BLOOD GLUCOSE: CPT

## 2022-09-16 PROCEDURE — C1769 GUIDE WIRE: HCPCS | Performed by: SPECIALIST

## 2022-09-16 PROCEDURE — U0003 INFECTIOUS AGENT DETECTION BY NUCLEIC ACID (DNA OR RNA); SEVERE ACUTE RESPIRATORY SYNDROME CORONAVIRUS 2 (SARS-COV-2) (CORONAVIRUS DISEASE [COVID-19]), AMPLIFIED PROBE TECHNIQUE, MAKING USE OF HIGH THROUGHPUT TECHNOLOGIES AS DESCRIBED BY CMS-2020-01-R: HCPCS | Performed by: SPECIALIST

## 2022-09-16 PROCEDURE — C2617 STENT, NON-COR, TEM W/O DEL: HCPCS | Performed by: SPECIALIST

## 2022-09-16 DEVICE — INLAY URETERAL STENT W/O GUIDEWIRE
Type: IMPLANTABLE DEVICE | Site: URETER | Status: NON-FUNCTIONAL
Brand: BARD® INLAY® URETERAL STENT
Removed: 2022-09-22

## 2022-09-16 RX ORDER — FENTANYL CITRATE/PF 50 MCG/ML
50 SYRINGE (ML) INJECTION
Status: DISCONTINUED | OUTPATIENT
Start: 2022-09-16 | End: 2022-09-16 | Stop reason: HOSPADM

## 2022-09-16 RX ORDER — PROPOFOL 10 MG/ML
INJECTION, EMULSION INTRAVENOUS AS NEEDED
Status: DISCONTINUED | OUTPATIENT
Start: 2022-09-16 | End: 2022-09-16

## 2022-09-16 RX ORDER — LEVOFLOXACIN 5 MG/ML
500 INJECTION, SOLUTION INTRAVENOUS ONCE
Status: DISCONTINUED | OUTPATIENT
Start: 2022-09-16 | End: 2022-09-16 | Stop reason: HOSPADM

## 2022-09-16 RX ORDER — SODIUM CHLORIDE, SODIUM LACTATE, POTASSIUM CHLORIDE, CALCIUM CHLORIDE 600; 310; 30; 20 MG/100ML; MG/100ML; MG/100ML; MG/100ML
125 INJECTION, SOLUTION INTRAVENOUS CONTINUOUS
Status: DISCONTINUED | OUTPATIENT
Start: 2022-09-16 | End: 2022-09-17 | Stop reason: HOSPADM

## 2022-09-16 RX ORDER — LEVOFLOXACIN 5 MG/ML
INJECTION, SOLUTION INTRAVENOUS CONTINUOUS PRN
Status: DISCONTINUED | OUTPATIENT
Start: 2022-09-16 | End: 2022-09-16

## 2022-09-16 RX ORDER — ONDANSETRON 2 MG/ML
INJECTION INTRAMUSCULAR; INTRAVENOUS AS NEEDED
Status: DISCONTINUED | OUTPATIENT
Start: 2022-09-16 | End: 2022-09-16

## 2022-09-16 RX ORDER — ONDANSETRON 2 MG/ML
4 INJECTION INTRAMUSCULAR; INTRAVENOUS ONCE AS NEEDED
Status: DISCONTINUED | OUTPATIENT
Start: 2022-09-16 | End: 2022-09-16 | Stop reason: HOSPADM

## 2022-09-16 RX ORDER — SODIUM CHLORIDE 9 MG/ML
125 INJECTION, SOLUTION INTRAVENOUS CONTINUOUS
Status: DISCONTINUED | OUTPATIENT
Start: 2022-09-16 | End: 2022-09-17 | Stop reason: HOSPADM

## 2022-09-16 RX ORDER — MIDAZOLAM HYDROCHLORIDE 2 MG/2ML
INJECTION, SOLUTION INTRAMUSCULAR; INTRAVENOUS AS NEEDED
Status: DISCONTINUED | OUTPATIENT
Start: 2022-09-16 | End: 2022-09-16

## 2022-09-16 RX ORDER — LIDOCAINE HYDROCHLORIDE 10 MG/ML
INJECTION, SOLUTION EPIDURAL; INFILTRATION; INTRACAUDAL; PERINEURAL AS NEEDED
Status: DISCONTINUED | OUTPATIENT
Start: 2022-09-16 | End: 2022-09-16

## 2022-09-16 RX ORDER — LEVOFLOXACIN 5 MG/ML
500 INJECTION, SOLUTION INTRAVENOUS ONCE
Status: CANCELLED | OUTPATIENT
Start: 2022-09-22 | End: 2022-09-16

## 2022-09-16 RX ORDER — FENTANYL CITRATE 50 UG/ML
INJECTION, SOLUTION INTRAMUSCULAR; INTRAVENOUS AS NEEDED
Status: DISCONTINUED | OUTPATIENT
Start: 2022-09-16 | End: 2022-09-16

## 2022-09-16 RX ORDER — MAGNESIUM HYDROXIDE 1200 MG/15ML
LIQUID ORAL AS NEEDED
Status: DISCONTINUED | OUTPATIENT
Start: 2022-09-16 | End: 2022-09-16 | Stop reason: HOSPADM

## 2022-09-16 RX ADMIN — HYDROMORPHONE HYDROCHLORIDE 1 MG: 1 INJECTION, SOLUTION INTRAMUSCULAR; INTRAVENOUS; SUBCUTANEOUS at 13:15

## 2022-09-16 RX ADMIN — PROPOFOL 250 MG: 10 INJECTION, EMULSION INTRAVENOUS at 17:46

## 2022-09-16 RX ADMIN — LEVOFLOXACIN: 5 INJECTION, SOLUTION INTRAVENOUS at 17:49

## 2022-09-16 RX ADMIN — HYDROMORPHONE HYDROCHLORIDE 1 MG: 1 INJECTION, SOLUTION INTRAMUSCULAR; INTRAVENOUS; SUBCUTANEOUS at 21:23

## 2022-09-16 RX ADMIN — SODIUM CHLORIDE, SODIUM LACTATE, POTASSIUM CHLORIDE, AND CALCIUM CHLORIDE 125 ML/HR: .6; .31; .03; .02 INJECTION, SOLUTION INTRAVENOUS at 19:39

## 2022-09-16 RX ADMIN — ONDANSETRON 4 MG: 2 INJECTION INTRAMUSCULAR; INTRAVENOUS at 17:56

## 2022-09-16 RX ADMIN — HEPARIN SODIUM 5000 UNITS: 5000 INJECTION INTRAVENOUS; SUBCUTANEOUS at 21:17

## 2022-09-16 RX ADMIN — FENTANYL CITRATE 25 MCG: 50 INJECTION, SOLUTION INTRAMUSCULAR; INTRAVENOUS at 17:53

## 2022-09-16 RX ADMIN — AMLODIPINE BESYLATE 5 MG: 5 TABLET ORAL at 08:19

## 2022-09-16 RX ADMIN — MIDAZOLAM 2 MG: 1 INJECTION INTRAMUSCULAR; INTRAVENOUS at 17:41

## 2022-09-16 RX ADMIN — LIDOCAINE HYDROCHLORIDE 50 MG: 10 INJECTION, SOLUTION EPIDURAL; INFILTRATION; INTRACAUDAL; PERINEURAL at 17:46

## 2022-09-16 RX ADMIN — FENTANYL CITRATE 25 MCG: 50 INJECTION, SOLUTION INTRAMUSCULAR; INTRAVENOUS at 17:59

## 2022-09-16 RX ADMIN — HEPARIN SODIUM 5000 UNITS: 5000 INJECTION INTRAVENOUS; SUBCUTANEOUS at 14:11

## 2022-09-16 RX ADMIN — FENTANYL CITRATE 50 MCG: 50 INJECTION, SOLUTION INTRAMUSCULAR; INTRAVENOUS at 17:49

## 2022-09-16 RX ADMIN — PANTOPRAZOLE SODIUM 40 MG: 40 TABLET, DELAYED RELEASE ORAL at 05:18

## 2022-09-16 RX ADMIN — SODIUM CHLORIDE 125 ML/HR: 0.9 INJECTION, SOLUTION INTRAVENOUS at 10:17

## 2022-09-16 NOTE — ASSESSMENT & PLAN NOTE
Creatine 1 75, baseline around 1 1  Secondary to obstructing kidney stone  Patient has been on normal saline 125 a mL per hour but had worsening creatinine up to 2 which again improved slightly to 1 8 this afternoon  Continue IV hydration  Avoid nephrotoxic agents and hypotension  Patient to be scheduled for cystoscopy with  stent placement today

## 2022-09-16 NOTE — PROGRESS NOTES
Progress Note - Urology      Patient: Jacky Snow   : 1965 Sex: male   MRN: [id number]     CSN: [id number]  Unit/Bed#: [address] [place] [address]     SUBJECTIVE:   No pain  Renal failure  Should have stent     Objective   Vitals: /79   Pulse 61   Temp 98 °F (36 7 °C) (Oral)   Resp 18   Ht 6' 4" (1 93 m)   Wt (!) 177 kg (390 lb)   SpO2 96%   BMI 47 47 kg/m²     I/O last 24 hours: In: 910 [P O :900;  I V :10]  Out: 1200 [Urine:1200]      Physical Exam:   General Alert awake   Normocephalic atraumatic PERRLA  Lungs clear bilaterally  Cardiac normal S1 normal S2  Abdomen soft, flank pain  Extremities no edema      Lab Results: CBC:   Lab Results   Component Value Date    WBC 9 96 09/15/2022    HGB 14 6 09/15/2022    HCT 43 9 09/15/2022    MCV 90 09/15/2022     09/15/2022    ADJUSTEDWBC 9 00 2016    MCH 30 0 09/15/2022    MCHC 33 3 09/15/2022    RDW 13 0 09/15/2022    MPV 9 2 09/15/2022    NRBC 0 09/15/2022     CMP:   Lab Results   Component Value Date     2022     04/15/2019    CO2 26 2022    CO2 23 04/15/2019    BUN 23 2022    BUN 23 04/15/2019    CREATININE 1 83 (H) 2022    CALCIUM 8 0 (L) 2022    AST 37 2022    AST 25 12/10/2018    ALT 41 2022    ALT 29 12/10/2018    ALKPHOS 66 2022    EGFR 39 2022     Urinalysis:   Lab Results   Component Value Date    COLORU Light Yellow 2022    CLARITYU Clear 2022    SPECGRAV 1 025 2022    PHUR 6 0 2022    PHUR 6 5 2018    LEUKOCYTESUR Negative 2022    NITRITE Negative 2022    GLUCOSEU Negative 2022    KETONESU Negative 2022    BILIRUBINUR Negative 2022    BLOODU Moderate (A) 2022     Urine Culture: No results found for: URINECX  PSA:   Lab Results   Component Value Date    PSA 2 4 2020    PSA 2 2 12/10/2018         Assessment/ Plan:  Right hydronephrosis  Right 8mm stone  Renal failure  Npo cysto right retrograde/stent          Jesse Lind MD

## 2022-09-16 NOTE — PERIOPERATIVE NURSING NOTE
Received from OR  Pt  Awake, alert  VSS  18French diaz patent for clear orange urine  Pt denies pain at present

## 2022-09-16 NOTE — OP NOTE
OPERATIVE REPORT  PATIENT NAME: Mehreen Monday    :  1965  MRN: [id number]  Pt Location: [facility] OR ROOM 03    SURGERY DATE: 2022    Surgeon(s) and Role:     * Arik Miguel MD - Primary    Preop Diagnosis:  Ureterolithiasis [N20 1]  Right 7 mm UPJ stone  Moderate hydronephrosis  Renal failure      Post-Op Diagnosis Codes:     * Ureterolithiasis [N20 1]    Procedure(s) (LRB):  CYSTOSCOPY RETROGRADE PYELOGRAM WITH INSERTION STENT URETERAL retrograde (Right)    Specimen(s):  * No specimens in log *    Estimated Blood Loss:   Minimal    Drains:  Urethral Catheter Latex 18 Fr  (Active)   Number of days: 0       Ureteral Internal Stent Right ureter 6 Fr  (Active)   Number of days: 0       Anesthesia Type:   General/LMA    Operative Indications:  Ureterolithiasis [N20 1]  This 57-year-old male known to me with history kidney stones presented to [facility] with acute onset right flank pain found to have 7 mm right UPJ stone with moderate hydronephrosis admitted seen by the bedside yesterday started on medical expulsion therapy noting minimal pain overnight but increasing renal failure patient is times to undergo cysto right retrograde stent placement in light of renal failure and possibility of another attack of colic KUB does not show stone urine pH 5 0 consistent with uric acid stone    Operative Findings:  Bilobar 3 0 cm obstructing prostatic urethra right retrograde confirms faint stone seen in the pelvis 24 cm 6 Nigerian stent past patient poor candidate for shockwave litho in light of poor visibility even with contrast would need to consider right ureteroscopy stone extraction or alkalinization over a 3 month  Complications:   None    Procedure and Technique:  Patient identified the holding area right side marked consent signed placed op suite after anesthesia induced placed in thigh position draped prep standard fashion time-out performed    A 22 Nigerian cystoscope passed through through the bladder  Anterior showed malaise    Posterior the confirmed 3 0 cm trilobar obstructing prostatic urethra the scope was inserted to the bladder lumen 5 Slovenian open catheter placed to the right orifice right retrograde pyelogram confirmed filling of the distal mid proximal ureter with moderate hydro pelvis the stone appeared to be now in the middle pole calyx a 0 038 wire was placed up into the right renal pelvis and a 24 cm 6 Slovenian stent was passed wire removed 18 Slovenian Coombs catheter inserted to bag drainage postop procedure awakened taken recovery room stable condition   I was present for the entire procedure    Patient Disposition:  PACU         SIGNATURE: [unfilled]  DATE: September 16, 2022  TIME: 6:04 PM

## 2022-09-16 NOTE — ASSESSMENT & PLAN NOTE
As evidenced by tachycardia, tachypnea and elevated white count  No clinical evidence of any infection  White count has normalized with IV hydration

## 2022-09-16 NOTE — ADDENDUM NOTE
Addendum  created 09/16/22 1833 by Yelena Moss MD    [address] recorded in [address] [place] [address], [place] [address] filed

## 2022-09-16 NOTE — ASSESSMENT & PLAN NOTE
Patient presents with right sided abdominal pain beginning yesterday afternoon  · CT a/p shows 7 mm right ureteropelvic stone causes moderate hydronephrosis  · Patient has been on normal saline 125 mL/hour on Flomax 0 4 milligram p o  Daily  · Patient is asymptomatic but creatinine is getting worse  · Urology input appreciated  · Patient to be scheduled for cystoscopy with stent placement today    Patient will need outpatient follow-up for lithotripsy and stent removal

## 2022-09-16 NOTE — ANESTHESIA PREPROCEDURE EVALUATION
Procedure:  CYSTOSCOPY RETROGRADE PYELOGRAM WITH INSERTION STENT URETERAL retrograde (Right Bladder)    Relevant Problems   ANESTHESIA (within normal limits)      CARDIO   (+) Hypertension      GI/HEPATIC   (+) GERD (gastroesophageal reflux disease)      /RENAL   (+) Acute kidney injury ([address] )   (+) Chronic kidney disease   (+) Ureteral stone with hydronephrosis      MUSCULOSKELETAL   (+) Chronic midline low back pain without sciatica   (+) Osteoarthritis   (+) Primary osteoarthritis of right knee   (+) Sciatica      NEURO/PSYCH   (+) Anxiety   (+) Chronic midline low back pain without sciatica   (+) Chronic pain syndrome      PULMONARY   (+) DOMINIC (obstructive sleep apnea)        Physical Exam    Airway    Mallampati score: II  TM Distance: >3 FB  Neck ROM: full     Dental   No notable dental hx     Cardiovascular  Rhythm: regular, Rate: normal,     Pulmonary  Breath sounds clear to auscultation,     Other Findings        Anesthesia Plan  ASA Score- 3 Emergent    Anesthesia Type- general with ASA Monitors  Additional Monitors:   Airway Plan: LMA  Plan Factors-Exercise tolerance (METS): >4 METS  Chart reviewed  EKG reviewed  Existing labs reviewed  Patient summary reviewed  Patient is not a current smoker  Induction- intravenous  Postoperative Plan- Plan for postoperative opioid use  Planned trial extubation    Informed Consent- Anesthetic plan and risks discussed with patient  I personally reviewed this patient with the CRNA  Discussed and agreed on the Anesthesia Plan with the CRNA  Adarsh Stinson

## 2022-09-16 NOTE — PROGRESS NOTES
[address]  Progress Note - Shantell March 1965, 59 y o  male MRN: [id number]  Unit/Bed#: [place] Encounter: [id number]  Primary Care Provider: Jorge L Santos MD   Date and time admitted to hospital: 9/14/2022 11:08 PM    * Ureteral stone with hydronephrosis  Assessment & Plan  Patient presents with right sided abdominal pain beginning yesterday afternoon  · CT a/p shows 7 mm right ureteropelvic stone causes moderate hydronephrosis  · Patient has been on normal saline 125 mL/hour on Flomax 0 4 milligram p o  Daily  · Patient is asymptomatic but creatinine is getting worse  · Urology input appreciated  · Patient to be scheduled for cystoscopy with stent placement today  Patient will need outpatient follow-up for lithotripsy and stent removal    Acute kidney injury [facility])  Assessment & Plan  Creatine 1 75, baseline around 1 1  Secondary to obstructing kidney stone  Patient has been on normal saline 125 a mL per hour but had worsening creatinine up to 2 which again improved slightly to 1 8 this afternoon  Continue IV hydration  Avoid nephrotoxic agents and hypotension  Patient to be scheduled for cystoscopy with  stent placement today    SIRS (systemic inflammatory response syndrome) ([facility] [address] )  Assessment & Plan  As evidenced by tachycardia, tachypnea and elevated white count  No clinical evidence of any infection  White count has normalized with IV hydration    DOMINIC (obstructive sleep apnea)  Assessment & Plan  DOMINIC, not able to tolerate CPAP at home    Hypertension  Assessment & Plan  Continue amlodipine,   Lisinopril/hydrochlorothiazide has been on hold in the setting of KIANA    GERD (gastroesophageal reflux disease)  Assessment & Plan  Continue PPI          VTE Pharmacologic Prophylaxis: VTE Score: 3 Moderate Risk (Score 3-4) - Pharmacological DVT Prophylaxis Ordered: heparin  Patient Centered Rounds: I performed bedside rounds with nursing staff today    Discussions with Specialists or Other Care Team Provider: Dr Tammie Fung and Discussions with Family / Patient: yes    Time Spent for Care: 45 minutes  More than 50% of total time spent on counseling and coordination of care as described above  Current Length of Stay: 0 day(s)  Current Patient Status: Inpatient   Certification Statement: The patient will continue to require additional inpatient hospital stay due to Left renal colic with acute kidney injury  Discharge Plan: Anticipate discharge in 24-48 hrs to home  Code Status: Level 1 - Full Code    Subjective:   Patient denies any abdominal pain, nausea, vomiting or urinary complaints    Objective:     Vitals:   Temp (24hrs), Av 2 °F (36 8 °C), Min:98 °F (36 7 °C), Max:98 4 °F (36 9 °C)    Temp:  [98 °F (36 7 °C)-98 4 °F (36 9 °C)] 98 °F (36 7 °C)  HR:  [61-95] 82  Resp:  [16-24] 22  BP: (131-161)/(66-92) 131/66  SpO2:  [94 %-96 %] 96 %  Body mass index is 47 47 kg/m²  Input and Output Summary (last 24 hours): Intake/Output Summary (Last 24 hours) at 2022 1837  Last data filed at 2022 1805  Gross per 24 hour   Intake 700 ml   Output --   Net 700 ml       Physical Exam:   Physical Exam  Constitutional:       Appearance: Normal appearance  HENT:      Head: Normocephalic and atraumatic  Eyes:      Extraocular Movements: Extraocular movements intact  Pupils: Pupils are equal, round, and reactive to light  Cardiovascular:      Rate and Rhythm: Normal rate and regular rhythm  Heart sounds: No murmur heard  No gallop  Pulmonary:      Effort: Pulmonary effort is normal       Breath sounds: Normal breath sounds  Abdominal:      General: Bowel sounds are normal       Palpations: Abdomen is soft  Tenderness: There is no abdominal tenderness  Musculoskeletal:         General: No swelling or deformity  Normal range of motion  Cervical back: Normal range of motion and neck supple  Skin:     General: Skin is warm and dry  Neurological:      General: No focal deficit present  Mental Status: He is alert  Additional Data:     Labs:  Results from last 7 days   Lab Units 09/15/22  0659   WBC Thousand/uL 9 96   HEMOGLOBIN g/dL 14 6   HEMATOCRIT % 43 9   PLATELETS Thousands/uL 209   NEUTROS PCT % 69   LYMPHS PCT % 17   MONOS PCT % 9   EOS PCT % 3     Results from last 7 days   Lab Units 09/16/22  1417 09/15/22  0659 09/14/22  2353   SODIUM mmol/L 134*   < > 136   POTASSIUM mmol/L 3 9   < > 4 9   CHLORIDE mmol/L 103   < > 101   CO2 mmol/L 26   < > 27   BUN mg/dL 23   < > 30*   CREATININE mg/dL 1 83*   < > 1 75*   ANION GAP mmol/L 5   < > 8   CALCIUM mg/dL 8 0*   < > 8 9   ALBUMIN g/dL  --   --  3 5   TOTAL BILIRUBIN mg/dL  --   --  0 98   ALK PHOS U/L  --   --  66   ALT U/L  --   --  41   AST U/L  --   --  37   GLUCOSE RANDOM mg/dL 104   < > 100    < > = values in this interval not displayed  Lines/Drains:  Invasive Devices  Report    Peripheral Intravenous Line  Duration           Peripheral IV 09/14/22 Left Antecubital 1 day          Drain  Duration           Ureteral Internal Stent Right ureter 6 Fr  <1 day    Urethral Catheter Latex 18 Fr  <1 day              Urinary Catheter:  Goal for removal: No longer needed   Will place order to discontinue               Imaging: Reviewed radiology reports from this admission including: abdominal/pelvic CT    Recent Cultures (last 7 days):         Last 24 Hours Medication List:   Current Facility-Administered Medications   Medication Dose Route Frequency Provider Last Rate    [MAR Hold] acetaminophen  650 mg Oral Q6H PRN Shey Chavis PA-C      [MAR Hold] amLODIPine  5 mg Oral Daily Jennifer IBETH Hamilton      fentaNYL  50 mcg Intravenous Q10 Min PRN Jermaine Garcia MD      [facility] Hold] heparin (porcine)  5,000 Units Subcutaneous Q8H [address] Jennifer Hamilton PA-C      [MAR Hold] HYDROmorphone  1 mg Intravenous Q4H PRN Silas Kendrick MD      levofloxacin 500 mg Intravenous Once Ernst Olvera MD      [facility] Hold] ondansetron  4 mg Intravenous Once Jacinda Acuña PA-C      [facility] Hold] ondansetron  4 mg Intravenous Q6H PRN Jacinda Acuña PA-C      ondansetron  4 mg Intravenous Once PRN Bridger Giles MD      [facility] Hold] pantoprazole  40 mg Oral Early Morning Jennifer IBETH Hamilton      sodium chloride  125 mL/hr Intravenous Continuous aMtthieu Stallworth  mL/hr (09/16/22 1017)    [MAR Hold] tamsulosin  0 4 mg Oral Daily With Skip Bernard PA-C          Today, Patient Was Seen By: Matthieu Stallworth MD    **Please Note: This note may have been constructed using a voice recognition system  **

## 2022-09-16 NOTE — ANESTHESIA POSTPROCEDURE EVALUATION
Post-Op Assessment Note    CV Status:  Stable  Pain Score: 1    Pain management: adequate     Mental Status:  Awake and alert   Hydration Status:  Stable   PONV Controlled:  Controlled   Airway Patency:  Patent      Post Op Vitals Reviewed: Yes      Staff: Anesthesiologist         No complications documented      /92 (09/16/22 1812)    Temp      Pulse 95 (09/16/22 1812)   Resp (!) 24 (09/16/22 1812)    SpO2 94 % (09/16/22 1812)

## 2022-09-17 ENCOUNTER — HOSPITAL ENCOUNTER (EMERGENCY)
Facility: HOSPITAL | Age: 57
Discharge: HOME | End: 2022-09-17
Attending: EMERGENCY MEDICINE
Payer: COMMERCIAL

## 2022-09-17 ENCOUNTER — APPOINTMENT (OUTPATIENT)
Dept: RADIOLOGY | Facility: HOSPITAL | Age: 57
End: 2022-09-17
Payer: COMMERCIAL

## 2022-09-17 VITALS
SYSTOLIC BLOOD PRESSURE: 158 MMHG | TEMPERATURE: 99.2 F | HEART RATE: 100 BPM | DIASTOLIC BLOOD PRESSURE: 106 MMHG | OXYGEN SATURATION: 97 % | RESPIRATION RATE: 24 BRPM

## 2022-09-17 VITALS
WEIGHT: 315 LBS | SYSTOLIC BLOOD PRESSURE: 176 MMHG | DIASTOLIC BLOOD PRESSURE: 98 MMHG | TEMPERATURE: 97.7 F | HEART RATE: 66 BPM | OXYGEN SATURATION: 96 % | RESPIRATION RATE: 19 BRPM | HEIGHT: 76 IN | BODY MASS INDEX: 38.36 KG/M2

## 2022-09-17 DIAGNOSIS — M25.561 ACUTE PAIN OF RIGHT KNEE: Primary | ICD-10-CM

## 2022-09-17 LAB
ANION GAP SERPL CALCULATED.3IONS-SCNC: 7 MMOL/L (ref 4–13)
BUN SERPL-MCNC: 19 MG/DL (ref 5–25)
CALCIUM SERPL-MCNC: 8.5 MG/DL (ref 8.3–10.1)
CHLORIDE SERPL-SCNC: 105 MMOL/L (ref 96–108)
CO2 SERPL-SCNC: 26 MMOL/L (ref 21–32)
CREAT SERPL-MCNC: 1.61 MG/DL (ref 0.6–1.3)
GFR SERPL CREATININE-BSD FRML MDRD: 46 ML/MIN/1.73SQ M
GLUCOSE SERPL-MCNC: 99 MG/DL (ref 65–140)
POTASSIUM SERPL-SCNC: 4.2 MMOL/L (ref 3.5–5.3)
SODIUM SERPL-SCNC: 138 MMOL/L (ref 135–147)

## 2022-09-17 PROCEDURE — 73564 X-RAY EXAM KNEE 4 OR MORE: CPT

## 2022-09-17 PROCEDURE — 99282 EMERGENCY DEPT VISIT SF MDM: CPT | Performed by: EMERGENCY MEDICINE

## 2022-09-17 PROCEDURE — 99239 HOSP IP/OBS DSCHRG MGMT >30: CPT | Performed by: INTERNAL MEDICINE

## 2022-09-17 PROCEDURE — 80048 BASIC METABOLIC PNL TOTAL CA: CPT | Performed by: INTERNAL MEDICINE

## 2022-09-17 PROCEDURE — 99283 EMERGENCY DEPT VISIT LOW MDM: CPT

## 2022-09-17 RX ORDER — OXYCODONE HYDROCHLORIDE AND ACETAMINOPHEN 5; 325 MG/1; MG/1
1 TABLET ORAL EVERY 8 HOURS PRN
Qty: 30 TABLET | Refills: 0 | Status: SHIPPED | OUTPATIENT
Start: 2022-09-17

## 2022-09-17 RX ORDER — TAMSULOSIN HYDROCHLORIDE 0.4 MG/1
0.4 CAPSULE ORAL
Qty: 10 CAPSULE | Refills: 0 | Status: SHIPPED | OUTPATIENT
Start: 2022-09-17 | End: 2022-09-22

## 2022-09-17 RX ORDER — LISINOPRIL AND HYDROCHLOROTHIAZIDE 25; 20 MG/1; MG/1
1 TABLET ORAL DAILY
Qty: 90 TABLET | Refills: 0 | Status: SHIPPED | OUTPATIENT
Start: 2022-09-18

## 2022-09-17 RX ADMIN — HYDROMORPHONE HYDROCHLORIDE 1 MG: 1 INJECTION, SOLUTION INTRAMUSCULAR; INTRAVENOUS; SUBCUTANEOUS at 10:52

## 2022-09-17 RX ADMIN — HEPARIN SODIUM 5000 UNITS: 5000 INJECTION INTRAVENOUS; SUBCUTANEOUS at 14:46

## 2022-09-17 RX ADMIN — HEPARIN SODIUM 5000 UNITS: 5000 INJECTION INTRAVENOUS; SUBCUTANEOUS at 05:24

## 2022-09-17 RX ADMIN — TAMSULOSIN HYDROCHLORIDE 0.4 MG: 0.4 CAPSULE ORAL at 16:43

## 2022-09-17 RX ADMIN — PANTOPRAZOLE SODIUM 40 MG: 40 TABLET, DELAYED RELEASE ORAL at 05:24

## 2022-09-17 RX ADMIN — AMLODIPINE BESYLATE 5 MG: 5 TABLET ORAL at 08:09

## 2022-09-17 NOTE — ASSESSMENT & PLAN NOTE
Continue amlodipine,   Lisinopril hydrochlorothiazide have been on hold which can be restarted tomorrow

## 2022-09-17 NOTE — ASSESSMENT & PLAN NOTE
As evidenced by tachycardia, tachypnea and elevated white count  No clinical evidence of any infection  White count has normalized

## 2022-09-17 NOTE — ASSESSMENT & PLAN NOTE
Patient presents with right sided abdominal pain beginning yesterday afternoon  · CT a/p shows 7 mm right ureteropelvic stone causes moderate hydronephrosis  · Patient has been on normal saline 125 mL/hour on Flomax 0 4 milligram p o   Daily  · Patient is asymptomatic but creatinine is getting worse  · Urology input appreciated  · Patient underwent cystoscopy with retrograde pyelogram and insertion of right ureteral stent  · Patient to follow-up with Urology for ureteroscopy with possible lithotripsy as outpatient

## 2022-09-17 NOTE — NURSING NOTE
Discharge instructions given to patient  No questions at this time  Patient left stable, all belongings at side

## 2022-09-17 NOTE — ASSESSMENT & PLAN NOTE
Creatine 1 75, baseline around 1 1  Secondary to obstructing kidney stone  Patient has been on normal saline 125 a mL per hour but had worsening creatinine up to 2 which again improved slightly to 1 8 this afternoon  Creatinine improving with IV hydration  Patient underwent cystoscopy with placement of ureteral stent  Repeat BMP in 5 days  Continue to hold lisinopril/hydrochlorothiazide for another day

## 2022-09-17 NOTE — DISCHARGE SUMMARY
[address]  Discharge- Crystal Moore 1965, 59 y o  male MRN: [id number]  Unit/Bed#: [address] [place] [address] Encounter: [id number]  Primary Care Provider: Rachele Merritt MD   Date and time admitted to hospital: 9/14/2022 11:08 PM    * Ureteral stone with hydronephrosis  Assessment & Plan  Patient presents with right sided abdominal pain beginning yesterday afternoon  · CT a/p shows 7 mm right ureteropelvic stone causes moderate hydronephrosis  · Patient has been on normal saline 125 mL/hour on Flomax 0 4 milligram p o   Daily  · Patient is asymptomatic but creatinine is getting worse  · Urology input appreciated  · Patient underwent cystoscopy with retrograde pyelogram and insertion of right ureteral stent  · Patient to follow-up with Urology for ureteroscopy with possible lithotripsy as outpatient    Acute kidney injury [facility])  Assessment & Plan  Creatine 1 75, baseline around 1 1  Secondary to obstructing kidney stone  Patient has been on normal saline 125 a mL per hour but had worsening creatinine up to 2 which again improved slightly to 1 8 this afternoon  Creatinine improving with IV hydration  Patient underwent cystoscopy with placement of ureteral stent  Repeat BMP in 5 days  Continue to hold lisinopril/hydrochlorothiazide for another day    SIRS (systemic inflammatory response syndrome) ([facility] [address] )  Assessment & Plan  As evidenced by tachycardia, tachypnea and elevated white count  No clinical evidence of any infection  White count has normalized    DOMINIC (obstructive sleep apnea)  Assessment & Plan  DOMINIC, not able to tolerate CPAP at home    Hypertension  Assessment & Plan  Continue amlodipine,   Lisinopril hydrochlorothiazide have been on hold which can be restarted tomorrow    GERD (gastroesophageal reflux disease)  Assessment & Plan  Continue PPI        Medical Problems             Resolved Problems  Date Reviewed: 9/17/2022   None               Discharging Physician / Practitioner: Lindsey Valladares MD  PCP: Adriana Singleton MD  Admission Date:   Admission Orders (From admission, onward)     Ordered        09/16/22 1022  Inpatient Admission  Once            09/15/22 0056  Place in Observation  Once                      Discharge Date: 09/17/22    Consultations During Hospital Stay:  · Urology    Procedures Performed:   · Cystoscopy with placement of right ureteral stent       Outpatient Tests Requested:  · Follow-up with Urology for lithotripsy    Complications:  None    Reason for Admission:  Abdominal pain    Hospital Course:   Philippe Valentin is a 59 y o  male patient with past medical history of GERD, hypertension, obstructive sleep apnea who originally presented to the hospital on 9/14/2022 due to abdominal pain and flank pain  In the ED patient had elevated creatinine and CT scan showed 7 millimeter right ureteropelvic stone causing moderate hydronephrosis  Patient was seen by Urology  Patient noted to have worsening creatinine patient underwent cystoscopy with placement of right ureteral stent  Creatinine was improving postprocedure  Patient remained stable and asymptomatic and will be discharged home with outpatient follow-up with Urology for lithotripsy  Please see above list of diagnoses and related plan for additional information  Condition at Discharge: stable    Discharge Day Visit / Exam:   Subjective:  Patient is feeling well  Denies any abdominal pain, nausea or vomiting  Patient had Coombs catheter postprocedure which was removed after which he has been urinating well  Vitals: Blood Pressure: (!) 176/98 (09/17/22 0806)  Pulse: 66 (09/17/22 0806)  Temperature: 97 7 °F (36 5 °C) (09/17/22 0806)  Temp Source: Oral (09/17/22 0806)  Respirations: 19 (09/17/22 0806)  Height: 6' 4" (193 cm) (09/15/22 0205)  Weight - Scale: (!) 177 kg (390 lb) (09/15/22 0205)  SpO2: 96 % (09/17/22 0806)  Exam:   Physical Exam  Constitutional:       Appearance: Normal appearance  HENT:      Head: Normocephalic and atraumatic  Eyes:      Extraocular Movements: Extraocular movements intact  Pupils: Pupils are equal, round, and reactive to light  Cardiovascular:      Rate and Rhythm: Normal rate and regular rhythm  Heart sounds: No murmur heard  No gallop  Pulmonary:      Effort: Pulmonary effort is normal       Breath sounds: Normal breath sounds  Abdominal:      General: Bowel sounds are normal       Palpations: Abdomen is soft  Tenderness: There is no abdominal tenderness  Musculoskeletal:         General: No swelling or deformity  Normal range of motion  Cervical back: Normal range of motion and neck supple  Skin:     General: Skin is warm and dry  Neurological:      General: No focal deficit present  Mental Status: He is alert  Discharge instructions/Information to patient and family:   See after visit summary for information provided to patient and family  Provisions for Follow-Up Care:  See after visit summary for information related to follow-up care and any pertinent home health orders  Disposition:   Home    Planned Readmission: No     Discharge Statement:  I spent 35 minutes discharging the patient  This time was spent on the day of discharge  I had direct contact with the patient on the day of discharge  Greater than 50% of the total time was spent examining patient, answering all patient questions, arranging and discussing plan of care with patient as well as directly providing post-discharge instructions  Additional time then spent on discharge activities  Discharge Medications:  See after visit summary for reconciled discharge medications provided to patient and/or family        **Please Note: This note may have been constructed using a voice recognition system**

## 2022-09-17 NOTE — UTILIZATION REVIEW
Continued Stay Review    Date: 5- 17-22                       Current Patient Class: inpatient  Current Level of Care: med surg     HPI:57 y o  male initially admitted on 9-15-22     Assessment/Plan:     SURGERY DATE: 9/16/2022    Preop Diagnosis:  Ureterolithiasis [N20 1]  Right 7 mm UPJ stone  Moderate hydronephrosis  Renal failure  Procedure(s) (LRB):  CYSTOSCOPY RETROGRADE PYELOGRAM WITH INSERTION STENT URETERAL retrograde (Right)    Estimated Blood Loss:   Minimal    Anesthesia Type:   General/LMA     Operative Indications:  Ureterolithiasis [N20 1]  This 66-year-old male known to me with history kidney stones presented to [facility] with acute onset right flank pain found to have 7 mm right UPJ stone with moderate hydronephrosis admitted seen by the bedside yesterday started on medical expulsion therapy noting minimal pain overnight but increasing renal failure patient is times to undergo cysto right retrograde stent placement in light of renal failure and possibility of another attack of colic KUB does not show stone urine pH 5 0 consistent with uric acid stone     Operative Findings:  Bilobar 3 0 cm obstructing prostatic urethra right retrograde confirms faint stone seen in the pelvis 24 cm 6 Hungarian stent past patient poor candidate for shockwave litho in light of poor visibility even with contrast would need to consider right ureteroscopy stone extraction or alkalinization over a 3 month      Complications:   None    9-17-22 inpatient post op day 1  Creatinine trending down  Continue iv fluids 125/hr  Urology plans to dc diaz and schedule right ureteroscopy on 9-22  Pain 7/10  Received iv dilaudid 2123 and 1052  Due to void          Vital Signs:     Date  /Time Temp Pulse Resp BP MAP (mmHg) SpO2 O2 Device   09/17/22 0806 97 7 °F (36 5 °C) 66 19 176/98   Abnormal  128 96 % None (Room air)   09/17/22 0400 98 2 °F (36 8 °C) 62 19 147/89 -- 98 % None (Room air)               Pertinent Labs/Diagnostic Results:   Results from last 7 days   Lab Units 09/16/22  1602   SARS-COV-2  Negative     Results from last 7 days   Lab Units 09/15/22  0659 09/14/22  2353   WBC Thousand/uL 9 96 13 23*   HEMOGLOBIN g/dL 14 6 14 9   HEMATOCRIT % 43 9 44 6   PLATELETS Thousands/uL 209 243   NEUTROS ABS Thousands/µL 6 90 10 10*         Results from last 7 days   Lab Units 09/17/22  0554 09/16/22  1417 09/16/22  0533 09/15/22  0659 09/14/22  2353   SODIUM mmol/L 138 134* 136 137 136   POTASSIUM mmol/L 4 2 3 9 4 2 3 7 4 9   CHLORIDE mmol/L 105 103 102 102 101   CO2 mmol/L 26 26 26 25 27   ANION GAP mmol/L 7 5 8 10 8   BUN mg/dL 19 23 27* 29* 30*   CREATININE mg/dL 1 61* 1 83* 2 02* 1 76* 1 75*   EGFR ml/min/1 73sq m 46 39 35 41 41   CALCIUM mg/dL 8 5 8 0* 8 3 8 5 8 9     Results from last 7 days   Lab Units 09/14/22  2353   AST U/L 37   ALT U/L 41   ALK PHOS U/L 66   TOTAL PROTEIN g/dL 7 7   ALBUMIN g/dL 3 5   TOTAL BILIRUBIN mg/dL 0 98     Results from last 7 days   Lab Units 09/16/22  1855   POC GLUCOSE mg/dl 89     Results from last 7 days   Lab Units 09/17/22  0554 09/16/22  1417 09/16/22  0533 09/15/22  0659 09/14/22  2353   GLUCOSE RANDOM mg/dL 99 104 114 104 100       Results from last 7 days   Lab Units 09/14/22  2353   CLARITY UA  Clear   COLOR UA  Light Yellow   SPEC GRAV UA  1 025   PH UA  6 0   GLUCOSE UA mg/dl Negative   KETONES UA mg/dl Negative   BLOOD UA  Moderate*   PROTEIN UA mg/dl Negative   NITRITE UA  Negative   BILIRUBIN UA  Negative   UROBILINOGEN UA E U /dl 0 2   LEUKOCYTES UA  Negative   WBC UA /hpf 0-1*   RBC UA /hpf 1-2*   BACTERIA UA /hpf Occasional   EPITHELIAL CELLS WET PREP /hpf None Seen           Scheduled Medications:  amLODIPine, 5 mg, Oral, Daily  heparin (porcine), 5,000 Units, Subcutaneous, Q8H JOSELINE  ondansetron, 4 mg, Intravenous, Once  pantoprazole, 40 mg, Oral, Early Morning  tamsulosin, 0 4 mg, Oral, Daily With Dinner      Continuous IV Infusions:  lactated ringers, 125 mL/hr, Intravenous, Continuous  sodium chloride, 125 mL/hr, Intravenous, Continuous      PRN Meds:  acetaminophen, 650 mg, Oral, Q6H PRN  HYDROmorphone, 1 mg, Intravenous, Q4H PRN  lactated ringers, 1,000 mL, Intravenous, Once PRN   And  lactated ringers, 1,000 mL, Intravenous, Once PRN  ondansetron, 4 mg, Intravenous, Q6H PRN  sodium chloride, 1,000 mL, Intravenous, Once PRN   And  sodium chloride, 1,000 mL, Intravenous, Once PRN        Discharge Plan: to be determined     Network Utilization Review Department  ATTENTION: Please call with any questions or concerns to [phone] and carefully listen to the prompts so that you are directed to the right person  All voicemails are confidential   Dee Doing all requests for admission clinical reviews, approved or denied determinations and any other requests to dedicated fax number below belonging to the campus where the patient is receiving treatment   List of dedicated fax numbers for the Facilities:  [address] DENIALS (Administrative/Medical Necessity) [phone]   [address] (Maternity/NICU/Pediatrics) [phone]   [address] [place] [address]  [address] [facility] [place] [address] [id number] [place] [address] [address] [facility] [facility] [phone]

## 2022-09-19 ENCOUNTER — TRANSITIONAL CARE MANAGEMENT (OUTPATIENT)
Dept: FAMILY MEDICINE CLINIC | Facility: CLINIC | Age: 57
End: 2022-09-19

## 2022-09-19 NOTE — ED PROVIDER NOTES
History  Chief Complaint   Patient presents with    Leg Pain     Discharged from hospital about 5;30, admitted for kidney stent  Upon leaving developed pain in R knee     Patient presents for evaluation knee pain  Patient was recently admitted to the hospital and had a kidney stent placed  Patient was discharged today from the hospital wrong 5:30 p m  He states when he started to get up he felt some knee pain is right anterior knee  Patient does not recall any injury or fall  He continues to have pain after he left the hospital so he came back to the ER for evaluation  History provided by:  Patient   used: No    Leg Pain  Associated symptoms: no back pain and no fever        Prior to Admission Medications   Prescriptions Last Dose Informant Patient Reported?  Taking?   acetaminophen (TYLENOL) 325 mg tablet  Self Yes No   Sig: Take 650 mg by mouth every 6 (six) hours as needed for mild pain   amLODIPine (NORVASC) 5 mg tablet  Self No No   Sig: Take 1 tablet (5 mg total) by mouth daily   lisinopril-hydrochlorothiazide (PRINZIDE,ZESTORETIC) 20-25 MG per tablet  Self No No   Sig: Take 1 tablet by mouth daily   omeprazole (PriLOSEC) 20 mg delayed release capsule  Self No No   Sig: Take 1 capsule (20 mg total) by mouth daily   oxyCODONE-acetaminophen (PERCOCET) 5-325 mg per tablet  Self No No   Sig: Take 1 tablet by mouth every 8 (eight) hours as needed for moderate pain for up to 5 doses Max Daily Amount: 3 tablets   tamsulosin (FLOMAX) 0 4 mg  Self No No   Sig: Take 1 capsule (0 4 mg total) by mouth daily with dinner      Facility-Administered Medications: None       Past Medical History:   Diagnosis Date    Borderline diabetes     per pt A1C "coming down"    Chronic pain disorder     lower back-s/p laminectomy    Claustrophobia     "some degree" jes MRI's"    CPAP (continuous positive airway pressure) dependence     per pt does not use CPAP    Diverticulitis     GERD (gastroesophageal reflux disease)     Hypertension     Kidney stone     x 2 episodes    Lactose intolerance     Localized pain of joint     Mild sleep apnea     CPAP started in July having difficulty using- only has used on occ    Morbid obesity with body mass index (BMI) of 50 0 to 59 9 in adult [facility])     Rectal bleeding     occ rectal bleeding last episode unsure-"nothing recent"    Seasickness     Skin tag     skin tags were removed-as of 8/2/19 has a few more    Teeth missing     Thyroid nodule     Tinnitus     Wears glasses     Weight loss     "80lbs and did gain 40lbs back"--       Past Surgical History:   Procedure Laterality Date    BACK SURGERY      laminectomy-1996, 2010    COLONOSCOPY      x2    ESOPHAGOGASTRODUODENOSCOPY N/A 3/30/2019    Procedure: ESOPHAGOGASTRODUODENOSCOPY (EGD); Surgeon: Liz Hawkins MD;  Location: [address] [place] [address];  Service: Gastroenterology    FL RETROGRADE PYELOGRAM  9/16/2022    KNEE ARTHROSCOPY Left 2007    NH CYSTO/URETERO W/LITHOTRIPSY &INDWELL STENT INSRT Right 1/6/2022    Procedure: CYSTOSCOPY URETEROSCOPY AND INSERTION STENT URETERAL RIGHT;  Surgeon: Jeanie Arroyo MD;  Location: [address] [place] [address];  Service: Urology    NH CYSTOURETHROSCOPY,URETER CATHETER Right 9/16/2022    Procedure: CYSTOSCOPY RETROGRADE PYELOGRAM WITH INSERTION STENT URETERAL retrograde;  Surgeon: Jeanie Arroyo MD;  Location: [address] [place] [address];  Service: Urology    NH ESOPHAGOGASTRODUODENOSCOPY TRANSORAL DIAGNOSTIC N/A 5/1/2019    Procedure: ESOPHAGOGASTRODUODENOSCOPY (EGD); Surgeon: Liz Hawkins MD;  Location: [facility];   Service: Gastroenterology    NH THYROID LOBECTOMY,UNILAT Left 8/31/2022    Procedure: Left patial THYROIDECTOMY;  Surgeon: Harshad Cosby MD;  Location:  MAIN OR;  Service: ENT    UPPER GASTROINTESTINAL ENDOSCOPY  2006    US GUIDED THYROID BIOPSY  12/28/2020       Family History   Problem Relation Age of Onset    Osteoarthritis Mother     Obesity Father [id number]    Sleep apnea Father         with CPAP    No Known Problems Sister     No Known Problems Brother     Lupus Sister     No Known Problems Sister     No Known Problems Son     Substance Abuse Neg Hx     Mental illness Neg Hx     Cancer Neg Hx     Diabetes Neg Hx     Heart disease Neg Hx     Stroke Neg Hx      I have reviewed and agree with the history as documented  E-Cigarette/Vaping    E-Cigarette Use Never User      E-Cigarette/Vaping Substances    Nicotine No     THC No     CBD No     Flavoring No     Other No     Unknown No      Social History     Tobacco Use    Smoking status: Never Smoker    Smokeless tobacco: Never Used   Vaping Use    Vaping Use: Never used   Substance Use Topics    Alcohol use: Never    Drug use: No       Review of Systems   Constitutional: Negative for chills and fever  HENT: Negative for ear pain and sore throat  Eyes: Negative for pain and visual disturbance  Respiratory: Negative for cough and shortness of breath  Cardiovascular: Negative for chest pain and palpitations  Gastrointestinal: Negative for abdominal pain and vomiting  Genitourinary: Negative for dysuria and hematuria  Musculoskeletal: Positive for arthralgias  Negative for back pain  Skin: Negative for color change and rash  Neurological: Negative for seizures and syncope  All other systems reviewed and are negative  Physical Exam  Physical Exam  Vitals and nursing note reviewed  Constitutional:       General: He is not in acute distress  HENT:      Head: Atraumatic  Right Ear: External ear normal       Left Ear: External ear normal       Nose: Nose normal       Mouth/Throat:      Mouth: Mucous membranes are moist       Pharynx: Oropharynx is clear  Eyes:      General: No scleral icterus  Conjunctiva/sclera: Conjunctivae normal    Cardiovascular:      Rate and Rhythm: Normal rate and regular rhythm     Pulmonary:      Effort: Pulmonary effort is normal  No respiratory distress  Breath sounds: Normal breath sounds  Musculoskeletal:         General: Tenderness present  No swelling or deformity  Normal range of motion  Legs:    Skin:     Capillary Refill: Capillary refill takes less than 2 seconds  Findings: No rash  Neurological:      General: No focal deficit present  Mental Status: He is alert and oriented to person, place, and time  Vital Signs  ED Triage Vitals [09/17/22 1924]   Temperature Pulse Respirations Blood Pressure SpO2   99 2 °F (37 3 °C) 100 (!) 24 (!) 158/106 97 %      Temp Source Heart Rate Source Patient Position - Orthostatic VS BP Location FiO2 (%)   Tympanic Monitor Standing Right arm --      Pain Score       7           Vitals:    09/17/22 1924   BP: (!) 158/106   Pulse: 100   Patient Position - Orthostatic VS: Standing         Visual Acuity      ED Medications  Medications - No data to display    Diagnostic Studies  Results Reviewed     None                 XR knee 4+ views Right injury   Final Result by Odette Wilhelm MD (09/19 6132)      No acute osseous abnormality  Workstation performed: [id number]                    Procedures  Procedures         ED Course                                             MDM  Number of Diagnoses or Management Options  Acute pain of right knee  Diagnosis management comments: Pulse ox 97% room air indicating adequate oxygenation    Xray R knee:  No fracture dislocation as read by me       Amount and/or Complexity of Data Reviewed  Tests in the radiology section of CPT®: ordered and reviewed  Decide to obtain previous medical records or to obtain history from someone other than the patient: yes  Review and summarize past medical records: yes  Independent visualization of images, tracings, or specimens: yes    Patient Progress  Patient progress: stable      Disposition  Final diagnoses:   Acute pain of right knee     Time reflects when diagnosis was documented in both MDM as applicable and the Disposition within this note     Time User Action Codes Description Comment    9/17/2022  8:20 PM Kaden Alas Add [J76 [id number]] Acute pain of right knee       ED Disposition     ED Disposition   Discharge    Condition   Stable    Date/Time   Sat Sep 17, 2022  8:20 PM    Comment   [facility] discharge to home/self care  Follow-up Information     Follow up With Specialties Details Why Contact Info    Patty Schroeder MD Orthopedic Surgery In 1 week  [address]  [phone]            Discharge Medication List as of 9/17/2022  8:33 PM      START taking these medications    Details   Diclofenac Sodium (VOLTAREN) 1 % Apply 2 g topically 4 (four) times a day, Starting Sat 9/17/2022, Normal         CONTINUE these medications which have NOT CHANGED    Details   acetaminophen (TYLENOL) 325 mg tablet Take 650 mg by mouth every 6 (six) hours as needed for mild pain, Historical Med      amLODIPine (NORVASC) 5 mg tablet Take 1 tablet (5 mg total) by mouth daily, Starting Wed 4/13/2022, Normal      lisinopril-hydrochlorothiazide (PRINZIDE,ZESTORETIC) 20-25 MG per tablet Take 1 tablet by mouth daily, Starting Sun 9/18/2022, Normal      omeprazole (PriLOSEC) 20 mg delayed release capsule Take 1 capsule (20 mg total) by mouth daily, Starting Tue 5/3/2022, Normal      oxyCODONE-acetaminophen (PERCOCET) 5-325 mg per tablet Take 1 tablet by mouth every 8 (eight) hours as needed for moderate pain for up to 5 doses Max Daily Amount: 3 tablets, Starting Sat 9/17/2022, Normal      tamsulosin (FLOMAX) 0 4 mg Take 1 capsule (0 4 mg total) by mouth daily with dinner, Starting Sat 9/17/2022, Normal             No discharge procedures on file      PDMP Review     None          ED Provider  Electronically Signed by           Ezequiel Castro DO  09/19/22 6234

## 2022-09-19 NOTE — UTILIZATION REVIEW
OBSERVATION 9/15/22 @ 0056 CONVERTED TO INPATIENT 9/16/22 @ 8134 DUE TO RENAL COLIC WITH HYDRONEPHROSIS REQUIRING CONTINUE IV HYDRATION AND POSSIBLE SURGICAL INTERVENTION  Initial Clinical Review    Admission: Date/Time/Statement:   Admission Orders (From admission, onward)     Ordered        09/16/22 1022  Inpatient Admission  Once                      Orders Placed This Encounter   Procedures    Inpatient Admission     Standing Status:   Standing     Number of Occurrences:   1     Order Specific Question:   Level of Care     Answer:   Med Surg [16]     Order Specific Question:   Estimated length of stay     Answer:   More than 2 Midnights     Order Specific Question:   Certification     Answer:   I certify that inpatient services are medically necessary for this patient for a duration of greater than two midnights  See H&P and MD Progress Notes for additional information about the patient's course of treatment  ED Arrival Information     Expected   -    Arrival   9/14/2022 22:57    Acuity   Urgent            Means of arrival   Walk-In    Escorted by   Self    Service   Hospitalist    Admission type   Urgent            Arrival complaint   kidney stone           Chief Complaint   Patient presents with    Flank Pain    Abdominal Pain     Started with R side pain for past 24 hours, hx of kidney stone and feels same, no vomiting, diarrhea or contsipation  States burping alot       Initial Presentation: 59 y o  male to the ED from home with complaints of right sided   Flank and RLQ abdominal pain for a day  Admitted under observation  Then converted to inpatient for ureteral stone with hydronephrosis, debbie  H/O HTN, GERD, DOMINIC  Arrives with tachypnea, elevated WBCs, creat 1 75, elevated BP  Started on IV fluids  CT a/p shows: 7 mm right ureteropelvic stone causing mod hydronephrosis  Start flomax  Urology consult  Recheck creat  Baseline creat 1 1    Date: 9/16      ED Triage Vitals [09/14/22 2304] Temperature Pulse Respirations Blood Pressure SpO2   98 4 °F (36 9 °C) 96 (!) 24 (!) 170/101 96 %      Temp Source Heart Rate Source Patient Position - Orthostatic VS BP Location FiO2 (%)   Tympanic Monitor Sitting Right arm --      Pain Score       6          Wt Readings from Last 1 Encounters:   09/15/22 (!) 177 kg (390 lb)     Additional Vital Signs:   Date/Time Temp Pulse Resp BP MAP (mmHg) SpO2 O2 Device Patient Position - Orthostatic VS   09/16/22 0759 -- 64 18 141/85 108 95 % None (Room air) Lying   09/15/22 2220 98 4 °F (36 9 °C) 66 16 135/86 106 94 % None (Room air) Lying   09/15/22 1910 98 1 °F (36 7 °C) 83 18 161/82 113 94 % None (Room air) Lying   09/15/22 1300 98 3 °F (36 8 °C) 75 18 136/82 104 98 % None (Room air) Lying       Time Temp Pulse Resp BP MAP (mmHg) SpO2 O2 Device Patient Position - Orthostatic VS   09/15/22 0734 98 2 °F (36 8 °C) 65 18 135/80 101 97 % None (Room air) Lying   09/15/22 0205 97 7 °F (36 5 °C) 65 20 157/83 111 97 % None (Room air) Lying   09/15/22 0130 -- 64 -- 131/73 97 96 % -- --   09/15/22 0100 -- 73 18 128/72 94 96 % -- --   09/15/22 0030 -- 76 18 138/69 94 96 % -- Sitting   09/15/22 0015 -- 77 20 162/76 109 95 % -- --   09/14/22 2304 98 4 °F (36 9 °C) 96 24 Abnormal  170/101 Abnormal  -- 96 % None (Room air) Sitting       Pertinent Labs/Diagnostic Test Results:     FL retrograde pyelogram   Final Result by Wenceslao Holder MD (09/17 2232)      Fluoroscopic guidance provided for procedure guidance  Please refer to the separate procedure notes for additional details  Workstation performed: [id number]         XR abdomen 1 view kub   Final Result by Meggan Castañeda MD (09/15 2004)      No nephrolithiasis identified           Workstation performed: [id number]         CT renal stone study abdomen pelvis wo contrast   Final Result by Beny Cevallos MD (09/15 0050)      7 mm right ureteropelvic stone causes moderate hydronephrosis                Workstation performed: [id number] Results from last 7 days   Lab Units 09/16/22  1602   SARS-COV-2  Negative     Results from last 7 days   Lab Units 09/15/22  0659 09/14/22  2353   WBC Thousand/uL 9 96 13 23*   HEMOGLOBIN g/dL 14 6 14 9   HEMATOCRIT % 43 9 44 6   PLATELETS Thousands/uL 209 243   NEUTROS ABS Thousands/µL 6 90 10 10*         Results from last 7 days   Lab Units 09/17/22  0554 09/16/22  1417 09/16/22  0533 09/15/22  0659 09/14/22  2353   SODIUM mmol/L 138 134* 136 137 136   POTASSIUM mmol/L 4 2 3 9 4 2 3 7 4 9   CHLORIDE mmol/L 105 103 102 102 101   CO2 mmol/L 26 26 26 25 27   ANION GAP mmol/L 7 5 8 10 8   BUN mg/dL 19 23 27* 29* 30*   CREATININE mg/dL 1 61* 1 83* 2 02* 1 76* 1 75*   EGFR ml/min/1 73sq m 46 39 35 41 41   CALCIUM mg/dL 8 5 8 0* 8 3 8 5 8 9     Results from last 7 days   Lab Units 09/14/22  2353   AST U/L 37   ALT U/L 41   ALK PHOS U/L 66   TOTAL PROTEIN g/dL 7 7   ALBUMIN g/dL 3 5   TOTAL BILIRUBIN mg/dL 0 98     Results from last 7 days   Lab Units 09/16/22  1855   POC GLUCOSE mg/dl 89     Results from last 7 days   Lab Units 09/17/22  0554 09/16/22  1417 09/16/22  0533 09/15/22  0659 09/14/22  2353   GLUCOSE RANDOM mg/dL 99 104 114 104 100             Results from last 7 days   Lab Units 09/14/22  2353   CLARITY UA  Clear   COLOR UA  Light Yellow   SPEC GRAV UA  1 025   PH UA  6 0   GLUCOSE UA mg/dl Negative   KETONES UA mg/dl Negative   BLOOD UA  Moderate*   PROTEIN UA mg/dl Negative   NITRITE UA  Negative   BILIRUBIN UA  Negative   UROBILINOGEN UA E U /dl 0 2   LEUKOCYTES UA  Negative   WBC UA /hpf 0-1*   RBC UA /hpf 1-2*   BACTERIA UA /hpf Occasional   EPITHELIAL CELLS WET PREP /hpf None Seen       ED Treatment:   Medication Administration from 09/14/2022 2257 to 09/15/2022 3909       Date/Time Order Dose Route Action     09/14/2022 2352 ketorolac (TORADOL) injection 15 mg 15 mg Intravenous Given     09/14/2022 9425 sodium chloride 0 9 % bolus 1,000 mL 1,000 mL Intravenous New Bag        [facility] History:   Diagnosis Date    Borderline diabetes     per pt A1C "coming down"    Chronic pain disorder     lower back-s/p laminectomy    Claustrophobia     "some degree" jes MRI's"    CPAP (continuous positive airway pressure) dependence     per pt does not use CPAP    Diverticulitis     GERD (gastroesophageal reflux disease)     Hypertension     Kidney stone     x 2 episodes    Lactose intolerance     Localized pain of joint     Mild sleep apnea     CPAP started in July having difficulty using- only has used on occ    Morbid obesity with body mass index (BMI) of 50 0 to 59 9 in adult [facility])     Rectal bleeding     occ rectal bleeding last episode unsure-"nothing recent"    Seasickness     Skin tag     skin tags were removed-as of 8/2/19 has a few more    Teeth missing     Thyroid nodule     Tinnitus     Wears glasses     Weight loss     "80lbs and did gain 40lbs back"--       Admitting Diagnosis: Ureterolithiasis [N20 1]  Abdominal pain [R10 9]  Flank pain [R10 9]  KIANA (acute kidney injury) ([facility] [address] ) [N17 9]  Age/Sex: 59 y o  male  Admission Orders:    Scheduled Medications:  amLODIPine, 5 mg, Oral, Daily  heparin (porcine), 5,000 Units, Subcutaneous, Q8H JOSELINE  ondansetron, 4 mg, Intravenous, Once  pantoprazole, 40 mg, Oral, Early Morning  tamsulosin, 0 4 mg, Oral, Daily With Dinner      Continuous IV Infusions:  No current facility-administered medications for this encounter  PRN Meds:  No current facility-administered medications for this encounter  None    Network Utilization Review Department  ATTENTION: Please call with any questions or concerns to [phone] and carefully listen to the prompts so that you are directed to the right person  All voicemails are confidential   Farrel Bodily all requests for admission clinical reviews, approved or denied determinations and any other requests to dedicated fax number below belonging to the campus where the patient is receiving treatment   List of dedicated fax numbers for the Facilities:  [address] DENIALS (Administrative/Medical Necessity) [phone]   [address]  (Maternity/NICU/Pediatrics) [phone]   [address] [phone]   [address]  [address] Medical [place] [address] [address] [address] [facility] [address] Prasad Ibarar RN   Registered Nurse   Specialty:  Utilization Review   Utilization Review       Signed   Date of Service:  9/17/2022  1:09 PM                 Signed              Show:Clear all  [x]Manual[x]Template[x]Copied    Added by:  [x]Sparkle Sargent RN      []Galileo for details          Continued Stay Review     Date: 5- 17-22                        Current Patient Class: inpatient       Current Level of Care: med surg      HPI:57 y o  male initially admitted on 9-15-22      Assessment/Plan:      SURGERY DATE: 9/16/2022     Preop Diagnosis:  Ureterolithiasis [N20 1]  Right 7 mm UPJ stone  Moderate hydronephrosis  Renal failure  Procedure(s) (LRB):  CYSTOSCOPY RETROGRADE PYELOGRAM WITH INSERTION STENT URETERAL retrograde (Right)     Estimated Blood Loss:   Minimal     Anesthesia Type:   General/LMA     Operative Indications:  Ureterolithiasis [N20 1]  This 47-year-old male known to me with history kidney stones presented to [facility] with acute onset right flank pain found to have 7 mm right UPJ stone with moderate hydronephrosis admitted seen by the bedside yesterday started on medical expulsion therapy noting minimal pain overnight but increasing renal failure patient is times to undergo cysto right retrograde stent placement in light of renal failure and possibility of another attack of colic KUB does not show stone urine pH 5 0 consistent with uric acid stone     Operative Findings:  Bilobar 3 0 cm obstructing prostatic urethra right retrograde confirms faint stone seen in the pelvis 24 cm 6 Serbian stent past patient poor candidate for shockwave litho in light of poor visibility even with contrast would need to consider right ureteroscopy stone extraction or alkalinization over a 3 month      Complications:   None     9-17-22 inpatient post op day 1  Creatinine trending down  Continue iv fluids 125/hr  Urology plans to dc diaz and schedule right ureteroscopy on 9-22  Pain 7/10  Received iv dilaudid 2123 and 1052  Due to void             Vital Signs:      Date  /Time Temp Pulse Resp BP MAP (mmHg) SpO2 O2 Device   09/17/22 0806 97 7 °F (36 5 °C) 66 19 176/98   Abnormal  128 96 % None (Room air)   09/17/22 0400 98 2 °F (36 8 °C) 62 19 147/89 -- 98 % None (Room air)                     Pertinent Labs/Diagnostic Results:        Results from last 7 days   Lab Units 09/16/22  1602   SARS-COV-2   Negative            Results from last 7 days   Lab Units 09/15/22  0659 09/14/22  2353   WBC Thousand/uL 9 96 13 23*   HEMOGLOBIN g/dL 14 6 14 9   HEMATOCRIT % 43 9 44 6   PLATELETS Thousands/uL 209 243   NEUTROS ABS Thousands/µL 6 90 10 10*                   Results from last 7 days   Lab Units 09/17/22  0554 09/16/22  1417 09/16/22  0533 09/15/22  0659 09/14/22  2353   SODIUM mmol/L 138 134* 136 137 136   POTASSIUM mmol/L 4 2 3 9 4 2 3 7 4 9   CHLORIDE mmol/L 105 103 102 102 101   CO2 mmol/L 26 26 26 25 27   ANION GAP mmol/L 7 5 8 10 8   BUN mg/dL 19 23 27* 29* 30*   CREATININE mg/dL 1 61* 1 83* 2 02* 1 76* 1 75*   EGFR ml/min/1 73sq m 46 39 35 41 41   CALCIUM mg/dL 8 5 8 0* 8 3 8 5 8 9           Results from last 7 days   Lab Units 09/14/22  2353   AST U/L 37   ALT U/L 41   ALK PHOS U/L 66   TOTAL PROTEIN g/dL 7 7   ALBUMIN g/dL 3 5   TOTAL BILIRUBIN mg/dL 0 98           Results from last 7 days   Lab Units 09/16/22  1855   POC GLUCOSE mg/dl 89               Results from last 7 days   Lab Units 09/17/22  0554 09/16/22  1417 09/16/22  0533 09/15/22  0659 09/14/22  2353   GLUCOSE RANDOM mg/dL 99 104 114 104 100              Results from last 7 days   Lab Units 09/14/22  2353   CLARITY UA   Clear   COLOR UA   Light Yellow   SPEC GRAV UA   1 025   PH UA   6 0   GLUCOSE UA mg/dl Negative   KETONES UA mg/dl Negative   BLOOD UA   Moderate*   PROTEIN UA mg/dl Negative   NITRITE UA   Negative   BILIRUBIN UA   Negative   UROBILINOGEN UA E U /dl 0 2   LEUKOCYTES UA   Negative   WBC UA /hpf 0-1*   RBC UA /hpf 1-2*   BACTERIA UA /hpf Occasional   EPITHELIAL CELLS WET PREP /hpf None Seen             Scheduled Medications:  amLODIPine, 5 mg, Oral, Daily  heparin (porcine), 5,000 Units, Subcutaneous, Q8H JOSELINE  ondansetron, 4 mg, Intravenous, Once  pantoprazole, 40 mg, Oral, Early Morning  tamsulosin, 0 4 mg, Oral, Daily With Dinner        Continuous IV Infusions:  lactated ringers, 125 mL/hr, Intravenous, Continuous  sodium chloride, 125 mL/hr, Intravenous, Continuous        PRN Meds:  acetaminophen, 650 mg, Oral, Q6H PRN  HYDROmorphone, 1 mg, Intravenous, Q4H PRN  lactated ringers, 1,000 mL, Intravenous, Once PRN   And  lactated ringers, 1,000 mL, Intravenous, Once PRN  ondansetron, 4 mg, Intravenous, Q6H PRN  sodium chloride, 1,000 mL, Intravenous, Once PRN   And  sodium chloride, 1,000 mL, Intravenous, Once PRN           Discharge Plan: to be determined      Network Utilization Review Department  ATTENTION: Please call with any questions or concerns to [phone] and carefully listen to the prompts so that you are directed to the right person   All voicemails are confidential   Nena Johnson all requests for admission clinical reviews, approved or denied determinations and any other requests to dedicated fax number below belonging to the campus where the patient is receiving treatment   List of dedicated fax numbers for the Facilities:  [address] [place] [address] DENIALS (Administrative/Medical Necessity) [phone]   [address] (Maternity/NICU/Pediatrics) [phone] [address] [place] [address]  [phone]   [address] Medical [place] [address] [address] [address] Anurag Cano [address] [facility] [place] [address] [phone]

## 2022-09-20 ENCOUNTER — APPOINTMENT (OUTPATIENT)
Dept: LAB | Facility: HOSPITAL | Age: 57
End: 2022-09-20
Payer: COMMERCIAL

## 2022-09-20 ENCOUNTER — ANESTHESIA EVENT (OUTPATIENT)
Dept: PERIOP | Facility: HOSPITAL | Age: 57
End: 2022-09-20
Payer: COMMERCIAL

## 2022-09-20 DIAGNOSIS — N17.9 AKI (ACUTE KIDNEY INJURY) (HCC): ICD-10-CM

## 2022-09-20 LAB
ANION GAP SERPL CALCULATED.3IONS-SCNC: 9 MMOL/L (ref 4–13)
BUN SERPL-MCNC: 18 MG/DL (ref 5–25)
CALCIUM SERPL-MCNC: 9.1 MG/DL (ref 8.3–10.1)
CHLORIDE SERPL-SCNC: 104 MMOL/L (ref 96–108)
CO2 SERPL-SCNC: 26 MMOL/L (ref 21–32)
CREAT SERPL-MCNC: 1.5 MG/DL (ref 0.6–1.3)
GFR SERPL CREATININE-BSD FRML MDRD: 50 ML/MIN/1.73SQ M
GLUCOSE P FAST SERPL-MCNC: 127 MG/DL (ref 65–99)
POTASSIUM SERPL-SCNC: 4.2 MMOL/L (ref 3.5–5.3)
SODIUM SERPL-SCNC: 139 MMOL/L (ref 135–147)

## 2022-09-20 PROCEDURE — 80048 BASIC METABOLIC PNL TOTAL CA: CPT

## 2022-09-20 PROCEDURE — 36415 COLL VENOUS BLD VENIPUNCTURE: CPT

## 2022-09-20 NOTE — PRE-PROCEDURE INSTRUCTIONS
My Surgical Experience    The following information was developed to assist you to prepare for your operation  What do I need to do before coming to the hospital?   Arrange for a responsible person to drive you to and from the hospital    Arrange care for your children at home  Children are not allowed in the recovery areas of the hospital   Plan to wear clothing that is easy to put on and take off  If you are having shoulder surgery, wear a shirt that buttons or zippers in the front  Bathing  o Shower the evening before and the morning of your surgery with an antibacterial soap  Please refer to the Pre Op Showering Instructions for Surgery Patients Sheet   o Remove nail polish and all body piercing jewelry  o Do not shave any body part for at least 24 hours before surgery-this includes face, arms, legs and upper body  Food  o Nothing to eat or drink after midnight the night before your surgery  This includes candy and chewing gum  o Exception: If your surgery is after 12:00pm (noon), you may have clear liquids such as 7-Up®, ginger ale, apple or cranberry juice, Jell-O®, water, or clear broth until 8:00 am  o Do not drink milk or juice with pulp on the morning before surgery  o Do not drink alcohol 24 hours before surgery  Medicine  o Follow instructions you received from your surgeon about which medicines you may take on the day of surgery  o If instructed to take medicine on the morning of surgery, take pills with just a small sip of water  Call your prescribing doctor for specific infroamtion on what to do if you take insulin    What should I bring to the hospital?    Bring:  Carol David or a walker, if you have them, for foot or knee surgery   A list of the daily medicines, vitamins, minerals, herbals and nutritional supplements you take   Include the dosages of medicines and the time you take them each day   Glasses, dentures or hearing aids   Minimal clothing; you will be wearing hospital sleepwear   Photo ID; required to verify your identity   If you have a Living Will or Power of , bring a copy of the documents   If you have an ostomy, bring an extra pouch and any supplies you use    Do not bring   Medicines or inhalers   Money, valuables or jewelry    What other information should I know about the day of surgery?  Notify your surgeons if you develop a cold, sore throat, cough, fever, rash or any other illness   Report to the Ambulatory Surgical/Same Day Surgery Unit   You will be instructed to stop at Registration only if you have not been pre-registered   Inform your  fi they do not stay that they will be asked by the staff to leave a phone number where they can be reached   Be available to be reached before surgery  In the event the operating room schedule changes, you may be asked to come in earlier or later than expected    *It is important to tell your doctor and others involved in your health care if you are taking or have been taking any non-prescription drugs, vitamins, minerals, herbals or other nutritional supplements  Any of these may interact with some food or medicines and cause a reaction      Pre-Surgery Instructions:   Medication Instructions    acetaminophen (TYLENOL) 325 mg tablet Hold day of surgery   amLODIPine (NORVASC) 5 mg tablet Take day of surgery   Diclofenac Sodium (VOLTAREN) 1 % Stop taking 5 days prior to surgery   lisinopril-hydrochlorothiazide (PRINZIDE,ZESTORETIC) 20-25 MG per tablet Hold day of surgery   omeprazole (PriLOSEC) 20 mg delayed release capsule Take day of surgery   oxyCODONE-acetaminophen (PERCOCET) 5-325 mg per tablet Hold day of surgery      tamsulosin (FLOMAX) 0 4 mg Take night before surgery

## 2022-09-21 NOTE — H&P
H&P Exam - Urology       Patient: Winnie Bird   : 1965 Sex: male   MRN: [id number]     CSN: [id number]      History of Present Illness   HPI:  Winnie Bird is a 59 y o  male who presents with right stent status post cysto stent insertion last  for obstructing right 7 mm ureteropelvic junction stone with significant hydronephrosis now to return to undergo right ureteroscopy lithotripsy stent exchange aware risk of anesthesia infection bleeding additional urologic procedures        Review of Systems:   Constitutional:  Negative for activity change, fever, chills and diaphoresis  HENT: Negative for hearing loss and trouble swallowing  Eyes: Negative for itching and visual disturbance  Respiratory: Negative for chest tightness and shortness of breath  Cardiovascular: Negative for chest pain, edema  Gastrointestinal: Negative for abdominal distention, na abdominal pain, constipation, diarrhea, Nausea and vomiting  Genitourinary: Negative for decreased urine volume, difficulty urinating, dysuria, enuresis, frequency, hematuria and urgency  Musculoskeletal: Negative for gait problem and myalgias  Neurological: Negative for dizziness and headaches  Hematological: Does not bruise/bleed easily         Historical Information   Past Medical History:   Diagnosis Date    Borderline diabetes     per pt A1C "coming down"    Chronic pain disorder     lower back-s/p laminectomy    Claustrophobia     "some degree" jes MRI's"    CPAP (continuous positive airway pressure) dependence     per pt does not use CPAP    Diverticulitis     GERD (gastroesophageal reflux disease)     Hypertension     Kidney stone     x 2 episodes    Lactose intolerance     Localized pain of joint     Mild sleep apnea     CPAP started in July having difficulty using- only has used on occ    Morbid obesity with body mass index (BMI) of 50 0 to 59 9 in adult [facility])     Rectal bleeding     occ rectal bleeding last episode unsure-"nothing recent"    Seasickness     Skin tag     skin tags were removed-as of 8/2/19 has a few more    Teeth missing     Thyroid nodule     Tinnitus     Wears glasses     Weight loss     "80lbs and did gain 40lbs back"--     Past Surgical History:   Procedure Laterality Date    BACK SURGERY      laminectomy-1996, 2010    COLONOSCOPY      x2    ESOPHAGOGASTRODUODENOSCOPY N/A 3/30/2019    Procedure: ESOPHAGOGASTRODUODENOSCOPY (EGD); Surgeon: Garfield Florence MD;  Location: [address] [place] [address];  Service: Gastroenterology    FL RETROGRADE PYELOGRAM  9/16/2022    KNEE ARTHROSCOPY Left 2007    MT CYSTO/URETERO W/LITHOTRIPSY &INDWELL STENT INSRT Right 1/6/2022    Procedure: CYSTOSCOPY URETEROSCOPY AND INSERTION STENT URETERAL RIGHT;  Surgeon: Colton Victoria MD;  Location: [address] [place] [address];  Service: Urology    MT CYSTOURETHROSCOPY,URETER CATHETER Right 9/16/2022    Procedure: CYSTOSCOPY RETROGRADE PYELOGRAM WITH INSERTION STENT URETERAL retrograde;  Surgeon: Colton Victoria MD;  Location: [address] [place] [address];  Service: Urology    MT ESOPHAGOGASTRODUODENOSCOPY TRANSORAL DIAGNOSTIC N/A 5/1/2019    Procedure: ESOPHAGOGASTRODUODENOSCOPY (EGD); Surgeon: Garfield Florence MD;  Location: [facility];   Service: Gastroenterology    MT THYROID LOBECTOMY,UNILAT Left 8/31/2022    Procedure: Left patial THYROIDECTOMY;  Surgeon: Anthony Carroll MD;  Location:  MAIN OR;  Service: ENT    UPPER GASTROINTESTINAL ENDOSCOPY  2006    US GUIDED THYROID BIOPSY  12/28/2020     Social History   Social History     Substance and Sexual Activity   Alcohol Use Never     Social History     Substance and Sexual Activity   Drug Use No     Social History     Tobacco Use   Smoking Status Never Smoker   Smokeless Tobacco Never Used     Family History:   Family History   Problem Relation Age of Onset    Osteoarthritis Mother     Obesity Father         [id number]    Sleep apnea Father         with CPAP    No Known Problems Sister  No Known Problems Brother     Lupus Sister     No Known Problems Sister     No Known Problems Son     Substance Abuse Neg Hx     Mental illness Neg Hx     Cancer Neg Hx     Diabetes Neg Hx     Heart disease Neg Hx     Stroke Neg Hx        Meds/Allergies   No medications prior to admission  Allergies   Allergen Reactions    Lactose - Food Allergy GI Intolerance       Objective   Vitals: There were no vitals taken for this visit  Physical Exam:  General Alert awake   Normocephalic atraumatic PERRLA  Lungs clear bilaterally  Cardiac normal S1 normal S2  Abdomen soft, flank pain  Extremities no edema    No intake/output data recorded      Invasive Devices  Report    Drain  Duration           Ureteral Internal Stent Right ureter 6 Fr  5 days                    Lab Results: CBC:   Lab Results   Component Value Date    WBC 9 96 09/15/2022    HGB 14 6 09/15/2022    HCT 43 9 09/15/2022    MCV 90 09/15/2022     09/15/2022    ADJUSTEDWBC 9 00 08/26/2016    MCH 30 0 09/15/2022    MCHC 33 3 09/15/2022    RDW 13 0 09/15/2022    MPV 9 2 09/15/2022    NRBC 0 09/15/2022     CMP:   Lab Results   Component Value Date     09/20/2022     04/15/2019    CO2 26 09/20/2022    CO2 23 04/15/2019    BUN 18 09/20/2022    BUN 23 04/15/2019    CREATININE 1 50 (H) 09/20/2022    CALCIUM 9 1 09/20/2022    AST 37 09/14/2022    AST 25 12/10/2018    ALT 41 09/14/2022    ALT 29 12/10/2018    ALKPHOS 66 09/14/2022    EGFR 50 09/20/2022     Urinalysis:   Lab Results   Component Value Date    COLORU Light Yellow 09/14/2022    CLARITYU Clear 09/14/2022    SPECGRAV 1 025 09/14/2022    PHUR 6 0 09/14/2022    PHUR 6 5 06/26/2018    LEUKOCYTESUR Negative 09/14/2022    NITRITE Negative 09/14/2022    GLUCOSEU Negative 09/14/2022    KETONESU Negative 09/14/2022    BILIRUBINUR Negative 09/14/2022    BLOODU Moderate (A) 09/14/2022     Urine Culture: No results found for: URINECX  PSA:   Lab Results   Component Value Date    PSA 2 4 12/22/2020    PSA 2 2 12/10/2018           Assessment/ Plan:  Cystoscopy right ureteroscopy laser lithotripsy stent      Yuli Kuhn MD

## 2022-09-22 ENCOUNTER — HOSPITAL ENCOUNTER (OUTPATIENT)
Facility: HOSPITAL | Age: 57
Setting detail: OUTPATIENT SURGERY
Discharge: HOME | End: 2022-09-22
Attending: SPECIALIST | Admitting: SPECIALIST
Payer: COMMERCIAL

## 2022-09-22 ENCOUNTER — APPOINTMENT (OUTPATIENT)
Dept: RADIOLOGY | Facility: HOSPITAL | Age: 57
End: 2022-09-22
Payer: COMMERCIAL

## 2022-09-22 ENCOUNTER — ANESTHESIA (OUTPATIENT)
Dept: PERIOP | Facility: HOSPITAL | Age: 57
End: 2022-09-22
Payer: COMMERCIAL

## 2022-09-22 VITALS
BODY MASS INDEX: 47.35 KG/M2 | HEART RATE: 69 BPM | OXYGEN SATURATION: 96 % | TEMPERATURE: 98.2 F | DIASTOLIC BLOOD PRESSURE: 63 MMHG | RESPIRATION RATE: 16 BRPM | WEIGHT: 315 LBS | SYSTOLIC BLOOD PRESSURE: 136 MMHG

## 2022-09-22 DIAGNOSIS — N20.0 CALCULUS OF KIDNEY: ICD-10-CM

## 2022-09-22 PROCEDURE — 88300 SURGICAL PATH GROSS: CPT | Performed by: STUDENT IN AN ORGANIZED HEALTH CARE EDUCATION/TRAINING PROGRAM

## 2022-09-22 PROCEDURE — C1894 INTRO/SHEATH, NON-LASER: HCPCS | Performed by: SPECIALIST

## 2022-09-22 PROCEDURE — 82360 CALCULUS ASSAY QUANT: CPT | Performed by: SPECIALIST

## 2022-09-22 PROCEDURE — C1769 GUIDE WIRE: HCPCS | Performed by: SPECIALIST

## 2022-09-22 PROCEDURE — 74420 UROGRAPHY RTRGR +-KUB: CPT

## 2022-09-22 PROCEDURE — C2617 STENT, NON-COR, TEM W/O DEL: HCPCS | Performed by: SPECIALIST

## 2022-09-22 DEVICE — INLAY URETERAL STENT W/O GUIDEWIRE
Type: IMPLANTABLE DEVICE | Status: NON-FUNCTIONAL
Brand: BARD® INLAY® URETERAL STENT
Removed: 2022-09-29

## 2022-09-22 RX ORDER — DEXAMETHASONE SODIUM PHOSPHATE 4 MG/ML
INJECTION, SOLUTION INTRA-ARTICULAR; INTRALESIONAL; INTRAMUSCULAR; INTRAVENOUS; SOFT TISSUE AS NEEDED
Status: DISCONTINUED | OUTPATIENT
Start: 2022-09-22 | End: 2022-09-22

## 2022-09-22 RX ORDER — MAGNESIUM HYDROXIDE 1200 MG/15ML
LIQUID ORAL AS NEEDED
Status: DISCONTINUED | OUTPATIENT
Start: 2022-09-22 | End: 2022-09-22 | Stop reason: HOSPADM

## 2022-09-22 RX ORDER — MIDAZOLAM HYDROCHLORIDE 2 MG/2ML
INJECTION, SOLUTION INTRAMUSCULAR; INTRAVENOUS AS NEEDED
Status: DISCONTINUED | OUTPATIENT
Start: 2022-09-22 | End: 2022-09-22

## 2022-09-22 RX ORDER — PROPOFOL 10 MG/ML
INJECTION, EMULSION INTRAVENOUS AS NEEDED
Status: DISCONTINUED | OUTPATIENT
Start: 2022-09-22 | End: 2022-09-22

## 2022-09-22 RX ORDER — ONDANSETRON 2 MG/ML
4 INJECTION INTRAMUSCULAR; INTRAVENOUS ONCE AS NEEDED
Status: DISCONTINUED | OUTPATIENT
Start: 2022-09-22 | End: 2022-09-22 | Stop reason: HOSPADM

## 2022-09-22 RX ORDER — ONDANSETRON 2 MG/ML
INJECTION INTRAMUSCULAR; INTRAVENOUS AS NEEDED
Status: DISCONTINUED | OUTPATIENT
Start: 2022-09-22 | End: 2022-09-22

## 2022-09-22 RX ORDER — LEVOFLOXACIN 5 MG/ML
500 INJECTION, SOLUTION INTRAVENOUS ONCE
Status: COMPLETED | OUTPATIENT
Start: 2022-09-22 | End: 2022-09-22

## 2022-09-22 RX ORDER — LIDOCAINE HYDROCHLORIDE 10 MG/ML
INJECTION, SOLUTION EPIDURAL; INFILTRATION; INTRACAUDAL; PERINEURAL AS NEEDED
Status: DISCONTINUED | OUTPATIENT
Start: 2022-09-22 | End: 2022-09-22

## 2022-09-22 RX ORDER — SODIUM CHLORIDE, SODIUM LACTATE, POTASSIUM CHLORIDE, CALCIUM CHLORIDE 600; 310; 30; 20 MG/100ML; MG/100ML; MG/100ML; MG/100ML
100 INJECTION, SOLUTION INTRAVENOUS CONTINUOUS
Status: DISCONTINUED | OUTPATIENT
Start: 2022-09-22 | End: 2022-09-22 | Stop reason: HOSPADM

## 2022-09-22 RX ORDER — FENTANYL CITRATE/PF 50 MCG/ML
50 SYRINGE (ML) INJECTION
Status: DISCONTINUED | OUTPATIENT
Start: 2022-09-22 | End: 2022-09-22 | Stop reason: HOSPADM

## 2022-09-22 RX ORDER — FENTANYL CITRATE 50 UG/ML
INJECTION, SOLUTION INTRAMUSCULAR; INTRAVENOUS AS NEEDED
Status: DISCONTINUED | OUTPATIENT
Start: 2022-09-22 | End: 2022-09-22

## 2022-09-22 RX ORDER — SODIUM CHLORIDE, SODIUM LACTATE, POTASSIUM CHLORIDE, CALCIUM CHLORIDE 600; 310; 30; 20 MG/100ML; MG/100ML; MG/100ML; MG/100ML
125 INJECTION, SOLUTION INTRAVENOUS CONTINUOUS
Status: DISCONTINUED | OUTPATIENT
Start: 2022-09-22 | End: 2022-09-22

## 2022-09-22 RX ADMIN — LEVOFLOXACIN: 500 INJECTION, SOLUTION INTRAVENOUS at 12:59

## 2022-09-22 RX ADMIN — LIDOCAINE HYDROCHLORIDE 50 MG: 10 INJECTION, SOLUTION EPIDURAL; INFILTRATION; INTRACAUDAL; PERINEURAL at 13:06

## 2022-09-22 RX ADMIN — PROPOFOL 200 MG: 10 INJECTION, EMULSION INTRAVENOUS at 13:06

## 2022-09-22 RX ADMIN — FENTANYL CITRATE 50 MCG: 50 INJECTION, SOLUTION INTRAMUSCULAR; INTRAVENOUS at 13:14

## 2022-09-22 RX ADMIN — FENTANYL CITRATE 25 MCG: 50 INJECTION, SOLUTION INTRAMUSCULAR; INTRAVENOUS at 13:32

## 2022-09-22 RX ADMIN — DEXAMETHASONE SODIUM PHOSPHATE 8 MG: 4 INJECTION INTRA-ARTICULAR; INTRALESIONAL; INTRAMUSCULAR; INTRAVENOUS; SOFT TISSUE at 13:06

## 2022-09-22 RX ADMIN — FENTANYL CITRATE 25 MCG: 50 INJECTION, SOLUTION INTRAMUSCULAR; INTRAVENOUS at 13:52

## 2022-09-22 RX ADMIN — ONDANSETRON 4 MG: 2 INJECTION INTRAMUSCULAR; INTRAVENOUS at 13:44

## 2022-09-22 RX ADMIN — FENTANYL CITRATE 50 MCG: 50 INJECTION, SOLUTION INTRAMUSCULAR; INTRAVENOUS at 13:12

## 2022-09-22 RX ADMIN — FENTANYL CITRATE 25 MCG: 50 INJECTION, SOLUTION INTRAMUSCULAR; INTRAVENOUS at 13:56

## 2022-09-22 RX ADMIN — PROPOFOL 100 MG: 10 INJECTION, EMULSION INTRAVENOUS at 13:07

## 2022-09-22 RX ADMIN — SODIUM CHLORIDE, SODIUM LACTATE, POTASSIUM CHLORIDE, AND CALCIUM CHLORIDE: .6; .31; .03; .02 INJECTION, SOLUTION INTRAVENOUS at 12:57

## 2022-09-22 RX ADMIN — FENTANYL CITRATE 25 MCG: 50 INJECTION, SOLUTION INTRAMUSCULAR; INTRAVENOUS at 13:37

## 2022-09-22 RX ADMIN — MIDAZOLAM 2 MG: 1 INJECTION INTRAMUSCULAR; INTRAVENOUS at 12:59

## 2022-09-22 NOTE — OP NOTE
OPERATIVE REPORT  PATIENT NAME: Lorie Alcantara    :  1965  MRN: [id number]  Pt Location: [facility] OR ROOM 04    SURGERY DATE: 2022    Surgeon(s) and Role:     * Yuli Kuhn MD - Primary    Preop Diagnosis:  Calculus of kidney [N20 0]  Right 10 mm stone renal pelvis    Post-Op Diagnosis Codes:     * Calculus of kidney [N20 0]  Right 10 mm stone renal pelvis    Procedure(s) (LRB):  CYSTOSCOPY URETEROSCOPY WITH LITHOTRIPSY HOLMIUM LASER, BASKET EXTRACTION, RETROGRADE PYELOGRAM (BILATERAL) AND  STENT EXCHANGE (Right)    Specimen(s):  ID Type Source Tests Collected by Time Destination   1 : RIGHT KIDNEY STONE Calculus Kidney, Right STONE ANALYSIS, TISSUE EXAM Yuli Kuhn MD 2022 1343        Estimated Blood Loss:   Minimal    Drains:  Urethral Catheter Latex 16 Fr  (Active)   Number of days: 0       Ureteral Internal Stent Right ureter 6 Fr  (Active)   Number of days: 6       Ureteral Internal Stent Right ureter 6 Fr  (Active)   Number of days: 0       Anesthesia Type:   IV Sedation with Anesthesia    Operative Indications:  Calculus of kidney [N20 0]  This 77-year-old male admitted last week with 10 mm right renal pelvic stone right flank pain and acute renal failure was seen at the bedside and watched for 2 days with un resolving renal failure undergoing cysto right ureteroscopy stone minute and stent placement patient's renal failure has now cleared and patient wishes to have the stone removed as soon as possible undergoing flexible ureteroscopy laser lithotripsy aware risk of anesthesia infection bleeding additional urologic procedures    Operative Findings:  10 mm stone nestled in the renal pelvis laser lithotripsy basket extraction larger fragments stent exchanged      Complications:   None    Procedure and Technique:  After patient identified in the holding area right side marked consent signed placed the op suite after anesthesia induced placed in thigh position draped prep standard fashion time-out performed  A 22 Turkish cystoscope passed through through the bladder    Anterior thick confirm the malaise posterior the confirmed a 2 5 trilobar prostatic urethra the right stent was noted 2 038 wires were passed upward into the right renal pelvis and the stent removed 1st wire was left a safety 2nd wire was then cannulated with the 45 cm ureteral sheath which under fluoroscopic control was then passed upward towards the renal pelvis the stone could not be seen under fluoroscopic views retrograde pyelogram performed through the ureteral CIS sheath confirmed the proximal end of the sheath to be 4 cm below the right UPJ the obturators removed flexible scope was placed the stone was encountered pushed into the middle pole laser lithotripsy into multiple 1-3 mm fragments basket extraction of multiple fragments for analysis scope removed sheath removed over the safety wire a 24 cm 6 Turkish stent was passed 25 Turkish Coombs catheter inserted and left bag drainage time dictation Coombs urine mildly hematuria patient be discharged have the Coombs removed tomorrow in the office   I was present for the entire procedure    Patient Disposition:  PACU         SIGNATURE: [unfilled]  DATE: September 22, 2022  TIME: 1:53 PM

## 2022-09-22 NOTE — ANESTHESIA POSTPROCEDURE EVALUATION
Post-Op Assessment Note    CV Status:  Stable  Pain Score: 0    Pain management: adequate     Mental Status:  Alert and awake   Hydration Status:  Euvolemic and stable   PONV Controlled:  Controlled   Airway Patency:  Patent      Post Op Vitals Reviewed: Yes      Staff: CRNA, Anesthesiologist   Comments: eport given to recovering RN, VSS  Pt states he is comfortable        No complications documented      /76 (09/22/22 1402)    Temp 98 2 °F (36 8 °C) (09/22/22 1402)    Pulse 80 (09/22/22 1402)   Resp 16 (09/22/22 1402)    SpO2 97 % (09/22/22 1402)

## 2022-09-22 NOTE — PERIOPERATIVE NURSING NOTE
Received from PACU  Awake, alert  Denies pain at present  16french diaz patent for blood tinged urine   Pt  Given juice to drink

## 2022-09-22 NOTE — DISCHARGE INSTRUCTIONS
#1 no heavy straining or lifting above 10 pounds for 2 weeks    #2 call office fevers, chills, or worsening blood in the urine  #3 Patient has follow-up tomorrow September 23rd 10:15 a m  To remove Coombs voiding trial  Patient has postop follow-up Dr Hermann Cuevas cysto right stent removal October 19th 2:45  To discuss stone analysis      Geeta GREENE  [address] [place] [address] office  [address] [place] [address]  [phone]  8:30 AM to 4:30 PM  Monday through Friday    [facility]ofai Anaya office  [phone] [address]  Sofia Anaya, [address] [place] [address]  [phone]  1:00 to 5:00 PM  Wednesday

## 2022-09-22 NOTE — ANESTHESIA PREPROCEDURE EVALUATION
Procedure:  CYSTOSCOPY URETEROSCOPY WITH LITHOTRIPSY HOLMIUM LASER, BASKET EXTRACTION, RETROGRADE PYELOGRAM (BILATERAL) AND  STENT EXCHANGE (Right Bladder)    Relevant Problems   ANESTHESIA (within normal limits)      CARDIO   (+) Hypertension      GI/HEPATIC   (+) GERD (gastroesophageal reflux disease)      /RENAL   (+) Acute kidney injury (HCC)   (+) Chronic kidney disease   (+) Ureteral stone with hydronephrosis      MUSCULOSKELETAL   (+) Chronic midline low back pain without sciatica   (+) Osteoarthritis   (+) Primary osteoarthritis of right knee   (+) Sciatica      NEURO/PSYCH   (+) Anxiety   (+) Chronic midline low back pain without sciatica   (+) Chronic pain syndrome      PULMONARY   (+) DOMINIC (obstructive sleep apnea)        Physical Exam    Airway    Mallampati score: II  TM Distance: >3 FB  Neck ROM: full     Dental   No notable dental hx     Cardiovascular  Rhythm: regular, Rate: normal,     Pulmonary  Breath sounds clear to auscultation,     Other Findings        Anesthesia Plan  ASA Score- 3     Anesthesia Type- general with ASA Monitors  Additional Monitors:   Airway Plan: LMA  Plan Factors-Exercise tolerance (METS): >4 METS  Chart reviewed  EKG reviewed  Existing labs reviewed  Patient summary reviewed  Patient is not a current smoker  Induction- intravenous  Postoperative Plan- Plan for postoperative opioid use  Planned trial extubation    Informed Consent- Anesthetic plan and risks discussed with patient  I personally reviewed this patient with the CRNA  Discussed and agreed on the Anesthesia Plan with the CRNA  Ernesto Michaud

## 2022-09-22 NOTE — PROGRESS NOTES
Progress Note - Urology      Patient: Juli Lau   : 1965 Sex: male   MRN: [id number]     CSN: [id number]  Unit/Bed#: OR POOL     SUBJECTIVE:   Patient in the surgical preop unit no change history physical to undergo right ureteroscopy lithotripsy stent to be seen in the office for cysto stent removal in a few weeks      Objective   Vitals: /58   Pulse 84   Temp (!) 39 2 °F (4 °C) (Temporal)   Resp 18   Wt (!) 176 kg (389 lb)   SpO2 96%   BMI 47 35 kg/m²     No intake/output data recorded        Physical Exam:   General Alert awake   Normocephalic atraumatic PERRLA  Lungs clear bilaterally  Cardiac normal S1 normal S2  Abdomen soft, flank pain  Extremities no edema      Lab Results: CBC:   Lab Results   Component Value Date    WBC 9 96 09/15/2022    HGB 14 6 09/15/2022    HCT 43 9 09/15/2022    MCV 90 09/15/2022     09/15/2022    ADJUSTEDWBC 9 00 2016    MCH 30 0 09/15/2022    MCHC 33 3 09/15/2022    RDW 13 0 09/15/2022    MPV 9 2 09/15/2022    NRBC 0 09/15/2022     CMP:   Lab Results   Component Value Date     2022     04/15/2019    CO2 26 2022    CO2 23 04/15/2019    BUN 18 2022    BUN 23 04/15/2019    CREATININE 1 50 (H) 2022    CALCIUM 9 1 2022    AST 37 2022    AST 25 12/10/2018    ALT 41 2022    ALT 29 12/10/2018    ALKPHOS 66 2022    EGFR 50 2022     Urinalysis:   Lab Results   Component Value Date    COLORU Light Yellow 2022    CLARITYU Clear 2022    SPECGRAV 1 025 2022    PHUR 6 0 2022    PHUR 6 5 2018    LEUKOCYTESUR Negative 2022    NITRITE Negative 2022    GLUCOSEU Negative 2022    KETONESU Negative 2022    BILIRUBINUR Negative 2022    BLOODU Moderate (A) 2022     Urine Culture: No results found for: URINECX  PSA:   Lab Results   Component Value Date    PSA 2 4 2020    PSA 2 2 12/10/2018         Assessment/ Plan:  Cysto right ureteroscopy laser stent          Goyo Castillo MD

## 2022-09-23 PROCEDURE — 88300 SURGICAL PATH GROSS: CPT | Performed by: STUDENT IN AN ORGANIZED HEALTH CARE EDUCATION/TRAINING PROGRAM

## 2022-09-28 ENCOUNTER — APPOINTMENT (EMERGENCY)
Dept: RADIOLOGY | Facility: HOSPITAL | Age: 57
DRG: 659 | End: 2022-09-28
Payer: COMMERCIAL

## 2022-09-28 ENCOUNTER — HOSPITAL ENCOUNTER (INPATIENT)
Facility: HOSPITAL | Age: 57
LOS: 1 days | Discharge: HOME | DRG: 659 | End: 2022-09-30
Attending: EMERGENCY MEDICINE | Admitting: INTERNAL MEDICINE
Payer: COMMERCIAL

## 2022-09-28 DIAGNOSIS — N13.2 URETERAL STONE WITH HYDRONEPHROSIS: ICD-10-CM

## 2022-09-28 DIAGNOSIS — N20.0 CALCULUS OF KIDNEY: ICD-10-CM

## 2022-09-28 DIAGNOSIS — N20.0 KIDNEY STONE: Primary | ICD-10-CM

## 2022-09-28 PROBLEM — N23 RENAL COLIC ON LEFT SIDE: Status: ACTIVE | Noted: 2022-09-28

## 2022-09-28 LAB
ALBUMIN SERPL BCP-MCNC: 3.6 G/DL (ref 3.5–5)
ALP SERPL-CCNC: 70 U/L (ref 46–116)
ALT SERPL W P-5'-P-CCNC: 45 U/L (ref 12–78)
ANION GAP SERPL CALCULATED.3IONS-SCNC: 10 MMOL/L (ref 4–13)
AST SERPL W P-5'-P-CCNC: 25 U/L (ref 5–45)
BACTERIA UR QL AUTO: ABNORMAL /HPF
BASOPHILS # BLD AUTO: 0.11 THOUSANDS/ΜL (ref 0–0.1)
BASOPHILS NFR BLD AUTO: 1 % (ref 0–1)
BILIRUB SERPL-MCNC: 0.43 MG/DL (ref 0.2–1)
BILIRUB UR QL STRIP: NEGATIVE
BUN SERPL-MCNC: 40 MG/DL (ref 5–25)
CALCIUM SERPL-MCNC: 9 MG/DL (ref 8.3–10.1)
CHLORIDE SERPL-SCNC: 103 MMOL/L (ref 96–108)
CLARITY UR: ABNORMAL
CO2 SERPL-SCNC: 25 MMOL/L (ref 21–32)
COLOR UR: YELLOW
CREAT SERPL-MCNC: 1.77 MG/DL (ref 0.6–1.3)
EOSINOPHIL # BLD AUTO: 0.49 THOUSAND/ΜL (ref 0–0.61)
EOSINOPHIL NFR BLD AUTO: 4 % (ref 0–6)
ERYTHROCYTE [DISTWIDTH] IN BLOOD BY AUTOMATED COUNT: 13.1 % (ref 11.6–15.1)
GFR SERPL CREATININE-BSD FRML MDRD: 41 ML/MIN/1.73SQ M
GLUCOSE SERPL-MCNC: 134 MG/DL (ref 65–140)
GLUCOSE UR STRIP-MCNC: NEGATIVE MG/DL
HCT VFR BLD AUTO: 45.5 % (ref 36.5–49.3)
HGB BLD-MCNC: 15.2 G/DL (ref 12–17)
HGB UR QL STRIP.AUTO: ABNORMAL
IMM GRANULOCYTES # BLD AUTO: 0.09 THOUSAND/UL (ref 0–0.2)
IMM GRANULOCYTES NFR BLD AUTO: 1 % (ref 0–2)
KETONES UR STRIP-MCNC: NEGATIVE MG/DL
LEUKOCYTE ESTERASE UR QL STRIP: NEGATIVE
LYMPHOCYTES # BLD AUTO: 2.42 THOUSANDS/ΜL (ref 0.6–4.47)
LYMPHOCYTES NFR BLD AUTO: 18 % (ref 14–44)
MCH RBC QN AUTO: 30 PG (ref 26.8–34.3)
MCHC RBC AUTO-ENTMCNC: 33.4 G/DL (ref 31.4–37.4)
MCV RBC AUTO: 90 FL (ref 82–98)
MONOCYTES # BLD AUTO: 1.21 THOUSAND/ΜL (ref 0.17–1.22)
MONOCYTES NFR BLD AUTO: 9 % (ref 4–12)
NEUTROPHILS # BLD AUTO: 9.29 THOUSANDS/ΜL (ref 1.85–7.62)
NEUTS SEG NFR BLD AUTO: 67 % (ref 43–75)
NITRITE UR QL STRIP: NEGATIVE
NON-SQ EPI CELLS URNS QL MICRO: ABNORMAL /HPF
NRBC BLD AUTO-RTO: 0 /100 WBCS
PH UR STRIP.AUTO: 5 [PH]
PLATELET # BLD AUTO: 292 THOUSANDS/UL (ref 149–390)
PMV BLD AUTO: 9.2 FL (ref 8.9–12.7)
POTASSIUM SERPL-SCNC: 4.1 MMOL/L (ref 3.5–5.3)
PROT SERPL-MCNC: 7.9 G/DL (ref 6.4–8.4)
PROT UR STRIP-MCNC: ABNORMAL MG/DL
RBC # BLD AUTO: 5.07 MILLION/UL (ref 3.88–5.62)
RBC #/AREA URNS AUTO: ABNORMAL /HPF
SARS-COV-2 RNA RESP QL NAA+PROBE: NEGATIVE
SODIUM SERPL-SCNC: 138 MMOL/L (ref 135–147)
SP GR UR STRIP.AUTO: 1.02 (ref 1–1.03)
UROBILINOGEN UR QL STRIP.AUTO: 0.2 E.U./DL
WBC # BLD AUTO: 13.61 THOUSAND/UL (ref 4.31–10.16)
WBC #/AREA URNS AUTO: ABNORMAL /HPF

## 2022-09-28 PROCEDURE — 99220 PR INITIAL OBSERVATION CARE/DAY 70 MINUTES: CPT | Performed by: INTERNAL MEDICINE

## 2022-09-28 PROCEDURE — 96361 HYDRATE IV INFUSION ADD-ON: CPT

## 2022-09-28 PROCEDURE — 96374 THER/PROPH/DIAG INJ IV PUSH: CPT

## 2022-09-28 PROCEDURE — U0005 INFEC AGEN DETEC AMPLI PROBE: HCPCS | Performed by: SPECIALIST

## 2022-09-28 PROCEDURE — 80053 COMPREHEN METABOLIC PANEL: CPT | Performed by: EMERGENCY MEDICINE

## 2022-09-28 PROCEDURE — U0003 INFECTIOUS AGENT DETECTION BY NUCLEIC ACID (DNA OR RNA); SEVERE ACUTE RESPIRATORY SYNDROME CORONAVIRUS 2 (SARS-COV-2) (CORONAVIRUS DISEASE [COVID-19]), AMPLIFIED PROBE TECHNIQUE, MAKING USE OF HIGH THROUGHPUT TECHNOLOGIES AS DESCRIBED BY CMS-2020-01-R: HCPCS | Performed by: SPECIALIST

## 2022-09-28 PROCEDURE — 81001 URINALYSIS AUTO W/SCOPE: CPT | Performed by: EMERGENCY MEDICINE

## 2022-09-28 PROCEDURE — 99285 EMERGENCY DEPT VISIT HI MDM: CPT | Performed by: EMERGENCY MEDICINE

## 2022-09-28 PROCEDURE — 36415 COLL VENOUS BLD VENIPUNCTURE: CPT | Performed by: EMERGENCY MEDICINE

## 2022-09-28 PROCEDURE — 74176 CT ABD & PELVIS W/O CONTRAST: CPT

## 2022-09-28 PROCEDURE — 85025 COMPLETE CBC W/AUTO DIFF WBC: CPT | Performed by: EMERGENCY MEDICINE

## 2022-09-28 PROCEDURE — 99285 EMERGENCY DEPT VISIT HI MDM: CPT

## 2022-09-28 PROCEDURE — G1004 CDSM NDSC: HCPCS

## 2022-09-28 PROCEDURE — 96376 TX/PRO/DX INJ SAME DRUG ADON: CPT

## 2022-09-28 RX ORDER — SODIUM CHLORIDE 9 MG/ML
125 INJECTION, SOLUTION INTRAVENOUS CONTINUOUS
Status: DISCONTINUED | OUTPATIENT
Start: 2022-09-28 | End: 2022-09-30 | Stop reason: HOSPADM

## 2022-09-28 RX ORDER — PANTOPRAZOLE SODIUM 20 MG/1
20 TABLET, DELAYED RELEASE ORAL
Refills: 3 | Status: DISCONTINUED | OUTPATIENT
Start: 2022-09-28 | End: 2022-09-30 | Stop reason: HOSPADM

## 2022-09-28 RX ORDER — MORPHINE SULFATE 4 MG/ML
4 INJECTION, SOLUTION INTRAMUSCULAR; INTRAVENOUS EVERY 4 HOURS PRN
Status: DISCONTINUED | OUTPATIENT
Start: 2022-09-28 | End: 2022-09-28

## 2022-09-28 RX ORDER — ENOXAPARIN SODIUM 100 MG/ML
40 INJECTION SUBCUTANEOUS DAILY
Status: DISCONTINUED | OUTPATIENT
Start: 2022-09-28 | End: 2022-09-28

## 2022-09-28 RX ORDER — ONDANSETRON 2 MG/ML
4 INJECTION INTRAMUSCULAR; INTRAVENOUS EVERY 6 HOURS PRN
Status: DISCONTINUED | OUTPATIENT
Start: 2022-09-28 | End: 2022-09-30 | Stop reason: HOSPADM

## 2022-09-28 RX ORDER — TAMSULOSIN HYDROCHLORIDE 0.4 MG/1
0.4 CAPSULE ORAL
Status: DISCONTINUED | OUTPATIENT
Start: 2022-09-28 | End: 2022-09-30 | Stop reason: HOSPADM

## 2022-09-28 RX ORDER — HYDROMORPHONE HCL/PF 1 MG/ML
1 SYRINGE (ML) INJECTION ONCE
Status: COMPLETED | OUTPATIENT
Start: 2022-09-28 | End: 2022-09-28

## 2022-09-28 RX ORDER — HEPARIN SODIUM 5000 [USP'U]/ML
5000 INJECTION, SOLUTION INTRAVENOUS; SUBCUTANEOUS EVERY 8 HOURS SCHEDULED
Status: DISCONTINUED | OUTPATIENT
Start: 2022-09-28 | End: 2022-09-30 | Stop reason: HOSPADM

## 2022-09-28 RX ORDER — OXYCODONE HYDROCHLORIDE AND ACETAMINOPHEN 5; 325 MG/1; MG/1
1 TABLET ORAL EVERY 8 HOURS PRN
Status: DISCONTINUED | OUTPATIENT
Start: 2022-09-28 | End: 2022-09-30 | Stop reason: HOSPADM

## 2022-09-28 RX ORDER — HYDROMORPHONE HCL/PF 1 MG/ML
1 SYRINGE (ML) INJECTION EVERY 4 HOURS PRN
Status: DISCONTINUED | OUTPATIENT
Start: 2022-09-28 | End: 2022-09-30 | Stop reason: HOSPADM

## 2022-09-28 RX ORDER — AMLODIPINE BESYLATE 5 MG/1
5 TABLET ORAL DAILY
Status: DISCONTINUED | OUTPATIENT
Start: 2022-09-28 | End: 2022-09-30 | Stop reason: HOSPADM

## 2022-09-28 RX ORDER — ACETAMINOPHEN 325 MG/1
650 TABLET ORAL EVERY 6 HOURS PRN
Status: DISCONTINUED | OUTPATIENT
Start: 2022-09-28 | End: 2022-09-30 | Stop reason: HOSPADM

## 2022-09-28 RX ORDER — TAMSULOSIN HYDROCHLORIDE 0.4 MG/1
0.4 CAPSULE ORAL ONCE
Status: COMPLETED | OUTPATIENT
Start: 2022-09-28 | End: 2022-09-28

## 2022-09-28 RX ADMIN — SODIUM CHLORIDE 125 ML/HR: 0.9 INJECTION, SOLUTION INTRAVENOUS at 15:29

## 2022-09-28 RX ADMIN — TAMSULOSIN HYDROCHLORIDE 0.4 MG: 0.4 CAPSULE ORAL at 07:44

## 2022-09-28 RX ADMIN — ACETAMINOPHEN 650 MG: 325 TABLET ORAL at 12:30

## 2022-09-28 RX ADMIN — PANTOPRAZOLE SODIUM 20 MG: 20 TABLET, DELAYED RELEASE ORAL at 12:30

## 2022-09-28 RX ADMIN — TAMSULOSIN HYDROCHLORIDE 0.4 MG: 0.4 CAPSULE ORAL at 15:30

## 2022-09-28 RX ADMIN — SODIUM CHLORIDE 125 ML/HR: 0.9 INJECTION, SOLUTION INTRAVENOUS at 23:00

## 2022-09-28 RX ADMIN — HYDROCHLOROTHIAZIDE: 25 TABLET ORAL at 12:30

## 2022-09-28 RX ADMIN — HEPARIN SODIUM 5000 UNITS: 5000 INJECTION INTRAVENOUS; SUBCUTANEOUS at 23:00

## 2022-09-28 RX ADMIN — SODIUM CHLORIDE 1000 ML: 0.9 INJECTION, SOLUTION INTRAVENOUS at 07:41

## 2022-09-28 RX ADMIN — AMLODIPINE BESYLATE 5 MG: 5 TABLET ORAL at 12:30

## 2022-09-28 RX ADMIN — HYDROMORPHONE HYDROCHLORIDE 1 MG: 1 INJECTION, SOLUTION INTRAMUSCULAR; INTRAVENOUS; SUBCUTANEOUS at 08:29

## 2022-09-28 RX ADMIN — HEPARIN SODIUM 5000 UNITS: 5000 INJECTION INTRAVENOUS; SUBCUTANEOUS at 15:29

## 2022-09-28 RX ADMIN — ENOXAPARIN SODIUM 40 MG: 40 INJECTION SUBCUTANEOUS at 12:29

## 2022-09-28 RX ADMIN — HYDROMORPHONE HYDROCHLORIDE 1 MG: 1 INJECTION, SOLUTION INTRAMUSCULAR; INTRAVENOUS; SUBCUTANEOUS at 07:33

## 2022-09-28 RX ADMIN — ONDANSETRON 4 MG: 2 INJECTION INTRAMUSCULAR; INTRAVENOUS at 15:28

## 2022-09-28 NOTE — ASSESSMENT & PLAN NOTE
As evidenced by tachycardia and leukocytosis  UA was negative for leukocyte esterase and nitrates and white cells  Likely stress response  Monitor

## 2022-09-28 NOTE — ED CARE HANDOFF
Emergency Department Sign Out Note        Sign out and transfer of care from previous provider  See Separate Emergency Department note  The patient, Hakeem Hernandez, was evaluated by the previous provider for flank pain    Workup Completed:  CT scan reviewed    ED Course / Workup Pending (followup): Patient has indwelling stent on the right after previous lithotripsy  He has developed a new stone on the left, affecting the proximal ureter  Discussed with Dr Joseph Manner  Will admit to observation, possible stenting                                     Procedures  MDM        Disposition  Final diagnoses:   Kidney stone     Time reflects when diagnosis was documented in both MDM as applicable and the Disposition within this note     Time User Action Codes Description Comment    9/28/2022  9:34 AM Palmer Mckee Add [N20 0] Kidney stone       ED Disposition     ED Disposition   Admit    Condition   Stable    Date/Time   Wed Sep 28, 2022  9:34 AM    Comment   Case was discussed with hospitalist and the patient's admission status was agreed to be Admission Status: observation status to the service of Dr Millicent Syed   Follow-up Information    None       Patient's Medications   Discharge Prescriptions    No medications on file     No discharge procedures on file         ED Provider  Electronically Signed by     Greer Whitten MD  09/28/22 7810

## 2022-09-28 NOTE — CONSULTS
H&P Exam - Urology       Patient: Solitario Burns   : 1965 Sex: male   MRN: [id number]     CSN: [id number]      History of Present Illness   HPI:  Solitario Burns is a 59 y o  male who presents with acute onset left flank pain nausea found on ER CT scan to have 2 mm left proximal ureteral stone mild hydronephrosis patient underwent successful cysto will right ureteroscopy laser lithotripsy stent exchange last Thursday seen in the office yesterday refusing to undergo cysto right stent removal in light of fear of pain and was scheduled to have stent out next week will be admitted for hydration analgesics tamsulosin if pain continues patient would like stone removed tomorrow with stent removed tomorrow        Review of Systems:   Constitutional:  Negative for activity change, fever, chills and diaphoresis  HENT: Negative for hearing loss and trouble swallowing  Eyes: Negative for itching and visual disturbance  Respiratory: Negative for chest tightness and shortness of breath  Cardiovascular: Negative for chest pain, edema  Gastrointestinal: Negative for abdominal distention, na abdominal pain, constipation, diarrhea, Nausea and vomiting  Genitourinary: Negative for decreased urine volume, difficulty urinating, dysuria, enuresis, frequency, hematuria and urgency  Musculoskeletal: Negative for gait problem and myalgias  Neurological: Negative for dizziness and headaches  Hematological: Does not bruise/bleed easily         Historical Information   Past Medical History:   Diagnosis Date    Borderline diabetes     per pt A1C "coming down"    Chronic pain disorder     lower back-s/p laminectomy    Claustrophobia     "some degree" jes MRI's"    CPAP (continuous positive airway pressure) dependence     per pt does not use CPAP    Diverticulitis     GERD (gastroesophageal reflux disease)     Hypertension     Kidney stone     x 2 episodes    Lactose intolerance     Localized pain of joint     Mild sleep apnea     CPAP started in July having difficulty using- only has used on occ    Morbid obesity with body mass index (BMI) of 50 0 to 59 9 in adult [facility])     Rectal bleeding     occ rectal bleeding last episode unsure-"nothing recent"    Seasickness     Skin tag     skin tags were removed-as of 8/2/19 has a few more    Teeth missing     Thyroid nodule     Tinnitus     Wears glasses     Weight loss     "80lbs and did gain 40lbs back"--     Past Surgical History:   Procedure Laterality Date    BACK SURGERY      laminectomy-1996, 2010    COLONOSCOPY      x2    ESOPHAGOGASTRODUODENOSCOPY N/A 3/30/2019    Procedure: ESOPHAGOGASTRODUODENOSCOPY (EGD); Surgeon: Cherylene Needy, MD;  Location: [address] [place] [address];  Service: Gastroenterology    FL RETROGRADE PYELOGRAM  9/16/2022    FL RETROGRADE PYELOGRAM  9/22/2022    KNEE ARTHROSCOPY Left 2007    MS CYSTO/URETERO W/LITHOTRIPSY &INDWELL STENT INSRT Right 1/6/2022    Procedure: CYSTOSCOPY URETEROSCOPY AND INSERTION STENT URETERAL RIGHT;  Surgeon: Tracey Thompson MD;  Location: [facility];  Service: Urology    MS CYSTO/URETERO W/LITHOTRIPSY &INDWELL STENT INSRT Right 9/22/2022    Procedure: CYSTOSCOPY URETEROSCOPY WITH LITHOTRIPSY HOLMIUM LASER, BASKET EXTRACTION, RETROGRADE PYELOGRAM (BILATERAL) AND  STENT EXCHANGE;  Surgeon: Tracey Thompson MD;  Location: [address] [place] [address];  Service: Urology    MS CYSTOURETHROSCOPY,URETER CATHETER Right 9/16/2022    Procedure: CYSTOSCOPY RETROGRADE PYELOGRAM WITH INSERTION STENT URETERAL retrograde;  Surgeon: Tracey Thompson MD;  Location: [address] [place] [address];  Service: Urology    MS ESOPHAGOGASTRODUODENOSCOPY TRANSORAL DIAGNOSTIC N/A 5/1/2019    Procedure: ESOPHAGOGASTRODUODENOSCOPY (EGD); Surgeon: Cherylene Needy, MD;  [facility];   Service: Gastroenterology    MS THYROID LOBECTOMY,UNILAT Left 8/31/2022    Procedure: Left patial THYROIDECTOMY;  Surgeon: Anibal Eid MD;  Location:  MAIN OR;  Service: ENT    UPPER GASTROINTESTINAL ENDOSCOPY  2006    US GUIDED THYROID BIOPSY  12/28/2020     Social History   Social History     Substance and Sexual Activity   Alcohol Use Never     Social History     Substance and Sexual Activity   Drug Use No     Social History     Tobacco Use   Smoking Status Never Smoker   Smokeless Tobacco Never Used     Family History:   Family History   Problem Relation Age of Onset    Osteoarthritis Mother     Obesity Father         [id number]    Sleep apnea Father         with CPAP    No Known Problems Sister     No Known Problems Brother     Lupus Sister     No Known Problems Sister     No Known Problems Son     Substance Abuse Neg Hx     Mental illness Neg Hx     Cancer Neg Hx     Diabetes Neg Hx     Heart disease Neg Hx     Stroke Neg Hx        Meds/Allergies   (Not in a hospital admission)    Allergies   Allergen Reactions    Lactose - Food Allergy GI Intolerance       Objective   Vitals: /70 (BP Location: Left arm)   Pulse 66   Temp 98 9 °F (37 2 °C) (Tympanic)   Resp 18   Ht 6' 4" (1 93 m)   Wt (!) 176 kg (389 lb)   SpO2 95%   BMI 47 35 kg/m²     Physical Exam:  General Alert awake   Normocephalic atraumatic PERRLA  Lungs clear bilaterally  Cardiac normal S1 normal S2  Abdomen soft, flank pain  Extremities no edema    I/O last 24 hours:   In: 1000 [IV Piggyback:1000]  Out: -     Invasive Devices  Report    Peripheral Intravenous Line  Duration           Peripheral IV 09/28/22 Right Wrist <1 day          Drain  Duration           Ureteral Internal Stent Right ureter 6 Fr  11 days    Ureteral Internal Stent Right ureter 6 Fr  5 days    Urethral Catheter Latex 16 Fr  5 days                    Lab Results: CBC:   Lab Results   Component Value Date    WBC 13 61 (H) 09/28/2022    HGB 15 2 09/28/2022    HCT 45 5 09/28/2022    MCV 90 09/28/2022     09/28/2022    ADJUSTEDWBC 9 00 08/26/2016    MCH 30 0 09/28/2022    MCHC 33 4 09/28/2022    RDW 13 1 09/28/2022    MPV 9 2 09/28/2022    NRBC 0 09/28/2022     CMP:   Lab Results   Component Value Date     09/28/2022     04/15/2019    CO2 25 09/28/2022    CO2 23 04/15/2019    BUN 40 (H) 09/28/2022    BUN 23 04/15/2019    CREATININE 1 77 (H) 09/28/2022    CALCIUM 9 0 09/28/2022    AST 25 09/28/2022    AST 25 12/10/2018    ALT 45 09/28/2022    ALT 29 12/10/2018    ALKPHOS 70 09/28/2022    EGFR 41 09/28/2022     Urinalysis:   Lab Results   Component Value Date    COLORU Yellow 09/28/2022    CLARITYU Cloudy 09/28/2022    SPECGRAV 1 020 09/28/2022    PHUR 5 0 09/28/2022    PHUR 6 5 06/26/2018    LEUKOCYTESUR Negative 09/28/2022    NITRITE Negative 09/28/2022    GLUCOSEU Negative 09/28/2022    KETONESU Negative 09/28/2022    BILIRUBINUR Negative 09/28/2022    BLOODU Large (A) 09/28/2022     Urine Culture: No results found for: URINECX  PSA:   Lab Results   Component Value Date    PSA 2 4 12/22/2020    PSA 2 2 12/10/2018           Assessment/ Plan:  2 mm left proximal ureteral stone has a 98% chance of passing with medical expulsion therapy patient with severe pain and anxiety start tamsulosin 0 4 mg strain urine KUB in the morning NPO after midnight if patient continues to have severe pain cysto left ureteroscopy stent tomorrow removal right stent      Michele Cortez MD

## 2022-09-28 NOTE — ASSESSMENT & PLAN NOTE
Continue amlodipine 5 milligram p o   Daily  Hold lisinopril/hydrochlorothiazide in the setting of acute kidney injury

## 2022-09-28 NOTE — ASSESSMENT & PLAN NOTE
Patient presented left-sided flank pain/abdominal pain since last night  Known history of kidney stone  Patient has history of right-sided kidney stone status post lithotripsy with stent exchange by Urology  CT abdomen pelvis showed mildly obstructing 2 millimeter calculus in the left proximal ureter  Urology input appreciated  Continue Flomax 0 4 milligram p o  Daily  IV hydration with normal saline at 125 mL/hour  Symptomatic treatment Dilaudid and Zofran  If patient cannot pass the stone patient was scheduled for cystoscopy with left ureteroscopy and removal of right stent tomorrow

## 2022-09-28 NOTE — H&P
[address] [facility] 1965, 59 y o  male MRN: [id number]  Unit/Bed#: [address] [place] [address] Encounter: [id number]  Primary Care Provider: Zaki Victor MD   Date and time admitted to hospital: 9/28/2022  6:12 AM    * Renal colic on left side  Assessment & Plan  Patient presented left-sided flank pain/abdominal pain since last night  Known history of kidney stone  Patient has history of right-sided kidney stone status post lithotripsy with stent exchange by Urology  CT abdomen pelvis showed mildly obstructing 2 millimeter calculus in the left proximal ureter  Urology input appreciated  Continue Flomax 0 4 milligram p o  Daily  IV hydration with normal saline at 125 mL/hour  Symptomatic treatment Dilaudid and Zofran  If patient cannot pass the stone patient was scheduled for cystoscopy with left ureteroscopy and removal of right stent tomorrow  Acute kidney injury [facility])  Assessment & Plan  Baseline creatinine around 1 1  Creatinine upon admission was 1 7  Likely in the setting of dehydration along with use of lisinopril/hydrochlorothiazide  Hold lisinopril/hydrochlorothiazide  Start hydration with normal saline at 125 mL/hour  Avoid nephrotoxic agents and hypotension  CT abdomen pelvis showed mildly obstructing 2 millimeter calculus in the left proximal ureter without any hydronephrosis    SIRS (systemic inflammatory response syndrome) ([address] )  Assessment & Plan  As evidenced by tachycardia and leukocytosis  UA was negative for leukocyte esterase and nitrates and white cells  Likely stress response  Monitor    DOMINIC (obstructive sleep apnea)  Assessment & Plan  Intolerant to CPAP    Hypertension  Assessment & Plan  Continue amlodipine 5 milligram p o  Daily  Hold lisinopril/hydrochlorothiazide in the setting of acute kidney injury      VTE Pharmacologic Prophylaxis: VTE Score: 3 Moderate Risk (Score 3-4) - Pharmacological DVT Prophylaxis Ordered: heparin    Code Status: Level 1 - Full Code    Anticipated Length of Stay: Patient will be admitted on an observation basis with an anticipated length of stay of less than 2 midnights secondary to Left renal colic  Total Time for Visit, including Counseling / Coordination of Care: 60 minutes Greater than 50% of this total time spent on direct patient counseling and coordination of care  Chief Complaint:  Flank Pain    History of Present Illness:  Shey Griffith is a 59 y o  male with a PMH of  hypertension, sleep apnea, kidney stones, chronic pain who presents with left-sided flank pain radiating to the left lower abdomen as with some nausea since last night  Patient has known history of kidney stones and had cystoscopy with lithotripsy and stent exchange by Urology recently  Patient reported that he had mild left flank pain yesterday afternoon which resolved  Later in the night patient started having severe pain in the left flank pain radiating in the left lower abdomen which is constant in nature  Patient took Percocet with  mild improvement in symptoms  Patient also reports some nausea but did not have any vomiting  Patient denies any burning urination frequency, urgency  In the ED patient initially had elevated blood pressure and patient also had 1 episode of hypoxia  Labs showed a BUN creatinine of 40 and 1 7 with a white count of [id number]  UA showed hematuria  CT abdomen pelvis showed 2 millimeter calculus in the left proximal ureter without any hydronephrosis    Review of Systems:  Review of Systems   Constitutional: Negative for chills, diaphoresis, fatigue and unexpected weight change  HENT: Negative for congestion, ear discharge, ear pain, facial swelling, hearing loss, mouth sores, nosebleeds, postnasal drip, rhinorrhea, sinus pressure, sneezing, sore throat, tinnitus, trouble swallowing and voice change  Eyes: Negative for photophobia, discharge, redness and visual disturbance     Respiratory: Negative for cough, chest tightness, shortness of breath, wheezing and stridor  Cardiovascular: Negative for chest pain, palpitations and leg swelling  Gastrointestinal: Positive for abdominal pain and nausea  Negative for abdominal distention, anal bleeding, blood in stool, constipation, diarrhea and vomiting  Endocrine: Negative for polydipsia, polyphagia and polyuria  Genitourinary: Positive for flank pain  Negative for decreased urine volume, difficulty urinating, dysuria, frequency, hematuria and urgency  Musculoskeletal: Negative for arthralgias, back pain and neck stiffness  Skin: Negative for pallor and rash  Neurological: Negative for dizziness, seizures, facial asymmetry, speech difficulty, light-headedness, numbness and headaches  Hematological: Negative for adenopathy  Does not bruise/bleed easily  Psychiatric/Behavioral: Negative for agitation and confusion         Past Medical and Surgical History:   Past Medical History:   Diagnosis Date    Borderline diabetes     per pt A1C "coming down"    Chronic pain disorder     lower back-s/p laminectomy    Claustrophobia     "some degree" jes MRI's"    CPAP (continuous positive airway pressure) dependence     per pt does not use CPAP    Diverticulitis     GERD (gastroesophageal reflux disease)     Hypertension     Kidney stone     x 2 episodes    Lactose intolerance     Localized pain of joint     Mild sleep apnea     CPAP started in July having difficulty using- only has used on occ    Morbid obesity with body mass index (BMI) of 50 0 to 59 9 in adult [facility])     Rectal bleeding     occ rectal bleeding last episode unsure-"nothing recent"    Seasickness     Skin tag     skin tags were removed-as of 8/2/19 has a few more    Teeth missing     Thyroid nodule     Tinnitus     Wears glasses     Weight loss     "80lbs and did gain 40lbs back"--       Past Surgical History:   Procedure Laterality Date    BACK SURGERY      laminectomy-1996, 2010    COLONOSCOPY      x2    ESOPHAGOGASTRODUODENOSCOPY N/A 3/30/2019    Procedure: ESOPHAGOGASTRODUODENOSCOPY (EGD); Surgeon: Josiah Bond MD;  Location: [address] [place] [address];  Service: Gastroenterology    FL RETROGRADE PYELOGRAM  9/16/2022    FL RETROGRADE PYELOGRAM  9/22/2022    KNEE ARTHROSCOPY Left 2007    MS CYSTO/URETERO W/LITHOTRIPSY &INDWELL STENT INSRT Right 1/6/2022    Procedure: CYSTOSCOPY URETEROSCOPY AND INSERTION STENT URETERAL RIGHT;  Surgeon: Prashanth Rodriguez MD;  Location: [facility];  Service: Urology    MS CYSTO/URETERO W/LITHOTRIPSY &INDWELL STENT INSRT Right 9/22/2022    Procedure: CYSTOSCOPY URETEROSCOPY WITH LITHOTRIPSY HOLMIUM LASER, BASKET EXTRACTION, RETROGRADE PYELOGRAM (BILATERAL) AND  STENT EXCHANGE;  Surgeon: Prashanth Rodriguez MD;  Location: [address] [place] [address];  Service: Urology    MS CYSTOURETHROSCOPY,URETER CATHETER Right 9/16/2022    Procedure: CYSTOSCOPY RETROGRADE PYELOGRAM WITH INSERTION STENT URETERAL retrograde;  Surgeon: Prashanth Rodriguez MD;  Location: [address] [place] [address];  Service: Urology    MS ESOPHAGOGASTRODUODENOSCOPY TRANSORAL DIAGNOSTIC N/A 5/1/2019    Procedure: ESOPHAGOGASTRODUODENOSCOPY (EGD); Surgeon: Josiah Bond MD;  Location: [facility] GI LAB; Service: Gastroenterology    MS THYROID LOBECTOMY,UNILAT Left 8/31/2022    Procedure: Left patial THYROIDECTOMY;  Surgeon: Juan C Arreola MD;  Location:  MAIN OR;  Service: ENT    UPPER GASTROINTESTINAL ENDOSCOPY  2006    US GUIDED THYROID BIOPSY  12/28/2020       Meds/Allergies:  Prior to Admission medications    Medication Sig Start Date End Date Taking?  Authorizing Provider   acetaminophen (TYLENOL) 325 mg tablet Take 650 mg by mouth every 6 (six) hours as needed for mild pain   Yes Historical Provider, MD   amLODIPine (NORVASC) 5 mg tablet Take 1 tablet (5 mg total) by mouth daily 4/13/22  Yes Courtney Lux MD   lisinopril-hydrochlorothiazide [facility]) 20-25 MG per tablet Take 1 tablet by mouth daily 9/18/22  Yes Abiola Beasley MD   omeprazole (PriLOSEC) 20 mg delayed release capsule Take 1 capsule (20 mg total) by mouth daily 5/3/22  Yes Humble Tyler PA-C   oxyCODONE-acetaminophen (PERCOCET) 5-325 mg per tablet Take 1 tablet by mouth every 8 (eight) hours as needed for moderate pain for up to 5 doses Max Daily Amount: 3 tablets 9/17/22  Yes Abiola Beasley MD   Diclofenac Sodium (VOLTAREN) 1 % Apply 2 g topically 4 (four) times a day  Patient not taking: Reported on 9/28/2022 9/17/22   Terressa Precise, DO     I have reviewed home medications using recent Epic encounter  Allergies: Allergies   Allergen Reactions    Lactose - Food Allergy GI Intolerance       Social History:  Marital Status:      Substance Use History:   Social History     Substance and Sexual Activity   Alcohol Use Never     Social History     Tobacco Use   Smoking Status Never Smoker   Smokeless Tobacco Never Used     Social History     Substance and Sexual Activity   Drug Use No       Family History:    Reviewed    Physical Exam:     Vitals:   Blood Pressure: 136/80 (09/28/22 1019)  Pulse: 70 (09/28/22 1019)  Temperature: 97 5 °F (36 4 °C) (09/28/22 1019)  Temp Source: Tympanic (09/28/22 0617)  Respirations: 18 (09/28/22 1019)  Height: 6' 4" (193 cm) (09/28/22 0617)  Weight - Scale: (!) 176 kg (389 lb) (09/28/22 0617)  SpO2: 96 % (09/28/22 1019)    Physical Exam  Constitutional:       Appearance: Normal appearance  HENT:      Head: Normocephalic and atraumatic  Eyes:      Extraocular Movements: Extraocular movements intact  Pupils: Pupils are equal, round, and reactive to light  Cardiovascular:      Rate and Rhythm: Normal rate and regular rhythm  Heart sounds: No murmur heard  No gallop  Pulmonary:      Effort: Pulmonary effort is normal       Breath sounds: Normal breath sounds  Abdominal:      General: Bowel sounds are normal       Palpations: Abdomen is soft  Tenderness:  There is no abdominal tenderness  Musculoskeletal:         General: No swelling or deformity  Normal range of motion  Cervical back: Normal range of motion and neck supple  Skin:     General: Skin is warm and dry  Neurological:      General: No focal deficit present  Mental Status: He is alert  Additional Data:     Lab Results:  Results from last 7 days   Lab Units 09/28/22  0632   WBC Thousand/uL 13 61*   HEMOGLOBIN g/dL 15 2   HEMATOCRIT % 45 5   PLATELETS Thousands/uL 292   NEUTROS PCT % 67   LYMPHS PCT % 18   MONOS PCT % 9   EOS PCT % 4     Results from last 7 days   Lab Units 09/28/22  0632   SODIUM mmol/L 138   POTASSIUM mmol/L 4 1   CHLORIDE mmol/L 103   CO2 mmol/L 25   BUN mg/dL 40*   CREATININE mg/dL 1 77*   ANION GAP mmol/L 10   CALCIUM mg/dL 9 0   ALBUMIN g/dL 3 6   TOTAL BILIRUBIN mg/dL 0 43   ALK PHOS U/L 70   ALT U/L 45   AST U/L 25   GLUCOSE RANDOM mg/dL 134                       Imaging: Reviewed radiology reports from this admission including: abdominal/pelvic CT  CT renal stone study abdomen pelvis wo contrast   Final Result by Angie Foster MD (09/28 8641)      Mildly obstructing 2 mm calculus in the left proximal ureter  No hydronephrosis status post right ureteral stent placement  Colonic diverticulosis without diverticulitis  Workstation performed: [id number]             EKG and Other Studies Reviewed on Admission:   · EKG:     ** Please Note: This note has been constructed using a voice recognition system   **

## 2022-09-28 NOTE — PLAN OF CARE
Problem: Potential for Falls  Goal: Patient will remain free of falls  Description: INTERVENTIONS:  - Educate patient/family on patient safety including physical limitations  - Instruct patient to call for assistance with activity   - Consult OT/PT to assist with strengthening/mobility   - Keep Call bell within reach  - Keep bed low and locked with side rails adjusted as appropriate  - Keep care items and personal belongings within reach  - Initiate and maintain comfort rounds  - Make Fall Risk Sign visible to staff  - Offer Toileting every  Hours, in advance of need  - Initiate/Maintain alarm  - Obtain necessary fall risk management equipment:   - Apply yellow socks and bracelet for high fall risk patients  - Consider moving patient to room near nurses station  Outcome: Progressing     Problem: PAIN - ADULT  Goal: Verbalizes/displays adequate comfort level or baseline comfort level  Description: Interventions:  - Encourage patient to monitor pain and request assistance  - Assess pain using appropriate pain scale  - Administer analgesics based on type and severity of pain and evaluate response  - Implement non-pharmacological measures as appropriate and evaluate response  - Consider cultural and social influences on pain and pain management  - Notify physician/advanced practitioner if interventions unsuccessful or patient reports new pain  Outcome: Progressing     Problem: INFECTION - ADULT  Goal: Absence or prevention of progression during hospitalization  Description: INTERVENTIONS:  - Assess and monitor for signs and symptoms of infection  - Monitor lab/diagnostic results  - Monitor all insertion sites, i e  indwelling lines, tubes, and drains  - Monitor endotracheal if appropriate and nasal secretions for changes in amount and color  - [place] appropriate cooling/warming therapies per order  - Administer medications as ordered  - Instruct and encourage patient and family to use good hand hygiene technique  - Identify and instruct in appropriate isolation precautions for identified infection/condition  Outcome: Progressing  Goal: Absence of fever/infection during neutropenic period  Description: INTERVENTIONS:  - Monitor WBC    Outcome: Progressing     Problem: SAFETY ADULT  Goal: Patient will remain free of falls  Description: INTERVENTIONS:  - Educate patient/family on patient safety including physical limitations  - Instruct patient to call for assistance with activity   - Consult OT/PT to assist with strengthening/mobility   - Keep Call bell within reach  - Keep bed low and locked with side rails adjusted as appropriate  - Keep care items and personal belongings within reach  - Initiate and maintain comfort rounds  - Make Fall Risk Sign visible to staff  - Offer Toileting every  Hours, in advance of need  - Initiate/Maintain alarm  - Obtain necessary fall risk management equipment:   - Apply yellow socks and bracelet for high fall risk patients  - Consider moving patient to room near nurses station  Outcome: Progressing  Goal: Maintain or return to baseline ADL function  Description: INTERVENTIONS:  -  Assess patient's ability to carry out ADLs; assess patient's baseline for ADL function and identify physical deficits which impact ability to perform ADLs (bathing, care of mouth/teeth, toileting, grooming, dressing, etc )  - Assess/evaluate cause of self-care deficits   - Assess range of motion  - Assess patient's mobility; develop plan if impaired  - Assess patient's need for assistive devices and provide as appropriate  - Encourage maximum independence but intervene and supervise when necessary  - Involve family in performance of ADLs  - Assess for home care needs following discharge   - Consider OT consult to assist with ADL evaluation and planning for discharge  - Provide patient education as appropriate  Outcome: Progressing  Goal: Maintains/Returns to pre admission functional level  Description: INTERVENTIONS:  - Perform BMAT or MOVE assessment daily    - Set and communicate daily mobility goal to care team and patient/family/caregiver  - Collaborate with rehabilitation services on mobility goals if consulted  - Perform Range of Motion  times a day  - Reposition patient every  hours  - Dangle patient  times a day  - Stand patient  times a day  - Ambulate patient  times a day  - Out of bed to chair  times a day   - Out of bed for meals  times a day  - Out of bed for toileting  - Record patient progress and toleration of activity level   Outcome: Progressing     Problem: DISCHARGE PLANNING  Goal: Discharge to home or other facility with appropriate resources  Description: INTERVENTIONS:  - Identify barriers to discharge w/patient and caregiver  - Arrange for needed discharge resources and transportation as appropriate  - Identify discharge learning needs (meds, wound care, etc )  - Arrange for interpretive services to assist at discharge as needed  - Refer to Case Management Department for coordinating discharge planning if the patient needs post-hospital services based on physician/advanced practitioner order or complex needs related to functional status, cognitive ability, or social support system  Outcome: Progressing     Problem: Knowledge Deficit  Goal: Patient/family/caregiver demonstrates understanding of disease process, treatment plan, medications, and discharge instructions  Description: Complete learning assessment and assess knowledge base    Interventions:  - Provide teaching at level of understanding  - Provide teaching via preferred learning methods  Outcome: Progressing

## 2022-09-28 NOTE — APP STUDENT NOTE
PERCY STUDENT  Inpatient H&P Exam for TRAINING ONLY  Not Part of Legal Medical Record       H&P Exam - Ksenia Jeff 59 y o  male MRN: [id number]  Unit/Bed#: [address] [place] [address] Encounter: [id number]        SIRS (systemic inflammatory response syndrome) ([facility] [address] )  Assessment & Plan  As evidenced by tachycardia and leukocytosis  UA was negative for leukocyte esterase and nitrates and white cells  Likely stress response  Monitor    Acute kidney injury ([facility] )  Assessment & Plan  Baseline creatinine around 1 1  Creatinine upon admission was 1 7  Likely in the setting of dehydration along with use of lisinopril/hydrochlorothiazide  Hold lisinopril/hydrochlorothiazide  Start hydration with normal saline at 125 mL/hour  Avoid nephrotoxic agents and hypotension  CT abdomen pelvis showed mildly obstructing 2 millimeter calculus in the left proximal ureter without any hydronephrosis    DOMINIC (obstructive sleep apnea)  Assessment & Plan  Intolerant to CPAP    Hypertension  Assessment & Plan  Continue amlodipine 5 milligram p o  Daily  Hold lisinopril/hydrochlorothiazide in the setting of acute kidney injury    * Renal colic on left side  Assessment & Plan  Patient presented left-sided flank pain/abdominal pain since last night  Known history of kidney stone  Patient has history of right-sided kidney stone status post lithotripsy with stent exchange by Urology  CT abdomen pelvis showed mildly obstructing 2 millimeter calculus in the left proximal ureter  Urology input appreciated  Continue Flomax 0 4 milligram p o  Daily  IV hydration with normal saline at 125 mL/hour  Symptomatic treatment Dilaudid and Zofran  If patient cannot pass the stone patient was scheduled for cystoscopy with left ureteroscopy and removal of right stent tomorrow  Assessment/Plan  1) Left renal calculus  Patient presented to the ED this morning w L-sided groin pain   Patient has history of kidney stones and was recently discharged s/p lithotripsy with stent placement for R sided renal calculus  · UA revealed innumerable RBCs and 1+ protein  · CT renal stone study revealed mildly obstructing 2mm calculus in L ureter with mild hydronephrosis  R stent properly positioned in ureter with no hydronephrosis  · Per urology, patient will ureteroscopy with R stent removal and L stone removal  · IV hydration  Encourage PO hydration  · Hydromorphone (Dilaudid) for pain control  · Tamsulosin (Flomax)    2) Hypoxia  SpO2 dropped below 90% in the ER overnight  Patient was placed on 2L NC  Patient has history of DOMINIC  · SpO2 today 96% of 2L NC  · Patient denies SOB or difficulty breathing  · Titrate off NC as tolerated  Provide oxygen via NC PRN SpO2<90%  · Refer to sleep medicine outpatient  3) KIANA   Cr 1 77  · IV fluids  · Encourage PO hydration  · Repeat BMP in am    4) Essential Hypertension  · Continue amlodipine (Norvasc) 5mg PO once daily   · Monitor BP    5) SIRS  Tachycardia and leukocytosis (WBC 13 61)  · UA negative for nitrites and leukocytes  · Monitor vitals  · Repeat CBC in am       VTE Prophylaxis: Heparin  / sequential compression device   Code Status:   POLST:   Discussion with family: No, plans     Anticipated Length of Stay:  Patient will be admitted on an Observation basis with an anticipated length of stay of  < 2 midnights  Justification for Hospital Stay: renal calculus requiring ureteroscopy    Total Time for Visit, including Counseling / Coordination of Care: 20 minutes  Greater than 50% of this total time spent on direct patient counseling and coordination of care  Chief Complaint:   L flank pain    History of Present Illness:    Billy Ovalles is a 59 y o  male w a PMHx significant for HTN, prior kidney stones, DOMINIC, and chronic pain who presents with severe, constant L sided flank pain that radiates into his L groin and nausea x1 day  The patient had an initial episode of L flank pain yesterday afternoon which resolved   In the evening the pain came back as severe and constant, prompting visit to the ED  Percocet provided some symptom relief, and pain has been complete resolved with Dilaudid given in the ED  Patient denies vomiting, pain or burning w urination, blood in the urine, or increased frequency of urination  Patient was recently discharged from hospital s/p lithotripsy w stent placement for R-sided renal calculus  Review of Systems:    Review of Systems   Constitutional: Negative for chills and fever  Respiratory: Negative for shortness of breath  Cardiovascular: Negative for chest pain and palpitations  Gastrointestinal: Positive for abdominal pain (Resolved currenlty with pain medication) and nausea  Negative for constipation, diarrhea and vomiting  Genitourinary: Positive for flank pain  Negative for difficulty urinating, dysuria, frequency and hematuria         Past Medical and Surgical History:     Past Medical History:   Diagnosis Date    Borderline diabetes     per pt A1C "coming down"    Chronic pain disorder     lower back-s/p laminectomy    Claustrophobia     "some degree" jse MRI's"    CPAP (continuous positive airway pressure) dependence     per pt does not use CPAP    Diverticulitis     GERD (gastroesophageal reflux disease)     Hypertension     Kidney stone     x 2 episodes    Lactose intolerance     Localized pain of joint     Mild sleep apnea     CPAP started in July having difficulty using- only has used on occ    Morbid obesity with body mass index (BMI) of 50 0 to 59 9 in adult [facility])     Rectal bleeding     occ rectal bleeding last episode unsure-"nothing recent"    Seasickness     Skin tag     skin tags were removed-as of 8/2/19 has a few more    Teeth missing     Thyroid nodule     Tinnitus     Wears glasses     Weight loss     "80lbs and did gain 40lbs back"--       Past Surgical History:   Procedure Laterality Date    BACK SURGERY      laminectomy-1996, 2010    COLONOSCOPY      x2    ESOPHAGOGASTRODUODENOSCOPY N/A 3/30/2019    Procedure: ESOPHAGOGASTRODUODENOSCOPY (EGD); Surgeon: Garfield Florence MD;  Location: [address] [place] [address];  Service: Gastroenterology    FL RETROGRADE PYELOGRAM  9/16/2022    FL RETROGRADE PYELOGRAM  9/22/2022    KNEE ARTHROSCOPY Left 2007    [place] CYSTO/URETERO W/LITHOTRIPSY &INDWELL STENT INSRT Right 1/6/2022    Procedure: CYSTOSCOPY URETEROSCOPY AND INSERTION STENT URETERAL RIGHT;  Surgeon: Colton Victoria MD;  Location: [facility];  Service: Urology    [place] CYSTO/URETERO W/LITHOTRIPSY &INDWELL STENT INSRT Right 9/22/2022    Procedure: CYSTOSCOPY URETEROSCOPY WITH LITHOTRIPSY HOLMIUM LASER, BASKET EXTRACTION, RETROGRADE PYELOGRAM (BILATERAL) AND  STENT EXCHANGE;  Surgeon: Colton Victoria MD;  Location: [address] [place] [address];  Service: Urology    TX CYSTOURETHROSCOPY,URETER CATHETER Right 9/16/2022    Procedure: CYSTOSCOPY RETROGRADE PYELOGRAM WITH INSERTION STENT URETERAL retrograde;  Surgeon: Colton Victoria MD;  Location: [address] [place] [address];  Service: Urology    TX ESOPHAGOGASTRODUODENOSCOPY TRANSORAL DIAGNOSTIC N/A 5/1/2019    Procedure: ESOPHAGOGASTRODUODENOSCOPY (EGD); Surgeon: Garfield Florence MD;  Location: [facility] GI LAB; Service: Gastroenterology    TX THYROID LOBECTOMY,UNILAT Left 8/31/2022    Procedure: Left patial THYROIDECTOMY;  Surgeon: Anthony Carroll MD;  Location:  MAIN OR;  Service: ENT    UPPER GASTROINTESTINAL ENDOSCOPY  2006    US GUIDED THYROID BIOPSY  12/28/2020       Meds/Allergies:    Prior to Admission medications    Medication Sig Start Date End Date Taking?  Authorizing Provider   acetaminophen (TYLENOL) 325 mg tablet Take 650 mg by mouth every 6 (six) hours as needed for mild pain   Yes Historical Provider, MD   amLODIPine (NORVASC) 5 mg tablet Take 1 tablet (5 mg total) by mouth daily 4/13/22  Yes Georgia Maldonado MD   lisinopril-hydrochlorothiazide [facility]) 20-25 MG per tablet Take 1 tablet by mouth daily 9/18/22  Yes Brandie Joiner MD omeprazole (PriLOSEC) 20 mg delayed release capsule Take 1 capsule (20 mg total) by mouth daily 5/3/22  Yes Judith Klinefelter, PA-C   oxyCODONE-acetaminophen (PERCOCET) 5-325 mg per tablet Take 1 tablet by mouth every 8 (eight) hours as needed for moderate pain for up to 5 doses Max Daily Amount: 3 tablets 9/17/22  Yes Manuelito Freire MD   Diclofenac Sodium (VOLTAREN) 1 % Apply 2 g topically 4 (four) times a day  Patient not taking: Reported on 9/28/2022 9/17/22   Marques Carlos DO     Reviewed on Epic    Allergies: Allergies   Allergen Reactions    Lactose - Food Allergy GI Intolerance       Social History:     Marital Status:    Occupation:  Patient Pre-hospital Living Situation:   Patient Pre-hospital Level of Mobility:   Patient Pre-hospital Diet Restrictions:   Substance Use History:   Social History     Substance and Sexual Activity   Alcohol Use Never     Social History     Tobacco Use   Smoking Status Never Smoker   Smokeless Tobacco Never Used     Social History     Substance and Sexual Activity   Drug Use No       Family History:    Family History   Problem Relation Age of Onset    Osteoarthritis Mother     Obesity Father         xf=000    Sleep apnea Father         with CPAP    No Known Problems Sister     No Known Problems Brother     Lupus Sister     No Known Problems Sister     No Known Problems Son     Substance Abuse Neg Hx     Mental illness Neg Hx     Cancer Neg Hx     Diabetes Neg Hx     Heart disease Neg Hx     Stroke Neg Hx        Physical Exam:     Vitals:   Blood Pressure: 136/80 (09/28/22 1019)  Pulse: 70 (09/28/22 1019)  Temperature: 97 5 °F (36 4 °C) (09/28/22 1019)  Temp Source: Tympanic (09/28/22 0617)  Respirations: 18 (09/28/22 1019)  Height: 6' 4" (193 cm) (09/28/22 0617)  Weight - Scale: (!) 176 kg (389 lb) (09/28/22 0617)  SpO2: 96 % (09/28/22 1019)    Physical Exam  Vitals reviewed  Constitutional:       General: He is not in acute distress  Appearance: He is not ill-appearing or toxic-appearing  HENT:      Head: Normocephalic and atraumatic  Cardiovascular:      Rate and Rhythm: Normal rate and regular rhythm  Pulmonary:      Effort: Pulmonary effort is normal       Breath sounds: Normal breath sounds  Abdominal:      General: Bowel sounds are normal  There is no distension  Palpations: Abdomen is soft  Tenderness: There is no abdominal tenderness  There is no guarding  Neurological:      Mental Status: He is alert  Additional Data:     Lab Results: Reviewed labs in last 24 hours    Results from last 7 days   Lab Units 09/28/22  0632   WBC Thousand/uL 13 61*   HEMOGLOBIN g/dL 15 2   HEMATOCRIT % 45 5   PLATELETS Thousands/uL 292   NEUTROS PCT % 67   LYMPHS PCT % 18   MONOS PCT % 9   EOS PCT % 4     Results from last 7 days   Lab Units 09/28/22  0632   SODIUM mmol/L 138   POTASSIUM mmol/L 4 1   CHLORIDE mmol/L 103   CO2 mmol/L 25   BUN mg/dL 40*   CREATININE mg/dL 1 77*   ANION GAP mmol/L 10   CALCIUM mg/dL 9 0   ALBUMIN g/dL 3 6   TOTAL BILIRUBIN mg/dL 0 43   ALK PHOS U/L 70   ALT U/L 45   AST U/L 25   GLUCOSE RANDOM mg/dL 134                       Imaging: I have personally reviewed pertinent reports  CT renal stone study abdomen pelvis wo contrast   Final Result by Tato Stewart MD (09/28 7547)      Mildly obstructing 2 mm calculus in the left proximal ureter  No hydronephrosis status post right ureteral stent placement  Colonic diverticulosis without diverticulitis  Workstation performed: [id number]             EKG, Pathology, and Other Studies Reviewed on Admission:   · EKG: Not viewed by me    [employer] Records Reviewed: Yes     ** Please Note: This note has been constructed using a voice recognition system   **

## 2022-09-28 NOTE — APP STUDENT NOTE
PER[facility] STUDENT  Inpatient Progress Note for TRAINING ONLY  Not Part of Legal Medical Record     Progress Note - Ashlyn Wellington 59 y o  male MRN: [id number]  Unit/Bed#: [place] Encounter: [id number]        No new Assessment & Plan notes have been filed under this hospital service since the last note was generated  Service: Hospitalist   1) Left renal calculus  Patient presented to ED this morning with L-sided groin pain  Patient has history of kidney stones and was recently discharged s/p lithotripsy with stent placement for R sided renal calculus  · UA revealed innumerable RBCs and 1+ protein  · CT renal stone study revealed mildly obstructing 2mm calculus in L ureter with mild hydronephrosis  Stent properly positioned in R ureter with no hydronephrosis  · Per urology, patient will undergo R stent removal and removal of L ureteral stone  · IV hydration  Encourage PO hydration as well  · Hydromorphone (Dilaudid) for pain control  · tamsulosin (Flomax)    2)   Essential Hypertension  3) GERD  4) Obstructive Sleep Apnea  SpO2 dropped in ER overnight, required nasal cannula  Patient on 2L NC today, SpO2 at 96%  Patient denies SOB or difficulty breathing  · Hypoxia likely due to DOMINIC  · Titrate off NC as tolerated  ·     VTE Pharmacologic Prophylaxis:   Pharmacologic: Enoxaparin (Lovenox)  Mechanical VTE Prophylaxis in Place: Yes    Patient Centered Rounds: I have performed bedside rounds with nursing staff today  Discussions with Specialists or Other Care Team Provider: ***    Education and Discussions with Family / Patient: ***    Time Spent for Care: {Time; Time 15 min - 1 hour:19605}  More than 50% of total time spent on counseling and coordination of care as described above      Current Length of Stay: 0 day(s)    Current Patient Status: Observation   Certification Statement: {Certification [id number]:48893}    Discharge Plan: ***    Code Status: Prior    Subjective:   ***    Objective:     Vitals: Temp (24hrs), Av 2 °F (36 8 °C), Min:97 5 °F (36 4 °C), Max:98 9 °F (37 2 °C)    Temp:  [97 5 °F (36 4 °C)-98 9 °F (37 2 °C)] 97 5 °F (36 4 °C)  HR:  [66-93] 70  Resp:  [18-20] 18  BP: (126-221)/() 136/80  SpO2:  [88 %-97 %] 96 %  Body mass index is 47 35 kg/m²  Input and Output Summary (last 24 hours): Intake/Output Summary (Last 24 hours) at 2022 1056  Last data filed at 2022 7017  Gross per 24 hour   Intake 1000 ml   Output --   Net 1000 ml       Physical Exam:     Physical Exam  ( *** Be Sure to Include Physical Exam: Delete this entire line when you have entered your exam)    Historical Information   Past Medical History:   Diagnosis Date    Borderline diabetes     per pt A1C "coming down"    Chronic pain disorder     lower back-s/p laminectomy    Claustrophobia     "some degree" jes MRI's"    CPAP (continuous positive airway pressure) dependence     per pt does not use CPAP    Diverticulitis     GERD (gastroesophageal reflux disease)     Hypertension     Kidney stone     x 2 episodes    Lactose intolerance     Localized pain of joint     Mild sleep apnea     CPAP started in July having difficulty using- only has used on occ    Morbid obesity with body mass index (BMI) of 50 0 to 59 9 in adult [facility])     Rectal bleeding     occ rectal bleeding last episode unsure-"nothing recent"    Seasickness     Skin tag     skin tags were removed-as of 19 has a few more    Teeth missing     Thyroid nodule     Tinnitus     Wears glasses     Weight loss     "80lbs and did gain 40lbs back"--     Past Surgical History:   Procedure Laterality Date    BACK SURGERY      laminectomy-,     COLONOSCOPY      x2    ESOPHAGOGASTRODUODENOSCOPY N/A 3/30/2019    Procedure: ESOPHAGOGASTRODUODENOSCOPY (EGD);   Surgeon: Ginger Nix MD;  Location: [address] [place] [address];  Service: Gastroenterology    FL RETROGRADE PYELOGRAM  2022    FL RETROGRADE PYELOGRAM  2022    KNEE ARTHROSCOPY Left 2007    [place] CYSTO/URETERO W/LITHOTRIPSY &INDWELL STENT INSRT Right 1/6/2022    Procedure: CYSTOSCOPY URETEROSCOPY AND INSERTION STENT URETERAL RIGHT;  Surgeon: Prashanth Rodriguez MD;  [place] [facility];  Service: Urology    [place] CYSTO/URETERO W/LITHOTRIPSY &INDWELL STENT INSRT Right 9/22/2022    Procedure: CYSTOSCOPY URETEROSCOPY WITH LITHOTRIPSY HOLMIUM LASER, BASKET EXTRACTION, RETROGRADE PYELOGRAM (BILATERAL) AND  STENT EXCHANGE;  Surgeon: Prashanth Rodriguez MD;  Location: [address] [place] [address];  Service: Urology    [place] CYSTOURETHROSCOPY,URETER CATHETER Right 9/16/2022    Procedure: CYSTOSCOPY RETROGRADE PYELOGRAM WITH INSERTION STENT URETERAL retrograde;  Surgeon: Prashanth Rodriguez MD;  Location: [address] [place] [address];  Service: Urology    [place] ESOPHAGOGASTRODUODENOSCOPY TRANSORAL DIAGNOSTIC N/A 5/1/2019    Procedure: ESOPHAGOGASTRODUODENOSCOPY (EGD); Surgeon: Josiah Bond MD;  Location: [facility] GI LAB;   Service: Gastroenterology    [place] THYROID LOBECTOMY,UNILAT Left 8/31/2022    Procedure: Left patial THYROIDECTOMY;  Surgeon: Juan C Arreola MD;  Location:  MAIN OR;  Service: ENT    UPPER GASTROINTESTINAL ENDOSCOPY  2006    US GUIDED THYROID BIOPSY  12/28/2020     Social History   Social History     Substance and Sexual Activity   Alcohol Use Never     Social History     Substance and Sexual Activity   Drug Use No     Social History     Tobacco Use   Smoking Status Never Smoker   Smokeless Tobacco Never Used     Family History: {MADELYN NIX FAM HISTORY SMARTLIST:044148860}    Meds/Allergies   {MADELYN NIX H&P ILDL:[id number]}  Allergies   Allergen Reactions    Lactose - Food Allergy GI Intolerance       Additional Data:     Labs:    Results from last 7 days   Lab Units 09/28/22  0632   WBC Thousand/uL 13 61*   HEMOGLOBIN g/dL 15 2   HEMATOCRIT % 45 5   PLATELETS Thousands/uL 292   NEUTROS PCT % 67   LYMPHS PCT % 18   MONOS PCT % 9   EOS PCT % 4     Results from last 7 days   Lab Units 09/28/22  0632   SODIUM mmol/L 138 POTASSIUM mmol/L 4 1   CHLORIDE mmol/L 103   CO2 mmol/L 25   BUN mg/dL 40*   CREATININE mg/dL 1 77*   ANION GAP mmol/L 10   CALCIUM mg/dL 9 0   ALBUMIN g/dL 3 6   TOTAL BILIRUBIN mg/dL 0 43   ALK PHOS U/L 70   ALT U/L 45   AST U/L 25   GLUCOSE RANDOM mg/dL 134                         * I Have Reviewed All Lab Data Listed Above  * Additional Pertinent Lab Tests Reviewed: {Labreview:46993}    Imaging:    Imaging Reports Reviewed Today Include: ***  Imaging Personally Reviewed by Myself Includes:  ***    Recent Cultures (last 7 days):           Last 24 Hours Medication List:        Today, Patient Was Seen By: Divya Alanis    ** Please Note: Dictation voice to text software may have been used in the creation of this document   **

## 2022-09-28 NOTE — ASSESSMENT & PLAN NOTE
Baseline creatinine around 1 1  Creatinine upon admission was 1 7  Likely in the setting of dehydration along with use of lisinopril/hydrochlorothiazide  Hold lisinopril/hydrochlorothiazide  Start hydration with normal saline at 125 mL/hour  Avoid nephrotoxic agents and hypotension  CT abdomen pelvis showed mildly obstructing 2 millimeter calculus in the left proximal ureter without any hydronephrosis

## 2022-09-29 ENCOUNTER — APPOINTMENT (OUTPATIENT)
Dept: RADIOLOGY | Facility: HOSPITAL | Age: 57
DRG: 659 | End: 2022-09-29
Payer: COMMERCIAL

## 2022-09-29 ENCOUNTER — ANESTHESIA (OUTPATIENT)
Dept: PERIOP | Facility: HOSPITAL | Age: 57
DRG: 659 | End: 2022-09-29
Payer: COMMERCIAL

## 2022-09-29 ENCOUNTER — ANESTHESIA EVENT (OUTPATIENT)
Dept: PERIOP | Facility: HOSPITAL | Age: 57
DRG: 659 | End: 2022-09-29
Payer: COMMERCIAL

## 2022-09-29 LAB
ANION GAP SERPL CALCULATED.3IONS-SCNC: 8 MMOL/L (ref 4–13)
BASOPHILS # BLD AUTO: 0.07 THOUSANDS/ΜL (ref 0–0.1)
BASOPHILS NFR BLD AUTO: 1 % (ref 0–1)
BUN SERPL-MCNC: 26 MG/DL (ref 5–25)
CALCIUM SERPL-MCNC: 8.6 MG/DL (ref 8.3–10.1)
CHLORIDE SERPL-SCNC: 105 MMOL/L (ref 96–108)
CO2 SERPL-SCNC: 25 MMOL/L (ref 21–32)
COLOR STONE: NORMAL
COM MFR STONE: 10 %
COMMENT-STONE3: NORMAL
COMPOSITION: NORMAL
CREAT SERPL-MCNC: 1.3 MG/DL (ref 0.6–1.3)
EOSINOPHIL # BLD AUTO: 0.36 THOUSAND/ΜL (ref 0–0.61)
EOSINOPHIL NFR BLD AUTO: 4 % (ref 0–6)
ERYTHROCYTE [DISTWIDTH] IN BLOOD BY AUTOMATED COUNT: 13.1 % (ref 11.6–15.1)
GFR SERPL CREATININE-BSD FRML MDRD: 59 ML/MIN/1.73SQ M
GLUCOSE P FAST SERPL-MCNC: 122 MG/DL (ref 65–99)
GLUCOSE SERPL-MCNC: 122 MG/DL (ref 65–140)
HCT VFR BLD AUTO: 44 % (ref 36.5–49.3)
HGB BLD-MCNC: 14.3 G/DL (ref 12–17)
IMM GRANULOCYTES # BLD AUTO: 0.04 THOUSAND/UL (ref 0–0.2)
IMM GRANULOCYTES NFR BLD AUTO: 1 % (ref 0–2)
LABORATORY COMMENT REPORT: NORMAL
LYMPHOCYTES # BLD AUTO: 1.99 THOUSANDS/ΜL (ref 0.6–4.47)
LYMPHOCYTES NFR BLD AUTO: 24 % (ref 14–44)
MCH RBC QN AUTO: 30 PG (ref 26.8–34.3)
MCHC RBC AUTO-ENTMCNC: 32.5 G/DL (ref 31.4–37.4)
MCV RBC AUTO: 92 FL (ref 82–98)
MONOCYTES # BLD AUTO: 0.65 THOUSAND/ΜL (ref 0.17–1.22)
MONOCYTES NFR BLD AUTO: 8 % (ref 4–12)
NEUTROPHILS # BLD AUTO: 5.37 THOUSANDS/ΜL (ref 1.85–7.62)
NEUTS SEG NFR BLD AUTO: 62 % (ref 43–75)
NRBC BLD AUTO-RTO: 0 /100 WBCS
PHOTO: NORMAL
PLATELET # BLD AUTO: 206 THOUSANDS/UL (ref 149–390)
PMV BLD AUTO: 9.4 FL (ref 8.9–12.7)
POTASSIUM SERPL-SCNC: 3.9 MMOL/L (ref 3.5–5.3)
RBC # BLD AUTO: 4.77 MILLION/UL (ref 3.88–5.62)
SIZE STONE: NORMAL MM
SODIUM SERPL-SCNC: 138 MMOL/L (ref 135–147)
SPEC SOURCE SUBJ: NORMAL
STONE ANALYSIS-IMP: NORMAL
STONE ANALYSIS-IMP: NORMAL
URATE MFR STONE: 90 %
WBC # BLD AUTO: 8.48 THOUSAND/UL (ref 4.31–10.16)
WT STONE: 6 MG

## 2022-09-29 PROCEDURE — 74420 UROGRAPHY RTRGR +-KUB: CPT

## 2022-09-29 PROCEDURE — 0T778DZ DILATION OF LEFT URETER WITH INTRALUMINAL DEVICE, VIA NATURAL OR ARTIFICIAL OPENING ENDOSCOPIC: ICD-10-PCS | Performed by: SPECIALIST

## 2022-09-29 PROCEDURE — 80048 BASIC METABOLIC PNL TOTAL CA: CPT | Performed by: INTERNAL MEDICINE

## 2022-09-29 PROCEDURE — 0TP98DZ REMOVAL OF INTRALUMINAL DEVICE FROM URETER, VIA NATURAL OR ARTIFICIAL OPENING ENDOSCOPIC: ICD-10-PCS | Performed by: SPECIALIST

## 2022-09-29 PROCEDURE — BT1F1ZZ FLUOROSCOPY OF LEFT KIDNEY, URETER AND BLADDER USING LOW OSMOLAR CONTRAST: ICD-10-PCS | Performed by: SPECIALIST

## 2022-09-29 PROCEDURE — C1769 GUIDE WIRE: HCPCS | Performed by: SPECIALIST

## 2022-09-29 PROCEDURE — 85025 COMPLETE CBC W/AUTO DIFF WBC: CPT | Performed by: INTERNAL MEDICINE

## 2022-09-29 PROCEDURE — C2617 STENT, NON-COR, TEM W/O DEL: HCPCS | Performed by: SPECIALIST

## 2022-09-29 PROCEDURE — 99232 SBSQ HOSP IP/OBS MODERATE 35: CPT | Performed by: INTERNAL MEDICINE

## 2022-09-29 DEVICE — INLAY URETERAL STENT W/O GUIDEWIRE
Type: IMPLANTABLE DEVICE | Status: FUNCTIONAL
Brand: BARD® INLAY® URETERAL STENT

## 2022-09-29 RX ORDER — MIDAZOLAM HYDROCHLORIDE 2 MG/2ML
INJECTION, SOLUTION INTRAMUSCULAR; INTRAVENOUS AS NEEDED
Status: DISCONTINUED | OUTPATIENT
Start: 2022-09-29 | End: 2022-09-29

## 2022-09-29 RX ORDER — ONDANSETRON 2 MG/ML
4 INJECTION INTRAMUSCULAR; INTRAVENOUS ONCE AS NEEDED
Status: DISCONTINUED | OUTPATIENT
Start: 2022-09-29 | End: 2022-09-29 | Stop reason: HOSPADM

## 2022-09-29 RX ORDER — FENTANYL CITRATE/PF 50 MCG/ML
50 SYRINGE (ML) INJECTION
Status: DISCONTINUED | OUTPATIENT
Start: 2022-09-29 | End: 2022-09-29 | Stop reason: HOSPADM

## 2022-09-29 RX ORDER — MAGNESIUM HYDROXIDE 1200 MG/15ML
LIQUID ORAL AS NEEDED
Status: DISCONTINUED | OUTPATIENT
Start: 2022-09-29 | End: 2022-09-29 | Stop reason: HOSPADM

## 2022-09-29 RX ORDER — ONDANSETRON 2 MG/ML
INJECTION INTRAMUSCULAR; INTRAVENOUS AS NEEDED
Status: DISCONTINUED | OUTPATIENT
Start: 2022-09-29 | End: 2022-09-29

## 2022-09-29 RX ORDER — LIDOCAINE HYDROCHLORIDE 20 MG/ML
INJECTION, SOLUTION EPIDURAL; INFILTRATION; INTRACAUDAL; PERINEURAL AS NEEDED
Status: DISCONTINUED | OUTPATIENT
Start: 2022-09-29 | End: 2022-09-29

## 2022-09-29 RX ORDER — PROPOFOL 10 MG/ML
INJECTION, EMULSION INTRAVENOUS AS NEEDED
Status: DISCONTINUED | OUTPATIENT
Start: 2022-09-29 | End: 2022-09-29

## 2022-09-29 RX ORDER — LEVOFLOXACIN 5 MG/ML
500 INJECTION, SOLUTION INTRAVENOUS ONCE
Status: CANCELLED | OUTPATIENT
Start: 2022-10-13 | End: 2022-09-29

## 2022-09-29 RX ORDER — DEXAMETHASONE SODIUM PHOSPHATE 4 MG/ML
INJECTION, SOLUTION INTRA-ARTICULAR; INTRALESIONAL; INTRAMUSCULAR; INTRAVENOUS; SOFT TISSUE AS NEEDED
Status: DISCONTINUED | OUTPATIENT
Start: 2022-09-29 | End: 2022-09-29

## 2022-09-29 RX ORDER — FENTANYL CITRATE 50 UG/ML
INJECTION, SOLUTION INTRAMUSCULAR; INTRAVENOUS AS NEEDED
Status: DISCONTINUED | OUTPATIENT
Start: 2022-09-29 | End: 2022-09-29

## 2022-09-29 RX ORDER — SODIUM CHLORIDE, SODIUM LACTATE, POTASSIUM CHLORIDE, CALCIUM CHLORIDE 600; 310; 30; 20 MG/100ML; MG/100ML; MG/100ML; MG/100ML
50 INJECTION, SOLUTION INTRAVENOUS CONTINUOUS
Status: DISCONTINUED | OUTPATIENT
Start: 2022-09-29 | End: 2022-09-30 | Stop reason: HOSPADM

## 2022-09-29 RX ORDER — SODIUM CHLORIDE, SODIUM LACTATE, POTASSIUM CHLORIDE, CALCIUM CHLORIDE 600; 310; 30; 20 MG/100ML; MG/100ML; MG/100ML; MG/100ML
INJECTION, SOLUTION INTRAVENOUS CONTINUOUS PRN
Status: DISCONTINUED | OUTPATIENT
Start: 2022-09-29 | End: 2022-09-29

## 2022-09-29 RX ORDER — LEVOFLOXACIN 5 MG/ML
500 INJECTION, SOLUTION INTRAVENOUS ONCE
Status: COMPLETED | OUTPATIENT
Start: 2022-09-29 | End: 2022-09-29

## 2022-09-29 RX ADMIN — LEVOFLOXACIN: 500 INJECTION, SOLUTION INTRAVENOUS at 15:59

## 2022-09-29 RX ADMIN — HEPARIN SODIUM 5000 UNITS: 5000 INJECTION INTRAVENOUS; SUBCUTANEOUS at 22:35

## 2022-09-29 RX ADMIN — FENTANYL CITRATE 50 MCG: 50 INJECTION, SOLUTION INTRAMUSCULAR; INTRAVENOUS at 16:33

## 2022-09-29 RX ADMIN — SODIUM CHLORIDE, SODIUM LACTATE, POTASSIUM CHLORIDE, AND CALCIUM CHLORIDE: .6; .31; .03; .02 INJECTION, SOLUTION INTRAVENOUS at 15:41

## 2022-09-29 RX ADMIN — OXYCODONE HYDROCHLORIDE AND ACETAMINOPHEN 1 TABLET: 5; 325 TABLET ORAL at 19:47

## 2022-09-29 RX ADMIN — FENTANYL CITRATE 50 MCG: 50 INJECTION, SOLUTION INTRAMUSCULAR; INTRAVENOUS at 16:16

## 2022-09-29 RX ADMIN — MIDAZOLAM 2 MG: 1 INJECTION INTRAMUSCULAR; INTRAVENOUS at 15:59

## 2022-09-29 RX ADMIN — PROPOFOL 200 MG: 10 INJECTION, EMULSION INTRAVENOUS at 16:03

## 2022-09-29 RX ADMIN — SODIUM CHLORIDE 125 ML/HR: 0.9 INJECTION, SOLUTION INTRAVENOUS at 14:22

## 2022-09-29 RX ADMIN — DEXAMETHASONE SODIUM PHOSPHATE 4 MG: 4 INJECTION INTRA-ARTICULAR; INTRALESIONAL; INTRAMUSCULAR; INTRAVENOUS; SOFT TISSUE at 16:07

## 2022-09-29 RX ADMIN — AMLODIPINE BESYLATE 5 MG: 5 TABLET ORAL at 08:20

## 2022-09-29 RX ADMIN — LIDOCAINE HYDROCHLORIDE 100 MG: 20 INJECTION, SOLUTION EPIDURAL; INFILTRATION; INTRACAUDAL at 16:03

## 2022-09-29 RX ADMIN — FENTANYL CITRATE 50 MCG: 50 INJECTION, SOLUTION INTRAMUSCULAR; INTRAVENOUS at 15:59

## 2022-09-29 RX ADMIN — FENTANYL CITRATE 50 MCG: 50 INJECTION, SOLUTION INTRAMUSCULAR; INTRAVENOUS at 16:30

## 2022-09-29 RX ADMIN — ONDANSETRON 4 MG: 2 INJECTION INTRAMUSCULAR; INTRAVENOUS at 16:07

## 2022-09-29 RX ADMIN — SODIUM CHLORIDE, SODIUM LACTATE, POTASSIUM CHLORIDE, AND CALCIUM CHLORIDE 50 ML/HR: .6; .31; .03; .02 INJECTION, SOLUTION INTRAVENOUS at 17:47

## 2022-09-29 RX ADMIN — SODIUM CHLORIDE 125 ML/HR: 0.9 INJECTION, SOLUTION INTRAVENOUS at 05:56

## 2022-09-29 RX ADMIN — TAMSULOSIN HYDROCHLORIDE 0.4 MG: 0.4 CAPSULE ORAL at 18:03

## 2022-09-29 NOTE — ASSESSMENT & PLAN NOTE
As evidenced by tachycardia and leukocytosis  UA was negative for leukocyte esterase and nitrates and white cells  Likely stress response  Resolved

## 2022-09-29 NOTE — UTILIZATION REVIEW
Observation Admission Authorization Request   NOTIFICATION OF OBSERVATION ADMISSION/OBSERVATION AUTHORIZATION REQUEST   SERVICING FACILITY:   [place] [address]  Tax ID: [id number]  NPI: [id number]  Place of Service: On [address] [place] Code: 22  CPT Code for Observation: CPT   CPT 23161     ATTENDING PROVIDER:  Attending Name and NPI#: Sanjuanita Marsh, Cindy Jose Luis Ortiz [[id number]]  Address: [address] [place] [address]  Phone: [phone]     UTILIZATION REVIEW CONTACT:  Katja Grissom, Utilization   Network Utilization Review Department  Phone: [phone]  Fax [phone]  Email: [email or web address]     PHYSICIAN ADVISORY SERVICES:  FOR YHYF-OI-RYQF REVIEW - MEDICAL NECESSITY DENIAL  Phone: [phone]  Fax: [phone]  Email: [email or web address]     TYPE OF REQUEST:  Observation Status     ADMISSION INFORMATION:  ADMISSION DATE/TIME:   PATIENT DIAGNOSIS CODE/DESCRIPTION:  Kidney stone [N20 0]  Flank pain [R10 9]  DISCHARGE DATE/TIME: No discharge date for patient encounter  IMPORTANT INFORMATION:  Please contact Clare Saha directly with any questions or concerns regarding this request  Department voicemails are confidential     Send requests for admission clinical reviews, concurrent reviews, approvals, and administrative denials due to lack of clinical to fax [phone]

## 2022-09-29 NOTE — MALNUTRITION/BMI
This medical record reflects one or more clinical indicators suggestive of malnutrition and/or morbid obesity  Malnutrition Findings:                                 BMI Findings:  Adult BMI Classifications: Morbid Obesity 45-49 9 (Recommend outpatient MNT counseling for weight manangement, once able for diet advancement recommend cardiac diet to promote weight loss  PT declined diet education at this time )        Body mass index is 47 35 kg/m²  See Nutrition note dated 9/29/22 for additional details  Completed nutrition assessment is viewable in the nutrition documentation

## 2022-09-29 NOTE — UTILIZATION REVIEW
Initial Clinical Review    Observation 9/28/22 @ 0935 converted to inpatient admission 9/29/22 @ 1952 for continued care & tx for renal colic  Admission: Date/Time/Statement:   Admission Orders (From admission, onward)     Ordered        09/29/22 1952  Inpatient Admission  Once            09/28/22 0935  Place in Observation  Once                      Orders Placed This Encounter   Procedures    Inpatient Admission     Standing Status:   Standing     Number of Occurrences:   1     Order Specific Question:   Level of Care     Answer:   Med Surg [16]     Order Specific Question:   Estimated length of stay     Answer:   More than 2 Midnights     Order Specific Question:   Certification     Answer:   I certify that inpatient services are medically necessary for this patient for a duration of greater than two midnights  See H&P and MD Progress Notes for additional information about the patient's course of treatment  ED Arrival Information     Expected   -    Arrival   9/28/2022 06:06    Acuity   Urgent            Means of arrival   Walk-In    Escorted by   Self    Service   Hospitalist    Admission type   Emergency            Arrival complaint   kidney stone           Chief Complaint   Patient presents with    Flank Pain     Patient was just a patient in the hospital for kidney stone, is to have another procedure next week with urologist, but yesterday started with left flank pain       Initial Presentation:   59 yom to ER from home c/o left-sided flank pain radiating to the left lower abdomen as with some nausea  Hx kidney stones and had cystoscopy with lithotripsy and stent exchange by Urology recently  Patient took Percocet with  mild improvement in symptoms  Presents hypertensive, s/s as above  Admission work-up showing obstructing 2 millimeter calculus in the left proximal ureter  Placed in observation status for L sided renal colic, KIANA  Started on IVF, rurlogy consulted      Per urol: 2 mm left proximal ureteral stone has a 98% chance of passing with medical expulsion therapy patient with severe pain and anxiety start tamsulosin 0 4 mg strain urine KUB in the morning NPO after midnight if patient continues to have severe pain cysto left ureteroscopy stent tomorrow removal right stent      9/29/22: To OR for: CYSTOSCOPY URETEROSCOPY, STENT MANUPILATION, RETROGRADE PYELOGRAM, LEFT  URETERAL STENT NSERTION, AND  REMOVAL OF RIGHT STENT (Bilateral)  Anesthesia Type: General/LMA  Operative Findings:  1  Right stent removed  2  Left retrograde confirmed 2 mm stone in the proximal ureter  3  Left ureteroscopy confirming stone to migrating back into the renal pelvis 24 6 Czech stent left in position to dilate ureter    Returned to nursing unit post-op, continue IVF hydration &  pain mgt  Coombs cath in place      ED Triage Vitals   Temperature Pulse Respirations Blood Pressure SpO2   09/28/22 0617 09/28/22 0617 09/28/22 0617 09/28/22 0617 09/28/22 0617   98 9 °F (37 2 °C) 93 20 (!) 221/135 97 %      Temp Source Heart Rate Source Patient Position - Orthostatic VS BP Location FiO2 (%)   09/28/22 0617 09/28/22 0617 09/28/22 0617 09/28/22 0617 --   Tympanic Monitor Sitting Right arm       Pain Score       09/28/22 0733       8          Wt Readings from Last 1 Encounters:   09/28/22 (!) 176 kg (389 lb)     Additional Vital Signs:   Date/Time Temp Pulse Resp BP MAP (mmHg) SpO2 Calculated FIO2 (%) - Nasal Cannula Nasal Cannula O2 Flow Rate (L/min) O2 Device Patient Position - Orthostatic VS   09/29/22 0305 97 5 °F (36 4 °C) 75 18 125/78 94 97 % -- -- None (Room air) Lying   09/28/22 23:01:37 97 4 °F (36 3 °C) Abnormal  68 -- 128/85 99 96 % -- -- -- --   09/28/22 22:58:55 97 4 °F (36 3 °C) Abnormal  80 18 128/85 99 94 % -- -- -- --   09/28/22 19:18:23 97 2 °F (36 2 °C) Abnormal  81 -- 139/85 103 97 % -- -- -- --   09/28/22 15:07:24 98 3 °F (36 8 °C) 72 -- 142/82 102 96 % -- -- -- --   09/28/22 1507 98 3 °F (36 8 °C) 79 -- 142/82 102 97 % -- -- -- --   09/28/22 10:19:49 97 5 °F (36 4 °C) 70 18 136/80 99 96 % -- -- -- --   09/28/22 0945 -- 66 18 126/70 90 95 % 28 2 L/min Nasal cannula Lying   09/28/22 0915 -- 66 18 129/78 97 94 % 28 2 L/min Nasal cannula Lying   09/28/22 0845 -- 66 18 134/74 96 94 % 28 2 L/min Nasal cannula Lying   09/28/22 0815 -- 78 18 138/78 101 88 % Abnormal  -- -- None (Room air) Lying     Pertinent Labs/Diagnostic Test Results:   CT renal stone study abdomen pelvis wo contrast   Final Result  (09/28 0726)      Mildly obstructing 2 mm calculus in the left proximal ureter  No hydronephrosis status post right ureteral stent placement  Colonic diverticulosis without diverticulitis       Results from last 7 days   Lab Units 09/28/22  1055   SARS-COV-2  Negative     Results from last 7 days   Lab Units 09/29/22  0554 09/28/22  0632   WBC Thousand/uL 8 48 13 61*   HEMOGLOBIN g/dL 14 3 15 2   HEMATOCRIT % 44 0 45 5   PLATELETS Thousands/uL 206 292   NEUTROS ABS Thousands/µL 5 37 9 29*     Results from last 7 days   Lab Units 09/29/22  0554 09/28/22  0632   SODIUM mmol/L 138 138   POTASSIUM mmol/L 3 9 4 1   CHLORIDE mmol/L 105 103   CO2 mmol/L 25 25   ANION GAP mmol/L 8 10   BUN mg/dL 26* 40*   CREATININE mg/dL 1 30 1 77*   EGFR ml/min/1 73sq m 59 41   CALCIUM mg/dL 8 6 9 0     Results from last 7 days   Lab Units 09/28/22  0632   AST U/L 25   ALT U/L 45   ALK PHOS U/L 70   TOTAL PROTEIN g/dL 7 9   ALBUMIN g/dL 3 6   TOTAL BILIRUBIN mg/dL 0 43     Results from last 7 days   Lab Units 09/29/22  0554 09/28/22  0632   GLUCOSE RANDOM mg/dL 122 134     Results from last 7 days   Lab Units 09/28/22  0632   CLARITY UA  Cloudy   COLOR UA  Yellow   SPEC GRAV UA  1 020   PH UA  5 0   GLUCOSE UA mg/dl Negative   KETONES UA mg/dl Negative   BLOOD UA  Large*   PROTEIN UA mg/dl 30 (1+)*   NITRITE UA  Negative   BILIRUBIN UA  Negative   UROBILINOGEN UA E U /dl 0 2   LEUKOCYTES UA  Negative   WBC UA /hpf 2-4   RBC UA /hpf Innumerable* BACTERIA UA /hpf Occasional   EPITHELIAL CELLS WET PREP /hpf None Seen     ED Treatment:   Medication Administration from 09/28/2022 0605 to 09/28/2022 1012       Date/Time Order Dose Route Action     09/28/2022 0733 HYDROmorphone (DILAUDID) injection 1 mg 1 mg Intravenous Given     09/28/2022 0744 tamsulosin (FLOMAX) capsule 0 4 mg 0 4 mg Oral Given     09/28/2022 0741 sodium chloride 0 9 % bolus 1,000 mL 1,000 mL Intravenous New Bag     09/28/2022 0829 HYDROmorphone (DILAUDID) injection 1 mg 1 mg Intravenous Given        Past Medical History:   Diagnosis Date    Borderline diabetes     per pt A1C "coming down"    Chronic pain disorder     lower back-s/p laminectomy    Claustrophobia     "some degree" jes MRI's"    CPAP (continuous positive airway pressure) dependence     per pt does not use CPAP    Diverticulitis     GERD (gastroesophageal reflux disease)     Hypertension     Kidney stone     x 2 episodes    Lactose intolerance     Localized pain of joint     Mild sleep apnea     CPAP started in July having difficulty using- only has used on occ    Morbid obesity with body mass index (BMI) of 50 0 to 59 9 in adult [facility])     Rectal bleeding     occ rectal bleeding last episode unsure-"nothing recent"    Seasickness     Skin tag     skin tags were removed-as of 8/2/19 has a few more    Teeth missing     Thyroid nodule     Tinnitus     Wears glasses     Weight loss     "80lbs and did gain 40lbs back"--     Present on Admission:   Acute kidney injury ([facility] [address] )   Hypertension   SIRS (systemic inflammatory response syndrome) ([facility] [address] )   DOMINIC (obstructive sleep apnea)    Admitting Diagnosis: Kidney stone [N20 0]  Flank pain [R10 9]  Age/Sex: 59 y o  male  Admission Orders:  Consult urology  scd    Scheduled Medications:  amLODIPine, 5 mg, Oral, Daily  heparin (porcine), 5,000 Units, Subcutaneous, Q8H [facility]  levofloxacin (LEVAQUIN) IVPB (premix in dextrose) 500 mg 100 mL, Once 9/29  pantoprazole, 20 mg, Oral, Early Morning  tamsulosin, 0 4 mg, Oral, Daily With Dinner    Continuous IV Infusions:  sodium chloride, 125 mL/hr, Intravenous, Continuous    PRN Meds:  acetaminophen, 650 mg, Oral, Q6H PRN  HYDROmorphone, 1 mg, Intravenous, Q4H PRN  ondansetron, 4 mg, Intravenous, Q6H PRN  oxyCODONE-acetaminophen, 1 tablet, Oral, Q8H PRN, 9/29 x1    Network Utilization Review Department  ATTENTION: Please call with any questions or concerns to [phone] and carefully listen to the prompts so that you are directed to the right person  All voicemails are confidential   [facility] all requests for admission clinical reviews, approved or denied determinations and any other requests to dedicated fax number below belonging to the campus where the patient is receiving treatment   List of dedicated fax numbers for the Facilities:  [address] DENIALS (Administrative/Medical Necessity) [phone]   [address] (Maternity/NICU/Pediatrics) [phone]   [address] [place] [address]  [address] [facility] [place] [address] [id number] [place] [address] [address] [facility] [phone]

## 2022-09-29 NOTE — ED PROVIDER NOTES
History  Chief Complaint   Patient presents with    Flank Pain     Patient was just a patient in the hospital for kidney stone, is to have another procedure next week with urologist, but yesterday started with left flank pain     HPI  Patient is a 68-year-old male recent history of right-sided nephrolithiasis requiring lithotripsy and stenting presenting for evaluation of 2 days of left-sided flank pain, intermittent dark urine  Patient states that symptoms were gradual onset, intermittent, now severe  Patient states symptoms are associated with some nausea, denies vomiting  Patient denies gross hematuria however states that his urine has been darker  Patient denies urinary frequency, difficulty urinating, fevers, chills, rigors  Patient states that the pain radiates from his left flank into his left lower abdomen  Patient denies diarrhea, constipation  Patient states that symptoms feel similar to prior episode of nephrolithiasis on the opposite side  Prior to Admission Medications   Prescriptions Last Dose Informant Patient Reported? Taking?    Diclofenac Sodium (VOLTAREN) 1 % Not Taking at Unknown time Self No No   Sig: Apply 2 g topically 4 (four) times a day   Patient not taking: Reported on 9/28/2022   acetaminophen (TYLENOL) 325 mg tablet 9/27/2022 at Unknown time Self Yes Yes   Sig: Take 650 mg by mouth every 6 (six) hours as needed for mild pain   amLODIPine (NORVASC) 5 mg tablet 9/27/2022 at Unknown time Self No Yes   Sig: Take 1 tablet (5 mg total) by mouth daily   lisinopril-hydrochlorothiazide (PRINZIDE,ZESTORETIC) 20-25 MG per tablet 9/27/2022 at Unknown time Self No Yes   Sig: Take 1 tablet by mouth daily   omeprazole (PriLOSEC) 20 mg delayed release capsule 9/27/2022 at Unknown time Self No Yes   Sig: Take 1 capsule (20 mg total) by mouth daily   oxyCODONE-acetaminophen (PERCOCET) 5-325 mg per tablet 9/27/2022 at Unknown time Self No Yes   Sig: Take 1 tablet by mouth every 8 (eight) hours as needed for moderate pain for up to 5 doses Max Daily Amount: 3 tablets      Facility-Administered Medications: None       Past Medical History:   Diagnosis Date    Borderline diabetes     per pt A1C "coming down"    Chronic pain disorder     lower back-s/p laminectomy    Claustrophobia     "some degree" jes MRI's"    CPAP (continuous positive airway pressure) dependence     per pt does not use CPAP    Diverticulitis     GERD (gastroesophageal reflux disease)     Hypertension     Kidney stone     x 2 episodes    Lactose intolerance     Localized pain of joint     Mild sleep apnea     CPAP started in July having difficulty using- only has used on occ    Morbid obesity with body mass index (BMI) of 50 0 to 59 9 in adult [facility])     Rectal bleeding     occ rectal bleeding last episode unsure-"nothing recent"    Seasickness     Skin tag     skin tags were removed-as of 8/2/19 has a few more    Teeth missing     Thyroid nodule     Tinnitus     Wears glasses     Weight loss     "80lbs and did gain 40lbs back"--       Past Surgical History:   Procedure Laterality Date    BACK SURGERY      laminectomy-1996, 2010    COLONOSCOPY      x2    ESOPHAGOGASTRODUODENOSCOPY N/A 3/30/2019    Procedure: ESOPHAGOGASTRODUODENOSCOPY (EGD);   Surgeon: Josiah Bond MD;  Location: [address] [place] [address];  Service: Gastroenterology    FL RETROGRADE PYELOGRAM  9/16/2022    FL RETROGRADE PYELOGRAM  9/22/2022    KNEE ARTHROSCOPY Left 2007    ID CYSTO/URETERO W/LITHOTRIPSY &INDWELL STENT INSRT Right 1/6/2022    Procedure: CYSTOSCOPY URETEROSCOPY AND INSERTION STENT URETERAL RIGHT;  Surgeon: Prashanth Rodriguez MD;  Location: [facility];  Service: Urology    ID CYSTO/URETERO W/LITHOTRIPSY &INDWELL STENT INSRT Right 9/22/2022    Procedure: CYSTOSCOPY URETEROSCOPY WITH LITHOTRIPSY HOLMIUM LASER, BASKET EXTRACTION, RETROGRADE PYELOGRAM (BILATERAL) AND  STENT EXCHANGE;  Surgeon: Prashanth Rodriguez MD;  Location: [address] [place] [address];  Service: Urology    AK CYSTOURETHROSCOPY,URETER CATHETER Right 9/16/2022    Procedure: CYSTOSCOPY RETROGRADE PYELOGRAM WITH INSERTION STENT URETERAL retrograde;  Surgeon: Kate Grey MD;  Location: [address] [place] [address];  Service: Urology    AK ESOPHAGOGASTRODUODENOSCOPY TRANSORAL DIAGNOSTIC N/A 5/1/2019    Procedure: ESOPHAGOGASTRODUODENOSCOPY (EGD); Surgeon: Andry Arora MD;  Location: [facility]; Service: Gastroenterology    AK THYROID LOBECTOMY,UNILAT Left 8/31/2022    Procedure: Left patial THYROIDECTOMY;  Surgeon: Gucci Vlilasenor MD;  Location[facility];  Service: ENT    UPPER GASTROINTESTINAL ENDOSCOPY  2006    US GUIDED THYROID BIOPSY  12/28/2020       Family History   Problem Relation Age of Onset    Osteoarthritis Mother     Obesity Father         [phone]    Sleep apnea Father         with CPAP    No Known Problems Sister     No Known Problems Brother     Lupus Sister     No Known Problems Sister     No Known Problems Son     Substance Abuse Neg Hx     Mental illness Neg Hx     Cancer Neg Hx     Diabetes Neg Hx     Heart disease Neg Hx     Stroke Neg Hx      I have reviewed and agree with the history as documented  E-Cigarette/Vaping    E-Cigarette Use Never User      E-Cigarette/Vaping Substances    Nicotine No     THC No     CBD No     Flavoring No     Other No     Unknown No      Social History     Tobacco Use    Smoking status: Never Smoker    Smokeless tobacco: Never Used   Vaping Use    Vaping Use: Never used   Substance Use Topics    Alcohol use: Never    Drug use: No       Review of Systems   Constitutional: Negative for chills, fatigue and fever  HENT: Negative for congestion, rhinorrhea and sore throat  Eyes: Negative for photophobia and visual disturbance  Respiratory: Negative for chest tightness and shortness of breath  Cardiovascular: Negative for chest pain, palpitations and leg swelling  Gastrointestinal: Positive for abdominal pain and nausea   Negative for abdominal distention, diarrhea and vomiting  Endocrine: Negative for polydipsia and polyuria  Genitourinary: Positive for flank pain and hematuria  Negative for dysuria, frequency and urgency  Musculoskeletal: Negative for arthralgias and myalgias  Skin: Negative for color change, pallor, rash and wound  Neurological: Negative for weakness, numbness and headaches  Psychiatric/Behavioral: Negative for confusion  Physical Exam  Physical Exam  Vitals and nursing note reviewed  Constitutional:       General: He is not in acute distress  Appearance: He is well-developed  He is not diaphoretic  Comments: Uncomfortable appearing but non-distressed    HENT:      Head: Normocephalic and atraumatic  Comments: Moist mucous membranes  Not actively vomiting     Right Ear: External ear normal       Left Ear: External ear normal       Nose: Nose normal       Mouth/Throat:      Pharynx: No oropharyngeal exudate  Eyes:      Conjunctiva/sclera: Conjunctivae normal       Pupils: Pupils are equal, round, and reactive to light  Cardiovascular:      Rate and Rhythm: Normal rate and regular rhythm  Heart sounds: Normal heart sounds  No murmur heard  No friction rub  No gallop  Comments: RRR, no M/R/G  Extremities warm well perfused  Pulmonary:      Effort: Pulmonary effort is normal  No respiratory distress  Breath sounds: Normal breath sounds  No wheezing  Chest:      Chest wall: No tenderness  Abdominal:      General: Bowel sounds are normal  There is no distension  Palpations: Abdomen is soft  There is no mass  Tenderness: There is abdominal tenderness  There is no guarding or rebound  Comments: Left lateral abdominal tenderness without rigidity, rebound, guarding   Genitourinary:     Comments: Left-sided CVA tenderness  Musculoskeletal:         General: No deformity  Skin:     General: Skin is warm and dry        Capillary Refill: Capillary refill takes less than 2 seconds  Neurological:      Mental Status: He is alert and oriented to person, place, and time     Psychiatric:         Behavior: Behavior normal          Vital Signs  ED Triage Vitals   Temperature Pulse Respirations Blood Pressure SpO2   09/28/22 0617 09/28/22 0617 09/28/22 0617 09/28/22 0617 09/28/22 0617   98 9 °F (37 2 °C) 93 20 (!) 221/135 97 %      Temp Source Heart Rate Source Patient Position - Orthostatic VS BP Location FiO2 (%)   09/28/22 0617 09/28/22 0617 09/28/22 0617 09/28/22 0617 --   Tympanic Monitor Sitting Right arm       Pain Score       09/28/22 0733       8           Vitals:    09/28/22 1019 09/28/22 1507 09/28/22 1507 09/28/22 1918   BP: 136/80 142/82 142/82 139/85   Pulse: 70 79 72 81   Patient Position - Orthostatic VS:             Visual Acuity      ED Medications  Medications   amLODIPine (NORVASC) tablet 5 mg (5 mg Oral Given 9/28/22 1230)   pantoprazole (PROTONIX) EC tablet 20 mg (20 mg Oral Given 9/28/22 1230)   acetaminophen (TYLENOL) tablet 650 mg (650 mg Oral Given 9/28/22 1230)   oxyCODONE-acetaminophen (PERCOCET) 5-325 mg per tablet 1 tablet (has no administration in time range)   ondansetron (ZOFRAN) injection 4 mg (4 mg Intravenous Given 9/28/22 1528)   tamsulosin (FLOMAX) capsule 0 4 mg (0 4 mg Oral Given 9/28/22 1530)   HYDROmorphone (DILAUDID) injection 1 mg (has no administration in time range)   sodium chloride 0 9 % infusion (125 mL/hr Intravenous New Bag 9/28/22 1529)   heparin (porcine) subcutaneous injection 5,000 Units (5,000 Units Subcutaneous Given 9/28/22 1529)   HYDROmorphone (DILAUDID) injection 1 mg (1 mg Intravenous Given 9/28/22 0733)   tamsulosin (FLOMAX) capsule 0 4 mg (0 4 mg Oral Given 9/28/22 0744)   sodium chloride 0 9 % bolus 1,000 mL (0 mL Intravenous Stopped 9/28/22 0829)   HYDROmorphone (DILAUDID) injection 1 mg (1 mg Intravenous Given 9/28/22 9312)       Diagnostic Studies  Results Reviewed     Procedure Component Value Units Date/Time COVID only [[id number]]  (Normal) Collected: 09/28/22 1055    Lab Status: Final result Specimen: Nares from Nose Updated: 09/28/22 1151     SARS-CoV-2 Negative    Narrative:      FOR PEDIATRIC PATIENTS - copy/paste COVID Guidelines URL to browser: [email or web address]    SARS-CoV-2 assay is a Nucleic Acid Amplification assay intended for the  qualitative detection of nucleic acid from SARS-CoV-2 in nasopharyngeal  swabs  Results are for the presumptive identification of SARS-CoV-2 RNA  Positive results are indicative of infection with SARS-CoV-2, the virus  causing COVID-19, but do not rule out bacterial infection or co-infection  with other viruses  Laboratories within the [place] and its  territories are required to report all positive results to the appropriate  public health authorities  Negative results do not preclude SARS-CoV-2  infection and should not be used as the sole basis for treatment or other  patient management decisions  Negative results must be combined with  clinical observations, patient history, and epidemiological information  This test has not been FDA cleared or approved  This test has been authorized by FDA under an Emergency Use Authorization  (EUA)  This test is only authorized for the duration of time the  declaration that circumstances exist justifying the authorization of the  emergency use of an in vitro diagnostic tests for detection of SARS-CoV-2  virus and/or diagnosis of COVID-19 infection under section 564(b)(1) of  the Act, 21 U  S C  054VDT-1(K)(0), unless the authorization is terminated  or revoked sooner  The test has been validated but independent review by FDA  and CLIA is pending  Test performed using [employer] GeneXpert: This RT-PCR assay targets N2,  a region unique to SARS-CoV-2  A conserved region in the E-gene was chosen  for pan-Sarbecovirus detection which includes SARS-CoV-2      According to CMS-2020-01-R, this platform meets the definition of high-Red-rabbit technology      Comprehensive metabolic panel [[id number]]  (Abnormal) Collected: 09/28/22 0632    Lab Status: Final result Specimen: Blood from Arm, Right Updated: 09/28/22 0701     Sodium 138 mmol/L      Potassium 4 1 mmol/L      Chloride 103 mmol/L      CO2 25 mmol/L      ANION GAP 10 mmol/L      BUN 40 mg/dL      Creatinine 1 77 mg/dL      Glucose 134 mg/dL      Calcium 9 0 mg/dL      AST 25 U/L      ALT 45 U/L      Alkaline Phosphatase 70 U/L      Total Protein 7 9 g/dL      Albumin 3 6 g/dL      Total Bilirubin 0 43 mg/dL      eGFR 41 ml/min/1 73sq m     Narrative:      National Kidney Disease Foundation guidelines for Chronic Kidney Disease (CKD):     Stage 1 with normal or high GFR (GFR > 90 mL/min/1 73 square meters)    Stage 2 Mild CKD (GFR = 60-89 mL/min/1 73 square meters)    Stage 3A Moderate CKD (GFR = 45-59 mL/min/1 73 square meters)    Stage 3B Moderate CKD (GFR = 30-44 mL/min/1 73 square meters)    Stage 4 Severe CKD (GFR = 15-29 mL/min/1 73 square meters)    Stage 5 End Stage CKD (GFR <15 mL/min/1 73 square meters)  Note: GFR calculation is accurate only with a steady state creatinine    Urinalysis with microscopic [[id number]]  (Abnormal) Collected: 09/28/22 7859    Lab Status: Final result Specimen: Urine, Clean Catch Updated: 09/28/22 0653     Color, UA Yellow     Clarity, UA Cloudy     Specific [place], UA 1 020     pH, UA 5 0     Leukocytes, UA Negative     Nitrite, UA Negative     Protein, UA 30 (1+) mg/dl      Glucose, UA Negative mg/dl      Ketones, UA Negative mg/dl      Urobilinogen, UA 0 2 E U /dl      Bilirubin, UA Negative     Occult Blood, UA Large     RBC, UA Innumerable /hpf      WBC, UA 2-4 /hpf      Epithelial Cells None Seen /hpf      Bacteria, UA Occasional /hpf     CBC and differential [[id number]]  (Abnormal) Collected: 09/28/22 6975    Lab Status: Final result Specimen: Blood from Arm, Right Updated: 09/28/22 0641     WBC 13 61 Thousand/uL      RBC 5 07 Million/uL      Hemoglobin 15 2 g/dL      Hematocrit 45 5 %      MCV 90 fL      MCH 30 0 pg      MCHC 33 4 g/dL      RDW 13 1 %      MPV 9 2 fL      Platelets 039 Thousands/uL      nRBC 0 /100 WBCs      Neutrophils Relative 67 %      Immat GRANS % 1 %      Lymphocytes Relative 18 %      Monocytes Relative 9 %      Eosinophils Relative 4 %      Basophils Relative 1 %      Neutrophils Absolute 9 29 Thousands/µL      Immature Grans Absolute 0 09 Thousand/uL      Lymphocytes Absolute 2 42 Thousands/µL      Monocytes Absolute 1 21 Thousand/µL      Eosinophils Absolute 0 49 Thousand/µL      Basophils Absolute 0 11 Thousands/µL                  CT renal stone study abdomen pelvis wo contrast   Final Result by Gonzalez Esteves MD (09/28 2033)      Mildly obstructing 2 mm calculus in the left proximal ureter  No hydronephrosis status post right ureteral stent placement  Colonic diverticulosis without diverticulitis  Workstation performed: [id number]                    Procedures  Procedures         ED Course                               SBIRT 22yo+    Flowsheet Row Most Recent Value   SBIRT (23 yo +)    In order to provide better care to our patients, we are screening all of our patients for alcohol and drug use  Would it be okay to ask you these screening questions? No Filed at: 09/28/2022 9127                    [facility]  Number of Diagnoses or Management Options  Kidney stone  Diagnosis management comments: Severe left-sided flank pain  Patient hypertensive but otherwise normal vital signs, afebrile  Denying infectious symptoms  Plan for CBC, CMP to evaluate for any leukocytosis, evidence of hemoconcentration, electrolyte or renal derangements  Treated with 1 L crystalloid bolus  CT abdomen pelvis to evaluate primarily for left-sided nephrolithiasis    Patient signed out to Dr Saima Abreu pending results of lab and imaging      Disposition  Final diagnoses: Kidney stone     Time reflects when diagnosis was documented in both MDM as applicable and the Disposition within this note     Time User Action Codes Description Comment    9/28/2022  9:34 AM Ashly Dimas Add [N20 0] Kidney stone       ED Disposition     ED Disposition   Admit    Condition   Stable    Date/Time   Wed Sep 28, 2022 3343    Comment   Case was discussed with hospitalist and the patient's admission status was agreed to be Admission Status: observation status to the service of Dr Ellie Campa   Follow-up Information    None         Current Discharge Medication List      CONTINUE these medications which have NOT CHANGED    Details   acetaminophen (TYLENOL) 325 mg tablet Take 650 mg by mouth every 6 (six) hours as needed for mild pain      amLODIPine (NORVASC) 5 mg tablet Take 1 tablet (5 mg total) by mouth daily  Qty: 90 tablet, Refills: 3    Associated Diagnoses: Essential hypertension      lisinopril-hydrochlorothiazide (PRINZIDE,ZESTORETIC) 20-25 MG per tablet Take 1 tablet by mouth daily  Qty: 90 tablet, Refills: 0    Associated Diagnoses: Essential hypertension      omeprazole (PriLOSEC) 20 mg delayed release capsule Take 1 capsule (20 mg total) by mouth daily  Qty: 90 capsule, Refills: 3    Associated Diagnoses: Eosinophilic esophagitis      oxyCODONE-acetaminophen (PERCOCET) 5-325 mg per tablet Take 1 tablet by mouth every 8 (eight) hours as needed for moderate pain for up to 5 doses Max Daily Amount: 3 tablets  Qty: 30 tablet, Refills: 0    Associated Diagnoses: Ureteral stone with hydronephrosis      Diclofenac Sodium (VOLTAREN) 1 % Apply 2 g topically 4 (four) times a day  Qty: 100 g, Refills: 0    Associated Diagnoses: Acute pain of right knee             No discharge procedures on file      PDMP Review     None          ED Provider  Electronically Signed by           Brooklyn Black MD  09/28/22 6927

## 2022-09-29 NOTE — ASSESSMENT & PLAN NOTE
Patient presented left-sided flank pain/abdominal pain since last night  Known history of kidney stone  Patient has history of right-sided kidney stone status post lithotripsy with stent exchange by Urology  CT abdomen pelvis showed mildly obstructing 2 millimeter calculus in the left proximal ureter  Urology input appreciated  Continue Flomax 0 4 milligram p o  Daily  Continue IV hydration and pain management   Patient underwent cystoscopy with retrograde pyelogram and placement of left ureteral stent with removal of right stent  With left ureteroscopy stone migrated to the back of the renal pelvis  Patient has Coombs catheter which could be possibly discontinued in a m

## 2022-09-29 NOTE — ASSESSMENT & PLAN NOTE
Baseline creatinine around 1 1  Creatinine upon admission was 1 7  Likely in the setting of dehydration along with use of lisinopril/hydrochlorothiazide  Hold lisinopril/hydrochlorothiazide  Creatinine improved with IV hydration and is close to baseline  Avoid nephrotoxic agents and hypotension  CT abdomen pelvis showed mildly obstructing 2 millimeter calculus in the left proximal ureter without any hydronephrosis

## 2022-09-29 NOTE — ANESTHESIA PREPROCEDURE EVALUATION
Procedure:  CYSTOSCOPY URETEROSCOPY WITH LITHOTRIPSY HOLMIUM LASER, RETROGRADE PYELOGRAM AND INSERTION STENT URETERAL removal right stent (Left Bladder)    Relevant Problems   CARDIO   (+) Hypertension      GI/HEPATIC   (+) GERD (gastroesophageal reflux disease)      /RENAL   (+) Acute kidney injury (HCC)   (+) Chronic kidney disease   (+) Ureteral stone with hydronephrosis      MUSCULOSKELETAL   (+) Chronic midline low back pain without sciatica   (+) Osteoarthritis   (+) Primary osteoarthritis of right knee   (+) Sciatica      NEURO/PSYCH   (+) Anxiety   (+) Chronic midline low back pain without sciatica   (+) Chronic pain syndrome      PULMONARY   (+) DOMINIC (obstructive sleep apnea)        Physical Exam    Airway    Mallampati score: II  TM Distance: >3 FB  Neck ROM: full     Dental   No notable dental hx     Cardiovascular  Rhythm: regular, Rate: normal,     Pulmonary  Pulmonary exam normal Breath sounds clear to auscultation,     Other Findings        Anesthesia Plan  ASA Score- 3     Anesthesia Type- general with ASA Monitors  Additional Monitors:   Airway Plan:           Plan Factors-Exercise tolerance (METS): >4 METS  Chart reviewed  Existing labs reviewed  Patient summary reviewed  Patient is not a current smoker  Induction- intravenous  Postoperative Plan- Plan for postoperative opioid use  Informed Consent- Anesthetic plan and risks discussed with patient  I personally reviewed this patient with the CRNA  Discussed and agreed on the Anesthesia Plan with the CRNA  Maryann Mark

## 2022-09-29 NOTE — PROGRESS NOTES
[address]  Progress Note - Jessi Hook 1965, 59 y o  male MRN: [id number]  Unit/Bed#: 2 [place] [address] A Encounter: [id number]  Primary Care Provider: Haleigh Chapman MD   Date and time admitted to hospital: 9/28/2022  6:12 AM    * Renal colic on left side  Assessment & Plan  Patient presented left-sided flank pain/abdominal pain since last night  Known history of kidney stone  Patient has history of right-sided kidney stone status post lithotripsy with stent exchange by Urology  CT abdomen pelvis showed mildly obstructing 2 millimeter calculus in the left proximal ureter  Urology input appreciated  Continue Flomax 0 4 milligram p o  Daily  Continue IV hydration and pain management   Patient underwent cystoscopy with retrograde pyelogram and placement of left ureteral stent with removal of right stent  With left ureteroscopy stone migrated to the back of the renal pelvis  Patient has Coombs catheter which could be possibly discontinued in a m  Acute kidney injury [facility])  Assessment & Plan  Baseline creatinine around 1 1  Creatinine upon admission was 1 7  Likely in the setting of dehydration along with use of lisinopril/hydrochlorothiazide  Hold lisinopril/hydrochlorothiazide  Creatinine improved with IV hydration and is close to baseline  Avoid nephrotoxic agents and hypotension  CT abdomen pelvis showed mildly obstructing 2 millimeter calculus in the left proximal ureter without any hydronephrosis    SIRS (systemic inflammatory response syndrome) ([address] )  Assessment & Plan  As evidenced by tachycardia and leukocytosis  UA was negative for leukocyte esterase and nitrates and white cells  Likely stress response  Resolved    DOMINIC (obstructive sleep apnea)  Assessment & Plan  Intolerant to CPAP    Hypertension  Assessment & Plan  Continue amlodipine 5 milligram p o   Daily  Lisinopril/hydrochlorothiazide on hold in the setting of KIANA          VTE Pharmacologic Prophylaxis: VTE Score: 3 Moderate Risk (Score 3-4) - Pharmacological DVT Prophylaxis Ordered: heparin  Patient Centered Rounds: I performed bedside rounds with nursing staff today  Discussions with Specialists or Other Care Team Provider: yes    Education and Discussions with Family / Patient: yes    Time Spent for Care: 45 minutes  More than 50% of total time spent on counseling and coordination of care as described above  Current Length of Stay: 0 day(s)  Current Patient Status: Inpatient   Certification Statement: The patient will continue to require additional inpatient hospital stay due to Renal colic status post cystoscopy with Coombs catheter  Discharge Plan: Anticipate discharge tomorrow to home  Code Status: Level 1 - Full Code    Subjective:   Patient denies any abdominal pain, nausea or vomiting  Patient could not pass the stone  Objective:     Vitals:   Temp (24hrs), Av 7 °F (36 5 °C), Min:97 4 °F (36 3 °C), Max:98 4 °F (36 9 °C)    Temp:  [97 4 °F (36 3 °C)-98 4 °F (36 9 °C)] 97 8 °F (36 6 °C)  HR:  [68-81] 77  Resp:  [18-20] 18  BP: (119-159)/(63-93) 119/63  SpO2:  [94 %-97 %] 96 %  Body mass index is 47 35 kg/m²  Input and Output Summary (last 24 hours): Intake/Output Summary (Last 24 hours) at 2022  Last data filed at 2022 1701  Gross per 24 hour   Intake 700 ml   Output --   Net 700 ml       Physical Exam:   Physical Exam  Constitutional:       Appearance: Normal appearance  HENT:      Head: Normocephalic and atraumatic  Eyes:      Extraocular Movements: Extraocular movements intact  Pupils: Pupils are equal, round, and reactive to light  Cardiovascular:      Rate and Rhythm: Normal rate and regular rhythm  Heart sounds: No murmur heard  No gallop  Pulmonary:      Effort: Pulmonary effort is normal       Breath sounds: Normal breath sounds  Abdominal:      General: Bowel sounds are normal       Palpations: Abdomen is soft  Tenderness:  There is no abdominal tenderness  Musculoskeletal:         General: No swelling or deformity  Normal range of motion  Cervical back: Normal range of motion and neck supple  Skin:     General: Skin is warm and dry  Neurological:      General: No focal deficit present  Mental Status: He is alert  Additional Data:     Labs:  Results from last 7 days   Lab Units 09/29/22  0554   WBC Thousand/uL 8 48   HEMOGLOBIN g/dL 14 3   HEMATOCRIT % 44 0   PLATELETS Thousands/uL 206   NEUTROS PCT % 62   LYMPHS PCT % 24   MONOS PCT % 8   EOS PCT % 4     Results from last 7 days   Lab Units 09/29/22  0554 09/28/22  0632   SODIUM mmol/L 138 138   POTASSIUM mmol/L 3 9 4 1   CHLORIDE mmol/L 105 103   CO2 mmol/L 25 25   BUN mg/dL 26* 40*   CREATININE mg/dL 1 30 1 77*   ANION GAP mmol/L 8 10   CALCIUM mg/dL 8 6 9 0   ALBUMIN g/dL  --  3 6   TOTAL BILIRUBIN mg/dL  --  0 43   ALK PHOS U/L  --  70   ALT U/L  --  45   AST U/L  --  25   GLUCOSE RANDOM mg/dL 122 134                       Lines/Drains:  Invasive Devices  Report    Peripheral Intravenous Line  Duration           Peripheral IV 09/28/22 Right Wrist 1 day          Drain  Duration           Ureteral Internal Stent Right ureter 6 Fr  13 days    Ureteral Internal Stent 6 Fr  <1 day    Urethral Catheter Latex 20 Fr  <1 day              Urinary Catheter:  Goal for removal: Possible voiding trial in a m                 Imaging: Reviewed radiology reports from this admission including: abdominal/pelvic CT    Recent Cultures (last 7 days):         Last 24 Hours Medication List:   Current Facility-Administered Medications   Medication Dose Route Frequency Provider Last Rate    acetaminophen  650 mg Oral Q6H PRN Hermann Mitchell MD      amLODIPine  5 mg Oral Daily Hermann Mitchell MD      heparin (porcine)  5,000 Units Subcutaneous [facility] Hermann Mitchell MD      HYDROmorphone  1 mg Intravenous Q4H PRN Hermann Mitchell MD      lactated ringers  1,000 mL Intravenous Once PRN Hermann Mitchell MD      And    lactated ringers  1,000 mL Intravenous Once PRN Goyo Castillo MD      lactated ringers  50 mL/hr Intravenous Continuous Bridgett Righter, CRNA 50 mL/hr (09/29/22 6779)    ondansetron  4 mg Intravenous Q6H PRN Goyo Castillo MD      oxyCODONE-acetaminophen  1 tablet Oral Q8H PRN Goyo Castillo MD      pantoprazole  20 mg Oral Early Morning Goyo Castillo MD      sodium chloride  1,000 mL Intravenous Once PRN Goyo Castillo MD      And    sodium chloride  1,000 mL Intravenous Once PRN Goyo Castillo MD      sodium chloride  125 mL/hr Intravenous Continuous Goyo Castillo  mL/hr (09/29/22 1422)    tamsulosin  0 4 mg Oral Daily With Nkechi Cai MD          Today, Patient Was Seen By: Дмитрий Tapia    **Please Note: This note may have been constructed using a voice recognition system  **

## 2022-09-29 NOTE — ASSESSMENT & PLAN NOTE
Continue amlodipine 5 milligram p o   Daily  Lisinopril/hydrochlorothiazide on hold in the setting of KIANA

## 2022-09-29 NOTE — PLAN OF CARE
Problem: Potential for Falls  Goal: Patient will remain free of falls  Description: INTERVENTIONS:  - Educate patient/family on patient safety including physical limitations  - Instruct patient to call for assistance with activity   - Consult OT/PT to assist with strengthening/mobility   - Keep Call bell within reach  - Keep bed low and locked with side rails adjusted as appropriate  - Keep care items and personal belongings within reach  - Initiate and maintain comfort rounds  - Make Fall Risk Sign visible to staff  - Offer Toileting every 2 Hours, in advance of need  - Initiate/Maintain bed alarm  - Obtain necessary fall risk management equipment: yellow socks, bed alarm  - Apply yellow socks and bracelet for high fall risk patients  - Consider moving patient to room near nurses station  Outcome: Progressing     Problem: PAIN - ADULT  Goal: Verbalizes/displays adequate comfort level or baseline comfort level  Description: Interventions:  - Encourage patient to monitor pain and request assistance  - Assess pain using appropriate pain scale  - Administer analgesics based on type and severity of pain and evaluate response  - Implement non-pharmacological measures as appropriate and evaluate response  - Consider cultural and social influences on pain and pain management  - Notify physician/advanced practitioner if interventions unsuccessful or patient reports new pain  Outcome: Progressing     Problem: INFECTION - ADULT  Goal: Absence or prevention of progression during hospitalization  Description: INTERVENTIONS:  - Assess and monitor for signs and symptoms of infection  - Monitor lab/diagnostic results  - Monitor all insertion sites, i e  indwelling lines, tubes, and drains  - Monitor endotracheal if appropriate and nasal secretions for changes in amount and color  - [place] appropriate cooling/warming therapies per order  - Administer medications as ordered  - Instruct and encourage patient and family to use good hand hygiene technique  - Identify and instruct in appropriate isolation precautions for identified infection/condition  Outcome: Progressing  Goal: Absence of fever/infection during neutropenic period  Description: INTERVENTIONS:  - Monitor WBC    Outcome: Progressing     Problem: SAFETY ADULT  Goal: Patient will remain free of falls  Description: INTERVENTIONS:  - Educate patient/family on patient safety including physical limitations  - Instruct patient to call for assistance with activity   - Consult OT/PT to assist with strengthening/mobility   - Keep Call bell within reach  - Keep bed low and locked with side rails adjusted as appropriate  - Keep care items and personal belongings within reach  - Initiate and maintain comfort rounds  - Make Fall Risk Sign visible to staff  - Offer Toileting every 2 Hours, in advance of need  - Initiate/Maintain bed alarm  - Obtain necessary fall risk management equipment: bed alarm, yellow socks, call bell within reach   - Apply yellow socks and bracelet for high fall risk patients  - Consider moving patient to room near nurses station  Outcome: Progressing  Goal: Maintain or return to baseline ADL function  Description: INTERVENTIONS:  -  Assess patient's ability to carry out ADLs; assess patient's baseline for ADL function and identify physical deficits which impact ability to perform ADLs (bathing, care of mouth/teeth, toileting, grooming, dressing, etc )  - Assess/evaluate cause of self-care deficits   - Assess range of motion  - Assess patient's mobility; develop plan if impaired  - Assess patient's need for assistive devices and provide as appropriate  - Encourage maximum independence but intervene and supervise when necessary  - Involve family in performance of ADLs  - Assess for home care needs following discharge   - Consider OT consult to assist with ADL evaluation and planning for discharge  - Provide patient education as appropriate  Outcome: Progressing  Goal: Maintains/Returns to pre admission functional level  Description: INTERVENTIONS:  - Perform BMAT or MOVE assessment daily    - Set and communicate daily mobility goal to care team and patient/family/caregiver  - Collaborate with rehabilitation services on mobility goals if consulted  - Perform Range of Motion 4 times a day  - Reposition patient every 2 hours  - Dangle patient 3 times a day  - Stand patient 3 times a day  - Ambulate patient 3 times a day  - Out of bed to chair 3 times a day   - Out of bed for meals 3 times a day  - Out of bed for toileting  - Record patient progress and toleration of activity level   Outcome: Progressing     Problem: DISCHARGE PLANNING  Goal: Discharge to home or other facility with appropriate resources  Description: INTERVENTIONS:  - Identify barriers to discharge w/patient and caregiver  - Arrange for needed discharge resources and transportation as appropriate  - Identify discharge learning needs (meds, wound care, etc )  - Arrange for interpretive services to assist at discharge as needed  - Refer to Case Management Department for coordinating discharge planning if the patient needs post-hospital services based on physician/advanced practitioner order or complex needs related to functional status, cognitive ability, or social support system  Outcome: Progressing     Problem: Knowledge Deficit  Goal: Patient/family/caregiver demonstrates understanding of disease process, treatment plan, medications, and discharge instructions  Description: Complete learning assessment and assess knowledge base    Interventions:  - Provide teaching at level of understanding  - Provide teaching via preferred learning methods  Outcome: Progressing

## 2022-09-29 NOTE — OP NOTE
OPERATIVE REPORT  PATIENT NAME: Judy Elder    :  1965  MRN: [id number]  Pt Location: [facility] OR ROOM 04    SURGERY DATE: 2022    Surgeon(s) and Role:     * Raquel Shearer MD - Primary    Preop Diagnosis:  Kidney stone [N20 0]  Bilateral renal stones  Left 2 mm ureteral stone    Post-Op Diagnosis Codes:     * Kidney stone [N20 0]  Bilateral renal stones  Left 2 mm ureteral stone    Procedure(s) (LRB):  CYSTOSCOPY URETEROSCOPY, STENT MANUPILATION, RETROGRADE PYELOGRAM, LEFT  URETERAL STENT NSERTION, AND  REMOVAL OF RIGHT STENT (Bilateral)    Specimen(s):  * No specimens in log *    Estimated Blood Loss:   Minimal    Drains:  Urethral Catheter Latex 20 Fr  (Active)   Number of days: 0       Ureteral Internal Stent Right ureter 6 Fr  (Active)   Number of days: 13       Ureteral Internal Stent 6 Fr  (Active)   Number of days: 0       Anesthesia Type:   General/LMA    Operative Indications:  Kidney stone [N20 0]  This 75-year-old male known to me underwent successful right flexible ureteroscopy laser lithotripsy stent exchange 1 week ago discharged home with stent seen in the office on Tuesday to undergo cysto stent removal refusing in light of anticipated pain schedule electively for cysto stent removal in 2 weeks only to present to [facility] ER yesterday with severe left flank pain found on CT scan to have 2-3 mm left proximal stone patient now watched over 24 hours with medical expulsion therapy continuing to have pain and wishes to undergo left ureteroscopy stone extraction with right stent removal aware risk of anesthesia infection bleeding additional urologic procedures    Operative Findings:  1  Right stent removed  2  Left retrograde confirmed 2 mm stone in the proximal ureter  3   Left ureteroscopy confirming stone to migrating back into the renal pelvis 24 6 [place] stent left in position to dilate ureter  Follow-up in 2-3 weeks discuss cysto stent removal     Complications: None    Procedure and Technique:  Patient seen in the surgical preop unit consent obtained placed the op suite after anesthesia induced placed in thigh position draped prepped in the standard fashion time-out performed  A 22 [place] cystoscope passed through urethra into the bladder anterior showed abnormalities posterior the confirmed a 2 5-3 cm trilobar prostatic urethra the right stent was grasped with an alligator and removed  A 5 [place] open-ended catheter was placed into the left orifice and left retrograde pyelogram confirmed filling of the distal mid proximal ureter renal pelvis with 3 mm stone noted in the proximal ureter a 0 038 wire was then passed up into the left renal pelvis the semi rigid ureteral scope was then passed easily into the left orifice and up into the proximal ureter the stone was not found and apparently migrated back into the kidney the scope was removed a 26 cm 6 [place] stent was passed the wire removed scope removed postop procedure well awakened taken to recovery room stable condition will leave the stent in for 2 weeks and then remove stent stone should pass easily     I was present for the entire procedure    Patient Disposition:  PACU         SIGNATURE: Ann-Marie Camp MD  DATE: September 29, 2022  TIME: 4:49 PM

## 2022-09-29 NOTE — ANESTHESIA POSTPROCEDURE EVALUATION
Post-Op Assessment Note    CV Status:  Stable  Pain Score: 0    Pain management: adequate     Mental Status:  Alert and awake   Hydration Status:  Euvolemic   PONV Controlled:  Controlled   Airway Patency:  Patent      Post Op Vitals Reviewed: Yes      Staff: CRNA         No complications documented      BP   142/78   Temp 97 9   Pulse 84   Resp 14   SpO2 95% fm 4lpm

## 2022-09-29 NOTE — DISCHARGE INSTRUCTIONS
#1 no heavy straining or lifting above 10 pounds for 2 weeks    #2 call office fevers, chills, or worsening blood in the urine  #3 Patient to call office and schedule preop visit for October 10th for anticipated cysto repeat left ureteroscopy basket extraction stone      Oxana GREENE  [address] [place] [address] office  [address]  [phone]  8:30 AM to 4:30 PM  Monday through Friday    [place] office  [phone]  [place] [phone]  1:00 to 5:00 PM  Wednesday

## 2022-09-30 VITALS
OXYGEN SATURATION: 93 % | HEART RATE: 72 BPM | BODY MASS INDEX: 38.36 KG/M2 | TEMPERATURE: 97.3 F | DIASTOLIC BLOOD PRESSURE: 84 MMHG | WEIGHT: 315 LBS | RESPIRATION RATE: 15 BRPM | HEIGHT: 76 IN | SYSTOLIC BLOOD PRESSURE: 139 MMHG

## 2022-09-30 LAB
ANION GAP SERPL CALCULATED.3IONS-SCNC: 7 MMOL/L (ref 4–13)
BUN SERPL-MCNC: 21 MG/DL (ref 5–25)
CALCIUM SERPL-MCNC: 8.7 MG/DL (ref 8.3–10.1)
CHLORIDE SERPL-SCNC: 106 MMOL/L (ref 96–108)
CO2 SERPL-SCNC: 27 MMOL/L (ref 21–32)
CREAT SERPL-MCNC: 1.24 MG/DL (ref 0.6–1.3)
GFR SERPL CREATININE-BSD FRML MDRD: 63 ML/MIN/1.73SQ M
GLUCOSE SERPL-MCNC: 128 MG/DL (ref 65–140)
POTASSIUM SERPL-SCNC: 4.1 MMOL/L (ref 3.5–5.3)
SODIUM SERPL-SCNC: 140 MMOL/L (ref 135–147)

## 2022-09-30 PROCEDURE — 80048 BASIC METABOLIC PNL TOTAL CA: CPT | Performed by: INTERNAL MEDICINE

## 2022-09-30 PROCEDURE — 99239 HOSP IP/OBS DSCHRG MGMT >30: CPT | Performed by: INTERNAL MEDICINE

## 2022-09-30 RX ORDER — HYDROMORPHONE HCL/PF 1 MG/ML
0.5 SYRINGE (ML) INJECTION
Status: DISCONTINUED | OUTPATIENT
Start: 2022-09-30 | End: 2022-09-30 | Stop reason: HOSPADM

## 2022-09-30 RX ORDER — TAMSULOSIN HYDROCHLORIDE 0.4 MG/1
0.4 CAPSULE ORAL
Qty: 30 CAPSULE | Refills: 0 | Status: SHIPPED | OUTPATIENT
Start: 2022-09-30

## 2022-09-30 RX ADMIN — HYDROMORPHONE HYDROCHLORIDE 1 MG: 1 INJECTION, SOLUTION INTRAMUSCULAR; INTRAVENOUS; SUBCUTANEOUS at 08:05

## 2022-09-30 RX ADMIN — PANTOPRAZOLE SODIUM 20 MG: 20 TABLET, DELAYED RELEASE ORAL at 05:28

## 2022-09-30 RX ADMIN — OXYCODONE HYDROCHLORIDE AND ACETAMINOPHEN 1 TABLET: 5; 325 TABLET ORAL at 09:25

## 2022-09-30 RX ADMIN — HEPARIN SODIUM 5000 UNITS: 5000 INJECTION INTRAVENOUS; SUBCUTANEOUS at 14:49

## 2022-09-30 RX ADMIN — TAMSULOSIN HYDROCHLORIDE 0.4 MG: 0.4 CAPSULE ORAL at 15:52

## 2022-09-30 RX ADMIN — AMLODIPINE BESYLATE 5 MG: 5 TABLET ORAL at 08:12

## 2022-09-30 RX ADMIN — HEPARIN SODIUM 5000 UNITS: 5000 INJECTION INTRAVENOUS; SUBCUTANEOUS at 05:28

## 2022-09-30 NOTE — DISCHARGE SUMMARY
[address]  Discharge- Philippe Valentin 1965, 59 y o  male MRN: [id number]  Unit/Bed#: 2 [place] [address] A Encounter: [id number]  Primary Care Provider: Adriana Singleton MD   Date and time admitted to hospital: 9/28/2022  6:12 AM    * Renal colic on left side  Assessment & Plan  Patient presented left-sided flank pain/abdominal pain since last night  Known history of kidney stone  Patient has history of right-sided kidney stone status post lithotripsy with stent exchange by Urology  CT abdomen pelvis showed mildly obstructing 2 millimeter calculus in the left proximal ureter  Urology input appreciated  Continue Flomax 0 4 milligram p o  Daily  Continue IV hydration and pain management   Patient underwent cystoscopy with retrograde pyelogram and placement of left ureteral stent with removal of right stent  With left ureteroscopy stone migrated to the back of the renal pelvis  Patient had Coombs catheter postprocedure which was discontinued this morning  Patient has been urinating well with improved pain  Outpatient follow-up with Urology for possible lithotripsy    Acute kidney injury [facility])  Assessment & Plan  Baseline creatinine around 1 1  Creatinine upon admission was 1 7  Likely in the setting of dehydration along with use of lisinopril/hydrochlorothiazide  Lisinopril/hydrochlorothiazide was on hold during hospitalization  Creatinine improved and was close to baseline with IV hydration    Can resume lisinopril/hydrochlorothiazide upon discharge  Avoid nephrotoxic agents and hypotension  CT abdomen pelvis showed mildly obstructing 2 millimeter calculus in the left proximal ureter without any hydronephrosis    SIRS (systemic inflammatory response syndrome) (HCC)  Assessment & Plan  As evidenced by tachycardia and leukocytosis  UA was negative for leukocyte esterase and nitrates and white cells  Likely stress response  Resolved    DOMINIC (obstructive sleep apnea)  Assessment & Plan  Intolerant to CPAP    Hypertension  Assessment & Plan  Continue amlodipine 5 milligram p o  Daily  Resume lisinopril/hydrochlorothiazide upon discharge        Medical Problems             Resolved Problems  Date Reviewed: 9/30/2022   None               Discharging Physician / Practitioner: Jaycob Vega  PCP: Haleigh Chapman MD  Admission Date:   Admission Orders (From admission, onward)     Ordered        09/29/22 1952  Inpatient Admission  Once            09/28/22 0935  Place in Observation  Once                      Discharge Date: 09/30/22    Consultations During Hospital Stay:  · Urology    Procedures Performed:   ·  Cystoscopy with ureteroscopy with stone manipulation and placement of left ureteral stent and removal of right stent       Outpatient Tests Requested: Follow-up with Urology for scheduling lithotripsy of the left    Complications:  None    Reason for Admission:  Left flank pain    Hospital Course:   Jessi Hook is a 59 y o  male patient with past medical history hypertension, sleep apnea, kidney stones, chronic pain who originally presented to the hospital on 9/28/2022 due to a left flank pain radiating to the left lower abdomen  In the ED patient's workup showed elevated BUN creatinine with elevated white count and CT scan showed 2 millimeter calculus in the left proximal ureter  Patient is admitted hospital was treated with IV fluids and Flomax and patient could not pass the stone  Patient was seen by Urology and underwent cystoscopy with ureteroscopy and placement of left ureteral stent and removal of right ureteral stent  Patient had Coombs catheter postprocedure which was later removed next day and patient was voiding well with improved pain  Patient remained stable and was discharged home with outpatient follow-up with Urology        Please see above list of diagnoses and related plan for additional information       Condition at Discharge: stable    Discharge Day Visit / Exam: Subjective:  Patient is feeling better  Patient complained of some left flank pain this morning which later improved during the day  Coombs catheter was removed and patient has been urinating well  Vitals: Blood Pressure: 139/84 (09/30/22 0756)  Pulse: 72 (09/30/22 0756)  Temperature: (!) 97 3 °F (36 3 °C) (09/30/22 0756)  Temp Source: Oral (09/30/22 0250)  Respirations: 15 (09/30/22 0756)  Height: 6' 4" (193 cm) (09/28/22 0617)  Weight - Scale: (!) 176 kg (389 lb) (09/28/22 0617)  SpO2: 93 % (09/30/22 0756)  Exam:   Physical Exam  Constitutional:       Appearance: Normal appearance  HENT:      Head: Normocephalic and atraumatic  Eyes:      Extraocular Movements: Extraocular movements intact  Pupils: Pupils are equal, round, and reactive to light  Cardiovascular:      Rate and Rhythm: Normal rate and regular rhythm  Heart sounds: No murmur heard  No gallop  Pulmonary:      Effort: Pulmonary effort is normal       Breath sounds: Normal breath sounds  Abdominal:      General: Bowel sounds are normal       Palpations: Abdomen is soft  Tenderness: There is no abdominal tenderness  Musculoskeletal:         General: No swelling or deformity  Normal range of motion  Cervical back: Normal range of motion and neck supple  Skin:     General: Skin is warm and dry  Neurological:      General: No focal deficit present  Mental Status: He is alert  Discharge instructions/Information to patient and family:   See after visit summary for information provided to patient and family  Provisions for Follow-Up Care:  See after visit summary for information related to follow-up care and any pertinent home health orders  Disposition:   Home    Planned Readmission: No     Discharge Statement:  I spent 35 minutes discharging the patient  This time was spent on the day of discharge  I had direct contact with the patient on the day of discharge   Greater than 50% of the total time was spent examining patient, answering all patient questions, arranging and discussing plan of care with patient as well as directly providing post-discharge instructions  Additional time then spent on discharge activities  Discharge Medications:  See after visit summary for reconciled discharge medications provided to patient and/or family        **Please Note: This note may have been constructed using a voice recognition system**

## 2022-09-30 NOTE — CASE MANAGEMENT
Case Management Assessment & Discharge Planning Note    Patient name Ksenia Jeff  [facility] [place] [facility] A MRN [id number]  : 1965 Date 2022       Current Admission Date: 2022  Current Admission Diagnosis:Renal colic on left side   Patient Active Problem List    Diagnosis Date Noted    Renal colic on left side     SIRS (systemic inflammatory response syndrome) ([facility] ) 2022    Ureteral stone with hydronephrosis 09/15/2022    Acute kidney injury ([facility] ) 09/15/2022    Chronic kidney disease 2022    Thyroid nodule 10/05/2020    DOMINIC (obstructive sleep apnea) 2019    Primary osteoarthritis of right knee 2018    Chronic pain syndrome 11/15/2018    Lumbar post-laminectomy syndrome 11/15/2018    Chronic midline low back pain without sciatica 2018    Morbid obesity with body mass index (BMI) of 50 0 to 59 9 in adult [facility]) 2018    Closed fracture of transverse process of lumbar vertebra ([facility] ) 2018    Anxiety 2014    Osteoarthritis 2014    Sciatica 2014    Breathing-related sleep disorder 2014    Urinary calculus 2014    Diverticulitis 2014    GERD (gastroesophageal reflux disease) 2014    Hypertension 2014      LOS (days): 1  Geometric Mean LOS (GMLOS) (days): 5 90  Days to GMLOS:5 3     OBJECTIVE:  PATIENT READMITTED TO HOSPITAL  Risk of Unplanned Readmission Score: 11 35      Current admission status: Inpatient     Preferred Pharmacy:   [facility] [place], [address] [facility] [address] [facility]  [address] [facility],  [address] [address]  Phone: [phone] Fax: [phone]    [address] [place] [address], [facility] [address] [place] [address]  Phone: [phone] Fax: [address], [place] [address] [place] [address]  Phone: [phone] Fax: [phone]    Primary Care Provider: Rigo Sheikh MD    Primary Insurance: [facility] RE[facility]  Secondary Insurance:     ASSESSMENT:  Ramón Ahn Proxies    There are no active Health Care Proxies on file  Readmission Root Cause  30 Day Readmission: Yes  Who directed you to return to the hospital?: Self  Did you understand whom to contact if you had questions or problems?: Yes  Did you get your prescriptions before you left the hospital?: No  Reason[de-identified] Not preferred pharmacy, Preference for own pharmacy  Were you able to get your prescriptions filled when you left the hospital?: Yes  Did you take your medications as prescribed?: Yes  Were you able to get to your follow-up appointments?: Yes  During previous admission, was a post-acute recommendation made?: No  Patient was readmitted due to: Kidney Stones  Action Plan: Urology consult, Cysto w/ stent, Medical mgmt    Patient Information  Admitted from[de-identified] Home  Mental Status: Alert  During Assessment patient was accompanied by: Not accompanied during assessment  Assessment information provided by[de-identified] Patient  Primary Caregiver: Self  Support Systems: Self, Son, Family members (Sister)  [place] of Residence: [address] [place] [address] do you live in?: [address] 45 entry access options   Select all that apply : Stairs  Number of steps to enter home : 8  Do the steps have railings?: Yes  Type of Current Residence: [place]  In the last 12 months, was there a time when you were not able to pay the mortgage or rent on time?: No  In the last 12 months, how many places have you lived?: 1  In the last 12 months, was there a time when you did not have a steady place to sleep or slept in a shelter (including now)?: No  Homeless/housing insecurity resource given?: N/A  Living Arrangements: Lives Alone  Is patient a ?: No    Activities of Daily Living Prior to Admission  Functional Status: Independent  Completes ADLs independently?: Yes  Ambulates independently?: Yes  Does patient use assisted devices?: No  Does patient currently own DME?: Yes  What DME does the patient currently own?: CPAP (does not use CPAP-claustraphobic  Thinks he obtained through [employer], did not exchange after recall)  Does patient have a history of Outpatient Therapy (PT/OT)?: No  Does the patient have a history of Short-Term Rehab?: No  Does patient have a history of HHC?: No  Does patient currently have [facility]?: No     Patient Information Continued  Income Source: Pension/residential  Does patient have prescription coverage?: Yes (Uses [employer], Denies issues with OOP costs)  Within the past 12 months, you worried that your food would run out before you got the money to buy more : Never true  Within the past 12 months, the food you bought just didn't last and you didn't have money to get more : Never true  Food insecurity resource given?: N/A  Does patient receive dialysis treatments?: No  Does patient have a history of substance abuse?: No  Does patient have a history of Mental Health Diagnosis?: No     Means of Transportation  Means of Transport to Appts[de-identified] Drives Self  In the past 12 months, has lack of transportation kept you from medical appointments or from getting medications?: No  In the past 12 months, has lack of transportation kept you from meetings, work, or from getting things needed for daily living?: No  Was application for public transport provided?: N/A    DISCHARGE DETAILS:    Discharge planning discussed with[de-identified] Patient  Freedom of Choice: Yes  Comments - Freedom of Choice: SW met with patient to introduce role, complete assessment, and discuss discharge planning  Patient reports he is completely independent with mobility and ADLs including driving at baseline  He lives alone but is supported by his sister and son who live locally  Patient plans on driving self home from hospital  If he is not feeling safe to drive, his sister Eva Horta will transport home   Patient confirmed he sees Dr Angie Landis for primary care and uses [facility] for medications  He denies having any questions, concerns, or needs that  can address  He expressed his gratitude for the care provided at the hospital and the pleasantness of the staff    CM contacted family/caregiver?: Yes  Were Treatment Team discharge recommendations reviewed with patient/caregiver?: Yes  Did patient/caregiver verbalize understanding of patient care needs?: Yes  Were patient/caregiver advised of the risks associated with not following Treatment Team discharge recommendations?: Yes    Contacts  Patient Contacts: Audrey Pack  Relationship to Patient[de-identified] Family  Contact Method: Phone  Phone Number: [phone]  Reason/Outcome: Emergency [address]         Is the patient interested in [facility] at discharge?: No    DME Referral Provided  Referral made for DME?: No     Would you like to participate in our [address] service program?  : No - Declined    Treatment Team Recommendation: Home  Discharge Destination Plan[de-identified] Home  Transport at Discharge : Family, Automobile, Self (Drive self vs sister)

## 2022-09-30 NOTE — CASE MANAGEMENT
Case Management Discharge Planning Note    Patient name Crystal Moore  Location 2 [place] [address]/2 [place] [address] A MRN [id number]  : 1965 Date 2022       Current Admission Date: 2022  Current Admission Diagnosis:Renal colic on left side   Patient Active Problem List    Diagnosis Date Noted    Renal colic on left side     SIRS (systemic inflammatory response syndrome) ([facility] ) 2022    Ureteral stone with hydronephrosis 09/15/2022    Acute kidney injury ([facility] ) 09/15/2022    Chronic kidney disease 2022    Thyroid nodule 10/05/2020    DOMINIC (obstructive sleep apnea) 2019    Primary osteoarthritis of right knee 2018    Chronic pain syndrome 11/15/2018    Lumbar post-laminectomy syndrome 11/15/2018    Chronic midline low back pain without sciatica 2018    Morbid obesity with body mass index (BMI) of 50 0 to 59 9 in adult [facility]) 2018    Closed fracture of transverse process of lumbar vertebra ([facility] ) 2018    Anxiety 2014    Osteoarthritis 2014    Sciatica 2014    Breathing-related sleep disorder 2014    Urinary calculus 2014    Diverticulitis 2014    GERD (gastroesophageal reflux disease) 2014    Hypertension 2014      LOS (days): 1  Geometric Mean LOS (GMLOS) (days): 5 90  Days to GMLOS:5 1     OBJECTIVE:  Risk of Unplanned Readmission Score: 11 51      Current admission status: Inpatient   Preferred Pharmacy:   [facility] [place] Hamman, [address]  22  [address]  707 [facility],  [address] [address]  Phone: [phone] Fax: [phone] [address] [facility] [place] [address]  Phone: [phone] Fax: [phone], [place] [address] [facility] Raiza Hardy 38 210 [facility]  Phone: [phone] Fax: [phone]    Primary Care Provider: Tay Desouza Jammie Wells MD    Primary Insurance: [facility] [employer]  Secondary Insurance:     DISCHARGE DETAILS:    Discharge planning discussed with[de-identified] Patient     IMM Given (Date):: 09/30/22  IMM Given to[de-identified] Patient (IMM#2 reviewed with patient  Patient gave verbal understanding  Original provided   Copy placed in scan bin )

## 2022-09-30 NOTE — PLAN OF CARE
Problem: Potential for Falls  Goal: Patient will remain free of falls  Description: INTERVENTIONS:  - Educate patient/family on patient safety including physical limitations  - Instruct patient to call for assistance with activity   - Consult OT/PT to assist with strengthening/mobility   - Keep Call bell within reach  - Keep bed low and locked with side rails adjusted as appropriate  - Keep care items and personal belongings within reach  - Initiate and maintain comfort rounds  - Make Fall Risk Sign visible to staff  - Offer Toileting every  Hours, in advance of need  - Initiate/Maintain alarm  - Obtain necessary fall risk management equipment:   - Apply yellow socks and bracelet for high fall risk patients  - Consider moving patient to room near nurses station  Outcome: Progressing     Problem: PAIN - ADULT  Goal: Verbalizes/displays adequate comfort level or baseline comfort level  Description: Interventions:  - Encourage patient to monitor pain and request assistance  - Assess pain using appropriate pain scale  - Administer analgesics based on type and severity of pain and evaluate response  - Implement non-pharmacological measures as appropriate and evaluate response  - Consider cultural and social influences on pain and pain management  - Notify physician/advanced practitioner if interventions unsuccessful or patient reports new pain  Outcome: Progressing     Problem: INFECTION - ADULT  Goal: Absence or prevention of progression during hospitalization  Description: INTERVENTIONS:  - Assess and monitor for signs and symptoms of infection  - Monitor lab/diagnostic results  - Monitor all insertion sites, i e  indwelling lines, tubes, and drains  - Monitor endotracheal if appropriate and nasal secretions for changes in amount and color  - [place] appropriate cooling/warming therapies per order  - Administer medications as ordered  - Instruct and encourage patient and family to use good hand hygiene technique  - Identify and instruct in appropriate isolation precautions for identified infection/condition  Outcome: Progressing  Goal: Absence of fever/infection during neutropenic period  Description: INTERVENTIONS:  - Monitor WBC    Outcome: Progressing     Problem: SAFETY ADULT  Goal: Patient will remain free of falls  Description: INTERVENTIONS:  - Educate patient/family on patient safety including physical limitations  - Instruct patient to call for assistance with activity   - Consult OT/PT to assist with strengthening/mobility   - Keep Call bell within reach  - Keep bed low and locked with side rails adjusted as appropriate  - Keep care items and personal belongings within reach  - Initiate and maintain comfort rounds  - Make Fall Risk Sign visible to staff  - Offer Toileting every  Hours, in advance of need  - Initiate/Maintain alarm  - Obtain necessary fall risk management equipment:   - Apply yellow socks and bracelet for high fall risk patients  - Consider moving patient to room near nurses station  Outcome: Progressing  Goal: Maintain or return to baseline ADL function  Description: INTERVENTIONS:  -  Assess patient's ability to carry out ADLs; assess patient's baseline for ADL function and identify physical deficits which impact ability to perform ADLs (bathing, care of mouth/teeth, toileting, grooming, dressing, etc )  - Assess/evaluate cause of self-care deficits   - Assess range of motion  - Assess patient's mobility; develop plan if impaired  - Assess patient's need for assistive devices and provide as appropriate  - Encourage maximum independence but intervene and supervise when necessary  - Involve family in performance of ADLs  - Assess for home care needs following discharge   - Consider OT consult to assist with ADL evaluation and planning for discharge  - Provide patient education as appropriate  Outcome: Progressing  Goal: Maintains/Returns to pre admission functional level  Description: INTERVENTIONS:  - Perform BMAT or MOVE assessment daily    - Set and communicate daily mobility goal to care team and patient/family/caregiver  - Collaborate with rehabilitation services on mobility goals if consulted  - Perform Range of Motion  times a day  - Reposition patient every  hours  - Dangle patient  times a day  - Stand patient times a day  - Ambulate patient  times a day  - Out of bed to chair  times a day   - Out of bed for meals times a day  - Out of bed for toileting  - Record patient progress and toleration of activity level   Outcome: Progressing     Problem: DISCHARGE PLANNING  Goal: Discharge to home or other facility with appropriate resources  Description: INTERVENTIONS:  - Identify barriers to discharge w/patient and caregiver  - Arrange for needed discharge resources and transportation as appropriate  - Identify discharge learning needs (meds, wound care, etc )  - Arrange for interpretive services to assist at discharge as needed  - Refer to Case Management Department for coordinating discharge planning if the patient needs post-hospital services based on physician/advanced practitioner order or complex needs related to functional status, cognitive ability, or social support system  Outcome: Progressing     Problem: Knowledge Deficit  Goal: Patient/family/caregiver demonstrates understanding of disease process, treatment plan, medications, and discharge instructions  Description: Complete learning assessment and assess knowledge base  Interventions:  - Provide teaching at level of understanding  - Provide teaching via preferred learning methods  Outcome: Progressing     Problem: Nutrition/Hydration-ADULT  Goal: Nutrient/Hydration intake appropriate for improving, restoring or maintaining nutritional needs  Description: Monitor and assess patient's nutrition/hydration status for malnutrition  Collaborate with interdisciplinary team and initiate plan and interventions as ordered    Monitor patient's weight and dietary intake as ordered or per policy  Utilize nutrition screening tool and intervene as necessary  Determine patient's food preferences and provide high-protein, high-caloric foods as appropriate       INTERVENTIONS:  - Monitor oral intake, urinary output, labs, and treatment plans  - Assess nutrition and hydration status and recommend course of action  - Evaluate amount of meals eaten  - Assist patient with eating if necessary   - Allow adequate time for meals  - Recommend/ encourage appropriate diets, oral nutritional supplements, and vitamin/mineral supplements  - Order, calculate, and assess calorie counts as needed  - Recommend, monitor, and adjust tube feedings and TPN/PPN based on assessed needs  - Assess need for intravenous fluids  - Provide specific nutrition/hydration education as appropriate  - Include patient/family/caregiver in decisions related to nutrition  Outcome: Progressing

## 2022-09-30 NOTE — UTILIZATION REVIEW
Inpatient Admission Authorization Request   NOTIFICATION OF INPATIENT ADMISSION/INPATIENT AUTHORIZATION REQUEST   SERVICING FACILITY:   [address] [place] [address]  Tax ID: [id number]  NPI: [id number]  Place of Service: Inpatient 4604 Counts include 234 beds at the [facility]  60W  Place of Service Code: 24     ATTENDING PROVIDER:  Attending Name and NPI#: Sunil Cesar [[id number]]  Address: [address] [place] [address],[address] [place] [address]  Phone: [phone]     UTILIZATION REVIEW CONTACT:  Krystal Granado, Utilization   Network Utilization Review Department  Phone: [phone]  Fax [phone]  Email: [email or web address]     PHYSICIAN ADVISORY SERVICES:  FOR [id number] REVIEW - MEDICAL NECESSITY DENIAL  Phone: [phone]  Fax: [phone]  Email: [email or web address]     TYPE OF REQUEST:  Inpatient Status     ADMISSION INFORMATION:  ADMISSION DATE/TIME: 9/29/22  7:52 PM  PATIENT DIAGNOSIS CODE/DESCRIPTION:  Kidney stone [N20 0]  Flank pain [R10 9]  DISCHARGE DATE/TIME: No discharge date for patient encounter  IMPORTANT INFORMATION:  Please contact Krystal Granado directly with any questions or concerns regarding this request  Department voicemails are confidential     Send requests for admission clinical reviews, concurrent reviews, approvals, and administrative denials due to lack of clinical to fax [phone]

## 2022-09-30 NOTE — UTILIZATION REVIEW
Continued Stay Review    Date 2:  09/30/2022                         Current Patient Class: Inpatient  Current Level of Care: Med/Surg    HPI:57 y o  male initially admitted on 09/29/2022    Assessment/Plan:  POD #1,  S/P CYSTOSCOPY URETEROSCOPY, STENT MANUPILATION, RETROGRADE PYELOGRAM, LEFT  URETERAL STENT NSERTION, AND  REMOVAL OF RIGHT STENT (Bilateral)  Continue IV flds  Regular diet  Up & OOB as tolerated  I&O  Brandon SCDs        Vital Signs: /84   Pulse 72   Temp (!) 97 3 °F (36 3 °C)   Resp 15   Ht 6' 4" (1 93 m)   Wt (!) 176 kg (389 lb)   SpO2 93%   BMI 47 35 kg/m²     Pertinent Labs/Diagnostic Results:   Results from last 7 days   Lab Units 09/28/22  1055   SARS-COV-2  Negative     Results from last 7 days   Lab Units 09/29/22  0554 09/28/22  0632   WBC Thousand/uL 8 48 13 61*   HEMOGLOBIN g/dL 14 3 15 2   HEMATOCRIT % 44 0 45 5   PLATELETS Thousands/uL 206 292   NEUTROS ABS Thousands/µL 5 37 9 29*     Results from last 7 days   Lab Units 09/30/22  0540 09/29/22  0554 09/28/22  0632   SODIUM mmol/L 140 138 138   POTASSIUM mmol/L 4 1 3 9 4 1   CHLORIDE mmol/L 106 105 103   CO2 mmol/L 27 25 25   ANION GAP mmol/L 7 8 10   BUN mg/dL 21 26* 40*   CREATININE mg/dL 1 24 1 30 1 77*   EGFR ml/min/1 73sq m 63 59 41   CALCIUM mg/dL 8 7 8 6 9 0     Results from last 7 days   Lab Units 09/28/22  0632   AST U/L 25   ALT U/L 45   ALK PHOS U/L 70   TOTAL PROTEIN g/dL 7 9   ALBUMIN g/dL 3 6   TOTAL BILIRUBIN mg/dL 0 43     Results from last 7 days   Lab Units 09/30/22  0540 09/29/22  0554 09/28/22  0632   GLUCOSE RANDOM mg/dL 128 122 134     Results from last 7 days   Lab Units 09/28/22  0632   CLARITY UA  Cloudy   COLOR UA  Yellow   SPEC GRAV UA  1 020   PH UA  5 0   GLUCOSE UA mg/dl Negative   KETONES UA mg/dl Negative   BLOOD UA  Large*   PROTEIN UA mg/dl 30 (1+)*   NITRITE UA  Negative   BILIRUBIN UA  Negative   UROBILINOGEN UA E U /dl 0 2   LEUKOCYTES UA  Negative   WBC UA /hpf 2-4   RBC UA /hpf Innumerable*   BACTERIA UA /hpf Occasional   EPITHELIAL CELLS WET PREP /hpf None Seen     Medications:   Scheduled Medications:  amLODIPine, 5 mg, Oral, Daily  heparin (porcine), 5,000 Units, Subcutaneous, Q8H JOSELINE  pantoprazole, 20 mg, Oral, Early Morning  tamsulosin, 0 4 mg, Oral, Daily With Dinner      Continuous IV Infusions:  lactated ringers, 50 mL/hr, Intravenous, Continuous  sodium chloride, 125 mL/hr, Intravenous, Continuous      PRN Meds:  acetaminophen, 650 mg, Oral, Q6H PRN  HYDROmorphone, 0 5 mg, Intravenous, Q3H PRN  HYDROmorphone, 1 mg, Intravenous, Q4H PRN  lactated ringers, 1,000 mL, Intravenous, Once PRN   And  lactated ringers, 1,000 mL, Intravenous, Once PRN  ondansetron, 4 mg, Intravenous, Q6H PRN  oxyCODONE-acetaminophen, 1 tablet, Oral, Q8H PRN  sodium chloride, 1,000 mL, Intravenous, Once PRN   And  sodium chloride, 1,000 mL, Intravenous, Once PRN        Discharge Plan: [facility]    Network Utilization Review Department  ATTENTION: Please call with any questions or concerns to [phone] and carefully listen to the prompts so that you are directed to the right person  All voicemails are confidential   Oxana Holguin all requests for admission clinical reviews, approved or denied determinations and any other requests to dedicated fax number below belonging to the campus where the patient is receiving treatment   List of dedicated fax numbers for the Facilities:  [address] DENIALS (Administrative/Medical Necessity) [phone]   [address] (Maternity/NICU/Pediatrics) [phone]   [address]  [address] [address] [address] [place] [address] Aris Osborn [address] [phone]

## 2022-09-30 NOTE — PLAN OF CARE
Problem: Potential for Falls  Goal: Patient will remain free of falls  Description: INTERVENTIONS:  - Educate patient/family on patient safety including physical limitations  - Instruct patient to call for assistance with activity   - Consult OT/PT to assist with strengthening/mobility   - Keep Call bell within reach  - Keep bed low and locked with side rails adjusted as appropriate  - Keep care items and personal belongings within reach  - Initiate and maintain comfort rounds  - Make Fall Risk Sign visible to staff  - Offer Toileting every  Hours, in advance of need  - Initiate/Maintain alarm  - Obtain necessary fall risk management equipment:   - Apply yellow socks and bracelet for high fall risk patients  - Consider moving patient to room near nurses station  9/30/2022 1610 by Mahamed Paul RN  Outcome: Completed  9/30/2022 1608 by Mahamed Paul RN  Outcome: Progressing  9/30/2022 0943 by Mahamed Paul RN  Outcome: Progressing     Problem: PAIN - ADULT  Goal: Verbalizes/displays adequate comfort level or baseline comfort level  Description: Interventions:  - Encourage patient to monitor pain and request assistance  - Assess pain using appropriate pain scale  - Administer analgesics based on type and severity of pain and evaluate response  - Implement non-pharmacological measures as appropriate and evaluate response  - Consider cultural and social influences on pain and pain management  - Notify physician/advanced practitioner if interventions unsuccessful or patient reports new pain  9/30/2022 1610 by Mahamed Paul RN  Outcome: Completed  9/30/2022 1608 by Mahamed Paul RN  Outcome: Progressing  9/30/2022 0943 by Mahamed Paul RN  Outcome: Progressing     Problem: INFECTION - ADULT  Goal: Absence or prevention of progression during hospitalization  Description: INTERVENTIONS:  - Assess and monitor for signs and symptoms of infection  - Monitor lab/diagnostic results  - Monitor all insertion sites, i e  indwelling lines, tubes, and drains  - Monitor endotracheal if appropriate and nasal secretions for changes in amount and color  - [place] appropriate cooling/warming therapies per order  - Administer medications as ordered  - Instruct and encourage patient and family to use good hand hygiene technique  - Identify and instruct in appropriate isolation precautions for identified infection/condition  9/30/2022 1610 by Mirna Nicolas RN  Outcome: Completed  9/30/2022 1608 by Mirna Nicolas RN  Outcome: Progressing  9/30/2022 0943 by Mirna Nicolas RN  Outcome: Progressing  Goal: Absence of fever/infection during neutropenic period  Description: INTERVENTIONS:  - Monitor WBC    9/30/2022 1610 by Mirna Nicolas RN  Outcome: Completed  9/30/2022 1608 by Mirna Nicolas RN  Outcome: Progressing  9/30/2022 0943 by Mirna Nicolas RN  Outcome: Progressing     Problem: SAFETY ADULT  Goal: Patient will remain free of falls  Description: INTERVENTIONS:  - Educate patient/family on patient safety including physical limitations  - Instruct patient to call for assistance with activity   - Consult OT/PT to assist with strengthening/mobility   - Keep Call bell within reach  - Keep bed low and locked with side rails adjusted as appropriate  - Keep care items and personal belongings within reach  - Initiate and maintain comfort rounds  - Make Fall Risk Sign visible to staff  - Offer Toileting every  Hours, in advance of need  - Initiate/Maintain alarm  - Obtain necessary fall risk management equipment:   - Apply yellow socks and bracelet for high fall risk patients  - Consider moving patient to room near nurses station  9/30/2022 1610 by Mirna Nicolas RN  Outcome: Completed  9/30/2022 1608 by Mirna Nicolas RN  Outcome: Progressing  9/30/2022 0943 by Mirna Nicolas RN  Outcome: Progressing  Goal: Maintain or return to baseline ADL function  Description: INTERVENTIONS:  -  Assess patient's ability to carry out ADLs; assess patient's baseline for ADL function and identify physical deficits which impact ability to perform ADLs (bathing, care of mouth/teeth, toileting, grooming, dressing, etc )  - Assess/evaluate cause of self-care deficits   - Assess range of motion  - Assess patient's mobility; develop plan if impaired  - Assess patient's need for assistive devices and provide as appropriate  - Encourage maximum independence but intervene and supervise when necessary  - Involve family in performance of ADLs  - Assess for home care needs following discharge   - Consider OT consult to assist with ADL evaluation and planning for discharge  - Provide patient education as appropriate  9/30/2022 1610 by Maxim Boles RN  Outcome: Completed  9/30/2022 1608 by Maxim Boles RN  Outcome: Progressing  9/30/2022 0943 by Maxim Boles RN  Outcome: Progressing  Goal: Maintains/Returns to pre admission functional level  Description: INTERVENTIONS:  - Perform BMAT or MOVE assessment daily    - Set and communicate daily mobility goal to care team and patient/family/caregiver  - Collaborate with rehabilitation services on mobility goals if consulted  - Perform Range of Motion  times a day  - Reposition patient every  hours    - Dangle patient  times a day  - Stand patient  times a day  - Ambulate patient  times a day  - Out of bed to chair  times a day   - Out of bed for meals  times a day  - Out of bed for toileting  - Record patient progress and toleration of activity level   9/30/2022 1610 by Maxim Boles RN  Outcome: Completed  9/30/2022 1608 by Maxim Boles RN  Outcome: Progressing  9/30/2022 0943 by Maxim Boles RN  Outcome: Progressing     Problem: DISCHARGE PLANNING  Goal: Discharge to home or other facility with appropriate resources  Description: INTERVENTIONS:  - Identify barriers to discharge w/patient and caregiver  - Arrange for needed discharge resources and transportation as appropriate  - Identify discharge learning needs (meds, wound care, etc )  - Arrange for interpretive services to assist at discharge as needed  - Refer to Case Management Department for coordinating discharge planning if the patient needs post-hospital services based on physician/advanced practitioner order or complex needs related to functional status, cognitive ability, or social support system  9/30/2022 1610 by aSnta Paez RN  Outcome: Completed  9/30/2022 1608 by Santa Paez RN  Outcome: Progressing  9/30/2022 0943 by Santa Paez RN  Outcome: Progressing     Problem: Knowledge Deficit  Goal: Patient/family/caregiver demonstrates understanding of disease process, treatment plan, medications, and discharge instructions  Description: Complete learning assessment and assess knowledge base  Interventions:  - Provide teaching at level of understanding  - Provide teaching via preferred learning methods  9/30/2022 1610 by Santa Paez RN  Outcome: Completed  9/30/2022 1608 by Santa Paez RN  Outcome: Progressing  9/30/2022 0943 by Santa Paez RN  Outcome: Progressing     Problem: Nutrition/Hydration-ADULT  Goal: Nutrient/Hydration intake appropriate for improving, restoring or maintaining nutritional needs  Description: Monitor and assess patient's nutrition/hydration status for malnutrition  Collaborate with interdisciplinary team and initiate plan and interventions as ordered  Monitor patient's weight and dietary intake as ordered or per policy  Utilize nutrition screening tool and intervene as necessary  Determine patient's food preferences and provide high-protein, high-caloric foods as appropriate       INTERVENTIONS:  - Monitor oral intake, urinary output, labs, and treatment plans  - Assess nutrition and hydration status and recommend course of action  - Evaluate amount of meals eaten  - Assist patient with eating if necessary   - Allow adequate time for meals  - Recommend/ encourage appropriate diets, oral nutritional supplements, and vitamin/mineral supplements  - Order, calculate, and assess calorie counts as needed  - Recommend, monitor, and adjust tube feedings and TPN/PPN based on assessed needs  - Assess need for intravenous fluids  - Provide specific nutrition/hydration education as appropriate  - Include patient/family/caregiver in decisions related to nutrition  9/30/2022 1610 by Cassius Galarza RN  Outcome: Completed  9/30/2022 1608 by Cassius Galarza RN  Outcome: Progressing  9/30/2022 0943 by Cassius Galarza RN  Outcome: Progressing

## 2022-09-30 NOTE — QUICK NOTE
S/p cysto/lt ureteroscopy stone man stent  D/c diaz  Voiding trial  Can go home  Pt to be seen 10/10  preop for stone extraction 10/13

## 2022-09-30 NOTE — PLAN OF CARE
Problem: Potential for Falls  Goal: Patient will remain free of falls  Description: INTERVENTIONS:  - Educate patient/family on patient safety including physical limitations  - Instruct patient to call for assistance with activity   - Consult OT/PT to assist with strengthening/mobility   - Keep Call bell within reach  - Keep bed low and locked with side rails adjusted as appropriate  - Keep care items and personal belongings within reach  - Initiate and maintain comfort rounds  - Make Fall Risk Sign visible to staff  - Offer Toileting every  Hours, in advance of need  - Initiate/Maintain alarm  - Obtain necessary fall risk management equipment:   - Apply yellow socks and bracelet for high fall risk patients  - Consider moving patient to room near nurses station  9/30/2022 1608 by Mai Varner RN  Outcome: Progressing  9/30/2022 0943 by Mia Varner RN  Outcome: Progressing     Problem: PAIN - ADULT  Goal: Verbalizes/displays adequate comfort level or baseline comfort level  Description: Interventions:  - Encourage patient to monitor pain and request assistance  - Assess pain using appropriate pain scale  - Administer analgesics based on type and severity of pain and evaluate response  - Implement non-pharmacological measures as appropriate and evaluate response  - Consider cultural and social influences on pain and pain management  - Notify physician/advanced practitioner if interventions unsuccessful or patient reports new pain  9/30/2022 1608 by Mai Varner RN  Outcome: Progressing  9/30/2022 0943 by Mai Varner RN  Outcome: Progressing     Problem: INFECTION - ADULT  Goal: Absence or prevention of progression during hospitalization  Description: INTERVENTIONS:  - Assess and monitor for signs and symptoms of infection  - Monitor lab/diagnostic results  - Monitor all insertion sites, i e  indwelling lines, tubes, and drains  - Monitor endotracheal if appropriate and nasal secretions for changes in amount and color  - [place] appropriate cooling/warming therapies per order  - Administer medications as ordered  - Instruct and encourage patient and family to use good hand hygiene technique  - Identify and instruct in appropriate isolation precautions for identified infection/condition  9/30/2022 1608 by Karen Driscoll RN  Outcome: Progressing  9/30/2022 0943 by Karen Driscoll RN  Outcome: Progressing  Goal: Absence of fever/infection during neutropenic period  Description: INTERVENTIONS:  - Monitor WBC    9/30/2022 1608 by Karen Driscoll RN  Outcome: Progressing  9/30/2022 0943 by Karen Driscoll RN  Outcome: Progressing     Problem: SAFETY ADULT  Goal: Patient will remain free of falls  Description: INTERVENTIONS:  - Educate patient/family on patient safety including physical limitations  - Instruct patient to call for assistance with activity   - Consult OT/PT to assist with strengthening/mobility   - Keep Call bell within reach  - Keep bed low and locked with side rails adjusted as appropriate  - Keep care items and personal belongings within reach  - Initiate and maintain comfort rounds  - Make Fall Risk Sign visible to staff  - Offer Toileting every  Hours, in advance of need  - Initiate/Maintain alarm  - Obtain necessary fall risk management equipment:   - Apply yellow socks and bracelet for high fall risk patients  - Consider moving patient to room near nurses station  9/30/2022 1608 by Karen Driscoll RN  Outcome: Progressing  9/30/2022 0943 by Karen Driscoll RN  Outcome: Progressing  Goal: Maintain or return to baseline ADL function  Description: INTERVENTIONS:  -  Assess patient's ability to carry out ADLs; assess patient's baseline for ADL function and identify physical deficits which impact ability to perform ADLs (bathing, care of mouth/teeth, toileting, grooming, dressing, etc )  - Assess/evaluate cause of self-care deficits   - Assess range of motion  - Assess patient's mobility; develop plan if impaired  - Assess patient's need for assistive devices and provide as appropriate  - Encourage maximum independence but intervene and supervise when necessary  - Involve family in performance of ADLs  - Assess for home care needs following discharge   - Consider OT consult to assist with ADL evaluation and planning for discharge  - Provide patient education as appropriate  9/30/2022 1608 by Valentin Rueda RN  Outcome: Progressing  9/30/2022 0943 by Valentin Rueda RN  Outcome: Progressing  Goal: Maintains/Returns to pre admission functional level  Description: INTERVENTIONS:  - Perform BMAT or MOVE assessment daily    - Set and communicate daily mobility goal to care team and patient/family/caregiver  - Collaborate with rehabilitation services on mobility goals if consulted  - Perform Range of Motion  times a day  - Reposition patient every  hours    - Dangle patient  times a day  - Stand patient  times a day  - Ambulate patient  times a day  - Out of bed to chair  times a day   - Out of bed for meals  times a day  - Out of bed for toileting  - Record patient progress and toleration of activity level   9/30/2022 1608 by Valentin Rueda RN  Outcome: Progressing  9/30/2022 0943 by Valentin Rueda RN  Outcome: Progressing     Problem: DISCHARGE PLANNING  Goal: Discharge to home or other facility with appropriate resources  Description: INTERVENTIONS:  - Identify barriers to discharge w/patient and caregiver  - Arrange for needed discharge resources and transportation as appropriate  - Identify discharge learning needs (meds, wound care, etc )  - Arrange for interpretive services to assist at discharge as needed  - Refer to Case Management Department for coordinating discharge planning if the patient needs post-hospital services based on physician/advanced practitioner order or complex needs related to functional status, cognitive ability, or social support system  9/30/2022 1608 by Maxim Boles RN  Outcome: Progressing  9/30/2022 0943 by Maxim Boles RN  Outcome: Progressing     Problem: Knowledge Deficit  Goal: Patient/family/caregiver demonstrates understanding of disease process, treatment plan, medications, and discharge instructions  Description: Complete learning assessment and assess knowledge base  Interventions:  - Provide teaching at level of understanding  - Provide teaching via preferred learning methods  9/30/2022 1608 by Maxim Boles RN  Outcome: Progressing  9/30/2022 0943 by Maxim Boles RN  Outcome: Progressing     Problem: Nutrition/Hydration-ADULT  Goal: Nutrient/Hydration intake appropriate for improving, restoring or maintaining nutritional needs  Description: Monitor and assess patient's nutrition/hydration status for malnutrition  Collaborate with interdisciplinary team and initiate plan and interventions as ordered  Monitor patient's weight and dietary intake as ordered or per policy  Utilize nutrition screening tool and intervene as necessary  Determine patient's food preferences and provide high-protein, high-caloric foods as appropriate       INTERVENTIONS:  - Monitor oral intake, urinary output, labs, and treatment plans  - Assess nutrition and hydration status and recommend course of action  - Evaluate amount of meals eaten  - Assist patient with eating if necessary   - Allow adequate time for meals  - Recommend/ encourage appropriate diets, oral nutritional supplements, and vitamin/mineral supplements  - Order, calculate, and assess calorie counts as needed  - Recommend, monitor, and adjust tube feedings and TPN/PPN based on assessed needs  - Assess need for intravenous fluids  - Provide specific nutrition/hydration education as appropriate  - Include patient/family/caregiver in decisions related to nutrition  9/30/2022 1608 by Maxim Boles RN  Outcome: Progressing  9/30/2022 0943 by Mai Varner RN  Outcome: Progressing

## 2022-09-30 NOTE — ASSESSMENT & PLAN NOTE
Patient presented left-sided flank pain/abdominal pain since last night  Known history of kidney stone  Patient has history of right-sided kidney stone status post lithotripsy with stent exchange by Urology  CT abdomen pelvis showed mildly obstructing 2 millimeter calculus in the left proximal ureter  Urology input appreciated  Continue Flomax 0 4 milligram p o  Daily  Continue IV hydration and pain management   Patient underwent cystoscopy with retrograde pyelogram and placement of left ureteral stent with removal of right stent  With left ureteroscopy stone migrated to the back of the renal pelvis  Patient had [place] catheter postprocedure which was discontinued this morning    Patient has been urinating well with improved pain  Outpatient follow-up with Urology for possible lithotripsy

## 2022-09-30 NOTE — ASSESSMENT & PLAN NOTE
Baseline creatinine around 1 1  Creatinine upon admission was 1 7  Likely in the setting of dehydration along with use of lisinopril/hydrochlorothiazide  Lisinopril/hydrochlorothiazide was on hold during hospitalization  Creatinine improved and was close to baseline with IV hydration    Can resume lisinopril/hydrochlorothiazide upon discharge  Avoid nephrotoxic agents and hypotension  CT abdomen pelvis showed mildly obstructing 2 millimeter calculus in the left proximal ureter without any hydronephrosis

## 2022-10-03 ENCOUNTER — TRANSITIONAL CARE MANAGEMENT (OUTPATIENT)
Dept: FAMILY MEDICINE CLINIC | Facility: CLINIC | Age: 57
End: 2022-10-03

## 2022-10-03 RX ORDER — DIAZEPAM 10 MG/1
TABLET ORAL
Status: ON HOLD | COMMUNITY
Start: 2022-09-23 | End: 2022-10-13

## 2022-10-03 NOTE — UTILIZATION REVIEW
Notification of Discharge   This is a Notification of Discharge from our facility [address]  Please be advised that this patient has been discharge from our facility  Below you will find the admission and discharge date and time including the patients disposition  UTILIZATION REVIEW CONTACT:  Delon Martinez  Utilization   Network Utilization Review Department  Phone: [phone] x carefully listen to the prompts  All voicemails are confidential   Email: [email or web address]     PHYSICIAN ADVISORY SERVICES:  FOR BOSP-GE-HTBM REVIEW - MEDICAL NECESSITY DENIAL  Phone: [phone]  Fax: [phone]  Email: [email or web address]     PRESENTATION DATE: 9/28/2022  6:12 AM  OBERVATION ADMISSION DATE:   INPATIENT ADMISSION DATE: 9/29/22  7:52 PM   DISCHARGE DATE: 9/30/2022  4:47 PM  DISPOSITION: Home/Self Care Home/Self Care      IMPORTANT INFORMATION:  Send all requests for admission clinical reviews, approved or denied determinations and any other requests to dedicated fax number below belonging to the campus where the patient is receiving treatment   List of dedicated fax numbers:  [address] DENIALS (Administrative/Medical Necessity) [phone]   [address] (Maternity/NICU/Pediatrics) [phone]   [facility] [phone]   [address] [place] [address] [phone]   [address] [place] [address] [phone]   2000 [facility] [address] [place] [address],[address] [phone]   [facility]  [phone]   [address] [facility]  [address] [facility] [address] [facility] [phone]

## 2022-10-11 NOTE — PRE-PROCEDURE INSTRUCTIONS
My Surgical Experience    The following information was developed to assist you to prepare for your operation  What do I need to do before coming to the hospital?  • Arrange for a responsible person to drive you to and from the hospital   • Arrange care for your children at home  Children are not allowed in the recovery areas of the hospital  • Plan to wear clothing that is easy to put on and take off  If you are having shoulder surgery, wear a shirt that buttons or zippers in the front  Bathing  o Shower the evening before and the morning of your surgery with an antibacterial soap  Please refer to the Pre Op Showering Instructions for Surgery Patients Sheet   o Remove nail polish and all body piercing jewelry  o Do not shave any body part for at least 24 hours before surgery-this includes face, arms, legs and upper body  Food  o Nothing to eat or drink after midnight the night before your surgery  This includes candy and chewing gum  o Exception: If your surgery is after 12:00pm (noon), you may have clear liquids such as 7-Up®, ginger ale, apple or cranberry juice, Jell-O®, water, or clear broth until 8:00 am  o Do not drink milk or juice with pulp on the morning before surgery  o Do not drink alcohol 24 hours before surgery  Medicine  o Follow instructions you received from your surgeon about which medicines you may take on the day of surgery  o If instructed to take medicine on the morning of surgery, take pills with just a small sip of water  Call your prescribing doctor for specific infroamtion on what to do if you take insulin    What should I bring to the hospital?    Bring:  • Crutches or a walker, if you have them, for foot or knee surgery  • A list of the daily medicines, vitamins, minerals, herbals and nutritional supplements you take   Include the dosages of medicines and the time you take them each day  • Glasses, dentures or hearing aids  • Minimal clothing; you will be wearing hospital sleepwear  • Photo ID; required to verify your identity  • If you have a Living Will or Power of , bring a copy of the documents  • If you have an ostomy, bring an extra pouch and any supplies you use    Do not bring  • Medicines or inhalers  • Money, valuables or jewelry    What other information should I know about the day of surgery? • Notify your surgeons if you develop a cold, sore throat, cough, fever, rash or any other illness  • Report to the Ambulatory Surgical/Same Day Surgery Unit  • You will be instructed to stop at Registration only if you have not been pre-registered  • Inform your  fi they do not stay that they will be asked by the staff to leave a phone number where they can be reached  • Be available to be reached before surgery  In the event the operating room schedule changes, you may be asked to come in earlier or later than expected    *It is important to tell your doctor and others involved in your health care if you are taking or have been taking any non-prescription drugs, vitamins, minerals, herbals or other nutritional supplements  Any of these may interact with some food or medicines and cause a reaction      Pre-Surgery Instructions:   Medication Instructions   • amLODIPine (NORVASC) 5 mg tablet Take day of surgery  • lisinopril-hydrochlorothiazide (PRINZIDE,ZESTORETIC) 20-25 MG per tablet Hold day of surgery  • omeprazole (PriLOSEC) 20 mg delayed release capsule Take day of surgery  • tamsulosin (FLOMAX) 0 4 mg Take day of surgery

## 2022-10-12 ENCOUNTER — ANESTHESIA EVENT (OUTPATIENT)
Dept: PERIOP | Facility: HOSPITAL | Age: 57
End: 2022-10-12
Payer: COMMERCIAL

## 2022-10-12 NOTE — H&P
H&P Exam - Urology       Patient: Mehreen Monday   : 1965 Sex: male   MRN: [id number]     CSN: [id number]      History of Present Illness   HPI:  Mehreen Monday is a 59 y o  male who presents with left stent left 3 mm stone status post cysto left ureteroscopy stone minute stent placement 2 weeks ago post procedure KUB confirms stone now to be in the left lower pole discussed undergoing cysto left stent to try to pass patient refused any type of procedure in office wishes to undergo cysto stent removal retrograde possible flexible ureteroscopy basket extraction if stone has not passed as of yet aware risk of anesthesia infection bleeding additional urologic procedures        Review of Systems:   Constitutional:  Negative for activity change, fever, chills and diaphoresis  HENT: Negative for hearing loss and trouble swallowing  Eyes: Negative for itching and visual disturbance  Respiratory: Negative for chest tightness and shortness of breath  Cardiovascular: Negative for chest pain, edema  Gastrointestinal: Negative for abdominal distention, na abdominal pain, constipation, diarrhea, Nausea and vomiting  Genitourinary: Negative for decreased urine volume, difficulty urinating, dysuria, enuresis, frequency, hematuria and urgency  Musculoskeletal: Negative for gait problem and myalgias  Neurological: Negative for dizziness and headaches  Hematological: Does not bruise/bleed easily         Historical Information   Past Medical History:   Diagnosis Date   • Borderline diabetes     per pt A1C "coming down"   • Chronic pain disorder     lower back-s/p laminectomy   • Claustrophobia     "some degree" jes MRI's"   • CPAP (continuous positive airway pressure) dependence     per pt does not use CPAP   • Diverticulitis    • GERD (gastroesophageal reflux disease)    • Hypertension    • Kidney stone     x 2 episodes   • Lactose intolerance    • Localized pain of joint    • Mild sleep apnea     CPAP started in July having difficulty using- only has used on occ   • Morbid obesity with body mass index (BMI) of 50 0 to 59 9 in adult [facility])    • Rectal bleeding     occ rectal bleeding last episode unsure-"nothing recent"   • Seasickness    • Skin tag     skin tags were removed-as of 8/2/19 has a few more   • Teeth missing    • Thyroid nodule    • Tinnitus    • Wears glasses    • Weight loss     "80lbs and did gain 40lbs back"--     Past Surgical History:   Procedure Laterality Date   • BACK SURGERY      laminectomy-1996, 2010   • COLONOSCOPY      x2   • ESOPHAGOGASTRODUODENOSCOPY N/A 3/30/2019    Procedure: ESOPHAGOGASTRODUODENOSCOPY (EGD);   Surgeon: Vikki Peoples MD;  Location: [address] [place] [address];  Service: Gastroenterology   • FL RETROGRADE PYELOGRAM  9/16/2022   • FL RETROGRADE PYELOGRAM  9/22/2022   • FL RETROGRADE PYELOGRAM  9/29/2022   • KNEE ARTHROSCOPY Left 2007   • ME CYSTO/URETERO W/LITHOTRIPSY &INDWELL STENT INSRT Right 1/6/2022    Procedure: CYSTOSCOPY URETEROSCOPY AND INSERTION STENT URETERAL RIGHT;  Surgeon: Aakash Asif MD;  Location: [facility];  Service: Urology   • ME CYSTO/URETERO W/LITHOTRIPSY &INDWELL STENT INSRT Right 9/22/2022    Procedure: CYSTOSCOPY URETEROSCOPY WITH LITHOTRIPSY HOLMIUM LASER, BASKET EXTRACTION, RETROGRADE PYELOGRAM (BILATERAL) AND  STENT EXCHANGE;  Surgeon: Aakash Asif MD;  Location: [address] [place] [address];  Service: Urology   • ME CYSTO/URETERO W/LITHOTRIPSY &INDWELL STENT INSRT Bilateral 9/29/2022    Procedure: CYSTOSCOPY URETEROSCOPY, STENT MANUPILATION, RETROGRADE PYELOGRAM, LEFT  URETERAL STENT NSERTION, AND  REMOVAL OF RIGHT STENT;  Surgeon: Aakash Asif MD;  Location: [address] [place] [address];  Service: Urology   • ME CYSTOURETHROSCOPY,URETER CATHETER Right 9/16/2022    Procedure: CYSTOSCOPY RETROGRADE PYELOGRAM WITH INSERTION STENT URETERAL retrograde;  Surgeon: Aakash Asif MD;  Location: [address] [place] [address];  Service: Urology   • ME ESOPHAGOGASTRODUODENOSCOPY TRANSORAL DIAGNOSTIC N/A 5/1/2019 Procedure: ESOPHAGOGASTRODUODENOSCOPY (EGD); Surgeon: Brittney Vaca MD;  Location: [facility]; Service: Gastroenterology   • MO THYROID LOBECTOMY,UNILAT Left 8/31/2022    Procedure: Left patial THYROIDECTOMY;  Surgeon: Bill Veronica MD;  Location:  MAIN OR;  Service: ENT   • UPPER GASTROINTESTINAL ENDOSCOPY  2006   • Jan 634 THYROID BIOPSY  12/28/2020     Social History   Social History     Substance and Sexual Activity   Alcohol Use Never     Social History     Substance and Sexual Activity   Drug Use No     Social History     Tobacco Use   Smoking Status Never Smoker   Smokeless Tobacco Never Used     Family History:   Family History   Problem Relation Age of Onset   • Osteoarthritis Mother    • Obesity Father         [id number]   • Sleep apnea Father         with CPAP   • No Known Problems Sister    • No Known Problems Brother    • Lupus Sister    • No Known Problems Sister    • No Known Problems Son    • Substance Abuse Neg Hx    • Mental illness Neg Hx    • Cancer Neg Hx    • Diabetes Neg Hx    • Heart disease Neg Hx    • Stroke Neg Hx        Meds/Allergies   No medications prior to admission  Allergies   Allergen Reactions   • Lactose - Food Allergy GI Intolerance       Objective   Vitals: There were no vitals taken for this visit  Physical Exam:  General Alert awake   Normocephalic atraumatic PERRLA  Lungs clear bilaterally  Cardiac normal S1 normal S2  Abdomen soft, flank pain  Extremities no edema    No intake/output data recorded      Invasive Devices  Report    Peripheral Intravenous Line  Duration           Peripheral IV 09/28/22 Right Wrist 14 days          Drain  Duration           Ureteral Internal Stent Right ureter 6 Fr  25 days    Ureteral Internal Stent 6 Fr  12 days    Urethral Catheter Latex 20 Fr  12 days                    Lab Results: CBC:   Lab Results   Component Value Date    WBC 8 48 09/29/2022    HGB 14 3 09/29/2022    HCT 44 0 09/29/2022    MCV 92 09/29/2022  09/29/2022    ADJUSTEDWBC 9 00 08/26/2016    MCH 30 0 09/29/2022    MCHC 32 5 09/29/2022    RDW 13 1 09/29/2022    MPV 9 4 09/29/2022    NRBC 0 09/29/2022     CMP:   Lab Results   Component Value Date     09/30/2022     04/15/2019    CO2 27 09/30/2022    CO2 23 04/15/2019    BUN 21 09/30/2022    BUN 23 04/15/2019    CREATININE 1 24 09/30/2022    CALCIUM 8 7 09/30/2022    AST 25 09/28/2022    AST 25 12/10/2018    ALT 45 09/28/2022    ALT 29 12/10/2018    ALKPHOS 70 09/28/2022    EGFR 63 09/30/2022     Urinalysis:   Lab Results   Component Value Date    COLORU Yellow 09/28/2022    CLARITYU Cloudy 09/28/2022    SPECGRAV 1 020 09/28/2022    PHUR 5 0 09/28/2022    PHUR 6 5 06/26/2018    LEUKOCYTESUR Negative 09/28/2022    NITRITE Negative 09/28/2022    GLUCOSEU Negative 09/28/2022    KETONESU Negative 09/28/2022    BILIRUBINUR Negative 09/28/2022    BLOODU Large (A) 09/28/2022     Urine Culture: No results found for: URINECX  PSA:   Lab Results   Component Value Date    PSA 2 4 12/22/2020    PSA 2 2 12/10/2018           Assessment/ Plan:  Cysto left flexible ureteroscopy laser lithotripsy basket stent exchange      Wallace Malone MD

## 2022-10-13 ENCOUNTER — ANESTHESIA (OUTPATIENT)
Dept: PERIOP | Facility: HOSPITAL | Age: 57
End: 2022-10-13
Payer: COMMERCIAL

## 2022-10-13 ENCOUNTER — APPOINTMENT (OUTPATIENT)
Dept: RADIOLOGY | Facility: HOSPITAL | Age: 57
End: 2022-10-13
Payer: COMMERCIAL

## 2022-10-13 ENCOUNTER — HOSPITAL ENCOUNTER (OUTPATIENT)
Facility: HOSPITAL | Age: 57
Setting detail: OUTPATIENT SURGERY
Discharge: HOME | End: 2022-10-13
Attending: SPECIALIST | Admitting: SPECIALIST
Payer: COMMERCIAL

## 2022-10-13 VITALS
BODY MASS INDEX: 38.36 KG/M2 | HEART RATE: 75 BPM | OXYGEN SATURATION: 94 % | WEIGHT: 315 LBS | HEIGHT: 76 IN | RESPIRATION RATE: 18 BRPM | TEMPERATURE: 97.6 F | DIASTOLIC BLOOD PRESSURE: 94 MMHG | SYSTOLIC BLOOD PRESSURE: 177 MMHG

## 2022-10-13 DIAGNOSIS — N20.0 CALCULUS OF KIDNEY: ICD-10-CM

## 2022-10-13 LAB
FLUAV RNA RESP QL NAA+PROBE: NEGATIVE
FLUBV RNA RESP QL NAA+PROBE: NEGATIVE
RSV RNA RESP QL NAA+PROBE: NEGATIVE
SARS-COV-2 RNA RESP QL NAA+PROBE: NEGATIVE

## 2022-10-13 PROCEDURE — 0241U HB NFCT DS VIR RESP RNA 4 TRGT: CPT | Performed by: SPECIALIST

## 2022-10-13 PROCEDURE — 82360 CALCULUS ASSAY QUANT: CPT | Performed by: SPECIALIST

## 2022-10-13 PROCEDURE — 88300 SURGICAL PATH GROSS: CPT | Performed by: STUDENT IN AN ORGANIZED HEALTH CARE EDUCATION/TRAINING PROGRAM

## 2022-10-13 PROCEDURE — 74018 RADEX ABDOMEN 1 VIEW: CPT

## 2022-10-13 PROCEDURE — C2617 STENT, NON-COR, TEM W/O DEL: HCPCS | Performed by: SPECIALIST

## 2022-10-13 DEVICE — INLAY URETERAL STENT W/O GUIDEWIRE
Type: IMPLANTABLE DEVICE | Status: FUNCTIONAL
Brand: BARD® INLAY® URETERAL STENT

## 2022-10-13 RX ORDER — ONDANSETRON 2 MG/ML
INJECTION INTRAMUSCULAR; INTRAVENOUS AS NEEDED
Status: DISCONTINUED | OUTPATIENT
Start: 2022-10-13 | End: 2022-10-13

## 2022-10-13 RX ORDER — DEXAMETHASONE SODIUM PHOSPHATE 10 MG/ML
INJECTION, SOLUTION INTRAMUSCULAR; INTRAVENOUS AS NEEDED
Status: DISCONTINUED | OUTPATIENT
Start: 2022-10-13 | End: 2022-10-13

## 2022-10-13 RX ORDER — SODIUM CHLORIDE, SODIUM LACTATE, POTASSIUM CHLORIDE, CALCIUM CHLORIDE 600; 310; 30; 20 MG/100ML; MG/100ML; MG/100ML; MG/100ML
125 INJECTION, SOLUTION INTRAVENOUS CONTINUOUS
Status: DISCONTINUED | OUTPATIENT
Start: 2022-10-13 | End: 2022-10-13 | Stop reason: HOSPADM

## 2022-10-13 RX ORDER — FENTANYL CITRATE 50 UG/ML
INJECTION, SOLUTION INTRAMUSCULAR; INTRAVENOUS AS NEEDED
Status: DISCONTINUED | OUTPATIENT
Start: 2022-10-13 | End: 2022-10-13

## 2022-10-13 RX ORDER — PROPOFOL 10 MG/ML
INJECTION, EMULSION INTRAVENOUS AS NEEDED
Status: DISCONTINUED | OUTPATIENT
Start: 2022-10-13 | End: 2022-10-13

## 2022-10-13 RX ORDER — MIDAZOLAM HYDROCHLORIDE 2 MG/2ML
INJECTION, SOLUTION INTRAMUSCULAR; INTRAVENOUS AS NEEDED
Status: DISCONTINUED | OUTPATIENT
Start: 2022-10-13 | End: 2022-10-13

## 2022-10-13 RX ORDER — LEVOFLOXACIN 5 MG/ML
500 INJECTION, SOLUTION INTRAVENOUS ONCE
Status: DISCONTINUED | OUTPATIENT
Start: 2022-10-13 | End: 2022-10-13 | Stop reason: HOSPADM

## 2022-10-13 RX ORDER — ONDANSETRON 2 MG/ML
4 INJECTION INTRAMUSCULAR; INTRAVENOUS ONCE AS NEEDED
Status: DISCONTINUED | OUTPATIENT
Start: 2022-10-13 | End: 2022-10-13 | Stop reason: HOSPADM

## 2022-10-13 RX ORDER — MEPERIDINE HYDROCHLORIDE 25 MG/ML
12.5 INJECTION INTRAMUSCULAR; INTRAVENOUS; SUBCUTANEOUS
Status: DISCONTINUED | OUTPATIENT
Start: 2022-10-13 | End: 2022-10-13 | Stop reason: HOSPADM

## 2022-10-13 RX ORDER — LIDOCAINE HYDROCHLORIDE 10 MG/ML
INJECTION, SOLUTION EPIDURAL; INFILTRATION; INTRACAUDAL; PERINEURAL AS NEEDED
Status: DISCONTINUED | OUTPATIENT
Start: 2022-10-13 | End: 2022-10-13

## 2022-10-13 RX ORDER — CEFAZOLIN SODIUM 1 G/50ML
SOLUTION INTRAVENOUS AS NEEDED
Status: DISCONTINUED | OUTPATIENT
Start: 2022-10-13 | End: 2022-10-13

## 2022-10-13 RX ORDER — CEFAZOLIN SODIUM 2 G/50ML
2000 SOLUTION INTRAVENOUS ONCE
Status: COMPLETED | OUTPATIENT
Start: 2022-10-13 | End: 2022-10-13

## 2022-10-13 RX ORDER — FENTANYL CITRATE/PF 50 MCG/ML
25 SYRINGE (ML) INJECTION
Status: DISCONTINUED | OUTPATIENT
Start: 2022-10-13 | End: 2022-10-13 | Stop reason: HOSPADM

## 2022-10-13 RX ADMIN — LIDOCAINE HYDROCHLORIDE 50 MG: 10 INJECTION, SOLUTION EPIDURAL; INFILTRATION; INTRACAUDAL; PERINEURAL at 13:25

## 2022-10-13 RX ADMIN — DEXAMETHASONE SODIUM PHOSPHATE 4 MG: 10 INJECTION, SOLUTION INTRAMUSCULAR; INTRAVENOUS at 13:32

## 2022-10-13 RX ADMIN — FENTANYL CITRATE 50 MCG: 50 INJECTION, SOLUTION INTRAMUSCULAR; INTRAVENOUS at 13:31

## 2022-10-13 RX ADMIN — FENTANYL CITRATE 50 MCG: 50 INJECTION, SOLUTION INTRAMUSCULAR; INTRAVENOUS at 13:35

## 2022-10-13 RX ADMIN — MIDAZOLAM 2 MG: 1 INJECTION INTRAMUSCULAR; INTRAVENOUS at 13:24

## 2022-10-13 RX ADMIN — CEFAZOLIN SODIUM 2000 MG: 2 SOLUTION INTRAVENOUS at 13:30

## 2022-10-13 RX ADMIN — FENTANYL CITRATE 50 MCG: 50 INJECTION, SOLUTION INTRAMUSCULAR; INTRAVENOUS at 13:22

## 2022-10-13 RX ADMIN — PROPOFOL 100 MG: 10 INJECTION, EMULSION INTRAVENOUS at 13:27

## 2022-10-13 RX ADMIN — SODIUM CHLORIDE, SODIUM LACTATE, POTASSIUM CHLORIDE, AND CALCIUM CHLORIDE: .6; .31; .03; .02 INJECTION, SOLUTION INTRAVENOUS at 13:18

## 2022-10-13 RX ADMIN — PROPOFOL 200 MG: 10 INJECTION, EMULSION INTRAVENOUS at 13:25

## 2022-10-13 RX ADMIN — CEFAZOLIN SODIUM 1000 MG: 1 SOLUTION INTRAVENOUS at 13:33

## 2022-10-13 RX ADMIN — ONDANSETRON 4 MG: 2 INJECTION INTRAMUSCULAR; INTRAVENOUS at 13:32

## 2022-10-13 RX ADMIN — FENTANYL CITRATE 50 MCG: 50 INJECTION, SOLUTION INTRAMUSCULAR; INTRAVENOUS at 13:45

## 2022-10-13 NOTE — OP NOTE
OPERATIVE REPORT  PATIENT NAME: Crystal Moore    :  1965  MRN: [id number]  Pt Location: [facility] OR ROOM 04    SURGERY DATE: 10/13/2022    Surgeon(s) and Role:     * Flavia Sierra MD - Primary    Preop Diagnosis:  Calculus of kidney [N20 0]  Left 2 mm ureteral stone    Post-Op Diagnosis Codes:     * Calculus of kidney [N20 0]  Left 4 mm ureteral stone    Procedure(s) (LRB):  CYSTOSCOPY URETEROSCOPY WITH LITHOTRIPSY BASKET EXTRACTION, RETROGRADE PYELOGRAM AND  STENT EXCHANGE (Left)    Specimen(s):  ID Type Source Tests Collected by Time Destination   1 : left renal stone  Calculus Kidney, Left STONE ANALYSIS, TISSUE EXAM Flavia Sierra MD 10/13/2022 1349        Estimated Blood Loss:   Minimal    Drains:  Urethral Catheter Latex 20 Fr  (Active)   Reasons to continue Urinary Catheter  Post-operative urological requirements 22   Goal for Removal Will consult urology 22   Site Assessment Clean;Skin intact 22   Coombs Care Done 22 0900   Collection Container Standard drainage bag 22   Securement Method Other (Comment) 22   Output (mL) 1350 mL 22 0545   Number of days: 14       Urethral Catheter 18 Fr  (Active)   Number of days: 0       Ureteral Internal Stent Right ureter 6 Fr  (Active)   Number of days: 27       Ureteral Internal Stent 6 Fr  (Active)   Number of days: 14       Ureteral Internal Stent Left ureter 6 Fr   (Active)   Number of days: 0       Anesthesia Type:   IV Sedation with Anesthesia    Operative Indications:  Calculus of kidney [N20 0]  This 78-year-old male known to me with history of bilateral renal stones presents back to the OR to undergo cysto left ureteroscopy basket extraction possible stent removal or exchange for 3 mm stone which now is in the lower pole calyx aware risk of anesthesia infection bleeding additional urologic procedures    Operative Findings:  4 mm stone migrating to the lower pole calyx undergoing basket extraction significant edema of the proximal ureter stent replaced mild postop bleeding Coombs 18 [place]ayan placed    Complications:   None    Procedure and Technique:  After patient identified in the holding area left side marked consent signed placed op suite after anesthesia induced placed in thigh position draped prep standard fashion time-out performed 22 [place] cystoscope passed through through bladder anterior through malaise posterior the confirmed 2 5 cm bilobar prostatic urethra scope was inserted the bladder lumen the left stent was noted and already incrusted    0 038 wire was passed upward into the left renal pelvis a 2nd wire was also passed the 1st left a safety the stent was removed the flexible scope was passed over the 2nd wire with no obvious stone noted in the ureter the wire was removed in the renal pelvis on on pyeloscopy the 4 mm stone was found in the lower pole basket placed and removed with significant edema noted in the proximal ureter in light of that the cystoscope was replaced and a 26 cm 6 [place] stent was passed wire removed 18 [place] Coombs catheter inserted left to bag drainage in light of mild post op hematuria postop procedure awakened taken recovery room stable condition   I was present for the entire procedure    Patient Disposition:  PACU         SIGNATURE: Jenny Ceballos MD  DATE: October 13, 2022  TIME: 2:09 PM

## 2022-10-13 NOTE — ANESTHESIA POSTPROCEDURE EVALUATION
Post-Op Assessment Note    CV Status:  Stable  Pain Score: 0    Pain management: adequate     Mental Status:  Arousable and awake   Hydration Status:  Stable   PONV Controlled:  None   Airway Patency:  Patent      Post Op Vitals Reviewed: Yes      Staff: CRNA         No complications documented      BP   124/65   Temp      Pulse  70   Resp   14   SpO2   98

## 2022-10-13 NOTE — PROGRESS NOTES
Progress Note - Urology      Patient: Ashley Triana   : 1965 Sex: male   MRN: [id number]     CSN: [id number]  Unit/Bed#: OR POOL     SUBJECTIVE:   Patient sent preop holding area  History physical the same  Aware risk of anesthesia infection bleeding additional urologic procedures      Objective   Vitals: /74   Pulse 79   Temp 97 7 °F (36 5 °C) (Temporal)   Resp 18   Ht 6' 4" (1 93 m)   Wt (!) 181 kg (398 lb 9 6 oz)   SpO2 96%   BMI 48 52 kg/m²     No intake/output data recorded        Physical Exam:   General Alert awake   Normocephalic atraumatic PERRLA  Lungs clear bilaterally  Cardiac normal S1 normal S2  Abdomen soft, flank pain  Extremities no edema      Lab Results: CBC:   Lab Results   Component Value Date    WBC 8 48 2022    HGB 14 3 2022    HCT 44 0 2022    MCV 92 2022     2022    ADJUSTEDWBC 9 00 2016    MCH 30 0 2022    MCHC 32 5 2022    RDW 13 1 2022    MPV 9 4 2022    NRBC 0 2022     CMP:   Lab Results   Component Value Date     2022     04/15/2019    CO2 27 2022    CO2 23 04/15/2019    BUN 21 2022    BUN 23 04/15/2019    CREATININE 1 24 2022    CALCIUM 8 7 2022    AST 25 2022    AST 25 12/10/2018    ALT 45 2022    ALT 29 12/10/2018    ALKPHOS 70 2022    EGFR 63 2022     Urinalysis:   Lab Results   Component Value Date    COLORU Yellow 2022    CLARITYU Cloudy 2022    SPECGRAV 1 020 2022    PHUR 5 0 2022    PHUR 6 5 2018    LEUKOCYTESUR Negative 2022    NITRITE Negative 2022    GLUCOSEU Negative 2022    KETONESU Negative 2022    BILIRUBINUR Negative 2022    BLOODU Large (A) 2022     Urine Culture: No results found for: URINECX  PSA:   Lab Results   Component Value Date    PSA 2 4 2020    PSA 2 2 12/10/2018         Assessment/ Plan:  Cysto left flexible ureteroscopy basket extractions stent removal          Douglas Mims MD

## 2022-10-13 NOTE — DISCHARGE INSTRUCTIONS
#1 no heavy straining or lifting above 10 pounds for 2 weeks    #2 call office fevers, chills, or worsening blood in the urine  #3 Patient has follow-up tomorrow October 14th 10:45 a m  Corin GREENE  [address] [facility] office  [address] [place] [address] [facility]  [phone]  8:30 AM to 4:30 PM  Monday through Friday    [place] office  [phone]  [address]  [phone]  1:00 to 5:00 PM  Wednesday

## 2022-10-13 NOTE — PERIOPERATIVE NURSING NOTE
Iv access removed  avs reviewed with patient  Coombs cath patent  Written prescription given to patient

## 2022-10-13 NOTE — ANESTHESIA PREPROCEDURE EVALUATION
Procedure:  CYSTOSCOPY URETEROSCOPY WITH LITHOTRIPSY HOLMIUM LASER, BASKET EXTRACTION, RETROGRADE PYELOGRAM AND  STENT EXCHANGE (Left Bladder)    Relevant Problems   CARDIO   (+) Hypertension      GI/HEPATIC   (+) GERD (gastroesophageal reflux disease)      /RENAL   (+) Acute kidney injury (HCC)   (+) Chronic kidney disease   (+) Ureteral stone with hydronephrosis      MUSCULOSKELETAL   (+) Chronic midline low back pain without sciatica   (+) Osteoarthritis   (+) Primary osteoarthritis of right knee   (+) Sciatica      NEURO/PSYCH   (+) Anxiety   (+) Chronic midline low back pain without sciatica   (+) Chronic pain syndrome      PULMONARY   (+) DOMINIC (obstructive sleep apnea)        Physical Exam    Airway    Mallampati score: II  TM Distance: >3 FB  Neck ROM: full     Dental   No notable dental hx     Cardiovascular  Cardiovascular exam normal    Pulmonary  Pulmonary exam normal     Other Findings        Anesthesia Plan  ASA Score- 3     Anesthesia Type- IV sedation with anesthesia with ASA Monitors  Additional Monitors:   Airway Plan:           Plan Factors-Exercise tolerance (METS): >4 METS  Chart reviewed  Imaging results reviewed  Existing labs reviewed  Patient summary reviewed  Patient is not a current smoker  Obstructive sleep apnea risk education given perioperatively  Induction- intravenous  Postoperative Plan- Plan for postoperative opioid use  Informed Consent- Anesthetic plan and risks discussed with patient  I personally reviewed this patient with the CRNA  Discussed and agreed on the Anesthesia Plan with the CRNA  Olga Irizarry

## 2022-10-17 PROCEDURE — 88300 SURGICAL PATH GROSS: CPT | Performed by: STUDENT IN AN ORGANIZED HEALTH CARE EDUCATION/TRAINING PROGRAM

## 2022-10-20 LAB
COLOR STONE: NORMAL
COMMENT-STONE3: NORMAL
COMPOSITION: NORMAL
LABORATORY COMMENT REPORT: NORMAL
PHOTO: NORMAL
SIZE STONE: <1 MM
SPEC SOURCE SUBJ: NORMAL
STONE ANALYSIS-IMP: NORMAL
STONE ANALYSIS-IMP: NORMAL
URATE MFR STONE: 100 %
WT STONE: <1 MG

## 2022-10-25 ENCOUNTER — APPOINTMENT (OUTPATIENT)
Dept: LAB | Facility: HOSPITAL | Age: 57
End: 2022-10-25
Payer: COMMERCIAL

## 2022-10-25 DIAGNOSIS — E04.1 THYROID NODULE: ICD-10-CM

## 2022-10-25 DIAGNOSIS — N20.0 RENAL CALCULUS: ICD-10-CM

## 2022-10-25 LAB
ANION GAP SERPL CALCULATED.3IONS-SCNC: 7 MMOL/L (ref 4–13)
BUN SERPL-MCNC: 26 MG/DL (ref 5–25)
CALCIUM 24H UR-MCNC: <150 MG/24 HRS (ref 42–353)
CALCIUM SERPL-MCNC: 9.2 MG/DL (ref 8.3–10.1)
CHLORIDE SERPL-SCNC: 104 MMOL/L (ref 96–108)
CO2 SERPL-SCNC: 28 MMOL/L (ref 21–32)
CREAT SERPL-MCNC: 1.22 MG/DL (ref 0.6–1.3)
GFR SERPL CREATININE-BSD FRML MDRD: 64 ML/MIN/1.73SQ M
GLUCOSE P FAST SERPL-MCNC: 109 MG/DL (ref 65–99)
PERIOD: 24 HOURS
PERIOD: 24 HOURS
POTASSIUM SERPL-SCNC: 3.8 MMOL/L (ref 3.5–5.3)
PTH-INTACT SERPL-MCNC: 59.5 PG/ML (ref 18.4–80.1)
SODIUM 24H UR-SRATE: 414 MMOL/24 HRS (ref 40–220)
SODIUM SERPL-SCNC: 139 MMOL/L (ref 135–147)
SPECIMEN VOL UR: 3000 ML
TSH SERPL DL<=0.05 MIU/L-ACNC: 2.92 UIU/ML (ref 0.45–4.5)
URATE 24H UR-MCNC: 438 MG/24 HRS (ref 150–990)

## 2022-10-25 PROCEDURE — 82340 ASSAY OF CALCIUM IN URINE: CPT

## 2022-10-25 PROCEDURE — 36415 COLL VENOUS BLD VENIPUNCTURE: CPT

## 2022-10-25 PROCEDURE — 83970 ASSAY OF PARATHORMONE: CPT

## 2022-10-25 PROCEDURE — 84300 ASSAY OF URINE SODIUM: CPT

## 2022-10-25 PROCEDURE — 84560 ASSAY OF URINE/URIC ACID: CPT

## 2022-10-25 PROCEDURE — 84443 ASSAY THYROID STIM HORMONE: CPT

## 2022-10-25 PROCEDURE — 82507 ASSAY OF CITRATE: CPT

## 2022-10-25 PROCEDURE — 83945 ASSAY OF OXALATE: CPT

## 2022-10-25 PROCEDURE — 80048 BASIC METABOLIC PNL TOTAL CA: CPT

## 2022-10-27 LAB
CITRATE 24H UR-MCNC: 168 MG/L
CITRATE 24H UR-MRATE: 504 MG/24 HR (ref 320–1240)
OXALATE 24H UR-MRATE: 27 MG/24 HR (ref 7–44)
OXALATE UR-MCNC: 9 MG/L

## 2022-11-03 ENCOUNTER — HOSPITAL ENCOUNTER (OUTPATIENT)
Dept: RADIOLOGY | Facility: HOSPITAL | Age: 57
Discharge: HOME | End: 2022-11-03

## 2022-11-03 DIAGNOSIS — E04.1 THYROID NODULE: ICD-10-CM

## 2023-01-19 ENCOUNTER — APPOINTMENT (OUTPATIENT)
Dept: LAB | Facility: HOSPITAL | Age: 58
End: 2023-01-19

## 2023-01-19 DIAGNOSIS — C73 PAPILLARY THYROID CARCINOMA (HCC): ICD-10-CM

## 2023-01-19 DIAGNOSIS — Z01.818 PRE-OPERATIVE EXAMINATION: ICD-10-CM

## 2023-01-19 LAB
ANION GAP SERPL CALCULATED.3IONS-SCNC: 8 MMOL/L (ref 4–13)
BASOPHILS # BLD AUTO: 0.08 THOUSANDS/ÂΜL (ref 0–0.1)
BASOPHILS NFR BLD AUTO: 1 % (ref 0–1)
BUN SERPL-MCNC: 27 MG/DL (ref 5–25)
CALCIUM SERPL-MCNC: 9.2 MG/DL (ref 8.3–10.1)
CHLORIDE SERPL-SCNC: 100 MMOL/L (ref 96–108)
CO2 SERPL-SCNC: 28 MMOL/L (ref 21–32)
CREAT SERPL-MCNC: 1.26 MG/DL (ref 0.6–1.3)
EOSINOPHIL # BLD AUTO: 0.46 THOUSAND/ÂΜL (ref 0–0.61)
EOSINOPHIL NFR BLD AUTO: 5 % (ref 0–6)
ERYTHROCYTE [DISTWIDTH] IN BLOOD BY AUTOMATED COUNT: 12.7 % (ref 11.6–15.1)
GFR SERPL CREATININE-BSD FRML MDRD: 62 ML/MIN/1.73SQ M
GLUCOSE P FAST SERPL-MCNC: 112 MG/DL (ref 65–99)
HCT VFR BLD AUTO: 44.4 % (ref 36.5–49.3)
HGB BLD-MCNC: 14.8 G/DL (ref 12–17)
IMM GRANULOCYTES # BLD AUTO: 0.02 THOUSAND/UL (ref 0–0.2)
IMM GRANULOCYTES NFR BLD AUTO: 0 % (ref 0–2)
LYMPHOCYTES # BLD AUTO: 1.97 THOUSANDS/ÂΜL (ref 0.6–4.47)
LYMPHOCYTES NFR BLD AUTO: 22 % (ref 14–44)
MCH RBC QN AUTO: 30.5 PG (ref 26.8–34.3)
MCHC RBC AUTO-ENTMCNC: 33.3 G/DL (ref 31.4–37.4)
MCV RBC AUTO: 91 FL (ref 82–98)
MONOCYTES # BLD AUTO: 0.64 THOUSAND/ÂΜL (ref 0.17–1.22)
MONOCYTES NFR BLD AUTO: 7 % (ref 4–12)
NEUTROPHILS # BLD AUTO: 5.63 THOUSANDS/ÂΜL (ref 1.85–7.62)
NEUTS SEG NFR BLD AUTO: 65 % (ref 43–75)
NRBC BLD AUTO-RTO: 0 /100 WBCS
PLATELET # BLD AUTO: 260 THOUSANDS/UL (ref 149–390)
PMV BLD AUTO: 9.8 FL (ref 8.9–12.7)
POTASSIUM SERPL-SCNC: 4.1 MMOL/L (ref 3.5–5.3)
RBC # BLD AUTO: 4.86 MILLION/UL (ref 3.88–5.62)
SODIUM SERPL-SCNC: 136 MMOL/L (ref 135–147)
WBC # BLD AUTO: 8.8 THOUSAND/UL (ref 4.31–10.16)

## 2023-01-22 LAB
ATRIAL RATE: 69 BPM
P AXIS: 37 DEGREES
PR INTERVAL: 178 MS
QRS AXIS: 13 DEGREES
QRSD INTERVAL: 108 MS
QT INTERVAL: 412 MS
QTC INTERVAL: 441 MS
T WAVE AXIS: -2 DEGREES
VENTRICULAR RATE: 69 BPM

## 2023-01-31 NOTE — PRE-PROCEDURE INSTRUCTIONS
Pre-Surgery Instructions:   Medication Instructions   • amLODIPine (NORVASC) 5 mg tablet Take night before surgery   • lisinopril-hydrochlorothiazide (PRINZIDE,ZESTORETIC) 20-25 MG per tablet Take night before surgery   • omeprazole (PriLOSEC) 20 mg delayed release capsule Take night before surgery   Verbal pre-op instructions given to pt  via phone  Pt verbalizes understanding

## 2023-02-01 DIAGNOSIS — I10 ESSENTIAL HYPERTENSION: ICD-10-CM

## 2023-02-01 RX ORDER — AMLODIPINE BESYLATE 5 MG/1
TABLET ORAL
Qty: 90 TABLET | Refills: 3 | OUTPATIENT
Start: 2023-02-01

## 2023-02-01 RX ORDER — LISINOPRIL AND HYDROCHLOROTHIAZIDE 25; 20 MG/1; MG/1
TABLET ORAL
Qty: 90 TABLET | Refills: 0 | Status: SHIPPED | OUTPATIENT
Start: 2023-02-01

## 2023-02-02 ENCOUNTER — OFFICE VISIT (OUTPATIENT)
Dept: FAMILY MEDICINE CLINIC | Facility: CLINIC | Age: 58
End: 2023-02-02

## 2023-02-02 VITALS
DIASTOLIC BLOOD PRESSURE: 88 MMHG | WEIGHT: 315 LBS | SYSTOLIC BLOOD PRESSURE: 138 MMHG | HEIGHT: 76 IN | HEART RATE: 79 BPM | TEMPERATURE: 98.1 F | OXYGEN SATURATION: 95 % | RESPIRATION RATE: 14 BRPM | BODY MASS INDEX: 38.36 KG/M2

## 2023-02-02 DIAGNOSIS — E66.01 CLASS 3 SEVERE OBESITY DUE TO EXCESS CALORIES WITH SERIOUS COMORBIDITY AND BODY MASS INDEX (BMI) OF 45.0 TO 49.9 IN ADULT (HCC): ICD-10-CM

## 2023-02-02 DIAGNOSIS — K21.9 GASTROESOPHAGEAL REFLUX DISEASE WITHOUT ESOPHAGITIS: ICD-10-CM

## 2023-02-02 DIAGNOSIS — I10 ESSENTIAL HYPERTENSION: ICD-10-CM

## 2023-02-02 DIAGNOSIS — I10 PRIMARY HYPERTENSION: Primary | ICD-10-CM

## 2023-02-02 DIAGNOSIS — K20.0 EOSINOPHILIC ESOPHAGITIS: ICD-10-CM

## 2023-02-02 DIAGNOSIS — C73 MALIGNANT NEOPLASM OF THYROID GLAND (HCC): ICD-10-CM

## 2023-02-02 PROBLEM — R65.10 SIRS (SYSTEMIC INFLAMMATORY RESPONSE SYNDROME) (HCC): Status: RESOLVED | Noted: 2022-09-16 | Resolved: 2023-02-02

## 2023-02-02 PROBLEM — N23 RENAL COLIC ON LEFT SIDE: Status: RESOLVED | Noted: 2022-09-28 | Resolved: 2023-02-02

## 2023-02-02 PROBLEM — S32.009A CLOSED FRACTURE OF TRANSVERSE PROCESS OF LUMBAR VERTEBRA (HCC): Status: RESOLVED | Noted: 2018-06-26 | Resolved: 2023-02-02

## 2023-02-02 PROBLEM — N17.9 ACUTE KIDNEY INJURY (HCC): Status: RESOLVED | Noted: 2022-09-15 | Resolved: 2023-02-02

## 2023-02-02 RX ORDER — AMLODIPINE BESYLATE 5 MG/1
5 TABLET ORAL DAILY
Qty: 90 TABLET | Refills: 3 | Status: SHIPPED | OUTPATIENT
Start: 2023-02-02

## 2023-02-02 RX ORDER — OMEPRAZOLE 20 MG/1
20 CAPSULE, DELAYED RELEASE ORAL DAILY
Qty: 90 CAPSULE | Refills: 3 | Status: SHIPPED | OUTPATIENT
Start: 2023-02-02

## 2023-02-02 NOTE — PROGRESS NOTES
Name: Tricia Hager      : 1965      MRN: [id number]  Encounter Provider: Diana Garcia MD  Encounter Date: 2023   Encounter department: [facility]     1  Primary hypertension  Assessment & Plan:  STABLE  DENIES ANY CP, SOB, PALPITATIONS, OR HEADACHE  NOTES NO WATER RETENTION  COMPLIANT WITH MEDICATION  NO CONCERNS    - CONTINUE CURRENT TREATMENT PLAN  - MONITOR DIETARY SODIUM INTAKE  - ENCOURAGE PHYSICAL ACTIVITY  - RV 3 MONTHS        2  Gastroesophageal reflux disease without esophagitis  Assessment & Plan:  STABLE  DENIES ANY CP, INDIGESTION, OR COUGH  NOTES NO MELENA OR HEMATOCHEZIA  NO HEMATEMESIS  COMPLIANT WITH MEDICATION  NO CONCERNS    - CONTINUE CURRENT TREATMENT PLAN  - MEDICATION AS PRESCRIBED  - AVOID CAFFEINE, ALCOHOL OR SMOKING        3  Class 3 severe obesity due to excess calories with serious comorbidity and body mass index (BMI) of 45 0 to 49 9 in adult [facility]    4  Malignant neoplasm of thyroid gland ([facility] )    5  Essential hypertension  -     amLODIPine (NORVASC) 5 mg tablet; Take 1 tablet (5 mg total) by mouth daily    6  Eosinophilic esophagitis  -     omeprazole (PriLOSEC) 20 mg delayed release capsule; Take 1 capsule (20 mg total) by mouth daily      BMI Counseling: Body mass index is 47 11 kg/m²  The BMI is above normal  Nutrition recommendations include encouraging healthy choices of fruits and vegetables and moderation in carbohydrate intake  Exercise recommendations include exercising 3-5 times per week  No pharmacotherapy was ordered  Rationale for BMI follow-up plan is due to patient being overweight or obese  Subjective      PATIENT RETURNS FOR ROUTINE EVALUATION OF PATIENT'S MEDICAL ISSUES    INDIVIDUAL MEDICAL ISSUES WITH THEIR CURRENT STATUS, ASSESSMENT AND PLANS ARE LISTED ABOVE        Review of Systems   Constitutional: Negative for chills, fatigue and fever     HENT: Negative for congestion, ear discharge, ear pain, mouth sores, postnasal drip, sore throat and trouble swallowing  Eyes: Negative for pain, discharge and visual disturbance  Respiratory: Negative for cough, shortness of breath and wheezing  Cardiovascular: Negative for chest pain, palpitations and leg swelling  Gastrointestinal: Negative for abdominal distention, abdominal pain, blood in stool, diarrhea and nausea  Endocrine: Negative for polydipsia, polyphagia and polyuria  Genitourinary: Negative for dysuria, frequency, hematuria and urgency  Musculoskeletal: Positive for arthralgias, back pain and neck pain  Negative for gait problem and joint swelling  Skin: Negative for pallor and rash  Neurological: Negative for dizziness, syncope, speech difficulty, weakness, light-headedness, numbness and headaches  Hematological: Negative for adenopathy  Psychiatric/Behavioral: Negative for behavioral problems, confusion and sleep disturbance  The patient is nervous/anxious  Current Outpatient Medications on File Prior to Visit   Medication Sig   • lisinopril-hydrochlorothiazide (PRINZIDE,ZESTORETIC) 20-25 MG per tablet TAKE 1 TABLET EVERY DAY   • [DISCONTINUED] amLODIPine (NORVASC) 5 mg tablet Take 1 tablet (5 mg total) by mouth daily (Patient taking differently: Take 5 mg by mouth every evening)   • [DISCONTINUED] omeprazole (PriLOSEC) 20 mg delayed release capsule Take 1 capsule (20 mg total) by mouth daily (Patient taking differently: Take 20 mg by mouth every evening)       Objective     /88   Pulse 79   Temp 98 1 °F (36 7 °C) (Temporal)   Resp 14   Ht 6' 4" (1 93 m)   Wt (!) 176 kg (387 lb)   SpO2 95%   BMI 47 11 kg/m²     Physical Exam  Constitutional:       General: He is not in acute distress  Appearance: Normal appearance  He is well-developed  He is obese  He is not ill-appearing  HENT:      Head: Normocephalic and atraumatic  Eyes:      General:         Right eye: No discharge           Left eye: No discharge  Conjunctiva/sclera: Conjunctivae normal       Pupils: Pupils are equal, round, and reactive to light  Neck:      Thyroid: No thyromegaly  Vascular: No JVD  Cardiovascular:      Rate and Rhythm: Normal rate and regular rhythm  Heart sounds: Normal heart sounds  No murmur heard  Pulmonary:      Effort: Pulmonary effort is normal       Breath sounds: Normal breath sounds  No wheezing or rales  Abdominal:      General: Bowel sounds are normal       Palpations: Abdomen is soft  There is no mass  Tenderness: There is no abdominal tenderness  There is no guarding or rebound  Comments: OBESE   Musculoskeletal:         General: Tenderness present  No deformity  Normal range of motion  Cervical back: Neck supple  Comments: MODERATE DJD CHANGES   Lymphadenopathy:      Cervical: No cervical adenopathy  Skin:     General: Skin is warm and dry  Findings: No erythema or rash  Neurological:      General: No focal deficit present  Mental Status: He is alert and oriented to person, place, and time  Psychiatric:         Mood and Affect: Mood normal          Behavior: Behavior normal          Thought Content:  Thought content normal          Judgment: Judgment normal        Rubio Camarillo MD

## 2023-02-07 ENCOUNTER — ANESTHESIA EVENT (OUTPATIENT)
Dept: PERIOP | Facility: HOSPITAL | Age: 58
End: 2023-02-07

## 2023-02-08 ENCOUNTER — ANESTHESIA (OUTPATIENT)
Dept: PERIOP | Facility: HOSPITAL | Age: 58
End: 2023-02-08

## 2023-02-08 ENCOUNTER — HOSPITAL ENCOUNTER (OUTPATIENT)
Facility: HOSPITAL | Age: 58
Setting detail: OUTPATIENT SURGERY
Discharge: HOME | End: 2023-02-08
Attending: OTOLARYNGOLOGY | Admitting: OTOLARYNGOLOGY

## 2023-02-08 VITALS
HEIGHT: 76 IN | BODY MASS INDEX: 38.36 KG/M2 | WEIGHT: 315 LBS | HEART RATE: 68 BPM | TEMPERATURE: 97.6 F | SYSTOLIC BLOOD PRESSURE: 120 MMHG | OXYGEN SATURATION: 94 % | RESPIRATION RATE: 19 BRPM | DIASTOLIC BLOOD PRESSURE: 62 MMHG

## 2023-02-08 DIAGNOSIS — E04.1 THYROID NODULE: ICD-10-CM

## 2023-02-08 DIAGNOSIS — C73 PAPILLARY THYROID CARCINOMA (HCC): Primary | ICD-10-CM

## 2023-02-08 LAB
FLUAV RNA RESP QL NAA+PROBE: NEGATIVE
FLUBV RNA RESP QL NAA+PROBE: NEGATIVE
PTH-INTACT SERPL-MCNC: 142.9 PG/ML (ref 18.4–80.1)
RSV RNA RESP QL NAA+PROBE: NEGATIVE
SARS-COV-2 RNA RESP QL NAA+PROBE: NEGATIVE

## 2023-02-08 RX ORDER — DEXAMETHASONE SODIUM PHOSPHATE 10 MG/ML
INJECTION, SOLUTION INTRAMUSCULAR; INTRAVENOUS AS NEEDED
Status: DISCONTINUED | OUTPATIENT
Start: 2023-02-08 | End: 2023-02-08

## 2023-02-08 RX ORDER — SUCCINYLCHOLINE/SOD CL,ISO/PF 100 MG/5ML
SYRINGE (ML) INTRAVENOUS AS NEEDED
Status: DISCONTINUED | OUTPATIENT
Start: 2023-02-08 | End: 2023-02-08

## 2023-02-08 RX ORDER — OXYCODONE HYDROCHLORIDE 5 MG/1
5 TABLET ORAL EVERY 4 HOURS PRN
Status: DISCONTINUED | OUTPATIENT
Start: 2023-02-08 | End: 2023-02-08 | Stop reason: HOSPADM

## 2023-02-08 RX ORDER — PHENOL 1.4 %
600 AEROSOL, SPRAY (ML) MUCOUS MEMBRANE
Qty: 42 TABLET | Refills: 0 | Status: SHIPPED | OUTPATIENT
Start: 2023-02-08 | End: 2023-02-22

## 2023-02-08 RX ORDER — LIDOCAINE HYDROCHLORIDE AND EPINEPHRINE 10; 10 MG/ML; UG/ML
INJECTION, SOLUTION INFILTRATION; PERINEURAL AS NEEDED
Status: DISCONTINUED | OUTPATIENT
Start: 2023-02-08 | End: 2023-02-08 | Stop reason: HOSPADM

## 2023-02-08 RX ORDER — FENTANYL CITRATE/PF 50 MCG/ML
50 SYRINGE (ML) INJECTION
Status: DISCONTINUED | OUTPATIENT
Start: 2023-02-08 | End: 2023-02-08 | Stop reason: HOSPADM

## 2023-02-08 RX ORDER — ACETAMINOPHEN 325 MG/1
650 TABLET ORAL EVERY 6 HOURS PRN
Status: DISCONTINUED | OUTPATIENT
Start: 2023-02-08 | End: 2023-02-08 | Stop reason: HOSPADM

## 2023-02-08 RX ORDER — ONDANSETRON 2 MG/ML
INJECTION INTRAMUSCULAR; INTRAVENOUS AS NEEDED
Status: DISCONTINUED | OUTPATIENT
Start: 2023-02-08 | End: 2023-02-08

## 2023-02-08 RX ORDER — CEFAZOLIN SODIUM 1 G/3ML
INJECTION, POWDER, FOR SOLUTION INTRAMUSCULAR; INTRAVENOUS AS NEEDED
Status: DISCONTINUED | OUTPATIENT
Start: 2023-02-08 | End: 2023-02-08

## 2023-02-08 RX ORDER — PROPOFOL 10 MG/ML
INJECTION, EMULSION INTRAVENOUS AS NEEDED
Status: DISCONTINUED | OUTPATIENT
Start: 2023-02-08 | End: 2023-02-08

## 2023-02-08 RX ORDER — SODIUM CHLORIDE 9 MG/ML
125 INJECTION, SOLUTION INTRAVENOUS CONTINUOUS
Status: DISCONTINUED | OUTPATIENT
Start: 2023-02-08 | End: 2023-02-08 | Stop reason: HOSPADM

## 2023-02-08 RX ORDER — ONDANSETRON 2 MG/ML
4 INJECTION INTRAMUSCULAR; INTRAVENOUS ONCE AS NEEDED
Status: DISCONTINUED | OUTPATIENT
Start: 2023-02-08 | End: 2023-02-08 | Stop reason: HOSPADM

## 2023-02-08 RX ORDER — MAGNESIUM HYDROXIDE 1200 MG/15ML
LIQUID ORAL AS NEEDED
Status: DISCONTINUED | OUTPATIENT
Start: 2023-02-08 | End: 2023-02-08 | Stop reason: HOSPADM

## 2023-02-08 RX ORDER — LIDOCAINE HYDROCHLORIDE 20 MG/ML
INJECTION, SOLUTION EPIDURAL; INFILTRATION; INTRACAUDAL; PERINEURAL AS NEEDED
Status: DISCONTINUED | OUTPATIENT
Start: 2023-02-08 | End: 2023-02-08

## 2023-02-08 RX ORDER — FENTANYL CITRATE 50 UG/ML
INJECTION, SOLUTION INTRAMUSCULAR; INTRAVENOUS AS NEEDED
Status: DISCONTINUED | OUTPATIENT
Start: 2023-02-08 | End: 2023-02-08

## 2023-02-08 RX ORDER — LEVOTHYROXINE SODIUM 300 UG/1
300 TABLET ORAL DAILY
Qty: 30 TABLET | Refills: 2 | Status: SHIPPED | OUTPATIENT
Start: 2023-02-08 | End: 2023-03-10

## 2023-02-08 RX ORDER — HYDROMORPHONE HCL/PF 1 MG/ML
0.5 SYRINGE (ML) INJECTION
Status: DISCONTINUED | OUTPATIENT
Start: 2023-02-08 | End: 2023-02-08 | Stop reason: HOSPADM

## 2023-02-08 RX ADMIN — PROPOFOL 400 MG: 10 INJECTION, EMULSION INTRAVENOUS at 12:43

## 2023-02-08 RX ADMIN — LIDOCAINE HYDROCHLORIDE 100 MG: 20 INJECTION, SOLUTION EPIDURAL; INFILTRATION; INTRACAUDAL; PERINEURAL at 13:03

## 2023-02-08 RX ADMIN — ACETAMINOPHEN 650 MG: 325 TABLET ORAL at 16:12

## 2023-02-08 RX ADMIN — FENTANYL CITRATE 100 MCG: 50 INJECTION INTRAMUSCULAR; INTRAVENOUS at 12:43

## 2023-02-08 RX ADMIN — SODIUM CHLORIDE 125 ML/HR: 0.9 INJECTION, SOLUTION INTRAVENOUS at 14:58

## 2023-02-08 RX ADMIN — FENTANYL CITRATE 50 MCG: 50 INJECTION, SOLUTION INTRAMUSCULAR; INTRAVENOUS at 14:33

## 2023-02-08 RX ADMIN — LIDOCAINE HYDROCHLORIDE 100 MG: 20 INJECTION, SOLUTION EPIDURAL; INFILTRATION; INTRACAUDAL; PERINEURAL at 12:43

## 2023-02-08 RX ADMIN — ONDANSETRON 8 MG: 2 INJECTION INTRAMUSCULAR; INTRAVENOUS at 12:43

## 2023-02-08 RX ADMIN — FENTANYL CITRATE 50 MCG: 50 INJECTION, SOLUTION INTRAMUSCULAR; INTRAVENOUS at 14:50

## 2023-02-08 RX ADMIN — FENTANYL CITRATE 50 MCG: 50 INJECTION, SOLUTION INTRAMUSCULAR; INTRAVENOUS at 14:41

## 2023-02-08 RX ADMIN — DEXAMETHASONE SODIUM PHOSPHATE 10 MG: 10 INJECTION INTRAMUSCULAR; INTRAVENOUS at 12:43

## 2023-02-08 RX ADMIN — HYDROMORPHONE HYDROCHLORIDE 0.5 MG: 1 INJECTION, SOLUTION INTRAMUSCULAR; INTRAVENOUS; SUBCUTANEOUS at 15:00

## 2023-02-08 RX ADMIN — FENTANYL CITRATE 50 MCG: 50 INJECTION INTRAMUSCULAR; INTRAVENOUS at 13:11

## 2023-02-08 RX ADMIN — HYDROMORPHONE HYDROCHLORIDE 0.5 MG: 1 INJECTION, SOLUTION INTRAMUSCULAR; INTRAVENOUS; SUBCUTANEOUS at 15:15

## 2023-02-08 RX ADMIN — Medication 100 MG: at 12:43

## 2023-02-08 RX ADMIN — CEFAZOLIN 3000 MG: 1 INJECTION, POWDER, FOR SOLUTION INTRAMUSCULAR; INTRAVENOUS at 12:53

## 2023-02-08 RX ADMIN — SODIUM CHLORIDE: 0.9 INJECTION, SOLUTION INTRAVENOUS at 13:12

## 2023-02-08 RX ADMIN — SODIUM CHLORIDE 125 ML/HR: 0.9 INJECTION, SOLUTION INTRAVENOUS at 09:37

## 2023-02-08 NOTE — DISCHARGE INSTR - AVS FIRST PAGE
CHIP Aceves  Post-Operative Instructions  Office ([phone]       - Keep wound clean and dry  May apply dressing as needed but generally leave open to air  - Okay to shower but do not submerge in water for 2 weeks, pat incision dry (don't rub)  - Avoid lifting anything greater than 10 lbs (a gallon of milk) for 3 weeks  - Use your prescribed pain medications as directed  Do not drive or operate heavy machinery while using narcotics  - Activity and diet as tolerated  - May use tylenol or Advil for pain  - Follow up with Dr Miller Juan in 1 weeks  - Watch for signs of fever, chills, warmth, redness, or drainage  A slight amount is normal for a day or two following surgery  Seek medical attention for: fever >101 5 F, increasing warmth, reddness, swelling, bleeding, or anything that may concern you  - Take calcium three times a day till told to stop  - Start taking thyroid medication every morning, Rx sent          WHAT YOU NEED TO KNOW:   Thyroid lobectomy is surgery to remove the part of your thyroid gland that contains a nodule or nodules  Your thyroid is a gland in the front lower part of your neck  The thyroid makes hormones that regulate your metabolism, body temperature, and heart rate  Smaller glands called parathyroids regulate the level of calcium in your blood  You have 4 parathyroids, located on the sides of your thyroid gland  Your parathyroids will not be removed during this surgery  DISCHARGE INSTRUCTIONS:   Medicines: The following medicines have been ordered by your healthcare provider:    Pain medicine  can help take away or decrease pain  Do not wait until the pain is severe before you take your medicine  Take your medicine as directed  Contact your healthcare provider if you think your medicine is not helping or if you have side effects  Tell him or her if you are allergic to any medicine  Keep a list of the medicines, vitamins, and herbs you take   Include the amounts, and when and why you take them  Bring the list or the pill bottles to follow-up visits  Carry your medicine list with you in case of an emergency  Follow up with your endocrinologist or surgeon as directed: You will need to return to have your wound checked and stitches removed  You may also need blood tests to monitor your calcium, parathyroid, and thyroid hormone levels  Write down your questions so you remember to ask them during your visits  Self-care:   Rest when you need to while you heal after surgery  Slowly start to do more each day  Return to your daily activities as directed  Wound care:  Carefully wash your skin near the incision wound area with soap and water  Dry the area and put on new, clean bandages as directed  Change your bandages when they get wet or dirty  You can use a mild body lotion to improve the scar  Swallowing and voice changes: You may have a sore throat, hoarse voice, or difficulty swallowing after surgery  These symptoms should go away after a few days  Contact your surgeon if:   You have a fever or chills  You feel very tired and cold, gain weight for no reason, and your skin is very dry  You vomit several times in a row  Your incision is red, swollen, or draining pus  You have new voice weakness or hoarseness, or it is getting worse  You have questions or concerns about your condition or care  Seek care immediately or call 911 if:   You have sudden tingling or muscle cramps in your face, arm, or leg  You have muscle spasms in your legs and feet that do not go away  Blood soaks through your bandage  Your stitches come apart  You have sudden swelling in your neck or difficulty swallowing  You have trouble breathing  You have a seizure  How to contact us:    Phone: If you have questions or concerns, please call us at [phone] during business hours (8 am to 5 pm)    On nights and weekends, you may page the ENT surgeon on call  at [facility]   In case of emergency, please call [phone]

## 2023-02-08 NOTE — ANESTHESIA POSTPROCEDURE EVALUATION
Post-Op Assessment Note    CV Status:  Stable    Pain management: adequate     Mental Status:  Alert and awake   Hydration Status:  Euvolemic   PONV Controlled:  Controlled   Airway Patency:  Patent      Post Op Vitals Reviewed: Yes      Staff: Anesthesiologist         No notable events documented      BP      Temp      Pulse     Resp      SpO2      /71   Pulse 71   Temp 97 9 °F (36 6 °C) (Temporal)   Resp 16   Ht 6' 4" (1 93 m)   Wt (!) 176 kg (386 lb 14 5 oz)   SpO2 96%   BMI 47 10 kg/m²

## 2023-02-08 NOTE — OP NOTE
OPERATIVE REPORT  PATIENT NAME: Misti Treviño    :  1965  MRN: [id number]  Pt Location: [facility] OR ROOM 02    SURGERY DATE: 2023    Surgeon(s) and Role:     * Otis Wilson MD - Primary     * Brown Huber MD - Co-surgeon    Preop Diagnosis:  Papillary thyroid carcinoma ([address] ) [C73]  Thyroid nodule [E04 1]    Post-Op Diagnosis Codes:     * Papillary thyroid carcinoma ([address] ) [C73]     * Thyroid nodule [E04 1]    Procedure(s):  Right - COMPLETION THYROIDECTOMY    Specimen(s):  ID Type Source Tests Collected by Time Destination   1 : RIGHT HEMITHYROID Tissue Thyroid, Right TISSUE EXAM Otis Wilson MD 2023  1:26 PM        Estimated Blood Loss:   Minimal    Drains:  Urethral Catheter Latex 20 Fr  (Active)   Number of days: 132       Urethral Catheter 18 Fr  (Active)   Number of days: 118       Ureteral Internal Stent Right ureter 6 Fr  (Active)   Number of days: 145       Ureteral Internal Stent 6 Fr  (Active)   Number of days: 132       Ureteral Internal Stent Left ureter 6 Fr  (Active)   Number of days: 118       Anesthesia Type:   General    Operative Indications:  Papillary thyroid carcinoma ([address] ) [C73]  Thyroid nodule [E04 1]      Operative Findings:  Uncomplicated Right completion thyroidectomy, both tylor left in situ  RLN identified and stimulated at the end of case    Complications:   None    Procedure and Technique:  Patient brought to the operating room and placed onto the operating table in supine position  Timeout performed  Patient placed under general endotracheal anesthesia with NIM tube  A shoulder roll was placed  The NIM system was set up, calibrated, and tested  The planned midline horizontal neck incision was marked in a RSTL  Lidocaine with epinephrine was injected into the planned incision  Patient was prepped and draped in usual sterile fashion  15 blade was used to incise the skin down through the subcutaneous tissue    A bovie was used to deepen the incision through the platysma muscle  A superior subplatysmal flap was elevated to the thyroid notch  An inferior subplatysmal flap was elevated to the level of the sternal notch and clavicles  Laterally, the dissection was carried to the anterior edge of the SCM bilaterally  A self-retaining retractor was inserted  Using Debakey forceps and bovie, the midline tissues overlying the strap muscles were divided vertically  The sternohyoid muscle was identified as well as the midline raphe  This raphe was then divided in the midline vertically  The thyroid gland was now visualized  Attention first turned to the right lobe  A Esteves retractor was inserted under the sternohyoid and sternothyroid muscles  The muscles were retracted laterally, and blunt dissection was used to separate the thyroid lobe from the lateral and underlying tissues so that the gland could be rotated medially  The gland was then retracted inferiorly and anteriorly  Blunt dissection was used to separate the medial aspect of the superior pole from the trachea  Blunt dissection was used to free the superior extent of the superior pole  The superior parathyroid gland was identified and  from the thyroid, taking care to preserve its vascular pedicle  The superior pole vessels were identified and ligated  Next, the thyroid was rotated more anteromedialy and dissection occurred along the mid-inferior portion of the thyroid, freeing the thin fascial cover overlying the thyroid  This was reflected laterally  Dissection then occurred within the triangle of carotid, thyroid, and trachea where the recurrent laryngeal nerve was identified  The nerve was then followed superiorly to where it entered the larynx  The inferior parathyroid gland was identified and  from the thyroid, taking care to preserve its vascular pedicle  All other tissue anterior to the nerve was then divided with the nerve under direct visualization    This included identification and ligation of the middle thyroid vein and inferior pole vessels  This now left the thyroid lobe tethered to the trachea by Berry's ligament  The lobe was dissected medially using the bipolar and curved scissors, followed by the bovie placed at a 45 degree angle to the trachea once dissection was far from the nerve  The freed specimen was inspected and passed off the field  The wound was irrigated, and hemostasis was obtained with electrocautery  A Valsalva maneuver was performed, and no further bleeding was noted  Surgicel was placed in the wound  The strap muscles were approximated with 3-0 vicryl in simple interrupted fashion  The platysma and deep dermis were approximated with 3-0 vicryl in simple interrupted fashion  The skin was closed with 4-0 monocryl in running subcuticular fashion  Mastisol was applied to wound edges, and steristrips were placed  The patient was taken out of general anesthesia and transferred to the PACU in stable condition       I was present for the entire procedure, A qualified resident physician was not available and A co-surgeon was required because of skills and techniques relevant to speciality    Patient Disposition:  PACU         SIGNATURE: Andi Gosselin, MD  DATE: February 8, 2023  TIME: 1:54 PM

## 2023-02-08 NOTE — H&P
Surgery Pre-op note/Updated History and Physical    Date of service: 2/8/202312:26 PM      No changes from most recent clinic H&P note  Patient to OR for Right completion thyroidectomy  Vitals:    10/13/21 0845   BP: 131/91   Pulse:    Resp:    Temp:    SpO2:      ENT: Normal  Chest: Breathing, unremarkable  Abd: Unremarkable  Ext: Unremarkable    The procedure was discussed with the patient, including risks, benefits, and alternatives and all questions were answered  Consent signed and in the chart      Sole Salvador MD MPH  Otolaryngology--Head and Neck Surgery  [facility]  2/8/2023 12:26 PM

## 2023-02-08 NOTE — ANESTHESIA PREPROCEDURE EVALUATION
Procedure:  COMPLETION THYROIDECTOMY (Right: Neck)    Relevant Problems   CARDIO   (+) Hypertension      GI/HEPATIC   (+) GERD (gastroesophageal reflux disease)      /RENAL   (+) Chronic kidney disease   (+) Ureteral stone with hydronephrosis      MUSCULOSKELETAL   (+) Chronic midline low back pain without sciatica   (+) Osteoarthritis   (+) Primary osteoarthritis of right knee   (+) Sciatica      NEURO/PSYCH   (+) Anxiety   (+) Chronic midline low back pain without sciatica   (+) Chronic pain syndrome      PULMONARY   (+) DOMINIC (obstructive sleep apnea)                Procedure:  COMPLETION THYROIDECTOMY (Right: Neck)    Relevant Problems   CARDIO   (+) Hypertension      GI/HEPATIC   (+) GERD (gastroesophageal reflux disease)      /RENAL   (+) Chronic kidney disease   (+) Ureteral stone with hydronephrosis      MUSCULOSKELETAL   (+) Chronic midline low back pain without sciatica   (+) Osteoarthritis   (+) Primary osteoarthritis of right knee   (+) Sciatica      NEURO/PSYCH   (+) Anxiety   (+) Chronic midline low back pain without sciatica   (+) Chronic pain syndrome      PULMONARY   (+) DOMINIC (obstructive sleep apnea)        Physical Exam    Airway    Mallampati score: III  TM Distance: >3 FB  Neck ROM: full     Dental       Cardiovascular  Rhythm: regular, Rate: normal, Cardiovascular exam normal    Pulmonary  Pulmonary exam normal Breath sounds clear to auscultation,     Other Findings        Anesthesia Plan  ASA Score- 3     Anesthesia Type- general with ASA Monitors  Additional Monitors:   Airway Plan: ETT  Comment: Had VL #4 blade, grade 1 view, w/ prior thyroid op          Plan Factors-    Chart reviewed  Existing labs reviewed  Patient summary reviewed  Patient is not a current smoker  Patient not instructed to abstain from smoking on day of procedure  Patient did not smoke on day of surgery  Induction- intravenous  Postoperative Plan- Plan for postoperative opioid use   Planned trial extubation    Informed Consent- Anesthetic plan and risks discussed with patient and son

## 2023-03-06 ENCOUNTER — LAB (OUTPATIENT)
Dept: LAB | Facility: HOSPITAL | Age: 58
End: 2023-03-06

## 2023-03-06 DIAGNOSIS — E04.1 THYROID NODULE: ICD-10-CM

## 2023-03-06 LAB
CALCIUM SERPL-MCNC: 9.4 MG/DL (ref 8.4–10.2)
PTH-INTACT SERPL-MCNC: 61 PG/ML (ref 18.4–80.1)
T4 FREE SERPL-MCNC: 2.08 NG/DL (ref 0.76–1.46)
TSH SERPL DL<=0.05 MIU/L-ACNC: 0.03 UIU/ML (ref 0.45–4.5)

## 2023-03-07 LAB
THYROGLOB AB SERPL-ACNC: <1 IU/ML (ref 0–0.9)
THYROGLOB SERPL-MCNC: 0.1 NG/ML (ref 1.4–29.2)

## 2023-03-16 LAB
LEFT EYE DIABETIC RETINOPATHY: NORMAL
RIGHT EYE DIABETIC RETINOPATHY: NORMAL

## 2023-03-16 PROCEDURE — 2023F DILAT RTA XM W/O RTNOPTHY: CPT | Performed by: FAMILY MEDICINE

## 2023-04-19 PROBLEM — E89.0 POST-OPERATIVE HYPOTHYROIDISM: Status: ACTIVE | Noted: 2023-04-19

## 2023-04-19 PROBLEM — E55.9 VITAMIN D DEFICIENCY: Status: ACTIVE | Noted: 2023-04-19

## 2023-04-19 PROBLEM — R53.83 OTHER FATIGUE: Status: ACTIVE | Noted: 2023-04-19

## 2023-04-20 ENCOUNTER — APPOINTMENT (OUTPATIENT)
Dept: RADIOLOGY | Facility: CLINIC | Age: 58
End: 2023-04-20

## 2023-04-20 DIAGNOSIS — R22.41 LOCALIZED SWELLING OF RIGHT FOOT: ICD-10-CM

## 2023-04-20 DIAGNOSIS — M79.671 RIGHT FOOT PAIN: ICD-10-CM

## 2023-05-08 ENCOUNTER — LAB (OUTPATIENT)
Dept: LAB | Facility: HOSPITAL | Age: 58
End: 2023-05-08
Attending: INTERNAL MEDICINE

## 2023-05-08 DIAGNOSIS — E89.0 POST-OPERATIVE HYPOTHYROIDISM: ICD-10-CM

## 2023-05-08 DIAGNOSIS — E55.9 VITAMIN D DEFICIENCY: ICD-10-CM

## 2023-05-08 DIAGNOSIS — R53.83 OTHER FATIGUE: ICD-10-CM

## 2023-05-08 DIAGNOSIS — C73 PAPILLARY THYROID CARCINOMA (HCC): ICD-10-CM

## 2023-05-08 LAB
25(OH)D3 SERPL-MCNC: 15 NG/ML (ref 30–100)
T4 FREE SERPL-MCNC: 1.95 NG/DL (ref 0.76–1.46)
TSH SERPL DL<=0.05 MIU/L-ACNC: <0.01 UIU/ML (ref 0.45–4.5)

## 2023-05-09 ENCOUNTER — DOCUMENTATION (OUTPATIENT)
Dept: ENDOCRINOLOGY | Facility: CLINIC | Age: 58
End: 2023-05-09

## 2023-05-09 DIAGNOSIS — E55.9 VITAMIN D DEFICIENCY: ICD-10-CM

## 2023-05-09 DIAGNOSIS — E04.1 THYROID NODULE: Primary | ICD-10-CM

## 2023-05-09 RX ORDER — LEVOTHYROXINE SODIUM 0.05 MG/1
50 TABLET ORAL DAILY
Qty: 90 TABLET | Refills: 1 | Status: SHIPPED | OUTPATIENT
Start: 2023-05-09

## 2023-05-09 RX ORDER — ERGOCALCIFEROL 1.25 MG/1
CAPSULE ORAL
Qty: 12 CAPSULE | Refills: 0 | Status: SHIPPED | OUTPATIENT
Start: 2023-05-09

## 2023-05-11 ENCOUNTER — TELEPHONE (OUTPATIENT)
Dept: FAMILY MEDICINE CLINIC | Facility: CLINIC | Age: 58
End: 2023-05-11

## 2023-05-11 DIAGNOSIS — M79.671 RIGHT FOOT PAIN: Primary | ICD-10-CM

## 2023-05-11 DIAGNOSIS — C73 PAPILLARY THYROID CARCINOMA (HCC): ICD-10-CM

## 2023-05-11 DIAGNOSIS — E04.1 THYROID NODULE: ICD-10-CM

## 2023-05-11 DIAGNOSIS — R22.41 LOCALIZED SWELLING OF RIGHT FOOT: ICD-10-CM

## 2023-05-11 RX ORDER — LEVOTHYROXINE SODIUM 0.2 MG/1
200 TABLET ORAL DAILY
Qty: 90 TABLET | Refills: 1 | Status: SHIPPED | OUTPATIENT
Start: 2023-05-11 | End: 2023-06-10

## 2023-05-11 NOTE — TELEPHONE ENCOUNTER
Julian Stovall states he is still having foot pain after his xray  He made an appt for Dr Yolanda Rodriguez, but needs an order to see him  Please place order and call when ready    Thanks  [phone]

## 2023-05-18 ENCOUNTER — RA CDI HCC (OUTPATIENT)
Dept: OTHER | Facility: HOSPITAL | Age: 58
End: 2023-05-18

## 2023-05-18 NOTE — PROGRESS NOTES
[facility]  coding opportunities       Chart reviewed, no opportunity found:   [address]        Patients Insurance     Medicare Insurance: [employer]

## 2023-05-22 ENCOUNTER — TELEMEDICINE (OUTPATIENT)
Dept: FAMILY MEDICINE CLINIC | Facility: CLINIC | Age: 58
End: 2023-05-22

## 2023-05-22 VITALS — BODY MASS INDEX: 38.36 KG/M2 | WEIGHT: 315 LBS | HEIGHT: 76 IN

## 2023-05-22 DIAGNOSIS — E89.0 POST-OPERATIVE HYPOTHYROIDISM: Primary | ICD-10-CM

## 2023-05-22 NOTE — PROGRESS NOTES
"  Virtual Regular Visit    Verification of patient location:    Patient is located at Home in the following state in which I hold an active license [place]      Assessment/Plan:    Problem List Items Addressed This Visit        Endocrine    Post-operative hypothyroidism - Primary            Reason for visit is   Chief Complaint   Patient presents with   • Follow-up     Discuss thyroid issues  • Virtual Regular Visit        Encounter provider Benjy Maria MD    Provider located at [address] [place] [address]  [id number]      Recent Visits  No visits were found meeting these conditions  Showing recent visits within past 7 days and meeting all other requirements  Today's Visits  Date Type Provider Dept   05/22/23 Telemedicine Benjy Maria MD [facility]   Showing today's visits and meeting all other requirements  Future Appointments  No visits were found meeting these conditions  Showing future appointments within next 150 days and meeting all other requirements       The patient was identified by name and date of birth  Nichole Stringer was informed that this is a telemedicine visit and that the visit is being conducted through the [employer]  He agrees to proceed     My office door was closed  No one else was in the room  He acknowledged consent and understanding of privacy and security of the video platform  The patient has agreed to participate and understands they can discontinue the visit at any time  Patient is aware this is a billable service  Subjective  Nichole Stringer is a 59 y o  male            DISCUSSION    CONCERNED ABOUT THYROID LEVELS  EDUCATION GIVEN  CONTINUES TO FEEL TIRED    WILL MONITOR       Past Medical History:   Diagnosis Date   • Borderline diabetes     per pt A1C \"coming down\"   • Chronic pain disorder     lower back-s/p laminectomy   • Claustrophobia     \"some degree\" jes MRI's\"   • " "CPAP (continuous positive airway pressure) dependence     per pt does not use CPAP   • Diverticulitis    • GERD (gastroesophageal reflux disease)    • Hypertension    • Kidney stone     x 2 episodes   • Lactose intolerance    • Localized pain of joint    • Mild sleep apnea     CPAP started in July having difficulty using- only has used on occ   • Morbid obesity with body mass index (BMI) of 50 0 to 59 9 in adult [facility])    • Papillary thyroid carcinoma (HCC)    • Rectal bleeding     occ rectal bleeding last episode unsure-\"nothing recent\"   • Seasickness    • Skin tag     skin tags were removed-as of 8/2/19 has a few more   • Teeth missing    • Thyroid nodule    • Tinnitus    • Wears glasses    • Weight loss     \"80lbs and did gain 40lbs back\"--       Past Surgical History:   Procedure Laterality Date   • BACK SURGERY      laminectomy-1996, 2010   • COLONOSCOPY      x2   • ESOPHAGOGASTRODUODENOSCOPY N/A 3/30/2019    Procedure: ESOPHAGOGASTRODUODENOSCOPY (EGD);   Surgeon: Manju De Luna MD;  Location: [address] [place] [address];  Service: Gastroenterology   • FL RETROGRADE PYELOGRAM  9/16/2022   • FL RETROGRADE PYELOGRAM  9/22/2022   • FL RETROGRADE PYELOGRAM  9/29/2022   • KNEE ARTHROSCOPY Left 2007   • OH CYSTO BLADDER W/URETERAL CATHETERIZATION Right 9/16/2022    Procedure: CYSTOSCOPY RETROGRADE PYELOGRAM WITH INSERTION STENT URETERAL retrograde;  Surgeon: Scottie Piper MD;  Location: [address] [place] [address];  Service: Urology   • OH CYSTO/URETERO W/LITHOTRIPSY &INDWELL STENT INSRT Right 1/6/2022    Procedure: CYSTOSCOPY URETEROSCOPY AND INSERTION STENT URETERAL RIGHT;  Surgeon: Scottie Piper MD;  Location: [address] [place] [address];  Service: Urology   • OH CYSTO/URETERO W/LITHOTRIPSY &INDWELL STENT INSRT Right 9/22/2022    Procedure: CYSTOSCOPY URETEROSCOPY WITH LITHOTRIPSY HOLMIUM LASER, BASKET EXTRACTION, RETROGRADE PYELOGRAM (BILATERAL) AND  STENT EXCHANGE;  Surgeon: Scottie Piper MD;  Location: [address] [place] [address];  Service: Urology   • OH CYSTO/URETERO " W/LITHOTRIPSY &INDWELL STENT INSRT Bilateral 9/29/2022    Procedure: CYSTOSCOPY URETEROSCOPY, STENT MANUPILATION, RETROGRADE PYELOGRAM, LEFT  URETERAL STENT NSERTION, AND  REMOVAL OF RIGHT STENT;  Surgeon: Bernabe Morel MD;  Location: [facility];  Service: Urology   • ID CYSTO/URETERO W/LITHOTRIPSY &INDWELL STENT INSRT Left 10/13/2022    Procedure: CYSTOSCOPY URETEROSCOPY WITH LITHOTRIPSY BASKET EXTRACTION, RETROGRADE PYELOGRAM AND  STENT EXCHANGE;  Surgeon: Bernabe Morel MD;  Location: [address] [place] [address];  Service: Urology   • ID ESOPHAGOGASTRODUODENOSCOPY TRANSORAL DIAGNOSTIC N/A 5/1/2019    Procedure: ESOPHAGOGASTRODUODENOSCOPY (EGD); Surgeon: Vivek Ambriz MD;  Location: [facility]; Service: Gastroenterology   • ID THYROIDECTOMY RMVL REMAINING TISS FLWG PRTL RMVL Right 2/8/2023    Procedure: COMPLETION THYROIDECTOMY;  Surgeon: Josue Costello MD;  Location: [facility];  Service: ENT   • ID TOTAL THYROID LOBECTOMY UNI W/WO ISTHMUSECTOMY Left 8/31/2022    Procedure: Left patial THYROIDECTOMY;  Surgeon: Josue Costello MD;  [facility]: [facility];  Service: ENT   • UPPER GASTROINTESTINAL ENDOSCOPY  2006   • US GUIDED THYROID BIOPSY  12/28/2020       Current Outpatient Medications   Medication Sig Dispense Refill   • amLODIPine (NORVASC) 5 mg tablet Take 1 tablet (5 mg total) by mouth daily 90 tablet 3   • ergocalciferol (VITAMIN D2) 50,000 units Take one capsule by mouth once a week for 12 weeks 12 capsule 0   • levothyroxine (Synthroid) 200 mcg tablet Take 1 tablet (200 mcg total) by mouth daily 90 tablet 1   • levothyroxine (Synthroid) 50 mcg tablet Take 1 tablet (50 mcg total) by mouth daily 90 tablet 1   • lisinopril-hydrochlorothiazide (PRINZIDE,ZESTORETIC) 20-25 MG per tablet TAKE 1 TABLET EVERY DAY 90 tablet 0   • omeprazole (PriLOSEC) 20 mg delayed release capsule Take 1 capsule (20 mg total) by mouth daily 90 capsule 3     No current facility-administered medications for this visit  "  Allergies   Allergen Reactions   • Lactose - Food Allergy GI Intolerance       Review of Systems   Constitutional: Positive for fatigue  Negative for chills and fever  HENT: Negative for congestion, ear discharge, ear pain, mouth sores, postnasal drip, sore throat and trouble swallowing  Eyes: Negative for pain, discharge and visual disturbance  Respiratory: Negative for cough, shortness of breath and wheezing  Cardiovascular: Negative for chest pain, palpitations and leg swelling  Gastrointestinal: Negative for abdominal distention, abdominal pain, blood in stool, diarrhea and nausea  Endocrine: Negative for polydipsia, polyphagia and polyuria  Genitourinary: Negative for dysuria, frequency, hematuria and urgency  Musculoskeletal: Positive for arthralgias  Negative for gait problem and joint swelling  Skin: Negative for pallor and rash  Neurological: Negative for dizziness, syncope, speech difficulty, weakness, light-headedness, numbness and headaches  Hematological: Negative for adenopathy  Psychiatric/Behavioral: Negative for behavioral problems, confusion and sleep disturbance  The patient is not nervous/anxious          Video Exam    Vitals:    05/22/23 1117   Weight: (!) 166 kg (366 lb)   Height: 6' 4\" (1 93 m)       Physical Exam     PATIENT VISUALLY APPEARS WELL IN NO OBVIOUS DISTRESS    Visit Time  Total Visit Duration: 10        "

## 2023-05-24 ENCOUNTER — OFFICE VISIT (OUTPATIENT)
Dept: PODIATRY | Facility: CLINIC | Age: 58
End: 2023-05-24

## 2023-05-24 VITALS
RESPIRATION RATE: 18 BRPM | BODY MASS INDEX: 38.36 KG/M2 | WEIGHT: 315 LBS | HEIGHT: 76 IN | DIASTOLIC BLOOD PRESSURE: 82 MMHG | SYSTOLIC BLOOD PRESSURE: 122 MMHG

## 2023-05-24 DIAGNOSIS — M21.42 ACQUIRED FLAT FOOT, LEFT: ICD-10-CM

## 2023-05-24 DIAGNOSIS — M21.41 ACQUIRED FLAT FOOT, RIGHT: ICD-10-CM

## 2023-05-24 DIAGNOSIS — M21.961 ACQUIRED DEFORMITY OF RIGHT FOOT: Primary | ICD-10-CM

## 2023-05-24 DIAGNOSIS — M21.962 ACQUIRED DEFORMITY OF LEFT FOOT: ICD-10-CM

## 2023-05-24 DIAGNOSIS — M10.9 ACUTE GOUTY ARTHRITIS: ICD-10-CM

## 2023-05-24 RX ORDER — COLCHICINE 0.6 MG/1
0.6 TABLET ORAL DAILY
Qty: 3 TABLET | Refills: 0 | Status: SHIPPED | OUTPATIENT
Start: 2023-05-24 | End: 2023-05-27

## 2023-05-24 RX ORDER — ETODOLAC 400 MG/1
400 TABLET, FILM COATED ORAL 2 TIMES DAILY
Qty: 20 TABLET | Refills: 0 | Status: SHIPPED | OUTPATIENT
Start: 2023-05-24 | End: 2023-06-03

## 2023-05-24 NOTE — PROGRESS NOTES
Assessment/Plan: Probable acute gouty arthritis left instep  Pain  Acquire deformity of foot  Acquired pes planus  Plan  Chart reviewed  Patient examined  Patient advised on condition  At this time blood work ordered to rule out acute gouty arthritis and/or inflammatory arthropathy  We will treat with both colchicine and Lodine  Patient advised on aftercare  In order to help with patient deformity, patient's feet of been casted for custom molded foot orthotics  He will use orthotics daily to control pain and and accommodate deformity  Diagnoses and all orders for this visit:    Acquired deformity of right foot    Acquired deformity of left foot    Acquired flat foot, right    Acute gouty arthritis  -     etodolac (LODINE) 400 MG tablet; Take 1 tablet (400 mg total) by mouth 2 (two) times a day for 10 days  -     colchicine (COLCRYS) 0 6 mg tablet; Take 1 tablet (0 6 mg total) by mouth daily for 3 days          Subjective: Patient has history of pain of the left foot  It started several days prior without history of trauma  He had similar incident of the right foot approximately a month prior    Allergies   Allergen Reactions   • Lactose - Food Allergy GI Intolerance         Current Outpatient Medications:   •  colchicine (COLCRYS) 0 6 mg tablet, Take 1 tablet (0 6 mg total) by mouth daily for 3 days, Disp: 3 tablet, Rfl: 0  •  etodolac (LODINE) 400 MG tablet, Take 1 tablet (400 mg total) by mouth 2 (two) times a day for 10 days, Disp: 20 tablet, Rfl: 0  •  amLODIPine (NORVASC) 5 mg tablet, Take 1 tablet (5 mg total) by mouth daily, Disp: 90 tablet, Rfl: 3  •  ergocalciferol (VITAMIN D2) 50,000 units, Take one capsule by mouth once a week for 12 weeks, Disp: 12 capsule, Rfl: 0  •  levothyroxine (Synthroid) 200 mcg tablet, Take 1 tablet (200 mcg total) by mouth daily, Disp: 90 tablet, Rfl: 1  •  levothyroxine (Synthroid) 50 mcg tablet, Take 1 tablet (50 mcg total) by mouth daily, Disp: 90 tablet, Rfl: 1  •  lisinopril-hydrochlorothiazide (PRINZIDE,ZESTORETIC) 20-25 MG per tablet, TAKE 1 TABLET EVERY DAY, Disp: 90 tablet, Rfl: 0  •  omeprazole (PriLOSEC) 20 mg delayed release capsule, Take 1 capsule (20 mg total) by mouth daily, Disp: 90 capsule, Rfl: 3    Patient Active Problem List   Diagnosis   • Anxiety   • Diverticulitis   • GERD (gastroesophageal reflux disease)   • Hypertension   • Osteoarthritis   • Sciatica   • Breathing-related sleep disorder   • Urinary calculus   • Chronic midline low back pain without sciatica   • Class 3 severe obesity due to excess calories with serious comorbidity and body mass index (BMI) of 45 0 to 49 9 in [facility])   • Chronic pain syndrome   • Lumbar post-laminectomy syndrome   • Primary osteoarthritis of right knee   • DOMINIC (obstructive sleep apnea)   • Thyroid nodule   • Chronic kidney disease   • Ureteral stone with hydronephrosis   • Papillary thyroid carcinoma (HCC)   • Post-operative hypothyroidism   • Vitamin D deficiency   • Other fatigue          Patient ID: Michaela Martinez is a 59 y o  male  HPI    The following portions of the patient's history were reviewed and updated as appropriate:     family history includes Lupus in his sister; No Known Problems in his brother, sister, sister, and son; Obesity in his father; Osteoarthritis in his mother; Sleep apnea in his father  reports that he has never smoked  He has never used smokeless tobacco  He reports that he does not drink alcohol and does not use drugs  Vitals:    05/24/23 0911   BP: 122/82   Resp: 18       Review of Systems      Objective:  Patient's shoes and socks removed  Foot ExamPhysical Exam  Vitals and nursing note reviewed  Constitutional:       Appearance: Normal appearance  Cardiovascular:      Rate and Rhythm: Normal rate and regular rhythm  Feet:      Comments: Foot demonstrates edema and erythema on the dorsal lateral aspect  No evidence of abscess cellulitis or ulcer  No ecchymosis  X-ray demonstrates osteoarthritis  Skin:     Capillary Refill: Capillary refill takes less than 2 seconds  Neurological:      Mental Status: He is alert  Psychiatric:         Mood and Affect: Mood normal          Behavior: Behavior normal          Thought Content:  Thought content normal          Judgment: Judgment normal

## 2023-05-25 LAB
ANA SER QL: NEGATIVE
B BURGDOR IGG+IGM SER QL IA: NEGATIVE
ERYTHROCYTE [SEDIMENTATION RATE] IN BLOOD BY WESTERGREN METHOD: 47 MM/HR (ref 0–30)
RHEUMATOID FACT SERPL-ACNC: <10 IU/ML
URATE SERPL-MCNC: 9.2 MG/DL (ref 3.8–8.4)

## 2023-05-26 ENCOUNTER — OFFICE VISIT (OUTPATIENT)
Dept: FAMILY MEDICINE CLINIC | Facility: CLINIC | Age: 58
End: 2023-05-26

## 2023-05-26 VITALS
BODY MASS INDEX: 38.36 KG/M2 | HEIGHT: 76 IN | OXYGEN SATURATION: 96 % | RESPIRATION RATE: 12 BRPM | WEIGHT: 315 LBS | HEART RATE: 88 BPM | DIASTOLIC BLOOD PRESSURE: 88 MMHG | SYSTOLIC BLOOD PRESSURE: 130 MMHG | TEMPERATURE: 97.8 F

## 2023-05-26 DIAGNOSIS — M79.672 LEFT FOOT PAIN: ICD-10-CM

## 2023-05-26 DIAGNOSIS — M10.9 GOUT, ARTHROPATHY: Primary | ICD-10-CM

## 2023-05-26 NOTE — PATIENT INSTRUCTIONS
REST  CONTINUE COLCHICINE TWICE A DAY X 1 WEEK  BW IN 3-4 WEEKS  FURTHER PLANS - POSSIBLE ALLOPURINOL THEN

## 2023-05-26 NOTE — PROGRESS NOTES
Name: Nuria Higginbotham      : 1965      MRN: [id number]  Encounter Provider: Kwaku Wallace MD  Encounter Date: 2023   Encounter department: [address] [place] [address]     1  Gout, arthropathy  -     Basic metabolic panel  -     Uric acid; Future    2  Left foot pain           Subjective      PATIENT HAS BEEN HAVING RECURRENT FOOT PAIN  RECENT CONSULT WITH DR Soo Randall    FEELS IT IS GOUT    DISCUSSED ISSUES AND FOLLOW UP AT LENGTH  NO DISCOMFORT AT THIS TIME    Review of Systems   Constitutional: Negative for chills, fatigue and fever  HENT: Negative for sore throat  Eyes: Negative for discharge  Respiratory: Negative for cough and chest tightness  Cardiovascular: Negative for chest pain and palpitations  Gastrointestinal: Negative for abdominal pain, diarrhea, nausea and vomiting  Musculoskeletal: Positive for arthralgias and joint swelling  Negative for gait problem  Neurological: Negative for dizziness, weakness and headaches  Hematological: Negative for adenopathy  Psychiatric/Behavioral: The patient is not nervous/anxious          Current Outpatient Medications on File Prior to Visit   Medication Sig   • amLODIPine (NORVASC) 5 mg tablet Take 1 tablet (5 mg total) by mouth daily   • colchicine (COLCRYS) 0 6 mg tablet Take 1 tablet (0 6 mg total) by mouth daily for 3 days   • ergocalciferol (VITAMIN D2) 50,000 units Take one capsule by mouth once a week for 12 weeks   • etodolac (LODINE) 400 MG tablet Take 1 tablet (400 mg total) by mouth 2 (two) times a day for 10 days   • levothyroxine (Synthroid) 200 mcg tablet Take 1 tablet (200 mcg total) by mouth daily   • levothyroxine (Synthroid) 50 mcg tablet Take 1 tablet (50 mcg total) by mouth daily   • lisinopril-hydrochlorothiazide (PRINZIDE,ZESTORETIC) 20-25 MG per tablet TAKE 1 TABLET EVERY DAY   • omeprazole (PriLOSEC) 20 mg delayed release capsule Take 1 capsule (20 mg total) by mouth daily "      Objective     /88 (BP Location: Right arm, Patient Position: Sitting, Cuff Size: Large)   Pulse 88   Temp 97 8 °F (36 6 °C) (Temporal)   Resp 12   Ht 6' 4\" (1 93 m)   Wt (!) 166 kg (366 lb)   SpO2 96%   BMI 44 55 kg/m²     Physical Exam     NO PE WAS PERFORMED    LENGTH OF VISIT 15 MIN  LENGTH OF  15 MIN  Crystal Varghese MD  "

## 2023-06-13 ENCOUNTER — APPOINTMENT (OUTPATIENT)
Dept: LAB | Facility: HOSPITAL | Age: 58
End: 2023-06-13
Payer: COMMERCIAL

## 2023-06-13 DIAGNOSIS — E04.1 THYROID NODULE: ICD-10-CM

## 2023-06-13 DIAGNOSIS — E55.9 VITAMIN D DEFICIENCY: ICD-10-CM

## 2023-06-13 DIAGNOSIS — M10.9 GOUT, ARTHROPATHY: ICD-10-CM

## 2023-06-13 LAB
ANION GAP SERPL CALCULATED.3IONS-SCNC: 7 MMOL/L (ref 4–13)
BUN SERPL-MCNC: 28 MG/DL (ref 5–25)
CALCIUM SERPL-MCNC: 9.2 MG/DL (ref 8.4–10.2)
CHLORIDE SERPL-SCNC: 107 MMOL/L (ref 96–108)
CO2 SERPL-SCNC: 25 MMOL/L (ref 21–32)
CREAT SERPL-MCNC: 1.03 MG/DL (ref 0.6–1.3)
GFR SERPL CREATININE-BSD FRML MDRD: 78 ML/MIN/1.73SQ M
GLUCOSE SERPL-MCNC: 101 MG/DL (ref 65–140)
POTASSIUM SERPL-SCNC: 3.7 MMOL/L (ref 3.5–5.3)
SODIUM SERPL-SCNC: 139 MMOL/L (ref 135–147)
T4 FREE SERPL-MCNC: 1.45 NG/DL (ref 0.61–1.12)
TSH SERPL DL<=0.05 MIU/L-ACNC: <0.01 UIU/ML (ref 0.45–4.5)
URATE SERPL-MCNC: 8.1 MG/DL (ref 3.5–8.5)

## 2023-06-13 PROCEDURE — 84439 ASSAY OF FREE THYROXINE: CPT

## 2023-06-13 PROCEDURE — 84443 ASSAY THYROID STIM HORMONE: CPT

## 2023-06-13 PROCEDURE — 36415 COLL VENOUS BLD VENIPUNCTURE: CPT

## 2023-06-13 PROCEDURE — 84550 ASSAY OF BLOOD/URIC ACID: CPT

## 2023-06-14 ENCOUNTER — VBI (OUTPATIENT)
Dept: ADMINISTRATIVE | Facility: OTHER | Age: 58
End: 2023-06-14

## 2023-06-14 DIAGNOSIS — M10.9 GOUT, ARTHROPATHY: Primary | ICD-10-CM

## 2023-06-14 RX ORDER — ALLOPURINOL 100 MG/1
100 TABLET ORAL DAILY
Qty: 90 TABLET | Refills: 3 | Status: SHIPPED | OUTPATIENT
Start: 2023-06-14

## 2023-06-14 NOTE — TELEPHONE ENCOUNTER
Occupational Therapy    Visit Type: treatment  SUBJECTIVE  Patient agreed to participate in therapy this date.  Patient / Family Goal: maximize function    OBJECTIVE        Sitting Balance  (SHUBAHM = base of support)  Static      - Trial 1 details: modified independent    Standing Balance  (SHUBHAM = base of support)  Firm Surface: Double Leg      - Static, Eyes Open       - Trial 1 details: contact guard and with double UE support     - Dynamic, Eyes Open       - Trial 1 details: contact guard and with double UE support       Transfers  Assistive devices: gait belt, 2-wheeled walker  - Sit to stand: minimal assist  - Stand to sit: minimal assist  - Stand pivot: minimal assist      Activities of Daily Living (ADLs)  Lower Body Dressing:   - Assist: minimal assist  - Position: chair  - Assist needed for: thread left lower extremity into underwear, thread right lower extremity into underwear and pull up over hips  Interventions      Additional exercise details: Performed scapular elevation/depression, scapular protraction/retraction x 10 reps each while seated. Performed 5 reps chair push-ups. Pt tolerated well with cues for anterior weight shifting.  Training provided: activity tolerance, balance retraining, transfer training, safety training, functional ambulation, bed mobility training and body mechanics  Skilled input: verbal instruction/cues and tactile instruction/cues  Verbal Consent: Writer verbally educated and received verbal consent for hand placement, positioning of patient, and techniques to be performed today from patient for clothing adjustments for techniques, therapist position for techniques and hand placement and palpation for techniques as described above and how they are pertinent to the patient's plan of care.         Education:   - Present and ready to learn: patient  Education provided during session:  - Results of above outlined education: Verbalizes understanding, Demonstrates understanding and  Pt needs office visit to continue getting refills  Thanks! Needs reinforcement    ASSESSMENT   Progress: progressing toward goals    Summary of function and discharge needs based on today's assessment:  - Current level of function: significantly below baseline level of function  - Therapy needs at discharge: therapy 1-3 times per week  - Activities of daily living (ADLs) requiring support at discharge: dressing, toileting, bed mobility, transfers, ambulation, grooming and bathing  - Instrumental activities of daily living (IADLs) requiring support at discharge: home management, health/medication management and meal preparation  - Impairments that require further therapy intervention: pain, ROM, strength, activity tolerance, safety awareness and balance  AM-PAC  - Prior Level of Function: IND/MOD I ([facility] [address] 22-24)       Key: MOD A=moderate assistance, IND/MOD I=independent/modified independent  - Generalized Current Level of Function     - Current Self-Cares: 16       Scoring Key= >21 Modified Independent; 20-21 Supervision; 18-19 Minimal assist; 13-18 Moderate assist; 9-12 Max assist; <9 Total assist        PLAN  Suggestions for next session as indicated: Progress functional mobility with 2ww w/ close chair follow (bring aide) toilet transfer, ADLs seated, BUE strengthening    OT Frequency: 3-5 x per week      PT/OT Mobility Equipment for Discharge: 4WW and w/c.  Agreement to plan and goals: patient agrees with goals and treatment plan      GOALS  Review Date: 6/18/2023  Long Term Goals: (to be met by time of discharge from hospital)  Grooming: Patient will complete grooming tasks modified independent. Status: progressing/ongoing  Upper body dressing: Patient will complete upper body dressing modified independent. Status: progressing/ongoing  Lower body dressing: Patient will complete lower body dressing modified independent. Status: progressing/ongoing  Toileting: Patient will complete toileting modified independent.  Status: progressing/ongoing  Bathing: Patient will  complete bathingmodified independent  Status: progressing/ongoingToilet transfer: Patient will complete toilet transfer with modified independent. Status: progressing/ongoing  Walk in shower: Patient will complete walk in shower transfer with modified independent.  Status: progressing/ongoing        Documented in the chart in the following areas: Assessment/Plan.    Patient at End of Session:   Location: in chair  Safety measures: alarm system in place/re-engaged, call light within reach, equipment intact and lines intact  Handoff to: nurse      Therapy procedure time and total treatment time can be found documented on the Time Entry flowsheet

## 2023-06-16 ENCOUNTER — DOCUMENTATION (OUTPATIENT)
Dept: ENDOCRINOLOGY | Facility: CLINIC | Age: 58
End: 2023-06-16

## 2023-06-16 DIAGNOSIS — E04.1 THYROID NODULE: Primary | ICD-10-CM

## 2023-06-27 ENCOUNTER — OFFICE VISIT (OUTPATIENT)
Dept: PODIATRY | Facility: CLINIC | Age: 58
End: 2023-06-27
Payer: COMMERCIAL

## 2023-06-27 VITALS
HEIGHT: 76 IN | WEIGHT: 315 LBS | BODY MASS INDEX: 38.36 KG/M2 | RESPIRATION RATE: 16 BRPM | DIASTOLIC BLOOD PRESSURE: 88 MMHG | SYSTOLIC BLOOD PRESSURE: 130 MMHG

## 2023-06-27 DIAGNOSIS — M21.962 ACQUIRED DEFORMITY OF LEFT FOOT: ICD-10-CM

## 2023-06-27 DIAGNOSIS — M25.571 PAIN IN JOINTS OF BOTH FEET: Primary | ICD-10-CM

## 2023-06-27 DIAGNOSIS — B35.1 ONYCHOMYCOSIS: ICD-10-CM

## 2023-06-27 DIAGNOSIS — M25.572 PAIN IN JOINTS OF BOTH FEET: Primary | ICD-10-CM

## 2023-06-27 DIAGNOSIS — M21.961 ACQUIRED DEFORMITY OF RIGHT FOOT: ICD-10-CM

## 2023-06-27 DIAGNOSIS — B35.9 DERMATOPHYTOSIS: ICD-10-CM

## 2023-06-27 PROCEDURE — 99213 OFFICE O/P EST LOW 20 MIN: CPT | Performed by: PODIATRIST

## 2023-06-27 RX ORDER — TERBINAFINE HYDROCHLORIDE 250 MG/1
250 TABLET ORAL DAILY
Qty: 30 TABLET | Refills: 0 | Status: SHIPPED | OUTPATIENT
Start: 2023-06-27 | End: 2023-07-27

## 2023-06-27 NOTE — PROGRESS NOTES
Assessment/Plan: Acquired deformity foot bilateral   History of acute gouty arthritis  Hyperuricemia  Acquired pes planus  Mycosis of nail and skin  Plan  Chart reviewed  Lab work reviewed  Patient's liver function is within normal limits  He will be started on Lamisil  Patient advised on aftercare  Patient will use orthotics daily  Patient will follow with primary care for treatment of hyperuricemia  Return for follow-up         Diagnoses and all orders for this visit:    Pain in joints of both feet    Acquired deformity of right foot    Acquired deformity of left foot    Onychomycosis  -     terbinafine (LamISIL) 250 mg tablet; Take 1 tablet (250 mg total) by mouth daily    Dermatophytosis  -     terbinafine (LamISIL) 250 mg tablet; Take 1 tablet (250 mg total) by mouth daily          Subjective: Patient is doing much better  He has no indication of acute gouty arthritis  His concern today is his toenails      Allergies   Allergen Reactions   • Lactose - Food Allergy GI Intolerance         Current Outpatient Medications:   •  allopurinol (ZYLOPRIM) 100 mg tablet, Take 1 tablet (100 mg total) by mouth daily, Disp: 90 tablet, Rfl: 3  •  terbinafine (LamISIL) 250 mg tablet, Take 1 tablet (250 mg total) by mouth daily, Disp: 30 tablet, Rfl: 0  •  amLODIPine (NORVASC) 5 mg tablet, Take 1 tablet (5 mg total) by mouth daily, Disp: 90 tablet, Rfl: 3  •  colchicine (COLCRYS) 0 6 mg tablet, Take 1 tablet (0 6 mg total) by mouth daily for 3 days, Disp: 3 tablet, Rfl: 0  •  ergocalciferol (VITAMIN D2) 50,000 units, Take one capsule by mouth once a week for 12 weeks, Disp: 12 capsule, Rfl: 0  •  etodolac (LODINE) 400 MG tablet, Take 1 tablet (400 mg total) by mouth 2 (two) times a day for 10 days, Disp: 20 tablet, Rfl: 0  •  levothyroxine (Synthroid) 200 mcg tablet, Take 1 tablet (200 mcg total) by mouth daily, Disp: 90 tablet, Rfl: 1  •  levothyroxine (Synthroid) 50 mcg tablet, Take 1 tablet (50 mcg total) by mouth daily (Patient taking differently: Take 25 mcg by mouth daily), Disp: 90 tablet, Rfl: 1  •  lisinopril-hydrochlorothiazide (PRINZIDE,ZESTORETIC) 20-25 MG per tablet, TAKE 1 TABLET EVERY DAY, Disp: 90 tablet, Rfl: 0  •  omeprazole (PriLOSEC) 20 mg delayed release capsule, Take 1 capsule (20 mg total) by mouth daily, Disp: 90 capsule, Rfl: 3    Patient Active Problem List   Diagnosis   • Anxiety   • Diverticulitis   • GERD (gastroesophageal reflux disease)   • Hypertension   • Osteoarthritis   • Sciatica   • Breathing-related sleep disorder   • Urinary calculus   • Chronic midline low back pain without sciatica   • Class 3 severe obesity due to excess calories with serious comorbidity and body mass index (BMI) of 45 0 to 49 9 in [facility])   • Chronic pain syndrome   • Lumbar post-laminectomy syndrome   • Primary osteoarthritis of right knee   • DOMINIC (obstructive sleep apnea)   • Thyroid nodule   • Chronic kidney disease   • Ureteral stone with hydronephrosis   • Papillary thyroid carcinoma (HCC)   • Post-operative hypothyroidism   • Vitamin D deficiency   • Other fatigue          Patient ID: Stephanie Amaral is a 59 y o  male  HPI    The following portions of the patient's history were reviewed and updated as appropriate:     family history includes Lupus in his sister; No Known Problems in his brother, sister, sister, and son; Obesity in his father; Osteoarthritis in his mother; Sleep apnea in his father  reports that he has never smoked  He has never used smokeless tobacco  He reports that he does not drink alcohol and does not use drugs  Vitals:    06/27/23 1141   BP: 130/88   Resp: 16       Review of Systems      Objective:  Patient's shoes and socks removed  Foot ExamPhysical Exam      Vitals and nursing note reviewed  Constitutional:       Appearance: Normal appearance  Cardiovascular:      Rate and Rhythm: Normal rate and regular rhythm     Feet:      Comments: Foot demonstrates edema and erythema on the dorsal lateral aspect  No evidence of abscess cellulitis or ulcer  No ecchymosis  X-ray demonstrates osteoarthritis  Skin:     Capillary Refill: Capillary refill takes less than 2 seconds  Patient demonstrates profound mycosis of nail and skin  Patient has dermatophytosis of the foot bilateral   All nails are dystrophic  Hallux nails demonstrate lysis secondary to fungus  Neurological:      Mental Status: He is alert  Psychiatric:         Mood and Affect: Mood normal          Behavior: Behavior normal          Thought Content:  Thought content normal          Judgment: Judgment normal

## 2023-06-28 DIAGNOSIS — E11.9 TYPE 2 DIABETES MELLITUS WITHOUT COMPLICATION, WITHOUT LONG-TERM CURRENT USE OF INSULIN (HCC): ICD-10-CM

## 2023-06-28 DIAGNOSIS — Z12.5 SCREENING FOR PROSTATE CANCER: ICD-10-CM

## 2023-06-28 DIAGNOSIS — I10 PRIMARY HYPERTENSION: ICD-10-CM

## 2023-06-28 DIAGNOSIS — E04.1 THYROID NODULE: ICD-10-CM

## 2023-06-28 DIAGNOSIS — K21.9 GASTROESOPHAGEAL REFLUX DISEASE WITHOUT ESOPHAGITIS: ICD-10-CM

## 2023-06-28 DIAGNOSIS — Z13.220 LIPID SCREENING: ICD-10-CM

## 2023-06-28 DIAGNOSIS — E89.0 POST-OPERATIVE HYPOTHYROIDISM: ICD-10-CM

## 2023-06-28 DIAGNOSIS — I10 ESSENTIAL HYPERTENSION: ICD-10-CM

## 2023-06-28 DIAGNOSIS — Z00.00 MEDICARE ANNUAL WELLNESS VISIT, SUBSEQUENT: Primary | ICD-10-CM

## 2023-06-28 RX ORDER — LISINOPRIL AND HYDROCHLOROTHIAZIDE 25; 20 MG/1; MG/1
TABLET ORAL
Qty: 90 TABLET | Refills: 0 | Status: SHIPPED | OUTPATIENT
Start: 2023-06-28

## 2023-07-24 DIAGNOSIS — B35.1 ONYCHOMYCOSIS: ICD-10-CM

## 2023-07-24 DIAGNOSIS — B35.9 DERMATOPHYTOSIS: ICD-10-CM

## 2023-07-24 RX ORDER — TERBINAFINE HYDROCHLORIDE 250 MG/1
250 TABLET ORAL DAILY
Qty: 30 TABLET | Refills: 0 | Status: SHIPPED | OUTPATIENT
Start: 2023-07-24 | End: 2023-08-23

## 2023-07-28 ENCOUNTER — APPOINTMENT (OUTPATIENT)
Dept: LAB | Facility: HOSPITAL | Age: 58
End: 2023-07-28
Payer: COMMERCIAL

## 2023-07-28 DIAGNOSIS — E04.1 THYROID NODULE: ICD-10-CM

## 2023-07-28 DIAGNOSIS — Z12.5 SCREENING FOR PROSTATE CANCER: ICD-10-CM

## 2023-07-28 DIAGNOSIS — E11.9 TYPE 2 DIABETES MELLITUS WITHOUT COMPLICATION, WITHOUT LONG-TERM CURRENT USE OF INSULIN (HCC): ICD-10-CM

## 2023-07-28 DIAGNOSIS — Z13.220 LIPID SCREENING: ICD-10-CM

## 2023-07-28 DIAGNOSIS — Z00.00 MEDICARE ANNUAL WELLNESS VISIT, SUBSEQUENT: ICD-10-CM

## 2023-07-28 DIAGNOSIS — I10 PRIMARY HYPERTENSION: ICD-10-CM

## 2023-07-28 DIAGNOSIS — E89.0 POST-OPERATIVE HYPOTHYROIDISM: ICD-10-CM

## 2023-07-28 DIAGNOSIS — K21.9 GASTROESOPHAGEAL REFLUX DISEASE WITHOUT ESOPHAGITIS: ICD-10-CM

## 2023-07-28 LAB
25(OH)D3 SERPL-MCNC: 27.4 NG/ML (ref 30–100)
ALBUMIN SERPL BCP-MCNC: 4 G/DL (ref 3.5–5)
ALP SERPL-CCNC: 64 U/L (ref 34–104)
ALT SERPL W P-5'-P-CCNC: 23 U/L (ref 7–52)
ANION GAP SERPL CALCULATED.3IONS-SCNC: 5 MMOL/L
AST SERPL W P-5'-P-CCNC: 19 U/L (ref 13–39)
BASOPHILS # BLD AUTO: 0.07 THOUSANDS/ÂΜL (ref 0–0.1)
BASOPHILS NFR BLD AUTO: 1 % (ref 0–1)
BILIRUB SERPL-MCNC: 0.61 MG/DL (ref 0.2–1)
BUN SERPL-MCNC: 23 MG/DL (ref 5–25)
CALCIUM SERPL-MCNC: 9.4 MG/DL (ref 8.4–10.2)
CHLORIDE SERPL-SCNC: 106 MMOL/L (ref 96–108)
CHOLEST SERPL-MCNC: 169 MG/DL
CO2 SERPL-SCNC: 26 MMOL/L (ref 21–32)
CREAT SERPL-MCNC: 1.06 MG/DL (ref 0.6–1.3)
EOSINOPHIL # BLD AUTO: 0.45 THOUSAND/ÂΜL (ref 0–0.61)
EOSINOPHIL NFR BLD AUTO: 5 % (ref 0–6)
ERYTHROCYTE [DISTWIDTH] IN BLOOD BY AUTOMATED COUNT: 14.1 % (ref 11.6–15.1)
GFR SERPL CREATININE-BSD FRML MDRD: 75 ML/MIN/1.73SQ M
GLUCOSE P FAST SERPL-MCNC: 100 MG/DL (ref 65–99)
HCT VFR BLD AUTO: 44.1 % (ref 36.5–49.3)
HDLC SERPL-MCNC: 31 MG/DL
HGB BLD-MCNC: 14.6 G/DL (ref 12–17)
IMM GRANULOCYTES # BLD AUTO: 0.04 THOUSAND/UL (ref 0–0.2)
IMM GRANULOCYTES NFR BLD AUTO: 1 % (ref 0–2)
LDLC SERPL CALC-MCNC: 113 MG/DL (ref 0–100)
LYMPHOCYTES # BLD AUTO: 2.13 THOUSANDS/ÂΜL (ref 0.6–4.47)
LYMPHOCYTES NFR BLD AUTO: 25 % (ref 14–44)
MCH RBC QN AUTO: 30.2 PG (ref 26.8–34.3)
MCHC RBC AUTO-ENTMCNC: 33.1 G/DL (ref 31.4–37.4)
MCV RBC AUTO: 91 FL (ref 82–98)
MONOCYTES # BLD AUTO: 0.74 THOUSAND/ÂΜL (ref 0.17–1.22)
MONOCYTES NFR BLD AUTO: 9 % (ref 4–12)
NEUTROPHILS # BLD AUTO: 5.25 THOUSANDS/ÂΜL (ref 1.85–7.62)
NEUTS SEG NFR BLD AUTO: 59 % (ref 43–75)
NONHDLC SERPL-MCNC: 138 MG/DL
NRBC BLD AUTO-RTO: 0 /100 WBCS
PLATELET # BLD AUTO: 224 THOUSANDS/UL (ref 149–390)
PMV BLD AUTO: 9.8 FL (ref 8.9–12.7)
POTASSIUM SERPL-SCNC: 3.8 MMOL/L (ref 3.5–5.3)
PROT SERPL-MCNC: 7.3 G/DL (ref 6.4–8.4)
RBC # BLD AUTO: 4.84 MILLION/UL (ref 3.88–5.62)
SODIUM SERPL-SCNC: 137 MMOL/L (ref 135–147)
T4 FREE SERPL-MCNC: 1.42 NG/DL (ref 0.61–1.12)
TRIGL SERPL-MCNC: 124 MG/DL
TSH SERPL DL<=0.05 MIU/L-ACNC: 0.04 UIU/ML (ref 0.45–4.5)
WBC # BLD AUTO: 8.68 THOUSAND/UL (ref 4.31–10.16)

## 2023-07-28 PROCEDURE — 84153 ASSAY OF PSA TOTAL: CPT

## 2023-07-28 PROCEDURE — 80053 COMPREHEN METABOLIC PANEL: CPT

## 2023-07-28 PROCEDURE — 85025 COMPLETE CBC W/AUTO DIFF WBC: CPT

## 2023-07-28 PROCEDURE — 82306 VITAMIN D 25 HYDROXY: CPT

## 2023-07-28 PROCEDURE — 84439 ASSAY OF FREE THYROXINE: CPT

## 2023-07-28 PROCEDURE — 84443 ASSAY THYROID STIM HORMONE: CPT

## 2023-07-28 PROCEDURE — 80061 LIPID PANEL: CPT

## 2023-07-30 LAB — MISCELLANEOUS LAB TEST RESULT: NORMAL

## 2023-08-01 DIAGNOSIS — E55.9 VITAMIN D DEFICIENCY: ICD-10-CM

## 2023-08-01 DIAGNOSIS — C73 PAPILLARY THYROID CARCINOMA (HCC): Primary | ICD-10-CM

## 2023-08-01 DIAGNOSIS — E89.0 POST-OPERATIVE HYPOTHYROIDISM: ICD-10-CM

## 2023-09-05 ENCOUNTER — VBI (OUTPATIENT)
Dept: ADMINISTRATIVE | Facility: OTHER | Age: 58
End: 2023-09-05

## 2023-09-26 ENCOUNTER — OFFICE VISIT (OUTPATIENT)
Dept: FAMILY MEDICINE CLINIC | Facility: CLINIC | Age: 58
End: 2023-09-26
Payer: COMMERCIAL

## 2023-09-26 VITALS
RESPIRATION RATE: 20 BRPM | TEMPERATURE: 97.3 F | SYSTOLIC BLOOD PRESSURE: 118 MMHG | BODY MASS INDEX: 38.36 KG/M2 | HEIGHT: 76 IN | OXYGEN SATURATION: 96 % | WEIGHT: 315 LBS | DIASTOLIC BLOOD PRESSURE: 80 MMHG | HEART RATE: 88 BPM

## 2023-09-26 DIAGNOSIS — G89.29 CHRONIC MIDLINE LOW BACK PAIN WITHOUT SCIATICA: ICD-10-CM

## 2023-09-26 DIAGNOSIS — Z00.00 MEDICARE ANNUAL WELLNESS VISIT, SUBSEQUENT: ICD-10-CM

## 2023-09-26 DIAGNOSIS — Z13.220 SCREENING FOR HYPERLIPIDEMIA: ICD-10-CM

## 2023-09-26 DIAGNOSIS — E66.01 CLASS 3 SEVERE OBESITY DUE TO EXCESS CALORIES WITH SERIOUS COMORBIDITY AND BODY MASS INDEX (BMI) OF 45.0 TO 49.9 IN ADULT (HCC): ICD-10-CM

## 2023-09-26 DIAGNOSIS — Z23 NEED FOR INFLUENZA VACCINATION: ICD-10-CM

## 2023-09-26 DIAGNOSIS — I10 PRIMARY HYPERTENSION: Primary | ICD-10-CM

## 2023-09-26 DIAGNOSIS — G47.33 OSA (OBSTRUCTIVE SLEEP APNEA): ICD-10-CM

## 2023-09-26 DIAGNOSIS — E89.0 POST-OPERATIVE HYPOTHYROIDISM: ICD-10-CM

## 2023-09-26 DIAGNOSIS — Z12.11 COLON CANCER SCREENING: ICD-10-CM

## 2023-09-26 DIAGNOSIS — Z12.5 ENCOUNTER FOR PROSTATE CANCER SCREENING: ICD-10-CM

## 2023-09-26 DIAGNOSIS — C73 PAPILLARY THYROID CARCINOMA (HCC): ICD-10-CM

## 2023-09-26 DIAGNOSIS — M15.9 PRIMARY OSTEOARTHRITIS INVOLVING MULTIPLE JOINTS: ICD-10-CM

## 2023-09-26 DIAGNOSIS — N21.9 CALCULUS OF LOWER URINARY TRACT: ICD-10-CM

## 2023-09-26 DIAGNOSIS — M54.50 CHRONIC MIDLINE LOW BACK PAIN WITHOUT SCIATICA: ICD-10-CM

## 2023-09-26 DIAGNOSIS — M10.9 GOUT, ARTHROPATHY: ICD-10-CM

## 2023-09-26 DIAGNOSIS — R73.09 ELEVATED GLUCOSE: ICD-10-CM

## 2023-09-26 DIAGNOSIS — K21.9 GASTROESOPHAGEAL REFLUX DISEASE WITHOUT ESOPHAGITIS: ICD-10-CM

## 2023-09-26 DIAGNOSIS — M10.9 ACUTE GOUT OF LEFT FOOT, UNSPECIFIED CAUSE: ICD-10-CM

## 2023-09-26 PROBLEM — M17.11 PRIMARY OSTEOARTHRITIS OF RIGHT KNEE: Status: RESOLVED | Noted: 2018-11-30 | Resolved: 2023-09-26

## 2023-09-26 LAB
SL AMB POCT FECES OCC BLD: 5.8
SL AMB POCT HEMOGLOBIN AIC: 5.8 (ref ?–6.5)

## 2023-09-26 PROCEDURE — 82270 OCCULT BLOOD FECES: CPT | Performed by: FAMILY MEDICINE

## 2023-09-26 PROCEDURE — 90686 IIV4 VACC NO PRSV 0.5 ML IM: CPT

## 2023-09-26 PROCEDURE — G0439 PPPS, SUBSEQ VISIT: HCPCS | Performed by: FAMILY MEDICINE

## 2023-09-26 PROCEDURE — 83036 HEMOGLOBIN GLYCOSYLATED A1C: CPT | Performed by: FAMILY MEDICINE

## 2023-09-26 PROCEDURE — G0008 ADMIN INFLUENZA VIRUS VAC: HCPCS

## 2023-09-26 PROCEDURE — 99215 OFFICE O/P EST HI 40 MIN: CPT | Performed by: FAMILY MEDICINE

## 2023-09-26 RX ORDER — COLCHICINE 0.6 MG/1
0.6 TABLET ORAL 2 TIMES DAILY
Qty: 28 TABLET | Refills: 0 | Status: SHIPPED | OUTPATIENT
Start: 2023-09-26 | End: 2023-10-10

## 2023-09-26 RX ORDER — ALLOPURINOL 300 MG/1
300 TABLET ORAL DAILY
Qty: 90 TABLET | Refills: 1 | Status: SHIPPED | OUTPATIENT
Start: 2023-09-26

## 2023-09-26 RX ORDER — LEVOTHYROXINE SODIUM 0.03 MG/1
25 TABLET ORAL DAILY
COMMUNITY

## 2023-09-26 NOTE — LETTER
Trung Villalobos      Current Outpatient Medications:     allopurinol (ZYLOPRIM) 100 mg tablet, Take 1 tablet (100 mg total) by mouth daily, Disp: 90 tablet, Rfl: 3    amLODIPine (NORVASC) 5 mg tablet, Take 1 tablet (5 mg total) by mouth daily, Disp: 90 tablet, Rfl: 3    ergocalciferol (VITAMIN D2) 50,000 units, Take one capsule by mouth once a week for 12 weeks, Disp: 12 capsule, Rfl: 0    levothyroxine (Synthroid) 200 mcg tablet, Take 1 tablet (200 mcg total) by mouth daily, Disp: 90 tablet, Rfl: 1    lisinopril-hydrochlorothiazide (PRINZIDE,ZESTORETIC) 20-25 MG per tablet, TAKE 1 TABLET EVERY DAY, Disp: 90 tablet, Rfl: 0    omeprazole (PriLOSEC) 20 mg delayed release capsule, Take 1 capsule (20 mg total) by mouth daily, Disp: 90 capsule, Rfl: 3      Recent Results (from the past 2688 hour(s))   Basic metabolic panel    Collection Time: 06/13/23 10:46 AM   Result Value Ref Range    Sodium 139 135 - 147 mmol/L    Potassium 3.7 3.5 - 5.3 mmol/L    Chloride 107 96 - 108 mmol/L    CO2 25 21 - 32 mmol/L    ANION GAP 7 4 - 13 mmol/L    BUN 28 (H) 5 - 25 mg/dL    Creatinine 1.03 0.60 - 1.30 mg/dL    Glucose 101 65 - 140 mg/dL    Calcium 9.2 8.4 - 10.2 mg/dL    eGFR 78 ml/min/1.73sq m   T4, free Lab Collect    Collection Time: 06/13/23 10:46 AM   Result Value Ref Range    Free T4 1.45 (H) 0.61 - 1.12 ng/dL   TSH, 3rd generation Lab Collect    Collection Time: 06/13/23 10:46 AM   Result Value Ref Range    TSH 3RD GENERATON <0.010 (L) 0.450 - 4.500 uIU/mL   Uric acid    Collection Time: 06/13/23 10:46 AM   Result Value Ref Range    Uric Acid 8.1 3.5 - 8.5 mg/dL   Vitamin D 25 hydroxy Lab Collect    Collection Time: 07/28/23  1:22 PM   Result Value Ref Range    Vit D, 25-Hydroxy 27.4 (L) 30.0 - 100.0 ng/mL   T4, free Lab Collect    Collection Time: 07/28/23  1:22 PM   Result Value Ref Range    Free T4 1.42 (H) 0.61 - 1.12 ng/dL   TSH, 3rd generation Lab Collect    Collection Time: 07/28/23  1:22 PM   Result Value Ref Range TSH 3RD GENERATON 0.038 (L) 0.450 - 4.500 uIU/mL   CBC and differential    Collection Time: 07/28/23  1:22 PM   Result Value Ref Range    WBC 8.68 4.31 - 10.16 Thousand/uL    RBC 4.84 3.88 - 5.62 Million/uL    Hemoglobin 14.6 12.0 - 17.0 g/dL    Hematocrit 44.1 36.5 - 49.3 %    MCV 91 82 - 98 fL    MCH 30.2 26.8 - 34.3 pg    MCHC 33.1 31.4 - 37.4 g/dL    RDW 14.1 11.6 - 15.1 %    MPV 9.8 8.9 - 12.7 fL    Platelets 306 561 - 717 Thousands/uL    nRBC 0 /100 WBCs    Neutrophils Relative 59 43 - 75 %    Immat GRANS % 1 0 - 2 %    Lymphocytes Relative 25 14 - 44 %    Monocytes Relative 9 4 - 12 %    Eosinophils Relative 5 0 - 6 %    Basophils Relative 1 0 - 1 %    Neutrophils Absolute 5.25 1.85 - 7.62 Thousands/µL    Immature Grans Absolute 0.04 0.00 - 0.20 Thousand/uL    Lymphocytes Absolute 2.13 0.60 - 4.47 Thousands/µL    Monocytes Absolute 0.74 0.17 - 1.22 Thousand/µL    Eosinophils Absolute 0.45 0.00 - 0.61 Thousand/µL    Basophils Absolute 0.07 0.00 - 0.10 Thousands/µL   Comprehensive metabolic panel    Collection Time: 07/28/23  1:22 PM   Result Value Ref Range    Sodium 137 135 - 147 mmol/L    Potassium 3.8 3.5 - 5.3 mmol/L    Chloride 106 96 - 108 mmol/L    CO2 26 21 - 32 mmol/L    ANION GAP 5 mmol/L    BUN 23 5 - 25 mg/dL    Creatinine 1.06 0.60 - 1.30 mg/dL    Glucose, Fasting 100 (H) 65 - 99 mg/dL    Calcium 9.4 8.4 - 10.2 mg/dL    AST 19 13 - 39 U/L    ALT 23 7 - 52 U/L    Alkaline Phosphatase 64 34 - 104 U/L    Total Protein 7.3 6.4 - 8.4 g/dL    Albumin 4.0 3.5 - 5.0 g/dL    Total Bilirubin 0.61 0.20 - 1.00 mg/dL    eGFR 75 ml/min/1.73sq m   Lipid panel    Collection Time: 07/28/23  1:22 PM   Result Value Ref Range    Cholesterol 169 See Comment mg/dL    Triglycerides 124 See Comment mg/dL    HDL, Direct 31 (L) >=40 mg/dL    LDL Calculated 113 (H) 0 - 100 mg/dL    Non-HDL-Chol (CHOL-HDL) 138 mg/dl   PSA Total (Reflex To Free)    Collection Time: 07/28/23  1:22 PM   Result Value Ref Range    Miscellaneous Lab Test Result SEE WRITTEN REPORT

## 2023-09-26 NOTE — PATIENT INSTRUCTIONS
DISCUSSED HEALTH MAINTENENCE ISSUES  BW WILL BE REVIEWED  ENCOURAGED HEALTHY DIET AND EXERCISE  COLONOSCOPY WILL BE ORDERED  RV FOR ANNUAL HEALTH EXAM IN 1 YEAR  RV SOONER IF THERE ARE ANY CONCERNS      RV 6M    Recent Results (from the past 2688 hour(s))   Basic metabolic panel    Collection Time: 06/13/23 10:46 AM   Result Value Ref Range    Sodium 139 135 - 147 mmol/L    Potassium 3.7 3.5 - 5.3 mmol/L    Chloride 107 96 - 108 mmol/L    CO2 25 21 - 32 mmol/L    ANION GAP 7 4 - 13 mmol/L    BUN 28 (H) 5 - 25 mg/dL    Creatinine 1.03 0.60 - 1.30 mg/dL    Glucose 101 65 - 140 mg/dL    Calcium 9.2 8.4 - 10.2 mg/dL    eGFR 78 ml/min/1.73sq m   T4, free Lab Collect    Collection Time: 06/13/23 10:46 AM   Result Value Ref Range    Free T4 1.45 (H) 0.61 - 1.12 ng/dL   TSH, 3rd generation Lab Collect    Collection Time: 06/13/23 10:46 AM   Result Value Ref Range    TSH 3RD GENERATON <0.010 (L) 0.450 - 4.500 uIU/mL   Uric acid    Collection Time: 06/13/23 10:46 AM   Result Value Ref Range    Uric Acid 8.1 3.5 - 8.5 mg/dL   Vitamin D 25 hydroxy Lab Collect    Collection Time: 07/28/23  1:22 PM   Result Value Ref Range    Vit D, 25-Hydroxy 27.4 (L) 30.0 - 100.0 ng/mL   T4, free Lab Collect    Collection Time: 07/28/23  1:22 PM   Result Value Ref Range    Free T4 1.42 (H) 0.61 - 1.12 ng/dL   TSH, 3rd generation Lab Collect    Collection Time: 07/28/23  1:22 PM   Result Value Ref Range    TSH 3RD GENERATON 0.038 (L) 0.450 - 4.500 uIU/mL   CBC and differential    Collection Time: 07/28/23  1:22 PM   Result Value Ref Range    WBC 8.68 4.31 - 10.16 Thousand/uL    RBC 4.84 3.88 - 5.62 Million/uL    Hemoglobin 14.6 12.0 - 17.0 g/dL    Hematocrit 44.1 36.5 - 49.3 %    MCV 91 82 - 98 fL    MCH 30.2 26.8 - 34.3 pg    MCHC 33.1 31.4 - 37.4 g/dL    RDW 14.1 11.6 - 15.1 %    MPV 9.8 8.9 - 12.7 fL    Platelets 851 010 - 335 Thousands/uL    nRBC 0 /100 WBCs    Neutrophils Relative 59 43 - 75 %    Immat GRANS % 1 0 - 2 %    Lymphocytes Relative 25 14 - 44 %    Monocytes Relative 9 4 - 12 %    Eosinophils Relative 5 0 - 6 %    Basophils Relative 1 0 - 1 %    Neutrophils Absolute 5.25 1.85 - 7.62 Thousands/µL    Immature Grans Absolute 0.04 0.00 - 0.20 Thousand/uL    Lymphocytes Absolute 2.13 0.60 - 4.47 Thousands/µL    Monocytes Absolute 0.74 0.17 - 1.22 Thousand/µL    Eosinophils Absolute 0.45 0.00 - 0.61 Thousand/µL    Basophils Absolute 0.07 0.00 - 0.10 Thousands/µL   Comprehensive metabolic panel    Collection Time: 07/28/23  1:22 PM   Result Value Ref Range    Sodium 137 135 - 147 mmol/L    Potassium 3.8 3.5 - 5.3 mmol/L    Chloride 106 96 - 108 mmol/L    CO2 26 21 - 32 mmol/L    ANION GAP 5 mmol/L    BUN 23 5 - 25 mg/dL    Creatinine 1.06 0.60 - 1.30 mg/dL    Glucose, Fasting 100 (H) 65 - 99 mg/dL    Calcium 9.4 8.4 - 10.2 mg/dL    AST 19 13 - 39 U/L    ALT 23 7 - 52 U/L    Alkaline Phosphatase 64 34 - 104 U/L    Total Protein 7.3 6.4 - 8.4 g/dL    Albumin 4.0 3.5 - 5.0 g/dL    Total Bilirubin 0.61 0.20 - 1.00 mg/dL    eGFR 75 ml/min/1.73sq m   Lipid panel    Collection Time: 07/28/23  1:22 PM   Result Value Ref Range    Cholesterol 169 See Comment mg/dL    Triglycerides 124 See Comment mg/dL    HDL, Direct 31 (L) >=40 mg/dL    LDL Calculated 113 (H) 0 - 100 mg/dL    Non-HDL-Chol (CHOL-HDL) 138 mg/dl   PSA Total (Reflex To Free)    Collection Time: 07/28/23  1:22 PM   Result Value Ref Range    Miscellaneous Lab Test Result SEE WRITTEN REPORT          Medicare Preventive Visit Patient Instructions  Thank you for completing your Welcome to Medicare Visit or Medicare Annual Wellness Visit today. Your next wellness visit will be due in one year (9/26/2024). The screening/preventive services that you may require over the next 5-10 years are detailed below. Some tests may not apply to you based off risk factors and/or age. Screening tests ordered at today's visit but not completed yet may show as past due.  Also, please note that scanned in results may not display below. Preventive Screenings:  Service Recommendations Previous Testing/Comments   Colorectal Cancer Screening  · Colonoscopy    · Fecal Occult Blood Test (FOBT)/Fecal Immunochemical Test (FIT)  · Fecal DNA/Cologuard Test  · Flexible Sigmoidoscopy Age: 43-73 years old   Colonoscopy: every 10 years (May be performed more frequently if at higher risk)  OR  FOBT/FIT: every 1 year  OR  Cologuard: every 3 years  OR  Sigmoidoscopy: every 5 years  Screening may be recommended earlier than age 39 if at higher risk for colorectal cancer. Also, an individualized decision between you and your healthcare provider will decide whether screening between the ages of 77-80 would be appropriate. Colonoscopy: 08/05/2019  FOBT/FIT: Not on file  Cologuard: Not on file  Sigmoidoscopy: Not on file    Screening Current     Prostate Cancer Screening Individualized decision between patient and health care provider in men between ages of 53-66   Medicare will cover every 12 months beginning on the day after your 50th birthday PSA: 2.4 ng/mL           Hepatitis C Screening Once for adults born between 1945 and 1965  More frequently in patients at high risk for Hepatitis C Hep C Antibody: 12/22/2020    Screening Current   Diabetes Screening 1-2 times per year if you're at risk for diabetes or have pre-diabetes Fasting glucose: 100 mg/dL (7/28/2023)  A1C: 5.9 % (7/22/2022)  Screening Not Indicated  History Diabetes   Cholesterol Screening Once every 5 years if you don't have a lipid disorder. May order more often based on risk factors. Lipid panel: 07/28/2023  Screening Current      Other Preventive Screenings Covered by Medicare:  1. Abdominal Aortic Aneurysm (AAA) Screening: covered once if your at risk. You're considered to be at risk if you have a family history of AAA or a male between the age of 70-76 who smoking at least 100 cigarettes in your lifetime.   2. Lung Cancer Screening: covers low dose CT scan once per year if you meet all of the following conditions: (1) Age 48-67; (2) No signs or symptoms of lung cancer; (3) Current smoker or have quit smoking within the last 15 years; (4) You have a tobacco smoking history of at least 20 pack years (packs per day x number of years you smoked); (5) You get a written order from a healthcare provider. 3. Glaucoma Screening: covered annually if you're considered high risk: (1) You have diabetes OR (2) Family history of glaucoma OR (3)  aged 48 and older OR (3)  American aged 72 and older  3. Osteoporosis Screening: covered every 2 years if you meet one of the following conditions: (1) Have a vertebral abnormality; (2) On glucocorticoid therapy for more than 3 months; (3) Have primary hyperparathyroidism; (4) On osteoporosis medications and need to assess response to drug therapy. 5. HIV Screening: covered annually if you're between the age of 14-79. Also covered annually if you are younger than 13 and older than 72 with risk factors for HIV infection. For pregnant patients, it is covered up to 3 times per pregnancy.     Immunizations:  Immunization Recommendations   Influenza Vaccine Annual influenza vaccination during flu season is recommended for all persons aged >= 6 months who do not have contraindications   Pneumococcal Vaccine   * Pneumococcal conjugate vaccine = PCV13 (Prevnar 13), PCV15 (Vaxneuvance), PCV20 (Prevnar 20)  * Pneumococcal polysaccharide vaccine = PPSV23 (Pneumovax) Adults 20-63 years old: 1-3 doses may be recommended based on certain risk factors  Adults 72 years old: 1-2 doses may be recommended based off what pneumonia vaccine you previously received   Hepatitis B Vaccine 3 dose series if at intermediate or high risk (ex: diabetes, end stage renal disease, liver disease)   Tetanus (Td) Vaccine - COST NOT COVERED BY MEDICARE PART B Following completion of primary series, a booster dose should be given every 10 years to maintain immunity against tetanus. Td may also be given as tetanus wound prophylaxis. Tdap Vaccine - COST NOT COVERED BY MEDICARE PART B Recommended at least once for all adults. For pregnant patients, recommended with each pregnancy. Shingles Vaccine (Shingrix) - COST NOT COVERED BY MEDICARE PART B  2 shot series recommended in those aged 48 and above     Health Maintenance Due:      Topic Date Due   • Colorectal Cancer Screening  08/05/2029   • HIV Screening  Completed   • Hepatitis C Screening  Completed     Immunizations Due:      Topic Date Due   • Pneumococcal Vaccine: Pediatrics (0 to 5 Years) and At-Risk Patients (6 to 59 Years) (1 - PCV) Never done   • Influenza Vaccine (1) 09/01/2023     Advance Directives   What are advance directives? Advance directives are legal documents that state your wishes and plans for medical care. These plans are made ahead of time in case you lose your ability to make decisions for yourself. Advance directives can apply to any medical decision, such as the treatments you want, and if you want to donate organs. What are the types of advance directives? There are many types of advance directives, and each state has rules about how to use them. You may choose a combination of any of the following:  · Living will: This is a written record of the treatment you want. You can also choose which treatments you do not want, which to limit, and which to stop at a certain time. This includes surgery, medicine, IV fluid, and tube feedings. · Durable power of  for healthcare [facility]): This is a written record that states who you want to make healthcare choices for you when you are unable to make them for yourself. This person, called a proxy, is usually a family member or a friend. You may choose more than 1 proxy. · Do not resuscitate (DNR) order:  A DNR order is used in case your heart stops beating or you stop breathing.  It is a request not to have certain forms of treatment, such as CPR. A DNR order may be included in other types of advance directives. · Medical directive: This covers the care that you want if you are in a coma, near death, or unable to make decisions for yourself. You can list the treatments you want for each condition. Treatment may include pain medicine, surgery, blood transfusions, dialysis, IV or tube feedings, and a ventilator (breathing machine). · Values history: This document has questions about your views, beliefs, and how you feel and think about life. This information can help others choose the care that you would choose. Why are advance directives important? An advance directive helps you control your care. Although spoken wishes may be used, it is better to have your wishes written down. Spoken wishes can be misunderstood, or not followed. Treatments may be given even if you do not want them. An advance directive may make it easier for your family to make difficult choices about your care. Weight Management   Why it is important to manage your weight:  Being overweight increases your risk of health conditions such as heart disease, high blood pressure, type 2 diabetes, and certain types of cancer. It can also increase your risk for osteoarthritis, sleep apnea, and other respiratory problems. Aim for a slow, steady weight loss. Even a small amount of weight loss can lower your risk of health problems. How to lose weight safely:  A safe and healthy way to lose weight is to eat fewer calories and get regular exercise. You can lose up about 1 pound a week by decreasing the number of calories you eat by 500 calories each day. Healthy meal plan for weight management:  A healthy meal plan includes a variety of foods, contains fewer calories, and helps you stay healthy. A healthy meal plan includes the following:  · Eat whole-grain foods more often. A healthy meal plan should contain fiber.  Fiber is the part of grains, fruits, and vegetables that is not broken down by your body. Whole-grain foods are healthy and provide extra fiber in your diet. Some examples of whole-grain foods are whole-wheat breads and pastas, oatmeal, brown rice, and bulgur. · Eat a variety of vegetables every day. Include dark, leafy greens such as spinach, kale, romeo greens, and mustard greens. Eat yellow and orange vegetables such as carrots, sweet potatoes, and winter squash. · Eat a variety of fruits every day. Choose fresh or canned fruit (canned in its own juice or light syrup) instead of juice. Fruit juice has very little or no fiber. · Eat low-fat dairy foods. Drink fat-free (skim) milk or 1% milk. Eat fat-free yogurt and low-fat cottage cheese. Try low-fat cheeses such as mozzarella and other reduced-fat cheeses. · Choose meat and other protein foods that are low in fat. Choose beans or other legumes such as split peas or lentils. Choose fish, skinless poultry (chicken or turkey), or lean cuts of red meat (beef or pork). Before you cook meat or poultry, cut off any visible fat. · Use less fat and oil. Try baking foods instead of frying them. Add less fat, such as margarine, sour cream, regular salad dressing and mayonnaise to foods. Eat fewer high-fat foods. Some examples of high-fat foods include french fries, doughnuts, ice cream, and cakes. · Eat fewer sweets. Limit foods and drinks that are high in sugar. This includes candy, cookies, regular soda, and sweetened drinks. Exercise:  Exercise at least 30 minutes per day on most days of the week. Some examples of exercise include walking, biking, dancing, and swimming. You can also fit in more physical activity by taking the stairs instead of the elevator or parking farther away from stores. Ask your healthcare provider about the best exercise plan for you. © Copyright [employer] 2018 Information is for End User's use only and may not be sold, redistributed or otherwise used for commercial purposes.  All illustrations and images included in CareNotes® are the copyrighted property of [employer] or [address] [place] [address]

## 2023-09-26 NOTE — PROGRESS NOTES
Assessment and Plan:     Problem List Items Addressed This Visit        Digestive    GERD (gastroesophageal reflux disease)     STABLE  DENIES ANY CP, INDIGESTION, OR COUGH  NOTES NO MELENA OR HEMATOCHEZIA  NO HEMATEMESIS  COMPLIANT WITH MEDICATION  NO CONCERNS    - CONTINUE CURRENT TREATMENT PLAN  - MEDICATION AS PRESCRIBED  - AVOID CAFFEINE, ALCOHOL OR SMOKING              Endocrine    Papillary thyroid carcinoma (HCC)    Relevant Medications    levothyroxine 25 mcg tablet    Post-operative hypothyroidism     STABLE           Relevant Medications    levothyroxine 25 mcg tablet       Respiratory    DOMINIC (obstructive sleep apnea)    Relevant Orders    Ambulatory Referral to Sleep Medicine       Cardiovascular and Mediastinum    Hypertension - Primary     STABLE  DENIES ANY CP, SOB, PALPITATIONS, OR HEADACHE  NOTES NO WATER RETENTION  COMPLIANT WITH MEDICATION  NO CONCERNS    - CONTINUE CURRENT TREATMENT PLAN  - MONITOR DIETARY SODIUM INTAKE  - ENCOURAGE PHYSICAL ACTIVITY  - RV 6 MONTHS              Musculoskeletal and Integument    Osteoarthritis     STABLE  DENIES ANY JOINT SWELLING OR REDNESS  JOINT STIFFNESS PRESENT  PAIN MANAGEMENT ADEQUATE    - CONTINUE CURRENT MANAGEMENT  - MEDICATION AS DIRECTED  - CALL / RETURN IF SYMPTOMS WORSEN              Genitourinary    Urinary calculus    Relevant Medications    colchicine (COLCRYS) 0.6 mg tablet    allopurinol (ZYLOPRIM) 300 mg tablet       Other    Chronic midline low back pain without sciatica    Class 3 severe obesity due to excess calories with serious comorbidity and body mass index (BMI) of 45.0 to 49.9 in adult [facility])   Other Visit Diagnoses     Screening for hyperlipidemia        Colon cancer screening        Relevant Orders    POCT hemoccult screening (Completed)    Encounter for prostate cancer screening        Medicare annual wellness visit, subsequent        Acute gout of left foot, unspecified cause        Relevant Medications    colchicine (COLCRYS) 0.6 mg tablet    Gout, arthropathy        Relevant Medications    colchicine (COLCRYS) 0.6 mg tablet    allopurinol (ZYLOPRIM) 300 mg tablet    Elevated glucose        Relevant Orders    POCT hemoglobin A1c (Completed)    Need for influenza vaccination        Relevant Orders    influenza vaccine, quadrivalent, 0.5 mL, preservative-free, for adult and pediatric patients 6 mos+ (AFLURIA, FLUARIX, FLULAVAL, FLUZONE)          Depression Screening and Follow-up Plan: Patient was screened for depression during today's encounter. They screened negative with a PHQ-2 score of 2. Preventive health issues were discussed with patient, and age appropriate screening tests were ordered as noted in patient's After Visit Summary. Personalized health advice and appropriate referrals for health education or preventive services given if needed, as noted in patient's After Visit Summary. History of Present Illness:     Patient presents for a Medicare Wellness Visit    PATIENT [facility] AND ANNUAL EVALUATION OF PATIENT'S MEDICAL ISSUES    INDIVIDUAL MEDICAL ISSUES WITH THEIR CURRENT STATUS, ASSESSMENT AND PLANS ARE LISTED ABOVE         Patient Care Team:  Yana Maria MD as PCP - General (Family Medicine)  César Torres MD (Otolaryngology)  Jena Copeland MD (Ophthalmology)     Review of Systems:     Review of Systems   Constitutional: Negative for chills, fatigue and fever. HENT: Negative for congestion, ear discharge, ear pain, mouth sores, postnasal drip, sore throat and trouble swallowing. Eyes: Negative for pain, discharge and visual disturbance. Respiratory: Negative for cough, shortness of breath and wheezing. Cardiovascular: Negative for chest pain, palpitations and leg swelling. Gastrointestinal: Negative for abdominal distention, abdominal pain, blood in stool, diarrhea and nausea. Endocrine: Negative for polydipsia, polyphagia and polyuria.    Genitourinary: Negative for dysuria, frequency, hematuria and urgency. Musculoskeletal: Negative for arthralgias, gait problem and joint swelling. Skin: Negative for pallor and rash. Neurological: Negative for dizziness, syncope, speech difficulty, weakness, light-headedness, numbness and headaches. Hematological: Negative for adenopathy. Psychiatric/Behavioral: Negative for behavioral problems, confusion and sleep disturbance. The patient is not nervous/anxious.          Problem List:     Patient Active Problem List   Diagnosis   • Anxiety   • GERD (gastroesophageal reflux disease)   • Hypertension   • Osteoarthritis   • Sciatica   • Breathing-related sleep disorder   • Urinary calculus   • Chronic midline low back pain without sciatica   • Class 3 severe obesity due to excess calories with serious comorbidity and body mass index (BMI) of 45.0 to 49.9 in [facility])   • Chronic pain syndrome   • Lumbar post-laminectomy syndrome   • DOMINIC (obstructive sleep apnea)   • Thyroid nodule   • Chronic kidney disease   • Ureteral stone with hydronephrosis   • Papillary thyroid carcinoma (HCC)   • Post-operative hypothyroidism   • Vitamin D deficiency   • Other fatigue      Past Medical and Surgical History:     Past Medical History:   Diagnosis Date   • Borderline diabetes     per pt A1C "coming down"   • Chronic pain disorder     lower back-s/p laminectomy   • Claustrophobia     "some degree" jes MRI's"   • CPAP (continuous positive airway pressure) dependence     per pt does not use CPAP   • Diverticulitis    • GERD (gastroesophageal reflux disease)    • Hypertension    • Kidney stone     x 2 episodes   • Lactose intolerance    • Localized pain of joint    • Mild sleep apnea     CPAP started in July having difficulty using- only has used on occ   • Morbid obesity with body mass index (BMI) of 50.0 to 59.9 in [facility])    • Papillary thyroid carcinoma (HCC)    • Rectal bleeding     occ rectal bleeding last episode unsure-"nothing recent"   • Seasickness    • Skin tag     skin tags were removed-as of 8/2/19 has a few more   • Teeth missing    • Thyroid nodule    • Tinnitus    • Wears glasses    • Weight loss     "80lbs and did gain 40lbs back"--     Past Surgical History:   Procedure Laterality Date   • BACK SURGERY      laminectomy-1996, 2010   • COLONOSCOPY      x2   • ESOPHAGOGASTRODUODENOSCOPY N/A 3/30/2019    Procedure: ESOPHAGOGASTRODUODENOSCOPY (EGD);   Surgeon: Jerrell Zhou MD;  Location: [facility];  Service: Gastroenterology   • FL RETROGRADE PYELOGRAM  9/16/2022   • FL RETROGRADE PYELOGRAM  9/22/2022   • FL RETROGRADE PYELOGRAM  9/29/2022   • KNEE ARTHROSCOPY Left 2007   • HI CYSTO BLADDER W/URETERAL CATHETERIZATION Right 9/16/2022    Procedure: CYSTOSCOPY RETROGRADE PYELOGRAM WITH INSERTION STENT URETERAL retrograde;  Surgeon: Enma Arellano MD;  Location: [facility];  Service: Urology   • HI CYSTO/URETERO W/LITHOTRIPSY &INDWELL STENT INSRT Right 1/6/2022    Procedure: CYSTOSCOPY URETEROSCOPY AND INSERTION STENT URETERAL RIGHT;  Surgeon: Enma Arellano MD;  Location: [facility];  Service: Urology   • HI CYSTO/URETERO W/LITHOTRIPSY &INDWELL STENT INSRT Right 9/22/2022    Procedure: CYSTOSCOPY URETEROSCOPY WITH LITHOTRIPSY HOLMIUM LASER, BASKET EXTRACTION, RETROGRADE PYELOGRAM (BILATERAL) AND  STENT EXCHANGE;  Surgeon: Enma Arellano MD;  Location: [facility];  Service: Urology   • HI CYSTO/URETERO W/LITHOTRIPSY &INDWELL STENT INSRT Bilateral 9/29/2022    Procedure: CYSTOSCOPY URETEROSCOPY, STENT MANUPILATION, RETROGRADE PYELOGRAM, LEFT  URETERAL STENT NSERTION, AND  REMOVAL OF RIGHT STENT;  Surgeon: Enma Arellano MD;  Location: [facility];  Service: Urology   • HI CYSTO/URETERO W/LITHOTRIPSY &INDWELL STENT INSRT Left 10/13/2022    Procedure: CYSTOSCOPY URETEROSCOPY WITH LITHOTRIPSY BASKET EXTRACTION, RETROGRADE PYELOGRAM AND  STENT EXCHANGE;  Surgeon: Enma Arellano MD;  Location: [facility];  Service: Urology   • HI ESOPHAGOGASTRODUODENOSCOPY TRANSORAL DIAGNOSTIC N/A 5/1/2019    Procedure: ESOPHAGOGASTRODUODENOSCOPY (EGD); Surgeon: Brandyn Allen MD;  Location: [facility];   Service: Gastroenterology   • HI THYROIDECTOMY RMVL REMAINING TISS FLWG PRTL RMVL Right 2/8/2023    Procedure: COMPLETION THYROIDECTOMY;  Surgeon: Abhay Cohn MD;  Location: [facility];  Service: ENT   • HI TOTAL THYROID LOBECTOMY UNI W/WO ISTHMUSECTOMY Left 8/31/2022    Procedure: Left patial THYROIDECTOMY;  Surgeon: Abhay Cohn MD;  Location: [facility];  Service: ENT   • UPPER GASTROINTESTINAL ENDOSCOPY  2006   • [address] THYROID BIOPSY  12/28/2020      Family History:     Family History   Problem Relation Age of Onset   • Osteoarthritis Mother    • Obesity Father         [id number]   • Sleep apnea Father         with CPAP   • No Known Problems Sister    • No Known Problems Brother    • Lupus Sister    • No Known Problems Sister    • No Known Problems Son    • Substance Abuse Neg Hx    • Mental illness Neg Hx    • Cancer Neg Hx    • Diabetes Neg Hx    • Heart disease Neg Hx    • Stroke Neg Hx       Social History:     Social History     Socioeconomic History   • Marital status:      Spouse name: None   • Number of children: None   • Years of education: None   • Highest education level: None   Occupational History   • None   Tobacco Use   • Smoking status: Never   • Smokeless tobacco: Never   Vaping Use   • Vaping Use: Never used   Substance and Sexual Activity   • Alcohol use: Never   • Drug use: No   • Sexual activity: None     Comment: defer   Other Topics Concern   • None   Social History Narrative    Living alone     Social Determinants of Health     Financial Resource Strain: Low Risk  (9/26/2023)    Overall Financial Resource Strain (CARDIA)    • Difficulty of Paying Living Expenses: Not hard at all   Food Insecurity: No Food Insecurity (9/30/2022)    Hunger Vital Sign    • Worried About Running Out of Food in the Last Year: Never true    • Ran Out of Food in the Last Year: Never true   Transportation Needs: No Transportation Needs (9/26/2023)    PRAPARE - Transportation    • Lack of Transportation (Medical): No    • Lack of Transportation (Non-Medical): No   Physical Activity: Not on file   Stress: Not on file   Social Connections: Not on file   Intimate Partner Violence: Not on file   Housing Stability: Low Risk  (9/30/2022)    Housing Stability Vital Sign    • Unable to Pay for Housing in the Last Year: No    • Number of Places Lived in the Last Year: 1    • Unstable Housing in the Last Year: No      Medications and Allergies:     Current Outpatient Medications   Medication Sig Dispense Refill   • allopurinol (ZYLOPRIM) 300 mg tablet Take 1 tablet (300 mg total) by mouth daily 90 tablet 1   • amLODIPine (NORVASC) 5 mg tablet Take 1 tablet (5 mg total) by mouth daily 90 tablet 3   • colchicine (COLCRYS) 0.6 mg tablet Take 1 tablet (0.6 mg total) by mouth 2 (two) times a day for 14 days 28 tablet 0   • ergocalciferol (VITAMIN D2) 50,000 units Take one capsule by mouth once a week for 12 weeks 12 capsule 0   • levothyroxine 25 mcg tablet Take 25 mcg by mouth daily     • lisinopril-hydrochlorothiazide (PRINZIDE,ZESTORETIC) 20-25 MG per tablet TAKE 1 TABLET EVERY DAY 90 tablet 0   • omeprazole (PriLOSEC) 20 mg delayed release capsule Take 1 capsule (20 mg total) by mouth daily 90 capsule 3   • levothyroxine (Synthroid) 200 mcg tablet Take 1 tablet (200 mcg total) by mouth daily 90 tablet 1     No current facility-administered medications for this visit.      Allergies   Allergen Reactions   • Lactose - Food Allergy GI Intolerance      Immunizations:     Immunization History   Administered Date(s) Administered   • Tdap 07/02/2010, 06/26/2018      Health Maintenance:         Topic Date Due   • Colorectal Cancer Screening  08/05/2029   • HIV Screening  Completed   • Hepatitis C Screening  Completed         Topic Date Due   • Pneumococcal Vaccine: Pediatrics (0 to 5 Years) and At-Risk Patients (6 to 59 Years) (1 - PCV) Never done   • Influenza Vaccine (1) 09/01/2023      Medicare Screening Tests and Risk Assessments:         Health Risk Assessment:   Patient rates overall health as fair. Patient feels that their physical health rating is much worse. Patient is satisfied with their life. Eyesight was rated as same. Hearing was rated as same. Patient feels that their emotional and mental health rating is slightly worse. Patients states they are sometimes angry. Patient states they are often unusually tired/fatigued. Pain experienced in the last 7 days has been a lot. Patient's pain rating has been 9/10. Patient states that he has experienced weight loss or gain in last 6 months. Depression Screening:   PHQ-2 Score: 2      Fall Risk Screening: In the past year, patient has experienced: no history of falling in past year      Home Safety:  Patient does not have trouble with stairs inside or outside of their home. Patient has working smoke alarms and has working carbon monoxide detector. Home safety hazards include: none. Nutrition:   Current diet is Diabetic, Low Carb and Limited junk food. Medications:   Patient is not currently taking any over-the-counter supplements. Patient is able to manage medications. Activities of Daily Living (ADLs)/Instrumental Activities of Daily Living (IADLs):   Walk and transfer into and out of bed and chair?: Yes  Dress and groom yourself?: Yes    Bathe or shower yourself?: Yes    Feed yourself? Yes  Do your laundry/housekeeping?: Yes  Manage your money, pay your bills and track your expenses?: Yes  Make your own meals?: Yes    Do your own shopping?: Yes    Previous Hospitalizations:   Any hospitalizations or ED visits within the last 12 months?: Yes    How many hospitalizations have you had in the last year?: 1-2    Advance Care Planning:   Living will: No    Durable POA for healthcare:  No Advanced directive: No    End of Life Decisions reviewed with patient: No      Cognitive Screening:   Provider or family/friend/caregiver concerned regarding cognition?: No    PREVENTIVE SCREENINGS      Cardiovascular Screening:    General: Screening Current      Diabetes Screening:     General: Screening Not Indicated and History Diabetes      Colorectal Cancer Screening:     General: Screening Current      Prostate Cancer Screening:    General: Screening Current      Osteoporosis Screening:    General: Screening Not Indicated      Abdominal Aortic Aneurysm (AAA) Screening:        General: Screening Not Indicated      Lung Cancer Screening:     General: Screening Not Indicated      Hepatitis C Screening:    General: Screening Current    Screening, Brief Intervention, and Referral to Treatment (SBIRT)    Screening  Typical number of drinks in a day: 0  Typical number of drinks in a week: 0  Interpretation: Low risk drinking behavior. AUDIT-C Screenin) How often did you have a drink containing alcohol in the past year? never  2) How many drinks did you have on a typical day when you were drinking in the past year? 0  3) How often did you have 6 or more drinks on one occasion in the past year? never    AUDIT-C Score: 0  Interpretation: Score 0-3 (male): Negative screen for alcohol misuse    Single Item Drug Screening:  How often have you used an illegal drug (including marijuana) or a prescription medication for non-medical reasons in the past year? never    Single Item Drug Screen Score: 0  Interpretation: Negative screen for possible drug use disorder    No results found. Physical Exam:     /80 (BP Location: Left arm, Patient Position: Sitting, Cuff Size: Large)   Pulse 88   Temp (!) 97.3 °F (36.3 °C) (Temporal)   Resp 20   Ht 6' 3.5" (1.918 m)   Wt (!) 176 kg (387 lb)   SpO2 96%   BMI 47.73 kg/m²     Physical Exam  Constitutional:       General: He is not in acute distress. Appearance: Normal appearance. He is well-developed. He is obese. He is not ill-appearing or diaphoretic. HENT:      Head: Normocephalic and atraumatic. Right Ear: External ear normal.      Left Ear: External ear normal.      Nose: Nose normal.      Mouth/Throat:      Mouth: Mucous membranes are moist.      Pharynx: Oropharynx is clear. No oropharyngeal exudate. Eyes:      General: No scleral icterus. Right eye: No discharge. Left eye: No discharge. Conjunctiva/sclera: Conjunctivae normal.      Pupils: Pupils are equal, round, and reactive to light. Neck:      Thyroid: No thyromegaly. Vascular: No JVD. Trachea: No tracheal deviation. Cardiovascular:      Rate and Rhythm: Normal rate and regular rhythm. Heart sounds: Normal heart sounds. No murmur heard. No friction rub. No gallop. Pulmonary:      Effort: Pulmonary effort is normal. No respiratory distress. Breath sounds: Normal breath sounds. No stridor. No wheezing or rales. Chest:      Chest wall: No tenderness. Abdominal:      General: Bowel sounds are normal. There is no distension. Palpations: Abdomen is soft. There is no mass. Tenderness: There is no abdominal tenderness. There is no guarding or rebound. Hernia: No hernia is present. Comments: OBESE   Genitourinary:     Penis: Normal. No tenderness. Prostate: Normal.      Rectum: Normal. Guaiac result negative. Musculoskeletal:         General: No tenderness or deformity. Cervical back: Normal range of motion and neck supple. Right lower leg: Edema present. Left lower leg: Edema present. Comments: MODERATE DJD CHANGES     Lymphadenopathy:      Cervical: No cervical adenopathy. Skin:     General: Skin is warm and dry. Coloration: Skin is not pale. Findings: No erythema or rash. Neurological:      General: No focal deficit present.       Mental Status: He is alert and oriented to person, place, and time. Cranial Nerves: No cranial nerve deficit. Sensory: No sensory deficit. Motor: No weakness or abnormal muscle tone. Coordination: Coordination normal.      Gait: Gait normal.      Deep Tendon Reflexes: Reflexes normal.   Psychiatric:         Mood and Affect: Mood normal.         Behavior: Behavior normal.         Thought Content:  Thought content normal.         Judgment: Judgment normal.          Luis Enrique Zhou MD

## 2023-11-06 DIAGNOSIS — E04.1 THYROID NODULE: ICD-10-CM

## 2023-11-06 DIAGNOSIS — C73 PAPILLARY THYROID CARCINOMA (HCC): ICD-10-CM

## 2023-11-06 RX ORDER — LEVOTHYROXINE SODIUM 0.2 MG/1
200 TABLET ORAL DAILY
Qty: 90 TABLET | Refills: 1 | Status: SHIPPED | OUTPATIENT
Start: 2023-11-06

## 2023-11-20 ENCOUNTER — TELEPHONE (OUTPATIENT)
Age: 58
End: 2023-11-20

## 2023-11-20 NOTE — TELEPHONE ENCOUNTER
Caller: Efraín Gore    Doctor/Office: Dr. Latha Judge    CB#: [phone]    Escalation: Co-payment/Bills/PT calling as he rec'd a bill from Dr Sophie Braden he does not understand what the charges are for and Marvin Min told him to contact Dr office. Please return call.  Thanks

## 2023-11-22 ENCOUNTER — TELEPHONE (OUTPATIENT)
Dept: FAMILY MEDICINE CLINIC | Facility: CLINIC | Age: 58
End: 2023-11-22

## 2023-11-22 DIAGNOSIS — I10 ESSENTIAL HYPERTENSION: ICD-10-CM

## 2023-11-22 RX ORDER — AMLODIPINE BESYLATE 5 MG/1
5 TABLET ORAL DAILY
Qty: 90 TABLET | Refills: 1 | Status: SHIPPED | OUTPATIENT
Start: 2023-11-22

## 2023-11-22 RX ORDER — LISINOPRIL AND HYDROCHLOROTHIAZIDE 25; 20 MG/1; MG/1
TABLET ORAL
Qty: 90 TABLET | Refills: 1 | Status: SHIPPED | OUTPATIENT
Start: 2023-11-22

## 2023-11-22 NOTE — TELEPHONE ENCOUNTER
Spoke with patient and he does not want the orthotics so they will be adjusted off the account and he will be in to pay the $50.00 copay

## 2023-11-24 ENCOUNTER — OFFICE VISIT (OUTPATIENT)
Dept: NEUROLOGY | Facility: CLINIC | Age: 58
End: 2023-11-24
Payer: COMMERCIAL

## 2023-11-24 VITALS
HEIGHT: 76 IN | HEART RATE: 76 BPM | DIASTOLIC BLOOD PRESSURE: 80 MMHG | SYSTOLIC BLOOD PRESSURE: 118 MMHG | WEIGHT: 315 LBS | BODY MASS INDEX: 38.36 KG/M2

## 2023-11-24 DIAGNOSIS — R06.83 SNORING: ICD-10-CM

## 2023-11-24 DIAGNOSIS — G47.33 OSA (OBSTRUCTIVE SLEEP APNEA): ICD-10-CM

## 2023-11-24 DIAGNOSIS — G47.19 EXCESSIVE DAYTIME SLEEPINESS: ICD-10-CM

## 2023-11-24 DIAGNOSIS — E66.01 CLASS 3 SEVERE OBESITY DUE TO EXCESS CALORIES WITHOUT SERIOUS COMORBIDITY WITH BODY MASS INDEX (BMI) OF 45.0 TO 49.9 IN ADULT (HCC): Primary | ICD-10-CM

## 2023-11-24 PROCEDURE — 99204 OFFICE O/P NEW MOD 45 MIN: CPT | Performed by: INTERNAL MEDICINE

## 2023-11-24 NOTE — PROGRESS NOTES
Sleep Consultation   Liliana Hernandez 59 y.o. male MRN: [id number]      Reason for consultation: Obstructive sleep apnea    Requesting physician: Jose Galeana MD    Assessment/Plan    1. Severe obstructive sleep apnea  Home sleep study 4/23/2019 showed GUERA 40.1. CPAP study 5/30/2019 at a weight of 400 pounds showed optimal pressure at 14 cm H2O  He was started on APAP 14-17 but had difficulty tolerating it  Clearwater score 1  Reports snoring and witnessed apneas    Plan  Patient currently has CPAP machine and would like to try it again  I discussed different treatment options including oral mandibular advancement device and hypoglossal nerve stimulator  I explained he is ordered CPAP supplies  Follow-up in 3 months for compliance visit  If he continues to have difficulty can consider short trial of Lunesta or consider doxepin for nighttime awakenings    2. Snoring  As above    3. Excessive daytime sleepiness  As above    4. Obesity  Counseled patient on lifestyle modifications including diet and exercise  Provided referral to weight management clinic    History of Present Illness   HPI:  Liliana Hernandez is a 59 y.o. male with PMHx of hypertension, DOMINIC, GERD, postop hypothyroidism status post left hemithyroidectomy who presents for evaluation of obstructive sleep apnea. He has not used CPAP in a long time as he had difficulty tolerating CPAP due to claustrophobia. He continues to snore and has witnessed apneas. He sometimes wakes up having difficulty breathing. He goes to bed at 10 PM and falls asleep by 10:30 PM.  He wakes up at 7 AM.  He wakes up 6-7 times at night to urinate and also due to shortness of breath. He occasionally has headaches. He does not smoke or drink alcohol. His current Clearwater score is 1. He has a DreamStation which was recalled. He did not register for the recall. He lost 80 pounds 2 years ago but subsequently gained it back.   He was on a carnivore diet but developed kidney stones and then gained back the weight. Review of Systems      Genitourinary none   Cardiology none   Gastrointestinal none   Neurology none   Constitutional none   Integumentary none   Psychiatry none   Musculoskeletal none   Pulmonary none   ENT none   Endocrine none   Hematological none         Historical Information   Past Medical History:   Diagnosis Date    Borderline diabetes     per pt A1C "coming down"    Chronic pain disorder     lower back-s/p laminectomy    Claustrophobia     "some degree" jes MRI's"    CPAP (continuous positive airway pressure) dependence     per pt does not use CPAP    Diverticulitis     GERD (gastroesophageal reflux disease)     Hypertension     Kidney stone     x 2 episodes    Lactose intolerance     Localized pain of joint     Mild sleep apnea     CPAP started in July having difficulty using- only has used on occ    Morbid obesity with body mass index (BMI) of 50.0 to 59.9 in adult [facility])     Papillary thyroid carcinoma (HCC)     Rectal bleeding     occ rectal bleeding last episode unsure-"nothing recent"    Seasickness     Skin tag     skin tags were removed-as of 8/2/19 has a few more    Teeth missing     Thyroid nodule     Tinnitus     Wears glasses     Weight loss     "80lbs and did gain 40lbs back"--     Past Surgical History:   Procedure Laterality Date    BACK SURGERY      laminectomy-1996, 2010    COLONOSCOPY      x2    ESOPHAGOGASTRODUODENOSCOPY N/A 3/30/2019    Procedure: ESOPHAGOGASTRODUODENOSCOPY (EGD);   Surgeon: Vivi Opitz, MD;  Location: [facility];  Service: Gastroenterology    FL RETROGRADE PYELOGRAM  9/16/2022    FL RETROGRADE PYELOGRAM  9/22/2022    FL RETROGRADE PYELOGRAM  9/29/2022    KNEE ARTHROSCOPY Left 2007    WI CYSTO BLADDER W/URETERAL CATHETERIZATION Right 9/16/2022    Procedure: CYSTOSCOPY RETROGRADE PYELOGRAM WITH INSERTION STENT URETERAL retrograde;  Surgeon: Benjie Lamb MD;  Location: [facility];  Service: Urology    WI CYSTO/URETERO W/LITHOTRIPSY &INDWELL STENT INSRT Right 1/6/2022    Procedure: CYSTOSCOPY URETEROSCOPY AND INSERTION STENT URETERAL RIGHT;  Surgeon: Joey Youngblood MD;  Location: [facility];  Service: Urology    PA CYSTO/URETERO W/LITHOTRIPSY &INDWELL STENT INSRT Right 9/22/2022    Procedure: CYSTOSCOPY URETEROSCOPY WITH LITHOTRIPSY HOLMIUM LASER, BASKET EXTRACTION, RETROGRADE PYELOGRAM (BILATERAL) AND  STENT EXCHANGE;  Surgeon: Joey Youngblood MD;  Location: [facility];  Service: Urology    PA CYSTO/URETERO W/LITHOTRIPSY &INDWELL STENT INSRT Bilateral 9/29/2022    Procedure: CYSTOSCOPY URETEROSCOPY, STENT MANUPILATION, RETROGRADE PYELOGRAM, LEFT  URETERAL STENT NSERTION, AND  REMOVAL OF RIGHT STENT;  Surgeon: Joey Youngblood MD;  Location: [facility];  Service: Urology    PA CYSTO/URETERO W/LITHOTRIPSY &INDWELL STENT INSRT Left 10/13/2022    Procedure: CYSTOSCOPY URETEROSCOPY WITH LITHOTRIPSY BASKET EXTRACTION, RETROGRADE PYELOGRAM AND  STENT EXCHANGE;  Surgeon: Joey Youngblood MD;  Location: [facility];  Service: Urology    PA ESOPHAGOGASTRODUODENOSCOPY TRANSORAL DIAGNOSTIC N/A 5/1/2019    Procedure: ESOPHAGOGASTRODUODENOSCOPY (EGD); Surgeon: Pollo Mena MD;  Location: [facility];   Service: Gastroenterology    PA THYROIDECTOMY RMVL REMAINING TISS FLWG PRTL RMVL Right 2/8/2023    Procedure: COMPLETION THYROIDECTOMY;  Surgeon: Edouard Kate MD;  Location: [facility];  Service: ENT    PA TOTAL THYROID LOBECTOMY UNI W/WO ISTHMUSECTOMY Left 8/31/2022    Procedure: Left patial THYROIDECTOMY;  Surgeon: Edouard Kate MD;  Location: [facility];  Service: ENT    UPPER GASTROINTESTINAL ENDOSCOPY  2006    US GUIDED THYROID BIOPSY  12/28/2020     Family History   Problem Relation Age of Onset    Osteoarthritis Mother     Obesity Father         [id number]    Sleep apnea Father         with CPAP    No Known Problems Sister     No Known Problems Brother     Lupus Sister     No Known Problems Sister     No Known Problems Son     Substance Abuse Neg Hx     Mental illness Neg Hx     Cancer Neg Hx     Diabetes Neg Hx     Heart disease Neg Hx     Stroke Neg Hx      Social History     Socioeconomic History    Marital status:      Spouse name: Not on file    Number of children: Not on file    Years of education: Not on file    Highest education level: Not on file   Occupational History    Not on file   Tobacco Use    Smoking status: Never    Smokeless tobacco: Never   Vaping Use    Vaping Use: Never used   Substance and Sexual Activity    Alcohol use: Never    Drug use: No    Sexual activity: Not on file     Comment: defer   Other Topics Concern    Not on file   Social History Narrative    Living alone     Social Determinants of Health     Financial Resource Strain: Low Risk  (9/26/2023)    Overall Financial Resource Strain (CARDIA)     Difficulty of Paying Living Expenses: Not hard at all   Food Insecurity: No Food Insecurity (9/30/2022)    Hunger Vital Sign     Worried About Running Out of Food in the Last Year: Never true     [address] in the Last Year: Never true   Transportation Needs: No Transportation Needs (9/26/2023)    PRAPARE - Transportation     Lack of Transportation (Medical): No     Lack of Transportation (Non-Medical): No   Physical Activity: Not on file   Stress: Not on file   Social Connections: Not on file   Intimate Partner Violence: Not on file   Housing Stability: Low Risk  (9/30/2022)    Housing Stability Vital Sign     Unable to Pay for Housing in the Last Year: No     Number of Places Lived in the Last Year: 1     Unstable Housing in the Last Year: No       Occupational History: NA    Meds/Allergies   Allergies   Allergen Reactions    Lactose - Food Allergy GI Intolerance       Home medications:  Prior to Admission medications    Medication Sig Start Date End Date Taking?  Authorizing Provider   allopurinol (ZYLOPRIM) 300 mg tablet Take 1 tablet (300 mg total) by mouth daily 9/26/23  Yes Brittnee Power Larissa Atkins MD   amLODIPine (NORVASC) 5 mg tablet TAKE 1 TABLET EVERY DAY 11/22/23  Yes Jeremi Magana MD   ergocalciferol (VITAMIN D2) 50,000 units Take one capsule by mouth once a week for 12 weeks 5/9/23  Yes SOLE Posadas   levothyroxine 200 mcg tablet TAKE ONE TABLET BY MOUTH EVERY DAY 11/6/23  Yes SOLE Posadas   lisinopril-hydrochlorothiazide (PRINZIDE,ZESTORETIC) 20-25 MG per tablet TAKE 1 TABLET EVERY DAY 11/22/23  Yes Jeremi Magana MD   omeprazole (PriLOSEC) 20 mg delayed release capsule Take 1 capsule (20 mg total) by mouth daily 2/2/23  Yes Jeremi Magana MD   colchicine (COLCRYS) 0.6 mg tablet Take 1 tablet (0.6 mg total) by mouth 2 (two) times a day for 14 days 9/26/23 10/10/23  Jeremi Magana MD   levothyroxine 25 mcg tablet Take 25 mcg by mouth daily    Historical Provider, MD       Vitals:   Blood pressure 118/80, pulse 76, height 6' 3.5" (1.918 m), weight (!) 180 kg (397 lb 12.8 oz). , Body mass index is 49.07 kg/m². Physical Exam  General: Awake alert and oriented x 3, conversant without conversational dyspnea, NAD, normal affect  HEENT:  PERRL, Sclera noninjected, nonicteric OU, Nares patent,  no craniofacial abnormalities, Mucous membranes, moist, no oral lesions, normal dentition  NECK:  Trachea midline, no accessory muscle use, no stridor, no cervical or supraclavicular adenopathy, JVP not elevated  CARDIAC: Reg, single s1/S2, no m/r/g  PULM: CTA bilaterally no wheezing, rhonchi or rales  EXT: No cyanosis, no clubbing, no edema, normal capillary refill  NEURO: no focal neurologic deficits, AAOx3, moving all extremities appropriately    Labs: I have personally reviewed pertinent lab results.   Lab Results   Component Value Date    WBC 8.68 07/28/2023    HGB 14.6 07/28/2023    HCT 44.1 07/28/2023    MCV 91 07/28/2023     07/28/2023      Lab Results   Component Value Date    CALCIUM 9.4 07/28/2023    K 3.8 07/28/2023    CO2 26 07/28/2023     07/28/2023    BUN 23 07/28/2023    CREATININE 1.06 07/28/2023     No results found for: "IRON", "TIBC", "FERRITIN"  No results found for: "MAZILRLP03"  No results found for: "FOLATE"      Arterial Blood Gas result:  [facility]

## 2023-11-27 ENCOUNTER — TELEPHONE (OUTPATIENT)
Dept: SLEEP CENTER | Facility: CLINIC | Age: 58
End: 2023-11-27

## 2023-11-27 LAB

## 2023-11-29 DIAGNOSIS — R53.83 OTHER FATIGUE: ICD-10-CM

## 2023-11-29 DIAGNOSIS — I10 PRIMARY HYPERTENSION: ICD-10-CM

## 2023-11-29 DIAGNOSIS — K21.9 GASTROESOPHAGEAL REFLUX DISEASE WITHOUT ESOPHAGITIS: Primary | ICD-10-CM

## 2023-11-29 DIAGNOSIS — E89.0 POST-OPERATIVE HYPOTHYROIDISM: ICD-10-CM

## 2023-11-29 DIAGNOSIS — M10.9 GOUT, ARTHROPATHY: ICD-10-CM

## 2023-11-29 DIAGNOSIS — Z13.220 LIPID SCREENING: ICD-10-CM

## 2023-11-29 DIAGNOSIS — N18.9 CHRONIC KIDNEY DISEASE, UNSPECIFIED CKD STAGE: ICD-10-CM

## 2023-12-05 ENCOUNTER — HOSPITAL ENCOUNTER (OUTPATIENT)
Dept: RADIOLOGY | Facility: HOSPITAL | Age: 58
Discharge: HOME | End: 2023-12-05
Payer: COMMERCIAL

## 2023-12-05 ENCOUNTER — APPOINTMENT (OUTPATIENT)
Dept: LAB | Facility: HOSPITAL | Age: 58
End: 2023-12-05
Attending: INTERNAL MEDICINE
Payer: COMMERCIAL

## 2023-12-05 DIAGNOSIS — I10 PRIMARY HYPERTENSION: ICD-10-CM

## 2023-12-05 DIAGNOSIS — R35.1 NOCTURIA: ICD-10-CM

## 2023-12-05 DIAGNOSIS — N18.9 CHRONIC KIDNEY DISEASE, UNSPECIFIED CKD STAGE: ICD-10-CM

## 2023-12-05 DIAGNOSIS — C73 PAPILLARY THYROID CARCINOMA (HCC): ICD-10-CM

## 2023-12-05 DIAGNOSIS — N20.0 URIC ACID NEPHROLITHIASIS: ICD-10-CM

## 2023-12-05 DIAGNOSIS — E89.0 POST-OPERATIVE HYPOTHYROIDISM: ICD-10-CM

## 2023-12-05 DIAGNOSIS — K21.9 GASTROESOPHAGEAL REFLUX DISEASE WITHOUT ESOPHAGITIS: ICD-10-CM

## 2023-12-05 DIAGNOSIS — M10.9 GOUT, ARTHROPATHY: ICD-10-CM

## 2023-12-05 DIAGNOSIS — Z13.220 LIPID SCREENING: ICD-10-CM

## 2023-12-05 DIAGNOSIS — E55.9 VITAMIN D DEFICIENCY: ICD-10-CM

## 2023-12-05 LAB
25(OH)D3 SERPL-MCNC: 25.5 NG/ML (ref 30–100)
ALBUMIN SERPL BCP-MCNC: 4.1 G/DL (ref 3.5–5)
ALP SERPL-CCNC: 67 U/L (ref 34–104)
ALT SERPL W P-5'-P-CCNC: 35 U/L (ref 7–52)
ANION GAP SERPL CALCULATED.3IONS-SCNC: 8 MMOL/L
AST SERPL W P-5'-P-CCNC: 24 U/L (ref 13–39)
BASOPHILS # BLD AUTO: 0.1 THOUSANDS/ÂΜL (ref 0–0.1)
BASOPHILS NFR BLD AUTO: 1 % (ref 0–1)
BILIRUB SERPL-MCNC: 0.54 MG/DL (ref 0.2–1)
BUN SERPL-MCNC: 26 MG/DL (ref 5–25)
CALCIUM SERPL-MCNC: 9.4 MG/DL (ref 8.4–10.2)
CHLORIDE SERPL-SCNC: 106 MMOL/L (ref 96–108)
CHOLEST SERPL-MCNC: 148 MG/DL
CO2 SERPL-SCNC: 24 MMOL/L (ref 21–32)
CREAT SERPL-MCNC: 1.08 MG/DL (ref 0.6–1.3)
EOSINOPHIL # BLD AUTO: 0.51 THOUSAND/ÂΜL (ref 0–0.61)
EOSINOPHIL NFR BLD AUTO: 5 % (ref 0–6)
ERYTHROCYTE [DISTWIDTH] IN BLOOD BY AUTOMATED COUNT: 13.7 % (ref 11.6–15.1)
GFR SERPL CREATININE-BSD FRML MDRD: 74 ML/MIN/1.73SQ M
GLUCOSE P FAST SERPL-MCNC: 99 MG/DL (ref 65–99)
HCT VFR BLD AUTO: 45.1 % (ref 36.5–49.3)
HDLC SERPL-MCNC: 32 MG/DL
HGB BLD-MCNC: 15 G/DL (ref 12–17)
IMM GRANULOCYTES # BLD AUTO: 0.07 THOUSAND/UL (ref 0–0.2)
IMM GRANULOCYTES NFR BLD AUTO: 1 % (ref 0–2)
LDLC SERPL CALC-MCNC: 98 MG/DL (ref 0–100)
LYMPHOCYTES # BLD AUTO: 2.03 THOUSANDS/ÂΜL (ref 0.6–4.47)
LYMPHOCYTES NFR BLD AUTO: 21 % (ref 14–44)
MCH RBC QN AUTO: 30.5 PG (ref 26.8–34.3)
MCHC RBC AUTO-ENTMCNC: 33.3 G/DL (ref 31.4–37.4)
MCV RBC AUTO: 92 FL (ref 82–98)
MONOCYTES # BLD AUTO: 0.64 THOUSAND/ÂΜL (ref 0.17–1.22)
MONOCYTES NFR BLD AUTO: 7 % (ref 4–12)
NEUTROPHILS # BLD AUTO: 6.53 THOUSANDS/ÂΜL (ref 1.85–7.62)
NEUTS SEG NFR BLD AUTO: 65 % (ref 43–75)
NONHDLC SERPL-MCNC: 116 MG/DL
NRBC BLD AUTO-RTO: 0 /100 WBCS
PLATELET # BLD AUTO: 260 THOUSANDS/UL (ref 149–390)
PMV BLD AUTO: 9.5 FL (ref 8.9–12.7)
POTASSIUM SERPL-SCNC: 3.8 MMOL/L (ref 3.5–5.3)
PROT SERPL-MCNC: 7.6 G/DL (ref 6.4–8.4)
PSA SERPL-MCNC: 3.93 NG/ML (ref 0–4)
RBC # BLD AUTO: 4.91 MILLION/UL (ref 3.88–5.62)
SODIUM SERPL-SCNC: 138 MMOL/L (ref 135–147)
T4 FREE SERPL-MCNC: 1.03 NG/DL (ref 0.61–1.12)
TRIGL SERPL-MCNC: 92 MG/DL
TSH SERPL DL<=0.05 MIU/L-ACNC: 0.17 UIU/ML (ref 0.45–4.5)
URATE SERPL-MCNC: 6.8 MG/DL (ref 3.5–8.5)
WBC # BLD AUTO: 9.88 THOUSAND/UL (ref 4.31–10.16)

## 2023-12-05 PROCEDURE — 84550 ASSAY OF BLOOD/URIC ACID: CPT

## 2023-12-05 PROCEDURE — 86800 THYROGLOBULIN ANTIBODY: CPT

## 2023-12-05 PROCEDURE — 80053 COMPREHEN METABOLIC PANEL: CPT

## 2023-12-05 PROCEDURE — 36415 COLL VENOUS BLD VENIPUNCTURE: CPT

## 2023-12-05 PROCEDURE — 82306 VITAMIN D 25 HYDROXY: CPT

## 2023-12-05 PROCEDURE — 74018 RADEX ABDOMEN 1 VIEW: CPT

## 2023-12-05 PROCEDURE — 84443 ASSAY THYROID STIM HORMONE: CPT

## 2023-12-05 PROCEDURE — 85025 COMPLETE CBC W/AUTO DIFF WBC: CPT

## 2023-12-05 PROCEDURE — G0103 PSA SCREENING: HCPCS

## 2023-12-05 PROCEDURE — 80061 LIPID PANEL: CPT

## 2023-12-05 PROCEDURE — 84432 ASSAY OF THYROGLOBULIN: CPT

## 2023-12-05 PROCEDURE — 84439 ASSAY OF FREE THYROXINE: CPT

## 2023-12-06 LAB
THYROGLOB AB SERPL-ACNC: <1 IU/ML (ref 0–0.9)
THYROGLOB SERPL-MCNC: <0.1 NG/ML (ref 1.4–29.2)

## 2023-12-13 ENCOUNTER — TELEPHONE (OUTPATIENT)
Dept: ENDOCRINOLOGY | Facility: CLINIC | Age: 58
End: 2023-12-13

## 2023-12-13 NOTE — TELEPHONE ENCOUNTER
----- Message from Cody Reno MA sent at 12/7/2023  4:34 PM EST -----  Pls call pt. To sched f/u visit .  Thanks

## 2023-12-20 ENCOUNTER — OFFICE VISIT (OUTPATIENT)
Dept: FAMILY MEDICINE CLINIC | Facility: CLINIC | Age: 58
End: 2023-12-20
Payer: COMMERCIAL

## 2023-12-20 VITALS
RESPIRATION RATE: 20 BRPM | TEMPERATURE: 97.8 F | BODY MASS INDEX: 38.36 KG/M2 | DIASTOLIC BLOOD PRESSURE: 78 MMHG | SYSTOLIC BLOOD PRESSURE: 130 MMHG | HEART RATE: 100 BPM | HEIGHT: 76 IN | OXYGEN SATURATION: 97 % | WEIGHT: 315 LBS

## 2023-12-20 DIAGNOSIS — K21.9 GASTROESOPHAGEAL REFLUX DISEASE WITHOUT ESOPHAGITIS: ICD-10-CM

## 2023-12-20 DIAGNOSIS — I10 PRIMARY HYPERTENSION: Primary | ICD-10-CM

## 2023-12-20 DIAGNOSIS — M10.9 ACUTE GOUT OF LEFT FOOT, UNSPECIFIED CAUSE: ICD-10-CM

## 2023-12-20 DIAGNOSIS — M10.9 GOUT, ARTHROPATHY: ICD-10-CM

## 2023-12-20 DIAGNOSIS — E66.01 CLASS 3 SEVERE OBESITY DUE TO EXCESS CALORIES WITH SERIOUS COMORBIDITY AND BODY MASS INDEX (BMI) OF 45.0 TO 49.9 IN ADULT (HCC): ICD-10-CM

## 2023-12-20 DIAGNOSIS — E89.0 POST-OPERATIVE HYPOTHYROIDISM: ICD-10-CM

## 2023-12-20 DIAGNOSIS — R73.09 ELEVATED GLUCOSE: ICD-10-CM

## 2023-12-20 DIAGNOSIS — M15.9 PRIMARY OSTEOARTHRITIS INVOLVING MULTIPLE JOINTS: ICD-10-CM

## 2023-12-20 PROBLEM — R53.83 OTHER FATIGUE: Status: RESOLVED | Noted: 2023-04-19 | Resolved: 2023-12-20

## 2023-12-20 PROBLEM — E55.9 VITAMIN D DEFICIENCY: Status: RESOLVED | Noted: 2023-04-19 | Resolved: 2023-12-20

## 2023-12-20 LAB — SL AMB POCT HEMOGLOBIN AIC: 5.9 (ref ?–6.5)

## 2023-12-20 PROCEDURE — 99214 OFFICE O/P EST MOD 30 MIN: CPT | Performed by: FAMILY MEDICINE

## 2023-12-20 PROCEDURE — 83036 HEMOGLOBIN GLYCOSYLATED A1C: CPT | Performed by: FAMILY MEDICINE

## 2023-12-20 RX ORDER — COLCHICINE 0.6 MG/1
0.6 TABLET ORAL 2 TIMES DAILY
Qty: 28 TABLET | Refills: 0 | Status: SHIPPED | OUTPATIENT
Start: 2023-12-20 | End: 2024-01-03

## 2023-12-20 RX ORDER — ALLOPURINOL 300 MG/1
300 TABLET ORAL DAILY
Qty: 90 TABLET | Refills: 1 | Status: SHIPPED | OUTPATIENT
Start: 2023-12-20

## 2023-12-20 NOTE — LETTER
KERRY BALLESTEROS      Current Outpatient Medications:     allopurinol (ZYLOPRIM) 300 mg tablet, Take 1 tablet (300 mg total) by mouth daily, Disp: 90 tablet, Rfl: 1    amLODIPine (NORVASC) 5 mg tablet, TAKE 1 TABLET EVERY DAY, Disp: 90 tablet, Rfl: 1    ergocalciferol (VITAMIN D2) 50,000 units, Take one capsule by mouth once a week for 12 weeks, Disp: 12 capsule, Rfl: 0    levothyroxine 200 mcg tablet, TAKE ONE TABLET BY MOUTH EVERY DAY, Disp: 90 tablet, Rfl: 1    lisinopril-hydrochlorothiazide (PRINZIDE,ZESTORETIC) 20-25 MG per tablet, TAKE 1 TABLET EVERY DAY, Disp: 90 tablet, Rfl: 1    omeprazole (PriLOSEC) 20 mg delayed release capsule, Take 1 capsule (20 mg total) by mouth daily, Disp: 90 capsule, Rfl: 3    Recent Results (from the past 672 hour(s))   PAP Accessories/Supplies Package    Collection Time: 11/27/23  8:48 AM   Result Value Ref Range    Supplier Name [employer]     Supplier Phone Number [phone]     Order Status Delivery Successful     Delivery Note      Delivery Request Date 11/27/2023     Date Delivered  11/27/2023     Item Description PAP Accessory     Item Description       PAP Mask, Nasal Pillow, Fit Upon Setup, N/A, 1 per 3 months    Item Description Humidifier Water Chamber, 1 per 6 months     Item Description PAP Headgear, 1 per 6 months     Item Description PAP Humidifier, Heated     Item Description PAP Monitoring Modem     Item Description Heated PAP Tubing, 1 per 3 months     Item Description Disposable PAP Filter, 2 per 1 month     Item Description Non-Disposable PAP Filter, 1 per 6 months     Item Description       PAP Mask Interface Cushion, Nasal Pillow, 2 per 1 month   T4, free    Collection Time: 12/05/23 11:56 AM   Result Value Ref Range    Free T4 1.03 0.61 - 1.12 ng/dL   Vitamin D 25 hydroxy    Collection Time: 12/05/23 11:56 AM   Result Value Ref Range    Vit D, 25-Hydroxy 25.5 (L) 30.0 - 100.0 ng/mL   CBC and differential    Collection Time:  12/05/23 11:56 AM   Result Value Ref Range    WBC 9.88 4.31 - 10.16 Thousand/uL    RBC 4.91 3.88 - 5.62 Million/uL    Hemoglobin 15.0 12.0 - 17.0 g/dL    Hematocrit 45.1 36.5 - 49.3 %    MCV 92 82 - 98 fL    MCH 30.5 26.8 - 34.3 pg    MCHC 33.3 31.4 - 37.4 g/dL    RDW 13.7 11.6 - 15.1 %    MPV 9.5 8.9 - 12.7 fL    Platelets 260 149 - 390 Thousands/uL    nRBC 0 /100 WBCs    Neutrophils Relative 65 43 - 75 %    Immat GRANS % 1 0 - 2 %    Lymphocytes Relative 21 14 - 44 %    Monocytes Relative 7 4 - 12 %    Eosinophils Relative 5 0 - 6 %    Basophils Relative 1 0 - 1 %    Neutrophils Absolute 6.53 1.85 - 7.62 Thousands/µL    Immature Grans Absolute 0.07 0.00 - 0.20 Thousand/uL    Lymphocytes Absolute 2.03 0.60 - 4.47 Thousands/µL    Monocytes Absolute 0.64 0.17 - 1.22 Thousand/µL    Eosinophils Absolute 0.51 0.00 - 0.61 Thousand/µL    Basophils Absolute 0.10 0.00 - 0.10 Thousands/µL   Comprehensive metabolic panel    Collection Time: 12/05/23 11:56 AM   Result Value Ref Range    Sodium 138 135 - 147 mmol/L    Potassium 3.8 3.5 - 5.3 mmol/L    Chloride 106 96 - 108 mmol/L    CO2 24 21 - 32 mmol/L    ANION GAP 8 mmol/L    BUN 26 (H) 5 - 25 mg/dL    Creatinine 1.08 0.60 - 1.30 mg/dL    Glucose, Fasting 99 65 - 99 mg/dL    Calcium 9.4 8.4 - 10.2 mg/dL    AST 24 13 - 39 U/L    ALT 35 7 - 52 U/L    Alkaline Phosphatase 67 34 - 104 U/L    Total Protein 7.6 6.4 - 8.4 g/dL    Albumin 4.1 3.5 - 5.0 g/dL    Total Bilirubin 0.54 0.20 - 1.00 mg/dL    eGFR 74 ml/min/1.73sq m   Lipid panel    Collection Time: 12/05/23 11:56 AM   Result Value Ref Range    Cholesterol 148 See Comment mg/dL    Triglycerides 92 See Comment mg/dL    HDL, Direct 32 (L) >=40 mg/dL    LDL Calculated 98 0 - 100 mg/dL    Non-HDL-Chol (CHOL-HDL) 116 mg/dl   TSH, 3rd generation    Collection Time: 12/05/23 11:56 AM   Result Value Ref Range    TSH 3RD GENERATON 0.169 (L) 0.450 - 4.500 uIU/mL   Uric acid    Collection Time: 12/05/23 11:56 AM   Result Value Ref  Range    Uric Acid 6.8 3.5 - 8.5 mg/dL   PSA, Total Screen    Collection Time: 12/05/23 11:56 AM   Result Value Ref Range    PSA 3.93 0.00 - 4.00 ng/mL   Thyroglobulin    Collection Time: 12/05/23 11:56 AM   Result Value Ref Range    Thyroglobulin Ab <1.0 0.0 - 0.9 IU/mL   Thyroglobulin by VICKI    Collection Time: 12/05/23 11:56 AM   Result Value Ref Range    Thyroglobulin-VICKI <0.1 (L) 1.4 - 29.2 ng/mL

## 2023-12-20 NOTE — ASSESSMENT & PLAN NOTE
STABLE  DENIES ANY JOINT SWELLING OR REDNESS  JOINT STIFFNESS PRESENT  PAIN MANAGEMENT ADEQUATE    - CONTINUE CURRENT MANAGEMENT  - MEDICATION AS DIRECTED  - CALL / RETURN IF SYMPTOMS WORSEN

## 2023-12-20 NOTE — PATIENT INSTRUCTIONS
CONTINUE CURRENT TREATMENT PLAN  MONITOR DIETARY SODIUM, CHOL INTAKE  ENCOURAGE PHYSICAL ACTIVITY      RV 6 M, SOONER PRN      Recent Results (from the past 672 hour(s))   PAP Accessories/Supplies Package    Collection Time: 11/27/23  8:48 AM   Result Value Ref Range    Supplier Name [employer]     Supplier Phone Number [phone]     Order Status Delivery Successful     Delivery Note      Delivery Request Date 11/27/2023     Date Delivered  11/27/2023     Item Description PAP Accessory     Item Description       PAP Mask, Nasal Pillow, Fit Upon Setup, N/A, 1 per 3 months    Item Description Humidifier Water Chamber, 1 per 6 months     Item Description PAP Headgear, 1 per 6 months     Item Description PAP Humidifier, Heated     Item Description PAP Monitoring Modem     Item Description Heated PAP Tubing, 1 per 3 months     Item Description Disposable PAP Filter, 2 per 1 month     Item Description Non-Disposable PAP Filter, 1 per 6 months     Item Description       PAP Mask Interface Cushion, Nasal Pillow, 2 per 1 month   T4, free    Collection Time: 12/05/23 11:56 AM   Result Value Ref Range    Free T4 1.03 0.61 - 1.12 ng/dL   Vitamin D 25 hydroxy    Collection Time: 12/05/23 11:56 AM   Result Value Ref Range    Vit D, 25-Hydroxy 25.5 (L) 30.0 - 100.0 ng/mL   CBC and differential    Collection Time: 12/05/23 11:56 AM   Result Value Ref Range    WBC 9.88 4.31 - 10.16 Thousand/uL    RBC 4.91 3.88 - 5.62 Million/uL    Hemoglobin 15.0 12.0 - 17.0 g/dL    Hematocrit 45.1 36.5 - 49.3 %    MCV 92 82 - 98 fL    MCH 30.5 26.8 - 34.3 pg    MCHC 33.3 31.4 - 37.4 g/dL    RDW 13.7 11.6 - 15.1 %    MPV 9.5 8.9 - 12.7 fL    Platelets 260 149 - 390 Thousands/uL    nRBC 0 /100 WBCs    Neutrophils Relative 65 43 - 75 %    Immat GRANS % 1 0 - 2 %    Lymphocytes Relative 21 14 - 44 %    Monocytes Relative 7 4 - 12 %    Eosinophils Relative 5 0 - 6 %    Basophils Relative 1 0 - 1 %    Neutrophils Absolute  6.53 1.85 - 7.62 Thousands/µL    Immature Grans Absolute 0.07 0.00 - 0.20 Thousand/uL    Lymphocytes Absolute 2.03 0.60 - 4.47 Thousands/µL    Monocytes Absolute 0.64 0.17 - 1.22 Thousand/µL    Eosinophils Absolute 0.51 0.00 - 0.61 Thousand/µL    Basophils Absolute 0.10 0.00 - 0.10 Thousands/µL   Comprehensive metabolic panel    Collection Time: 12/05/23 11:56 AM   Result Value Ref Range    Sodium 138 135 - 147 mmol/L    Potassium 3.8 3.5 - 5.3 mmol/L    Chloride 106 96 - 108 mmol/L    CO2 24 21 - 32 mmol/L    ANION GAP 8 mmol/L    BUN 26 (H) 5 - 25 mg/dL    Creatinine 1.08 0.60 - 1.30 mg/dL    Glucose, Fasting 99 65 - 99 mg/dL    Calcium 9.4 8.4 - 10.2 mg/dL    AST 24 13 - 39 U/L    ALT 35 7 - 52 U/L    Alkaline Phosphatase 67 34 - 104 U/L    Total Protein 7.6 6.4 - 8.4 g/dL    Albumin 4.1 3.5 - 5.0 g/dL    Total Bilirubin 0.54 0.20 - 1.00 mg/dL    eGFR 74 ml/min/1.73sq m   Lipid panel    Collection Time: 12/05/23 11:56 AM   Result Value Ref Range    Cholesterol 148 See Comment mg/dL    Triglycerides 92 See Comment mg/dL    HDL, Direct 32 (L) >=40 mg/dL    LDL Calculated 98 0 - 100 mg/dL    Non-HDL-Chol (CHOL-HDL) 116 mg/dl   TSH, 3rd generation    Collection Time: 12/05/23 11:56 AM   Result Value Ref Range    TSH 3RD GENERATON 0.169 (L) 0.450 - 4.500 uIU/mL   Uric acid    Collection Time: 12/05/23 11:56 AM   Result Value Ref Range    Uric Acid 6.8 3.5 - 8.5 mg/dL   PSA, Total Screen    Collection Time: 12/05/23 11:56 AM   Result Value Ref Range    PSA 3.93 0.00 - 4.00 ng/mL   Thyroglobulin    Collection Time: 12/05/23 11:56 AM   Result Value Ref Range    Thyroglobulin Ab <1.0 0.0 - 0.9 IU/mL   Thyroglobulin by VICKI    Collection Time: 12/05/23 11:56 AM   Result Value Ref Range    Thyroglobulin-VICKI <0.1 (L) 1.4 - 29.2 ng/mL

## 2023-12-20 NOTE — ASSESSMENT & PLAN NOTE
STABLE  DENIES ANY CP, SOB, PALPITATIONS, OR HEADACHE  NOTES NO WATER RETENTION  COMPLIANT WITH MEDICATION  NO CONCERNS    - CONTINUE CURRENT TREATMENT PLAN  - MONITOR DIETARY SODIUM INTAKE  - ENCOURAGE PHYSICAL ACTIVITY  - RV 3 MONTHS

## 2023-12-20 NOTE — PROGRESS NOTES
Name: Joseluis Blake      : 1965      MRN: [id number]  Encounter Provider: Juni Vinson MD  Encounter Date: 2023   Encounter department: [facility]    Assessment & Plan     1. Primary hypertension  Assessment & Plan:  STABLE  DENIES ANY CP, SOB, PALPITATIONS, OR HEADACHE  NOTES NO WATER RETENTION  COMPLIANT WITH MEDICATION  NO CONCERNS    - CONTINUE CURRENT TREATMENT PLAN  - MONITOR DIETARY SODIUM INTAKE  - ENCOURAGE PHYSICAL ACTIVITY  - RV 3 MONTHS        2. Post-operative hypothyroidism  Assessment & Plan:  STABLE  TSH LOW      3. Gastroesophageal reflux disease without esophagitis  Assessment & Plan:  STABLE  DENIES ANY CP, INDIGESTION, OR COUGH  NOTES NO MELENA OR HEMATOCHEZIA  NO HEMATEMESIS  COMPLIANT WITH MEDICATION  NO CONCERNS    - CONTINUE CURRENT TREATMENT PLAN  - MEDICATION AS PRESCRIBED  - AVOID CAFFEINE, ALCOHOL OR SMOKING        4. Class 3 severe obesity due to excess calories with serious comorbidity and body mass index (BMI) of 45.0 to 49.9 in adult (HCC)    5. Gout, arthropathy  Assessment & Plan:  STABLE    Orders:  -     allopurinol (ZYLOPRIM) 300 mg tablet; Take 1 tablet (300 mg total) by mouth daily  -     colchicine (COLCRYS) 0.6 mg tablet; Take 1 tablet (0.6 mg total) by mouth 2 (two) times a day for 14 days    6. Primary osteoarthritis involving multiple joints  Assessment & Plan:  STABLE  DENIES ANY JOINT SWELLING OR REDNESS  JOINT STIFFNESS PRESENT  PAIN MANAGEMENT ADEQUATE    - CONTINUE CURRENT MANAGEMENT  - MEDICATION AS DIRECTED  - CALL / RETURN IF SYMPTOMS WORSEN        7. Elevated glucose  -     POCT hemoglobin A1c    8. Acute gout of left foot, unspecified cause  -     colchicine (COLCRYS) 0.6 mg tablet; Take 1 tablet (0.6 mg total) by mouth 2 (two) times a day for 14 days           Subjective      PATIENT RETURNS FOR ROUTINE EVALUATION OF PATIENT'S MEDICAL ISSUES    INDIVIDUAL MEDICAL ISSUES WITH THEIR CURRENT STATUS, ASSESSMENT AND  PLANS ARE LISTED ABOVE            Review of Systems   Constitutional:  Negative for chills, fatigue and fever.   HENT:  Negative for congestion, ear discharge, ear pain, mouth sores, postnasal drip, sore throat and trouble swallowing.    Eyes:  Negative for pain, discharge and visual disturbance.   Respiratory:  Negative for cough, shortness of breath and wheezing.    Cardiovascular:  Negative for chest pain, palpitations and leg swelling.   Gastrointestinal:  Negative for abdominal distention, abdominal pain, blood in stool, diarrhea and nausea.   Endocrine: Negative for polydipsia, polyphagia and polyuria.   Genitourinary:  Negative for dysuria, frequency, hematuria and urgency.   Musculoskeletal:  Negative for arthralgias, gait problem and joint swelling.   Skin:  Negative for pallor and rash.   Neurological:  Negative for dizziness, syncope, speech difficulty, weakness, light-headedness, numbness and headaches.   Hematological:  Negative for adenopathy.   Psychiatric/Behavioral:  Negative for behavioral problems, confusion and sleep disturbance. The patient is not nervous/anxious.        Current Outpatient Medications on File Prior to Visit   Medication Sig   • amLODIPine (NORVASC) 5 mg tablet TAKE 1 TABLET EVERY DAY   • ergocalciferol (VITAMIN D2) 50,000 units Take one capsule by mouth once a week for 12 weeks   • levothyroxine 200 mcg tablet TAKE ONE TABLET BY MOUTH EVERY DAY   • lisinopril-hydrochlorothiazide (PRINZIDE,ZESTORETIC) 20-25 MG per tablet TAKE 1 TABLET EVERY DAY   • omeprazole (PriLOSEC) 20 mg delayed release capsule Take 1 capsule (20 mg total) by mouth daily   • [DISCONTINUED] allopurinol (ZYLOPRIM) 300 mg tablet Take 1 tablet (300 mg total) by mouth daily   • [DISCONTINUED] colchicine (COLCRYS) 0.6 mg tablet Take 1 tablet (0.6 mg total) by mouth 2 (two) times a day for 14 days   • [DISCONTINUED] levothyroxine 25 mcg tablet Take 25 mcg by mouth daily       Objective     /78 (BP Location:  "Left arm, Patient Position: Sitting, Cuff Size: Large)   Pulse 100   Temp 97.8 °F (36.6 °C) (Temporal)   Resp 20   Ht 6' 3.5\" (1.918 m)   Wt (!) 180 kg (397 lb)   SpO2 97%   BMI 48.97 kg/m²     Physical Exam  Vitals reviewed.   Constitutional:       General: He is not in acute distress.     Appearance: Normal appearance. He is well-developed. He is obese. He is not ill-appearing.   HENT:      Head: Normocephalic and atraumatic.   Eyes:      General:         Right eye: No discharge.         Left eye: No discharge.      Conjunctiva/sclera: Conjunctivae normal.      Pupils: Pupils are equal, round, and reactive to light.   Neck:      Thyroid: No thyromegaly.      Vascular: No JVD.   Cardiovascular:      Rate and Rhythm: Normal rate and regular rhythm.      Heart sounds: Normal heart sounds. No murmur heard.  Pulmonary:      Effort: Pulmonary effort is normal.      Breath sounds: Normal breath sounds. No wheezing or rales.   Abdominal:      General: Bowel sounds are normal.      Palpations: Abdomen is soft. There is no mass.      Tenderness: There is no abdominal tenderness. There is no guarding or rebound.   Musculoskeletal:         General: No tenderness or deformity. Normal range of motion.      Cervical back: Neck supple.   Lymphadenopathy:      Cervical: No cervical adenopathy.   Skin:     General: Skin is warm and dry.      Findings: No erythema or rash.   Neurological:      General: No focal deficit present.      Mental Status: He is alert and oriented to person, place, and time.   Psychiatric:         Mood and Affect: Mood normal.         Behavior: Behavior normal.         Thought Content: Thought content normal.         Judgment: Judgment normal.       Juni Vinson MD    "

## 2023-12-20 NOTE — ASSESSMENT & PLAN NOTE
STABLE  DENIES ANY CP, INDIGESTION, OR COUGH  NOTES NO MELENA OR HEMATOCHEZIA  NO HEMATEMESIS  COMPLIANT WITH MEDICATION  NO CONCERNS    - CONTINUE CURRENT TREATMENT PLAN  - MEDICATION AS PRESCRIBED  - AVOID CAFFEINE, ALCOHOL OR SMOKING

## 2024-01-04 ENCOUNTER — OFFICE VISIT (OUTPATIENT)
Dept: ENDOCRINOLOGY | Facility: CLINIC | Age: 59
End: 2024-01-04
Payer: COMMERCIAL

## 2024-01-04 VITALS
OXYGEN SATURATION: 98 % | SYSTOLIC BLOOD PRESSURE: 120 MMHG | HEIGHT: 75 IN | DIASTOLIC BLOOD PRESSURE: 80 MMHG | HEART RATE: 95 BPM | BODY MASS INDEX: 39.17 KG/M2 | WEIGHT: 315 LBS

## 2024-01-04 DIAGNOSIS — E66.01 CLASS 3 SEVERE OBESITY DUE TO EXCESS CALORIES WITH SERIOUS COMORBIDITY AND BODY MASS INDEX (BMI) OF 45.0 TO 49.9 IN ADULT (HCC): ICD-10-CM

## 2024-01-04 DIAGNOSIS — E89.0 POST-OPERATIVE HYPOTHYROIDISM: ICD-10-CM

## 2024-01-04 DIAGNOSIS — E04.1 THYROID NODULE: Primary | ICD-10-CM

## 2024-01-04 DIAGNOSIS — E11.9 TYPE 2 DIABETES MELLITUS WITHOUT COMPLICATION, UNSPECIFIED WHETHER LONG TERM INSULIN USE (HCC): ICD-10-CM

## 2024-01-04 DIAGNOSIS — C73 PAPILLARY THYROID CARCINOMA (HCC): ICD-10-CM

## 2024-01-04 PROCEDURE — 99213 OFFICE O/P EST LOW 20 MIN: CPT | Performed by: NURSE PRACTITIONER

## 2024-01-04 NOTE — ASSESSMENT & PLAN NOTE
TSH goal <0.1, Thyroglobulin appropriately low  Discussed radioactive iodine treatment, will review after upcoming ultrasound  Surveillance ultrasound head, neck, lymph nodes ordered for February 2024.

## 2024-01-04 NOTE — PROGRESS NOTES
Established Patient Progress Note       CC: Thyroid carcinoma    Impression & Plan:    Problem List Items Addressed This Visit          Endocrine    Papillary thyroid carcinoma (HCC)     TSH goal <0.1, Thyroglobulin appropriately low  Discussed radioactive iodine treatment, will review after upcoming ultrasound  Surveillance ultrasound head, neck, lymph nodes ordered for February 2024.          Relevant Orders    US head neck lymph node mapping    Post-operative hypothyroidism     Clinically euthyroid on levothyroxine 200 mcg daily. Will repeat thyroid function tests in 2-3 months.          Relevant Orders    TSH, 3rd generation    T4, free- Lab Collect    RESOLVED: Thyroid nodule - Primary    RESOLVED: Type 2 diabetes mellitus without complication, unspecified whether long term insulin use (HCC)       Other    Class 3 severe obesity due to excess calories with serious comorbidity and body mass index (BMI) of 45.0 to 49.9 in adult ([facility])       Orders Placed This Encounter   Procedures    US head neck lymph node mapping     Standing Status:   Future     Standing Expiration Date:   1/4/2028     Scheduling Instructions:      No prep required. Please bring your insurance cards, a form of photo ID and a list of your medications with you. Arrive 15 minutes prior to your appointment time in order to register.            To schedule this appointment, please contact Central Scheduling at [phone].    TSH, 3rd generation     This is a patient instruction: This test is non-fasting. Please drink two glasses of water morning of bloodwork.        Standing Status:   Future     Standing Expiration Date:   7/4/2024    T4, free- Lab Collect     Standing Status:   Future     Standing Expiration Date:   1/4/2025       History of Present Illness:     Joseluis Blake is a 58 y.o. male with a history of thyroid carcinoma.  Had history of multiple thyroid nodules with an FNA completed in December 2020 which was benign, AUS.  Patient  "had left hemithyroidectomy in August 2022 pathology demonstrated papillary thyroid carcinoma with infiltrative follicular variant 4.5 cm.  Denies family history of thyroid cancer or thyroid disease.  Denies any known history of head or neck radiation.  Last ultrasound of head pleated in November 2022 but prior to completion thyroidectomy and at that time showed no evidence of recurrence or metastatic disease.  Thyroglobulin by VICKI was less than 0.1 on 12/5/2023.  Thyroglobulin antibody less than 1.0 on 12/5/2023.    Currently taking levothyroxine 200 mcg orally daily.  Taking regularly and properly. Denies change in energy level or weight.   Denies anxiety, jitteriness, or tremors.  Denies tachycardia or palpitations.  Denies constipation or hyperdefecation.  Denies frequent headaches.  Denies temperature intolerances.     Last seen for initial consultation by Dr. An in April 2023.      Patient Active Problem List   Diagnosis    Anxiety    GERD (gastroesophageal reflux disease)    Hypertension    Osteoarthritis    Breathing-related sleep disorder    Urinary calculus    Chronic midline low back pain without sciatica    Class 3 severe obesity due to excess calories with serious comorbidity and body mass index (BMI) of 45.0 to 49.9 in adult (HCC)    Chronic pain syndrome    Lumbar post-laminectomy syndrome    DOMINIC (obstructive sleep apnea)    Elevated glucose    Chronic kidney disease    Ureteral stone with hydronephrosis    Papillary thyroid carcinoma (HCC)    Post-operative hypothyroidism    Gout, arthropathy      Past Medical History:   Diagnosis Date    Borderline diabetes     per pt A1C \"coming down\"    Chronic pain disorder     lower back-s/p laminectomy    Claustrophobia     \"some degree\" jes MRI's\"    CPAP (continuous positive airway pressure) dependence     per pt does not use CPAP    Diverticulitis     GERD (gastroesophageal reflux disease)     Hypertension     Kidney stone     x 2 episodes    Lactose " "intolerance     Localized pain of joint     Mild sleep apnea     CPAP started in July having difficulty using- only has used on occ    Morbid obesity with body mass index (BMI) of 50.0 to 59.9 in adult (HCC)     Papillary thyroid carcinoma (HCC)     Rectal bleeding     occ rectal bleeding last episode unsure-\"nothing recent\"    Seasickness     Skin tag     skin tags were removed-as of 8/2/19 has a few more    Teeth missing     Thyroid nodule     Tinnitus     Wears glasses     Weight loss     \"80lbs and did gain 40lbs back\"--      Past Surgical History:   Procedure Laterality Date    BACK SURGERY      laminectomy-1996, 2010    COLONOSCOPY      x2    ESOPHAGOGASTRODUODENOSCOPY N/A 3/30/2019    Procedure: ESOPHAGOGASTRODUODENOSCOPY (EGD);  Surgeon: Kory Suresh MD;  Location: [facility];  Service: Gastroenterology    FL RETROGRADE PYELOGRAM  9/16/2022    FL RETROGRADE PYELOGRAM  9/22/2022    FL RETROGRADE PYELOGRAM  9/29/2022    KNEE ARTHROSCOPY Left 2007    DC CYSTO BLADDER W/URETERAL CATHETERIZATION Right 9/16/2022    Procedure: CYSTOSCOPY RETROGRADE PYELOGRAM WITH INSERTION STENT URETERAL retrograde;  Surgeon: Won Lawton MD;  Location: [facility];  Service: Urology    DC CYSTO/URETERO W/LITHOTRIPSY &INDWELL STENT INSRT Right 1/6/2022    Procedure: CYSTOSCOPY URETEROSCOPY AND INSERTION STENT URETERAL RIGHT;  Surgeon: Won Lawton MD;  Location: [facility] OR;  Service: Urology    DC CYSTO/URETERO W/LITHOTRIPSY &INDWELL STENT INSRT Right 9/22/2022    Procedure: CYSTOSCOPY URETEROSCOPY WITH LITHOTRIPSY HOLMIUM LASER, BASKET EXTRACTION, RETROGRADE PYELOGRAM (BILATERAL) AND  STENT EXCHANGE;  Surgeon: Won Lawton MD;  Location: [facility];  Service: Urology    DC CYSTO/URETERO W/LITHOTRIPSY &INDWELL STENT INSRT Bilateral 9/29/2022    Procedure: CYSTOSCOPY URETEROSCOPY, STENT MANUPILATION, RETROGRADE PYELOGRAM, LEFT  URETERAL STENT NSERTION, AND  REMOVAL OF RIGHT STENT;  Surgeon: Won Lawton MD;  Location: [facility] " [facility];  Service: Urology    NC CYSTO/URETERO W/LITHOTRIPSY &INDWELL STENT INSRT Left 10/13/2022    Procedure: CYSTOSCOPY URETEROSCOPY WITH LITHOTRIPSY BASKET EXTRACTION, RETROGRADE PYELOGRAM AND  STENT EXCHANGE;  Surgeon: Won Lawton MD;  Location: [facility];  Service: Urology    NC ESOPHAGOGASTRODUODENOSCOPY TRANSORAL DIAGNOSTIC N/A 5/1/2019    Procedure: ESOPHAGOGASTRODUODENOSCOPY (EGD);  Surgeon: Kory Suresh MD;  Location: [facility];  Service: Gastroenterology    NC THYROIDECTOMY RMVL REMAINING TISS FLWG PRTL RMVL Right 2/8/2023    Procedure: COMPLETION THYROIDECTOMY;  Surgeon: Timbo Bolaños MD;  Location: [facility];  Service: ENT    NC TOTAL THYROID LOBECTOMY UNI W/WO ISTHMUSECTOMY Left 8/31/2022    Procedure: Left patial THYROIDECTOMY;  Surgeon: Timbo Bolaños MD;  Location:  MAIN OR;  Service: ENT    UPPER GASTROINTESTINAL ENDOSCOPY  2006    US GUIDED THYROID BIOPSY  12/28/2020      Family History   Problem Relation Age of Onset    Osteoarthritis Mother     Obesity Father         [id number]    Sleep apnea Father         with CPAP    No Known Problems Sister     No Known Problems Brother     Lupus Sister     No Known Problems Sister     No Known Problems Son     Substance Abuse Neg Hx     Mental illness Neg Hx     Cancer Neg Hx     Diabetes Neg Hx     Heart disease Neg Hx     Stroke Neg Hx      Social History     Tobacco Use    Smoking status: Never    Smokeless tobacco: Never   Substance Use Topics    Alcohol use: No     Allergies   Allergen Reactions    Lactose - Food Allergy GI Intolerance       Current Outpatient Medications:     allopurinol (ZYLOPRIM) 300 mg tablet, Take 1 tablet (300 mg total) by mouth daily, Disp: 90 tablet, Rfl: 1    amLODIPine (NORVASC) 5 mg tablet, TAKE 1 TABLET EVERY DAY, Disp: 90 tablet, Rfl: 1    colchicine (COLCRYS) 0.6 mg tablet, Take 1 tablet (0.6 mg total) by mouth 2 (two) times a day for 14 days, Disp: 28 tablet, Rfl: 0    ergocalciferol (VITAMIN  "D2) 50,000 units, Take one capsule by mouth once a week for 12 weeks, Disp: 12 capsule, Rfl: 0    levothyroxine 200 mcg tablet, TAKE ONE TABLET BY MOUTH EVERY DAY, Disp: 90 tablet, Rfl: 1    lisinopril-hydrochlorothiazide (PRINZIDE,ZESTORETIC) 20-25 MG per tablet, TAKE 1 TABLET EVERY DAY, Disp: 90 tablet, Rfl: 1    omeprazole (PriLOSEC) 20 mg delayed release capsule, Take 1 capsule (20 mg total) by mouth daily, Disp: 90 capsule, Rfl: 3    Review of Systems  See HPI.   All other systems reviewed and are negative.      Physical Exam:  Body mass index is 49.5 kg/m².  /80 (BP Location: Left arm, Patient Position: Sitting, Cuff Size: Large)   Pulse 95   Ht 6' 3\" (1.905 m)   Wt (!) 180 kg (396 lb)   SpO2 98%   BMI 49.50 kg/m²    Wt Readings from Last 3 Encounters:   01/04/24 (!) 180 kg (396 lb)   12/20/23 (!) 180 kg (397 lb)   11/24/23 (!) 180 kg (397 lb 12.8 oz)       Physical Exam  Vitals reviewed.   Constitutional:       Appearance: Normal appearance.      No palpable cervical lymph nodes, scar healed  Cardiovascular:      Rate and Rhythm: Normal rate and regular rhythm.      Pulses: Normal pulses.      Heart sounds: Normal heart sounds.   Pulmonary:      Effort: Pulmonary effort is normal.      Breath sounds: Normal breath sounds.   Skin:     General: Skin is warm and dry.      Capillary Refill: Capillary refill takes less than 2 seconds.   Neurological:      General: No focal deficit present.      Mental Status: He is alert and oriented to person, place, and time.      No tremors with outstretched arms.   Psychiatric:         Mood and Affect: Mood normal.         Behavior: Behavior normal.       Labs:     Lab Results   Component Value Date    CREATININE 1.08 12/05/2023    CREATININE 1.06 07/28/2023    CREATININE 1.03 06/13/2023    BUN 26 (H) 12/05/2023    K 3.8 12/05/2023     12/05/2023    CO2 24 12/05/2023     eGFR   Date Value Ref Range Status   12/05/2023 74 ml/min/1.73sq m Final       Lab Results " "  Component Value Date    HDL 32 (L) 12/05/2023    TRIG 92 12/05/2023    CHOLHDL 3.8 12/10/2018       Lab Results   Component Value Date    ALT 35 12/05/2023    AST 24 12/05/2023    ALKPHOS 67 12/05/2023       Lab Results   Component Value Date    FREET4 1.03 12/05/2023 8/2022 Final Diagnosis  A. Thyroid, \"left\"; partial thyroidectomy:  - Papillary thyroid carcinoma, infiltrative follicular variant, 4.5cm  - Negative for lymphovascular invasion  - Negative for perineural invasion  - Resection margin free of tumor  - One benign lymph node (0/1)  - Background thyroid with nodular hyperplasia  - Previous biopsy site changes     2/8/2023  Final Diagnosis   A. Right completion thyroidectomy:      - Benign thyroid with multiple adenomatoid nodules with focal fibrosis and calcification      - Changes consistent with prior procedure       - Negative for malignancy        Discussed with the patient and all questioned fully answered. He will call me if any problems arise.    Follow-up appointment in 3 months.     Counseled patient on diagnostic results, prognosis, risk and benefit of treatment options, instruction for management, importance of treatment compliance, Risk  factor reduction and impressions    SOLE Ward    "

## 2024-01-15 ENCOUNTER — HOSPITAL ENCOUNTER (OUTPATIENT)
Dept: RADIOLOGY | Facility: HOSPITAL | Age: 59
Discharge: HOME | End: 2024-01-15
Attending: SPECIALIST
Payer: COMMERCIAL

## 2024-01-15 DIAGNOSIS — N20.0 RENAL CALCULUS: ICD-10-CM

## 2024-01-15 PROCEDURE — 76775 US EXAM ABDO BACK WALL LIM: CPT

## 2024-01-19 ENCOUNTER — APPOINTMENT (OUTPATIENT)
Dept: LAB | Facility: HOSPITAL | Age: 59
End: 2024-01-19
Payer: COMMERCIAL

## 2024-01-19 DIAGNOSIS — N20.0 RENAL CALCULUS: ICD-10-CM

## 2024-01-19 LAB
CALCIUM 24H UR-MCNC: 82.55 MG/24 HRS (ref 100–300)
PERIOD: 24 HOURS
PH UR STRIP.AUTO: 5.5 [PH]
SODIUM 24H UR-SRATE: 215.9 MMOL/24 HRS (ref 40–220)
SPECIMEN VOL UR: 3175 ML
URATE 24H UR-MCNC: 1035.05 MG/24 HRS (ref 250–800)

## 2024-01-19 PROCEDURE — 82507 ASSAY OF CITRATE: CPT

## 2024-01-19 PROCEDURE — 83986 ASSAY PH BODY FLUID NOS: CPT

## 2024-01-19 PROCEDURE — 84560 ASSAY OF URINE/URIC ACID: CPT

## 2024-01-19 PROCEDURE — 84300 ASSAY OF URINE SODIUM: CPT

## 2024-01-19 PROCEDURE — 82340 ASSAY OF CALCIUM IN URINE: CPT

## 2024-01-19 PROCEDURE — 83945 ASSAY OF OXALATE: CPT

## 2024-01-23 LAB
CITRATE 24H UR-MCNC: 27 MG/L
CITRATE 24H UR-MRATE: 86 MG/24 HR (ref 320–1240)

## 2024-01-29 LAB
OXALATE 24H UR-MRATE: 35 MG/24 HR (ref 7–44)
OXALATE UR-MCNC: 11 MG/L

## 2024-01-31 ENCOUNTER — VBI (OUTPATIENT)
Dept: ADMINISTRATIVE | Facility: OTHER | Age: 59
End: 2024-01-31

## 2024-02-01 ENCOUNTER — HOSPITAL ENCOUNTER (OUTPATIENT)
Dept: RADIOLOGY | Facility: HOSPITAL | Age: 59
Discharge: HOME | End: 2024-02-01
Payer: COMMERCIAL

## 2024-02-01 DIAGNOSIS — C73 PAPILLARY THYROID CARCINOMA (HCC): ICD-10-CM

## 2024-02-01 PROCEDURE — 76536 US EXAM OF HEAD AND NECK: CPT

## 2024-02-07 DIAGNOSIS — K20.0 EOSINOPHILIC ESOPHAGITIS: ICD-10-CM

## 2024-02-07 RX ORDER — OMEPRAZOLE 20 MG/1
20 CAPSULE, DELAYED RELEASE ORAL DAILY
Qty: 90 CAPSULE | Refills: 1 | Status: SHIPPED | OUTPATIENT
Start: 2024-02-07

## 2024-03-01 ENCOUNTER — OFFICE VISIT (OUTPATIENT)
Dept: NEUROLOGY | Facility: CLINIC | Age: 59
End: 2024-03-01
Payer: COMMERCIAL

## 2024-03-01 VITALS
SYSTOLIC BLOOD PRESSURE: 140 MMHG | OXYGEN SATURATION: 94 % | HEIGHT: 75 IN | HEART RATE: 96 BPM | DIASTOLIC BLOOD PRESSURE: 82 MMHG | WEIGHT: 315 LBS | BODY MASS INDEX: 39.17 KG/M2

## 2024-03-01 DIAGNOSIS — E66.01 CLASS 3 SEVERE OBESITY DUE TO EXCESS CALORIES WITHOUT SERIOUS COMORBIDITY WITH BODY MASS INDEX (BMI) OF 45.0 TO 49.9 IN ADULT (HCC): ICD-10-CM

## 2024-03-01 DIAGNOSIS — F51.04 CHRONIC INSOMNIA: ICD-10-CM

## 2024-03-01 DIAGNOSIS — G47.33 OSA (OBSTRUCTIVE SLEEP APNEA): Primary | ICD-10-CM

## 2024-03-01 DIAGNOSIS — R06.83 SNORING: ICD-10-CM

## 2024-03-01 PROCEDURE — 99214 OFFICE O/P EST MOD 30 MIN: CPT | Performed by: INTERNAL MEDICINE

## 2024-03-01 RX ORDER — ESZOPICLONE 2 MG/1
2 TABLET, FILM COATED ORAL
Qty: 14 TABLET | Refills: 0 | Status: SHIPPED | OUTPATIENT
Start: 2024-03-01 | End: 2024-03-15

## 2024-03-01 NOTE — PROGRESS NOTES
Progress Note - Sleep Medicine  Joseluis Blake 58 y.o. male MRN: [id number]       Impression & Plan:         1.  Severe obstructive sleep apnea  HST/23/19 GUERA 40.1  CPAP study at 400 pounds showed optimal pressure of 14  Started on APAP 14-17 but had difficulty tolerating it  Reports snoring and witnessed apneas  He started CPAP again after last office visit, he received a new machine through recall  He reports mouth breathing while using nasal pillows  Compliance data is very poor with barely using more than 45 minutes total  He uses it for 15 minutes on the days that he tried      Plan  Ordered Lunesta 2 mg to be taken prior to CPAP use at bedtime  Ordered mask fitting and chinstrap  Follow-up in 1 month    2. Morbid Obesity  BMI 48.35  Provided referral to weight management  Explained that he may be a good candidate for bariatric surgery    3.  Snoring  Continues to snore  ______________________________________________________________________    HPI:    Joseluis Blake 58-year-old male with past medical history of hypertension, DOMINIC, GERD, postop hypothyroidism status post left hemithyroidectomy who presents for follow-up of obstructive sleep apnea.  He had previously not been able to tolerate CPAP due to claustrophobia.  He reports snoring and witnessed apneas.      At last office visit he expressed an interest to try CPAP.  He received a new machine through the recall.  He has still been struggling with CPAP and reports mouth breathing with nasal pillows.  His current [place] score is 3.  He has only been using the machine for 15 minutes at a time.  Weight management referral was provided at last visit but he has not followed up.    He does report some difficulty falling asleep with CPAP on.        Review of Systems:  Review of Systems   All other systems reviewed and are negative.        Social history updates:  Social History     Tobacco Use   Smoking Status Never   Smokeless Tobacco Never     Social History  "    Socioeconomic History    Marital status:      Spouse name: Not on file    Number of children: 1    Years of education: Not on file    Highest education level: Not on file   Occupational History    Not on file   Tobacco Use    Smoking status: Never    Smokeless tobacco: Never   Vaping Use    Vaping status: Never Used   Substance and Sexual Activity    Alcohol use: No    Drug use: No    Sexual activity: Not Currently     Partners: Female     Comment: defer   Other Topics Concern    Not on file   Social History Narrative    Living alone     Social Determinants of Health     Financial Resource Strain: Low Risk  (9/26/2023)    Overall Financial Resource Strain (CARDIA)     Difficulty of Paying Living Expenses: Not hard at all   Food Insecurity: No Food Insecurity (9/30/2022)    Hunger Vital Sign     Worried About Running Out of Food in the Last Year: Never true     Ran Out of Food in the Last Year: Never true   Transportation Needs: No Transportation Needs (9/26/2023)    PRAPARE - Transportation     Lack of Transportation (Medical): No     Lack of Transportation (Non-Medical): No   Physical Activity: Not on file   Stress: Not on file   Social Connections: Not on file   Intimate Partner Violence: Not on file   Housing Stability: Low Risk  (9/30/2022)    Housing Stability Vital Sign     Unable to Pay for Housing in the Last Year: No     Number of Places Lived in the Last Year: 1     Unstable Housing in the Last Year: No       PhysicalExamination:  Vitals:   /82 (BP Location: Left arm, Patient Position: Sitting, Cuff Size: Large)   Pulse 96   Ht 6' 3\" (1.905 m)   Wt (!) 175 kg (386 lb 12.8 oz)   SpO2 94%   BMI 48.35 kg/m²     Physical Exam  General:  Awake alert and oriented x 3, conversant without conversational dyspnea, NAD, normal affect  HEENT:  PERRL, Sclera noninjected, nonicteric OU, Nares patent,  no craniofacial abnormalities, Mucous membranes, moist, no oral lesions, normal dentition  NECK: " Trachea midline, no accessory muscle use, no stridor, no cervical or supraclavicular adenopathy, JVP not elevated  CARDIAC: Reg, single s1/S2, no m/r/g  PULM: CTA bilaterally no wheezing, rhonchi or rales  EXT: No cyanosis, no clubbing, no edema, normal capillary refill  NEURO: no focal neurologic deficits, AAOx3, moving all extremities appropriately     Diagnostic Data:  Labs:  I personally reviewed the most recent laboratory data pertinent to today's visit  Appointment on 01/19/2024   Component Date Value    24H Urine Volume 01/19/2024 3,175     Calcium, 24H Urine 01/19/2024 82.55 (L)     Oxalate, Ur 01/19/2024 11     Oxalate, 24H Ur 01/19/2024 35     24H Urine Volume 01/19/2024 3,175     Uric Acid, 24H Ur 01/19/2024 1,035.05 (H)     PERIOD 01/19/2024 24     Citric Acid, U, 24hr 01/19/2024 86 (L)     CITRIC ACID, URINE 01/19/2024 27     Sodium, 24H Ur 01/19/2024 215.9     24H Urine Volume 01/19/2024 3,175     PERIOD 01/19/2024 24     pH, UA 01/19/2024 5.5     24H Urine Volume 01/19/2024 3,175     PERIOD 01/19/2024 24    Office Visit on 12/20/2023   Component Date Value    Hemoglobin A1C 12/20/2023 5.9    Appointment on 12/05/2023   Component Date Value    Free T4 12/05/2023 1.03     Vit D, 25-Hydroxy 12/05/2023 25.5 (L)     WBC 12/05/2023 9.88     RBC 12/05/2023 4.91     Hemoglobin 12/05/2023 15.0     Hematocrit 12/05/2023 45.1     MCV 12/05/2023 92     MCH 12/05/2023 30.5     MCHC 12/05/2023 33.3     RDW 12/05/2023 13.7     MPV 12/05/2023 9.5     Platelets 12/05/2023 260     nRBC 12/05/2023 0     Neutrophils Relative 12/05/2023 65     Immat GRANS % 12/05/2023 1     Lymphocytes Relative 12/05/2023 21     Monocytes Relative 12/05/2023 7     Eosinophils Relative 12/05/2023 5     Basophils Relative 12/05/2023 1     Neutrophils Absolute 12/05/2023 6.53     Immature Grans Absolute 12/05/2023 0.07     Lymphocytes Absolute 12/05/2023 2.03     Monocytes Absolute 12/05/2023 0.64     Eosinophils Absolute 12/05/2023 0.51      Basophils Absolute 12/05/2023 0.10     Sodium 12/05/2023 138     Potassium 12/05/2023 3.8     Chloride 12/05/2023 106     CO2 12/05/2023 24     ANION GAP 12/05/2023 8     BUN 12/05/2023 26 (H)     Creatinine 12/05/2023 1.08     Glucose, Fasting 12/05/2023 99     Calcium 12/05/2023 9.4     AST 12/05/2023 24     ALT 12/05/2023 35     Alkaline Phosphatase 12/05/2023 67     Total Protein 12/05/2023 7.6     Albumin 12/05/2023 4.1     Total Bilirubin 12/05/2023 0.54     eGFR 12/05/2023 74     Cholesterol 12/05/2023 148     Triglycerides 12/05/2023 92     HDL, Direct 12/05/2023 32 (L)     LDL Calculated 12/05/2023 98     Non-HDL-Chol (CHOL-HDL) 12/05/2023 116     TSH 3RD GENERATON 12/05/2023 0.169 (L)     Uric Acid 12/05/2023 6.8     PSA 12/05/2023 3.93     Thyroglobulin Ab 12/05/2023 <1.0     Thyroglobulin-VICKI 12/05/2023 <0.1 (L)    Telephone on 11/27/2023   Component Date Value    Supplier Name 11/27/2023 [employer]     Supplier Phone Number 11/27/2023 [phone]     Order Status 11/27/2023 Delivery Successful     Delivery Request Date 11/27/2023 11/27/2023     Date Delivered  11/27/2023 11/27/2023     Item Description 11/27/2023 PAP Accessory     Item Description 11/27/2023 PAP Mask, Nasal Pillow, Fit Upon Setup, N/A, 1 per 3 months     Item Description 11/27/2023 Humidifier Water Chamber, 1 per 6 months     Item Description 11/27/2023 PAP Headgear, 1 per 6 months     Item Description 11/27/2023 PAP Humidifier, Heated     Item Description 11/27/2023 PAP Monitoring Modem     Item Description 11/27/2023 Heated PAP Tubing, 1 per 3 months     Item Description 11/27/2023 Disposable PAP Filter, 2 per 1 month     Item Description 11/27/2023 Non-Disposable PAP Filter, 1 per 6 months     Item Description 11/27/2023 PAP Mask Interface Cushion, Nasal Pillow, 2 per 1 month    Office Visit on 09/26/2023   Component Date Value    FECES OCC BLD 09/26/2023 5.8     Hemoglobin A1C 09/26/2023 5.8        I have  "personally reviewed pertinent lab results.  Lab Results   Component Value Date    WBC 9.88 12/05/2023    HGB 15.0 12/05/2023    HCT 45.1 12/05/2023    MCV 92 12/05/2023     12/05/2023     Lab Results   Component Value Date    CALCIUM 9.4 12/05/2023    K 3.8 12/05/2023    CO2 24 12/05/2023     12/05/2023    BUN 26 (H) 12/05/2023    CREATININE 1.08 12/05/2023     No results found for: \"IGE\"  Lab Results   Component Value Date    ALT 35 12/05/2023    AST 24 12/05/2023    ALKPHOS 67 12/05/2023     No results found for: \"IRON\", \"TIBC\", \"FERRITIN\"  No results found for: \"IPBHIJZT04\"  No results found for: \"FOLATE\"      Arterial Blood Gas result:  PAWAN Perez MD  [facility]    "

## 2024-03-01 NOTE — PATIENT INSTRUCTIONS
Please take Lunesta 2 mg at bedtime prior to putting on CPAP  Please use CPAP every night  Please follow-up with weight management

## 2024-03-04 ENCOUNTER — TELEPHONE (OUTPATIENT)
Dept: SLEEP CENTER | Facility: CLINIC | Age: 59
End: 2024-03-04

## 2024-03-05 LAB
DME PARACHUTE DELIVERY DATE ACTUAL: NORMAL
DME PARACHUTE DELIVERY DATE REQUESTED: NORMAL
DME PARACHUTE ITEM DESCRIPTION: NORMAL
DME PARACHUTE ORDER STATUS: NORMAL
DME PARACHUTE SUPPLIER NAME: NORMAL
DME PARACHUTE SUPPLIER PHONE: NORMAL

## 2024-03-14 ENCOUNTER — TELEPHONE (OUTPATIENT)
Dept: NEUROLOGY | Facility: CLINIC | Age: 59
End: 2024-03-14

## 2024-03-26 ENCOUNTER — OFFICE VISIT (OUTPATIENT)
Dept: BARIATRICS | Facility: CLINIC | Age: 59
End: 2024-03-26
Payer: COMMERCIAL

## 2024-03-26 VITALS
BODY MASS INDEX: 38.36 KG/M2 | HEIGHT: 76 IN | WEIGHT: 315 LBS | DIASTOLIC BLOOD PRESSURE: 70 MMHG | HEART RATE: 85 BPM | SYSTOLIC BLOOD PRESSURE: 120 MMHG

## 2024-03-26 DIAGNOSIS — G47.33 OSA (OBSTRUCTIVE SLEEP APNEA): ICD-10-CM

## 2024-03-26 DIAGNOSIS — E66.01 CLASS 3 SEVERE OBESITY DUE TO EXCESS CALORIES WITH SERIOUS COMORBIDITY AND BODY MASS INDEX (BMI) OF 45.0 TO 49.9 IN ADULT (HCC): Primary | ICD-10-CM

## 2024-03-26 DIAGNOSIS — E66.01 CLASS 3 SEVERE OBESITY DUE TO EXCESS CALORIES WITHOUT SERIOUS COMORBIDITY WITH BODY MASS INDEX (BMI) OF 45.0 TO 49.9 IN ADULT (HCC): ICD-10-CM

## 2024-03-26 DIAGNOSIS — I10 PRIMARY HYPERTENSION: ICD-10-CM

## 2024-03-26 PROCEDURE — 99204 OFFICE O/P NEW MOD 45 MIN: CPT | Performed by: NURSE PRACTITIONER

## 2024-03-26 RX ORDER — POTASSIUM CITRATE 10 MEQ/1
10 TABLET, EXTENDED RELEASE ORAL
COMMUNITY
Start: 2024-03-08

## 2024-03-26 NOTE — ASSESSMENT & PLAN NOTE
Treatment includes lisinopril/hydrochlorothiazide 20/25 mg and amlodipine 5 mg daily  Blood pressure well-controlled today

## 2024-03-26 NOTE — PROGRESS NOTES
Assessment/Plan:    Class 3 severe obesity due to excess calories with serious comorbidity and body mass index (BMI) of 45.0 to 49.9 in adult (HCC)  - Discussed options of HealthyCORE-Intensive Lifestyle Intervention Program, Very Low Calorie Diet-VLCD, Conservative Program, Vijaya-En-Y Gastric Bypass, and Vertical Sleeve Gastrectomy and the role of weight loss medications.  - Explained the importance of making lifestyle changes in addition to starting any anti-obesity medications.   - Patient is interested in pursuing HealthyCORE-Intensive Lifestyle Intervention Program and follow up visits with medical weight management provider.  - Initial weight loss goal of 5-10% weight loss for improved health  - Weight loss can improve patient's co-morbid conditions and/or prevent weight-related complications.  - Weight is not at goal and patient has been unable to achieve a meaningful weight loss above 5% using various programs and tools for more than 6 months  - Labs reviewed from 12/2023  -Patient lifestyle habits were reviewed and barriers to weight loss were addressed today.  Nutrition was discussed and patient will begin with a healthy core program which will help better balance his nutrition.  Patient will work with the dietitian to help with portioning out his evenly and spacing out his calories throughout the day.  Patient was encouraged to avoid only eating 1 meal per day and educated that his body likely needs more calories than he is currently consuming.  Patient will consider staying on a low carbohydrate plan but would avoid full keto due to his history of gout and kidney stones.  Activity was discussed and patient was encouraged to start an exercise routine for at least 30 minutes 2-3 times per week.    Medications were discussed:  Phentermine to be avoided due to elevated blood pressure  Topiramate contraindicated as patient has history of kidney stones  Bupropion/naltrexone was discussed and patient may  consider in the future to help with cravings and emotional eating  GLP-1 medications were discussed and patient is aware Medicare does not traditionally cover these medications.  Patient may inquire with his insurance more specifically if these medications will be covered.    Bariatric surgery was discussed and patient may be open to this solution in the future if he is not successful with more conservative treatment options.    Goals:  Calorie Goal: 1855-7666 calories  Do not skip meals.  Food log via naveen or provided paper log (naveen options include www.myfitnesspal.com, sparkpeople.com, loseit.com, calorieking.com, Clarimedix)  No sugary beverages.   At least 64oz of water daily.  Increase physical activity by 10 minutes daily. Gradually increase physical activity to a goal of 5 days per week for 30 minutes of MODERATE intensity PLUS 2 days per week of FULL BODY resistance training     DOMINIC (obstructive sleep apnea)  Encourage nightly use of CPAP machine    Hypertension  Treatment includes lisinopril/hydrochlorothiazide 20/25 mg and amlodipine 5 mg daily  Blood pressure well-controlled today         Joseluis was seen today for consult.    Diagnoses and all orders for this visit:    Class 3 severe obesity due to excess calories with serious comorbidity and body mass index (BMI) of 45.0 to 49.9 in adult ([facility])    Class 3 severe obesity due to excess calories without serious comorbidity with body mass index (BMI) of 45.0 to 49.9 in adult ([facility])  -     Ambulatory Referral to Weight Management    DOMINIC (obstructive sleep apnea)    Primary hypertension           Total time spent reviewing chart, interviewing patient, examining patient, discussing plan, answering all questions, and documentin min, with >50% face-to-face time spent counseling patient on nonsurgical interventions for the treatment of excess weight. Discussed in detail nonsurgical options including intensive lifestyle intervention program, very low-calorie diet  "program and conservative program.  Discussed the role of weight loss medications.  Counseled patient on diet behavior and exercise modification for weight loss.    Follow up in approximately 2 months with Non-Surgical Physician/Advanced Practitioner.    Subjective:   Chief Complaint   Patient presents with    Consult     Pt is here for MWM consult       Patient ID: Joseluis Blake  is a 58 y.o. male with excess weight/obesity here to pursue weight management.  Previous notes and records have been reviewed.    Past Medical History:   Diagnosis Date    Borderline diabetes     per pt A1C \"coming down\"    Chronic pain disorder     lower back-s/p laminectomy    Claustrophobia     \"some degree\" jes MRI's\"    CPAP (continuous positive airway pressure) dependence     per pt does not use CPAP    Diverticulitis     GERD (gastroesophageal reflux disease)     Hypertension     Kidney stone     x 2 episodes    Lactose intolerance     Localized pain of joint     Mild sleep apnea     CPAP started in July having difficulty using- only has used on occ    Morbid obesity with body mass index (BMI) of 50.0 to 59.9 in adult (HCC)     Papillary thyroid carcinoma (HCC)     Rectal bleeding     occ rectal bleeding last episode unsure-\"nothing recent\"    Seasickness     Skin tag     skin tags were removed-as of 8/2/19 has a few more    Teeth missing     Thyroid nodule     Tinnitus     Wears glasses     Weight loss     \"80lbs and did gain 40lbs back\"--     Past Surgical History:   Procedure Laterality Date    BACK SURGERY      laminectomy-1996, 2010    COLONOSCOPY      x2    ESOPHAGOGASTRODUODENOSCOPY N/A 3/30/2019    Procedure: ESOPHAGOGASTRODUODENOSCOPY (EGD);  Surgeon: Kory Suresh MD;  Location: [facility] OR;  Service: Gastroenterology    FL RETROGRADE PYELOGRAM  9/16/2022    FL RETROGRADE PYELOGRAM  9/22/2022    FL RETROGRADE PYELOGRAM  9/29/2022    KNEE ARTHROSCOPY Left 2007    AR CYSTO BLADDER W/URETERAL CATHETERIZATION Right 9/16/2022    " Procedure: CYSTOSCOPY RETROGRADE PYELOGRAM WITH INSERTION STENT URETERAL retrograde;  Surgeon: Won Lawton MD;  Location: [facility];  Service: Urology    MD CYSTO/URETERO W/LITHOTRIPSY &INDWELL STENT INSRT Right 1/6/2022    Procedure: CYSTOSCOPY URETEROSCOPY AND INSERTION STENT URETERAL RIGHT;  Surgeon: Won Lawton MD;  Location: [facility];  Service: Urology    MD CYSTO/URETERO W/LITHOTRIPSY &INDWELL STENT INSRT Right 9/22/2022    Procedure: CYSTOSCOPY URETEROSCOPY WITH LITHOTRIPSY HOLMIUM LASER, BASKET EXTRACTION, RETROGRADE PYELOGRAM (BILATERAL) AND  STENT EXCHANGE;  Surgeon: Won Lawton MD;  Location: [facility];  Service: Urology    MD CYSTO/URETERO W/LITHOTRIPSY &INDWELL STENT INSRT Bilateral 9/29/2022    Procedure: CYSTOSCOPY URETEROSCOPY, STENT MANUPILATION, RETROGRADE PYELOGRAM, LEFT  URETERAL STENT NSERTION, AND  REMOVAL OF RIGHT STENT;  Surgeon: Won Lawton MD;  Location: [facility];  Service: Urology    MD CYSTO/URETERO W/LITHOTRIPSY &INDWELL STENT INSRT Left 10/13/2022    Procedure: CYSTOSCOPY URETEROSCOPY WITH LITHOTRIPSY BASKET EXTRACTION, RETROGRADE PYELOGRAM AND  STENT EXCHANGE;  Surgeon: Won Lawton MD;  Location: [facility];  Service: Urology    MD ESOPHAGOGASTRODUODENOSCOPY TRANSORAL DIAGNOSTIC N/A 5/1/2019    Procedure: ESOPHAGOGASTRODUODENOSCOPY (EGD);  Surgeon: Kory Suresh MD;  Location: [facility] LAB;  Service: Gastroenterology    MD THYROIDECTOMY RMVL REMAINING TISS FLWG PRTL RMVL Right 2/8/2023    Procedure: COMPLETION THYROIDECTOMY;  Surgeon: Timbo Bolaños MD;  Location: [facility];  Service: ENT    MD TOTAL THYROID LOBECTOMY UNI W/WO ISTHMUSECTOMY Left 8/31/2022    Procedure: Left patial THYROIDECTOMY;  Surgeon: Timbo Bolaños MD;  Location: [facility];  Service: ENT    UPPER GASTROINTESTINAL ENDOSCOPY  2006    US GUIDED THYROID BIOPSY  12/28/2020       HPI:  Wt Readings from Last 20 Encounters:   03/26/24 (!) 172 kg (379 lb)   03/01/24 (!) 175 kg  (386 lb 12.8 oz)   01/04/24 (!) 180 kg (396 lb)   12/20/23 (!) 180 kg (397 lb)   11/24/23 (!) 180 kg (397 lb 12.8 oz)   09/26/23 (!) 176 kg (387 lb)   06/27/23 (!) 166 kg (366 lb)   05/26/23 (!) 166 kg (366 lb)   05/24/23 (!) 166 kg (366 lb)   05/22/23 (!) 166 kg (366 lb)   04/20/23 (!) 166 kg (366 lb)   04/19/23 (!) 168 kg (370 lb)   02/17/23 (!) 176 kg (388 lb 0.2 oz)   02/08/23 (!) 176 kg (386 lb 14.5 oz)   02/02/23 (!) 176 kg (387 lb)   10/20/22 (!) 179 kg (395 lb)   10/13/22 (!) 181 kg (398 lb 9.6 oz)   09/28/22 (!) 176 kg (389 lb)   09/22/22 (!) 176 kg (389 lb)   09/15/22 (!) 177 kg (390 lb)       Patient presents today to medical weight management office for consult.  Patient has been struggling with his weight for many years.  Patient adopted the carnivore diet 2 years ago and was able to lose 80 pounds.  Patient developed kidney stones while on this diet so now stays on a low carbohydrate and high-protein diet.  He reports he eats 1 meal a day consisting of meat with occasional vegetables.   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  He is active during the day but does not have any formal exercise routine.  Patient is under the care of a specialist for his sleep apnea and is currently trying to adjust to a nasal pillow CPAP device.  Patient has a history of papillary thyroid cancer and had his thyroid completely removed.  Patient is under the management of his PCP for elevated blood pressure which is stable on amlodipine and lisinopril/hydrochlorothiazide.      Obesity/Excess Weight:  Severity: Severe  Onset:  last 10+ years    Modifiers: Diet and Exercise  Contributing factors: Poor Food Choices, Insufficient Physical Activity, Stress/Emotional Eating, Lack of knowledge of appropriate lifestyle changes, and Insufficient time to make appropriate lifestyle changes  Associated symptoms: comorbid conditions, fatigue, increased joint pain, decreased exercise capacity,  "body image issues, decreased self esteem, increased shortness of breath, decreased mobility, depression, inability to do certain activities, and clothes do not fit    Diet recall:  B:  skips  L: (3pm) chicken or beef with occasional vegetables  S: no  D: skips    Hydration: 1 gallon water with lemon and tart cherry  Alcohol: no  Smoking: no  Exercise: no  Occupation: retired - carpentry   Sleep: CPAP - difficulty using (trying nasal pillows)      Current weight: 379 lbs  Current BMI: 45.75  Current Waist Measurement: 60.5 in  Goal weight: 300 lbs    Colonoscopy: 2019      The following portions of the patient's history were reviewed and updated as appropriate: allergies, current medications, past family history, past medical history, past social history, past surgical history, and problem list.    Family History   Problem Relation Age of Onset    Osteoarthritis Mother     Obesity Father         [phone]    Sleep apnea Father         with CPAP    No Known Problems Sister     Lupus Sister     No Known Problems Sister     No Known Problems Brother     No Known Problems Son     Substance Abuse Neg Hx     Mental illness Neg Hx     Cancer Neg Hx     Diabetes Neg Hx     Heart disease Neg Hx     Stroke Neg Hx         Review of Systems   Constitutional:  Negative for fatigue.   HENT:  Negative for sore throat.    Respiratory:  Negative for cough and shortness of breath.    Cardiovascular:  Negative for chest pain, palpitations and leg swelling.   Gastrointestinal:  Negative for abdominal pain, constipation, diarrhea and nausea.   Genitourinary:  Negative for dysuria.   Musculoskeletal:  Positive for back pain. Negative for arthralgias.   Skin:  Negative for rash.   Neurological:  Negative for headaches.   Psychiatric/Behavioral:  Negative for dysphoric mood. The patient is not nervous/anxious.        Objective:  /70 (BP Location: Left arm, Patient Position: Sitting, Cuff Size: Large)   Pulse 85   Ht 6' 3.5\" (1.918 m)  "  Wt (!) 172 kg (379 lb)   BMI 46.75 kg/m²     Physical Exam  Vitals and nursing note reviewed.   Constitutional:       Appearance: Normal appearance. He is obese.   HENT:      Head: Normocephalic.   Pulmonary:      Effort: Pulmonary effort is normal.   Neurological:      General: No focal deficit present.      Mental Status: He is alert and oriented to person, place, and time.   Psychiatric:         Mood and Affect: Mood normal.         Behavior: Behavior normal.         Thought Content: Thought content normal.         Judgment: Judgment normal.              Labs and Imaging  Recent labs and imaging have been personally reviewed.  Lab Results   Component Value Date    WBC 9.88 12/05/2023    HGB 15.0 12/05/2023    HCT 45.1 12/05/2023    MCV 92 12/05/2023     12/05/2023     Lab Results   Component Value Date    SODIUM 138 12/05/2023    K 3.8 12/05/2023     12/05/2023    CO2 24 12/05/2023    AGAP 8 12/05/2023    BUN 26 (H) 12/05/2023    CREATININE 1.08 12/05/2023    GLUC 101 06/13/2023    GLUF 99 12/05/2023    CALCIUM 9.4 12/05/2023    AST 24 12/05/2023    ALT 35 12/05/2023    ALKPHOS 67 12/05/2023    TP 7.6 12/05/2023    TBILI 0.54 12/05/2023    EGFR 74 12/05/2023     Lab Results   Component Value Date    HGBA1C 5.9 12/20/2023     Lab Results   Component Value Date    MNZ5UQDOZFJG 0.169 (L) 12/05/2023    TSH 2.080 12/10/2018     Lab Results   Component Value Date    CHOLESTEROL 148 12/05/2023     Lab Results   Component Value Date    HDL 32 (L) 12/05/2023     Lab Results   Component Value Date    TRIG 92 12/05/2023     Lab Results   Component Value Date    LDLCALC 98 12/05/2023

## 2024-03-26 NOTE — ASSESSMENT & PLAN NOTE
- Discussed options of HealthyCORE-Intensive Lifestyle Intervention Program, Very Low Calorie Diet-VLCD, Conservative Program, Vijaya-En-Y Gastric Bypass, and Vertical Sleeve Gastrectomy and the role of weight loss medications.  - Explained the importance of making lifestyle changes in addition to starting any anti-obesity medications.   - Patient is interested in pursuing HealthyCORE-Intensive Lifestyle Intervention Program and follow up visits with medical weight management provider.  - Initial weight loss goal of 5-10% weight loss for improved health  - Weight loss can improve patient's co-morbid conditions and/or prevent weight-related complications.  - Weight is not at goal and patient has been unable to achieve a meaningful weight loss above 5% using various programs and tools for more than 6 months  - Labs reviewed from 12/2023  -Patient lifestyle habits were reviewed and barriers to weight loss were addressed today.  Nutrition was discussed and patient will begin with a healthy core program which will help better balance his nutrition.  Patient will work with the dietitian to help with portioning out his evenly and spacing out his calories throughout the day.  Patient was encouraged to avoid only eating 1 meal per day and educated that his body likely needs more calories than he is currently consuming.  Patient will consider staying on a low carbohydrate plan but would avoid full keto due to his history of gout and kidney stones.  Activity was discussed and patient was encouraged to start an exercise routine for at least 30 minutes 2-3 times per week.    Medications were discussed:  Phentermine to be avoided due to elevated blood pressure  Topiramate contraindicated as patient has history of kidney stones  Bupropion/naltrexone was discussed and patient may consider in the future to help with cravings and emotional eating  GLP-1 medications were discussed and patient is aware Medicare does not traditionally  cover these medications.  Patient may inquire with his insurance more specifically if these medications will be covered.    Bariatric surgery was discussed and patient may be open to this solution in the future if he is not successful with more conservative treatment options.    Goals:  Calorie Goal: 1688-9665 calories  Do not skip meals.  Food log via naveen or provided paper log (naveen options include www.[employer].com, sparkpeople.com, loseit.com, calorieking.com, [employer])  No sugary beverages.   At least 64oz of water daily.  Increase physical activity by 10 minutes daily. Gradually increase physical activity to a goal of 5 days per week for 30 minutes of MODERATE intensity PLUS 2 days per week of FULL BODY resistance training

## 2024-03-27 ENCOUNTER — TELEPHONE (OUTPATIENT)
Dept: SLEEP CENTER | Facility: CLINIC | Age: 59
End: 2024-03-27

## 2024-03-27 DIAGNOSIS — G47.33 OSA (OBSTRUCTIVE SLEEP APNEA): Primary | ICD-10-CM

## 2024-03-27 NOTE — TELEPHONE ENCOUNTER
Patient emailed and would like to change to a full face mask because he can't keep his mouth close.    Can I please get a full face script for him.

## 2024-04-01 ENCOUNTER — TELEPHONE (OUTPATIENT)
Dept: NEUROLOGY | Facility: CLINIC | Age: 59
End: 2024-04-01

## 2024-04-03 NOTE — PROGRESS NOTES
"Weight Management Medical Nutrition Assessment  Joseluis is here today for Healthy Core initial visit. Current weight 376.2#. Hx of HTN, DOMINIC, GERD, CKD2, and gout. Patient started Healthy Core program x 2019 but was unable to attend classes and was lost to follow up. Reviewed Healthy Core program policies and procedures. Patient has struggled with his weight for many years and is looking to get back on track. Patient had success 2 years ago with Carnivore diet but developed kidney stones. Enjoyed the ease of following that program. Discussed sustainable weight loss goals/time frame and sustainable, healthy practices.  Discussed benefits of interval eating, appropriate protein intake, and mindful eating. Encouraged patient to space his calories out more evenly throughout the day to support metabolic health. Low-calorie meal plan reviewed with a goal of 2-3#/week weight loss based on patient's weight loss goals and current intake. Advised patient against ketosis due to gout risk and discussed benefits of incorporating consistent complex carbohydrates throughout the day. Will adjust meal plan throughout program if necessary. Patient recently joined [facility] to utilize [employer]. Encouraged patient to increase daily activity (swimming) by 10 mins with eventual goal of 30 mins 5x/week. Hydration adequate. Completed a body composition using SECA scale and will review results with patient at Month 1 f/u.     Likes: eggs/egg whites, cheese, nuts, peanut butter, hummus, tuna   Open to Greek yogurt and cottage cheese      Patient seen by Medical Provider in past 6 months:  yes  Requested to schedule appointment with Medical Provider: No    Anthropometric Measurements  Healthy Core Start Weight (#): 376.2# (4/4/24)  Current Weight (#): --  TBW % Change from start weight: --  Ideal Body Weight (#): 199# (75.5\")  Goal Weight (#): 300#  Highest Weight (#): 407#  Lowest Weight (#): 200# (age 16)    Weight Loss History  Previous weight loss " attempts: Counseling with MD  Exercise, High Protein/Low CHO diets (Atkins, [place], etc.)  Nutrition Counseling with RD, Self Created Diets (Portion Control, Healthy Food Choices, etc.)    Food and Nutrition Related History  Wake up: 6-7 am    Bed Time: 11 pm   Sleep quality: DOMINIC w/ difficulty tolerating CPAP (trying nasal pillows)    Dietary Recall  Breakfast: --  Snack: --  Lunch (3-4 pm): chicken or beef with occasional vegetables Or yesterday was 8 chicken drumsticks (~4 oz each) Or 4 eggs + cheese w/ bell pepper  Snack: --  Dinner: --  Snack: --    Beverages: water (w/ juice of 2 johanna and 1 Tbsp tart cherry juice)  Volume of beverage intake: 1 gallon    Weekends: Same  Cravings: none  Trouble area of day: none but may feel hungry if first meal is pushed back later than 3-4 pm    Frequency of Eating out: 1x/month (burgers + fries)  Food restrictions: lactose intolerant (reports okay with cheese + milk?)  Cooking: self  Food Shopping: self    Occupation: retired (truck drive) - currently [employer]     Physical Activity  Activity: No formal routine (recently joined [employer] for swimming)   Frequency: rarely  Physical limitations/barriers to exercise: chronic lower back pain (had surgery when he was 33)    Estimated Needs  Energy  SECA: BMR: 2,586 X 1.4 -1,000 = 2,620 kcal     [facility] Energy Needs (needs at 379#)  BMR: 2,618 kcal  Maintenance calories (sedentary): 3,141 kcal  1-2#/week loss (sedentary): 2,141-2,641 kcal  1-2#/week loss (light activity): 2,599-3,099 kcal    Protein: 108-136 grams (1.2-1.5g/kg IBW)  Fluid: 105 ounces (35mL/kg IBW)    Nutrition Diagnosis  Yes;    Overweight/obesity related to Excess energy intake as evidenced by BMI more than normative standard for age and sex (obesity-grade III 40+)     Nutrition Intervention    Nutrition Prescription  Calories: 1,700-1,900 kcal  Protein: 108-136 g  Fluid: 105 ounces    Meal Plan (Moises/Pro)  Breakfast: 400/30-40  Snack: 150-200/5+  Lunch:  500-550/40-45  Snack: --  Dinner: 500-550/40-45  Snack: 150-200/5+    Nutrition Education  Healthy Core Manual   Calorie controlled menu  Lean protein food choices  Healthy snack options  Food journaling tips    Nutrition Counseling  Strategies: meal planning, portion sizes, healthy snack choices, hydration, fiber intake, protein intake, exercise, food logging    Monitoring and Evaluation:    Evaluation criteria  Energy Intake  Meet protein needs  Maintain adequate hydration  Monitor weekly weight  Meal planning/preparation  Food journal   Decreased portions at mealtimes and snacks  Physical activity     Barriers to change:none  Readiness to change: Preparation:  (Getting ready to change)   Comprehension: very good  Expected Compliance: very good

## 2024-04-04 ENCOUNTER — CLINICAL SUPPORT (OUTPATIENT)
Dept: BARIATRICS | Facility: CLINIC | Age: 59
End: 2024-04-04

## 2024-04-04 VITALS — BODY MASS INDEX: 38.36 KG/M2 | WEIGHT: 315 LBS | HEIGHT: 76 IN

## 2024-04-04 DIAGNOSIS — R63.5 ABNORMAL WEIGHT GAIN: Primary | ICD-10-CM

## 2024-04-04 PROCEDURE — RECHECK

## 2024-04-04 PROCEDURE — WMPRO12

## 2024-04-08 ENCOUNTER — OFFICE VISIT (OUTPATIENT)
Dept: FAMILY MEDICINE CLINIC | Facility: CLINIC | Age: 59
End: 2024-04-08
Payer: COMMERCIAL

## 2024-04-08 VITALS
TEMPERATURE: 97.9 F | SYSTOLIC BLOOD PRESSURE: 120 MMHG | OXYGEN SATURATION: 97 % | HEIGHT: 76 IN | DIASTOLIC BLOOD PRESSURE: 82 MMHG | WEIGHT: 315 LBS | BODY MASS INDEX: 38.36 KG/M2 | RESPIRATION RATE: 20 BRPM | HEART RATE: 80 BPM

## 2024-04-08 DIAGNOSIS — C73 PAPILLARY THYROID CARCINOMA (HCC): ICD-10-CM

## 2024-04-08 DIAGNOSIS — E11.69 TYPE 2 DIABETES MELLITUS WITH MORBID OBESITY (HCC): ICD-10-CM

## 2024-04-08 DIAGNOSIS — R45.86 EMOTIONAL HYPERSENSITIVITY: Primary | ICD-10-CM

## 2024-04-08 DIAGNOSIS — E66.01 TYPE 2 DIABETES MELLITUS WITH MORBID OBESITY (HCC): ICD-10-CM

## 2024-04-08 PROCEDURE — G2211 COMPLEX E/M VISIT ADD ON: HCPCS | Performed by: NURSE PRACTITIONER

## 2024-04-08 PROCEDURE — 99214 OFFICE O/P EST MOD 30 MIN: CPT | Performed by: NURSE PRACTITIONER

## 2024-04-08 NOTE — ASSESSMENT & PLAN NOTE
Following up with endocrinology. Stable, no changes.  
Pt perceives emotional hypersensitivity. Provided reassurance for pt and he is advised that this may be due to overall anxiety and/or normal response to life changes and experiences.  Discussed in detail in the office. If symptoms persist, consider treatment for generalized anxiety.  
Stable, no issues at this time.  Lab Results   Component Value Date    HGBA1C 5.9 12/20/2023     
no

## 2024-04-08 NOTE — PROGRESS NOTES
"Assessment/Plan:    1. Emotional hypersensitivity  Assessment & Plan:  Pt perceives emotional hypersensitivity. Provided reassurance for pt and he is advised that this may be due to overall anxiety and/or normal response to life changes and experiences.  Discussed in detail in the office. If symptoms persist, consider treatment for generalized anxiety.    Orders:  -     Testosterone; Future  -     Cortisol; Future    2. Type 2 diabetes mellitus with morbid obesity (HCC)  Assessment & Plan:  Stable, no issues at this time.  Lab Results   Component Value Date    HGBA1C 5.9 12/20/2023         3. Papillary thyroid carcinoma (HCC)  Assessment & Plan:  Following up with endocrinology. Stable, no changes.              Patient Instructions   Look into the book \"ATTACHED\"       Return in about 6 weeks (around 5/20/2024) for Recheck.    Subjective:      Patient ID: Joseluis Blake is a 59 y.o. male.    Chief Complaint   Patient presents with    Mental Health Problem     Pt here for a mental health check       Joseluis is a 59 year old male with diabetes, anxiety, diabetes, obesity, chronic pain syndrome, GERD, hypertension, history of papillary thyroid cancer  who presents to the office for evaluation and management of hypersensitivity. Pt reports that he feels as if he is very emotional about \"silly little things like a dog walking down the street\". States that symptoms have been persistent since having his thyroid removed about one year ago due to thyroid cancer. States that he has come into the office for this issue due to now being involved with a woman on the internet for about 6 weeks. Reports that he has strong emotions toward this woman that he feels is irrational due to the short timing of the relationship. Reports that he has been  in the past but has been  for 28 years and has not been focused on dating. Reports that he has been focused on taking care of his son. Reports that she has been sending this " "woman he has been dating, money and this is why he feels \"this is likely a game\". Pt has noticed excessive emotions after having thyroid removed for thyroid cancer. Reports that he did not have any concerns regarding initial diagnosis of thyroid cancer. Highs are very high and lows are very low. Denies any known history of depression. Prior to this period of time,         The following portions of the patient's history were reviewed and updated as appropriate: allergies, current medications, past family history, past medical history, past social history, past surgical history and problem list.    Review of Systems   Constitutional:  Negative for chills, fatigue and fever.   Respiratory:  Negative for cough, chest tightness and shortness of breath.    Cardiovascular:  Negative for chest pain.   Psychiatric/Behavioral:  Negative for agitation, behavioral problems, confusion, decreased concentration, dysphoric mood and sleep disturbance. The patient is nervous/anxious.          Current Outpatient Medications   Medication Sig Dispense Refill    allopurinol (ZYLOPRIM) 300 mg tablet Take 1 tablet (300 mg total) by mouth daily 90 tablet 1    amLODIPine (NORVASC) 5 mg tablet TAKE 1 TABLET EVERY DAY 90 tablet 1    Ergocalciferol (VITAMIN D2 PO) Take by mouth      levothyroxine 200 mcg tablet TAKE ONE TABLET BY MOUTH EVERY DAY 90 tablet 1    lisinopril-hydrochlorothiazide (PRINZIDE,ZESTORETIC) 20-25 MG per tablet TAKE 1 TABLET EVERY DAY 90 tablet 1    omeprazole (PriLOSEC) 20 mg delayed release capsule TAKE 1 CAPSULE EVERY DAY 90 capsule 1    potassium citrate (UROCIT-K) 10 mEq Take 10 mEq by mouth 3 (three) times a day with meals      eszopiclone (LUNESTA) 2 mg tablet Take 1 tablet (2 mg total) by mouth daily at bedtime for 14 days Take immediately before bedtime 14 tablet 0     No current facility-administered medications for this visit.       Objective:    /82 (BP Location: Left arm, Patient Position: Sitting, Cuff " "Size: Large)   Pulse 80   Temp 97.9 °F (36.6 °C) (Temporal)   Resp 20   Ht 6' 3.5\" (1.918 m)   Wt (!) 171 kg (376 lb)   SpO2 97%   BMI 46.38 kg/m²        Physical Exam  Vitals reviewed.   Constitutional:       General: He is not in acute distress.     Appearance: Normal appearance. He is well-developed. He is not diaphoretic.   HENT:      Head: Normocephalic and atraumatic.   Eyes:      General: Lids are normal.         Right eye: No discharge.         Left eye: No discharge.      Conjunctiva/sclera: Conjunctivae normal.   Neck:      Thyroid: No thyromegaly.   Cardiovascular:      Rate and Rhythm: Normal rate and regular rhythm.      Heart sounds: Normal heart sounds.   Pulmonary:      Effort: Pulmonary effort is normal. No respiratory distress.      Breath sounds: Normal breath sounds. No decreased breath sounds, wheezing, rhonchi or rales.   Musculoskeletal:      Cervical back: Normal range of motion and neck supple.   Lymphadenopathy:      Cervical: No cervical adenopathy.   Skin:     General: Skin is warm and dry.      Findings: No rash.   Neurological:      Mental Status: He is alert and oriented to person, place, and time.   Psychiatric:         Mood and Affect: Mood is anxious. Mood is not depressed. Affect is tearful.         Behavior: Behavior normal.         Thought Content: Thought content normal.         Judgment: Judgment normal.                SOLE Cameron  "

## 2024-04-09 ENCOUNTER — LAB (OUTPATIENT)
Dept: LAB | Facility: HOSPITAL | Age: 59
End: 2024-04-09
Payer: COMMERCIAL

## 2024-04-09 DIAGNOSIS — E89.0 POST-OPERATIVE HYPOTHYROIDISM: ICD-10-CM

## 2024-04-09 DIAGNOSIS — R45.86 EMOTIONAL HYPERSENSITIVITY: ICD-10-CM

## 2024-04-09 LAB
CORTIS SERPL-MCNC: 18.6 UG/DL
T4 FREE SERPL-MCNC: 1.16 NG/DL (ref 0.61–1.12)
TESTOST SERPL-MSCNC: 437 NG/DL
TSH SERPL DL<=0.05 MIU/L-ACNC: 0.14 UIU/ML (ref 0.45–4.5)

## 2024-04-09 PROCEDURE — 84443 ASSAY THYROID STIM HORMONE: CPT

## 2024-04-09 PROCEDURE — 84403 ASSAY OF TOTAL TESTOSTERONE: CPT

## 2024-04-09 PROCEDURE — 82533 TOTAL CORTISOL: CPT

## 2024-04-09 PROCEDURE — 84439 ASSAY OF FREE THYROXINE: CPT

## 2024-04-09 PROCEDURE — 36415 COLL VENOUS BLD VENIPUNCTURE: CPT

## 2024-04-17 DIAGNOSIS — I10 ESSENTIAL HYPERTENSION: ICD-10-CM

## 2024-04-17 RX ORDER — AMLODIPINE BESYLATE 5 MG/1
5 TABLET ORAL DAILY
Qty: 90 TABLET | Refills: 1 | Status: SHIPPED | OUTPATIENT
Start: 2024-04-17

## 2024-04-17 RX ORDER — LISINOPRIL AND HYDROCHLOROTHIAZIDE 25; 20 MG/1; MG/1
TABLET ORAL
Qty: 90 TABLET | Refills: 1 | Status: SHIPPED | OUTPATIENT
Start: 2024-04-17

## 2024-04-18 ENCOUNTER — OFFICE VISIT (OUTPATIENT)
Age: 59
End: 2024-04-18
Payer: COMMERCIAL

## 2024-04-18 VITALS
DIASTOLIC BLOOD PRESSURE: 70 MMHG | WEIGHT: 315 LBS | BODY MASS INDEX: 39.17 KG/M2 | HEIGHT: 75 IN | OXYGEN SATURATION: 93 % | HEART RATE: 84 BPM | SYSTOLIC BLOOD PRESSURE: 128 MMHG

## 2024-04-18 DIAGNOSIS — E66.01 CLASS 3 SEVERE OBESITY DUE TO EXCESS CALORIES WITHOUT SERIOUS COMORBIDITY WITH BODY MASS INDEX (BMI) OF 45.0 TO 49.9 IN ADULT (HCC): ICD-10-CM

## 2024-04-18 DIAGNOSIS — G47.33 OSA (OBSTRUCTIVE SLEEP APNEA): Primary | ICD-10-CM

## 2024-04-18 DIAGNOSIS — I10 HYPERTENSION, UNSPECIFIED TYPE: ICD-10-CM

## 2024-04-18 DIAGNOSIS — R06.83 SNORING: ICD-10-CM

## 2024-04-18 PROCEDURE — 99213 OFFICE O/P EST LOW 20 MIN: CPT | Performed by: INTERNAL MEDICINE

## 2024-04-18 PROCEDURE — G2211 COMPLEX E/M VISIT ADD ON: HCPCS | Performed by: INTERNAL MEDICINE

## 2024-04-18 NOTE — PROGRESS NOTES
Progress Note - Sleep Medicine  Joseluis Blake 59 y.o. male MRN: [id number]       Impression & Plan:       1.  Severe obstructive sleep apnea  HST GUERA 40.1  CPAP study at 400 pounds showed optimal pressure of 14 cm H2O    Compliance from 2/22 - 3/22/2024  More than 4 hours 0%, average usage on days used 14 minutes  Minimal air leak  Residual AHI 32.7    At last office visit started patient on a 2-week trial of Lunesta 2 mg to improve CPAP compliance  Ordered mask fitting and chinstrap at last visit  Chinstrap has not been helpful  He has been ordered a fullface mask and received it last week but has not used it yet    Plan  Recommended patient use CPAP with full facemask  Recommended he continue using Lunesta can go up to 4 mg to help him become compliant with CPAP  Follow-up in 6 weeks    2.  Morbid obesity  BMI 46.40  Provided referral to weight management at last visit  Explained that he may be a good candidate for bariatric surgery    3.  Snoring  Continues to snore    4.  Hypertension  Blood pressure 128/70 today  Currently on lisinopril-hydrochlorothiazide 20-25 mg, amlodipine 5 mg  Explained the treatment of obstructive sleep apnea can improve blood pressure control      ______________________________________________________________________    HPI:    Joseluis Blake 59-year-old male with past medical history of hypertension, obstructive sleep apnea, GERD, postop hypothyroidism status post left hemithyroidectomy who presents for follow-up of obstructive sleep apnea.  He has severe DOMINIC and has had difficulty tolerating CPAP due to claustrophobia.  He reports snoring and witnessed apneas.    At last office visit he was started on a trial of Lunesta 2 mg nightly to improve CPAP compliance.  He did not feel sleepy or any difference with Lunesta.  CPAP compliance is extremely poor.  He was ordered a chinstrap at last office visit but it has not been helpful.  He recently received a fullface mask but has not  started using it yet.  He was previously mouth breathing with nasal mask.    He goes to bed at 11 PM and fall asleep within 30 minutes.  He wakes up at 6 AM.  He gets 5 to 6 hours of sleep at night.  Current Bicknell score of 2          Review of Systems:  Review of Systems   All other systems reviewed and are negative.        Social history updates:  Social History     Tobacco Use   Smoking Status Never    Passive exposure: Never   Smokeless Tobacco Never     Social History     Socioeconomic History    Marital status:      Spouse name: Not on file    Number of children: 1    Years of education: Not on file    Highest education level: Not on file   Occupational History    Not on file   Tobacco Use    Smoking status: Never     Passive exposure: Never    Smokeless tobacco: Never   Vaping Use    Vaping status: Never Used   Substance and Sexual Activity    Alcohol use: No    Drug use: No    Sexual activity: Not Currently     Partners: Female     Comment: defer   Other Topics Concern    Not on file   Social History Narrative    Living alone     Social Determinants of Health     Financial Resource Strain: Low Risk  (9/26/2023)    Overall Financial Resource Strain (CARDIA)     Difficulty of Paying Living Expenses: Not hard at all   Food Insecurity: No Food Insecurity (9/30/2022)    Hunger Vital Sign     Worried About Running Out of Food in the Last Year: Never true     Ran Out of Food in the Last Year: Never true   Transportation Needs: No Transportation Needs (9/26/2023)    PRAPARE - Transportation     Lack of Transportation (Medical): No     Lack of Transportation (Non-Medical): No   Physical Activity: Not on file   Stress: Not on file   Social Connections: Not on file   Intimate Partner Violence: Not on file   Housing Stability: Low Risk  (9/30/2022)    Housing Stability Vital Sign     Unable to Pay for Housing in the Last Year: No     Number of Places Lived in the Last Year: 1     Unstable Housing in the Last  "Year: No       PhysicalExamination:  Vitals:   /70 (BP Location: Left arm, Patient Position: Sitting, Cuff Size: Large)   Pulse 84   Ht 6' 3\" (1.905 m)   Wt (!) 168 kg (371 lb 3.2 oz)   SpO2 93%   BMI 46.40 kg/m²     Physical Exam  General:  Awake alert and oriented x 3, conversant without conversational dyspnea, NAD, normal affect  HEENT:  PERRL, Sclera noninjected, nonicteric OU, Nares patent,  no craniofacial abnormalities, Mucous membranes, moist, no oral lesions, normal dentition  NECK: Trachea midline, no accessory muscle use, no stridor, no cervical or supraclavicular adenopathy, JVP not elevated  CARDIAC: Reg, single s1/S2, no m/r/g  PULM: CTA bilaterally no wheezing, rhonchi or rales  EXT: No cyanosis, no clubbing, no edema, normal capillary refill  NEURO: no focal neurologic deficits, AAOx3, moving all extremities appropriately     Diagnostic Data:  Labs:  I personally reviewed the most recent laboratory data pertinent to today's visit  Lab on 04/09/2024   Component Date Value    Testosterone 04/09/2024 437     Cortisol, Random 04/09/2024 18.6     TSH 3RD GENERATON 04/09/2024 0.141 (L)     Free T4 04/09/2024 1.16 (H)    Telephone on 03/27/2024   Component Date Value    Supplier Name 03/27/2024 [facility]     Supplier Phone Number 03/27/2024 [phone]     Order Status 03/27/2024 Completed     Delivery Request Date 03/27/2024 03/27/2024     Date Delivered  03/27/2024 03/27/2024     Item Description 03/27/2024 PAP Accessory     Item Description 03/27/2024 PAP Mask, Full Face, Fit Upon Setup, N/A, 1 per 3 months     Item Description 03/27/2024 Humidifier Water Chamber, 1 per 6 months    Telephone on 03/04/2024   Component Date Value    Supplier Name 03/04/2024 [facility]     Supplier Phone Number 03/04/2024 [phone]     Order Status 03/04/2024 Completed     Delivery Request Date 03/04/2024 03/04/2024     Date Delivered  03/04/2024 03/04/2024 "     Item Description 03/04/2024 PAP Accessory     Item Description 03/04/2024 PAP Chinstrap, 1 per 6 months     Item Description 03/04/2024 Humidifier Water Chamber, 1 per 6 months    Appointment on 01/19/2024   Component Date Value    24H Urine Volume 01/19/2024 3,175     Calcium, 24H Urine 01/19/2024 82.55 (L)     Oxalate, Ur 01/19/2024 11     Oxalate, 24H Ur 01/19/2024 35     24H Urine Volume 01/19/2024 3,175     Uric Acid, 24H Ur 01/19/2024 1,035.05 (H)     PERIOD 01/19/2024 24     Citric Acid, U, 24hr 01/19/2024 86 (L)     CITRIC ACID, URINE 01/19/2024 27     Sodium, 24H Ur 01/19/2024 215.9     24H Urine Volume 01/19/2024 3,175     PERIOD 01/19/2024 24     pH, UA 01/19/2024 5.5     24H Urine Volume 01/19/2024 3,175     PERIOD 01/19/2024 24    Office Visit on 12/20/2023   Component Date Value    Hemoglobin A1C 12/20/2023 5.9    Appointment on 12/05/2023   Component Date Value    Free T4 12/05/2023 1.03     Vit D, 25-Hydroxy 12/05/2023 25.5 (L)     WBC 12/05/2023 9.88     RBC 12/05/2023 4.91     Hemoglobin 12/05/2023 15.0     Hematocrit 12/05/2023 45.1     MCV 12/05/2023 92     MCH 12/05/2023 30.5     MCHC 12/05/2023 33.3     RDW 12/05/2023 13.7     MPV 12/05/2023 9.5     Platelets 12/05/2023 260     nRBC 12/05/2023 0     Segmented % 12/05/2023 65     Immature Grans % 12/05/2023 1     Lymphocytes % 12/05/2023 21     Monocytes % 12/05/2023 7     Eosinophils Relative 12/05/2023 5     Basophils Relative 12/05/2023 1     Absolute Neutrophils 12/05/2023 6.53     Absolute Immature Grans 12/05/2023 0.07     Absolute Lymphocytes 12/05/2023 2.03     Absolute Monocytes 12/05/2023 0.64     Eosinophils Absolute 12/05/2023 0.51     Basophils Absolute 12/05/2023 0.10     Sodium 12/05/2023 138     Potassium 12/05/2023 3.8     Chloride 12/05/2023 106     CO2 12/05/2023 24     ANION GAP 12/05/2023 8     BUN 12/05/2023 26 (H)     Creatinine 12/05/2023 1.08     Glucose, Fasting 12/05/2023 99     Calcium 12/05/2023 9.4     AST  "12/05/2023 24     ALT 12/05/2023 35     Alkaline Phosphatase 12/05/2023 67     Total Protein 12/05/2023 7.6     Albumin 12/05/2023 4.1     Total Bilirubin 12/05/2023 0.54     eGFR 12/05/2023 74     Cholesterol 12/05/2023 148     Triglycerides 12/05/2023 92     HDL, Direct 12/05/2023 32 (L)     LDL Calculated 12/05/2023 98     Non-HDL-Chol (CHOL-HDL) 12/05/2023 116     TSH 3RD GENERATON 12/05/2023 0.169 (L)     Uric Acid 12/05/2023 6.8     PSA 12/05/2023 3.93     Thyroglobulin Ab 12/05/2023 <1.0     Thyroglobulin-VICKI 12/05/2023 <0.1 (L)    Telephone on 11/27/2023   Component Date Value    Supplier Name 11/27/2023 [facility]     Supplier Phone Number 11/27/2023 [phone]     Order Status 11/27/2023 Delivery Successful     Delivery Request Date 11/27/2023 11/27/2023     Date Delivered  11/27/2023 11/27/2023     Item Description 11/27/2023 PAP Accessory     Item Description 11/27/2023 PAP Mask, Nasal Pillow, Fit Upon Setup, N/A, 1 per 3 months     Item Description 11/27/2023 Humidifier Water Chamber, 1 per 6 months     Item Description 11/27/2023 PAP Headgear, 1 per 6 months     Item Description 11/27/2023 PAP Humidifier, Heated     Item Description 11/27/2023 PAP Monitoring Modem     Item Description 11/27/2023 Heated PAP Tubing, 1 per 3 months     Item Description 11/27/2023 Disposable PAP Filter, 2 per 1 month     Item Description 11/27/2023 Non-Disposable PAP Filter, 1 per 6 months     Item Description 11/27/2023 PAP Mask Interface Cushion, Nasal Pillow, 2 per 1 month        I have personally reviewed pertinent lab results.  Lab Results   Component Value Date    WBC 9.88 12/05/2023    HGB 15.0 12/05/2023    HCT 45.1 12/05/2023    MCV 92 12/05/2023     12/05/2023     Lab Results   Component Value Date    CALCIUM 9.4 12/05/2023    K 3.8 12/05/2023    CO2 24 12/05/2023     12/05/2023    BUN 26 (H) 12/05/2023    CREATININE 1.08 12/05/2023     No results found for: \"IGE\"  Lab " "Results   Component Value Date    ALT 35 12/05/2023    AST 24 12/05/2023    ALKPHOS 67 12/05/2023     No results found for: \"IRON\", \"TIBC\", \"FERRITIN\"  No results found for: \"GSSSXUFP29\"  No results found for: \"FOLATE\"      Arterial Blood Gas result:  PAWAN Perez MD  [facility]    "

## 2024-04-18 NOTE — PATIENT INSTRUCTIONS
Use fullface mask mask  If having difficulty falling asleep take Lunesta 2 tablets at bedtime, if any side effects please call the office or message me on MyChart  Follow-up in 6 weeks

## 2024-04-23 ENCOUNTER — CLINICAL SUPPORT (OUTPATIENT)
Dept: BARIATRICS | Facility: CLINIC | Age: 59
End: 2024-04-23

## 2024-04-23 ENCOUNTER — OFFICE VISIT (OUTPATIENT)
Dept: ENDOCRINOLOGY | Facility: CLINIC | Age: 59
End: 2024-04-23
Payer: COMMERCIAL

## 2024-04-23 VITALS — HEIGHT: 75 IN | BODY MASS INDEX: 39.17 KG/M2 | WEIGHT: 315 LBS

## 2024-04-23 VITALS
SYSTOLIC BLOOD PRESSURE: 120 MMHG | DIASTOLIC BLOOD PRESSURE: 80 MMHG | BODY MASS INDEX: 39.17 KG/M2 | HEART RATE: 86 BPM | HEIGHT: 75 IN | OXYGEN SATURATION: 97 % | WEIGHT: 315 LBS

## 2024-04-23 DIAGNOSIS — E66.01 TYPE 2 DIABETES MELLITUS WITH MORBID OBESITY (HCC): ICD-10-CM

## 2024-04-23 DIAGNOSIS — I10 PRIMARY HYPERTENSION: Primary | ICD-10-CM

## 2024-04-23 DIAGNOSIS — E11.69 TYPE 2 DIABETES MELLITUS WITH MORBID OBESITY (HCC): ICD-10-CM

## 2024-04-23 DIAGNOSIS — R63.5 ABNORMAL WEIGHT GAIN: Primary | ICD-10-CM

## 2024-04-23 DIAGNOSIS — E89.0 POST-OPERATIVE HYPOTHYROIDISM: ICD-10-CM

## 2024-04-23 DIAGNOSIS — C73 PAPILLARY THYROID CARCINOMA (HCC): ICD-10-CM

## 2024-04-23 PROBLEM — R73.09 ELEVATED GLUCOSE: Status: RESOLVED | Noted: 2020-10-05 | Resolved: 2024-04-23

## 2024-04-23 PROCEDURE — 99214 OFFICE O/P EST MOD 30 MIN: CPT | Performed by: NURSE PRACTITIONER

## 2024-04-23 PROCEDURE — G2211 COMPLEX E/M VISIT ADD ON: HCPCS | Performed by: NURSE PRACTITIONER

## 2024-04-23 PROCEDURE — RECHECK

## 2024-04-23 NOTE — PROGRESS NOTES
"Weight Management Medical Nutrition Assessment  Joseluis is here today for Healthy Core Month 1 f/u. Current Weight: 365.6#. Patient has lost 10.6# x 3 weeks. Hx of HTN, DOMINIC, GERD, CKD2, and gout. Patient has incorporated interval eating 2x/day which is an improvement from 1 meal/day. Does report he struggles with portion control. Feels this is due to that fact that he is used to eating very large portion at his meal time. Patient not currently having any snacks. Advised patient to continue to space his calories out throughout the day. Patient feels this will be a mental shift but he is open to starting with a snack before 3-4 pm meal and after. Discussed snack options. Reviewed calorie needs for 2#/week weight loss. Patient has enjoyed having meal in AM and is satisfied to see that with smaller portions spaced between 2 meals, he is seeing weight loss. Patient identified that he may benefit from cooking single portions of protein at one time rather than cooking entire package so he does not have access to all servings. Patient has not started at [facility] but plans to this coming week. Will add in exercise like an appointment for building a bed. Encouraged patient to continue with small, sustainable changes and to practice patience with himself. SECA body comp reviewed. Keep up the good work!    Likes: eggs/egg whites, cheese, nuts, peanut butter, hummus, tuna   Open to Greek yogurt and cottage cheese      Patient seen by Medical Provider in past 6 months:  yes  Requested to schedule appointment with Medical Provider: No    Anthropometric Measurements  Healthy Core Start Weight (#): 376.2# (4/4/24)  Current Weight (#): 365.6#  TBW % Change from start weight: 2.8%  Ideal Body Weight (#): 199# (75.5\")  Goal Weight (#): 300#  Highest Weight (#): 407#  Lowest Weight (#): 200# (age 16)    Weight Loss History  Previous weight loss attempts: Counseling with MD  Exercise, High Protein/Low CHO diets (Atkins, [place], " etc.)  Nutrition Counseling with RD, Self Created Diets (Portion Control, Healthy Food Choices, etc.)    Food and Nutrition Related History  Wake up: 6-7 am    Bed Time: 11 pm   Sleep quality: DOMINIC w/ difficulty tolerating CPAP (trying nasal pillows)    Dietary Recall  Breakfast (7 am): 3 eggs + egg beaters   Snack: --  Lunch (3-4 pm): 10 oz protein/2+ cups vegetable/2-3 cups carb  Snack: --  Dinner: --  Snack: --    Beverages: water (w/ juice of 2 johanna and 1 Tbsp tart cherry juice)  Volume of beverage intake: 1 gallon    Weekends: Same  Cravings: none  Trouble area of day: none but may feel hungry if first meal is pushed back later than 3-4 pm    Frequency of Eating out: 1x/month (burgers + fries)  Food restrictions: lactose intolerant (reports okay with cheese + milk?)  Cooking: self  Food Shopping: self    Occupation: retired (truck drive) - currently carpentry     Physical Activity  Activity: No formal routine (recently joined [employer] for swimming)   Frequency: rarely  Physical limitations/barriers to exercise: chronic lower back pain (had surgery when he was 33)    Estimated Needs  Energy  SECA: BMR: 2,586 X 1.4 -1,000 = 2,620 kcal     [facility] Energy Needs (needs at 379#)  BMR: 2,618 kcal  Maintenance calories (sedentary): 3,141 kcal  1-2#/week loss (sedentary): 2,141-2,641 kcal  1-2#/week loss (light activity): 2,599-3,099 kcal    Protein: 108-136 grams (1.2-1.5g/kg IBW)  Fluid: 105 ounces (35mL/kg IBW)    Nutrition Diagnosis  Yes;    Overweight/obesity related to Excess energy intake as evidenced by BMI more than normative standard for age and sex (obesity-grade III 40+)     Nutrition Intervention    Nutrition Prescription  Calories: 1,700-1,900 kcal  Protein: 108-136 g  Fluid: 105 ounces    Meal Plan (Moises/Pro)  Breakfast: 400/30-40  Snack: 150-200/5+  Lunch: 500-550/40-45  Snack: --  Dinner: 500-550/40-45  Snack: 150-200/5+    Nutrition Education  Healthy Core Manual   Calorie controlled menu  Lean  protein food choices  Healthy snack options  Food journaling tips    Nutrition Counseling  Strategies: meal planning, portion sizes, healthy snack choices, hydration, fiber intake, protein intake, exercise, food logging    Monitoring and Evaluation:    Evaluation criteria  Energy Intake  Meet protein needs  Maintain adequate hydration  Monitor weekly weight  Meal planning/preparation  Food journal   Decreased portions at mealtimes and snacks  Physical activity     Barriers to change:none  Readiness to change: Action:  (Changing behavior)  Comprehension: very good  Expected Compliance: very good

## 2024-04-23 NOTE — ASSESSMENT & PLAN NOTE
Recommend checking thyroglobulin levels.   Recommend rechecking thyroid ultrasound in February 2025.

## 2024-04-23 NOTE — ASSESSMENT & PLAN NOTE
TSH goal <0.1.   Continue on levothyroxine 200 mcg daily.   Recommend recheck labs in 3 months.

## 2024-04-23 NOTE — ASSESSMENT & PLAN NOTE
Lab Results   Component Value Date    HGBA1C 5.9 12/20/2023     Stable, off of pharmacologic therapy.   Managed by PCP.

## 2024-04-23 NOTE — PROGRESS NOTES
Established Patient Progress Note    Chief Complaint:  Diabetes follow up visit    Impression & Plan:    Problem List Items Addressed This Visit          Cardiovascular and Mediastinum    Hypertension - Primary     BP stable; continues on ACE            Endocrine    Type 2 diabetes mellitus with morbid obesity (HCC)       Lab Results   Component Value Date    HGBA1C 5.9 12/20/2023     Stable, off of pharmacologic therapy.   Managed by PCP.          Relevant Orders    POCT hemoglobin A1c    Papillary thyroid carcinoma (HCC)     Recommend checking thyroglobulin levels.   Recommend rechecking thyroid ultrasound in February 2025.         Relevant Orders    Thyroglobulin    TSH, 3rd generation    T4, free    Post-operative hypothyroidism     TSH goal <0.1.   Continue on levothyroxine 200 mcg daily.   Recommend recheck labs in 3 months.            Relevant Orders    TSH, 3rd generation    T4, free       History of Present Illness:   Joseluis Blake is a 59 y.o. male with a history of thyroid carcinoma.  Had history of multiple thyroid nodules with an FNA completed in December 2020 which was benign, AUS.  Patient had left hemithyroidectomy in August 2022 pathology demonstrated papillary thyroid carcinoma with infiltrative follicular variant 4.5 cm.  Continues to deny family history of thyroid cancer or thyroid disease.  Denies any known history of head or neck radiation.  Last ultrasound of head pleated in February 2024 no evidence of recurrence or metastatic disease.  Thyroglobulin by VICKI was less than 0.1 on 12/5/2023.  Thyroglobulin antibody less than 1.0 on 12/5/2023.     Currently taking levothyroxine 200 mcg orally daily.  Taking regularly and properly. Denies change in energy level or weight.   Denies jitteriness, or tremors.  Denies tachycardia or palpitations.  Denies constipation or hyperdefecation.  Denies frequent headaches.  Denies temperature intolerances.     Patient Active Problem List   Diagnosis     "Anxiety    GERD (gastroesophageal reflux disease)    Hypertension    Osteoarthritis    Breathing-related sleep disorder    Type 2 diabetes mellitus with morbid obesity (HCC)    Urinary calculus    Chronic midline low back pain without sciatica    Class 3 severe obesity due to excess calories with serious comorbidity and body mass index (BMI) of 45.0 to 49.9 in adult (HCC)    Chronic pain syndrome    Lumbar post-laminectomy syndrome    DOMINIC (obstructive sleep apnea)    Chronic kidney disease    Ureteral stone with hydronephrosis    Papillary thyroid carcinoma (HCC)    Post-operative hypothyroidism    Gout, arthropathy    Emotional hypersensitivity      Past Medical History:   Diagnosis Date    Borderline diabetes     per pt A1C \"coming down\"    Chronic pain disorder     lower back-s/p laminectomy    Claustrophobia     \"some degree\" jes MRI's\"    CPAP (continuous positive airway pressure) dependence     per pt does not use CPAP    Diverticulitis     GERD (gastroesophageal reflux disease)     Hypertension     Kidney stone     x 2 episodes    Lactose intolerance     Localized pain of joint     Mild sleep apnea     CPAP started in July having difficulty using- only has used on occ    Morbid obesity with body mass index (BMI) of 50.0 to 59.9 in adult (HCC)     Papillary thyroid carcinoma (HCC)     Rectal bleeding     occ rectal bleeding last episode unsure-\"nothing recent\"    Seasickness     Skin tag     skin tags were removed-as of 8/2/19 has a few more    Teeth missing     Thyroid nodule     Tinnitus     Wears glasses     Weight loss     \"80lbs and did gain 40lbs back\"--      Past Surgical History:   Procedure Laterality Date    BACK SURGERY      laminectomy-1996, 2010    COLONOSCOPY      x2    ESOPHAGOGASTRODUODENOSCOPY N/A 3/30/2019    Procedure: ESOPHAGOGASTRODUODENOSCOPY (EGD);  Surgeon: Kory Suresh MD;  Location: [facility];  Service: Gastroenterology    FL RETROGRADE PYELOGRAM  9/16/2022    FL RETROGRADE PYELOGRAM "  9/22/2022    FL RETROGRADE PYELOGRAM  9/29/2022    KNEE ARTHROSCOPY Left 2007    DE CYSTO BLADDER W/URETERAL CATHETERIZATION Right 9/16/2022    Procedure: CYSTOSCOPY RETROGRADE PYELOGRAM WITH INSERTION STENT URETERAL retrograde;  Surgeon: Won Lawton MD;  Location: [facility];  Service: Urology    DE CYSTO/URETERO W/LITHOTRIPSY &INDWELL STENT INSRT Right 1/6/2022    Procedure: CYSTOSCOPY URETEROSCOPY AND INSERTION STENT URETERAL RIGHT;  Surgeon: Won Lawton MD;  Location: [facility];  Service: Urology    DE CYSTO/URETERO W/LITHOTRIPSY &INDWELL STENT INSRT Right 9/22/2022    Procedure: CYSTOSCOPY URETEROSCOPY WITH LITHOTRIPSY HOLMIUM LASER, BASKET EXTRACTION, RETROGRADE PYELOGRAM (BILATERAL) AND  STENT EXCHANGE;  Surgeon: Won Lawton MD;  Location: [facility];  Service: Urology    DE CYSTO/URETERO W/LITHOTRIPSY &INDWELL STENT INSRT Bilateral 9/29/2022    Procedure: CYSTOSCOPY URETEROSCOPY, STENT MANUPILATION, RETROGRADE PYELOGRAM, LEFT  URETERAL STENT NSERTION, AND  REMOVAL OF RIGHT STENT;  Surgeon: Won Lawton MD;  Location: [facility];  Service: Urology    DE CYSTO/URETERO W/LITHOTRIPSY &INDWELL STENT INSRT Left 10/13/2022    Procedure: CYSTOSCOPY URETEROSCOPY WITH LITHOTRIPSY BASKET EXTRACTION, RETROGRADE PYELOGRAM AND  STENT EXCHANGE;  Surgeon: Won Lawton MD;  Location: [facility];  Service: Urology    DE ESOPHAGOGASTRODUODENOSCOPY TRANSORAL DIAGNOSTIC N/A 5/1/2019    Procedure: ESOPHAGOGASTRODUODENOSCOPY (EGD);  Surgeon: Kory Suresh MD;  Location: [facility];  Service: Gastroenterology    DE THYROIDECTOMY RMVL REMAINING TISS FLWG PRTL RMVL Right 2/8/2023    Procedure: COMPLETION THYROIDECTOMY;  Surgeon: Timbo Bolaños MD;  Location: [facility];  Service: ENT    DE TOTAL THYROID LOBECTOMY UNI W/WO ISTHMUSECTOMY Left 8/31/2022    Procedure: Left patial THYROIDECTOMY;  Surgeon: Timbo Bolaños MD;  Location:  MAIN OR;  Service: ENT    UPPER GASTROINTESTINAL ENDOSCOPY   "2006    US GUIDED THYROID BIOPSY  12/28/2020      Family History   Problem Relation Age of Onset    Osteoarthritis Mother     Obesity Father         [id number]    Sleep apnea Father         with CPAP    No Known Problems Sister     Lupus Sister     No Known Problems Sister     No Known Problems Brother     No Known Problems Son     Substance Abuse Neg Hx     Mental illness Neg Hx     Cancer Neg Hx     Diabetes Neg Hx     Heart disease Neg Hx     Stroke Neg Hx      Social History     Tobacco Use    Smoking status: Never     Passive exposure: Never    Smokeless tobacco: Never   Substance Use Topics    Alcohol use: No     Allergies   Allergen Reactions    Lactose - Food Allergy GI Intolerance         Current Outpatient Medications:     allopurinol (ZYLOPRIM) 300 mg tablet, Take 1 tablet (300 mg total) by mouth daily, Disp: 90 tablet, Rfl: 1    amLODIPine (NORVASC) 5 mg tablet, TAKE 1 TABLET EVERY DAY, Disp: 90 tablet, Rfl: 1    Ergocalciferol (VITAMIN D2 PO), Take by mouth, Disp: , Rfl:     eszopiclone (LUNESTA) 2 mg tablet, Take 1 tablet (2 mg total) by mouth daily at bedtime for 14 days Take immediately before bedtime, Disp: 14 tablet, Rfl: 0    levothyroxine 200 mcg tablet, TAKE ONE TABLET BY MOUTH EVERY DAY, Disp: 90 tablet, Rfl: 1    lisinopril-hydrochlorothiazide (PRINZIDE,ZESTORETIC) 20-25 MG per tablet, TAKE 1 TABLET EVERY DAY, Disp: 90 tablet, Rfl: 1    omeprazole (PriLOSEC) 20 mg delayed release capsule, TAKE 1 CAPSULE EVERY DAY, Disp: 90 capsule, Rfl: 1    potassium citrate (UROCIT-K) 10 mEq, Take 10 mEq by mouth 3 (three) times a day with meals, Disp: , Rfl:     Review of Systems  See HPI.   All other systems reviewed and are negative.      Physical Exam:  Body mass index is 44.75 kg/m².  /80 (BP Location: Left arm, Patient Position: Sitting, Cuff Size: Large)   Pulse 86   Ht 6' 3\" (1.905 m)   Wt (!) 162 kg (358 lb)   SpO2 97%   BMI 44.75 kg/m²    Wt Readings from Last 3 Encounters:   04/23/24 (!) " 162 kg (358 lb)   04/23/24 (!) 165 kg (364 lb 6.4 oz)   04/18/24 (!) 168 kg (371 lb 3.2 oz)        Physical Exam  Vitals reviewed.   Constitutional:       Appearance: Normal appearance.   Cardiovascular:      Rate and Rhythm: Normal rate and regular rhythm.      Pulses: Normal pulses.      Heart sounds: Normal heart sounds.   Pulmonary:      Effort: Pulmonary effort is normal.      Breath sounds: Normal breath sounds.   Skin:     General: Skin is warm and dry.      Capillary Refill: Capillary refill takes less than 2 seconds.   Neurological:      General: No focal deficit present.      Mental Status: He is alert and oriented to person, place, and time.   Psychiatric:         Mood and Affect: Mood normal.         Behavior: Behavior normal.       Labs:   Lab Results   Component Value Date    HGBA1C 5.9 12/20/2023    HGBA1C 5.8 09/26/2023    HGBA1C 5.9 (H) 07/22/2022     Lab Results   Component Value Date    CREATININE 1.08 12/05/2023    CREATININE 1.06 07/28/2023    CREATININE 1.03 06/13/2023    BUN 26 (H) 12/05/2023    K 3.8 12/05/2023     12/05/2023    CO2 24 12/05/2023     eGFR   Date Value Ref Range Status   12/05/2023 74 ml/min/1.73sq m Final     Lab Results   Component Value Date    HDL 32 (L) 12/05/2023    TRIG 92 12/05/2023    CHOLHDL 3.8 12/10/2018     Lab Results   Component Value Date    ALT 35 12/05/2023    AST 24 12/05/2023    ALKPHOS 67 12/05/2023     Lab Results   Component Value Date    URX2ZJZJZHUF 0.141 (L) 04/09/2024    [id number] 0.169 (L) 12/05/2023    [id number] 0.038 (L) 07/28/2023     Lab Results   Component Value Date    FREET4 1.16 (H) 04/09/2024       Discussed with the patient and all questioned fully answered. He will call me if any problems arise.    Follow-up appointment in 3 months.     Counseled patient on diagnostic results, prognosis, risk and benefit of treatment options, instruction for management, importance of treatment compliance, Risk  factor reduction and  impressions    SOLE Ward

## 2024-04-24 ENCOUNTER — CLINICAL SUPPORT (OUTPATIENT)
Dept: BARIATRICS | Facility: CLINIC | Age: 59
End: 2024-04-24

## 2024-04-24 VITALS — BODY MASS INDEX: 38.36 KG/M2 | HEIGHT: 76 IN | WEIGHT: 315 LBS

## 2024-04-24 DIAGNOSIS — R63.5 ABNORMAL WEIGHT GAIN: Primary | ICD-10-CM

## 2024-04-24 PROCEDURE — RECHECK

## 2024-04-29 ENCOUNTER — APPOINTMENT (OUTPATIENT)
Dept: LAB | Facility: HOSPITAL | Age: 59
End: 2024-04-29

## 2024-04-29 DIAGNOSIS — E89.0 POST-OPERATIVE HYPOTHYROIDISM: ICD-10-CM

## 2024-04-29 DIAGNOSIS — C73 PAPILLARY THYROID CARCINOMA (HCC): ICD-10-CM

## 2024-04-29 DIAGNOSIS — N20.0 RENAL CALCULUS: ICD-10-CM

## 2024-04-30 ENCOUNTER — CLINICAL SUPPORT (OUTPATIENT)
Dept: BARIATRICS | Facility: CLINIC | Age: 59
End: 2024-04-30

## 2024-04-30 VITALS — WEIGHT: 315 LBS | BODY MASS INDEX: 38.36 KG/M2 | HEIGHT: 76 IN

## 2024-04-30 DIAGNOSIS — R63.5 ABNORMAL WEIGHT GAIN: Primary | ICD-10-CM

## 2024-04-30 PROCEDURE — RECHECK

## 2024-05-05 DIAGNOSIS — E04.1 THYROID NODULE: ICD-10-CM

## 2024-05-05 DIAGNOSIS — C73 PAPILLARY THYROID CARCINOMA (HCC): ICD-10-CM

## 2024-05-05 RX ORDER — LEVOTHYROXINE SODIUM 0.2 MG/1
200 TABLET ORAL DAILY
Qty: 90 TABLET | Refills: 1 | Status: SHIPPED | OUTPATIENT
Start: 2024-05-05

## 2024-05-07 ENCOUNTER — CLINICAL SUPPORT (OUTPATIENT)
Dept: BARIATRICS | Facility: CLINIC | Age: 59
End: 2024-05-07

## 2024-05-07 ENCOUNTER — APPOINTMENT (OUTPATIENT)
Dept: LAB | Facility: HOSPITAL | Age: 59
End: 2024-05-07
Payer: COMMERCIAL

## 2024-05-07 VITALS — BODY MASS INDEX: 38.36 KG/M2 | WEIGHT: 315 LBS | HEIGHT: 76 IN

## 2024-05-07 DIAGNOSIS — C73 PAPILLARY THYROID CARCINOMA (HCC): ICD-10-CM

## 2024-05-07 DIAGNOSIS — R63.5 ABNORMAL WEIGHT GAIN: Primary | ICD-10-CM

## 2024-05-07 LAB
CALCIUM 24H UR-MCNC: 126 MG/24 HRS (ref 100–300)
PERIOD: 24 HOURS
PH UR STRIP.AUTO: 6.5 [PH]
SODIUM 24H UR-SRATE: 453.12 MMOL/24 HRS (ref 40–220)
SPECIMEN VOL UR: 3540 ML
SPECIMEN VOL UR: 3540 ML
SPECIMEN VOL UR: 3600 ML
SPECIMEN VOL UR: 3600 ML
URATE 24H UR-MCNC: 1245.6 MG/24 HRS (ref 250–800)

## 2024-05-07 PROCEDURE — 83986 ASSAY PH BODY FLUID NOS: CPT

## 2024-05-07 PROCEDURE — 84300 ASSAY OF URINE SODIUM: CPT

## 2024-05-07 PROCEDURE — 82507 ASSAY OF CITRATE: CPT

## 2024-05-07 PROCEDURE — 83945 ASSAY OF OXALATE: CPT

## 2024-05-07 PROCEDURE — 82340 ASSAY OF CALCIUM IN URINE: CPT

## 2024-05-07 PROCEDURE — 84560 ASSAY OF URINE/URIC ACID: CPT

## 2024-05-07 PROCEDURE — RECHECK

## 2024-05-09 LAB
CITRATE 24H UR-MCNC: 251 MG/L
CITRATE 24H UR-MRATE: 889 MG/24 HR (ref 320–1240)

## 2024-05-10 LAB
OXALATE 24H UR-MRATE: 35 MG/24 HR (ref 7–44)
OXALATE UR-MCNC: 10 MG/L

## 2024-05-14 ENCOUNTER — CLINICAL SUPPORT (OUTPATIENT)
Dept: BARIATRICS | Facility: CLINIC | Age: 59
End: 2024-05-14

## 2024-05-14 VITALS — HEIGHT: 76 IN | WEIGHT: 315 LBS | BODY MASS INDEX: 38.36 KG/M2

## 2024-05-14 DIAGNOSIS — R63.5 ABNORMAL WEIGHT GAIN: Primary | ICD-10-CM

## 2024-05-14 PROCEDURE — RECHECK

## 2024-05-15 ENCOUNTER — OFFICE VISIT (OUTPATIENT)
Dept: FAMILY MEDICINE CLINIC | Facility: CLINIC | Age: 59
End: 2024-05-15
Payer: COMMERCIAL

## 2024-05-15 VITALS
DIASTOLIC BLOOD PRESSURE: 92 MMHG | RESPIRATION RATE: 18 BRPM | BODY MASS INDEX: 44.53 KG/M2 | TEMPERATURE: 97.2 F | HEART RATE: 86 BPM | OXYGEN SATURATION: 93 % | SYSTOLIC BLOOD PRESSURE: 122 MMHG | WEIGHT: 315 LBS

## 2024-05-15 DIAGNOSIS — F43.20 ADJUSTMENT DISORDER, UNSPECIFIED TYPE: Primary | ICD-10-CM

## 2024-05-15 DIAGNOSIS — R45.86 EMOTIONAL HYPERSENSITIVITY: ICD-10-CM

## 2024-05-15 PROCEDURE — G2211 COMPLEX E/M VISIT ADD ON: HCPCS | Performed by: NURSE PRACTITIONER

## 2024-05-15 PROCEDURE — 99213 OFFICE O/P EST LOW 20 MIN: CPT | Performed by: NURSE PRACTITIONER

## 2024-05-15 NOTE — PROGRESS NOTES
Assessment/Plan:    1. Adjustment disorder, unspecified type  Assessment & Plan:  Pt struggles with interpersonal relationships, seeking out relationships. Reports easily emotional.  Advise that he would benefit from counseling.   Encourage self care and healthy coping.   Orders:  -     Ambulatory referral to Psych Services; Future  2. Emotional hypersensitivity  Assessment & Plan:  Pt is doing well overall. Encourage counseling.             Return if symptoms worsen or fail to improve.    Subjective:      Patient ID: Joseluis Blake is a 59 y.o. male.    Chief Complaint   Patient presents with    Follow-up     Anxiety       Joseluis is a 59 year old male who presents for a 6 week recheck. Reports that he is feeling greatly improved. States that he is meeting individuals in his own community and this has been helping him to have an improved mood overall. Reports that he is interested in therapy and his family is encouraging him to go.         The following portions of the patient's history were reviewed and updated as appropriate: allergies, current medications, past family history, past medical history, past social history, past surgical history and problem list.    Review of Systems   Constitutional:  Negative for chills, fatigue and fever.   Respiratory:  Negative for shortness of breath.    Cardiovascular:  Negative for chest pain.   Psychiatric/Behavioral:  Negative for sleep disturbance. The patient is not nervous/anxious.         Easily emotional, per pt         Current Outpatient Medications   Medication Sig Dispense Refill    allopurinol (ZYLOPRIM) 300 mg tablet Take 1 tablet (300 mg total) by mouth daily 90 tablet 1    amLODIPine (NORVASC) 5 mg tablet TAKE 1 TABLET EVERY DAY 90 tablet 1    Ergocalciferol (VITAMIN D2 PO) Take by mouth      levothyroxine 200 mcg tablet TAKE ONE TABLET BY MOUTH EVERY DAY 90 tablet 1    lisinopril-hydrochlorothiazide (PRINZIDE,ZESTORETIC) 20-25 MG per tablet TAKE 1 TABLET EVERY  DAY 90 tablet 1    omeprazole (PriLOSEC) 20 mg delayed release capsule TAKE 1 CAPSULE EVERY DAY 90 capsule 1    potassium citrate (UROCIT-K) 10 mEq Take 10 mEq by mouth 3 (three) times a day with meals      eszopiclone (LUNESTA) 2 mg tablet Take 1 tablet (2 mg total) by mouth daily at bedtime for 14 days Take immediately before bedtime 14 tablet 0     No current facility-administered medications for this visit.       Objective:    /92 (BP Location: Right arm, Patient Position: Sitting, Cuff Size: Extra-Large)   Pulse 86   Temp (!) 97.2 °F (36.2 °C) (Temporal)   Resp 18   Wt (!) 164 kg (361 lb)   SpO2 93%   BMI 44.53 kg/m²        Physical Exam  Vitals reviewed.   Constitutional:       Appearance: Normal appearance.   HENT:      Head: Normocephalic and atraumatic.   Cardiovascular:      Rate and Rhythm: Normal rate and regular rhythm.      Heart sounds: Normal heart sounds.   Pulmonary:      Breath sounds: Normal breath sounds.   Neurological:      Mental Status: He is alert and oriented to person, place, and time.   Psychiatric:         Mood and Affect: Mood normal.                SOLE Cameron

## 2024-05-15 NOTE — ASSESSMENT & PLAN NOTE
Pt struggles with interpersonal relationships, seeking out relationships. Reports easily emotional.  Advise that he would benefit from counseling.   Encourage self care and healthy coping.

## 2024-05-16 DIAGNOSIS — M10.9 GOUT, ARTHROPATHY: ICD-10-CM

## 2024-05-17 RX ORDER — ALLOPURINOL 300 MG/1
300 TABLET ORAL DAILY
Qty: 90 TABLET | Refills: 1 | Status: SHIPPED | OUTPATIENT
Start: 2024-05-17

## 2024-05-21 ENCOUNTER — CLINICAL SUPPORT (OUTPATIENT)
Dept: BARIATRICS | Facility: CLINIC | Age: 59
End: 2024-05-21

## 2024-05-21 VITALS — WEIGHT: 315 LBS | HEIGHT: 76 IN | BODY MASS INDEX: 38.36 KG/M2

## 2024-05-21 DIAGNOSIS — R63.5 ABNORMAL WEIGHT GAIN: Primary | ICD-10-CM

## 2024-05-21 PROCEDURE — RECHECK

## 2024-05-22 ENCOUNTER — OFFICE VISIT (OUTPATIENT)
Dept: BARIATRICS | Facility: CLINIC | Age: 59
End: 2024-05-22
Payer: COMMERCIAL

## 2024-05-22 VITALS
SYSTOLIC BLOOD PRESSURE: 120 MMHG | HEIGHT: 76 IN | BODY MASS INDEX: 38.36 KG/M2 | HEART RATE: 80 BPM | DIASTOLIC BLOOD PRESSURE: 70 MMHG | WEIGHT: 315 LBS

## 2024-05-22 DIAGNOSIS — I10 PRIMARY HYPERTENSION: ICD-10-CM

## 2024-05-22 DIAGNOSIS — F41.9 ANXIETY: ICD-10-CM

## 2024-05-22 DIAGNOSIS — E66.01 CLASS 3 SEVERE OBESITY DUE TO EXCESS CALORIES WITH SERIOUS COMORBIDITY AND BODY MASS INDEX (BMI) OF 45.0 TO 49.9 IN ADULT (HCC): Primary | ICD-10-CM

## 2024-05-22 PROCEDURE — G2211 COMPLEX E/M VISIT ADD ON: HCPCS | Performed by: NURSE PRACTITIONER

## 2024-05-22 PROCEDURE — 99214 OFFICE O/P EST MOD 30 MIN: CPT | Performed by: NURSE PRACTITIONER

## 2024-05-22 RX ORDER — BUPROPION HYDROCHLORIDE 150 MG/1
150 TABLET ORAL DAILY
Qty: 30 TABLET | Refills: 1 | Status: SHIPPED | OUTPATIENT
Start: 2024-05-22 | End: 2024-07-21

## 2024-05-22 NOTE — ASSESSMENT & PLAN NOTE
Bupropion was discussed as an option to help with both cravings for food as well as potentially emotional stability.  Patient will be started on 150 mg once daily

## 2024-05-22 NOTE — PROGRESS NOTES
Assessment/Plan:     Class 3 severe obesity due to excess calories with serious comorbidity and body mass index (BMI) of 45.0 to 49.9 in adult (HCC)  - Patient is pursuing HealthyCORE-Intensive Lifestyle Intervention Program and follow up visits with medical weight management provider  - Initial weight loss goal of 5-10% weight loss for improved health  -Patient lifestyle habits were reviewed and patient was congratulated on his weight loss since last office visit.  Patient continues to make small changes to his lifestyle and his nutrition which is showing in his successful weight loss.  Patient continues to try to work on small goals with the support of the dietitian and will be meeting with the  to help overcome some emotional barriers.    Medications were discussed and patient would like to avoid injectable medications if possible.  Bupropion was discussed as an option that may help help with both his emotional instability as well as his cravings for food.  Patient was in agreement to try this medication to see if it helps him better control his emotions.  Side effects of the medication were discussed and patient voiced understanding.  Patient will be started on bupropion 150 mg once daily.  Kidney function as well as blood pressure will be monitored closely while on bupropion.      Goals:  Calorie Goal: 4582-6126 calories per day  Do not skip meals.  Food log via naveen or provided paper log (naveen options include www.[employer].com, sparkpeople.com, loseit.com, calorieking.com, [place])  No sugary beverages.   At least 64oz of water daily.  Increase physical activity by 10 minutes daily. Gradually increase physical activity to a goal of 5 days per week for 30 minutes of MODERATE intensity PLUS 2 days per week of FULL BODY resistance training    Anxiety  Bupropion was discussed as an option to help with both cravings for food as well as potentially emotional stability.  Patient will be started on  150 mg once daily    Hypertension  Blood pressure will be monitored closely while on bupropion  BP well controlled today         Joseluis was seen today for follow-up.    Diagnoses and all orders for this visit:    Class 3 severe obesity due to excess calories with serious comorbidity and body mass index (BMI) of 45.0 to 49.9 in adult (HCC)  -     buPROPion (Wellbutrin XL) 150 mg 24 hr tablet; Take 1 tablet (150 mg total) by mouth daily    Anxiety    Primary hypertension        Total time spent reviewing chart, interviewing patient, examining patient, discussing plan, answering all questions, and documentin minutes with >50% face-to-face time with the patient.    Follow up in approximately 2 months with Non-Surgical Physician/Advanced Practitioner.    Subjective:   Chief Complaint   Patient presents with    Follow-up     Pt is here for MWM f/u       Patient ID: Joseluis Blake  is a 59 y.o. male with excess weight/obesity here to pursue weight management.  Patient is pursuing HealthyCORE-Intensive Lifestyle Intervention Program.   Most recent notes and records were reviewed.    HPI    Wt Readings from Last 20 Encounters:   24 (!) 165 kg (364 lb 12.8 oz)   24 (!) 163 kg (360 lb)   05/15/24 (!) 164 kg (361 lb)   24 (!) 164 kg (362 lb 3.2 oz)   24 (!) 168 kg (371 lb)   24 (!) 166 kg (366 lb 3.2 oz)   24 (!) 166 kg (365 lb 9.6 oz)   24 (!) 162 kg (358 lb)   24 (!) 165 kg (364 lb 6.4 oz)   24 (!) 168 kg (371 lb 3.2 oz)   24 (!) 171 kg (376 lb)   24 (!) 171 kg (376 lb 3.2 oz)   24 (!) 172 kg (379 lb)   24 (!) 175 kg (386 lb 12.8 oz)   24 (!) 180 kg (396 lb)   23 (!) 180 kg (397 lb)   23 (!) 180 kg (397 lb 12.8 oz)   23 (!) 176 kg (387 lb)   23 (!) 166 kg (366 lb)   23 (!) 166 kg (366 lb)       Patient presents today to medical weight management office for follow up.  Patient has been participating in the  healthy core program and is doing well.  He has noticed an improvement in his portions but does still struggle with food choices.  Patient states he often feels like he needs to eat everything he makes and he is working on making single portion meals.  Patient reports he is struggling with emotions ever since having his thyroid removed.  He is followed by endocrine and his thyroid levels are stable but patient feels like he cries 1-2 times per day and is unable to will regulate his emotions.  He feels this also gets in the way of his weight loss as he will go to food for comfort.    Weight loss medication and dose: none  Started weight and date: 379 lbs in 3/2024  Current weight: 364.8 lbs   Difference: -14.2 lbs    Current BMI:  Current Waist Measurement: 60.5 in  Goal weight: 300 lbs    Starting BMI:  45.75 in 3/2024  Current BMI: 44.99    Waist Measurements:  3/2024: 60.5 in  5/2024: 58.5 in    Nutrition Prescription  Calories: 1,700-1,900 kcal  Protein: 108-136 g  Fluid: 105 ounces    Diet recall:  B:  skips  L: (3pm) chicken or beef with occasional vegetables  S: no  D: skips    Hydration: 1 gallon water with lemon and tart cherry  Alcohol: no  Smoking: no  Exercise: no  Occupation: retired - carpentry   Sleep: CPAP - difficulty using (trying nasal pillows)      Colonoscopy: 2019      The following portions of the patient's history were reviewed and updated as appropriate: allergies, current medications, past family history, past medical history, past social history, past surgical history, and problem list.    Family History   Problem Relation Age of Onset    Osteoarthritis Mother     Obesity Father         [id number]    Sleep apnea Father         with CPAP    No Known Problems Sister     Lupus Sister     No Known Problems Sister     No Known Problems Brother     No Known Problems Son     Substance Abuse Neg Hx     Mental illness Neg Hx     Cancer Neg Hx     Diabetes Neg Hx     Heart disease Neg Hx     Stroke Neg  "Hx         Review of Systems   Constitutional:  Negative for fatigue.   HENT:  Negative for sore throat.    Respiratory:  Negative for cough and shortness of breath.    Cardiovascular:  Negative for chest pain, palpitations and leg swelling.   Gastrointestinal:  Negative for abdominal pain, constipation, diarrhea and nausea.   Genitourinary:  Negative for dysuria.   Musculoskeletal:  Negative for arthralgias and back pain.   Skin:  Negative for rash.   Neurological:  Negative for headaches.   Psychiatric/Behavioral:  Negative for dysphoric mood. The patient is not nervous/anxious.        Objective:  /70 (BP Location: Left arm, Patient Position: Sitting, Cuff Size: Large)   Pulse 80   Ht 6' 3.5\" (1.918 m)   Wt (!) 165 kg (364 lb 12.8 oz)   BMI 44.99 kg/m²     Physical Exam  Vitals and nursing note reviewed.   Constitutional:       Appearance: Normal appearance. He is obese.   HENT:      Head: Normocephalic.   Pulmonary:      Effort: Pulmonary effort is normal.   Neurological:      General: No focal deficit present.      Mental Status: He is alert and oriented to person, place, and time.   Psychiatric:         Mood and Affect: Mood normal.         Behavior: Behavior normal.         Thought Content: Thought content normal.         Judgment: Judgment normal.            Labs   Most recent labs reviewed   Lab Results   Component Value Date    SODIUM 138 12/05/2023    K 3.8 12/05/2023     12/05/2023    CO2 24 12/05/2023    AGAP 8 12/05/2023    BUN 26 (H) 12/05/2023    CREATININE 1.08 12/05/2023    GLUC 101 06/13/2023    GLUF 99 12/05/2023    CALCIUM 9.4 12/05/2023    AST 24 12/05/2023    ALT 35 12/05/2023    ALKPHOS 67 12/05/2023    TP 7.6 12/05/2023    TBILI 0.54 12/05/2023    EGFR 74 12/05/2023     Lab Results   Component Value Date    HGBA1C 5.9 12/20/2023     Lab Results   Component Value Date    [id number] 0.141 (L) 04/09/2024    TSH 2.080 12/10/2018     Lab Results   Component Value Date    " CHOLESTEROL 148 12/05/2023     Lab Results   Component Value Date    HDL 32 (L) 12/05/2023     Lab Results   Component Value Date    TRIG 92 12/05/2023     Lab Results   Component Value Date    LDLCALC 98 12/05/2023

## 2024-05-22 NOTE — ASSESSMENT & PLAN NOTE
- Patient is pursuing HealthyCORE-Intensive Lifestyle Intervention Program and follow up visits with medical weight management provider  - Initial weight loss goal of 5-10% weight loss for improved health  -Patient lifestyle habits were reviewed and patient was congratulated on his weight loss since last office visit.  Patient continues to make small changes to his lifestyle and his nutrition which is showing in his successful weight loss.  Patient continues to try to work on small goals with the support of the dietitian and will be meeting with the  to help overcome some emotional barriers.    Medications were discussed and patient would like to avoid injectable medications if possible.  Bupropion was discussed as an option that may help help with both his emotional instability as well as his cravings for food.  Patient was in agreement to try this medication to see if it helps him better control his emotions.  Side effects of the medication were discussed and patient voiced understanding.  Patient will be started on bupropion 150 mg once daily.  Kidney function as well as blood pressure will be monitored closely while on bupropion.      Goals:  Calorie Goal: 1543-6529 calories per day  Do not skip meals.  Food log via naveen or provided paper log (naveen options include www.[employer].com, sparkpeople.com, loseit.com, calorieking.com, [employer])  No sugary beverages.   At least 64oz of water daily.  Increase physical activity by 10 minutes daily. Gradually increase physical activity to a goal of 5 days per week for 30 minutes of MODERATE intensity PLUS 2 days per week of FULL BODY resistance training

## 2024-05-28 ENCOUNTER — CLINICAL SUPPORT (OUTPATIENT)
Dept: BARIATRICS | Facility: CLINIC | Age: 59
End: 2024-05-28

## 2024-05-28 VITALS — HEIGHT: 76 IN | BODY MASS INDEX: 38.36 KG/M2 | WEIGHT: 315 LBS

## 2024-05-28 DIAGNOSIS — R63.5 ABNORMAL WEIGHT GAIN: Primary | ICD-10-CM

## 2024-05-28 PROCEDURE — RECHECK

## 2024-05-30 ENCOUNTER — TELEPHONE (OUTPATIENT)
Dept: PSYCHIATRY | Facility: CLINIC | Age: 59
End: 2024-05-30

## 2024-05-30 NOTE — TELEPHONE ENCOUNTER
Contacted patient in regards to Routine Referral in attempts to verify patient's needs of services and add patient to proper wait list.   Pt placed on wait list

## 2024-06-03 NOTE — PROGRESS NOTES
"Weight Management Medical Nutrition Assessment  Joseluis is here today for Healthy Core Month 2 f/u. Appointment notes originally documented via pen and paper due to power outage at office. Current Weight: 362.6#. Patient has lost 3# x 6 weeks and overall has lost 13.6#. Hx of HTN, DOMINIC, GERD, CKD2, and gout. Patient prescribed Wellbutrin by ELIAS WHIPPLE to assist with managing cravings and emotional support. He took first dosage yesterday. Patient tearful during appointment. Patient reports that since removal of his thyroid, he has struggled with managing his emotions despite thyroid levels being stable. This has been a struggle for him with his dietary changes as he continues to struggle with portion control and using food for comfort. He is open to meeting with SW to discuss and will be contacted to schedule due to power outage. He has been working to only prepare single servings of food items rather than having entire package prepared and available. Patient expressed disappointment with recent food choices while driving to [place]. Utilized candy bars to keep him alert and stopped for fast food. Reports he ate less but did not make mindful choices when ordering. Discussed alternative snacks to support his drive and reviewed dining out techniques to reduce calories and promote balance. Encouraged patient to give himself mara as he navigates his emotions and other personal struggles but to continue to seek out support to help him reach his health goals.     Likes: eggs/egg whites, cheese, nuts, peanut butter, hummus, tuna   Open to Greek yogurt and cottage cheese      Patient seen by Medical Provider in past 6 months:  yes  Requested to schedule appointment with Medical Provider: No    Anthropometric Measurements  Healthy Core Start Weight (#): 376.2# (4/4/24)  Current Weight (#): 362.6#  TBW % Change from start weight: 3.6%  Ideal Body Weight (#): 199# (75.5\")  Goal Weight (#): 300#  Highest Weight (#): 407#  Lowest " Weight (#): 200# (age 16)    Weight Loss History  Previous weight loss attempts: Counseling with MD  Exercise, High Protein/Low CHO diets (Atkins, [place], etc.)  Nutrition Counseling with RD, Self Created Diets (Portion Control, Healthy Food Choices, etc.)    Food and Nutrition Related History  Wake up: 6-7 am    Bed Time: 11 pm   Sleep quality: DOMINIC w/ difficulty tolerating CPAP (trying nasal pillows)    Dietary Recall  Recall not assessed    Beverages: water (w/ juice of 2 johanna and 1 Tbsp tart cherry juice)  Volume of beverage intake: 1 gallon    Weekends: Same  Cravings: none  Trouble area of day: none but may feel hungry if first meal is pushed back later than 3-4 pm    Frequency of Eating out: 1x/month (burgers + fries)  Food restrictions: lactose intolerant (reports okay with cheese + milk?)  Cooking: self  Food Shopping: self    Occupation: retired (truck drive) - currently carpentry     Physical Activity  Activity: No formal routine (recently joined [employer] for swimming)   Frequency: rarely  Physical limitations/barriers to exercise: chronic lower back pain (had surgery when he was 33)    Estimated Needs  Energy  SECA: BMR: 2,586 X 1.4 -1,000 = 2,620 kcal     [facility] Energy Needs (needs at 379#)  BMR: 2,618 kcal  Maintenance calories (sedentary): 3,141 kcal  1-2#/week loss (sedentary): 2,141-2,641 kcal  1-2#/week loss (light activity): 2,599-3,099 kcal    Protein: 108-136 grams (1.2-1.5g/kg IBW)  Fluid: 105 ounces (35mL/kg IBW)    Nutrition Diagnosis  Yes;    Overweight/obesity related to Excess energy intake as evidenced by BMI more than normative standard for age and sex (obesity-grade III 40+)     Nutrition Intervention    Nutrition Prescription  Calories: 1,700-1,900 kcal  Protein: 108-136 g  Fluid: 105 ounces    Meal Plan (Moises/Pro)  Breakfast: 400/30-40  Snack: 150-200/5+  Lunch: 500-550/40-45  Snack: --  Dinner: 500-550/40-45  Snack: 150-200/5+    Nutrition Education  Healthy Core Manual    Calorie controlled menu  Lean protein food choices  Healthy snack options  Food journaling tips    Nutrition Counseling  Strategies: meal planning, portion sizes, healthy snack choices, hydration, fiber intake, protein intake, exercise, food logging    Monitoring and Evaluation:    Evaluation criteria  Energy Intake  Meet protein needs  Maintain adequate hydration  Monitor weekly weight  Meal planning/preparation  Food journal   Decreased portions at mealtimes and snacks  Physical activity     Barriers to change:none  Readiness to change: Action:  (Changing behavior)  Comprehension: very good  Expected Compliance: very good

## 2024-06-04 ENCOUNTER — CLINICAL SUPPORT (OUTPATIENT)
Dept: BARIATRICS | Facility: CLINIC | Age: 59
End: 2024-06-04

## 2024-06-04 VITALS — WEIGHT: 315 LBS | HEIGHT: 76 IN | BODY MASS INDEX: 38.36 KG/M2

## 2024-06-04 DIAGNOSIS — R63.5 ABNORMAL WEIGHT GAIN: Primary | ICD-10-CM

## 2024-06-04 PROCEDURE — RECHECK

## 2024-06-07 DIAGNOSIS — M10.9 GOUT, ARTHROPATHY: ICD-10-CM

## 2024-06-07 RX ORDER — ALLOPURINOL 300 MG/1
TABLET ORAL
Qty: 90 TABLET | Refills: 1 | Status: SHIPPED | OUTPATIENT
Start: 2024-06-07

## 2024-06-11 ENCOUNTER — CLINICAL SUPPORT (OUTPATIENT)
Dept: BARIATRICS | Facility: CLINIC | Age: 59
End: 2024-06-11

## 2024-06-11 VITALS — WEIGHT: 315 LBS | HEIGHT: 76 IN | BODY MASS INDEX: 38.36 KG/M2

## 2024-06-11 VITALS — BODY MASS INDEX: 44.45 KG/M2 | WEIGHT: 315 LBS

## 2024-06-11 DIAGNOSIS — R63.5 ABNORMAL WEIGHT GAIN: Primary | ICD-10-CM

## 2024-06-11 PROCEDURE — RECHECK

## 2024-06-11 NOTE — PATIENT INSTRUCTIONS
- continue to check in with endocrinologist and PCP about mood shift, advocate for self regarding possible hormonal imbalances.  - tune into triggers of anxiety, internal dialogue that could be contributing to shifts in mood and tearfulness  - when triggered to eat in response to emotions, slow down impulse to snack and evaluate other things that can be done first to soothe feelings other than eating.  - uncouple activities from eating (ex. Watching TV while eating)  - be present for the eating experience, enjoy all of the tastes, textures, smells and sights of the food, savor each bite so the smaller portions feel more satisfying.  - if emotional eating occurs, use it as an opportunity to reflect on decisions made and what can be done differently next time  - practice gratitude journaling or vocalizing when having negative thoughts, name three things your grateful for.

## 2024-06-11 NOTE — PROGRESS NOTES
Patient presents for 1 hour Behavioral Health Evaluation as a part of Medical Weight Management Program, current weight 360.4lbs.    Eating behaviors/food choices: Patient reports some mindless eating and larger portions especially when feeling intense emotions. He is working on reducing how much food he prepares at a time to limits his portions. Discussed the impact food can have on the brain and mood, the use of food to soothe and dopamine responses. Encouraged mindful eating, uncoupling eating from other activities such as watching TV so he can tune into the eating experience.    Mental Health/Wellness:  Patient reports experiencing significant mood changes since undergoing thyroid surgery after cancer diagnosis. His medical experience was not particularly stressful or traumatic, he has no history of mental health diagnosis, and was not a person to easily cry. Now he is experiencing crying spells 2 times per day and the triggers are very mild or completely unknown. Blood work from PCP and endocrinologist is coming back normal, he is on medication to manage hormones and Wellbutrin XL. Reports feeling some difference with Wellbutrin but still experiencing intense anxiety and crying spells. He does engage in some emotional eating, having larger portions especially during dinner while watching TV. Discussed the dopamine response to food, coupling it with another activity like watching TV may be a way he is soothing or numbing the intense feelings he's having. Suggested tuning in to internal thoughts and feelings that could trigger eating, uncouple act of eating and TV so he can be more present in eating experience. Patient notices negative thoughts when he feels anxious, he does allow himself to cry but wants to be more positive. Discussed the value of gratitude journaling and vocalizing, encouraged to make it a practice to reduce negative emotions. Patient is scheduled with [facility] for tomorrow,  discussed ways to prepare and bring his insights and reflections to therapy to get the most out of his sessions.    Next Appointment:  with JAMAR on 7/10

## 2024-06-12 ENCOUNTER — TELEPHONE (OUTPATIENT)
Age: 59
End: 2024-06-12

## 2024-06-12 ENCOUNTER — NURSE TRIAGE (OUTPATIENT)
Age: 59
End: 2024-06-12

## 2024-06-12 NOTE — TELEPHONE ENCOUNTER
Entered chart to see if I could addend my Call Hub. I forgot to mention that I did try to warm transfer to the nurse and I wanted to make sure that I sent it high priority but I could not find it.

## 2024-06-12 NOTE — TELEPHONE ENCOUNTER
"Pt called in stating that he feels as if his depression is worsening. Pt feels more anxious and hopeless. Pt states the the last few night he thought that he would not care if he woke up in the morning. Pt denies plan or intent of suicide. Denies homicidal thoughts. Pt states that his weight management doctor put him on Wellbutrin for weight loss and depression. Pt does not know if he should be taking it. Pt would like to set up virtual appt with PCP. Please advise if pt could be seen tomorrow. Pt is available anytime but would like to be seen asap.     Reason for Disposition   Depression is worsening (e.g.,sleeping poorly, less able to do activities of daily living)    Answer Assessment - Initial Assessment Questions  1. CONCERN: \"What happened that made you call today?\"      Weight management prescribed antidepressant   2. DEPRESSION SYMPTOM SCREENING: \"How are you feeling overall?\" (e.g., decreased energy, increased sleeping or difficulty sleeping, difficulty concentrating, feelings of sadness, guilt, hopelessness, or worthlessness)      Anxious, hopelessness  3. RISK OF HARM - SUICIDAL IDEATION:  \"Do you ever have thoughts of hurting or killing yourself?\"  (e.g., yes, no, no but preoccupation with thoughts about death)    - INTENT:  \"Do you have thoughts of hurting or killing yourself right NOW?\" (e.g., yes, no, N/A)    - PLAN: \"Do you have a specific plan for how you would do this?\" (e.g., gun, knife, overdose, no plan, N/A)      Denies  4. RISK OF HARM - HOMICIDAL IDEATION:  \"Do you ever have thoughts of hurting or killing someone else?\"  (e.g., yes, no, no but preoccupation with thoughts about death)    - INTENT:  \"Do you have thoughts of hurting or killing someone right NOW?\" (e.g., yes, no, N/A)    - PLAN: \"Do you have a specific plan for how you would do this?\" (e.g., gun, knife, no plan, N/A)       Denies  5. FUNCTIONAL IMPAIRMENT: \"How have things been going for you overall? Have you had more difficulty " "than usual doing your normal daily activities?\"  (e.g., better, same, worse; self-care, school, work, interactions)      Thing have been more difficult  6. SUPPORT: \"Who is with you now?\" \"Who do you live with?\" \"Do you have family or friends who you can talk to?\"       Yes  7. THERAPIST: \"Do you have a counselor or therapist? Name?\"      Wait listed  8. STRESSORS: \"Has there been any new stress or recent changes in your life?\"      NA  9. ALCOHOL USE OR SUBSTANCE USE (DRUG USE): \"Do you drink alcohol or use any illegal drugs?\"      NA  10. OTHER: \"Do you have any other physical symptoms right now?\" (e.g., fever)        Denies  11. PREGNANCY: \"Is there any chance you are pregnant?\" \"When was your last menstrual period?\"        NA    Protocols used: Depression-ADULT-[place]    "

## 2024-06-12 NOTE — TELEPHONE ENCOUNTER
Regarding: emotional issues  ----- Message from Kayleigh RYAN sent at 6/12/2024 12:42 PM EDT -----  Pt c/o emotional problems. It has been getting worse. Pt has become aware of problems coming to light. Pt is on antidepressants. Pt is not thinking of harming himself at this time but does not care if he wakes up in the morning. Please, triage.

## 2024-06-13 ENCOUNTER — OFFICE VISIT (OUTPATIENT)
Dept: FAMILY MEDICINE CLINIC | Facility: CLINIC | Age: 59
End: 2024-06-13
Payer: COMMERCIAL

## 2024-06-13 VITALS
WEIGHT: 315 LBS | TEMPERATURE: 97.2 F | RESPIRATION RATE: 20 BRPM | SYSTOLIC BLOOD PRESSURE: 136 MMHG | HEART RATE: 92 BPM | HEIGHT: 76 IN | OXYGEN SATURATION: 97 % | DIASTOLIC BLOOD PRESSURE: 84 MMHG | BODY MASS INDEX: 38.36 KG/M2

## 2024-06-13 DIAGNOSIS — F32.1 CURRENT MODERATE EPISODE OF MAJOR DEPRESSIVE DISORDER WITHOUT PRIOR EPISODE (HCC): Primary | ICD-10-CM

## 2024-06-13 DIAGNOSIS — F43.20 ADJUSTMENT DISORDER, UNSPECIFIED TYPE: ICD-10-CM

## 2024-06-13 PROCEDURE — G2211 COMPLEX E/M VISIT ADD ON: HCPCS | Performed by: FAMILY MEDICINE

## 2024-06-13 PROCEDURE — 99213 OFFICE O/P EST LOW 20 MIN: CPT | Performed by: FAMILY MEDICINE

## 2024-06-13 RX ORDER — ESCITALOPRAM OXALATE 10 MG/1
10 TABLET ORAL DAILY
Qty: 90 TABLET | Refills: 1 | Status: SHIPPED | OUTPATIENT
Start: 2024-06-13

## 2024-06-13 NOTE — PROGRESS NOTES
"Ambulatory Visit  Name: Joseluis Blake      : 1965      MRN: [id number]  Encounter Provider: Juni Vinson MD  Encounter Date: 2024   Encounter department: [facility]    Assessment & Plan   1. Current moderate episode of major depressive disorder without prior episode (HCC)  -     escitalopram (Lexapro) 10 mg tablet; Take 1 tablet (10 mg total) by mouth daily  2. Adjustment disorder, unspecified type  -     escitalopram (Lexapro) 10 mg tablet; Take 1 tablet (10 mg total) by mouth daily       History of Present Illness     DISCUSSION    VERY DEPRESSED  FEELS IT HAS GOTTEN WORSE WITH THE WELLBUTRIN    DISCUSSED ISSUES AT LENGTH    REVIEWED THERAPEUTIC OPTIONS  RECOMMENDED THERAPY        Review of Systems   Constitutional:  Negative for chills, fatigue and fever.   HENT:  Negative for sore throat.    Eyes:  Negative for discharge.   Respiratory:  Negative for cough and chest tightness.    Cardiovascular:  Negative for chest pain and palpitations.   Gastrointestinal:  Negative for abdominal pain, diarrhea, nausea and vomiting.   Musculoskeletal:  Negative for arthralgias and gait problem.   Neurological:  Negative for dizziness, weakness and headaches.   Hematological:  Negative for adenopathy.   Psychiatric/Behavioral:  Positive for dysphoric mood. The patient is nervous/anxious.      Pertinent Medical History             Medical History Reviewed by provider this encounter:  Tobacco  Allergies  Meds  Problems  Med Hx  Surg Hx  Fam Hx       Past Medical History   Past Medical History:   Diagnosis Date   • Borderline diabetes     per pt A1C \"coming down\"   • Chronic pain disorder     lower back-s/p laminectomy   • Claustrophobia     \"some degree\" jes MRI's\"   • CPAP (continuous positive airway pressure) dependence     per pt does not use CPAP   • Diverticulitis    • GERD (gastroesophageal reflux disease)    • Hypertension    • Kidney stone     x 2 episodes   • Lactose " "intolerance    • Localized pain of joint    • Mild sleep apnea     CPAP started in July having difficulty using- only has used on occ   • Morbid obesity with body mass index (BMI) of 50.0 to 59.9 in adult (HCC)    • Papillary thyroid carcinoma (HCC)    • Rectal bleeding     occ rectal bleeding last episode unsure-\"nothing recent\"   • Seasickness    • Skin tag     skin tags were removed-as of 8/2/19 has a few more   • Teeth missing    • Thyroid nodule    • Tinnitus    • Wears glasses    • Weight loss     \"80lbs and did gain 40lbs back\"--     Past Surgical History:   Procedure Laterality Date   • BACK SURGERY      laminectomy-1996, 2010   • COLONOSCOPY      x2   • ESOPHAGOGASTRODUODENOSCOPY N/A 3/30/2019    Procedure: ESOPHAGOGASTRODUODENOSCOPY (EGD);  Surgeon: Kory Suresh MD;  Location: [facility];  Service: Gastroenterology   • FL RETROGRADE PYELOGRAM  9/16/2022   • FL RETROGRADE PYELOGRAM  9/22/2022   • FL RETROGRADE PYELOGRAM  9/29/2022   • KNEE ARTHROSCOPY Left 2007   • MI CYSTO BLADDER W/URETERAL CATHETERIZATION Right 9/16/2022    Procedure: CYSTOSCOPY RETROGRADE PYELOGRAM WITH INSERTION STENT URETERAL retrograde;  Surgeon: Won Lawton MD;  Location: [facility];  Service: Urology   • MI CYSTO/URETERO W/LITHOTRIPSY &INDWELL STENT INSRT Right 1/6/2022    Procedure: CYSTOSCOPY URETEROSCOPY AND INSERTION STENT URETERAL RIGHT;  Surgeon: Won Lawton MD;  [facility];  Service: Urology   • MI CYSTO/URETERO W/LITHOTRIPSY &INDWELL STENT INSRT Right 9/22/2022    Procedure: CYSTOSCOPY URETEROSCOPY WITH LITHOTRIPSY HOLMIUM LASER, BASKET EXTRACTION, RETROGRADE PYELOGRAM (BILATERAL) AND  STENT EXCHANGE;  Surgeon: Won Lawton MD;  [facility];  Service: Urology   • MI CYSTO/URETERO W/LITHOTRIPSY &INDWELL STENT INSRT Bilateral 9/29/2022    Procedure: CYSTOSCOPY URETEROSCOPY, STENT MANUPILATION, RETROGRADE PYELOGRAM, LEFT  URETERAL STENT NSERTION, AND  REMOVAL OF RIGHT STENT;  Surgeon: Won" MD Jered;  [facility]: [facility];  Service: Urology   • LA CYSTO/URETERO W/LITHOTRIPSY &INDWELL STENT INSRT Left 10/13/2022    Procedure: CYSTOSCOPY URETEROSCOPY WITH LITHOTRIPSY BASKET EXTRACTION, RETROGRADE PYELOGRAM AND  STENT EXCHANGE;  Surgeon: Won Lawton MD;  [facility];  Service: Urology   • LA ESOPHAGOGASTRODUODENOSCOPY TRANSORAL DIAGNOSTIC N/A 5/1/2019    Procedure: ESOPHAGOGASTRODUODENOSCOPY (EGD);  Surgeon: Kory Suresh MD;  Location: [facility];  Service: Gastroenterology   • LA THYROIDECTOMY RMVL REMAINING TISS FLWG PRTL RMVL Right 2/8/2023    Procedure: COMPLETION THYROIDECTOMY;  Surgeon: Timbo Bolaños MD;  [facility]: [facility];  Service: ENT   • LA TOTAL THYROID LOBECTOMY UNI W/WO ISTHMUSECTOMY Left 8/31/2022    Procedure: Left patial THYROIDECTOMY;  Surgeon: Timbo Bolaños MD;  Location:  MAIN OR;  Service: ENT   • UPPER GASTROINTESTINAL ENDOSCOPY  2006   • US GUIDED THYROID BIOPSY  12/28/2020     Family History   Problem Relation Age of Onset   • Osteoarthritis Mother    • Obesity Father         [id number]   • Sleep apnea Father         with CPAP   • No Known Problems Sister    • Lupus Sister    • No Known Problems Sister    • No Known Problems Brother    • No Known Problems Son    • Substance Abuse Neg Hx    • Mental illness Neg Hx    • Cancer Neg Hx    • Diabetes Neg Hx    • Heart disease Neg Hx    • Stroke Neg Hx      Current Outpatient Medications on File Prior to Visit   Medication Sig Dispense Refill   • allopurinol (ZYLOPRIM) 300 mg tablet TAKE ONE TABLET BY MOUTH EVERY DAY (GENERIC FOR ZYLOPRIM) 90 tablet 1   • amLODIPine (NORVASC) 5 mg tablet TAKE 1 TABLET EVERY DAY 90 tablet 1   • Ergocalciferol (VITAMIN D2 PO) Take by mouth     • levothyroxine 200 mcg tablet TAKE ONE TABLET BY MOUTH EVERY DAY 90 tablet 1   • lisinopril-hydrochlorothiazide (PRINZIDE,ZESTORETIC) 20-25 MG per tablet TAKE 1 TABLET EVERY DAY 90 tablet 1   • omeprazole (PriLOSEC) 20 mg  delayed release capsule TAKE 1 CAPSULE EVERY DAY 90 capsule 1   • potassium citrate (UROCIT-K) 10 mEq Take 10 mEq by mouth 3 (three) times a day with meals     • [DISCONTINUED] buPROPion (Wellbutrin XL) 150 mg 24 hr tablet Take 1 tablet (150 mg total) by mouth daily 30 tablet 1   • [DISCONTINUED] eszopiclone (LUNESTA) 2 mg tablet Take 1 tablet (2 mg total) by mouth daily at bedtime for 14 days Take immediately before bedtime 14 tablet 0     No current facility-administered medications on file prior to visit.     Allergies   Allergen Reactions   • Lactose - Food Allergy GI Intolerance      Current Outpatient Medications on File Prior to Visit   Medication Sig Dispense Refill   • allopurinol (ZYLOPRIM) 300 mg tablet TAKE ONE TABLET BY MOUTH EVERY DAY (GENERIC FOR ZYLOPRIM) 90 tablet 1   • amLODIPine (NORVASC) 5 mg tablet TAKE 1 TABLET EVERY DAY 90 tablet 1   • Ergocalciferol (VITAMIN D2 PO) Take by mouth     • levothyroxine 200 mcg tablet TAKE ONE TABLET BY MOUTH EVERY DAY 90 tablet 1   • lisinopril-hydrochlorothiazide (PRINZIDE,ZESTORETIC) 20-25 MG per tablet TAKE 1 TABLET EVERY DAY 90 tablet 1   • omeprazole (PriLOSEC) 20 mg delayed release capsule TAKE 1 CAPSULE EVERY DAY 90 capsule 1   • potassium citrate (UROCIT-K) 10 mEq Take 10 mEq by mouth 3 (three) times a day with meals     • [DISCONTINUED] buPROPion (Wellbutrin XL) 150 mg 24 hr tablet Take 1 tablet (150 mg total) by mouth daily 30 tablet 1   • [DISCONTINUED] eszopiclone (LUNESTA) 2 mg tablet Take 1 tablet (2 mg total) by mouth daily at bedtime for 14 days Take immediately before bedtime 14 tablet 0     No current facility-administered medications on file prior to visit.      Social History     Tobacco Use   • Smoking status: Never     Passive exposure: Never   • Smokeless tobacco: Never   Vaping Use   • Vaping status: Never Used   Substance and Sexual Activity   • Alcohol use: No   • Drug use: No   • Sexual activity: Not Currently     Partners: Female      "Comment: defer     Objective     /84 (BP Location: Left arm, Patient Position: Sitting, Cuff Size: Large)   Pulse 92   Temp (!) 97.2 °F (36.2 °C) (Temporal)   Resp 20   Ht 6' 3.5\" (1.918 m)   Wt (!) 163 kg (359 lb)   SpO2 97%   BMI 44.28 kg/m²     Physical Exam    NO PE WAS PERFORMED  Administrative Statements   I have spent a total time of 20 minutes on 06/13/24 In caring for this patient including Instructions for management and Impressions.  "

## 2024-06-13 NOTE — PATIENT INSTRUCTIONS
DISCUSSED ISSUES  CONSIDER THERAPY  DISCONTINUE WELLBUTRIN  TRIAL OF LEXAPRO    CALL NEXT WEEK WITH UPDATE  RV 4 WEEKS    CALL SOONER PRN

## 2024-06-18 ENCOUNTER — NURSE TRIAGE (OUTPATIENT)
Dept: OTHER | Facility: OTHER | Age: 59
End: 2024-06-18

## 2024-06-18 ENCOUNTER — TELEPHONE (OUTPATIENT)
Age: 59
End: 2024-06-18

## 2024-06-18 NOTE — TELEPHONE ENCOUNTER
I spoke with Joseluis and his sister April,  I informed them both per Dr. Vinson to  keep hydrated, get plenty of rest and continue the Lexapo.  If symptoms get worse please call the office for a appointment.  Joseluis stated he feels a lot better right now compared to how he felt this morning. (Cold sweats dizzy and nauseous)  Isi/LUIS

## 2024-06-18 NOTE — TELEPHONE ENCOUNTER
"Reason for Disposition  • [1] Caller has NON-URGENT medicine question about med that PCP prescribed AND [2] triager unable to answer question    Answer Assessment - Initial Assessment Questions  1. NAME of MEDICATION: \"What medicine are you calling about?\"      On Wellbutin for 10-11 days stopped 6/13/2024 and started on Lexapro on 6/13/1024. He wasn't feeling as anxious on it initially.  2. QUESTION: \"What is your question?\" (e.g., medication refill, side effect)      \"Is he having side effects from stopping one and starting another?\"  3. PRESCRIBING HCP: \"Who prescribed it?\" Reason: if prescribed by specialist, call should be referred to that group.      Dr. Vinson  4. SYMPTOMS: \"Do you have any symptoms?\"      Nausea, cold sweats still, but he is not feeling unbalanced like he was at 0600 when this started.  5. SEVERITY: If symptoms are present, ask \"Are they mild, moderate or severe?\"      Ringing in his ears now    Protocols used: Medication Question Call-ADULT-    "

## 2024-06-18 NOTE — TELEPHONE ENCOUNTER
"Regarding: medication reaction  ----- Message from Lindsey NICOLE sent at 6/18/2024  7:27 AM EDT -----  \"My brother just changed medications from Wellbutrin to Lexapro. He is now having cold sweats, chills and is also suffering from nausea. Could this be a side affect from changing his medications? Could he be going through withdraw?\"    "

## 2024-06-18 NOTE — TELEPHONE ENCOUNTER
Patient and his sister are questioning whether he is having side effects from starting a new medication and stopping another on 6/13/2024.  He is unsure if he would benefit from an appointment today or just speaking to the Dr.  Please call him back this morning.

## 2024-06-18 NOTE — TELEPHONE ENCOUNTER
Called pt to confirm upcoming appt w . Provided pt w appt time, date and location. Pt will call back to confirm

## 2024-06-19 ENCOUNTER — TELEPHONE (OUTPATIENT)
Dept: NEUROLOGY | Facility: CLINIC | Age: 59
End: 2024-06-19

## 2024-06-19 NOTE — TELEPHONE ENCOUNTER
Patient cancelled Dr. Perez appointment for 6/27/24.  Patient stated he will call back to reschedule when he knows his schedule.

## 2024-06-19 NOTE — TELEPHONE ENCOUNTER
Pt called to request appt with Dr Vinson.  His symptoms are continuing - cold sweats, chills, nausea and dizziness - and he feels they are getting worse.  Next available appt was not until Monday 6/24/24.  Warm transferred to Desiree in Clerical, who assisted pt.

## 2024-06-20 ENCOUNTER — TELEMEDICINE (OUTPATIENT)
Dept: FAMILY MEDICINE CLINIC | Facility: CLINIC | Age: 59
End: 2024-06-20
Payer: COMMERCIAL

## 2024-06-20 DIAGNOSIS — R00.2 PALPITATIONS: Primary | ICD-10-CM

## 2024-06-20 DIAGNOSIS — G25.79 SEROTONIN SYNDROME: ICD-10-CM

## 2024-06-20 DIAGNOSIS — F32.A DEPRESSION, UNSPECIFIED DEPRESSION TYPE: ICD-10-CM

## 2024-06-20 PROCEDURE — 99213 OFFICE O/P EST LOW 20 MIN: CPT | Performed by: FAMILY MEDICINE

## 2024-06-20 PROCEDURE — G2211 COMPLEX E/M VISIT ADD ON: HCPCS | Performed by: FAMILY MEDICINE

## 2024-06-20 NOTE — PROGRESS NOTES
Virtual Regular Visit    Verification of patient location:    Patient is located at Home in the following state in which I hold an active license [place]      Assessment/Plan:    Problem List Items Addressed This Visit    None  Visit Diagnoses     Palpitations    -  Primary    Serotonin syndrome        Depression, unspecified depression type                   Reason for visit is   Chief Complaint   Patient presents with   • Virtual Regular Visit          Encounter provider Juni Vinson MD      Recent Visits  Date Type Provider Dept   06/13/24 Office Visit Juni Vinson MD [facility]   Showing recent visits within past 7 days and meeting all other requirements  Today's Visits  Date Type Provider Dept   06/20/24 Telemedicine Juni Vinson MD [facility]   Showing today's visits and meeting all other requirements  Future Appointments  No visits were found meeting these conditions.  Showing future appointments within next 150 days and meeting all other requirements       The patient was identified by name and date of birth. Joseluis Blake was informed that this is a telemedicine visit and that the visit is being conducted through the Epic Embedded platform. He agrees to proceed..  My office door was closed. No one else was in the room.  He acknowledged consent and understanding of privacy and security of the video platform. The patient has agreed to participate and understands they can discontinue the visit at any time.    Patient is aware this is a billable service.     Subjective  Joseluis Blake is a 59 y.o. male    .      PATIENT COMPLAINS OF SOME DIZZINESS - SPINNING/UNSTEADY FEELING, OCC PALPITATIONS, SWEATS OVER THE PAST 2 DAYS  STARTED LEXAPRO  STOPPED LAST NIGHT  FEELS MUCH BETTER    DENIES ANY CHEST PAIN, VD - SL NAUSEOUS  NO SOB, LEG SWELLING  NOTES NO FEVER OR RECENT ILLNESS    DEPRESSIVE ISSUE HAS BEEN ELIMINATED  FEELS MUCH BETTER           Past Medical  "History:   Diagnosis Date   • Borderline diabetes     per pt A1C \"coming down\"   • Chronic pain disorder     lower back-s/p laminectomy   • Claustrophobia     \"some degree\" jes MRI's\"   • CPAP (continuous positive airway pressure) dependence     per pt does not use CPAP   • Diverticulitis    • GERD (gastroesophageal reflux disease)    • Hypertension    • Kidney stone     x 2 episodes   • Lactose intolerance    • Localized pain of joint    • Mild sleep apnea     CPAP started in July having difficulty using- only has used on occ   • Morbid obesity with body mass index (BMI) of 50.0 to 59.9 in adult (HCC)    • Papillary thyroid carcinoma (HCC)    • Rectal bleeding     occ rectal bleeding last episode unsure-\"nothing recent\"   • Seasickness    • Skin tag     skin tags were removed-as of 8/2/19 has a few more   • Teeth missing    • Thyroid nodule    • Tinnitus    • Wears glasses    • Weight loss     \"80lbs and did gain 40lbs back\"--       Past Surgical History:   Procedure Laterality Date   • BACK SURGERY      laminectomy-1996, 2010   • COLONOSCOPY      x2   • ESOPHAGOGASTRODUODENOSCOPY N/A 3/30/2019    Procedure: ESOPHAGOGASTRODUODENOSCOPY (EGD);  Surgeon: Kory Suresh MD;  [facility];  Service: Gastroenterology   • FL RETROGRADE PYELOGRAM  9/16/2022   • FL RETROGRADE PYELOGRAM  9/22/2022   • FL RETROGRADE PYELOGRAM  9/29/2022   • KNEE ARTHROSCOPY Left 2007   • RI CYSTO BLADDER W/URETERAL CATHETERIZATION Right 9/16/2022    Procedure: CYSTOSCOPY RETROGRADE PYELOGRAM WITH INSERTION STENT URETERAL retrograde;  Surgeon: Won Lawton MD;  [facility];  Service: Urology   • RI CYSTO/URETERO W/LITHOTRIPSY &INDWELL STENT INSRT Right 1/6/2022    Procedure: CYSTOSCOPY URETEROSCOPY AND INSERTION STENT URETERAL RIGHT;  Surgeon: Won Lawton MD;  [facility];  Service: Urology   • RI CYSTO/URETERO W/LITHOTRIPSY &INDWELL STENT INSRT Right 9/22/2022    Procedure: CYSTOSCOPY URETEROSCOPY WITH " LITHOTRIPSY HOLMIUM LASER, BASKET EXTRACTION, RETROGRADE PYELOGRAM (BILATERAL) AND  STENT EXCHANGE;  Surgeon: Won Lawton MD;  [facility];  Service: Urology   • UT CYSTO/URETERO W/LITHOTRIPSY &INDWELL STENT INSRT Bilateral 9/29/2022    Procedure: CYSTOSCOPY URETEROSCOPY, STENT MANUPILATION, RETROGRADE PYELOGRAM, LEFT  URETERAL STENT NSERTION, AND  REMOVAL OF RIGHT STENT;  Surgeon: Won Lawton MD;  [facility];  Service: Urology   • UT CYSTO/URETERO W/LITHOTRIPSY &INDWELL STENT INSRT Left 10/13/2022    Procedure: CYSTOSCOPY URETEROSCOPY WITH LITHOTRIPSY BASKET EXTRACTION, RETROGRADE PYELOGRAM AND  STENT EXCHANGE;  Surgeon: Won Lawton MD;  [facility];  Service: Urology   • UT ESOPHAGOGASTRODUODENOSCOPY TRANSORAL DIAGNOSTIC N/A 5/1/2019    Procedure: ESOPHAGOGASTRODUODENOSCOPY (EGD);  Surgeon: Kory Suresh MD;  Location: [facility];  Service: Gastroenterology   • UT THYROIDECTOMY RMVL REMAINING TISS FLWG PRTL RMVL Right 2/8/2023    Procedure: COMPLETION THYROIDECTOMY;  Surgeon: Timbo Bolaños MD;  [facility];  Service: ENT   • UT TOTAL THYROID LOBECTOMY UNI W/WO ISTHMUSECTOMY Left 8/31/2022    Procedure: Left patial THYROIDECTOMY;  Surgeon: Timbo Bolaños MD;  Location:  MAIN OR;  Service: ENT   • UPPER GASTROINTESTINAL ENDOSCOPY  2006   • US GUIDED THYROID BIOPSY  12/28/2020       Current Outpatient Medications   Medication Sig Dispense Refill   • allopurinol (ZYLOPRIM) 300 mg tablet TAKE ONE TABLET BY MOUTH EVERY DAY (GENERIC FOR ZYLOPRIM) 90 tablet 1   • amLODIPine (NORVASC) 5 mg tablet TAKE 1 TABLET EVERY DAY 90 tablet 1   • Ergocalciferol (VITAMIN D2 PO) Take by mouth     • escitalopram (Lexapro) 10 mg tablet Take 1 tablet (10 mg total) by mouth daily 90 tablet 1   • levothyroxine 200 mcg tablet TAKE ONE TABLET BY MOUTH EVERY DAY 90 tablet 1   • lisinopril-hydrochlorothiazide (PRINZIDE,ZESTORETIC) 20-25 MG per tablet TAKE 1 TABLET EVERY DAY  90 tablet 1   • omeprazole (PriLOSEC) 20 mg delayed release capsule TAKE 1 CAPSULE EVERY DAY 90 capsule 1   • potassium citrate (UROCIT-K) 10 mEq Take 10 mEq by mouth 3 (three) times a day with meals       No current facility-administered medications for this visit.        Allergies   Allergen Reactions   • Lactose - Food Allergy GI Intolerance       Review of Systems   Constitutional:  Positive for diaphoresis. Negative for chills, fatigue and fever.   HENT:  Negative for sore throat.    Eyes:  Negative for discharge.   Respiratory:  Negative for cough and chest tightness.    Cardiovascular:  Positive for palpitations. Negative for chest pain.   Gastrointestinal:  Positive for nausea. Negative for abdominal pain, diarrhea and vomiting.   Musculoskeletal:  Negative for arthralgias and gait problem.   Neurological:  Positive for dizziness. Negative for weakness and headaches.   Hematological:  Negative for adenopathy.   Psychiatric/Behavioral:  The patient is not nervous/anxious.        Video Exam    There were no vitals filed for this visit.    Physical Exam     PATIENT VISUALLY APPEARS  IN NO OBVIOUS DISTRESS      Visit Time  Total Visit Duration: 10

## 2024-06-20 NOTE — PATIENT INSTRUCTIONS
DISCONTINUE LEXAPRO  HYDRATION  REST  MONITOR SYMPTOMS    MESSAGE WITH UPDATE NEXT WEEL,  SOONER PRN

## 2024-06-25 ENCOUNTER — CLINICAL SUPPORT (OUTPATIENT)
Dept: BARIATRICS | Facility: CLINIC | Age: 59
End: 2024-06-25

## 2024-06-25 VITALS — HEIGHT: 76 IN | BODY MASS INDEX: 38.36 KG/M2 | WEIGHT: 315 LBS

## 2024-06-25 DIAGNOSIS — R63.5 ABNORMAL WEIGHT GAIN: Primary | ICD-10-CM

## 2024-06-25 PROCEDURE — RECHECK

## 2024-07-09 ENCOUNTER — PATIENT OUTREACH (OUTPATIENT)
Dept: BARIATRICS | Facility: CLINIC | Age: 59
End: 2024-07-09

## 2024-07-09 NOTE — PROGRESS NOTES
Patient contacted RD stating he will be taking a break from the [employer] program for personal reasons. Reminded patient of remaining classes/appointments which can be utilized as a credit in the future for 1 30-min RD visit and 1 45-min visit. Encouraged patient to reach out for any support.

## 2024-07-10 ENCOUNTER — OFFICE VISIT (OUTPATIENT)
Dept: URGENT CARE | Facility: CLINIC | Age: 59
End: 2024-07-10
Payer: COMMERCIAL

## 2024-07-10 VITALS
WEIGHT: 315 LBS | BODY MASS INDEX: 38.36 KG/M2 | HEART RATE: 76 BPM | DIASTOLIC BLOOD PRESSURE: 78 MMHG | TEMPERATURE: 98.5 F | RESPIRATION RATE: 16 BRPM | HEIGHT: 76 IN | SYSTOLIC BLOOD PRESSURE: 122 MMHG

## 2024-07-10 DIAGNOSIS — H61.23 BILATERAL IMPACTED CERUMEN: Primary | ICD-10-CM

## 2024-07-10 PROCEDURE — 69209 REMOVE IMPACTED EAR WAX UNI: CPT

## 2024-07-10 PROCEDURE — 99213 OFFICE O/P EST LOW 20 MIN: CPT

## 2024-07-10 NOTE — PROGRESS NOTES
[facility]        NAME: Joseluis Blake is a 59 y.o. male  : 1965    MRN: [id number]  DATE: July 10, 2024  TIME: 2:48 PM    Assessment and Plan   Bilateral impacted cerumen [H61.23]  1. Bilateral impacted cerumen          Cerumen successfully removed from bilateral ears. Hearing improved.     Patient Instructions       Follow up with PCP in 3-5 days.  Proceed to  ER if symptoms worsen.    If tests are performed, our office will contact you with results only if changes need to made to the care plan discussed with you at the visit. [facility] can [facility].    Chief Complaint     Chief Complaint   Patient presents with    Earache     Pt states he has a build up of wax in his right ear that started yesterday. Pt tried cleaning with qtip.          History of Present Illness       Earache   There is pain in the right ear. This is a new problem. The current episode started yesterday. The problem occurs constantly. The problem has been unchanged. There has been no fever. Associated symptoms include hearing loss. Pertinent negatives include no abdominal pain, coughing, headaches, rhinorrhea, sore throat or vomiting. He has tried nothing for the symptoms.       Review of Systems   Review of Systems   Constitutional:  Negative for chills and fever.   HENT:  Positive for ear pain and hearing loss. Negative for congestion, postnasal drip, rhinorrhea, sinus pressure, sore throat and trouble swallowing.    Respiratory:  Negative for cough, chest tightness and shortness of breath.    Cardiovascular:  Negative for chest pain and palpitations.   Gastrointestinal:  Negative for abdominal pain, nausea and vomiting.   Genitourinary:  Negative for difficulty urinating.   Musculoskeletal:  Negative for myalgias.   Neurological:  Negative for dizziness and headaches.         Current Medications       Current Outpatient Medications:     allopurinol (ZYLOPRIM) 300 mg tablet, TAKE ONE TABLET BY  "MOUTH EVERY DAY (GENERIC FOR ZYLOPRIM), Disp: 90 tablet, Rfl: 1    amLODIPine (NORVASC) 5 mg tablet, TAKE 1 TABLET EVERY DAY, Disp: 90 tablet, Rfl: 1    Ergocalciferol (VITAMIN D2 PO), Take by mouth, Disp: , Rfl:     levothyroxine 200 mcg tablet, TAKE ONE TABLET BY MOUTH EVERY DAY, Disp: 90 tablet, Rfl: 1    lisinopril-hydrochlorothiazide (PRINZIDE,ZESTORETIC) 20-25 MG per tablet, TAKE 1 TABLET EVERY DAY, Disp: 90 tablet, Rfl: 1    omeprazole (PriLOSEC) 20 mg delayed release capsule, TAKE 1 CAPSULE EVERY DAY, Disp: 90 capsule, Rfl: 1    potassium citrate (UROCIT-K) 10 mEq, Take 10 mEq by mouth 3 (three) times a day with meals, Disp: , Rfl:     escitalopram (Lexapro) 10 mg tablet, Take 1 tablet (10 mg total) by mouth daily, Disp: 90 tablet, Rfl: 1    Current Allergies     Allergies as of 07/10/2024 - Reviewed 07/10/2024   Allergen Reaction Noted    Lactose - food allergy GI Intolerance 08/02/2019            The following portions of the patient's history were reviewed and updated as appropriate: allergies, current medications, past family history, past medical history, past social history, past surgical history and problem list.     Past Medical History:   Diagnosis Date    Borderline diabetes     per pt A1C \"coming down\"    Chronic pain disorder     lower back-s/p laminectomy    Claustrophobia     \"some degree\" jes MRI's\"    CPAP (continuous positive airway pressure) dependence     per pt does not use CPAP    Diverticulitis     GERD (gastroesophageal reflux disease)     Gout     Hypertension     Kidney stone     x 2 episodes    Lactose intolerance     Localized pain of joint     Mild sleep apnea     CPAP started in July having difficulty using- only has used on occ    Morbid obesity with body mass index (BMI) of 50.0 to 59.9 in adult (HCC)     Papillary thyroid carcinoma (HCC)     Rectal bleeding     occ rectal bleeding last episode unsure-\"nothing recent\"    Seasickness     Skin tag     skin tags were removed-as of " "8/2/19 has a few more    Teeth missing     Thyroid nodule     Tinnitus     Wears glasses     Weight loss     \"80lbs and did gain 40lbs back\"--       Past Surgical History:   Procedure Laterality Date    BACK SURGERY      laminectomy-1996, 2010    COLONOSCOPY      x2    ESOPHAGOGASTRODUODENOSCOPY N/A 3/30/2019    Procedure: ESOPHAGOGASTRODUODENOSCOPY (EGD);  Surgeon: Kory Suresh MD;  [facility]: [facility];  Service: Gastroenterology    FL RETROGRADE PYELOGRAM  9/16/2022    FL RETROGRADE PYELOGRAM  9/22/2022    FL RETROGRADE PYELOGRAM  9/29/2022    KNEE ARTHROSCOPY Left 2007    MI CYSTO BLADDER W/URETERAL CATHETERIZATION Right 9/16/2022    Procedure: CYSTOSCOPY RETROGRADE PYELOGRAM WITH INSERTION STENT URETERAL retrograde;  Surgeon: Won Lawton MD;  [facility];  Service: Urology    MI CYSTO/URETERO W/LITHOTRIPSY &INDWELL STENT INSRT Right 1/6/2022    Procedure: CYSTOSCOPY URETEROSCOPY AND INSERTION STENT URETERAL RIGHT;  Surgeon: Won Lawton MD;  [facility];  Service: Urology    MI CYSTO/URETERO W/LITHOTRIPSY &INDWELL STENT INSRT Right 9/22/2022    Procedure: CYSTOSCOPY URETEROSCOPY WITH LITHOTRIPSY HOLMIUM LASER, BASKET EXTRACTION, RETROGRADE PYELOGRAM (BILATERAL) AND  STENT EXCHANGE;  Surgeon: Won Lawton MD;  [facility];  Service: Urology    MI CYSTO/URETERO W/LITHOTRIPSY &INDWELL STENT INSRT Bilateral 9/29/2022    Procedure: CYSTOSCOPY URETEROSCOPY, STENT MANUPILATION, RETROGRADE PYELOGRAM, LEFT  URETERAL STENT NSERTION, AND  REMOVAL OF RIGHT STENT;  Surgeon: Won Lawton MD;  [facility];  Service: Urology    MI CYSTO/URETERO W/LITHOTRIPSY &INDWELL STENT INSRT Left 10/13/2022    Procedure: CYSTOSCOPY URETEROSCOPY WITH LITHOTRIPSY BASKET EXTRACTION, RETROGRADE PYELOGRAM AND  STENT EXCHANGE;  Surgeon: Won Lawton MD;  [facility];  Service: Urology    MI ESOPHAGOGASTRODUODENOSCOPY TRANSORAL DIAGNOSTIC N/A 5/1/2019    Procedure: " "ESOPHAGOGASTRODUODENOSCOPY (EGD);  Surgeon: Kory Suresh MD;  Location: [facility];  Service: Gastroenterology    HI THYROIDECTOMY RMVL REMAINING TISS FLWG PRTL RMVL Right 2/8/2023    Procedure: COMPLETION THYROIDECTOMY;  Surgeon: Timbo Bolaños MD;  [facility];  Service: ENT    HI TOTAL THYROID LOBECTOMY UNI W/WO ISTHMUSECTOMY Left 8/31/2022    Procedure: Left patial THYROIDECTOMY;  Surgeon: Timbo Bolaños MD;  [facility];  Service: ENT    UPPER GASTROINTESTINAL ENDOSCOPY  2006    US GUIDED THYROID BIOPSY  12/28/2020       Family History   Problem Relation Age of Onset    Osteoarthritis Mother     Obesity Father         [id number]    Sleep apnea Father         with CPAP    No Known Problems Sister     Lupus Sister     No Known Problems Sister     No Known Problems Brother     No Known Problems Son     Substance Abuse Neg Hx     Mental illness Neg Hx     Cancer Neg Hx     Diabetes Neg Hx     Heart disease Neg Hx     Stroke Neg Hx          Medications have been verified.        Objective   /78   Pulse 76   Temp 98.5 °F (36.9 °C)   Resp 16   Ht 6' 4\" (1.93 m)   Wt (!) 164 kg (361 lb)   BMI 43.94 kg/m²        Physical Exam     Physical Exam  Constitutional:       General: He is not in acute distress.  HENT:      Head: Normocephalic.      Right Ear: Tympanic membrane, ear canal and external ear normal. There is impacted cerumen.      Left Ear: Tympanic membrane, ear canal and external ear normal. There is impacted cerumen.      Nose: Nose normal.   Eyes:      Pupils: Pupils are equal, round, and reactive to light.   Cardiovascular:      Rate and Rhythm: Normal rate and regular rhythm.      Pulses: Normal pulses.      Heart sounds: Normal heart sounds.   Pulmonary:      Effort: Pulmonary effort is normal.      Breath sounds: Normal breath sounds.   Abdominal:      General: Abdomen is flat.   Musculoskeletal:         General: Normal range of motion.   Skin:     General: " "Skin is warm and dry.      Capillary Refill: Capillary refill takes less than 2 seconds.   Neurological:      Mental Status: He is alert and oriented to person, place, and time.         Ear cerumen removal    Date/Time: 7/10/2024 2:30 PM    Performed by: Christine Silva PA-C  Authorized by: Christine Silva PA-C  Universal Protocol:  Consent: Verbal consent obtained.  Risks and benefits: risks, benefits and alternatives were discussed  Consent given by: patient  Time out: Immediately prior to procedure a \"time out\" was called to verify the correct patient, procedure, equipment, support staff and site/side marked as required.  Timeout called at: 7/10/2024 2:40 PM.  Patient understanding: patient states understanding of the procedure being performed  Patient consent: the patient's understanding of the procedure matches consent given  Patient identity confirmed: verbally with patient    Patient location:  Clinic  Procedure details:     Local anesthetic:  None    Location:  L ear and R ear    Procedure type: irrigation only      Approach:  Natural orifice  Post-procedure details:     Complication:  None    Hearing quality:  Improved    Patient tolerance of procedure:  Tolerated well, no immediate complications            "

## 2024-07-12 PROBLEM — N18.2 CHRONIC KIDNEY DISEASE (CKD), STAGE II (MILD): Status: ACTIVE | Noted: 2022-01-06

## 2024-07-12 PROBLEM — E11.22: Status: ACTIVE | Noted: 2024-07-12

## 2024-07-12 PROBLEM — N18.2: Status: ACTIVE | Noted: 2024-07-12

## 2024-07-24 DIAGNOSIS — K20.0 EOSINOPHILIC ESOPHAGITIS: ICD-10-CM

## 2024-07-24 RX ORDER — OMEPRAZOLE 20 MG/1
20 CAPSULE, DELAYED RELEASE ORAL DAILY
Qty: 100 CAPSULE | Refills: 1 | Status: SHIPPED | OUTPATIENT
Start: 2024-07-24

## 2024-08-02 ENCOUNTER — TELEPHONE (OUTPATIENT)
Age: 59
End: 2024-08-02

## 2024-08-02 NOTE — TELEPHONE ENCOUNTER
"Patient called about labs to be done for upcoming appt. He is planning on going to [facility]. I see the labs orders but there is documentation and a \"release\" on the April ones by Annette. Is he ok to still go get those labs next week before his appt?  Please follow up with patient. Thank you  "

## 2024-08-05 ENCOUNTER — APPOINTMENT (OUTPATIENT)
Dept: LAB | Facility: HOSPITAL | Age: 59
End: 2024-08-05
Payer: COMMERCIAL

## 2024-08-05 LAB
T4 FREE SERPL-MCNC: 1.26 NG/DL (ref 0.61–1.12)
TSH SERPL DL<=0.05 MIU/L-ACNC: 0.07 UIU/ML (ref 0.45–4.5)

## 2024-08-06 LAB
THYROGLOB AB SERPL-ACNC: <1 IU/ML (ref 0–0.9)
THYROGLOB SERPL-MCNC: <0.1 NG/ML (ref 1.4–29.2)

## 2024-08-09 DIAGNOSIS — E11.69 TYPE 2 DIABETES MELLITUS WITH MORBID OBESITY (HCC): ICD-10-CM

## 2024-08-09 DIAGNOSIS — Z13.220 LIPID SCREENING: ICD-10-CM

## 2024-08-09 DIAGNOSIS — E66.01 TYPE 2 DIABETES MELLITUS WITH MORBID OBESITY (HCC): ICD-10-CM

## 2024-08-09 DIAGNOSIS — K21.9 GASTROESOPHAGEAL REFLUX DISEASE WITHOUT ESOPHAGITIS: ICD-10-CM

## 2024-08-09 DIAGNOSIS — K20.0 EOSINOPHILIC ESOPHAGITIS: ICD-10-CM

## 2024-08-09 DIAGNOSIS — C73 PAPILLARY THYROID CARCINOMA (HCC): ICD-10-CM

## 2024-08-09 DIAGNOSIS — N18.9 CHRONIC KIDNEY DISEASE, UNSPECIFIED CKD STAGE: ICD-10-CM

## 2024-08-09 DIAGNOSIS — Z12.5 ENCOUNTER FOR PROSTATE CANCER SCREENING: ICD-10-CM

## 2024-08-09 DIAGNOSIS — Z00.00 MEDICARE ANNUAL WELLNESS VISIT, SUBSEQUENT: Primary | ICD-10-CM

## 2024-08-09 DIAGNOSIS — R53.83 OTHER FATIGUE: ICD-10-CM

## 2024-08-13 ENCOUNTER — OFFICE VISIT (OUTPATIENT)
Dept: ENDOCRINOLOGY | Facility: CLINIC | Age: 59
End: 2024-08-13
Payer: COMMERCIAL

## 2024-08-13 VITALS
DIASTOLIC BLOOD PRESSURE: 76 MMHG | OXYGEN SATURATION: 98 % | BODY MASS INDEX: 38.36 KG/M2 | HEIGHT: 76 IN | WEIGHT: 315 LBS | HEART RATE: 78 BPM | SYSTOLIC BLOOD PRESSURE: 120 MMHG

## 2024-08-13 DIAGNOSIS — E11.69 TYPE 2 DIABETES MELLITUS WITH MORBID OBESITY (HCC): ICD-10-CM

## 2024-08-13 DIAGNOSIS — E11.22 TYPE 2 DIABETES MELLITUS WITH STAGE 2 CHRONIC KIDNEY DISEASE, WITHOUT LONG-TERM CURRENT USE OF INSULIN  (HCC): ICD-10-CM

## 2024-08-13 DIAGNOSIS — N18.2 TYPE 2 DIABETES MELLITUS WITH STAGE 2 CHRONIC KIDNEY DISEASE, WITHOUT LONG-TERM CURRENT USE OF INSULIN  (HCC): ICD-10-CM

## 2024-08-13 DIAGNOSIS — I10 PRIMARY HYPERTENSION: Primary | ICD-10-CM

## 2024-08-13 DIAGNOSIS — E66.01 TYPE 2 DIABETES MELLITUS WITH MORBID OBESITY (HCC): ICD-10-CM

## 2024-08-13 DIAGNOSIS — C73 PAPILLARY THYROID CARCINOMA (HCC): ICD-10-CM

## 2024-08-13 DIAGNOSIS — E89.0 POST-OPERATIVE HYPOTHYROIDISM: ICD-10-CM

## 2024-08-13 PROBLEM — E66.813 CLASS 3 SEVERE OBESITY DUE TO EXCESS CALORIES WITH SERIOUS COMORBIDITY AND BODY MASS INDEX (BMI) OF 45.0 TO 49.9 IN ADULT (HCC): Status: RESOLVED | Noted: 2018-11-12 | Resolved: 2024-08-13

## 2024-08-13 LAB — SL AMB POCT HEMOGLOBIN AIC: 5.5 (ref ?–6.5)

## 2024-08-13 PROCEDURE — 99214 OFFICE O/P EST MOD 30 MIN: CPT | Performed by: NURSE PRACTITIONER

## 2024-08-13 PROCEDURE — 83036 HEMOGLOBIN GLYCOSYLATED A1C: CPT | Performed by: NURSE PRACTITIONER

## 2024-08-13 NOTE — ASSESSMENT & PLAN NOTE
Thyroglobulin levels appropriately low will recheck in August 2025 unless clinically warranted earlier.  Ultrasound head neck lymph nodes to be rechecked in February 2025.

## 2024-08-13 NOTE — ASSESSMENT & PLAN NOTE
Lab Results   Component Value Date    HGBA1C 5.5 08/13/2024     Well controlled with lifestyle modifications. Managed primarily by PCP. Recommend regular diabetic eye and foot exam.

## 2024-08-13 NOTE — PROGRESS NOTES
Established Patient Progress Note     CC: Endocrinology follow up visit    Impression & Plan:    Problem List Items Addressed This Visit          Cardiovascular and Mediastinum    Hypertension - Primary     BP under good control.  Continues on ACE/ARB.             Endocrine    Type 2 diabetes mellitus with morbid obesity (HCC)    Relevant Orders    POCT hemoglobin A1c (Completed)    Albumin / creatinine urine ratio    Papillary thyroid carcinoma (HCC)     Thyroglobulin levels appropriately low will recheck in August 2025 unless clinically warranted earlier.  Ultrasound head neck lymph nodes to be rechecked in February 2025.          Post-operative hypothyroidism     Clinically euthyroid.   TSH in goal <0.1.  Recommend continue levothyroxine 200 mcg daily.   Recheck TSH and free T4 in 3 months.          Relevant Orders    TSH, 3rd generation    T4, free    Type 2 diabetes with stage 2 chronic kidney disease GFR 60-89  (HCC)       Lab Results   Component Value Date    HGBA1C 5.5 08/13/2024     Well controlled with lifestyle modifications. Managed primarily by PCP. Recommend regular diabetic eye and foot exam.             History of Present Illness:     Joseluis Blake is a 59 y.o. male with a history of thyroid carcinoma.  Had history of multiple thyroid nodules with an FNA completed in December 2020 which was benign, AUS.  Patient had left hemithyroidectomy in August 2022 pathology demonstrated papillary thyroid carcinoma with infiltrative follicular variant 4.5 cm.  Continues to deny family history of thyroid cancer or thyroid disease.  Denies any known history of head or neck radiation.  Last ultrasound of head pleated in February 2024 no evidence of recurrence or metastatic disease.  Thyroglobulin by VICKI was less than 0.1 in August 2024.  Thyroglobulin antibody less than 1.0 in August 2024. TSH appropriately suppressed from August 2024.      Currently taking levothyroxine 200 mcg orally daily.  Taking regularly  "and properly. Continues to deny symptoms consistent with hypo/hyperthyroidism. Denies change in energy level or weight.   Denies jitteriness, or tremors.  Denies tachycardia or palpitations.  Denies bowel pattern changes. Denies frequent headaches.  Denies temperature intolerances.          Patient Active Problem List   Diagnosis    Anxiety    GERD (gastroesophageal reflux disease)    Hypertension    Osteoarthritis    Breathing-related sleep disorder    Type 2 diabetes mellitus with morbid obesity ([facility])    Urinary calculus    Chronic midline low back pain without sciatica    Chronic pain syndrome    Lumbar post-laminectomy syndrome    DOMINIC (obstructive sleep apnea)    Chronic kidney disease (CKD), stage II (mild)    Ureteral stone with hydronephrosis    Papillary thyroid carcinoma (HCC)    Post-operative hypothyroidism    Gout, arthropathy    Emotional hypersensitivity    Adjustment disorder    Type 2 diabetes with stage 2 chronic kidney disease GFR 60-89  ([facility])      Past Medical History:   Diagnosis Date    Borderline diabetes     per pt A1C \"coming down\"    Chronic pain disorder     lower back-s/p laminectomy    Claustrophobia     \"some degree\" jes MRI's\"    CPAP (continuous positive airway pressure) dependence     per pt does not use CPAP    Diverticulitis     GERD (gastroesophageal reflux disease)     Gout     Hypertension     Kidney stone     x 2 episodes    Lactose intolerance     Localized pain of joint     Mild sleep apnea     CPAP started in July having difficulty using- only has used on occ    Morbid obesity with body mass index (BMI) of 50.0 to 59.9 in adult (HCC)     Papillary thyroid carcinoma (HCC)     Rectal bleeding     occ rectal bleeding last episode unsure-\"nothing recent\"    Seasickness     Skin tag     skin tags were removed-as of 8/2/19 has a few more    Teeth missing     Thyroid nodule     Tinnitus     Wears glasses     Weight loss     \"80lbs and did gain 40lbs back\"--      Past Surgical " History:   Procedure Laterality Date    BACK SURGERY      laminectomy-1996, 2010    COLONOSCOPY      x2    ESOPHAGOGASTRODUODENOSCOPY N/A 3/30/2019    Procedure: ESOPHAGOGASTRODUODENOSCOPY (EGD);  Surgeon: Kory Suresh MD;  [facility];  Service: Gastroenterology    FL RETROGRADE PYELOGRAM  9/16/2022    FL RETROGRADE PYELOGRAM  9/22/2022    FL RETROGRADE PYELOGRAM  9/29/2022    KNEE ARTHROSCOPY Left 2007    SC CYSTO BLADDER W/URETERAL CATHETERIZATION Right 9/16/2022    Procedure: CYSTOSCOPY RETROGRADE PYELOGRAM WITH INSERTION STENT URETERAL retrograde;  Surgeon: Won Lawton MD;  [facility];  Service: Urology    SC CYSTO/URETERO W/LITHOTRIPSY &INDWELL STENT INSRT Right 1/6/2022    Procedure: CYSTOSCOPY URETEROSCOPY AND INSERTION STENT URETERAL RIGHT;  Surgeon: Won Lawton MD;  [facility];  Service: Urology    SC CYSTO/URETERO W/LITHOTRIPSY &INDWELL STENT INSRT Right 9/22/2022    Procedure: CYSTOSCOPY URETEROSCOPY WITH LITHOTRIPSY HOLMIUM LASER, BASKET EXTRACTION, RETROGRADE PYELOGRAM (BILATERAL) AND  STENT EXCHANGE;  Surgeon: Won Lawton MD;  [facility];  Service: Urology    SC CYSTO/URETERO W/LITHOTRIPSY &INDWELL STENT INSRT Bilateral 9/29/2022    Procedure: CYSTOSCOPY URETEROSCOPY, STENT MANUPILATION, RETROGRADE PYELOGRAM, LEFT  URETERAL STENT NSERTION, AND  REMOVAL OF RIGHT STENT;  Surgeon: Won Lawton MD;  [facility];  Service: Urology    SC CYSTO/URETERO W/LITHOTRIPSY &INDWELL STENT INSRT Left 10/13/2022    Procedure: CYSTOSCOPY URETEROSCOPY WITH LITHOTRIPSY BASKET EXTRACTION, RETROGRADE PYELOGRAM AND  STENT EXCHANGE;  Surgeon: Won Lawton MD;  [facility];  Service: Urology    SC ESOPHAGOGASTRODUODENOSCOPY TRANSORAL DIAGNOSTIC N/A 5/1/2019    Procedure: ESOPHAGOGASTRODUODENOSCOPY (EGD);  Surgeon: Kory Suresh MD;  Location: [facility];  Service: Gastroenterology    SC THYROIDECTOMY RMVL REMAINING TISS FLWG PRTL RMVL Right 2/8/2023     Procedure: COMPLETION THYROIDECTOMY;  Surgeon: Timbo Bolaños MD;  [facility];  Service: ENT    PA TOTAL THYROID LOBECTOMY UNI W/WO ISTHMUSECTOMY Left 8/31/2022    Procedure: Left patial THYROIDECTOMY;  Surgeon: Timbo Bolaños MD;  [facility];  Service: ENT    UPPER GASTROINTESTINAL ENDOSCOPY  2006    US GUIDED THYROID BIOPSY  12/28/2020      Family History   Problem Relation Age of Onset    Osteoarthritis Mother     Obesity Father         [id number]    Sleep apnea Father         with CPAP    No Known Problems Sister     Lupus Sister     No Known Problems Sister     No Known Problems Brother     No Known Problems Son     Substance Abuse Neg Hx     Mental illness Neg Hx     Cancer Neg Hx     Diabetes Neg Hx     Heart disease Neg Hx     Stroke Neg Hx      Social History     Tobacco Use    Smoking status: Never     Passive exposure: Never    Smokeless tobacco: Never   Substance Use Topics    Alcohol use: No     Allergies   Allergen Reactions    Lactose - Food Allergy GI Intolerance       Current Outpatient Medications:     allopurinol (ZYLOPRIM) 300 mg tablet, TAKE ONE TABLET BY MOUTH EVERY DAY (GENERIC FOR ZYLOPRIM), Disp: 90 tablet, Rfl: 1    amLODIPine (NORVASC) 5 mg tablet, TAKE 1 TABLET EVERY DAY, Disp: 90 tablet, Rfl: 1    Ergocalciferol (VITAMIN D2 PO), Take by mouth, Disp: , Rfl:     levothyroxine 200 mcg tablet, TAKE ONE TABLET BY MOUTH EVERY DAY, Disp: 90 tablet, Rfl: 1    lisinopril-hydrochlorothiazide (PRINZIDE,ZESTORETIC) 20-25 MG per tablet, TAKE 1 TABLET EVERY DAY, Disp: 90 tablet, Rfl: 1    omeprazole (PriLOSEC) 20 mg delayed release capsule, TAKE 1 CAPSULE EVERY DAY, Disp: 100 capsule, Rfl: 1    potassium citrate (UROCIT-K) 10 mEq, Take 10 mEq by mouth 3 (three) times a day with meals, Disp: , Rfl:     escitalopram (Lexapro) 10 mg tablet, Take 1 tablet (10 mg total) by mouth daily, Disp: 90 tablet, Rfl: 1    Review of Systems  See HPI.   All other systems reviewed  "and are negative.      Physical Exam:  Body mass index is 44.06 kg/m².  /76 (BP Location: Left arm, Patient Position: Sitting, Cuff Size: Large)   Pulse 78   Ht 6' 4\" (1.93 m)   Wt (!) 164 kg (362 lb)   SpO2 98%   BMI 44.06 kg/m²    Wt Readings from Last 3 Encounters:   08/13/24 (!) 164 kg (362 lb)   07/10/24 (!) 164 kg (361 lb)   06/25/24 (!) 162 kg (358 lb 3.2 oz)     Physical Exam  Vitals reviewed.   Constitutional:       Appearance: Normal appearance.   Cardiovascular:      Rate and Rhythm: Normal rate and regular rhythm.      Pulses: Normal pulses.      Heart sounds: Normal heart sounds.   Pulmonary:      Effort: Pulmonary effort is normal.      Breath sounds: Normal breath sounds.   Skin:     General: Skin is warm and dry.      Capillary Refill: Capillary refill takes less than 2 seconds.   Neurological:      General: No focal deficit present.      Mental Status: He is alert and oriented to person, place, and time.      No tremors with outstretched arms.   Psychiatric:         Mood and Affect: Mood normal.         Behavior: Behavior normal.       Labs:   Component      Latest Ref Rng 4/9/2024 8/5/2024   TSH 3RD GENERATON      0.450 - 4.500 uIU/mL 0.141 (L)  0.073 (L)    FREE T4      0.61 - 1.12 ng/dL 1.16 (H)  1.26 (H)    THYROGLOBULIN AB      0.0 - 0.9 IU/mL  <1.0    Thyroglobulin-VICKI      1.4 - 29.2 ng/mL  <0.1 (L)        Discussed with the patient and all questioned fully answered. He will call me if any problems arise.    Follow-up appointment in 3 months.     Counseled patient on diagnostic results, prognosis, risk and benefit of treatment options, instruction for management, importance of treatment compliance, Risk  factor reduction and impressions    SOLE Ward    "

## 2024-08-13 NOTE — ASSESSMENT & PLAN NOTE
Clinically euthyroid.   TSH in goal <0.1.  Recommend continue levothyroxine 200 mcg daily.   Recheck TSH and free T4 in 3 months.

## 2024-09-11 DIAGNOSIS — I10 ESSENTIAL HYPERTENSION: ICD-10-CM

## 2024-09-11 RX ORDER — LISINOPRIL AND HYDROCHLOROTHIAZIDE 20; 25 MG/1; MG/1
TABLET ORAL
Qty: 90 TABLET | Refills: 1 | Status: SHIPPED | OUTPATIENT
Start: 2024-09-11

## 2024-09-11 RX ORDER — AMLODIPINE BESYLATE 5 MG/1
5 TABLET ORAL DAILY
Qty: 90 TABLET | Refills: 1 | Status: SHIPPED | OUTPATIENT
Start: 2024-09-11

## 2024-09-17 ENCOUNTER — APPOINTMENT (OUTPATIENT)
Dept: LAB | Facility: HOSPITAL | Age: 59
End: 2024-09-17
Payer: COMMERCIAL

## 2024-09-17 DIAGNOSIS — K21.9 GASTROESOPHAGEAL REFLUX DISEASE WITHOUT ESOPHAGITIS: ICD-10-CM

## 2024-09-17 DIAGNOSIS — K20.0 EOSINOPHILIC ESOPHAGITIS: ICD-10-CM

## 2024-09-17 DIAGNOSIS — N18.9 CHRONIC KIDNEY DISEASE, UNSPECIFIED CKD STAGE: ICD-10-CM

## 2024-09-17 DIAGNOSIS — Z00.00 MEDICARE ANNUAL WELLNESS VISIT, SUBSEQUENT: ICD-10-CM

## 2024-09-17 DIAGNOSIS — Z12.5 ENCOUNTER FOR PROSTATE CANCER SCREENING: ICD-10-CM

## 2024-09-17 DIAGNOSIS — C73 PAPILLARY THYROID CARCINOMA (HCC): ICD-10-CM

## 2024-09-17 DIAGNOSIS — E11.69 TYPE 2 DIABETES MELLITUS WITH MORBID OBESITY (HCC): ICD-10-CM

## 2024-09-17 DIAGNOSIS — R53.83 OTHER FATIGUE: ICD-10-CM

## 2024-09-17 DIAGNOSIS — Z13.220 LIPID SCREENING: ICD-10-CM

## 2024-09-17 DIAGNOSIS — E66.01 TYPE 2 DIABETES MELLITUS WITH MORBID OBESITY (HCC): ICD-10-CM

## 2024-09-17 LAB
ALBUMIN SERPL BCG-MCNC: 4 G/DL (ref 3.5–5)
ALP SERPL-CCNC: 54 U/L (ref 34–104)
ALT SERPL W P-5'-P-CCNC: 20 U/L (ref 7–52)
ANION GAP SERPL CALCULATED.3IONS-SCNC: 8 MMOL/L (ref 4–13)
AST SERPL W P-5'-P-CCNC: 19 U/L (ref 13–39)
BASOPHILS # BLD AUTO: 0.08 THOUSANDS/ΜL (ref 0–0.1)
BASOPHILS NFR BLD AUTO: 1 % (ref 0–1)
BILIRUB SERPL-MCNC: 0.8 MG/DL (ref 0.2–1)
BUN SERPL-MCNC: 28 MG/DL (ref 5–25)
CALCIUM SERPL-MCNC: 9.2 MG/DL (ref 8.4–10.2)
CHLORIDE SERPL-SCNC: 104 MMOL/L (ref 96–108)
CHOLEST SERPL-MCNC: 169 MG/DL
CO2 SERPL-SCNC: 25 MMOL/L (ref 21–32)
CREAT SERPL-MCNC: 1.09 MG/DL (ref 0.6–1.3)
EOSINOPHIL # BLD AUTO: 0.41 THOUSAND/ΜL (ref 0–0.61)
EOSINOPHIL NFR BLD AUTO: 5 % (ref 0–6)
ERYTHROCYTE [DISTWIDTH] IN BLOOD BY AUTOMATED COUNT: 13.2 % (ref 11.6–15.1)
EST. AVERAGE GLUCOSE BLD GHB EST-MCNC: 114 MG/DL
GFR SERPL CREATININE-BSD FRML MDRD: 72 ML/MIN/1.73SQ M
GLUCOSE P FAST SERPL-MCNC: 102 MG/DL (ref 65–99)
HBA1C MFR BLD: 5.6 %
HCT VFR BLD AUTO: 45.2 % (ref 36.5–49.3)
HDLC SERPL-MCNC: 34 MG/DL
HGB BLD-MCNC: 14.8 G/DL (ref 12–17)
IMM GRANULOCYTES # BLD AUTO: 0.03 THOUSAND/UL (ref 0–0.2)
IMM GRANULOCYTES NFR BLD AUTO: 0 % (ref 0–2)
LDLC SERPL CALC-MCNC: 121 MG/DL (ref 0–100)
LYMPHOCYTES # BLD AUTO: 2.02 THOUSANDS/ΜL (ref 0.6–4.47)
LYMPHOCYTES NFR BLD AUTO: 23 % (ref 14–44)
MCH RBC QN AUTO: 30.8 PG (ref 26.8–34.3)
MCHC RBC AUTO-ENTMCNC: 32.7 G/DL (ref 31.4–37.4)
MCV RBC AUTO: 94 FL (ref 82–98)
MONOCYTES # BLD AUTO: 0.6 THOUSAND/ΜL (ref 0.17–1.22)
MONOCYTES NFR BLD AUTO: 7 % (ref 4–12)
NEUTROPHILS # BLD AUTO: 5.65 THOUSANDS/ΜL (ref 1.85–7.62)
NEUTS SEG NFR BLD AUTO: 64 % (ref 43–75)
NONHDLC SERPL-MCNC: 135 MG/DL
NRBC BLD AUTO-RTO: 0 /100 WBCS
PLATELET # BLD AUTO: 242 THOUSANDS/UL (ref 149–390)
PMV BLD AUTO: 10.4 FL (ref 8.9–12.7)
POTASSIUM SERPL-SCNC: 4 MMOL/L (ref 3.5–5.3)
PROT SERPL-MCNC: 7.3 G/DL (ref 6.4–8.4)
PSA SERPL-MCNC: 3.66 NG/ML (ref 0–4)
RBC # BLD AUTO: 4.81 MILLION/UL (ref 3.88–5.62)
SODIUM SERPL-SCNC: 137 MMOL/L (ref 135–147)
TRIGL SERPL-MCNC: 72 MG/DL
TSH SERPL DL<=0.05 MIU/L-ACNC: 0.08 UIU/ML (ref 0.45–4.5)
WBC # BLD AUTO: 8.79 THOUSAND/UL (ref 4.31–10.16)

## 2024-09-17 PROCEDURE — 84443 ASSAY THYROID STIM HORMONE: CPT

## 2024-09-17 PROCEDURE — 36415 COLL VENOUS BLD VENIPUNCTURE: CPT

## 2024-09-17 PROCEDURE — 85025 COMPLETE CBC W/AUTO DIFF WBC: CPT

## 2024-09-17 PROCEDURE — 83036 HEMOGLOBIN GLYCOSYLATED A1C: CPT

## 2024-09-17 PROCEDURE — 80053 COMPREHEN METABOLIC PANEL: CPT

## 2024-09-17 PROCEDURE — 80061 LIPID PANEL: CPT

## 2024-09-17 PROCEDURE — G0103 PSA SCREENING: HCPCS

## 2024-09-18 DIAGNOSIS — C73 PAPILLARY THYROID CARCINOMA (HCC): ICD-10-CM

## 2024-09-18 DIAGNOSIS — E04.1 THYROID NODULE: ICD-10-CM

## 2024-09-18 RX ORDER — LEVOTHYROXINE SODIUM 200 UG/1
200 TABLET ORAL DAILY
Qty: 90 TABLET | Refills: 1 | Status: SHIPPED | OUTPATIENT
Start: 2024-09-18

## 2024-09-20 ENCOUNTER — RA CDI HCC (OUTPATIENT)
Dept: OTHER | Facility: HOSPITAL | Age: 59
End: 2024-09-20

## 2024-09-20 PROBLEM — I12.9 HYPERTENSIVE CKD (CHRONIC KIDNEY DISEASE): Status: ACTIVE | Noted: 2024-09-20

## 2024-09-20 NOTE — PROGRESS NOTES
HCC coding opportunities          Chart Reviewed number of suggestions sent to Provider: 1  I12.9     Patients Insurance     Medicare Insurance: [facility] Medicare Advantage

## 2024-09-27 ENCOUNTER — OFFICE VISIT (OUTPATIENT)
Dept: FAMILY MEDICINE CLINIC | Facility: CLINIC | Age: 59
End: 2024-09-27
Payer: COMMERCIAL

## 2024-09-27 VITALS
RESPIRATION RATE: 22 BRPM | WEIGHT: 315 LBS | OXYGEN SATURATION: 96 % | HEART RATE: 89 BPM | TEMPERATURE: 96.4 F | SYSTOLIC BLOOD PRESSURE: 112 MMHG | DIASTOLIC BLOOD PRESSURE: 88 MMHG | HEIGHT: 76 IN | BODY MASS INDEX: 38.36 KG/M2

## 2024-09-27 DIAGNOSIS — M96.1 LUMBAR POST-LAMINECTOMY SYNDROME: ICD-10-CM

## 2024-09-27 DIAGNOSIS — Z12.11 COLON CANCER SCREENING: ICD-10-CM

## 2024-09-27 DIAGNOSIS — E11.69 TYPE 2 DIABETES MELLITUS WITH MORBID OBESITY (HCC): ICD-10-CM

## 2024-09-27 DIAGNOSIS — E66.813 CLASS 3 SEVERE OBESITY DUE TO EXCESS CALORIES WITH SERIOUS COMORBIDITY AND BODY MASS INDEX (BMI) OF 40.0 TO 44.9 IN ADULT (HCC): ICD-10-CM

## 2024-09-27 DIAGNOSIS — G89.29 CHRONIC MIDLINE LOW BACK PAIN WITHOUT SCIATICA: ICD-10-CM

## 2024-09-27 DIAGNOSIS — M15.0 PRIMARY OSTEOARTHRITIS INVOLVING MULTIPLE JOINTS: ICD-10-CM

## 2024-09-27 DIAGNOSIS — I10 PRIMARY HYPERTENSION: Primary | ICD-10-CM

## 2024-09-27 DIAGNOSIS — N18.2 TYPE 2 DIABETES MELLITUS WITH STAGE 2 CHRONIC KIDNEY DISEASE, WITHOUT LONG-TERM CURRENT USE OF INSULIN  (HCC): ICD-10-CM

## 2024-09-27 DIAGNOSIS — E11.22 TYPE 2 DIABETES MELLITUS WITH STAGE 2 CHRONIC KIDNEY DISEASE, WITHOUT LONG-TERM CURRENT USE OF INSULIN  (HCC): ICD-10-CM

## 2024-09-27 DIAGNOSIS — K21.9 GASTROESOPHAGEAL REFLUX DISEASE WITHOUT ESOPHAGITIS: ICD-10-CM

## 2024-09-27 DIAGNOSIS — Z00.00 MEDICARE ANNUAL WELLNESS VISIT, SUBSEQUENT: ICD-10-CM

## 2024-09-27 DIAGNOSIS — E89.0 POST-OPERATIVE HYPOTHYROIDISM: ICD-10-CM

## 2024-09-27 DIAGNOSIS — G47.33 OSA (OBSTRUCTIVE SLEEP APNEA): ICD-10-CM

## 2024-09-27 DIAGNOSIS — E66.01 TYPE 2 DIABETES MELLITUS WITH MORBID OBESITY (HCC): ICD-10-CM

## 2024-09-27 DIAGNOSIS — M54.50 CHRONIC MIDLINE LOW BACK PAIN WITHOUT SCIATICA: ICD-10-CM

## 2024-09-27 DIAGNOSIS — Z12.5 ENCOUNTER FOR PROSTATE CANCER SCREENING: ICD-10-CM

## 2024-09-27 DIAGNOSIS — B35.1 TOENAIL FUNGUS: ICD-10-CM

## 2024-09-27 DIAGNOSIS — E66.01 CLASS 3 SEVERE OBESITY DUE TO EXCESS CALORIES WITH SERIOUS COMORBIDITY AND BODY MASS INDEX (BMI) OF 40.0 TO 44.9 IN ADULT (HCC): ICD-10-CM

## 2024-09-27 DIAGNOSIS — I12.9 HYPERTENSIVE CHRONIC KIDNEY DISEASE, UNSPECIFIED CKD STAGE: ICD-10-CM

## 2024-09-27 PROBLEM — F43.20 ADJUSTMENT DISORDER: Status: RESOLVED | Noted: 2024-05-15 | Resolved: 2024-09-27

## 2024-09-27 PROBLEM — R45.86 EMOTIONAL HYPERSENSITIVITY: Status: RESOLVED | Noted: 2024-04-08 | Resolved: 2024-09-27

## 2024-09-27 PROCEDURE — G0439 PPPS, SUBSEQ VISIT: HCPCS | Performed by: FAMILY MEDICINE

## 2024-09-27 PROCEDURE — 99214 OFFICE O/P EST MOD 30 MIN: CPT | Performed by: FAMILY MEDICINE

## 2024-09-27 RX ORDER — TERBINAFINE HYDROCHLORIDE 250 MG/1
250 TABLET ORAL DAILY
Qty: 30 TABLET | Refills: 2 | Status: SHIPPED | OUTPATIENT
Start: 2024-09-27 | End: 2024-12-26

## 2024-09-27 NOTE — PATIENT INSTRUCTIONS
DISCUSSED HEALTH MAINTENENCE ISSUES  BW WILL BE REVIEWED  ENCOURAGED HEALTHY DIET AND EXERCISE  COLONOSCOPY WILL BE ORDERED  RV FOR ANNUAL HEALTH EXAM IN 1 YEAR  RV SOONER IF THERE ARE ANY CONCERNS      RV 3M    Recent Results (from the past 672 hour(s))   Comprehensive metabolic panel    Collection Time: 09/17/24 11:51 AM   Result Value Ref Range    Sodium 137 135 - 147 mmol/L    Potassium 4.0 3.5 - 5.3 mmol/L    Chloride 104 96 - 108 mmol/L    CO2 25 21 - 32 mmol/L    ANION GAP 8 4 - 13 mmol/L    BUN 28 (H) 5 - 25 mg/dL    Creatinine 1.09 0.60 - 1.30 mg/dL    Glucose, Fasting 102 (H) 65 - 99 mg/dL    Calcium 9.2 8.4 - 10.2 mg/dL    AST 19 13 - 39 U/L    ALT 20 7 - 52 U/L    Alkaline Phosphatase 54 34 - 104 U/L    Total Protein 7.3 6.4 - 8.4 g/dL    Albumin 4.0 3.5 - 5.0 g/dL    Total Bilirubin 0.80 0.20 - 1.00 mg/dL    eGFR 72 ml/min/1.73sq m   CBC and differential    Collection Time: 09/17/24 11:51 AM   Result Value Ref Range    WBC 8.79 4.31 - 10.16 Thousand/uL    RBC 4.81 3.88 - 5.62 Million/uL    Hemoglobin 14.8 12.0 - 17.0 g/dL    Hematocrit 45.2 36.5 - 49.3 %    MCV 94 82 - 98 fL    MCH 30.8 26.8 - 34.3 pg    MCHC 32.7 31.4 - 37.4 g/dL    RDW 13.2 11.6 - 15.1 %    MPV 10.4 8.9 - 12.7 fL    Platelets 242 149 - 390 Thousands/uL    nRBC 0 /100 WBCs    Segmented % 64 43 - 75 %    Immature Grans % 0 0 - 2 %    Lymphocytes % 23 14 - 44 %    Monocytes % 7 4 - 12 %    Eosinophils Relative 5 0 - 6 %    Basophils Relative 1 0 - 1 %    Absolute Neutrophils 5.65 1.85 - 7.62 Thousands/µL    Absolute Immature Grans 0.03 0.00 - 0.20 Thousand/uL    Absolute Lymphocytes 2.02 0.60 - 4.47 Thousands/µL    Absolute Monocytes 0.60 0.17 - 1.22 Thousand/µL    Eosinophils Absolute 0.41 0.00 - 0.61 Thousand/µL    Basophils Absolute 0.08 0.00 - 0.10 Thousands/µL   Lipid panel    Collection Time: 09/17/24 11:51 AM   Result Value Ref Range    Cholesterol 169 See Comment mg/dL    Triglycerides 72 See Comment mg/dL    HDL, Direct 34 (L)  >=40 mg/dL    LDL Calculated 121 (H) 0 - 100 mg/dL    Non-HDL-Chol (CHOL-HDL) 135 mg/dl   TSH, 3rd generation    Collection Time: 09/17/24 11:51 AM   Result Value Ref Range    TSH 3RD GENERATON 0.079 (L) 0.450 - 4.500 uIU/mL   PSA, Total Screen    Collection Time: 09/17/24 11:51 AM   Result Value Ref Range    PSA 3.660 0.000 - 4.000 ng/mL   Hemoglobin A1C    Collection Time: 09/17/24 11:51 AM   Result Value Ref Range    Hemoglobin A1C 5.6 Normal 4.0-5.6%; PreDiabetic 5.7-6.4%; Diabetic >=6.5%; Glycemic control for adults with diabetes <7.0% %     mg/dl       Medicare Preventive Visit Patient Instructions  Thank you for completing your Welcome to Medicare Visit or Medicare Annual Wellness Visit today. Your next wellness visit will be due in one year (9/28/2025).  The screening/preventive services that you may require over the next 5-10 years are detailed below. Some tests may not apply to you based off risk factors and/or age. Screening tests ordered at today's visit but not completed yet may show as past due. Also, please note that scanned in results may not display below.  Preventive Screenings:  Service Recommendations Previous Testing/Comments   Colorectal Cancer Screening  Colonoscopy    Fecal Occult Blood Test (FOBT)/Fecal Immunochemical Test (FIT)  Fecal DNA/Cologuard Test  Flexible Sigmoidoscopy Age: 45-75 years old   Colonoscopy: every 10 years (May be performed more frequently if at higher risk)  OR  FOBT/FIT: every 1 year  OR  Cologuard: every 3 years  OR  Sigmoidoscopy: every 5 years  Screening may be recommended earlier than age 45 if at higher risk for colorectal cancer. Also, an individualized decision between you and your healthcare provider will decide whether screening between the ages of 76-85 would be appropriate. Colonoscopy: 08/05/2019  FOBT/FIT: Not on file  Cologuard: Not on file  Sigmoidoscopy: Not on file    Screening Current     Prostate Cancer Screening Individualized decision  between patient and health care provider in men between ages of 55-69   Medicare will cover every 12 months beginning on the day after your 50th birthday PSA: 3.660 ng/mL     Screening Current     Hepatitis C Screening Once for adults born between 1945 and 1965  More frequently in patients at high risk for Hepatitis C Hep C Antibody: 12/22/2020    Screening Current   Diabetes Screening 1-2 times per year if you're at risk for diabetes or have pre-diabetes Fasting glucose: 102 mg/dL (9/17/2024)  A1C: 5.6 % (9/17/2024)  Screening Not Indicated  History Diabetes   Cholesterol Screening Once every 5 years if you don't have a lipid disorder. May order more often based on risk factors. Lipid panel: 09/17/2024  Screening Current      Other Preventive Screenings Covered by Medicare:  Abdominal Aortic Aneurysm (AAA) Screening: covered once if your at risk. You're considered to be at risk if you have a family history of AAA or a male between the age of 65-75 who smoking at least 100 cigarettes in your lifetime.  Lung Cancer Screening: covers low dose CT scan once per year if you meet all of the following conditions: (1) Age 55-77; (2) No signs or symptoms of lung cancer; (3) Current smoker or have quit smoking within the last 15 years; (4) You have a tobacco smoking history of at least 20 pack years (packs per day x number of years you smoked); (5) You get a written order from a healthcare provider.  Glaucoma Screening: covered annually if you're considered high risk: (1) You have diabetes OR (2) Family history of glaucoma OR (3)  aged 50 and older OR (4)  American aged 65 and older  Osteoporosis Screening: covered every 2 years if you meet one of the following conditions: (1) Have a vertebral abnormality; (2) On glucocorticoid therapy for more than 3 months; (3) Have primary hyperparathyroidism; (4) On osteoporosis medications and need to assess response to drug therapy.  HIV Screening: covered  annually if you're between the age of 15-65. Also covered annually if you are younger than 15 and older than 65 with risk factors for HIV infection. For pregnant patients, it is covered up to 3 times per pregnancy.    Immunizations:  Immunization Recommendations   Influenza Vaccine Annual influenza vaccination during flu season is recommended for all persons aged >= 6 months who do not have contraindications   Pneumococcal Vaccine   * Pneumococcal conjugate vaccine = PCV13 (Prevnar 13), PCV15 (Vaxneuvance), PCV20 (Prevnar 20)  * Pneumococcal polysaccharide vaccine = PPSV23 (Pneumovax) Adults 19-65 yo with certain risk factors or if 65+ yo  If never received any pneumonia vaccine: recommend Prevnar 20 (PCV20)  Give PCV20 if previously received 1 dose of PCV13 or PPSV23   Hepatitis B Vaccine 3 dose series if at intermediate or high risk (ex: diabetes, end stage renal disease, liver disease)   Respiratory syncytial virus (RSV) Vaccine - COVERED BY MEDICARE PART D  * RSVPreF3 (Arexvy) CDC recommends that adults 60 years of age and older may receive a single dose of RSV vaccine using shared clinical decision-making (SCDM)   Tetanus (Td) Vaccine - COST NOT COVERED BY MEDICARE PART B Following completion of primary series, a booster dose should be given every 10 years to maintain immunity against tetanus. Td may also be given as tetanus wound prophylaxis.   Tdap Vaccine - COST NOT COVERED BY MEDICARE PART B Recommended at least once for all adults. For pregnant patients, recommended with each pregnancy.   Shingles Vaccine (Shingrix) - COST NOT COVERED BY MEDICARE PART B  2 shot series recommended in those 19 years and older who have or will have weakened immune systems or those 50 years and older     Health Maintenance Due:      Topic Date Due   • Colorectal Cancer Screening  08/05/2029   • HIV Screening  Completed   • Hepatitis C Screening  Completed     Immunizations Due:      Topic Date Due   • Pneumococcal Vaccine:  Pediatrics (0 to 5 Years) and At-Risk Patients (6 to 64 Years) (1 of 2 - PCV) Never done   • COVID-19 Vaccine (1 - 2023-24 season) Never done   • Influenza Vaccine (1) 09/01/2024     Advance Directives   What are advance directives?  Advance directives are legal documents that state your wishes and plans for medical care. These plans are made ahead of time in case you lose your ability to make decisions for yourself. Advance directives can apply to any medical decision, such as the treatments you want, and if you want to donate organs.   What are the types of advance directives?  There are many types of advance directives, and each state has rules about how to use them. You may choose a combination of any of the following:  Living will:  This is a written record of the treatment you want. You can also choose which treatments you do not want, which to limit, and which to stop at a certain time. This includes surgery, medicine, IV fluid, and tube feedings.   Durable power of  for healthcare (DPAHC):  This is a written record that states who you want to make healthcare choices for you when you are unable to make them for yourself. This person, called a proxy, is usually a family member or a friend. You may choose more than 1 proxy.  Do not resuscitate (DNR) order:  A DNR order is used in case your heart stops beating or you stop breathing. It is a request not to have certain forms of treatment, such as CPR. A DNR order may be included in other types of advance directives.  Medical directive:  This covers the care that you want if you are in a coma, near death, or unable to make decisions for yourself. You can list the treatments you want for each condition. Treatment may include pain medicine, surgery, blood transfusions, dialysis, IV or tube feedings, and a ventilator (breathing machine).  Values history:  This document has questions about your views, beliefs, and how you feel and think about life. This  information can help others choose the care that you would choose.  Why are advance directives important?  An advance directive helps you control your care. Although spoken wishes may be used, it is better to have your wishes written down. Spoken wishes can be misunderstood, or not followed. Treatments may be given even if you do not want them. An advance directive may make it easier for your family to make difficult choices about your care.   Weight Management   Why it is important to manage your weight:  Being overweight increases your risk of health conditions such as heart disease, high blood pressure, type 2 diabetes, and certain types of cancer. It can also increase your risk for osteoarthritis, sleep apnea, and other respiratory problems. Aim for a slow, steady weight loss. Even a small amount of weight loss can lower your risk of health problems.  How to lose weight safely:  A safe and healthy way to lose weight is to eat fewer calories and get regular exercise. You can lose up about 1 pound a week by decreasing the number of calories you eat by 500 calories each day.   Healthy meal plan for weight management:  A healthy meal plan includes a variety of foods, contains fewer calories, and helps you stay healthy. A healthy meal plan includes the following:  Eat whole-grain foods more often.  A healthy meal plan should contain fiber. Fiber is the part of grains, fruits, and vegetables that is not broken down by your body. Whole-grain foods are healthy and provide extra fiber in your diet. Some examples of whole-grain foods are whole-wheat breads and pastas, oatmeal, brown rice, and bulgur.  Eat a variety of vegetables every day.  Include dark, leafy greens such as spinach, kale, romeo greens, and mustard greens. Eat yellow and orange vegetables such as carrots, sweet potatoes, and winter squash.   Eat a variety of fruits every day.  Choose fresh or canned fruit (canned in its own juice or light syrup) instead  of juice. Fruit juice has very little or no fiber.  Eat low-fat dairy foods.  Drink fat-free (skim) milk or 1% milk. Eat fat-free yogurt and low-fat cottage cheese. Try low-fat cheeses such as mozzarella and other reduced-fat cheeses.  Choose meat and other protein foods that are low in fat.  Choose beans or other legumes such as split peas or lentils. Choose fish, skinless poultry (chicken or turkey), or lean cuts of red meat (beef or pork). Before you cook meat or poultry, cut off any visible fat.   Use less fat and oil.  Try baking foods instead of frying them. Add less fat, such as margarine, sour cream, regular salad dressing and mayonnaise to foods. Eat fewer high-fat foods. Some examples of high-fat foods include french fries, doughnuts, ice cream, and cakes.  Eat fewer sweets.  Limit foods and drinks that are high in sugar. This includes candy, cookies, regular soda, and sweetened drinks.  Exercise:  Exercise at least 30 minutes per day on most days of the week. Some examples of exercise include walking, biking, dancing, and swimming. You can also fit in more physical activity by taking the stairs instead of the elevator or parking farther away from stores. Ask your healthcare provider about the best exercise plan for you.      © Copyright [employer] 2018 Information is for End User's use only and may not be sold, redistributed or otherwise used for commercial purposes. All illustrations and images included in CareNotes® are the copyrighted property of [employer] or [employer]

## 2024-09-27 NOTE — ASSESSMENT & PLAN NOTE
Lab Results   Component Value Date    HGBA1C 5.6 09/17/2024       COMPLIANT WITH MEDICATION  DENIES ANY NUMBNESS, TINGLING IN FEET    - DISCUSSED DIETARY MODIFICATION OF CARBOHYDRATES  - HGB A1C AND URINE STUDIES DUE TODAY  - FOOT CARE  - RV 3 MONTHS

## 2024-09-27 NOTE — LETTER
KERRY BALLESTEROS      Current Outpatient Medications:     allopurinol (ZYLOPRIM) 300 mg tablet, TAKE ONE TABLET BY MOUTH EVERY DAY (GENERIC FOR ZYLOPRIM), Disp: 90 tablet, Rfl: 1    amLODIPine (NORVASC) 5 mg tablet, TAKE 1 TABLET EVERY DAY, Disp: 90 tablet, Rfl: 1    Ergocalciferol (VITAMIN D2 PO), Take by mouth, Disp: , Rfl:     escitalopram (Lexapro) 10 mg tablet, Take 1 tablet (10 mg total) by mouth daily, Disp: 90 tablet, Rfl: 1    levothyroxine 200 mcg tablet, TAKE 1 TABLET EVERY DAY, Disp: 90 tablet, Rfl: 1    lisinopril-hydrochlorothiazide (PRINZIDE,ZESTORETIC) 20-25 MG per tablet, TAKE 1 TABLET EVERY DAY, Disp: 90 tablet, Rfl: 1    omeprazole (PriLOSEC) 20 mg delayed release capsule, TAKE 1 CAPSULE EVERY DAY, Disp: 100 capsule, Rfl: 1    potassium citrate (UROCIT-K) 10 mEq, Take 10 mEq by mouth 3 (three) times a day with meals, Disp: , Rfl:     Recent Results (from the past 672 hour(s))   Comprehensive metabolic panel    Collection Time: 09/17/24 11:51 AM   Result Value Ref Range    Sodium 137 135 - 147 mmol/L    Potassium 4.0 3.5 - 5.3 mmol/L    Chloride 104 96 - 108 mmol/L    CO2 25 21 - 32 mmol/L    ANION GAP 8 4 - 13 mmol/L    BUN 28 (H) 5 - 25 mg/dL    Creatinine 1.09 0.60 - 1.30 mg/dL    Glucose, Fasting 102 (H) 65 - 99 mg/dL    Calcium 9.2 8.4 - 10.2 mg/dL    AST 19 13 - 39 U/L    ALT 20 7 - 52 U/L    Alkaline Phosphatase 54 34 - 104 U/L    Total Protein 7.3 6.4 - 8.4 g/dL    Albumin 4.0 3.5 - 5.0 g/dL    Total Bilirubin 0.80 0.20 - 1.00 mg/dL    eGFR 72 ml/min/1.73sq m   CBC and differential    Collection Time: 09/17/24 11:51 AM   Result Value Ref Range    WBC 8.79 4.31 - 10.16 Thousand/uL    RBC 4.81 3.88 - 5.62 Million/uL    Hemoglobin 14.8 12.0 - 17.0 g/dL    Hematocrit 45.2 36.5 - 49.3 %    MCV 94 82 - 98 fL    MCH 30.8 26.8 - 34.3 pg    MCHC 32.7 31.4 - 37.4 g/dL    RDW 13.2 11.6 - 15.1 %    MPV 10.4 8.9 - 12.7 fL    Platelets 242 149 - 390 Thousands/uL    nRBC 0 /100 WBCs    Segmented % 64 43  - 75 %    Immature Grans % 0 0 - 2 %    Lymphocytes % 23 14 - 44 %    Monocytes % 7 4 - 12 %    Eosinophils Relative 5 0 - 6 %    Basophils Relative 1 0 - 1 %    Absolute Neutrophils 5.65 1.85 - 7.62 Thousands/µL    Absolute Immature Grans 0.03 0.00 - 0.20 Thousand/uL    Absolute Lymphocytes 2.02 0.60 - 4.47 Thousands/µL    Absolute Monocytes 0.60 0.17 - 1.22 Thousand/µL    Eosinophils Absolute 0.41 0.00 - 0.61 Thousand/µL    Basophils Absolute 0.08 0.00 - 0.10 Thousands/µL   Lipid panel    Collection Time: 09/17/24 11:51 AM   Result Value Ref Range    Cholesterol 169 See Comment mg/dL    Triglycerides 72 See Comment mg/dL    HDL, Direct 34 (L) >=40 mg/dL    LDL Calculated 121 (H) 0 - 100 mg/dL    Non-HDL-Chol (CHOL-HDL) 135 mg/dl   TSH, 3rd generation    Collection Time: 09/17/24 11:51 AM   Result Value Ref Range    TSH 3RD GENERATON 0.079 (L) 0.450 - 4.500 uIU/mL   PSA, Total Screen    Collection Time: 09/17/24 11:51 AM   Result Value Ref Range    PSA 3.660 0.000 - 4.000 ng/mL   Hemoglobin A1C    Collection Time: 09/17/24 11:51 AM   Result Value Ref Range    Hemoglobin A1C 5.6 Normal 4.0-5.6%; PreDiabetic 5.7-6.4%; Diabetic >=6.5%; Glycemic control for adults with diabetes <7.0% %     mg/dl

## 2024-09-27 NOTE — PROGRESS NOTES
Ambulatory Visit  Name: Joseluis Blake      : 1965      MRN: [id number]  Encounter Provider: Juni Vinson MD  Encounter Date: 2024   Encounter department: [facility]    Assessment & Plan  Primary hypertension  STABLE  DENIES ANY CP, SOB, PALPITATIONS, OR HEADACHE  NOTES NO WATER RETENTION  COMPLIANT WITH MEDICATION  NO CONCERNS    - CONTINUE CURRENT TREATMENT PLAN  - MONITOR DIETARY SODIUM INTAKE  - ENCOURAGE PHYSICAL ACTIVITY  - RV 3 MONTHS           Type 2 diabetes mellitus with morbid obesity (HCC)    Lab Results   Component Value Date    HGBA1C 5.6 2024       COMPLIANT WITH MEDICATION  DENIES ANY NUMBNESS, TINGLING IN FEET    - DISCUSSED DIETARY MODIFICATION OF CARBOHYDRATES  - HGB A1C AND URINE STUDIES DUE TODAY  - FOOT CARE  - RV 3 MONTHS           Gastroesophageal reflux disease without esophagitis  STABLE  DENIES ANY CP, INDIGESTION, OR COUGH  NOTES NO MELENA OR HEMATOCHEZIA  NO HEMATEMESIS  COMPLIANT WITH MEDICATION  NO CONCERNS    - CONTINUE CURRENT TREATMENT PLAN  - MEDICATION AS PRESCRIBED  - AVOID CAFFEINE, ALCOHOL OR SMOKING           Post-operative hypothyroidism  STABLE           Primary osteoarthritis involving multiple joints  STABLE  DENIES ANY JOINT SWELLING OR REDNESS  JOINT STIFFNESS PRESENT  PAIN MANAGEMENT ADEQUATE    - CONTINUE CURRENT MANAGEMENT  - MEDICATION AS DIRECTED  - CALL / RETURN IF SYMPTOMS WORSEN           Hypertensive chronic kidney disease, unspecified CKD stage  Lab Results   Component Value Date    EGFR 72 2024    EGFR 74 2023    EGFR 75 2023    CREATININE 1.09 2024    CREATININE 1.08 2023    CREATININE 1.06 2023            Type 2 diabetes mellitus with stage 2 chronic kidney disease, without long-term current use of insulin  (HCC)    Lab Results   Component Value Date    HGBA1C 5.6 2024            Chronic midline low back pain without sciatica         Lumbar post-laminectomy  syndrome         DOMINIC (obstructive sleep apnea)         Class 3 severe obesity due to excess calories with serious comorbidity and body mass index (BMI) of 40.0 to 44.9 in adult ([facility])         Encounter for prostate cancer screening         Colon cancer screening         Medicare annual wellness visit, subsequent         Toenail fungus    Orders:    terbinafine (LamISIL) 250 mg tablet; Take 1 tablet (250 mg total) by mouth daily      Depression Screening and Follow-up Plan: Patient was screened for depression during today's encounter. They screened negative with a PHQ-2 score of 1.      Preventive health issues were discussed with patient, and age appropriate screening tests were ordered as noted in patient's After Visit Summary. Personalized health advice and appropriate referrals for health education or preventive services given if needed, as noted in patient's After Visit Summary.    History of Present Illness     PATIENT RETURNS FOR ANNUAL WELLNESS EXAM AND ANNUAL EVALUATION OF PATIENT'S MEDICAL ISSUES    INDIVIDUAL MEDICAL ISSUES WITH THEIR CURRENT STATUS, ASSESSMENT AND PLANS ARE LISTED ABOVE             Patient Care Team:  Juni Vinson MD as PCP - General (Family Medicine)  Ervin Iverson MD (Otolaryngology)  Richard Staton MD (Ophthalmology)  SOLE Collins (Nurse Practitioner)    Review of Systems   Constitutional:  Negative for chills, fatigue and fever.   HENT:  Negative for congestion, ear discharge, ear pain, mouth sores, postnasal drip, sore throat and trouble swallowing.    Eyes:  Negative for pain, discharge and visual disturbance.   Respiratory:  Negative for cough, shortness of breath and wheezing.    Cardiovascular:  Negative for chest pain, palpitations and leg swelling.   Gastrointestinal:  Negative for abdominal distention, abdominal pain, blood in stool, diarrhea and nausea.   Endocrine: Negative for polydipsia, polyphagia and polyuria.   Genitourinary:  Negative for dysuria,  frequency, hematuria and urgency.   Musculoskeletal:  Negative for arthralgias, gait problem and joint swelling.   Skin:  Negative for pallor and rash.   Neurological:  Negative for dizziness, syncope, speech difficulty, weakness, light-headedness, numbness and headaches.   Hematological:  Negative for adenopathy.   Psychiatric/Behavioral:  Negative for behavioral problems, confusion and sleep disturbance. The patient is not nervous/anxious.      Medical History Reviewed by provider this encounter:  Tobacco  Allergies  Meds  Problems  Med Hx  Surg Hx  Fam Hx       Annual Wellness Visit Questionnaire   Last Medicare Wellness visit information reviewed, patient interviewed and updates made to the record today.      Health Risk Assessment:   Patient rates overall health as fair. Patient feels that their physical health rating is same. Patient is satisfied with their life. Eyesight was rated as same. Hearing was rated as same. Patient feels that their emotional and mental health rating is slightly worse. Patients states they are never, rarely angry. Patient states they are never, rarely unusually tired/fatigued. Pain experienced in the last 7 days has been none. Patient states that he has experienced weight loss or gain in last 6 months.     Depression Screening:   PHQ-2 Score: 1      Fall Risk Screening:   In the past year, patient has experienced: no history of falling in past year      Home Safety:  Patient does not have trouble with stairs inside or outside of their home. Patient has working smoke alarms and has working carbon monoxide detector. Home safety hazards include: none.     Nutrition:   Current diet is Low Carb.     Medications:   Patient is not currently taking any over-the-counter supplements. Patient is able to manage medications.     Activities of Daily Living (ADLs)/Instrumental Activities of Daily Living (IADLs):   Walk and transfer into and out of bed and chair?: Yes  Dress and groom  yourself?: Yes    Bathe or shower yourself?: Yes    Feed yourself? Yes  Do your laundry/housekeeping?: Yes  Manage your money, pay your bills and track your expenses?: Yes  Make your own meals?: Yes    Do your own shopping?: Yes    Previous Hospitalizations:   Any hospitalizations or ED visits within the last 12 months?: No      Advance Care Planning:   Living will: Yes    Durable POA for healthcare: No    Advanced directive: Yes    Provider agrees with end of life decisions: Yes      Cognitive Screening:   Provider or family/friend/caregiver concerned regarding cognition?: No    PREVENTIVE SCREENINGS      Cardiovascular Screening:    General: Screening Current      Diabetes Screening:     General: Screening Not Indicated and History Diabetes      Colorectal Cancer Screening:     General: Screening Current      Prostate Cancer Screening:    General: Screening Current      Osteoporosis Screening:    General: Screening Not Indicated      Abdominal Aortic Aneurysm (AAA) Screening:        General: Screening Not Indicated      Lung Cancer Screening:     General: Screening Not Indicated      Hepatitis C Screening:    General: Screening Current    Screening, Brief Intervention, and Referral to Treatment (SBIRT)    Screening  Typical number of drinks in a day: 0  Typical number of drinks in a week: 0  Interpretation: Low risk drinking behavior.    AUDIT-C Screenin) How often did you have a drink containing alcohol in the past year? monthly or less  2) How many drinks did you have on a typical day when you were drinking in the past year? 1 to 2  3) How often did you have 6 or more drinks on one occasion in the past year? never    AUDIT-C Score: 1  Interpretation: Score 0-3 (male): Negative screen for alcohol misuse    Single Item Drug Screening:  How often have you used an illegal drug (including marijuana) or a prescription medication for non-medical reasons in the past year? never    Single Item Drug Screen Score:  "0  Interpretation: Negative screen for possible drug use disorder    Social Determinants of Health     Financial Resource Strain: Low Risk  (9/26/2023)    Overall Financial Resource Strain (CARDIA)     Difficulty of Paying Living Expenses: Not hard at all   Food Insecurity: No Food Insecurity (9/20/2024)    Hunger Vital Sign     Worried About Running Out of Food in the Last Year: Never true     Ran Out of Food in the Last Year: Never true   Transportation Needs: No Transportation Needs (9/20/2024)    PRAPARE - Transportation     Lack of Transportation (Medical): No     Lack of Transportation (Non-Medical): No   Housing Stability: Low Risk  (9/20/2024)    Housing Stability Vital Sign     Unable to Pay for Housing in the Last Year: No     Number of Times Moved in the Last Year: 0     Homeless in the Last Year: No   Utilities: Not At Risk (9/20/2024)    [facility] Utilities     Threatened with loss of utilities: No     No results found.    Objective     /88 (BP Location: Left arm, Patient Position: Sitting, Cuff Size: Large)   Pulse 89   Temp (!) 96.4 °F (35.8 °C) (Temporal)   Resp 22   Ht 6' 4\" (1.93 m)   Wt (!) 165 kg (363 lb)   SpO2 96%   BMI 44.19 kg/m²     Physical Exam  Constitutional:       General: He is not in acute distress.     Appearance: Normal appearance. He is well-developed. He is obese. He is not ill-appearing or diaphoretic.   HENT:      Head: Normocephalic and atraumatic.      Right Ear: Tympanic membrane and external ear normal.      Left Ear: Tympanic membrane and external ear normal.      Nose: Nose normal.      Mouth/Throat:      Mouth: Mucous membranes are moist.      Pharynx: No oropharyngeal exudate.   Eyes:      General: No scleral icterus.        Right eye: No discharge.         Left eye: No discharge.      Conjunctiva/sclera: Conjunctivae normal.      Pupils: Pupils are equal, round, and reactive to light.   Neck:      Thyroid: No thyromegaly.      Vascular: No JVD.      Trachea: No " tracheal deviation.   Cardiovascular:      Rate and Rhythm: Normal rate and regular rhythm.      Pulses: no weak pulses.           Dorsalis pedis pulses are 1+ on the right side and 1+ on the left side.        Posterior tibial pulses are 1+ on the right side and 1+ on the left side.      Heart sounds: Normal heart sounds. No murmur heard.     No friction rub. No gallop.   Pulmonary:      Effort: Pulmonary effort is normal. No respiratory distress.      Breath sounds: Normal breath sounds. No stridor. No wheezing or rales.   Chest:      Chest wall: No tenderness.   Abdominal:      General: Bowel sounds are normal. There is no distension.      Palpations: Abdomen is soft. There is no mass.      Tenderness: There is no abdominal tenderness. There is no guarding or rebound.      Hernia: No hernia is present.   Genitourinary:     Penis: Normal. No tenderness.       Testes: Normal.      Prostate: Normal.      Rectum: Normal. Guaiac result negative.   Musculoskeletal:         General: No tenderness or deformity. Normal range of motion.      Cervical back: Normal range of motion and neck supple.   Feet:      Right foot:      Skin integrity: No ulcer, skin breakdown, erythema, warmth, callus or dry skin.      Left foot:      Skin integrity: No ulcer, skin breakdown, erythema, warmth, callus or dry skin.   Lymphadenopathy:      Cervical: No cervical adenopathy.   Skin:     General: Skin is warm and dry.      Coloration: Skin is not pale.      Findings: No erythema or rash.   Neurological:      General: No focal deficit present.      Mental Status: He is alert and oriented to person, place, and time.      Cranial Nerves: No cranial nerve deficit.      Sensory: No sensory deficit.      Motor: No weakness or abnormal muscle tone.      Coordination: Coordination normal.      Gait: Gait normal.      Deep Tendon Reflexes: Reflexes normal.   Psychiatric:         Mood and Affect: Mood normal.         Behavior: Behavior normal.          Thought Content: Thought content normal.         Judgment: Judgment normal.     Diabetic Foot Exam    Patient's shoes and socks removed.    Right Foot/Ankle   Right Foot Inspection  Skin Exam: skin normal and skin intact. No dry skin, no warmth, no callus, no erythema, no maceration, no abnormal color, no pre-ulcer, no ulcer and no callus.     Toe Exam: ROM and strength within normal limits.     Sensory   Monofilament testing: diminished    Vascular  The right DP pulse is 1+. The right PT pulse is 1+.     Left Foot/Ankle  Left Foot Inspection  Skin Exam: skin normal and skin intact. No dry skin, no warmth, no erythema, no maceration, normal color, no pre-ulcer, no ulcer and no callus.     Toe Exam: ROM and strength within normal limits.     Sensory   Monofilament testing: diminished    Vascular  The left DP pulse is 1+. The left PT pulse is 1+.     Assign Risk Category  No deformity present  Loss of protective sensation  No weak pulses  Risk: 1

## 2024-09-27 NOTE — ASSESSMENT & PLAN NOTE
Lab Results   Component Value Date    EGFR 72 09/17/2024    EGFR 74 12/05/2023    EGFR 75 07/28/2023    CREATININE 1.09 09/17/2024    CREATININE 1.08 12/05/2023    CREATININE 1.06 07/28/2023

## 2024-10-01 PROCEDURE — 93000 ELECTROCARDIOGRAM COMPLETE: CPT | Performed by: FAMILY MEDICINE

## 2024-10-05 DIAGNOSIS — M10.9 GOUT, ARTHROPATHY: ICD-10-CM

## 2024-10-05 RX ORDER — ALLOPURINOL 300 MG/1
300 TABLET ORAL DAILY
Qty: 90 TABLET | Refills: 1 | Status: SHIPPED | OUTPATIENT
Start: 2024-10-05

## 2024-10-16 ENCOUNTER — TELEPHONE (OUTPATIENT)
Age: 59
End: 2024-10-16

## 2024-10-27 PROBLEM — Z00.00 MEDICARE ANNUAL WELLNESS VISIT, SUBSEQUENT: Status: RESOLVED | Noted: 2024-09-27 | Resolved: 2024-10-27

## 2024-11-11 ENCOUNTER — APPOINTMENT (OUTPATIENT)
Dept: LAB | Facility: HOSPITAL | Age: 59
End: 2024-11-11
Attending: SPECIALIST

## 2024-11-11 DIAGNOSIS — N20.0 RENAL CALCULUS: ICD-10-CM

## 2024-11-12 ENCOUNTER — APPOINTMENT (OUTPATIENT)
Dept: LAB | Facility: HOSPITAL | Age: 59
End: 2024-11-12
Attending: SPECIALIST
Payer: COMMERCIAL

## 2024-11-12 DIAGNOSIS — N20.0 RENAL CALCULUS: ICD-10-CM

## 2024-11-12 LAB
CALCIUM 24H UR-MCNC: 145.6 MG/24 HRS (ref 100–300)
PERIOD: 24 HOURS
PH UR STRIP.AUTO: 5.5 [PH]
SODIUM 24H UR-SRATE: 416 MMOL/24 HRS (ref 40–220)
SPECIMEN VOL UR: 2600 ML
URATE 24H UR-MCNC: 980.2 MG/24 HRS (ref 250–800)

## 2024-11-12 PROCEDURE — 82340 ASSAY OF CALCIUM IN URINE: CPT

## 2024-11-12 PROCEDURE — 84560 ASSAY OF URINE/URIC ACID: CPT

## 2024-11-12 PROCEDURE — 83945 ASSAY OF OXALATE: CPT

## 2024-11-12 PROCEDURE — 84300 ASSAY OF URINE SODIUM: CPT

## 2024-11-12 PROCEDURE — 83986 ASSAY PH BODY FLUID NOS: CPT

## 2024-11-12 PROCEDURE — 82507 ASSAY OF CITRATE: CPT

## 2024-11-15 LAB
CITRATE 24H UR-MCNC: 246 MG/L
CITRATE 24H UR-MRATE: 640 MG/24 HR (ref 320–1240)
OXALATE 24H UR-MRATE: 47 MG/24 HR (ref 7–44)
OXALATE UR-MCNC: 18 MG/L

## 2024-11-18 ENCOUNTER — VBI (OUTPATIENT)
Dept: ADMINISTRATIVE | Facility: OTHER | Age: 59
End: 2024-11-18

## 2024-11-18 ENCOUNTER — RESULTS FOLLOW-UP (OUTPATIENT)
Dept: ENDOCRINOLOGY | Facility: CLINIC | Age: 59
End: 2024-11-18

## 2024-11-18 ENCOUNTER — APPOINTMENT (OUTPATIENT)
Dept: LAB | Facility: HOSPITAL | Age: 59
End: 2024-11-18
Payer: COMMERCIAL

## 2024-11-18 DIAGNOSIS — E11.69 TYPE 2 DIABETES MELLITUS WITH MORBID OBESITY (HCC): ICD-10-CM

## 2024-11-18 DIAGNOSIS — E89.0 POST-OPERATIVE HYPOTHYROIDISM: ICD-10-CM

## 2024-11-18 DIAGNOSIS — E66.01 TYPE 2 DIABETES MELLITUS WITH MORBID OBESITY (HCC): ICD-10-CM

## 2024-11-18 LAB
CREAT UR-MCNC: 147.5 MG/DL
MICROALBUMIN UR-MCNC: 8.1 MG/L
MICROALBUMIN/CREAT 24H UR: 5 MG/G CREATININE (ref 0–30)
T4 FREE SERPL-MCNC: 1.44 NG/DL (ref 0.61–1.12)
TSH SERPL DL<=0.05 MIU/L-ACNC: 0.11 UIU/ML (ref 0.45–4.5)

## 2024-11-18 PROCEDURE — 84443 ASSAY THYROID STIM HORMONE: CPT

## 2024-11-18 PROCEDURE — 82043 UR ALBUMIN QUANTITATIVE: CPT

## 2024-11-18 PROCEDURE — 84439 ASSAY OF FREE THYROXINE: CPT

## 2024-11-18 PROCEDURE — 82570 ASSAY OF URINE CREATININE: CPT

## 2024-11-18 PROCEDURE — 36415 COLL VENOUS BLD VENIPUNCTURE: CPT

## 2024-11-18 NOTE — TELEPHONE ENCOUNTER
11/18/24 12:30 PM     Chart reviewed for Hemoglobin A1c was/were submitted to the patient's insurance.     Vidal Mcdaniel MA   [facility]11/18/24 12:30 PM

## 2024-11-20 ENCOUNTER — OFFICE VISIT (OUTPATIENT)
Dept: ENDOCRINOLOGY | Facility: CLINIC | Age: 59
End: 2024-11-20
Payer: COMMERCIAL

## 2024-11-20 VITALS
OXYGEN SATURATION: 96 % | DIASTOLIC BLOOD PRESSURE: 80 MMHG | HEIGHT: 76 IN | BODY MASS INDEX: 38.36 KG/M2 | HEART RATE: 76 BPM | WEIGHT: 315 LBS | SYSTOLIC BLOOD PRESSURE: 120 MMHG

## 2024-11-20 DIAGNOSIS — C73 PAPILLARY THYROID CARCINOMA (HCC): Primary | ICD-10-CM

## 2024-11-20 DIAGNOSIS — I10 PRIMARY HYPERTENSION: ICD-10-CM

## 2024-11-20 DIAGNOSIS — E89.0 POST-OPERATIVE HYPOTHYROIDISM: ICD-10-CM

## 2024-11-20 DIAGNOSIS — N18.2 TYPE 2 DIABETES MELLITUS WITH STAGE 2 CHRONIC KIDNEY DISEASE, WITHOUT LONG-TERM CURRENT USE OF INSULIN  (HCC): ICD-10-CM

## 2024-11-20 DIAGNOSIS — E11.22 TYPE 2 DIABETES MELLITUS WITH STAGE 2 CHRONIC KIDNEY DISEASE, WITHOUT LONG-TERM CURRENT USE OF INSULIN  (HCC): ICD-10-CM

## 2024-11-20 PROCEDURE — 99203 OFFICE O/P NEW LOW 30 MIN: CPT | Performed by: NURSE PRACTITIONER

## 2024-11-20 RX ORDER — ESCITALOPRAM OXALATE 10 MG/1
10 TABLET ORAL DAILY
COMMUNITY
Start: 2024-11-08

## 2024-11-20 NOTE — ASSESSMENT & PLAN NOTE
Thyroglobulin levels appropriately low from August 2024.  Repeat annual ultrasound head, neck, lymph node mapping in January/February 2025.   Denies neck compressive symptoms.   Orders:    US head neck lymph node mapping; Future

## 2024-11-20 NOTE — ASSESSMENT & PLAN NOTE
Lab Results   Component Value Date    HGBA1C 5.6 09/17/2024     Managed with lifestyle modifications.  Discussed ASCVD risk and LDL. Will repeat lipid panel and hemoglobin A1c.   Urine microalbumin/creatinine ratio complted and reviewed from November 2024 and within goal range <30.     Orders:    Hemoglobin A1C; Future    Lipid panel; Future

## 2024-11-20 NOTE — PROGRESS NOTES
Name: Joseluis Blake      : 1965      MRN: [id number]  Encounter Provider: SOLE Ward  Encounter Date: 2024   Encounter department: [facility]  :  Assessment & Plan  Papillary thyroid carcinoma (HCC)  Thyroglobulin levels appropriately low from 2024.  Repeat annual ultrasound head, neck, lymph node mapping in 2025.   Denies neck compressive symptoms.   Orders:    US head neck lymph node mapping; Future    Type 2 diabetes mellitus with stage 2 chronic kidney disease, without long-term current use of insulin  (HCC)    Lab Results   Component Value Date    HGBA1C 5.6 2024     Managed with lifestyle modifications.  Discussed ASCVD risk and LDL. Will repeat lipid panel and hemoglobin A1c.   Urine microalbumin/creatinine ratio complted and reviewed from 2024 and within goal range <30.     Orders:    Hemoglobin A1C; Future    Lipid panel; Future    Post-operative hypothyroidism  Levothyroxine 200 mcg daily, clinically euthyroid.  TSH goal <0.1, free T4 reasonable   Repeat TSH and free T4 in 3 months  Orders:    TSH, 3rd generation; Future    T4, free; Future    Primary hypertension  BP under good control.   No ACE or ARB.              History of Present Illness     HPI  Joseluis Blake is a 59 y.o. male who presents with a history of thyroid carcinoma.  Had history of multiple thyroid nodules with an FNA completed in 2020 which was benign, AUS.  Patient had left hemithyroidectomy in 2022 pathology demonstrated papillary thyroid carcinoma with infiltrative follicular variant 4.5 cm.  Continues to deny family history of thyroid cancer or thyroid disease.  Denies any known history of head or neck radiation.  Last ultrasound of head pleated in 2024 no evidence of recurrence or metastatic disease.  Thyroglobulin by VICKI was less than 0.1 in 2024.  Thyroglobulin antibody less than 1.0 in  "August 2024. TSH appropriately suppressed from November 2024.     Currently taking levothyroxine 200 mcg orally daily.  Continues taking regularly and properly. Continues to deny symptoms consistent with hypo/hyperthyroidism.    History obtained from: patient    Review of Systems  See HPI.   All other systems reviewed and are negative.    Medical History Reviewed by provider this encounter:  Tobacco  Allergies  Meds  Problems  Med Hx  Surg Hx  Fam Hx     .  Current Outpatient Medications on File Prior to Visit   Medication Sig Dispense Refill    allopurinol (ZYLOPRIM) 300 mg tablet TAKE 1 TABLET EVERY DAY 90 tablet 1    amLODIPine (NORVASC) 5 mg tablet TAKE 1 TABLET EVERY DAY 90 tablet 1    Ergocalciferol (VITAMIN D2 PO) Take by mouth      escitalopram (LEXAPRO) 10 mg tablet Take 10 mg by mouth daily      levothyroxine 200 mcg tablet TAKE 1 TABLET EVERY DAY 90 tablet 1    lisinopril-hydrochlorothiazide (PRINZIDE,ZESTORETIC) 20-25 MG per tablet TAKE 1 TABLET EVERY DAY 90 tablet 1    omeprazole (PriLOSEC) 20 mg delayed release capsule TAKE 1 CAPSULE EVERY  capsule 1    potassium citrate (UROCIT-K) 10 mEq Take 10 mEq by mouth 3 (three) times a day with meals      terbinafine (LamISIL) 250 mg tablet Take 1 tablet (250 mg total) by mouth daily 30 tablet 2     No current facility-administered medications on file prior to visit.         Objective   /80 (BP Location: Left arm, Patient Position: Sitting, Cuff Size: Large)   Pulse 76   Ht 6' 4\" (1.93 m)   Wt (!) 167 kg (368 lb)   SpO2 96%   BMI 44.79 kg/m²         Physical Exam  Vitals reviewed.   Constitutional:       Appearance: Normal appearance.   Cardiovascular:      Rate and Rhythm: Normal rate and regular rhythm.      Pulses: Normal pulses.      Heart sounds: Normal heart sounds.   Pulmonary:      Effort: Pulmonary effort is normal.      Breath sounds: Normal breath sounds.   Skin:     General: Skin is warm and dry.      Capillary Refill: " Capillary refill takes less than 2 seconds.   Neurological:      General: No focal deficit present.      Mental Status: He is alert and oriented to person, place, and time.      No tremors with oustretched arms.   Psychiatric:         Mood and Affect: Mood normal.         Behavior: Behavior normal.         Administrative Statements

## 2024-11-20 NOTE — ASSESSMENT & PLAN NOTE
Levothyroxine 200 mcg daily, clinically euthyroid.  TSH goal <0.1, free T4 reasonable   Repeat TSH and free T4 in 3 months  Orders:    TSH, 3rd generation; Future    T4, free; Future

## 2024-11-29 ENCOUNTER — HOSPITAL ENCOUNTER (OUTPATIENT)
Dept: RADIOLOGY | Facility: HOSPITAL | Age: 59
Discharge: HOME | End: 2024-11-29

## 2024-11-29 DIAGNOSIS — C73 PAPILLARY THYROID CARCINOMA (HCC): ICD-10-CM

## 2024-12-19 DIAGNOSIS — K20.0 EOSINOPHILIC ESOPHAGITIS: ICD-10-CM

## 2024-12-27 ENCOUNTER — OFFICE VISIT (OUTPATIENT)
Dept: FAMILY MEDICINE CLINIC | Facility: CLINIC | Age: 59
End: 2024-12-27
Payer: COMMERCIAL

## 2024-12-27 VITALS
WEIGHT: 315 LBS | HEIGHT: 76 IN | TEMPERATURE: 96.5 F | BODY MASS INDEX: 38.36 KG/M2 | DIASTOLIC BLOOD PRESSURE: 88 MMHG | HEART RATE: 78 BPM | OXYGEN SATURATION: 97 % | SYSTOLIC BLOOD PRESSURE: 134 MMHG | RESPIRATION RATE: 18 BRPM

## 2024-12-27 DIAGNOSIS — E11.69 TYPE 2 DIABETES MELLITUS WITH MORBID OBESITY (HCC): ICD-10-CM

## 2024-12-27 DIAGNOSIS — I10 PRIMARY HYPERTENSION: Primary | ICD-10-CM

## 2024-12-27 DIAGNOSIS — M15.0 PRIMARY OSTEOARTHRITIS INVOLVING MULTIPLE JOINTS: ICD-10-CM

## 2024-12-27 DIAGNOSIS — E66.01 TYPE 2 DIABETES MELLITUS WITH MORBID OBESITY (HCC): ICD-10-CM

## 2024-12-27 DIAGNOSIS — K21.9 GASTROESOPHAGEAL REFLUX DISEASE WITHOUT ESOPHAGITIS: ICD-10-CM

## 2024-12-27 LAB — SL AMB POCT HEMOGLOBIN AIC: 5.6 (ref ?–6.5)

## 2024-12-27 PROCEDURE — 83036 HEMOGLOBIN GLYCOSYLATED A1C: CPT | Performed by: FAMILY MEDICINE

## 2024-12-27 PROCEDURE — 99214 OFFICE O/P EST MOD 30 MIN: CPT | Performed by: FAMILY MEDICINE

## 2024-12-27 NOTE — PROGRESS NOTES
Name: Joseluis Blake      : 1965      MRN: [id number]  Encounter Provider: Juni Vinson MD  Encounter Date: 2024   Encounter department: [facility]    Assessment & Plan  Primary hypertension  STABLE  DENIES ANY CP, SOB, PALPITATIONS, OR HEADACHE  NOTES NO WATER RETENTION  COMPLIANT WITH MEDICATION  NO CONCERNS    - CONTINUE CURRENT TREATMENT PLAN  - MONITOR DIETARY SODIUM INTAKE  - ENCOURAGE PHYSICAL ACTIVITY  - RV 3 MONTHS         Type 2 diabetes mellitus with morbid obesity (HCC)    Lab Results   Component Value Date    HGBA1C 5.6 2024     COMPLIANT WITH MEDICATION  DENIES ANY NUMBNESS, TINGLING IN FEET    - DISCUSSED DIETARY MODIFICATION OF CARBOHYDRATES  - HGB A1C AND URINE STUDIES DUE TODAY  - FOOT CARE  - RV 3 MONTHS    Orders:  •  POCT hemoglobin A1c    Gastroesophageal reflux disease without esophagitis  STABLE  DENIES ANY CP, INDIGESTION, OR COUGH  NOTES NO MELENA OR HEMATOCHEZIA  NO HEMATEMESIS  COMPLIANT WITH MEDICATION  NO CONCERNS    - CONTINUE CURRENT TREATMENT PLAN  - MEDICATION AS PRESCRIBED  - AVOID CAFFEINE, ALCOHOL OR SMOKING         Primary osteoarthritis involving multiple joints  STABLE  DENIES ANY JOINT SWELLING OR REDNESS  JOINT STIFFNESS PRESENT  PAIN MANAGEMENT ADEQUATE    - CONTINUE CURRENT MANAGEMENT  - MEDICATION AS DIRECTED  - CALL / RETURN IF SYMPTOMS WORSEN              History of Present Illness     PATIENT RETURNS FOR ROUTINE EVALUATION OF PATIENT'S MEDICAL ISSUES    INDIVIDUAL MEDICAL ISSUES WITH THEIR CURRENT STATUS, ASSESSMENT AND PLANS ARE LISTED ABOVE          Review of Systems   Constitutional:  Negative for chills, fatigue and fever.   HENT:  Negative for congestion, ear discharge, ear pain, mouth sores, postnasal drip, sore throat and trouble swallowing.    Eyes:  Negative for pain, discharge and visual disturbance.   Respiratory:  Negative for cough, shortness of breath and wheezing.    Cardiovascular:  Negative for  "chest pain, palpitations and leg swelling.   Gastrointestinal:  Negative for abdominal distention, abdominal pain, blood in stool, diarrhea and nausea.   Endocrine: Negative for polydipsia, polyphagia and polyuria.   Genitourinary:  Negative for dysuria, frequency, hematuria and urgency.   Musculoskeletal:  Negative for arthralgias, gait problem and joint swelling.   Skin:  Negative for pallor and rash.   Neurological:  Negative for dizziness, syncope, speech difficulty, weakness, light-headedness, numbness and headaches.   Hematological:  Negative for adenopathy.   Psychiatric/Behavioral:  Negative for behavioral problems, confusion and sleep disturbance. The patient is not nervous/anxious.      Past Medical History:   Diagnosis Date   • Borderline diabetes     per pt A1C \"coming down\"   • Chronic pain disorder     lower back-s/p laminectomy   • Claustrophobia     \"some degree\" jes MRI's\"   • CPAP (continuous positive airway pressure) dependence     per pt does not use CPAP   • Diverticulitis    • GERD (gastroesophageal reflux disease)    • Gout    • Hypertension    • Kidney stone     x 2 episodes   • Lactose intolerance    • Localized pain of joint    • Mild sleep apnea     CPAP started in July having difficulty using- only has used on occ   • Morbid obesity with body mass index (BMI) of 50.0 to 59.9 in adult (HCC)    • Papillary thyroid carcinoma (HCC)    • Rectal bleeding     occ rectal bleeding last episode unsure-\"nothing recent\"   • Seasickness    • Skin tag     skin tags were removed-as of 8/2/19 has a few more   • Teeth missing    • Thyroid nodule    • Tinnitus    • Wears glasses    • Weight loss     \"80lbs and did gain 40lbs back\"--     Past Surgical History:   Procedure Laterality Date   • BACK SURGERY      laminectomy-1996, 2010   • COLONOSCOPY      x2   • ESOPHAGOGASTRODUODENOSCOPY N/A 3/30/2019    Procedure: ESOPHAGOGASTRODUODENOSCOPY (EGD);  Surgeon: Kory Suresh MD;  Location: [facility];  Service: " Gastroenterology   • FL RETROGRADE PYELOGRAM  9/16/2022   • FL RETROGRADE PYELOGRAM  9/22/2022   • FL RETROGRADE PYELOGRAM  9/29/2022   • KNEE ARTHROSCOPY Left 2007   • IA CYSTO BLADDER W/URETERAL CATHETERIZATION Right 9/16/2022    Procedure: CYSTOSCOPY RETROGRADE PYELOGRAM WITH INSERTION STENT URETERAL retrograde;  Surgeon: Won Lawton MD;  [facility]: [facility];  Service: Urology   • IA CYSTO/URETERO W/LITHOTRIPSY &INDWELL STENT INSRT Right 1/6/2022    Procedure: CYSTOSCOPY URETEROSCOPY AND INSERTION STENT URETERAL RIGHT;  Surgeon: Won Lawton MD;  [facility]: [facility];  Service: Urology   • IA CYSTO/URETERO W/LITHOTRIPSY &INDWELL STENT INSRT Right 9/22/2022    Procedure: CYSTOSCOPY URETEROSCOPY WITH LITHOTRIPSY HOLMIUM LASER, BASKET EXTRACTION, RETROGRADE PYELOGRAM (BILATERAL) AND  STENT EXCHANGE;  Surgeon: Won Lawton MD;  [facility]: [facility];  Service: Urology   • IA CYSTO/URETERO W/LITHOTRIPSY &INDWELL STENT INSRT Bilateral 9/29/2022    Procedure: CYSTOSCOPY URETEROSCOPY, STENT MANUPILATION, RETROGRADE PYELOGRAM, LEFT  URETERAL STENT NSERTION, AND  REMOVAL OF RIGHT STENT;  Surgeon: Won Lawton MD;  [facility]: [facility];  Service: Urology   • IA CYSTO/URETERO W/LITHOTRIPSY &INDWELL STENT INSRT Left 10/13/2022    Procedure: CYSTOSCOPY URETEROSCOPY WITH LITHOTRIPSY BASKET EXTRACTION, RETROGRADE PYELOGRAM AND  STENT EXCHANGE;  Surgeon: Won Lawton MD;  [facility];  Service: Urology   • IA ESOPHAGOGASTRODUODENOSCOPY TRANSORAL DIAGNOSTIC N/A 5/1/2019    Procedure: ESOPHAGOGASTRODUODENOSCOPY (EGD);  Surgeon: Kory Suresh MD;  Location: [facility];  Service: Gastroenterology   • IA THYROIDECTOMY RMVL REMAINING TISS FLWG PRTL RMVL Right 2/8/2023    Procedure: COMPLETION THYROIDECTOMY;  Surgeon: Timbo Bolaños MD;  [facility];  Service: ENT   • IA TOTAL THYROID LOBECTOMY UNI W/WO ISTHMUSECTOMY Left 8/31/2022    Procedure: Left patial THYROIDECTOMY;  Surgeon: Timbo  Pavel Bolaños MD;  [facility];  Service: ENT   • UPPER GASTROINTESTINAL ENDOSCOPY  2006   • US GUIDED THYROID BIOPSY  12/28/2020     Family History   Problem Relation Age of Onset   • Osteoarthritis Mother    • Obesity Father         [id number]   • Sleep apnea Father         with CPAP   • No Known Problems Sister    • Lupus Sister    • No Known Problems Sister    • No Known Problems Brother    • No Known Problems Son    • Substance Abuse Neg Hx    • Mental illness Neg Hx    • Cancer Neg Hx    • Diabetes Neg Hx    • Heart disease Neg Hx    • Stroke Neg Hx      Social History     Tobacco Use   • Smoking status: Never     Passive exposure: Never   • Smokeless tobacco: Never   Vaping Use   • Vaping status: Never Used   Substance and Sexual Activity   • Alcohol use: No   • Drug use: No   • Sexual activity: Not Currently     Partners: Female     Comment: defer     Current Outpatient Medications on File Prior to Visit   Medication Sig   • allopurinol (ZYLOPRIM) 300 mg tablet TAKE 1 TABLET EVERY DAY   • amLODIPine (NORVASC) 5 mg tablet TAKE 1 TABLET EVERY DAY   • Ergocalciferol (VITAMIN D2 PO) Take by mouth   • levothyroxine 200 mcg tablet TAKE 1 TABLET EVERY DAY   • lisinopril-hydrochlorothiazide (PRINZIDE,ZESTORETIC) 20-25 MG per tablet TAKE 1 TABLET EVERY DAY   • omeprazole (PriLOSEC) 20 mg delayed release capsule TAKE 1 CAPSULE EVERY DAY   • potassium citrate (UROCIT-K) 10 mEq Take 10 mEq by mouth 3 (three) times a day with meals   • terbinafine (LamISIL) 250 mg tablet Take 1 tablet (250 mg total) by mouth daily   • [DISCONTINUED] escitalopram (LEXAPRO) 10 mg tablet Take 10 mg by mouth daily (Patient not taking: Reported on 12/27/2024)     Allergies   Allergen Reactions   • Lactose - Food Allergy GI Intolerance     Immunization History   Administered Date(s) Administered   • Influenza, injectable, quadrivalent, preservative free 0.5 mL 09/26/2023   • Tdap 07/02/2010, 06/26/2018     Objective   /88    "Pulse 78   Temp (!) 96.5 °F (35.8 °C)   Resp 18   Ht 6' 4\" (1.93 m)   Wt (!) 167 kg (368 lb)   SpO2 97%   BMI 44.79 kg/m²     Physical Exam  Vitals reviewed.   Constitutional:       General: He is not in acute distress.     Appearance: Normal appearance. He is well-developed. He is obese. He is not ill-appearing.   HENT:      Head: Normocephalic and atraumatic.      Nose: Nose normal.      Mouth/Throat:      Mouth: Mucous membranes are moist.   Eyes:      General:         Right eye: No discharge.         Left eye: No discharge.      Conjunctiva/sclera: Conjunctivae normal.      Pupils: Pupils are equal, round, and reactive to light.   Neck:      Thyroid: No thyromegaly.      Vascular: No JVD.   Cardiovascular:      Rate and Rhythm: Normal rate and regular rhythm.      Heart sounds: Normal heart sounds. No murmur heard.  Pulmonary:      Effort: Pulmonary effort is normal.      Breath sounds: Normal breath sounds. No wheezing or rales.   Abdominal:      General: Bowel sounds are normal.      Palpations: Abdomen is soft. There is no mass.      Tenderness: There is no abdominal tenderness. There is no guarding or rebound.   Musculoskeletal:         General: No tenderness or deformity. Normal range of motion.      Cervical back: Neck supple.      Comments: MODERATE DJD CHANGES     Lymphadenopathy:      Cervical: No cervical adenopathy.   Skin:     General: Skin is warm and dry.      Findings: No erythema or rash.   Neurological:      General: No focal deficit present.      Mental Status: He is alert and oriented to person, place, and time.   Psychiatric:         Mood and Affect: Mood normal.         Behavior: Behavior normal.         Thought Content: Thought content normal.         Judgment: Judgment normal.       "

## 2024-12-27 NOTE — ASSESSMENT & PLAN NOTE
Lab Results   Component Value Date    HGBA1C 5.6 12/27/2024     COMPLIANT WITH MEDICATION  DENIES ANY NUMBNESS, TINGLING IN FEET    - DISCUSSED DIETARY MODIFICATION OF CARBOHYDRATES  - HGB A1C AND URINE STUDIES DUE TODAY  - FOOT CARE  - RV 3 MONTHS    Orders:  •  POCT hemoglobin A1c

## 2025-01-03 ENCOUNTER — TELEPHONE (OUTPATIENT)
Age: 60
End: 2025-01-03

## 2025-01-03 NOTE — TELEPHONE ENCOUNTER
Spoke to Joseluis and he stated on the back of his neck, he thought is was a fatty lump/cyst, but you called it something else and mentioned an US.  The other US that is placed is to check his thyroid

## 2025-01-03 NOTE — TELEPHONE ENCOUNTER
PT states when he saw Dr Vinson on 12/27, he was told that an US of neck will be placed. PT aware an US was placed by SOLE Mota, however he states Dr Vinson needed to check something else.    Please advise    ThankYou

## 2025-01-03 NOTE — TELEPHONE ENCOUNTER
COURTNEY    NOT SURE WHAT I DISCUSSED WITH KERRY ABOUT OTHER SCANS?  COULD YOU CHECK WITH HIM    THANKS

## 2025-01-05 DIAGNOSIS — D17.0 LIPOMA OF NECK: Primary | ICD-10-CM

## 2025-01-06 NOTE — TELEPHONE ENCOUNTER
Spoke to Joseluis and relayed that Dr. Vinson put in an order for an US also.  Gave him Central scheduling telephone number and advised to make sure they see that there are 2 separate US orders in the chart.

## 2025-01-07 ENCOUNTER — HOSPITAL ENCOUNTER (OUTPATIENT)
Dept: RADIOLOGY | Facility: HOSPITAL | Age: 60
Discharge: HOME | End: 2025-01-07
Payer: COMMERCIAL

## 2025-01-07 DIAGNOSIS — D17.0 LIPOMA OF NECK: ICD-10-CM

## 2025-01-07 PROCEDURE — 76536 US EXAM OF HEAD AND NECK: CPT

## 2025-01-11 ENCOUNTER — RESULTS FOLLOW-UP (OUTPATIENT)
Dept: FAMILY MEDICINE CLINIC | Facility: CLINIC | Age: 60
End: 2025-01-11

## 2025-01-12 DIAGNOSIS — B35.1 TOENAIL FUNGUS: ICD-10-CM

## 2025-01-14 RX ORDER — TERBINAFINE HYDROCHLORIDE 250 MG/1
TABLET ORAL
Qty: 30 TABLET | Refills: 2 | Status: SHIPPED | OUTPATIENT
Start: 2025-01-14 | End: 2025-02-28

## 2025-01-23 DIAGNOSIS — F41.9 ANXIETY: Primary | ICD-10-CM

## 2025-01-23 RX ORDER — ESCITALOPRAM OXALATE 10 MG/1
10 TABLET ORAL DAILY
Qty: 90 TABLET | Refills: 1 | Status: SHIPPED | OUTPATIENT
Start: 2025-01-23 | End: 2025-07-22

## 2025-02-04 NOTE — TELEPHONE ENCOUNTER
Spoke to PT. He states that the cyst really bothers him, and is inquiring about having it removed. Please f/u with PT to advise, preferably through a My Chart message.    ThankYou

## 2025-02-05 DIAGNOSIS — D17.0 LIPOMA OF NECK: Primary | ICD-10-CM

## 2025-02-10 DIAGNOSIS — I10 ESSENTIAL HYPERTENSION: ICD-10-CM

## 2025-02-11 RX ORDER — AMLODIPINE BESYLATE 5 MG/1
5 TABLET ORAL DAILY
Qty: 90 TABLET | Refills: 1 | Status: SHIPPED | OUTPATIENT
Start: 2025-02-11

## 2025-02-11 RX ORDER — LISINOPRIL AND HYDROCHLOROTHIAZIDE 20; 25 MG/1; MG/1
1 TABLET ORAL DAILY
Qty: 90 TABLET | Refills: 1 | Status: SHIPPED | OUTPATIENT
Start: 2025-02-11

## 2025-02-12 DIAGNOSIS — C73 PAPILLARY THYROID CARCINOMA (HCC): ICD-10-CM

## 2025-02-12 DIAGNOSIS — E04.1 THYROID NODULE: ICD-10-CM

## 2025-02-12 RX ORDER — LEVOTHYROXINE SODIUM 200 UG/1
200 TABLET ORAL DAILY
Qty: 90 TABLET | Refills: 1 | Status: SHIPPED | OUTPATIENT
Start: 2025-02-12

## 2025-02-13 ENCOUNTER — CONSULT (OUTPATIENT)
Age: 60
End: 2025-02-13
Payer: COMMERCIAL

## 2025-02-13 ENCOUNTER — TELEPHONE (OUTPATIENT)
Age: 60
End: 2025-02-13

## 2025-02-13 VITALS
BODY MASS INDEX: 38.36 KG/M2 | HEART RATE: 93 BPM | HEIGHT: 76 IN | OXYGEN SATURATION: 93 % | WEIGHT: 315 LBS | TEMPERATURE: 97.7 F | DIASTOLIC BLOOD PRESSURE: 83 MMHG | SYSTOLIC BLOOD PRESSURE: 126 MMHG

## 2025-02-13 DIAGNOSIS — D17.0 LIPOMA OF NECK: Primary | ICD-10-CM

## 2025-02-13 PROCEDURE — 99204 OFFICE O/P NEW MOD 45 MIN: CPT | Performed by: SURGERY

## 2025-02-13 NOTE — PROGRESS NOTES
"Name: Joseluis Blake      : 1965      MRN: [id number]  Encounter Provider: Haroon Patel MD  Encounter Date: 2025   Encounter department: [facility] [place]  :  Assessment & Plan  Lipoma of neck    Orders:    Ambulatory Referral to General Surgery    Plan:  1.  EKG 2024 largely unremarkable.  CMP and CBC 2024 also unremarkable.  Last hemoglobin A1c 5.2024.  2.  Plan excision of subcutaneous lipoma from the back in the main operating room    History of Present Illness   HPI  Joseluis Blake is a 59 y.o. male with hypertension, type 2 diabetes, morbid obesity (BMI 45) and CKD stage II who presents for evaluation of subcutaneous mass on the back of his neck.  Patient states has been there for quite some time and slowly enlarging.          Review of Systems   Constitutional:  Negative for chills and fever.   Respiratory:          Obstructive sleep apnea   Cardiovascular:         Hypertension under treatment   Endocrine:        Type 2 diabetes under treatment with A1c 5.6   Genitourinary:         CKD stage II   Neurological: Negative.    Hematological: Negative.    All other systems reviewed and are negative.  Medical History Reviewed by provider this encounter:     .  Past Medical History   Past Medical History:   Diagnosis Date    Borderline diabetes     per pt A1C \"coming down\"    Chronic pain disorder     lower back-s/p laminectomy    Claustrophobia     \"some degree\" jes MRI's\"    CPAP (continuous positive airway pressure) dependence     per pt does not use CPAP    Diverticulitis     GERD (gastroesophageal reflux disease)     Gout     Hypertension     Kidney stone     x 2 episodes    Lactose intolerance     Localized pain of joint     Mild sleep apnea     CPAP started in July having difficulty using- only has used on occ    Morbid obesity with body mass index (BMI) of 50.0 to 59.9 in adult (HCC)     Papillary thyroid carcinoma (HCC)     Rectal bleeding  " "   occ rectal bleeding last episode unsure-\"nothing recent\"    Seasickness     Skin tag     skin tags were removed-as of 8/2/19 has a few more    Teeth missing     Thyroid nodule     Tinnitus     Wears glasses     Weight loss     \"80lbs and did gain 40lbs back\"--     Past Surgical History:   Procedure Laterality Date    BACK SURGERY      laminectomy-1996, 2010    COLONOSCOPY      x2    ESOPHAGOGASTRODUODENOSCOPY N/A 3/30/2019    Procedure: ESOPHAGOGASTRODUODENOSCOPY (EGD);  Surgeon: Kory Suresh MD;  [facility]: [facility];  Service: Gastroenterology    FL RETROGRADE PYELOGRAM  9/16/2022    FL RETROGRADE PYELOGRAM  9/22/2022    FL RETROGRADE PYELOGRAM  9/29/2022    KNEE ARTHROSCOPY Left 2007    ND CYSTO BLADDER W/URETERAL CATHETERIZATION Right 9/16/2022    Procedure: CYSTOSCOPY RETROGRADE PYELOGRAM WITH INSERTION STENT URETERAL retrograde;  Surgeon: Won Lawton MD;  [facility];  Service: Urology    ND CYSTO/URETERO W/LITHOTRIPSY &INDWELL STENT INSRT Right 1/6/2022    Procedure: CYSTOSCOPY URETEROSCOPY AND INSERTION STENT URETERAL RIGHT;  Surgeon: Won Lawton MD;  [facility]: [facility];  Service: Urology    ND CYSTO/URETERO W/LITHOTRIPSY &INDWELL STENT INSRT Right 9/22/2022    Procedure: CYSTOSCOPY URETEROSCOPY WITH LITHOTRIPSY HOLMIUM LASER, BASKET EXTRACTION, RETROGRADE PYELOGRAM (BILATERAL) AND  STENT EXCHANGE;  Surgeon: Won Lawton MD;  [facility];  Service: Urology    ND CYSTO/URETERO W/LITHOTRIPSY &INDWELL STENT INSRT Bilateral 9/29/2022    Procedure: CYSTOSCOPY URETEROSCOPY, STENT MANUPILATION, RETROGRADE PYELOGRAM, LEFT  URETERAL STENT NSERTION, AND  REMOVAL OF RIGHT STENT;  Surgeon: Won Lawton MD;  [facility];  Service: Urology    ND CYSTO/URETERO W/LITHOTRIPSY &INDWELL STENT INSRT Left 10/13/2022    Procedure: CYSTOSCOPY URETEROSCOPY WITH LITHOTRIPSY BASKET EXTRACTION, RETROGRADE PYELOGRAM AND  STENT EXCHANGE;  Surgeon: Won Lawton MD;  [facility];  " Service: Urology    MA ESOPHAGOGASTRODUODENOSCOPY TRANSORAL DIAGNOSTIC N/A 5/1/2019    Procedure: ESOPHAGOGASTRODUODENOSCOPY (EGD);  Surgeon: Kory Suresh MD;  Location: [facility];  Service: Gastroenterology    MA THYROIDECTOMY RMVL REMAINING TISS FLWG PRTL RMVL Right 2/8/2023    Procedure: COMPLETION THYROIDECTOMY;  Surgeon: Timbo Bolaños MD;  [facility];  Service: ENT    MA TOTAL THYROID LOBECTOMY UNI W/WO ISTHMUSECTOMY Left 8/31/2022    Procedure: Left patial THYROIDECTOMY;  Surgeon: Timbo Bolaños MD;  [facility];  Service: ENT    UPPER GASTROINTESTINAL ENDOSCOPY  2006    US GUIDED THYROID BIOPSY  12/28/2020     Family History   Problem Relation Age of Onset    Osteoarthritis Mother     Obesity Father         ye=321    Sleep apnea Father         with CPAP    No Known Problems Sister     Lupus Sister     No Known Problems Sister     No Known Problems Brother     No Known Problems Son     Substance Abuse Neg Hx     Mental illness Neg Hx     Cancer Neg Hx     Diabetes Neg Hx     Heart disease Neg Hx     Stroke Neg Hx       reports that he has never smoked. He has never been exposed to tobacco smoke. He has never used smokeless tobacco. He reports that he does not drink alcohol and does not use drugs.  Current Outpatient Medications on File Prior to Visit   Medication Sig Dispense Refill    allopurinol (ZYLOPRIM) 300 mg tablet TAKE 1 TABLET EVERY DAY 90 tablet 1    amLODIPine (NORVASC) 5 mg tablet TAKE 1 TABLET EVERY DAY 90 tablet 1    Ergocalciferol (VITAMIN D2 PO) Take by mouth      escitalopram (LEXAPRO) 10 mg tablet Take 1 tablet (10 mg total) by mouth daily 90 tablet 1    levothyroxine 200 mcg tablet TAKE 1 TABLET EVERY DAY 90 tablet 1    lisinopril-hydrochlorothiazide (PRINZIDE,ZESTORETIC) 20-25 MG per tablet TAKE 1 TABLET EVERY DAY 90 tablet 1    omeprazole (PriLOSEC) 20 mg delayed release capsule TAKE 1 CAPSULE EVERY DAY 90 capsule 1    potassium citrate (UROCIT-K)  10 mEq Take 10 mEq by mouth 3 (three) times a day with meals      terbinafine (LamISIL) 250 mg tablet TAKE ONE TABLET BY MOUTH EVERY DAY (GENERIC FOR LAMISIL) 30 tablet 2     No current facility-administered medications on file prior to visit.     Allergies   Allergen Reactions    Lactose - Food Allergy GI Intolerance      Current Outpatient Medications on File Prior to Visit   Medication Sig Dispense Refill    allopurinol (ZYLOPRIM) 300 mg tablet TAKE 1 TABLET EVERY DAY 90 tablet 1    amLODIPine (NORVASC) 5 mg tablet TAKE 1 TABLET EVERY DAY 90 tablet 1    Ergocalciferol (VITAMIN D2 PO) Take by mouth      escitalopram (LEXAPRO) 10 mg tablet Take 1 tablet (10 mg total) by mouth daily 90 tablet 1    levothyroxine 200 mcg tablet TAKE 1 TABLET EVERY DAY 90 tablet 1    lisinopril-hydrochlorothiazide (PRINZIDE,ZESTORETIC) 20-25 MG per tablet TAKE 1 TABLET EVERY DAY 90 tablet 1    omeprazole (PriLOSEC) 20 mg delayed release capsule TAKE 1 CAPSULE EVERY DAY 90 capsule 1    potassium citrate (UROCIT-K) 10 mEq Take 10 mEq by mouth 3 (three) times a day with meals      terbinafine (LamISIL) 250 mg tablet TAKE ONE TABLET BY MOUTH EVERY DAY (GENERIC FOR LAMISIL) 30 tablet 2     No current facility-administered medications on file prior to visit.      Social History     Tobacco Use    Smoking status: Never     Passive exposure: Never    Smokeless tobacco: Never   Vaping Use    Vaping status: Never Used   Substance and Sexual Activity    Alcohol use: No    Drug use: No    Sexual activity: Not Currently     Partners: Female     Comment: defer        Objective   There were no vitals taken for this visit.     Physical Exam  Vitals reviewed.   Constitutional:       General: He is not in acute distress.     Appearance: He is obese.   Neck:      Comments: 6 cm x 5 cm x 4 cm smooth, well-circumscribed, soft lesion and subcutaneous tissue posterior neck most consistent with lipoma  Cardiovascular:      Rate and Rhythm: Normal rate.    Pulmonary:      Effort: Pulmonary effort is normal.   Abdominal:      General: There is no distension.   Musculoskeletal:         General: Normal range of motion.      Cervical back: Normal range of motion and neck supple.   Lymphadenopathy:      Cervical: No cervical adenopathy.   Skin:     General: Skin is warm and dry.   Neurological:      Mental Status: He is alert.

## 2025-02-13 NOTE — PRE-PROCEDURE INSTRUCTIONS
Pre-Surgery Instructions:   Medication Instructions    allopurinol (ZYLOPRIM) 300 mg tablet Take night before surgery    amLODIPine (NORVASC) 5 mg tablet Take night before surgery    Ergocalciferol (VITAMIN D2 PO) Hold day of surgery.    levothyroxine 200 mcg tablet Take day of surgery.    lisinopril-hydrochlorothiazide (PRINZIDE,ZESTORETIC) 20-25 MG per tablet Hold day of surgery.    omeprazole (PriLOSEC) 20 mg delayed release capsule Take day of surgery.    potassium citrate (UROCIT-K) 10 mEq Take night before surgery    terbinafine (LamISIL) 250 mg tablet Take night before surgery    Medication instructions for day surgery reviewed. Please use only a sip of water to take your instructed medications. Avoid all over the counter vitamins, supplements and NSAIDS for one week prior to surgery per anesthesia guidelines. Tylenol is ok to take as needed.     You will receive a call one business day prior to surgery with an arrival time and hospital directions. If your surgery is scheduled on a Monday, the hospital will be calling you on the Friday prior to your surgery. If you have not heard from anyone by 8pm, please call the hospital supervisor through the hospital  at [phone]. ([place] [phone] or [place] [phone]).    Do not eat or drink anything after midnight the night before your surgery, including candy, mints, lifesavers, or chewing gum. Do not drink alcohol 24hrs before your surgery. Try not to smoke at least 24hrs before your surgery.       Follow the pre surgery showering instructions as listed in the “My Surgical Experience Booklet” or otherwise provided by your surgeon's office. Do not use a blade to shave the surgical area 1 week before surgery. It is okay to use a clean electric clippers up to 24 hours before surgery. Do not apply any lotions, creams, including makeup, cologne, deodorant, or perfumes after showering on the day of your surgery. Do not use dry shampoo, hair spray,  hair gel, or any type of hair products.     No contact lenses, eye make-up, or artificial eyelashes. Remove nail polish, including gel polish, and any artificial, gel, or acrylic nails if possible. Remove all jewelry including rings and body piercing jewelry.     Wear causal clothing that is easy to take on and off. Consider your type of surgery.    Keep any valuables, jewelry, piercings at home. Please bring any specially ordered equipment (sling, braces) if indicated.    Arrange for a responsible person to drive you to and from the hospital on the day of your surgery. Please confirm the visitor policy for the day of your procedure when you receive your phone call with an arrival time.     Call the surgeon's office with any new illnesses, exposures, or additional questions prior to surgery.    Please reference your “My Surgical Experience Booklet” for additional information to prepare for your upcoming surgery.

## 2025-02-13 NOTE — TELEPHONE ENCOUNTER
Contacted patient off of [facility]  wait list to verify needs of services in attempts to update list with patient preferences. LVM for patient to contact intake dept  in regards to [facility] maintenance.     1st attempt

## 2025-02-13 NOTE — H&P
"Name: Joseluis Blake      : 1965      MRN: [id number]  Encounter Provider: Haroon Patel MD  Encounter Date: 2025   Encounter department: [facility]  :  Assessment & Plan  Lipoma of neck    Orders:    Ambulatory Referral to General Surgery    Plan:  1.  EKG 2024 largely unremarkable.  CMP and CBC 2024 also unremarkable.  Last hemoglobin A1c 5.2024.  2.  Plan excision of subcutaneous lipoma from the back in the main operating room    History of Present Illness   HPI  Joseluis Blake is a 59 y.o. male with hypertension, type 2 diabetes, morbid obesity (BMI 45) and CKD stage II who presents for evaluation of subcutaneous mass on the back of his neck.  Patient states has been there for quite some time and slowly enlarging.          Review of Systems   Constitutional:  Negative for chills and fever.   Respiratory:          Obstructive sleep apnea   Cardiovascular:         Hypertension under treatment   Endocrine:        Type 2 diabetes under treatment with A1c 5.6   Genitourinary:         CKD stage II   Neurological: Negative.    Hematological: Negative.    All other systems reviewed and are negative.  Medical History Reviewed by provider this encounter:     .  Past Medical History   Past Medical History:   Diagnosis Date    Borderline diabetes     per pt A1C \"coming down\"    Chronic pain disorder     lower back-s/p laminectomy    Claustrophobia     \"some degree\" jes MRI's\"    CPAP (continuous positive airway pressure) dependence     per pt does not use CPAP    Diverticulitis     GERD (gastroesophageal reflux disease)     Gout     Hypertension     Kidney stone     x 2 episodes    Lactose intolerance     Localized pain of joint     Mild sleep apnea     CPAP started in July having difficulty using- only has used on occ    Morbid obesity with body mass index (BMI) of 50.0 to 59.9 in adult (HCC)     Papillary thyroid carcinoma (HCC)     Rectal bleeding  " "   occ rectal bleeding last episode unsure-\"nothing recent\"    Seasickness     Skin tag     skin tags were removed-as of 8/2/19 has a few more    Teeth missing     Thyroid nodule     Tinnitus     Wears glasses     Weight loss     \"80lbs and did gain 40lbs back\"--     Past Surgical History:   Procedure Laterality Date    BACK SURGERY      laminectomy-1996, 2010    COLONOSCOPY      x2    ESOPHAGOGASTRODUODENOSCOPY N/A 3/30/2019    Procedure: ESOPHAGOGASTRODUODENOSCOPY (EGD);  Surgeon: Kory Suresh MD;  Location: [facility];  Service: Gastroenterology    FL RETROGRADE PYELOGRAM  9/16/2022    FL RETROGRADE PYELOGRAM  9/22/2022    FL RETROGRADE PYELOGRAM  9/29/2022    KNEE ARTHROSCOPY Left 2007    NV CYSTO BLADDER W/URETERAL CATHETERIZATION Right 9/16/2022    Procedure: CYSTOSCOPY RETROGRADE PYELOGRAM WITH INSERTION STENT URETERAL retrograde;  Surgeon: Won Lawton MD;  Location: [facility];  Service: Urology    NV CYSTO/URETERO W/LITHOTRIPSY &INDWELL STENT INSRT Right 1/6/2022    Procedure: CYSTOSCOPY URETEROSCOPY AND INSERTION STENT URETERAL RIGHT;  Surgeon: Won Lawton MD;  Location: [facility];  Service: Urology    NV CYSTO/URETERO W/LITHOTRIPSY &INDWELL STENT INSRT Right 9/22/2022    Procedure: CYSTOSCOPY URETEROSCOPY WITH LITHOTRIPSY HOLMIUM LASER, BASKET EXTRACTION, RETROGRADE PYELOGRAM (BILATERAL) AND  STENT EXCHANGE;  Surgeon: Won Lawton MD;  Location: [facility];  Service: Urology    NV CYSTO/URETERO W/LITHOTRIPSY &INDWELL STENT INSRT Bilateral 9/29/2022    Procedure: CYSTOSCOPY URETEROSCOPY, STENT MANUPILATION, RETROGRADE PYELOGRAM, LEFT  URETERAL STENT NSERTION, AND  REMOVAL OF RIGHT STENT;  Surgeon: Won Lawton MD;  [facility];  Service: Urology    NV CYSTO/URETERO W/LITHOTRIPSY &INDWELL STENT INSRT Left 10/13/2022    Procedure: CYSTOSCOPY URETEROSCOPY WITH LITHOTRIPSY BASKET EXTRACTION, RETROGRADE PYELOGRAM AND  STENT EXCHANGE;  Surgeon: Won Lawton MD;  Location: [facility];  " Service: Urology    MT ESOPHAGOGASTRODUODENOSCOPY TRANSORAL DIAGNOSTIC N/A 5/1/2019    Procedure: ESOPHAGOGASTRODUODENOSCOPY (EGD);  Surgeon: Kory Suresh MD;  Location: [facility] GI LAB;  Service: Gastroenterology    MT THYROIDECTOMY RMVL REMAINING TISS FLWG PRTL RMVL Right 2/8/2023    Procedure: COMPLETION THYROIDECTOMY;  Surgeon: Timbo Bolaños MD;  [facility]: [facility];  Service: ENT    MT TOTAL THYROID LOBECTOMY UNI W/WO ISTHMUSECTOMY Left 8/31/2022    Procedure: Left patial THYROIDECTOMY;  Surgeon: Timbo Bolaños MD;  [facility];  Service: ENT    UPPER GASTROINTESTINAL ENDOSCOPY  2006    US GUIDED THYROID BIOPSY  12/28/2020     Family History   Problem Relation Age of Onset    Osteoarthritis Mother     Obesity Father         [id number]    Sleep apnea Father         with CPAP    No Known Problems Sister     Lupus Sister     No Known Problems Sister     No Known Problems Brother     No Known Problems Son     Substance Abuse Neg Hx     Mental illness Neg Hx     Cancer Neg Hx     Diabetes Neg Hx     Heart disease Neg Hx     Stroke Neg Hx       reports that he has never smoked. He has never been exposed to tobacco smoke. He has never used smokeless tobacco. He reports that he does not drink alcohol and does not use drugs.  Current Outpatient Medications on File Prior to Visit   Medication Sig Dispense Refill    allopurinol (ZYLOPRIM) 300 mg tablet TAKE 1 TABLET EVERY DAY 90 tablet 1    amLODIPine (NORVASC) 5 mg tablet TAKE 1 TABLET EVERY DAY 90 tablet 1    Ergocalciferol (VITAMIN D2 PO) Take by mouth      escitalopram (LEXAPRO) 10 mg tablet Take 1 tablet (10 mg total) by mouth daily 90 tablet 1    levothyroxine 200 mcg tablet TAKE 1 TABLET EVERY DAY 90 tablet 1    lisinopril-hydrochlorothiazide (PRINZIDE,ZESTORETIC) 20-25 MG per tablet TAKE 1 TABLET EVERY DAY 90 tablet 1    omeprazole (PriLOSEC) 20 mg delayed release capsule TAKE 1 CAPSULE EVERY DAY 90 capsule 1    potassium citrate (UROCIT-K)  10 mEq Take 10 mEq by mouth 3 (three) times a day with meals      terbinafine (LamISIL) 250 mg tablet TAKE ONE TABLET BY MOUTH EVERY DAY (GENERIC FOR LAMISIL) 30 tablet 2     No current facility-administered medications on file prior to visit.     Allergies   Allergen Reactions    Lactose - Food Allergy GI Intolerance      Current Outpatient Medications on File Prior to Visit   Medication Sig Dispense Refill    allopurinol (ZYLOPRIM) 300 mg tablet TAKE 1 TABLET EVERY DAY 90 tablet 1    amLODIPine (NORVASC) 5 mg tablet TAKE 1 TABLET EVERY DAY 90 tablet 1    Ergocalciferol (VITAMIN D2 PO) Take by mouth      escitalopram (LEXAPRO) 10 mg tablet Take 1 tablet (10 mg total) by mouth daily 90 tablet 1    levothyroxine 200 mcg tablet TAKE 1 TABLET EVERY DAY 90 tablet 1    lisinopril-hydrochlorothiazide (PRINZIDE,ZESTORETIC) 20-25 MG per tablet TAKE 1 TABLET EVERY DAY 90 tablet 1    omeprazole (PriLOSEC) 20 mg delayed release capsule TAKE 1 CAPSULE EVERY DAY 90 capsule 1    potassium citrate (UROCIT-K) 10 mEq Take 10 mEq by mouth 3 (three) times a day with meals      terbinafine (LamISIL) 250 mg tablet TAKE ONE TABLET BY MOUTH EVERY DAY (GENERIC FOR LAMISIL) 30 tablet 2     No current facility-administered medications on file prior to visit.      Social History     Tobacco Use    Smoking status: Never     Passive exposure: Never    Smokeless tobacco: Never   Vaping Use    Vaping status: Never Used   Substance and Sexual Activity    Alcohol use: No    Drug use: No    Sexual activity: Not Currently     Partners: Female     Comment: defer        Objective   There were no vitals taken for this visit.     Physical Exam  Vitals reviewed.   Constitutional:       General: He is not in acute distress.     Appearance: He is obese.   Neck:      Comments: 6 cm x 5 cm x 4 cm smooth, well-circumscribed, soft lesion and subcutaneous tissue posterior neck most consistent with lipoma  Cardiovascular:      Rate and Rhythm: Normal rate.    Pulmonary:      Effort: Pulmonary effort is normal.   Abdominal:      General: There is no distension.   Musculoskeletal:         General: Normal range of motion.      Cervical back: Normal range of motion and neck supple.   Lymphadenopathy:      Cervical: No cervical adenopathy.   Skin:     General: Skin is warm and dry.   Neurological:      Mental Status: He is alert.

## 2025-02-13 NOTE — H&P (VIEW-ONLY)
"Name: Joseluis Blake      : 1965      MRN: [id number]  Encounter Provider: Haroon Patel MD  Encounter Date: 2025   Encounter department: [facility] [place]  :  Assessment & Plan  Lipoma of neck    Orders:    Ambulatory Referral to General Surgery    Plan:  1.  EKG 2024 largely unremarkable.  CMP and CBC 2024 also unremarkable.  Last hemoglobin A1c 5.2024.  2.  Plan excision of subcutaneous lipoma from the back in the main operating room    History of Present Illness   HPI  Joseluis Blake is a 59 y.o. male with hypertension, type 2 diabetes, morbid obesity (BMI 45) and CKD stage II who presents for evaluation of subcutaneous mass on the back of his neck.  Patient states has been there for quite some time and slowly enlarging.          Review of Systems   Constitutional:  Negative for chills and fever.   Respiratory:          Obstructive sleep apnea   Cardiovascular:         Hypertension under treatment   Endocrine:        Type 2 diabetes under treatment with A1c 5.6   Genitourinary:         CKD stage II   Neurological: Negative.    Hematological: Negative.    All other systems reviewed and are negative.  Medical History Reviewed by provider this encounter:     .  Past Medical History   Past Medical History:   Diagnosis Date    Borderline diabetes     per pt A1C \"coming down\"    Chronic pain disorder     lower back-s/p laminectomy    Claustrophobia     \"some degree\" jes MRI's\"    CPAP (continuous positive airway pressure) dependence     per pt does not use CPAP    Diverticulitis     GERD (gastroesophageal reflux disease)     Gout     Hypertension     Kidney stone     x 2 episodes    Lactose intolerance     Localized pain of joint     Mild sleep apnea     CPAP started in July having difficulty using- only has used on occ    Morbid obesity with body mass index (BMI) of 50.0 to 59.9 in adult (HCC)     Papillary thyroid carcinoma (HCC)     Rectal bleeding  " "   occ rectal bleeding last episode unsure-\"nothing recent\"    Seasickness     Skin tag     skin tags were removed-as of 8/2/19 has a few more    Teeth missing     Thyroid nodule     Tinnitus     Wears glasses     Weight loss     \"80lbs and did gain 40lbs back\"--     Past Surgical History:   Procedure Laterality Date    BACK SURGERY      laminectomy-1996, 2010    COLONOSCOPY      x2    ESOPHAGOGASTRODUODENOSCOPY N/A 3/30/2019    Procedure: ESOPHAGOGASTRODUODENOSCOPY (EGD);  Surgeon: Kory Suresh MD;  [facility];  Service: Gastroenterology    FL RETROGRADE PYELOGRAM  9/16/2022    FL RETROGRADE PYELOGRAM  9/22/2022    FL RETROGRADE PYELOGRAM  9/29/2022    KNEE ARTHROSCOPY Left 2007    IL CYSTO BLADDER W/URETERAL CATHETERIZATION Right 9/16/2022    Procedure: CYSTOSCOPY RETROGRADE PYELOGRAM WITH INSERTION STENT URETERAL retrograde;  Surgeon: Won Lawton MD;  [facility];  Service: Urology    IL CYSTO/URETERO W/LITHOTRIPSY &INDWELL STENT INSRT Right 1/6/2022    Procedure: CYSTOSCOPY URETEROSCOPY AND INSERTION STENT URETERAL RIGHT;  Surgeon: Won Lawton MD;  [facility]: [facility];  Service: Urology    IL CYSTO/URETERO W/LITHOTRIPSY &INDWELL STENT INSRT Right 9/22/2022    Procedure: CYSTOSCOPY URETEROSCOPY WITH LITHOTRIPSY HOLMIUM LASER, BASKET EXTRACTION, RETROGRADE PYELOGRAM (BILATERAL) AND  STENT EXCHANGE;  Surgeon: Won Lawton MD;  [facility]: [facility];  Service: Urology    IL CYSTO/URETERO W/LITHOTRIPSY &INDWELL STENT INSRT Bilateral 9/29/2022    Procedure: CYSTOSCOPY URETEROSCOPY, STENT MANUPILATION, RETROGRADE PYELOGRAM, LEFT  URETERAL STENT NSERTION, AND  REMOVAL OF RIGHT STENT;  Surgeon: Won Lawton MD;  [facility]: [facility];  Service: Urology    IL CYSTO/URETERO W/LITHOTRIPSY &INDWELL STENT INSRT Left 10/13/2022    Procedure: CYSTOSCOPY URETEROSCOPY WITH LITHOTRIPSY BASKET EXTRACTION, RETROGRADE PYELOGRAM AND  STENT EXCHANGE;  Surgeon: Won Lawton MD;  [facility]: [facility];  " Service: Urology    IN ESOPHAGOGASTRODUODENOSCOPY TRANSORAL DIAGNOSTIC N/A 5/1/2019    Procedure: ESOPHAGOGASTRODUODENOSCOPY (EGD);  Surgeon: Kory Suresh MD;  Location: [facility] GI LAB;  Service: Gastroenterology    IN THYROIDECTOMY RMVL REMAINING TISS FLWG PRTL RMVL Right 2/8/2023    Procedure: COMPLETION THYROIDECTOMY;  Surgeon: Timbo Bolaños MD;  [facility]: [facility];  Service: ENT    IN TOTAL THYROID LOBECTOMY UNI W/WO ISTHMUSECTOMY Left 8/31/2022    Procedure: Left patial THYROIDECTOMY;  Surgeon: Timbo Bolaños MD;  [facility];  Service: ENT    UPPER GASTROINTESTINAL ENDOSCOPY  2006    US GUIDED THYROID BIOPSY  12/28/2020     Family History   Problem Relation Age of Onset    Osteoarthritis Mother     Obesity Father         [id number]    Sleep apnea Father         with CPAP    No Known Problems Sister     Lupus Sister     No Known Problems Sister     No Known Problems Brother     No Known Problems Son     Substance Abuse Neg Hx     Mental illness Neg Hx     Cancer Neg Hx     Diabetes Neg Hx     Heart disease Neg Hx     Stroke Neg Hx       reports that he has never smoked. He has never been exposed to tobacco smoke. He has never used smokeless tobacco. He reports that he does not drink alcohol and does not use drugs.  Current Outpatient Medications on File Prior to Visit   Medication Sig Dispense Refill    allopurinol (ZYLOPRIM) 300 mg tablet TAKE 1 TABLET EVERY DAY 90 tablet 1    amLODIPine (NORVASC) 5 mg tablet TAKE 1 TABLET EVERY DAY 90 tablet 1    Ergocalciferol (VITAMIN D2 PO) Take by mouth      escitalopram (LEXAPRO) 10 mg tablet Take 1 tablet (10 mg total) by mouth daily 90 tablet 1    levothyroxine 200 mcg tablet TAKE 1 TABLET EVERY DAY 90 tablet 1    lisinopril-hydrochlorothiazide (PRINZIDE,ZESTORETIC) 20-25 MG per tablet TAKE 1 TABLET EVERY DAY 90 tablet 1    omeprazole (PriLOSEC) 20 mg delayed release capsule TAKE 1 CAPSULE EVERY DAY 90 capsule 1    potassium citrate (UROCIT-K)  10 mEq Take 10 mEq by mouth 3 (three) times a day with meals      terbinafine (LamISIL) 250 mg tablet TAKE ONE TABLET BY MOUTH EVERY DAY (GENERIC FOR LAMISIL) 30 tablet 2     No current facility-administered medications on file prior to visit.     Allergies   Allergen Reactions    Lactose - Food Allergy GI Intolerance      Current Outpatient Medications on File Prior to Visit   Medication Sig Dispense Refill    allopurinol (ZYLOPRIM) 300 mg tablet TAKE 1 TABLET EVERY DAY 90 tablet 1    amLODIPine (NORVASC) 5 mg tablet TAKE 1 TABLET EVERY DAY 90 tablet 1    Ergocalciferol (VITAMIN D2 PO) Take by mouth      escitalopram (LEXAPRO) 10 mg tablet Take 1 tablet (10 mg total) by mouth daily 90 tablet 1    levothyroxine 200 mcg tablet TAKE 1 TABLET EVERY DAY 90 tablet 1    lisinopril-hydrochlorothiazide (PRINZIDE,ZESTORETIC) 20-25 MG per tablet TAKE 1 TABLET EVERY DAY 90 tablet 1    omeprazole (PriLOSEC) 20 mg delayed release capsule TAKE 1 CAPSULE EVERY DAY 90 capsule 1    potassium citrate (UROCIT-K) 10 mEq Take 10 mEq by mouth 3 (three) times a day with meals      terbinafine (LamISIL) 250 mg tablet TAKE ONE TABLET BY MOUTH EVERY DAY (GENERIC FOR LAMISIL) 30 tablet 2     No current facility-administered medications on file prior to visit.      Social History     Tobacco Use    Smoking status: Never     Passive exposure: Never    Smokeless tobacco: Never   Vaping Use    Vaping status: Never Used   Substance and Sexual Activity    Alcohol use: No    Drug use: No    Sexual activity: Not Currently     Partners: Female     Comment: defer        Objective   There were no vitals taken for this visit.     Physical Exam  Vitals reviewed.   Constitutional:       General: He is not in acute distress.     Appearance: He is obese.   Neck:      Comments: 6 cm x 5 cm x 4 cm smooth, well-circumscribed, soft lesion and subcutaneous tissue posterior neck most consistent with lipoma  Cardiovascular:      Rate and Rhythm: Normal rate.    Pulmonary:      Effort: Pulmonary effort is normal.   Abdominal:      General: There is no distension.   Musculoskeletal:         General: Normal range of motion.      Cervical back: Normal range of motion and neck supple.   Lymphadenopathy:      Cervical: No cervical adenopathy.   Skin:     General: Skin is warm and dry.   Neurological:      Mental Status: He is alert.

## 2025-02-21 ENCOUNTER — ANESTHESIA (OUTPATIENT)
Dept: PERIOP | Facility: HOSPITAL | Age: 60
End: 2025-02-21
Payer: COMMERCIAL

## 2025-02-21 ENCOUNTER — ANESTHESIA EVENT (OUTPATIENT)
Dept: PERIOP | Facility: HOSPITAL | Age: 60
End: 2025-02-21
Payer: COMMERCIAL

## 2025-02-21 ENCOUNTER — HOSPITAL ENCOUNTER (OUTPATIENT)
Facility: HOSPITAL | Age: 60
Setting detail: OUTPATIENT SURGERY
Discharge: HOME | End: 2025-02-21
Attending: SURGERY | Admitting: SURGERY
Payer: COMMERCIAL

## 2025-02-21 VITALS
WEIGHT: 315 LBS | HEART RATE: 83 BPM | SYSTOLIC BLOOD PRESSURE: 128 MMHG | RESPIRATION RATE: 20 BRPM | BODY MASS INDEX: 38.36 KG/M2 | DIASTOLIC BLOOD PRESSURE: 56 MMHG | OXYGEN SATURATION: 95 % | HEIGHT: 76 IN | TEMPERATURE: 97.2 F

## 2025-02-21 DIAGNOSIS — D17.0 LIPOMA OF NECK: ICD-10-CM

## 2025-02-21 LAB — GLUCOSE SERPL-MCNC: 113 MG/DL (ref 65–140)

## 2025-02-21 PROCEDURE — 88304 TISSUE EXAM BY PATHOLOGIST: CPT | Performed by: STUDENT IN AN ORGANIZED HEALTH CARE EDUCATION/TRAINING PROGRAM

## 2025-02-21 PROCEDURE — 82948 REAGENT STRIP/BLOOD GLUCOSE: CPT

## 2025-02-21 PROCEDURE — 11426 EXC H-F-NK-SP B9+MARG >4 CM: CPT | Performed by: PHYSICIAN ASSISTANT

## 2025-02-21 PROCEDURE — 11426 EXC H-F-NK-SP B9+MARG >4 CM: CPT | Performed by: SURGERY

## 2025-02-21 RX ORDER — SODIUM CHLORIDE, SODIUM LACTATE, POTASSIUM CHLORIDE, CALCIUM CHLORIDE 600; 310; 30; 20 MG/100ML; MG/100ML; MG/100ML; MG/100ML
INJECTION, SOLUTION INTRAVENOUS CONTINUOUS PRN
Status: DISCONTINUED | OUTPATIENT
Start: 2025-02-21 | End: 2025-02-21

## 2025-02-21 RX ORDER — ROCURONIUM BROMIDE 10 MG/ML
INJECTION, SOLUTION INTRAVENOUS AS NEEDED
Status: DISCONTINUED | OUTPATIENT
Start: 2025-02-21 | End: 2025-02-21

## 2025-02-21 RX ORDER — ONDANSETRON 2 MG/ML
4 INJECTION INTRAMUSCULAR; INTRAVENOUS ONCE
Status: DISCONTINUED | OUTPATIENT
Start: 2025-02-21 | End: 2025-02-21 | Stop reason: HOSPADM

## 2025-02-21 RX ORDER — MIDAZOLAM HYDROCHLORIDE 2 MG/2ML
INJECTION, SOLUTION INTRAMUSCULAR; INTRAVENOUS AS NEEDED
Status: DISCONTINUED | OUTPATIENT
Start: 2025-02-21 | End: 2025-02-21

## 2025-02-21 RX ORDER — METOCLOPRAMIDE HYDROCHLORIDE 5 MG/ML
INJECTION INTRAMUSCULAR; INTRAVENOUS AS NEEDED
Status: DISCONTINUED | OUTPATIENT
Start: 2025-02-21 | End: 2025-02-21

## 2025-02-21 RX ORDER — LIDOCAINE HYDROCHLORIDE 10 MG/ML
INJECTION, SOLUTION EPIDURAL; INFILTRATION; INTRACAUDAL; PERINEURAL AS NEEDED
Status: DISCONTINUED | OUTPATIENT
Start: 2025-02-21 | End: 2025-02-21

## 2025-02-21 RX ORDER — MAGNESIUM HYDROXIDE 1200 MG/15ML
LIQUID ORAL AS NEEDED
Status: DISCONTINUED | OUTPATIENT
Start: 2025-02-21 | End: 2025-02-21 | Stop reason: HOSPADM

## 2025-02-21 RX ORDER — FENTANYL CITRATE 50 UG/ML
INJECTION, SOLUTION INTRAMUSCULAR; INTRAVENOUS AS NEEDED
Status: DISCONTINUED | OUTPATIENT
Start: 2025-02-21 | End: 2025-02-21

## 2025-02-21 RX ORDER — ONDANSETRON 2 MG/ML
INJECTION INTRAMUSCULAR; INTRAVENOUS AS NEEDED
Status: DISCONTINUED | OUTPATIENT
Start: 2025-02-21 | End: 2025-02-21

## 2025-02-21 RX ORDER — PROPOFOL 10 MG/ML
INJECTION, EMULSION INTRAVENOUS AS NEEDED
Status: DISCONTINUED | OUTPATIENT
Start: 2025-02-21 | End: 2025-02-21

## 2025-02-21 RX ORDER — BUPIVACAINE HYDROCHLORIDE AND EPINEPHRINE 5; 5 MG/ML; UG/ML
INJECTION, SOLUTION EPIDURAL; INTRACAUDAL; PERINEURAL AS NEEDED
Status: DISCONTINUED | OUTPATIENT
Start: 2025-02-21 | End: 2025-02-21 | Stop reason: HOSPADM

## 2025-02-21 RX ORDER — FENTANYL CITRATE/PF 50 MCG/ML
25 SYRINGE (ML) INJECTION
Status: DISCONTINUED | OUTPATIENT
Start: 2025-02-21 | End: 2025-02-21 | Stop reason: HOSPADM

## 2025-02-21 RX ORDER — DEXAMETHASONE SODIUM PHOSPHATE 10 MG/ML
INJECTION, SOLUTION INTRAMUSCULAR; INTRAVENOUS AS NEEDED
Status: DISCONTINUED | OUTPATIENT
Start: 2025-02-21 | End: 2025-02-21

## 2025-02-21 RX ADMIN — ROCURONIUM BROMIDE 50 MG: 10 INJECTION, SOLUTION INTRAVENOUS at 10:54

## 2025-02-21 RX ADMIN — METOCLOPRAMIDE 10 MG: 5 INJECTION, SOLUTION INTRAMUSCULAR; INTRAVENOUS at 10:51

## 2025-02-21 RX ADMIN — FENTANYL CITRATE 100 MCG: 50 INJECTION, SOLUTION INTRAMUSCULAR; INTRAVENOUS at 10:54

## 2025-02-21 RX ADMIN — ONDANSETRON 4 MG: 2 INJECTION INTRAMUSCULAR; INTRAVENOUS at 10:51

## 2025-02-21 RX ADMIN — DEXAMETHASONE SODIUM PHOSPHATE 10 MG: 10 INJECTION, SOLUTION INTRAMUSCULAR; INTRAVENOUS at 11:07

## 2025-02-21 RX ADMIN — SUGAMMADEX 200 MG: 100 INJECTION, SOLUTION INTRAVENOUS at 11:20

## 2025-02-21 RX ADMIN — SODIUM CHLORIDE, SODIUM LACTATE, POTASSIUM CHLORIDE, AND CALCIUM CHLORIDE: .6; .31; .03; .02 INJECTION, SOLUTION INTRAVENOUS at 10:51

## 2025-02-21 RX ADMIN — PROPOFOL 200 MG: 10 INJECTION, EMULSION INTRAVENOUS at 10:54

## 2025-02-21 RX ADMIN — LIDOCAINE HYDROCHLORIDE 50 MG: 10 INJECTION, SOLUTION EPIDURAL; INFILTRATION; INTRACAUDAL; PERINEURAL at 10:54

## 2025-02-21 RX ADMIN — MIDAZOLAM 2 MG: 1 INJECTION INTRAMUSCULAR; INTRAVENOUS at 10:51

## 2025-02-21 RX ADMIN — Medication 3000 MG: at 10:51

## 2025-02-21 NOTE — INTERVAL H&P NOTE
H&P reviewed. After examining the patient I find no changes in the patients condition since the H&P had been written.    Vitals:    02/21/25 0806   BP: 126/58   Pulse: 81   Resp: 18   Temp: 98.1 °F (36.7 °C)   SpO2: 96%

## 2025-02-21 NOTE — ANESTHESIA POSTPROCEDURE EVALUATION
Post-Op Assessment Note            No anethesia notable event occurred.    Staff: Anesthesiologist           Last Filed PACU Vitals:  Vitals Value Taken Time   Temp 97.2 °F (36.2 °C) 02/21/25 1145   Pulse 82 02/21/25 1214   /57 02/21/25 1214   Resp 18 02/21/25 1214   SpO2 95 % 02/21/25 1214       Modified Abdi:     Vitals Value Taken Time   Activity 2 02/21/25 1214   Respiration 2 02/21/25 1214   Circulation 2 02/21/25 1214   Consciousness 2 02/21/25 1214   Oxygen Saturation 2 02/21/25 1214     Modified Abdi Score: 10

## 2025-02-21 NOTE — ANESTHESIA POSTPROCEDURE EVALUATION
Post-Op Assessment Note    CV Status:  Stable  Pain Score: 0    Pain management: adequate       Mental Status:  Sleepy   Hydration Status:  Stable   PONV Controlled:  None   Airway Patency:  Patent     Post Op Vitals Reviewed: Yes    No anethesia notable event occurred.    Staff: CRNA           Last Filed PACU Vitals:  Vitals Value Taken Time   Temp 97.2    Pulse 75    /72    Resp 20    SpO2 97 on FM O2

## 2025-02-21 NOTE — ANESTHESIA PREPROCEDURE EVALUATION
Procedure:  EXCISION LIPOMA (Neck)    Relevant Problems   CARDIO   (+) Hypertension      ENDO   (+) Post-operative hypothyroidism   (+) Type 2 diabetes mellitus with morbid obesity (HCC)   (+) Type 2 diabetes with stage 2 chronic kidney disease GFR 60-89  (HCC)      GI/HEPATIC   (+) GERD (gastroesophageal reflux disease)      /RENAL   (+) Chronic kidney disease (CKD), stage II (mild)   (+) Hypertensive CKD (chronic kidney disease)   (+) Ureteral stone with hydronephrosis      MUSCULOSKELETAL   (+) Chronic midline low back pain without sciatica   (+) Gout, arthropathy   (+) Osteoarthritis      NEURO/PSYCH   (+) Anxiety   (+) Chronic midline low back pain without sciatica   (+) Chronic pain syndrome      PULMONARY   (+) DOMINIC (obstructive sleep apnea)        Physical Exam    Airway    Mallampati score: II  TM Distance: >3 FB  Neck ROM: full     Dental   No notable dental hx     Cardiovascular  Cardiovascular exam normal    Pulmonary  Pulmonary exam normal     Other Findings        Anesthesia Plan  ASA Score- 3     Anesthesia Type- general with ASA Monitors.         Additional Monitors:     Airway Plan: ETT.           Plan Factors-Exercise tolerance (METS): >4 METS.    Chart reviewed.   Existing labs reviewed. Patient summary reviewed.    Patient is not a current smoker.      Obstructive sleep apnea risk education given perioperatively.        Induction- intravenous.    Postoperative Plan- Plan for postoperative opioid use.     Perioperative Resuscitation Plan - Level 1 - Full Code.       Informed Consent- Anesthetic plan and risks discussed with patient.  I personally reviewed this patient with the CRNA. Discussed and agreed on the Anesthesia Plan with the CRNA..      NPO Status:  Vitals Value Taken Time   Date of last liquid 02/21/25 02/21/25 0758   Time of last liquid 0700 02/21/25 0758   Date of last solid 02/20/25 02/21/25 0758   Time of last solid 1800 02/21/25 0758

## 2025-02-21 NOTE — OP NOTE
OPERATIVE REPORT  PATIENT NAME: Joseluis Blake    :  1965  MRN: [id number]  Pt Location: [facility] OR ROOM 01    SURGERY DATE: 2025    Surgeons and Role:     * Haroon Patel MD - Primary     * Jannet Mike PA-C - Assisting    Preop Diagnosis:  Lipoma of neck [D17.0]    Post-Op Diagnosis Codes:     * Lipoma of neck [D17.0]    Procedure(s):  EXCISION LIPOMA    Specimen(s):  ID Type Source Tests Collected by Time Destination   1 : NECK LIPOMA Tissue Soft Tissue, Lipoma TISSUE EXAM Haroon Patel MD 2025 1112        Estimated Blood Loss:   Minimal    Drains:  Ureteral Internal Stent 6 Fr. (Active)   Number of days: 876       Ureteral Internal Stent Left ureter 6 Fr. (Active)   Number of days: 862       Anesthesia Type:   General    Operative Indications:  Lipoma of neck [D17.0]      Operative Findings:  Fibrolipoma subcutaneous tissue posterior neck: 8 cm x 6 cm x 4 cm      Complications:   None    Procedure and Technique:  Excision of subcutaneous lipoma posterior neck    Patient is taken back to main operating room, intubated while supine, placed prone with all bony prominences padded, the posterior neck was prepped and draped in normal fashion.    An incision was made overlying the palpable mass.  Soft tissue was divided with Bovie cautery.  An encapsulated fibrous lipoma was dissected free from the subcutaneous tissue with Bovie cautery.  The area is copiously irrigated and strict hemostasis was obtained.  Dermis was reapproximated with 3-0 Vicryl suture.  Skin was reapproximated with a running 3-0 nylon suture.  Bacitracin and a dressing was placed.  A large field block was created around the incision at the end of the case.    The patient was then placed supine on the stretcher, extubated in the operating room, and sent to the PACU in stable condition.    PRAVEENA Mike was required for technical assistance and retraction.   I was present for the entire procedure., A qualified resident physician  was not available., and A physician assistant was required during the procedure for retraction, tissue handling, dissection and suturing.    Patient Disposition:  PACU       SIGNATURE: Haroon Patel MD  DATE: February 21, 2025  TIME: 11:27 AM

## 2025-02-21 NOTE — DISCHARGE INSTR - AVS FIRST PAGE
1.  Keep dressing on, clean and dry, for 2 days.  After 2 days, the dressing may be removed and you may shower and clean the incision daily with soap and water.  No further dressing is required.  2.  Take Tylenol, 1 g, 3 times a day regularly.  3.  Use an ice pack additionally for discomfort.  4.  Follow-up with Dr. Salazar on 7 March at 8:15 AM for suture removal.

## 2025-02-21 NOTE — PERIOPERATIVE NURSING NOTE
Received patient from pacu rn lokesh, patient resting in bed, bed locked in lowest position with side rails raised. Patient aoo vitals stable. Patient verbalizes pain to back of neck surgical site at 0/10, denies further pain or discomfort and states this pain is manageable and would not like further intervention at this time. Patient given juice and is tolerating sips well without complaint. Patient incision Is CDI, dressing in place, ice applied. Patient resting in bed, call light in reach and environment cleared. Plan of care ongoing.

## 2025-02-26 PROCEDURE — 88304 TISSUE EXAM BY PATHOLOGIST: CPT | Performed by: STUDENT IN AN ORGANIZED HEALTH CARE EDUCATION/TRAINING PROGRAM

## 2025-03-06 ENCOUNTER — APPOINTMENT (OUTPATIENT)
Dept: LAB | Facility: HOSPITAL | Age: 60
End: 2025-03-06
Payer: COMMERCIAL

## 2025-03-06 ENCOUNTER — OFFICE VISIT (OUTPATIENT)
Age: 60
End: 2025-03-06

## 2025-03-06 VITALS — WEIGHT: 315 LBS | BODY MASS INDEX: 38.36 KG/M2 | TEMPERATURE: 97.4 F | HEIGHT: 76 IN

## 2025-03-06 DIAGNOSIS — Z09 SURGERY FOLLOW-UP EXAMINATION: Primary | ICD-10-CM

## 2025-03-06 DIAGNOSIS — E11.22 TYPE 2 DIABETES MELLITUS WITH STAGE 2 CHRONIC KIDNEY DISEASE, WITHOUT LONG-TERM CURRENT USE OF INSULIN  (HCC): ICD-10-CM

## 2025-03-06 DIAGNOSIS — N18.2 TYPE 2 DIABETES MELLITUS WITH STAGE 2 CHRONIC KIDNEY DISEASE, WITHOUT LONG-TERM CURRENT USE OF INSULIN  (HCC): ICD-10-CM

## 2025-03-06 DIAGNOSIS — E89.0 POST-OPERATIVE HYPOTHYROIDISM: ICD-10-CM

## 2025-03-06 LAB
CHOLEST SERPL-MCNC: 190 MG/DL (ref ?–200)
EST. AVERAGE GLUCOSE BLD GHB EST-MCNC: 126 MG/DL
HBA1C MFR BLD: 6 %
HDLC SERPL-MCNC: 35 MG/DL
LDLC SERPL CALC-MCNC: 139 MG/DL (ref 0–100)
NONHDLC SERPL-MCNC: 155 MG/DL
T4 FREE SERPL-MCNC: 1.25 NG/DL (ref 0.61–1.12)
TRIGL SERPL-MCNC: 78 MG/DL (ref ?–150)
TSH SERPL DL<=0.05 MIU/L-ACNC: 0.2 UIU/ML (ref 0.45–4.5)

## 2025-03-06 PROCEDURE — 36415 COLL VENOUS BLD VENIPUNCTURE: CPT

## 2025-03-06 PROCEDURE — 84439 ASSAY OF FREE THYROXINE: CPT

## 2025-03-06 PROCEDURE — 80061 LIPID PANEL: CPT

## 2025-03-06 PROCEDURE — 84443 ASSAY THYROID STIM HORMONE: CPT

## 2025-03-06 PROCEDURE — 99024 POSTOP FOLLOW-UP VISIT: CPT | Performed by: SURGERY

## 2025-03-06 PROCEDURE — 83036 HEMOGLOBIN GLYCOSYLATED A1C: CPT

## 2025-03-06 NOTE — PROGRESS NOTES
Patient is status post excision of a subcutaneous lipoma from the posterior neck on 21 February 2025.  He is here for suture removal.  He reports minimal pain at this time and no wound issues.    Pathology report:  Final Diagnosis   A. Soft Tissue, Mass, Neck, Excision:  -   Mature adipose tissue and admixed connective tissue consistent with lipoma.      Incision healing nicely.  No signs infection.  Suture removed and Steri-Strips placed.    Patient counseled.  Follow-up as needed.

## 2025-03-07 ENCOUNTER — HOSPITAL ENCOUNTER (OUTPATIENT)
Dept: RADIOLOGY | Facility: HOSPITAL | Age: 60
Discharge: HOME | End: 2025-03-07
Payer: COMMERCIAL

## 2025-03-07 ENCOUNTER — RESULTS FOLLOW-UP (OUTPATIENT)
Dept: ENDOCRINOLOGY | Facility: CLINIC | Age: 60
End: 2025-03-07

## 2025-03-07 PROCEDURE — 76536 US EXAM OF HEAD AND NECK: CPT

## 2025-03-19 RX ORDER — POTASSIUM CITRATE 15 MEQ/1
TABLET, EXTENDED RELEASE ORAL
COMMUNITY
Start: 2025-02-01

## 2025-03-20 ENCOUNTER — OFFICE VISIT (OUTPATIENT)
Dept: ENDOCRINOLOGY | Facility: CLINIC | Age: 60
End: 2025-03-20
Payer: COMMERCIAL

## 2025-03-20 VITALS
SYSTOLIC BLOOD PRESSURE: 120 MMHG | HEIGHT: 76 IN | DIASTOLIC BLOOD PRESSURE: 76 MMHG | OXYGEN SATURATION: 96 % | WEIGHT: 315 LBS | BODY MASS INDEX: 38.36 KG/M2 | HEART RATE: 84 BPM

## 2025-03-20 DIAGNOSIS — E11.69 TYPE 2 DIABETES MELLITUS WITH MORBID OBESITY (HCC): ICD-10-CM

## 2025-03-20 DIAGNOSIS — E89.0 POST-OPERATIVE HYPOTHYROIDISM: ICD-10-CM

## 2025-03-20 DIAGNOSIS — E66.01 TYPE 2 DIABETES MELLITUS WITH MORBID OBESITY (HCC): ICD-10-CM

## 2025-03-20 DIAGNOSIS — C73 PAPILLARY THYROID CARCINOMA (HCC): Primary | ICD-10-CM

## 2025-03-20 PROBLEM — E66.813 CLASS 3 SEVERE OBESITY DUE TO EXCESS CALORIES WITH SERIOUS COMORBIDITY AND BODY MASS INDEX (BMI) OF 40.0 TO 44.9 IN ADULT (HCC): Status: RESOLVED | Noted: 2024-09-27 | Resolved: 2025-03-20

## 2025-03-20 PROCEDURE — 99204 OFFICE O/P NEW MOD 45 MIN: CPT | Performed by: NURSE PRACTITIONER

## 2025-03-20 RX ORDER — TERBINAFINE HYDROCHLORIDE 250 MG/1
TABLET ORAL
COMMUNITY
Start: 2025-03-18

## 2025-03-20 RX ORDER — LEVOTHYROXINE SODIUM 25 UG/1
TABLET ORAL
Qty: 15 TABLET | Refills: 2 | Status: SHIPPED | OUTPATIENT
Start: 2025-03-20

## 2025-03-20 NOTE — ASSESSMENT & PLAN NOTE
Thyroglobulin levels appropriately low from August 2024.  Ultrasound head, neck, lymph node mapping in March 2025.   Denies neck compressive symptoms.     Orders:    Thyroglobulin; Future

## 2025-03-20 NOTE — PROGRESS NOTES
Name: Joseluis Blake      : 1965      MRN: [id number]  Encounter Provider: SOLE Ward  Encounter Date: 3/20/2025   Encounter department: [facility]    No chief complaint on file.  :  Assessment & Plan  Papillary thyroid carcinoma (HCC)  Thyroglobulin levels appropriately low from 2024.  Ultrasound head, neck, lymph node mapping in 2025.   Denies neck compressive symptoms.     Orders:    Thyroglobulin; Future    Post-operative hypothyroidism  Clinically euthyroid on levothyroxine 200 mcg daily.   TSH in goal <0.1.  Recommend increase levothyroxine to 212.5 mcg daily.  Recheck TSH and free T4 in 6 weeks.        Orders:    TSH, 3rd generation; Future    T4, free; Future    levothyroxine (Synthroid) 25 mcg tablet; Take 1/2 tablet daily (200 mcg tablet + 12.5 mcg = 212.5 mcg daily)    Type 2 diabetes mellitus with morbid obesity (HCC)    Lab Results   Component Value Date    HGBA1C 6.0 (H) 2025     Managed by PCP on lifestyle modifications. Will discuss lipid panel with PCP, could consider low-dose statin.              History of Present Illness     Joseluis Blake is a 59 y.o. male who presents with a history of thyroid carcinoma.  Had history of multiple thyroid nodules with an FNA completed in 2020 which was benign, AUS.  Patient had left hemithyroidectomy in 2022 pathology demonstrated papillary thyroid carcinoma with infiltrative follicular variant 4.5 cm. Last ultrasound of head pleated in 2025 no evidence of recurrence or metastatic disease.  Thyroglobulin by VICKI was less than 0.1 in 2024.  Thyroglobulin antibody less than 1.0 in 2024.      Currently taking levothyroxine 200 mcg orally daily.  Continues taking regularly and properly. Continues to deny symptoms consistent with hypo/hyperthyroidism.    History obtained from: patient     Review of Systems as per HPI  Medical History Reviewed by  "provider this encounter:  Tobacco  Allergies  Meds  Problems  Med Hx  Surg Hx  Fam Hx     .  Current Outpatient Medications on File Prior to Visit   Medication Sig Dispense Refill    allopurinol (ZYLOPRIM) 300 mg tablet TAKE 1 TABLET EVERY DAY 90 tablet 1    amLODIPine (NORVASC) 5 mg tablet TAKE 1 TABLET EVERY DAY 90 tablet 1    Ergocalciferol (VITAMIN D2 PO) Take by mouth      levothyroxine 200 mcg tablet TAKE 1 TABLET EVERY DAY 90 tablet 1    lisinopril-hydrochlorothiazide (PRINZIDE,ZESTORETIC) 20-25 MG per tablet TAKE 1 TABLET EVERY DAY 90 tablet 1    omeprazole (PriLOSEC) 20 mg delayed release capsule TAKE 1 CAPSULE EVERY DAY 90 capsule 1    potassium citrate (UROCIT-K) 10 mEq Take 10 mEq by mouth 3 (three) times a day with meals      Potassium Citrate ER 15 MEQ (1620 MG) TBCR       terbinafine (LamISIL) 250 mg tablet        No current facility-administered medications on file prior to visit.         Medical History Reviewed by provider this encounter:     .    Objective   /76 (BP Location: Left arm, Patient Position: Sitting, Cuff Size: Large)   Pulse 84   Ht 6' 4\" (1.93 m)   Wt (!) 170 kg (375 lb)   SpO2 96%   BMI 45.65 kg/m²      Body mass index is 45.65 kg/m².  Wt Readings from Last 3 Encounters:   03/20/25 (!) 170 kg (375 lb)   03/06/25 (!) 170 kg (375 lb)   02/21/25 (!) 172 kg (379 lb 3.1 oz)        Physical Exam  Vitals reviewed.   Constitutional:       Appearance: Normal appearance.      No cervical adenopathy.   Cardiovascular:      Rate and Rhythm: Normal rate and regular rhythm.      Pulses: Normal pulses.      Heart sounds: Normal heart sounds.   Pulmonary:      Effort: Pulmonary effort is normal.      Breath sounds: Normal breath sounds.   Skin:     General: Skin is warm and dry.      Capillary Refill: Capillary refill takes less than 2 seconds.   Neurological:      General: No focal deficit present.      Mental Status: He is alert and oriented to person, place, and time.     No " tremors with oustretched arms.   Psychiatric:         Mood and Affect: Mood normal.         Behavior: Behavior normal.       Labs:   Lab Results   Component Value Date    HGBA1C 6.0 (H) 03/06/2025    HGBA1C 5.6 12/27/2024    HGBA1C 5.6 09/17/2024     Lab Results   Component Value Date    CREATININE 1.09 09/17/2024    CREATININE 1.08 12/05/2023    CREATININE 1.06 07/28/2023    BUN 28 (H) 09/17/2024    K 4.0 09/17/2024     09/17/2024    CO2 25 09/17/2024     eGFR   Date Value Ref Range Status   09/17/2024 72 ml/min/1.73sq m Final     Lab Results   Component Value Date    HDL 35 (L) 03/06/2025    TRIG 78 03/06/2025    CHOLHDL 3.8 12/10/2018     Lab Results   Component Value Date    ALT 20 09/17/2024    AST 19 09/17/2024    ALKPHOS 54 09/17/2024     Lab Results   Component Value Date    OUM5VTGAXMUL 0.201 (L) 03/06/2025    CJT5VTHTGDHU 0.112 (L) 11/18/2024    FQB9BYBKCGHP 0.079 (L) 09/17/2024     Lab Results   Component Value Date    FREET4 1.25 (H) 03/06/2025       There are no Patient Instructions on file for this visit.    Discussed with the patient and all questioned fully answered. He will call me if any problems arise.    Administrative Statements

## 2025-03-20 NOTE — ASSESSMENT & PLAN NOTE
Clinically euthyroid on levothyroxine 200 mcg daily.   TSH in goal <0.1.  Recommend increase levothyroxine to 212.5 mcg daily.  Recheck TSH and free T4 in 6 weeks.        Orders:    TSH, 3rd generation; Future    T4, free; Future    levothyroxine (Synthroid) 25 mcg tablet; Take 1/2 tablet daily (200 mcg tablet + 12.5 mcg = 212.5 mcg daily)

## 2025-03-20 NOTE — ASSESSMENT & PLAN NOTE
Lab Results   Component Value Date    HGBA1C 6.0 (H) 03/06/2025     Managed by PCP on lifestyle modifications. Will discuss lipid panel with PCP, could consider low-dose statin.

## 2025-03-27 ENCOUNTER — OFFICE VISIT (OUTPATIENT)
Dept: FAMILY MEDICINE CLINIC | Facility: CLINIC | Age: 60
End: 2025-03-27
Payer: COMMERCIAL

## 2025-03-27 VITALS
BODY MASS INDEX: 38.36 KG/M2 | TEMPERATURE: 98.2 F | SYSTOLIC BLOOD PRESSURE: 118 MMHG | RESPIRATION RATE: 20 BRPM | HEIGHT: 76 IN | DIASTOLIC BLOOD PRESSURE: 80 MMHG | WEIGHT: 315 LBS | OXYGEN SATURATION: 96 % | HEART RATE: 87 BPM

## 2025-03-27 DIAGNOSIS — N18.2 TYPE 2 DIABETES MELLITUS WITH STAGE 2 CHRONIC KIDNEY DISEASE, WITHOUT LONG-TERM CURRENT USE OF INSULIN  (HCC): ICD-10-CM

## 2025-03-27 DIAGNOSIS — E66.01 TYPE 2 DIABETES MELLITUS WITH MORBID OBESITY (HCC): Primary | ICD-10-CM

## 2025-03-27 DIAGNOSIS — K21.9 GASTROESOPHAGEAL REFLUX DISEASE WITHOUT ESOPHAGITIS: ICD-10-CM

## 2025-03-27 DIAGNOSIS — E78.2 MIXED HYPERLIPIDEMIA: ICD-10-CM

## 2025-03-27 DIAGNOSIS — E11.69 TYPE 2 DIABETES MELLITUS WITH MORBID OBESITY (HCC): Primary | ICD-10-CM

## 2025-03-27 DIAGNOSIS — E89.0 POST-OPERATIVE HYPOTHYROIDISM: ICD-10-CM

## 2025-03-27 DIAGNOSIS — I10 PRIMARY HYPERTENSION: ICD-10-CM

## 2025-03-27 DIAGNOSIS — E11.22 TYPE 2 DIABETES MELLITUS WITH STAGE 2 CHRONIC KIDNEY DISEASE, WITHOUT LONG-TERM CURRENT USE OF INSULIN  (HCC): ICD-10-CM

## 2025-03-27 PROBLEM — D17.0 LIPOMA OF NECK: Status: RESOLVED | Noted: 2025-02-21 | Resolved: 2025-03-27

## 2025-03-27 PROCEDURE — 99214 OFFICE O/P EST MOD 30 MIN: CPT | Performed by: FAMILY MEDICINE

## 2025-03-27 PROCEDURE — G2211 COMPLEX E/M VISIT ADD ON: HCPCS | Performed by: FAMILY MEDICINE

## 2025-03-27 RX ORDER — ROSUVASTATIN CALCIUM 5 MG/1
5 TABLET, COATED ORAL DAILY
Qty: 100 TABLET | Refills: 1 | Status: SHIPPED | OUTPATIENT
Start: 2025-03-27

## 2025-03-27 NOTE — ASSESSMENT & PLAN NOTE
WATCHING CHOLESTEROL INTAKE  COMPLIANT WITH MEDICATION  NO CONCERNS    - CONTINUE TO MONITOR DIETARY CHOL INTAKE  - CONTINUE CURRENT MEDICATION  - ENCOURAGED PHYSICAL ACTIVITY    Orders:  •  rosuvastatin (CRESTOR) 5 mg tablet; Take 1 tablet (5 mg total) by mouth daily

## 2025-03-27 NOTE — ASSESSMENT & PLAN NOTE
Lab Results   Component Value Date    HGBA1C 6.0 (H) 03/06/2025       COMPLIANT WITH MEDICATION  DENIES ANY NUMBNESS, TINGLING IN FEET    - DISCUSSED DIETARY MODIFICATION OF CARBOHYDRATES  - HGB A1C AND URINE STUDIES DUE TODAY  - FOOT CARE  - RV 3 MONTHS

## 2025-03-27 NOTE — LETTER
KERRY BALLESTEROS      Current Outpatient Medications:     allopurinol (ZYLOPRIM) 300 mg tablet, TAKE 1 TABLET EVERY DAY, Disp: 90 tablet, Rfl: 1    amLODIPine (NORVASC) 5 mg tablet, TAKE 1 TABLET EVERY DAY, Disp: 90 tablet, Rfl: 1    Ergocalciferol (VITAMIN D2 PO), Take by mouth, Disp: , Rfl:     levothyroxine (Synthroid) 25 mcg tablet, Take 1/2 tablet daily (200 mcg tablet + 12.5 mcg = 212.5 mcg daily), Disp: 15 tablet, Rfl: 2    levothyroxine 200 mcg tablet, TAKE 1 TABLET EVERY DAY, Disp: 90 tablet, Rfl: 1    lisinopril-hydrochlorothiazide (PRINZIDE,ZESTORETIC) 20-25 MG per tablet, TAKE 1 TABLET EVERY DAY, Disp: 90 tablet, Rfl: 1    omeprazole (PriLOSEC) 20 mg delayed release capsule, TAKE 1 CAPSULE EVERY DAY, Disp: 90 capsule, Rfl: 1    potassium citrate (UROCIT-K) 10 mEq, Take 10 mEq by mouth 3 (three) times a day with meals, Disp: , Rfl:     Potassium Citrate ER 15 MEQ (1620 MG) TBCR, , Disp: , Rfl:     terbinafine (LamISIL) 250 mg tablet, , Disp: , Rfl:       Recent Results (from the past 4 weeks)   TSH, 3rd generation    Collection Time: 03/06/25 11:29 AM   Result Value Ref Range    TSH 3RD GENERATON 0.201 (L) 0.450 - 4.500 uIU/mL   T4, free    Collection Time: 03/06/25 11:29 AM   Result Value Ref Range    Free T4 1.25 (H) 0.61 - 1.12 ng/dL   Hemoglobin A1C    Collection Time: 03/06/25 11:29 AM   Result Value Ref Range    Hemoglobin A1C 6.0 (H) Normal 4.0-5.6%; PreDiabetic 5.7-6.4%; Diabetic >=6.5%; Glycemic control for adults with diabetes <7.0% %     mg/dl   Lipid panel    Collection Time: 03/06/25 11:29 AM   Result Value Ref Range    Cholesterol 190 See Comment mg/dL    Triglycerides 78 See Comment mg/dL    HDL, Direct 35 (L) >=40 mg/dL    LDL Calculated 139 (H) 0 - 100 mg/dL    Non-HDL-Chol (CHOL-HDL) 155 mg/dl

## 2025-03-27 NOTE — PATIENT INSTRUCTIONS
CONTINUE CURRENT TREATMENT PLAN  MONITOR DIETARY SODIUM, CHOL, AND CARBOHYDRATE INTAKE  ENCOURAGE PHYSICAL ACTIVITY  FOOT CARE    RV 3 M, SOONER PRN      Recent Results (from the past 4 weeks)   TSH, 3rd generation    Collection Time: 03/06/25 11:29 AM   Result Value Ref Range    TSH 3RD GENERATON 0.201 (L) 0.450 - 4.500 uIU/mL   T4, free    Collection Time: 03/06/25 11:29 AM   Result Value Ref Range    Free T4 1.25 (H) 0.61 - 1.12 ng/dL   Hemoglobin A1C    Collection Time: 03/06/25 11:29 AM   Result Value Ref Range    Hemoglobin A1C 6.0 (H) Normal 4.0-5.6%; PreDiabetic 5.7-6.4%; Diabetic >=6.5%; Glycemic control for adults with diabetes <7.0% %     mg/dl   Lipid panel    Collection Time: 03/06/25 11:29 AM   Result Value Ref Range    Cholesterol 190 See Comment mg/dL    Triglycerides 78 See Comment mg/dL    HDL, Direct 35 (L) >=40 mg/dL    LDL Calculated 139 (H) 0 - 100 mg/dL    Non-HDL-Chol (CHOL-HDL) 155 mg/dl

## 2025-03-27 NOTE — PROGRESS NOTES
Name: Joseluis Blake      : 1965      MRN: [id number]  Encounter Provider: Juni Vinson MD  Encounter Date: 3/27/2025   Encounter department: [facility]    Assessment & Plan  Type 2 diabetes mellitus with morbid obesity (HCC)    Lab Results   Component Value Date    HGBA1C 6.0 (H) 2025       COMPLIANT WITH MEDICATION  DENIES ANY NUMBNESS, TINGLING IN FEET    - DISCUSSED DIETARY MODIFICATION OF CARBOHYDRATES  - HGB A1C AND URINE STUDIES DUE TODAY  - FOOT CARE  - RV 3 MONTHS         Primary hypertension  STABLE  DENIES ANY CP, SOB, PALPITATIONS, OR HEADACHE  NOTES NO WATER RETENTION  COMPLIANT WITH MEDICATION  NO CONCERNS    - CONTINUE CURRENT TREATMENT PLAN  - MONITOR DIETARY SODIUM INTAKE  - ENCOURAGE PHYSICAL ACTIVITY  - RV 3 MONTHS         Type 2 diabetes mellitus with stage 2 chronic kidney disease, without long-term current use of insulin  (HCC)    Lab Results   Component Value Date    HGBA1C 6.0 (H) 2025            Gastroesophageal reflux disease without esophagitis  STABLE  DENIES ANY CP, INDIGESTION, OR COUGH  NOTES NO MELENA OR HEMATOCHEZIA  NO HEMATEMESIS  COMPLIANT WITH MEDICATION  NO CONCERNS    - CONTINUE CURRENT TREATMENT PLAN  - MEDICATION AS PRESCRIBED  - AVOID CAFFEINE, ALCOHOL OR SMOKING         Post-operative hypothyroidism  STABLE         Mixed hyperlipidemia  WATCHING CHOLESTEROL INTAKE  COMPLIANT WITH MEDICATION  NO CONCERNS    - CONTINUE TO MONITOR DIETARY CHOL INTAKE  - CONTINUE CURRENT MEDICATION  - ENCOURAGED PHYSICAL ACTIVITY    Orders:  •  rosuvastatin (CRESTOR) 5 mg tablet; Take 1 tablet (5 mg total) by mouth daily      Depression Screening and Follow-up Plan: Patient was screened for depression during today's encounter. They screened negative with a PHQ-2 score of 0.          History of Present Illness     HPI  Review of Systems   Constitutional:  Negative for chills, fatigue and fever.   HENT:  Negative for congestion, ear  "discharge, ear pain, mouth sores, postnasal drip, sore throat and trouble swallowing.    Eyes:  Negative for pain, discharge and visual disturbance.   Respiratory:  Negative for cough, shortness of breath and wheezing.    Cardiovascular:  Negative for chest pain, palpitations and leg swelling.   Gastrointestinal:  Negative for abdominal distention, abdominal pain, blood in stool, diarrhea and nausea.   Endocrine: Negative for polydipsia, polyphagia and polyuria.   Genitourinary:  Negative for dysuria, frequency, hematuria and urgency.   Musculoskeletal:  Negative for arthralgias, gait problem and joint swelling.   Skin:  Negative for pallor and rash.   Neurological:  Negative for dizziness, syncope, speech difficulty, weakness, light-headedness, numbness and headaches.   Hematological:  Negative for adenopathy.   Psychiatric/Behavioral:  Negative for behavioral problems, confusion and sleep disturbance. The patient is not nervous/anxious.      Past Medical History:   Diagnosis Date   • Borderline diabetes     per pt A1C \"coming down\"   • Cancer ([facility])    • Chronic pain disorder     lower back-s/p laminectomy   • Claustrophobia     \"some degree\" jes MRI's\"   • CPAP (continuous positive airway pressure) dependence     per pt does not use CPAP   • Diverticulitis    • Diverticulitis of colon    • GERD (gastroesophageal reflux disease)    • Gout    • Hypertension    • Kidney stone     x 2 episodes   • Lactose intolerance    • Localized pain of joint    • Mild sleep apnea     CPAP started in July having difficulty using- only has used on occ   • Morbid obesity with body mass index (BMI) of 50.0 to 59.9 in adult ([facility])    • Obesity many years   • Papillary thyroid carcinoma ([facility])    • Rectal bleeding     occ rectal bleeding last episode unsure-\"nothing recent\"   • Seasickness    • Skin tag     skin tags were removed-as of 8/2/19 has a few more   • Teeth missing    • Thyroid nodule    • Tinnitus    • Wears glasses    • Weight " "loss     \"80lbs and did gain 40lbs back\"--     Past Surgical History:   Procedure Laterality Date   • BACK SURGERY      laminectomy-1996, 2010   • COLONOSCOPY      x2   • ESOPHAGOGASTRODUODENOSCOPY N/A 03/30/2019    Procedure: ESOPHAGOGASTRODUODENOSCOPY (EGD);  Surgeon: Kory Suresh MD;  Location: [facility];  Service: Gastroenterology   • FL RETROGRADE PYELOGRAM  09/16/2022   • FL RETROGRADE PYELOGRAM  09/22/2022   • FL RETROGRADE PYELOGRAM  09/29/2022   • KNEE ARTHROSCOPY Left 2007   • WI CYSTO/URETERO W/LITHOTRIPSY &INDWELL STENT INSRT Right 01/06/2022    Procedure: CYSTOSCOPY URETEROSCOPY AND INSERTION STENT URETERAL RIGHT;  Surgeon: Won Lawton MD;  Location: [facility];  Service: Urology   • WI CYSTO/URETERO W/LITHOTRIPSY &INDWELL STENT INSRT Right 09/22/2022    Procedure: CYSTOSCOPY URETEROSCOPY WITH LITHOTRIPSY HOLMIUM LASER, BASKET EXTRACTION, RETROGRADE PYELOGRAM (BILATERAL) AND  STENT EXCHANGE;  Surgeon: Won Lawton MD;  Location: [facility];  Service: Urology   • WI CYSTO/URETERO W/LITHOTRIPSY &INDWELL STENT INSRT Bilateral 09/29/2022    Procedure: CYSTOSCOPY URETEROSCOPY, STENT MANUPILATION, RETROGRADE PYELOGRAM, LEFT  URETERAL STENT NSERTION, AND  REMOVAL OF RIGHT STENT;  Surgeon: Won Lawton MD;  Location: [facility];  Service: Urology   • WI CYSTO/URETERO W/LITHOTRIPSY &INDWELL STENT INSRT Left 10/13/2022    Procedure: CYSTOSCOPY URETEROSCOPY WITH LITHOTRIPSY BASKET EXTRACTION, RETROGRADE PYELOGRAM AND  STENT EXCHANGE;  Surgeon: Won Lawton MD;  Location: [facility];  Service: Urology   • WI CYSTOURETHROSCOPY W/URETERAL CATHETERIZATION Right 09/16/2022    Procedure: CYSTOSCOPY RETROGRADE PYELOGRAM WITH INSERTION STENT URETERAL retrograde;  Surgeon: Won Lawton MD;  [facility]: [facility];  Service: Urology   • WI ESOPHAGOGASTRODUODENOSCOPY TRANSORAL DIAGNOSTIC N/A 05/01/2019    Procedure: ESOPHAGOGASTRODUODENOSCOPY (EGD);  Surgeon: Kory Suresh MD;  Location: [facility];  " Service: Gastroenterology   • SD EXC B9 LESION MRGN XCP SK TG T/A/L >4.0 CM N/A 02/21/2025    Procedure: EXCISION LIPOMA;  Surgeon: Haroon Patel MD;  Location: [facility];  Service: General   • SD THYROIDECTOMY RMVL REMAINING TISS FLWG PRTL RMVL Right 02/08/2023    Procedure: COMPLETION THYROIDECTOMY;  Surgeon: Timbo Bolaños MD;  Location: [facility];  Service: ENT   • SD TOTAL THYROID LOBECTOMY UNI W/WO ISTHMUSECTOMY Left 08/31/2022    Procedure: Left patial THYROIDECTOMY;  Surgeon: Timbo Bolaños MD;  Location: [facility];  Service: ENT   • THYROID SURGERY     • UPPER GASTROINTESTINAL ENDOSCOPY  2006   • US GUIDED THYROID BIOPSY  12/28/2020     Family History   Problem Relation Age of Onset   • Osteoarthritis Mother    • Obesity Father         [id number]   • Sleep apnea Father         with CPAP   • No Known Problems Sister    • Lupus Sister    • No Known Problems Sister    • No Known Problems Brother    • No Known Problems Son    • Substance Abuse Neg Hx    • Mental illness Neg Hx    • Cancer Neg Hx    • Diabetes Neg Hx    • Heart disease Neg Hx    • Stroke Neg Hx      Social History     Tobacco Use   • Smoking status: Never     Passive exposure: Never   • Smokeless tobacco: Never   Vaping Use   • Vaping status: Never Used   Substance and Sexual Activity   • Alcohol use: No   • Drug use: No   • Sexual activity: Not Currently     Partners: Female     Comment: defer     Current Outpatient Medications on File Prior to Visit   Medication Sig   • allopurinol (ZYLOPRIM) 300 mg tablet TAKE 1 TABLET EVERY DAY   • amLODIPine (NORVASC) 5 mg tablet TAKE 1 TABLET EVERY DAY   • Ergocalciferol (VITAMIN D2 PO) Take by mouth   • levothyroxine (Synthroid) 25 mcg tablet Take 1/2 tablet daily (200 mcg tablet + 12.5 mcg = 212.5 mcg daily)   • levothyroxine 200 mcg tablet TAKE 1 TABLET EVERY DAY   • lisinopril-hydrochlorothiazide (PRINZIDE,ZESTORETIC) 20-25 MG per tablet TAKE 1 TABLET EVERY DAY   • omeprazole  "(PriLOSEC) 20 mg delayed release capsule TAKE 1 CAPSULE EVERY DAY   • potassium citrate (UROCIT-K) 10 mEq Take 10 mEq by mouth 3 (three) times a day with meals   • Potassium Citrate ER 15 MEQ (1620 MG) TBCR    • terbinafine (LamISIL) 250 mg tablet      Allergies   Allergen Reactions   • Lactose - Food Allergy GI Intolerance     Immunization History   Administered Date(s) Administered   • Influenza, injectable, quadrivalent, preservative free 0.5 mL 09/26/2023   • Tdap 07/02/2010, 06/26/2018     Objective   /80 (BP Location: Left arm, Patient Position: Sitting, Cuff Size: Large)   Pulse 87   Temp 98.2 °F (36.8 °C) (Temporal)   Resp 20   Ht 6' 4\" (1.93 m)   Wt (!) 169 kg (372 lb)   SpO2 96%   BMI 45.28 kg/m²     Physical Exam  Vitals reviewed.   Constitutional:       General: He is not in acute distress.     Appearance: Normal appearance. He is well-developed. He is obese. He is not ill-appearing.   HENT:      Head: Normocephalic and atraumatic.      Nose: Nose normal.      Mouth/Throat:      Mouth: Mucous membranes are moist.   Eyes:      General:         Right eye: No discharge.         Left eye: No discharge.      Conjunctiva/sclera: Conjunctivae normal.      Pupils: Pupils are equal, round, and reactive to light.   Neck:      Thyroid: No thyromegaly.      Vascular: No JVD.   Cardiovascular:      Rate and Rhythm: Normal rate and regular rhythm.      Heart sounds: Normal heart sounds. No murmur heard.  Pulmonary:      Effort: Pulmonary effort is normal.      Breath sounds: Normal breath sounds. No wheezing or rales.   Abdominal:      General: Bowel sounds are normal.      Palpations: Abdomen is soft. There is no mass.      Tenderness: There is no abdominal tenderness. There is no guarding or rebound.   Musculoskeletal:         General: No tenderness or deformity. Normal range of motion.      Cervical back: Neck supple.   Lymphadenopathy:      Cervical: No cervical adenopathy.   Skin:     General: Skin " is warm and dry.      Findings: No erythema or rash.   Neurological:      General: No focal deficit present.      Mental Status: He is alert and oriented to person, place, and time.   Psychiatric:         Mood and Affect: Mood normal.         Behavior: Behavior normal.         Thought Content: Thought content normal.         Judgment: Judgment normal.

## 2025-04-13 NOTE — ADDENDUM NOTE
Addended by: JORGE MCKEON on: 10/1/2024 09:43 AM     Modules accepted: Fannie     Spiritual Health History and Assessment/Progress Note  [facility]    Attempted Encounter,  ,  ,      Name: Yefri Vigil MRN: [id number]    Age: 65 y.o.     Sex: male   Language: English   Amish: None   Colonic mass     Date: 4/13/2025            Total Time Calculated: 10 min              Spiritual Assessment began in [facility]        Referral/Consult From: Rounding   Encounter Overview/Reason: Attempted Encounter  Service Provided For: Patient not available    Kassandra, Belief, Meaning:   Patient unable to assess at this time  Family/Friends No family/friends present      Importance and Influence:  Patient unable to assess at this time  Family/Friends No family/friends present    Community:  Patient Other: Unable to assess  Family/Friends No family/friends present    Assessment and Plan of Care:     Patient Interventions include: Other: Unable to assess  Family/Friends Interventions include: No family/friends present    Patient Plan of Care: Spiritual Care available upon further referral  Family/Friends Plan of Care: Spiritual Care available upon further referral    Electronically signed by SARAH Acosta on 4/13/2025 at 2:40 PM   Rev. ANOOP Acosta, SARAH  [facility]   Paging Service [phone])

## 2025-04-16 DIAGNOSIS — M10.9 GOUT, ARTHROPATHY: ICD-10-CM

## 2025-04-17 RX ORDER — ALLOPURINOL 300 MG/1
300 TABLET ORAL DAILY
Qty: 90 TABLET | Refills: 1 | Status: SHIPPED | OUTPATIENT
Start: 2025-04-17

## 2025-04-18 DIAGNOSIS — B35.1 TOENAIL FUNGUS: Primary | ICD-10-CM

## 2025-04-18 RX ORDER — TERBINAFINE HYDROCHLORIDE 250 MG/1
TABLET ORAL
Qty: 30 TABLET | Refills: 2 | Status: SHIPPED | OUTPATIENT
Start: 2025-04-18 | End: 2025-06-02

## 2025-04-24 NOTE — ASSESSMENT & PLAN NOTE
Patient was here a few days ago with complaints of left-sided abdominal pain found to have diverticulitis  CT of the abdomen and pelvis upon admission shows improvement    · Review Augmentin to finish the course  · Patient follow-up with GI What Type Of Note Output Would You Prefer (Optional)?: Bullet Format What Is The Reason For Today's Visit?: Full Body Skin Examination with No Concerns

## 2025-05-06 DIAGNOSIS — N52.9 ERECTILE DYSFUNCTION, UNSPECIFIED ERECTILE DYSFUNCTION TYPE: Primary | ICD-10-CM

## 2025-05-06 RX ORDER — SILDENAFIL 100 MG/1
100 TABLET, FILM COATED ORAL DAILY PRN
Qty: 10 TABLET | Refills: 0 | Status: SHIPPED | OUTPATIENT
Start: 2025-05-06

## 2025-05-27 ENCOUNTER — TELEPHONE (OUTPATIENT)
Age: 60
End: 2025-05-27

## 2025-05-27 ENCOUNTER — OFFICE VISIT (OUTPATIENT)
Dept: URGENT CARE | Facility: CLINIC | Age: 60
End: 2025-05-27
Payer: COMMERCIAL

## 2025-05-27 VITALS
BODY MASS INDEX: 38.36 KG/M2 | HEIGHT: 76 IN | TEMPERATURE: 97.3 F | HEART RATE: 74 BPM | WEIGHT: 315 LBS | RESPIRATION RATE: 20 BRPM | DIASTOLIC BLOOD PRESSURE: 64 MMHG | OXYGEN SATURATION: 96 % | SYSTOLIC BLOOD PRESSURE: 138 MMHG

## 2025-05-27 DIAGNOSIS — H69.91 ACUTE DYSFUNCTION OF RIGHT EUSTACHIAN TUBE: Primary | ICD-10-CM

## 2025-05-27 PROCEDURE — G2211 COMPLEX E/M VISIT ADD ON: HCPCS | Performed by: FAMILY MEDICINE

## 2025-05-27 PROCEDURE — 99213 OFFICE O/P EST LOW 20 MIN: CPT | Performed by: FAMILY MEDICINE

## 2025-05-27 RX ORDER — FLUTICASONE PROPIONATE 50 MCG
1 SPRAY, SUSPENSION (ML) NASAL 2 TIMES DAILY
Qty: 16 G | Refills: 0 | Status: SHIPPED | OUTPATIENT
Start: 2025-05-27 | End: 2025-05-30

## 2025-05-27 NOTE — TELEPHONE ENCOUNTER
PT called to schedule an appt - PT has been to [facility] before, provided their office number as per referral

## 2025-05-27 NOTE — PROGRESS NOTES
[facility]        NAME: Joseluis Blake is a 60 y.o. male  : 1965    MRN: [id number]  DATE: May 27, 2025  TIME: 3:44 PM    Assessment and Plan   Acute dysfunction of right eustachian tube [H69.91]  1. Acute dysfunction of right eustachian tube  fluticasone (FLONASE) 50 mcg/act nasal spray    Ambulatory Referral to Otolaryngology        Right ear muffled hearing likely secondary to eustachian tube dysfunction.  Flonase prescribed.  Sent to ENT to evaluate for the possibility of TM rupture.    Patient Instructions     Follow up with PCP in 3-5 days.  Proceed to  ER if symptoms worsen.    If tests have been performed at [facility], our office will contact you with results if changes need to be made to the care plan discussed with you at the visit.  [facility]    Chief Complaint     Chief Complaint   Patient presents with    clogged ear     Has had several days of a clogged ear         History of Present Illness       60-year-old male presents today with muffled hearing from the right ear.  Denies any other associated symptoms.        Review of Systems   Review of Systems   Constitutional:  Negative for chills and fever.   HENT:  Positive for hearing loss (Right). Negative for congestion, ear pain and rhinorrhea.    Respiratory:  Negative for cough and shortness of breath.    Cardiovascular:  Negative for chest pain.   Gastrointestinal:  Negative for abdominal pain and nausea.   Neurological:  Negative for dizziness and headaches.     Current Medications     Current Medications[1]    Current Allergies     Allergies as of 2025 - Reviewed 2025   Allergen Reaction Noted    Lactose - food allergy GI Intolerance 2019            The following portions of the patient's history were reviewed and updated as appropriate: allergies, current medications, past family history, past medical history, past social history, past surgical history and problem  "list.     Past Medical History[2]    Past Surgical History[3]    Family History[4]      Medications have been verified.        Objective   /64 (Patient Position: Sitting, Cuff Size: Large)   Pulse 74   Temp (!) 97.3 °F (36.3 °C)   Resp 20   Ht 6' 4\" (1.93 m)   Wt (!) 169 kg (373 lb)   SpO2 96%   BMI 45.40 kg/m²   No LMP for male patient.       Physical Exam     Physical Exam  Vitals and nursing note reviewed.   Constitutional:       General: He is in acute distress.      Appearance: Normal appearance. He is obese. He is not ill-appearing, toxic-appearing or diaphoretic.   HENT:      Head: Normocephalic and atraumatic.      Right Ear: Ear canal and external ear normal.      Left Ear: Tympanic membrane, ear canal and external ear normal. There is no impacted cerumen.      Ears:      Comments: Right TM appears to be sucked in and possibly ruptured with some residual cerumen.  No surrounding erythema.     Nose: Nose normal. No congestion or rhinorrhea.      Mouth/Throat:      Mouth: Mucous membranes are moist.      Pharynx: No posterior oropharyngeal erythema.     Eyes:      General:         Right eye: No discharge.         Left eye: No discharge.      Conjunctiva/sclera: Conjunctivae normal.     Pulmonary:      Effort: Pulmonary effort is normal.     Skin:     General: Skin is warm.      Findings: No erythema.     Neurological:      General: No focal deficit present.      Mental Status: He is alert and oriented to person, place, and time.     Psychiatric:         Mood and Affect: Mood normal.         Behavior: Behavior normal.         Thought Content: Thought content normal.         Judgment: Judgment normal.                        [1]   Current Outpatient Medications:     allopurinol (ZYLOPRIM) 300 mg tablet, TAKE 1 TABLET EVERY DAY, Disp: 90 tablet, Rfl: 1    amLODIPine (NORVASC) 5 mg tablet, TAKE 1 TABLET EVERY DAY, Disp: 90 tablet, Rfl: 1    Ergocalciferol (VITAMIN D2 PO), Take by mouth, Disp: , Rfl: " "    fluticasone (FLONASE) 50 mcg/act nasal spray, 1 spray into each nostril 2 (two) times a day for 3 days, Disp: 16 g, Rfl: 0    levothyroxine (Synthroid) 25 mcg tablet, Take 1/2 tablet daily (200 mcg tablet + 12.5 mcg = 212.5 mcg daily), Disp: 15 tablet, Rfl: 2    levothyroxine 200 mcg tablet, TAKE 1 TABLET EVERY DAY, Disp: 90 tablet, Rfl: 1    lisinopril-hydrochlorothiazide (PRINZIDE,ZESTORETIC) 20-25 MG per tablet, TAKE 1 TABLET EVERY DAY, Disp: 90 tablet, Rfl: 1    omeprazole (PriLOSEC) 20 mg delayed release capsule, TAKE 1 CAPSULE EVERY DAY, Disp: 90 capsule, Rfl: 1    potassium citrate (UROCIT-K) 10 mEq, Take 10 mEq by mouth in the morning and 10 mEq at noon and 10 mEq in the evening. Take with meals., Disp: , Rfl:     Potassium Citrate ER 15 MEQ (1620 MG) TBCR, , Disp: , Rfl:     rosuvastatin (CRESTOR) 5 mg tablet, Take 1 tablet (5 mg total) by mouth daily, Disp: 100 tablet, Rfl: 1    sildenafil (VIAGRA) 100 mg tablet, Take 1 tablet (100 mg total) by mouth daily as needed for erectile dysfunction, Disp: 10 tablet, Rfl: 0    terbinafine (LamISIL) 250 mg tablet, TAKE ONE TABLET BY MOUTH EVERY DAY (GENERIC FOR LAMISIL), Disp: 30 tablet, Rfl: 2  [2]   Past Medical History:  Diagnosis Date    Borderline diabetes     per pt A1C \"coming down\"    Cancer (HCC)     Chronic pain disorder     lower back-s/p laminectomy    Claustrophobia     \"some degree\" jes MRI's\"    CPAP (continuous positive airway pressure) dependence     per pt does not use CPAP    Diverticulitis     Diverticulitis of colon     GERD (gastroesophageal reflux disease)     Gout     Hypertension     Kidney stone     x 2 episodes    Lactose intolerance     Localized pain of joint     Mild sleep apnea     CPAP started in July having difficulty using- only has used on occ    Morbid obesity with body mass index (BMI) of 50.0 to 59.9 in adult (HCC)     Obesity many years    Papillary thyroid carcinoma (HCC)     Rectal bleeding     occ rectal bleeding last " "episode unsure-\"nothing recent\"    Seasickness     Skin tag     skin tags were removed-as of 8/2/19 has a few more    Teeth missing     Thyroid nodule     Tinnitus     Wears glasses     Weight loss     \"80lbs and did gain 40lbs back\"--   [3]   Past Surgical History:  Procedure Laterality Date    BACK SURGERY      laminectomy-1996, 2010    COLONOSCOPY      x2    ESOPHAGOGASTRODUODENOSCOPY N/A 03/30/2019    Procedure: ESOPHAGOGASTRODUODENOSCOPY (EGD);  Surgeon: Kory Suresh MD;  Location: [facility];  Service: Gastroenterology    FL RETROGRADE PYELOGRAM  09/16/2022    FL RETROGRADE PYELOGRAM  09/22/2022    FL RETROGRADE PYELOGRAM  09/29/2022    KNEE ARTHROSCOPY Left 2007    NY CYSTO/URETERO W/LITHOTRIPSY &INDWELL STENT INSRT Right 01/06/2022    Procedure: CYSTOSCOPY URETEROSCOPY AND INSERTION STENT URETERAL RIGHT;  Surgeon: Won Lawton MD;  Location: [facility];  Service: Urology    NY CYSTO/URETERO W/LITHOTRIPSY &INDWELL STENT INSRT Right 09/22/2022    Procedure: CYSTOSCOPY URETEROSCOPY WITH LITHOTRIPSY HOLMIUM LASER, BASKET EXTRACTION, RETROGRADE PYELOGRAM (BILATERAL) AND  STENT EXCHANGE;  Surgeon: Won Lawton MD;  Location: [facility];  Service: Urology    NY CYSTO/URETERO W/LITHOTRIPSY &INDWELL STENT INSRT Bilateral 09/29/2022    Procedure: CYSTOSCOPY URETEROSCOPY, STENT MANUPILATION, RETROGRADE PYELOGRAM, LEFT  URETERAL STENT NSERTION, AND  REMOVAL OF RIGHT STENT;  Surgeon: Won Lawton MD;  Location: [facility];  Service: Urology    NY CYSTO/URETERO W/LITHOTRIPSY &INDWELL STENT INSRT Left 10/13/2022    Procedure: CYSTOSCOPY URETEROSCOPY WITH LITHOTRIPSY BASKET EXTRACTION, RETROGRADE PYELOGRAM AND  STENT EXCHANGE;  Surgeon: Won Lawton MD;  [facility]: [facility];  Service: Urology    NY CYSTOURETHROSCOPY W/URETERAL CATHETERIZATION Right 09/16/2022    Procedure: CYSTOSCOPY RETROGRADE PYELOGRAM WITH INSERTION STENT URETERAL retrograde;  Surgeon: Won Lawton MD;  Location: [facility];  Service: " Urology    NM ESOPHAGOGASTRODUODENOSCOPY TRANSORAL DIAGNOSTIC N/A 05/01/2019    Procedure: ESOPHAGOGASTRODUODENOSCOPY (EGD);  Surgeon: Kory Suresh MD;  Location: [facility];  Service: Gastroenterology    NM EXC B9 LESION MRGN XCP SK TG T/A/L >4.0 CM N/A 02/21/2025    Procedure: EXCISION LIPOMA;  Surgeon: Haroon Patel MD;  Location: [facility];  Service: General    NM THYROIDECTOMY RMVL REMAINING TISS FLWG PRTL RMVL Right 02/08/2023    Procedure: COMPLETION THYROIDECTOMY;  Surgeon: Timbo Bolaños MD;  Location: [facility];  Service: ENT    NM TOTAL THYROID LOBECTOMY UNI W/WO ISTHMUSECTOMY Left 08/31/2022    Procedure: Left patial THYROIDECTOMY;  Surgeon: Timbo Bolaños MD;  Location: [facility];  Service: ENT    THYROID SURGERY      UPPER GASTROINTESTINAL ENDOSCOPY  2006    US GUIDED THYROID BIOPSY  12/28/2020   [4]   Family History  Problem Relation Name Age of Onset    Osteoarthritis Mother      Obesity Father          [id number]    Sleep apnea Father          with CPAP    No Known Problems Sister      Lupus Sister      No Known Problems Sister      No Known Problems Brother      No Known Problems Son      Substance Abuse Neg Hx      Mental illness Neg Hx      Cancer Neg Hx      Diabetes Neg Hx      Heart disease Neg Hx      Stroke Neg Hx

## 2025-06-11 DIAGNOSIS — N52.9 ERECTILE DYSFUNCTION, UNSPECIFIED ERECTILE DYSFUNCTION TYPE: ICD-10-CM

## 2025-06-11 RX ORDER — SILDENAFIL 100 MG/1
TABLET, FILM COATED ORAL
Qty: 10 TABLET | Refills: 0 | Status: SHIPPED | OUTPATIENT
Start: 2025-06-11

## 2025-06-20 DIAGNOSIS — E89.0 POST-OPERATIVE HYPOTHYROIDISM: ICD-10-CM

## 2025-06-23 RX ORDER — LEVOTHYROXINE SODIUM 25 UG/1
TABLET ORAL
Qty: 15 TABLET | Refills: 2 | Status: SHIPPED | OUTPATIENT
Start: 2025-06-23

## 2025-07-09 DIAGNOSIS — E04.1 THYROID NODULE: ICD-10-CM

## 2025-07-09 DIAGNOSIS — C73 PAPILLARY THYROID CARCINOMA (HCC): ICD-10-CM

## 2025-07-09 RX ORDER — LEVOTHYROXINE SODIUM 200 UG/1
200 TABLET ORAL DAILY
Qty: 90 TABLET | Refills: 1 | Status: SHIPPED | OUTPATIENT
Start: 2025-07-09

## 2025-07-18 ENCOUNTER — HOSPITAL ENCOUNTER (EMERGENCY)
Facility: HOSPITAL | Age: 60
Discharge: HOME | End: 2025-07-19
Attending: STUDENT IN AN ORGANIZED HEALTH CARE EDUCATION/TRAINING PROGRAM | Admitting: STUDENT IN AN ORGANIZED HEALTH CARE EDUCATION/TRAINING PROGRAM
Payer: COMMERCIAL

## 2025-07-18 ENCOUNTER — APPOINTMENT (EMERGENCY)
Dept: RADIOLOGY | Facility: HOSPITAL | Age: 60
End: 2025-07-18
Payer: COMMERCIAL

## 2025-07-18 DIAGNOSIS — K57.92 DIVERTICULITIS: Primary | ICD-10-CM

## 2025-07-18 LAB
ATRIAL RATE: 79 BPM
BASOPHILS # BLD AUTO: 0.11 THOUSANDS/ÂΜL (ref 0–0.1)
BASOPHILS NFR BLD AUTO: 1 % (ref 0–1)
BILIRUB UR QL STRIP: NEGATIVE
CARDIAC TROPONIN I PNL SERPL HS: 3 NG/L (ref ?–50)
CLARITY UR: CLEAR
COLOR UR: YELLOW
EOSINOPHIL # BLD AUTO: 0.61 THOUSAND/ÂΜL (ref 0–0.61)
EOSINOPHIL NFR BLD AUTO: 4 % (ref 0–6)
ERYTHROCYTE [DISTWIDTH] IN BLOOD BY AUTOMATED COUNT: 13.5 % (ref 11.6–15.1)
GLUCOSE UR STRIP-MCNC: NEGATIVE MG/DL
HCT VFR BLD AUTO: 42.2 % (ref 36.5–49.3)
HGB BLD-MCNC: 14.2 G/DL (ref 12–17)
HGB UR QL STRIP.AUTO: ABNORMAL
IMM GRANULOCYTES # BLD AUTO: 0.09 THOUSAND/UL (ref 0–0.2)
IMM GRANULOCYTES NFR BLD AUTO: 1 % (ref 0–2)
KETONES UR STRIP-MCNC: NEGATIVE MG/DL
LEUKOCYTE ESTERASE UR QL STRIP: NEGATIVE
LYMPHOCYTES # BLD AUTO: 2.38 THOUSANDS/ÂΜL (ref 0.6–4.47)
LYMPHOCYTES NFR BLD AUTO: 17 % (ref 14–44)
MCH RBC QN AUTO: 31.3 PG (ref 26.8–34.3)
MCHC RBC AUTO-ENTMCNC: 33.6 G/DL (ref 31.4–37.4)
MCV RBC AUTO: 93 FL (ref 82–98)
MONOCYTES # BLD AUTO: 1.15 THOUSAND/ÂΜL (ref 0.17–1.22)
MONOCYTES NFR BLD AUTO: 8 % (ref 4–12)
NEUTROPHILS # BLD AUTO: 9.45 THOUSANDS/ÂΜL (ref 1.85–7.62)
NEUTS SEG NFR BLD AUTO: 69 % (ref 43–75)
NITRITE UR QL STRIP: NEGATIVE
NRBC BLD AUTO-RTO: 0 /100 WBCS
P AXIS: 61 DEGREES
PH UR STRIP.AUTO: 5.5 [PH]
PLATELET # BLD AUTO: 214 THOUSANDS/UL (ref 149–390)
PMV BLD AUTO: 9.9 FL (ref 8.9–12.7)
PR INTERVAL: 168 MS
PROT UR STRIP-MCNC: NEGATIVE MG/DL
QRS AXIS: 57 DEGREES
QRSD INTERVAL: 102 MS
QT INTERVAL: 388 MS
QTC INTERVAL: 444 MS
RBC # BLD AUTO: 4.53 MILLION/UL (ref 3.88–5.62)
SP GR UR STRIP.AUTO: 1.02 (ref 1–1.03)
T WAVE AXIS: 23 DEGREES
UROBILINOGEN UR STRIP-ACNC: <2 MG/DL
VENTRICULAR RATE: 79 BPM
WBC # BLD AUTO: 13.79 THOUSAND/UL (ref 4.31–10.16)

## 2025-07-18 PROCEDURE — 85025 COMPLETE CBC W/AUTO DIFF WBC: CPT | Performed by: STUDENT IN AN ORGANIZED HEALTH CARE EDUCATION/TRAINING PROGRAM

## 2025-07-18 PROCEDURE — 36415 COLL VENOUS BLD VENIPUNCTURE: CPT | Performed by: STUDENT IN AN ORGANIZED HEALTH CARE EDUCATION/TRAINING PROGRAM

## 2025-07-18 PROCEDURE — 83880 ASSAY OF NATRIURETIC PEPTIDE: CPT | Performed by: STUDENT IN AN ORGANIZED HEALTH CARE EDUCATION/TRAINING PROGRAM

## 2025-07-18 PROCEDURE — 81001 URINALYSIS AUTO W/SCOPE: CPT | Performed by: STUDENT IN AN ORGANIZED HEALTH CARE EDUCATION/TRAINING PROGRAM

## 2025-07-18 PROCEDURE — 93005 ELECTROCARDIOGRAM TRACING: CPT

## 2025-07-18 PROCEDURE — 71045 X-RAY EXAM CHEST 1 VIEW: CPT

## 2025-07-18 PROCEDURE — 96374 THER/PROPH/DIAG INJ IV PUSH: CPT

## 2025-07-18 PROCEDURE — 84484 ASSAY OF TROPONIN QUANT: CPT | Performed by: STUDENT IN AN ORGANIZED HEALTH CARE EDUCATION/TRAINING PROGRAM

## 2025-07-18 PROCEDURE — 99285 EMERGENCY DEPT VISIT HI MDM: CPT

## 2025-07-18 PROCEDURE — 99285 EMERGENCY DEPT VISIT HI MDM: CPT | Performed by: STUDENT IN AN ORGANIZED HEALTH CARE EDUCATION/TRAINING PROGRAM

## 2025-07-18 RX ORDER — ACETAMINOPHEN 10 MG/ML
1000 INJECTION, SOLUTION INTRAVENOUS ONCE
Status: COMPLETED | OUTPATIENT
Start: 2025-07-18 | End: 2025-07-19

## 2025-07-18 RX ADMIN — ACETAMINOPHEN 1000 MG: 10 INJECTION INTRAVENOUS at 23:45

## 2025-07-19 ENCOUNTER — APPOINTMENT (EMERGENCY)
Dept: RADIOLOGY | Facility: HOSPITAL | Age: 60
End: 2025-07-19
Payer: COMMERCIAL

## 2025-07-19 VITALS
SYSTOLIC BLOOD PRESSURE: 142 MMHG | TEMPERATURE: 98.8 F | OXYGEN SATURATION: 96 % | HEART RATE: 78 BPM | RESPIRATION RATE: 19 BRPM | DIASTOLIC BLOOD PRESSURE: 73 MMHG

## 2025-07-19 LAB
2HR DELTA HS TROPONIN: 0 NG/L
ALBUMIN SERPL BCG-MCNC: 3.7 G/DL (ref 3.5–5)
ALP SERPL-CCNC: 60 U/L (ref 34–104)
ALT SERPL W P-5'-P-CCNC: 19 U/L (ref 7–52)
ANION GAP SERPL CALCULATED.3IONS-SCNC: 5 MMOL/L (ref 4–13)
AST SERPL W P-5'-P-CCNC: 17 U/L (ref 13–39)
BACTERIA UR QL AUTO: NORMAL /HPF
BILIRUB SERPL-MCNC: 0.34 MG/DL (ref 0.2–1)
BNP SERPL-MCNC: 55 PG/ML (ref 0–100)
BUN SERPL-MCNC: 34 MG/DL (ref 5–25)
CALCIUM SERPL-MCNC: 8.5 MG/DL (ref 8.4–10.2)
CARDIAC TROPONIN I PNL SERPL HS: 3 NG/L (ref ?–50)
CHLORIDE SERPL-SCNC: 106 MMOL/L (ref 96–108)
CO2 SERPL-SCNC: 26 MMOL/L (ref 21–32)
CREAT SERPL-MCNC: 1.45 MG/DL (ref 0.6–1.3)
GFR SERPL CREATININE-BSD FRML MDRD: 51 ML/MIN/1.73SQ M
GLUCOSE SERPL-MCNC: 104 MG/DL (ref 65–140)
NON-SQ EPI CELLS URNS QL MICRO: NORMAL /HPF
POTASSIUM SERPL-SCNC: 3.9 MMOL/L (ref 3.5–5.3)
PROT SERPL-MCNC: 6.6 G/DL (ref 6.4–8.4)
RBC #/AREA URNS AUTO: NORMAL /HPF
SODIUM SERPL-SCNC: 137 MMOL/L (ref 135–147)
WBC #/AREA URNS AUTO: NORMAL /HPF

## 2025-07-19 PROCEDURE — 74177 CT ABD & PELVIS W/CONTRAST: CPT

## 2025-07-19 PROCEDURE — 84484 ASSAY OF TROPONIN QUANT: CPT | Performed by: STUDENT IN AN ORGANIZED HEALTH CARE EDUCATION/TRAINING PROGRAM

## 2025-07-19 PROCEDURE — 80053 COMPREHEN METABOLIC PANEL: CPT | Performed by: STUDENT IN AN ORGANIZED HEALTH CARE EDUCATION/TRAINING PROGRAM

## 2025-07-19 PROCEDURE — 96360 HYDRATION IV INFUSION INIT: CPT

## 2025-07-19 PROCEDURE — 36415 COLL VENOUS BLD VENIPUNCTURE: CPT | Performed by: STUDENT IN AN ORGANIZED HEALTH CARE EDUCATION/TRAINING PROGRAM

## 2025-07-19 RX ADMIN — SODIUM CHLORIDE 1000 ML: 0.9 INJECTION, SOLUTION INTRAVENOUS at 01:16

## 2025-07-19 RX ADMIN — IOHEXOL 100 ML: 350 INJECTION, SOLUTION INTRAVENOUS at 01:24

## 2025-07-19 RX ADMIN — AMOXICILLIN AND CLAVULANATE POTASSIUM 1 TABLET: 875; 125 TABLET, FILM COATED ORAL at 02:19

## 2025-07-19 NOTE — DISCHARGE INSTRUCTIONS
As discussed please take the antibiotics as prescribed.  Return to the emergency room for any worsening pain, fevers, chills, or for any other new or concerning symptoms.    Your labs show a possible early KIANA.  Please stay well-hydrated.  You should have your blood rechecked in 48 to 72 hours.

## 2025-07-22 LAB
ATRIAL RATE: 79 BPM
P AXIS: 61 DEGREES
PR INTERVAL: 168 MS
QRS AXIS: 57 DEGREES
QRSD INTERVAL: 102 MS
QT INTERVAL: 388 MS
QTC INTERVAL: 444 MS
T WAVE AXIS: 23 DEGREES
VENTRICULAR RATE: 79 BPM

## 2025-07-22 PROCEDURE — 93010 ELECTROCARDIOGRAM REPORT: CPT | Performed by: INTERNAL MEDICINE

## 2025-07-24 ENCOUNTER — APPOINTMENT (OUTPATIENT)
Dept: LAB | Facility: HOSPITAL | Age: 60
End: 2025-07-24
Attending: NURSE PRACTITIONER
Payer: COMMERCIAL

## 2025-07-24 DIAGNOSIS — K57.92 DIVERTICULITIS: ICD-10-CM

## 2025-07-24 DIAGNOSIS — C73 PAPILLARY THYROID CARCINOMA (HCC): ICD-10-CM

## 2025-07-24 DIAGNOSIS — E89.0 POST-OPERATIVE HYPOTHYROIDISM: ICD-10-CM

## 2025-07-24 LAB
ANION GAP SERPL CALCULATED.3IONS-SCNC: 11 MMOL/L (ref 4–13)
BUN SERPL-MCNC: 38 MG/DL (ref 5–25)
CALCIUM SERPL-MCNC: 9.9 MG/DL (ref 8.4–10.2)
CHLORIDE SERPL-SCNC: 105 MMOL/L (ref 96–108)
CO2 SERPL-SCNC: 23 MMOL/L (ref 21–32)
CREAT SERPL-MCNC: 1.3 MG/DL (ref 0.6–1.3)
GFR SERPL CREATININE-BSD FRML MDRD: 58 ML/MIN/1.73SQ M
GLUCOSE P FAST SERPL-MCNC: 104 MG/DL (ref 65–99)
POTASSIUM SERPL-SCNC: 4.3 MMOL/L (ref 3.5–5.3)
SODIUM SERPL-SCNC: 139 MMOL/L (ref 135–147)
T4 FREE SERPL-MCNC: 1.09 NG/DL (ref 0.61–1.12)
TSH SERPL DL<=0.05 MIU/L-ACNC: 0.21 UIU/ML (ref 0.45–4.5)

## 2025-07-24 PROCEDURE — 86800 THYROGLOBULIN ANTIBODY: CPT

## 2025-07-24 PROCEDURE — 84432 ASSAY OF THYROGLOBULIN: CPT

## 2025-07-24 PROCEDURE — 84439 ASSAY OF FREE THYROXINE: CPT

## 2025-07-24 PROCEDURE — 80048 BASIC METABOLIC PNL TOTAL CA: CPT

## 2025-07-24 PROCEDURE — 84443 ASSAY THYROID STIM HORMONE: CPT

## 2025-07-24 PROCEDURE — 36415 COLL VENOUS BLD VENIPUNCTURE: CPT

## 2025-07-25 LAB
THYROGLOB AB SERPL-ACNC: <1 IU/ML (ref 0–0.9)
THYROGLOB SERPL-MCNC: <0.1 NG/ML (ref 1.4–29.2)

## 2025-07-29 DIAGNOSIS — B35.1 TOENAIL FUNGUS: ICD-10-CM

## 2025-07-30 RX ORDER — TERBINAFINE HYDROCHLORIDE 250 MG/1
TABLET ORAL
Qty: 30 TABLET | Refills: 2 | Status: SHIPPED | OUTPATIENT
Start: 2025-07-30 | End: 2025-09-13

## 2025-08-19 DIAGNOSIS — K20.0 EOSINOPHILIC ESOPHAGITIS: ICD-10-CM

## 2025-08-19 DIAGNOSIS — I10 ESSENTIAL HYPERTENSION: ICD-10-CM

## 2025-08-21 RX ORDER — AMLODIPINE BESYLATE 5 MG/1
5 TABLET ORAL DAILY
Qty: 90 TABLET | Refills: 1 | Status: SHIPPED | OUTPATIENT
Start: 2025-08-21

## 2025-08-21 RX ORDER — LISINOPRIL AND HYDROCHLOROTHIAZIDE 20; 25 MG/1; MG/1
1 TABLET ORAL DAILY
Qty: 90 TABLET | Refills: 1 | Status: SHIPPED | OUTPATIENT
Start: 2025-08-21

## 2025-08-21 RX ORDER — OMEPRAZOLE 20 MG/1
20 CAPSULE, DELAYED RELEASE ORAL DAILY
Qty: 90 CAPSULE | Refills: 1 | Status: SHIPPED | OUTPATIENT
Start: 2025-08-21

## (undated) DEVICE — SPONGE STICK WITH PVP-I: Brand: KENDALL

## (undated) DEVICE — SUT SILK 2-0 SH 30 IN K833H

## (undated) DEVICE — POUCH UR CATCHER STERILE

## (undated) DEVICE — Device

## (undated) DEVICE — GAUZE SPONGES,16 PLY: Brand: CURITY

## (undated) DEVICE — CYSTOSCOPY PACK: Brand: CONVERTORS

## (undated) DEVICE — LUBRICANT SURGILUBE TUBE 4 OZ  FLIP TOP

## (undated) DEVICE — NEPTUNE E-SEP SMOKE EVACUATION PENCIL, COATED, 70MM BLADE, PUSH BUTTON SWITCH: Brand: NEPTUNE E-SEP

## (undated) DEVICE — TOWEL SET X-RAY

## (undated) DEVICE — URETERAL CATHETER POLLACK OPEN ENDED 5FR

## (undated) DEVICE — URETERAL CATH 5 FR

## (undated) DEVICE — SURGICEL 2 X 3

## (undated) DEVICE — LASER HOLMIUM FIBER 365 MIC

## (undated) DEVICE — GAUZE,SPONGE,2"X2",8PLY,STERILE,LF,2'S: Brand: MEDLINE

## (undated) DEVICE — SUT MONOCRYL 5-0 PC-2 18 IN Y495G

## (undated) DEVICE — THE DISPOSABLE ROTH NET FOREIGN BODY STANDARD RETRIEVAL DEVICE IS USED IN THE ENDOSCOPIC RETRIEVAL OF FOREIGN BODY, FOOD BOLUS AND EXCISED TISSUE SUCH AS POLYPS.: Brand: ROTH NET

## (undated) DEVICE — GUIDEWIRE STRGHT TIP 0.038 IN SOLO PLUS

## (undated) DEVICE — SCD SEQUENTIAL COMPRESSION COMFORT SLEEVE MEDIUM KNEE LENGTH: Brand: KENDALL SCD

## (undated) DEVICE — DRAPE SURGIKIT SADDLE BAG

## (undated) DEVICE — CATH FOLEY COUDE 16FR 5ML 2 WAY TIEMANN LUBRICATH

## (undated) DEVICE — NGAGE, NITINOL STONE EXTRACTOR: Brand: NGAGE

## (undated) DEVICE — SEAL ADJUST BIOPSY PORT Y ADAPTOR

## (undated) DEVICE — FABRIC REINFORCED, SURGICAL GOWN, XL: Brand: CONVERTORS

## (undated) DEVICE — PROXIMATE SKIN STAPLERS (35 WIDE) CONTAINS 35 STAINLESS STEEL STAPLES (FIXED HEAD): Brand: PROXIMATE

## (undated) DEVICE — CYSTO TUBING TUR Y IRRIGATION

## (undated) DEVICE — BAG URINE DRAINAGE 4000ML CONTINUOUS IRR

## (undated) DEVICE — 3M™ STERI-STRIP™ REINFORCED ADHESIVE SKIN CLOSURES, R1547, 1/2 IN X 4 IN (12 MM X 100 MM), 6 STRIPS/ENVELOPE: Brand: 3M™ STERI-STRIP™

## (undated) DEVICE — TOWEL SURG XR DETECT GREEN STRL RFD

## (undated) DEVICE — MEDI-VAC YANKAUER SUCTION HANDLE: Brand: CARDINAL HEALTH

## (undated) DEVICE — DRAPE UTILITY

## (undated) DEVICE — DISPOSABLE BIOPSY VALVE MAJ-1555: Brand: SINGLE USE BIOPSY VALVE (STERILE)

## (undated) DEVICE — SUT SILK 3-0 18 IN A184H

## (undated) DEVICE — SHEATH URETHERAL ACCESS FLEXOR 12FR 35CM

## (undated) DEVICE — BOWL: 16OZ PEELPOUCH 75/CS 16/PLT: Brand: MEDEGEN MEDICAL PRODUCTS, LLC

## (undated) DEVICE — GLOVE EXAM NON-STRL NTRL PLUS LRG PF

## (undated) DEVICE — GLOVE SRG BIOGEL 8

## (undated) DEVICE — STERILE DOUBLE BASIN SET PACK: Brand: CARDINAL HEALTH

## (undated) DEVICE — TUBING BUBBLE CLEAR 5MM X 100 FT NS

## (undated) DEVICE — SEAL BIOPSY PORT ADJUST F/ACCESSORIES UP TO 3FR

## (undated) DEVICE — SOLIDIFIER FLUID WASTE CONTROL 1500ML

## (undated) DEVICE — PACK UNIVERSAL NECK

## (undated) DEVICE — EMG TUBE 8229707 NIM TRIVANTAGE 7.0MM ID: Brand: NIM TRIVANTAGE™

## (undated) DEVICE — GLOVE SRG BIOGEL ORTHOPEDIC 7.5

## (undated) DEVICE — TIBURON SPLIT SHEET: Brand: CONVERTORS

## (undated) DEVICE — INTENDED FOR TISSUE SEPARATION, AND OTHER PROCEDURES THAT REQUIRE A SHARP SURGICAL BLADE TO PUNCTURE OR CUT.: Brand: BARD-PARKER ® CARBON RIB-BACK BLADES

## (undated) DEVICE — "MAJ-901 WATER CONTAINER SET CV-160/140": Brand: WATER CONTAINER

## (undated) DEVICE — LIGACLIP MCA MULTIPLE CLIP APPLIERS, 20 MEDIUM CLIPS: Brand: LIGACLIP

## (undated) DEVICE — SURGICEL 4 X 8

## (undated) DEVICE — JACKSON-PRATT 100CC BULB RESERVOIR: Brand: CARDINAL HEALTH

## (undated) DEVICE — "MH-443 SUCTION VALVE F/EVIS140 EVIS160": Brand: SUCTION VALVE

## (undated) DEVICE — CATH FOLEY IC 16FR 5ML

## (undated) DEVICE — PROBE 8225101 5PK STD PRASS FL TIP ROHS

## (undated) DEVICE — SUT VICRYL 3-0 SH 27 IN J416H

## (undated) DEVICE — JP CHAN DRN SIL HUBLESS 15FR W/TRO: Brand: CARDINAL HEALTH

## (undated) DEVICE — "MB-142 MOUTHPIECE": Brand: MOUTHPIECE

## (undated) DEVICE — BRUSH ENDO CLEANING DBL-HEADER

## (undated) DEVICE — "MH-438 A/W VLVE F/140 EVIS-140": Brand: AIR/WATER VALVE

## (undated) DEVICE — FORCEP ENDO RESCUE RAT TOOTH 230CM

## (undated) DEVICE — BIPOLAR CORD DISP

## (undated) DEVICE — PACK GENERAL LF

## (undated) DEVICE — GLOVE INDICATOR PI UNDERGLOVE SZ 8.5 BLUE

## (undated) DEVICE — CHLORAPREP HI-LITE 26ML ORANGE

## (undated) DEVICE — NGAGE, NITINOL STONE EXTRACTOR MODIFIED BASKET: Brand: NGAGE

## (undated) DEVICE — BULB SYRINGE,IRRIGATION WITH PROTECTIVE CAP: Brand: DOVER

## (undated) DEVICE — BITE BLOCK MAXI 60FR LF STRAP

## (undated) DEVICE — SUT SILK 3-0 30 IN A304H

## (undated) DEVICE — HEMOCLIP CARTRIDGE MED

## (undated) DEVICE — SINGLE-USE BIOPSY FORCEPS: Brand: RADIAL JAW 4

## (undated) DEVICE — LIGATOR MULTI 7 BANDING KIT

## (undated) DEVICE — SUT SILK 3-0 SH CR/8 18 IN C013D

## (undated) DEVICE — TRAVELKIT CONTAINS FIRST STEP KIT (200ML EP-4 KIT) AND SOILED SCOPE BAG - 1 KIT: Brand: TRAVELKIT CONTAINS FIRST STEP KIT AND SOILED SCOPE BAG

## (undated) DEVICE — 1200CC GUARDIAN II: Brand: GUARDIAN

## (undated) DEVICE — SHEATH URETERAL ACCESS 10/12FR 45CM PROXIS

## (undated) DEVICE — AIRLIFE™  ADULT CUSHION NASAL CANNULA WITH 7 FOOT (2.1 M) CRUSH-RESISTANT OXYGEN TUBING, AND U/CONNECT-IT ADAPTER: Brand: AIRLIFE™

## (undated) DEVICE — AAMI LEVEL 4 POLY-REINFORCED SURGICAL GOWN, X-LARGE, STERILE, SET-IN: Brand: CONVERTORS

## (undated) DEVICE — 60 ML SYRINGE,REGULAR TIP: Brand: MONOJECT

## (undated) DEVICE — GLOVE INDICATOR PI UNDERGLOVE SZ 7.5 BLUE

## (undated) DEVICE — DRESSING MEPILEX AG BORDER POST-OP 4 X 8 IN